# Patient Record
Sex: FEMALE | Race: WHITE | NOT HISPANIC OR LATINO | URBAN - METROPOLITAN AREA
[De-identification: names, ages, dates, MRNs, and addresses within clinical notes are randomized per-mention and may not be internally consistent; named-entity substitution may affect disease eponyms.]

---

## 2019-06-25 ENCOUNTER — OFFICE (OUTPATIENT)
Dept: URBAN - METROPOLITAN AREA CLINIC 75 | Facility: CLINIC | Age: 79
End: 2019-06-25

## 2019-06-25 VITALS
SYSTOLIC BLOOD PRESSURE: 160 MMHG | DIASTOLIC BLOOD PRESSURE: 104 MMHG | HEIGHT: 67 IN | WEIGHT: 225 LBS | HEART RATE: 75 BPM

## 2019-06-25 DIAGNOSIS — I48.91 UNSPECIFIED ATRIAL FIBRILLATION: ICD-10-CM

## 2019-06-25 DIAGNOSIS — D50.0 IRON DEFICIENCY ANEMIA SECONDARY TO BLOOD LOSS (CHRONIC): ICD-10-CM

## 2019-06-25 PROCEDURE — 99204 OFFICE O/P NEW MOD 45 MIN: CPT | Performed by: INTERNAL MEDICINE

## 2019-08-12 VITALS
TEMPERATURE: 96.4 F | HEART RATE: 61 BPM | DIASTOLIC BLOOD PRESSURE: 72 MMHG | RESPIRATION RATE: 21 BRPM | HEART RATE: 66 BPM | TEMPERATURE: 96.2 F | WEIGHT: 225 LBS | SYSTOLIC BLOOD PRESSURE: 191 MMHG | RESPIRATION RATE: 13 BRPM | SYSTOLIC BLOOD PRESSURE: 154 MMHG | SYSTOLIC BLOOD PRESSURE: 143 MMHG | RESPIRATION RATE: 22 BRPM | RESPIRATION RATE: 23 BRPM | DIASTOLIC BLOOD PRESSURE: 58 MMHG | SYSTOLIC BLOOD PRESSURE: 139 MMHG | RESPIRATION RATE: 17 BRPM | SYSTOLIC BLOOD PRESSURE: 158 MMHG | SYSTOLIC BLOOD PRESSURE: 208 MMHG | RESPIRATION RATE: 16 BRPM | HEART RATE: 70 BPM | OXYGEN SATURATION: 93 % | SYSTOLIC BLOOD PRESSURE: 175 MMHG | HEART RATE: 63 BPM | DIASTOLIC BLOOD PRESSURE: 61 MMHG | DIASTOLIC BLOOD PRESSURE: 74 MMHG | HEIGHT: 67 IN | RESPIRATION RATE: 19 BRPM | OXYGEN SATURATION: 96 % | HEART RATE: 60 BPM | SYSTOLIC BLOOD PRESSURE: 204 MMHG | RESPIRATION RATE: 20 BRPM | OXYGEN SATURATION: 92 % | HEART RATE: 67 BPM | HEART RATE: 57 BPM | SYSTOLIC BLOOD PRESSURE: 156 MMHG | HEART RATE: 64 BPM | HEART RATE: 62 BPM | DIASTOLIC BLOOD PRESSURE: 65 MMHG | OXYGEN SATURATION: 95 % | SYSTOLIC BLOOD PRESSURE: 159 MMHG | SYSTOLIC BLOOD PRESSURE: 161 MMHG | RESPIRATION RATE: 14 BRPM | HEART RATE: 69 BPM | OXYGEN SATURATION: 94 % | HEART RATE: 68 BPM | DIASTOLIC BLOOD PRESSURE: 67 MMHG | SYSTOLIC BLOOD PRESSURE: 133 MMHG | DIASTOLIC BLOOD PRESSURE: 82 MMHG | DIASTOLIC BLOOD PRESSURE: 81 MMHG | HEART RATE: 58 BPM | SYSTOLIC BLOOD PRESSURE: 157 MMHG | DIASTOLIC BLOOD PRESSURE: 69 MMHG | SYSTOLIC BLOOD PRESSURE: 145 MMHG | OXYGEN SATURATION: 100 % | SYSTOLIC BLOOD PRESSURE: 165 MMHG | HEART RATE: 65 BPM | DIASTOLIC BLOOD PRESSURE: 70 MMHG

## 2019-08-15 ENCOUNTER — OFFICE (OUTPATIENT)
Dept: URBAN - METROPOLITAN AREA PATHOLOGY 4 | Facility: PATHOLOGY | Age: 79
End: 2019-08-15

## 2019-08-15 ENCOUNTER — AMBULATORY SURGICAL CENTER (OUTPATIENT)
Dept: URBAN - METROPOLITAN AREA SURGERY 17 | Facility: SURGERY | Age: 79
End: 2019-08-15

## 2019-08-15 DIAGNOSIS — K29.80 DUODENITIS WITHOUT BLEEDING: ICD-10-CM

## 2019-08-15 DIAGNOSIS — D12.3 BENIGN NEOPLASM OF TRANSVERSE COLON: ICD-10-CM

## 2019-08-15 DIAGNOSIS — D12.5 BENIGN NEOPLASM OF SIGMOID COLON: ICD-10-CM

## 2019-08-15 DIAGNOSIS — D50.0 IRON DEFICIENCY ANEMIA SECONDARY TO BLOOD LOSS (CHRONIC): ICD-10-CM

## 2019-08-15 LAB
GI HISTOLOGY: A. UNSPECIFIED: (no result)
GI HISTOLOGY: B. UNSPECIFIED: (no result)
GI HISTOLOGY: C. UNSPECIFIED: (no result)
GI HISTOLOGY: PDF REPORT: (no result)

## 2019-08-15 PROCEDURE — 45385 COLONOSCOPY W/LESION REMOVAL: CPT | Performed by: INTERNAL MEDICINE

## 2019-08-15 PROCEDURE — 43239 EGD BIOPSY SINGLE/MULTIPLE: CPT | Performed by: INTERNAL MEDICINE

## 2019-08-15 PROCEDURE — 88305 TISSUE EXAM BY PATHOLOGIST: CPT | Performed by: INTERNAL MEDICINE

## 2022-03-31 ENCOUNTER — TRANSCRIBE ORDERS (OUTPATIENT)
Dept: ADMINISTRATIVE | Facility: HOSPITAL | Age: 82
End: 2022-03-31

## 2022-03-31 DIAGNOSIS — D63.1 ERYTHROPOIETIN DEFICIENCY ANEMIA: ICD-10-CM

## 2022-03-31 DIAGNOSIS — N18.4 CHRONIC RENAL DISEASE, STAGE IV: ICD-10-CM

## 2022-03-31 DIAGNOSIS — N18.4 CHRONIC KIDNEY DISEASE, STAGE IV (SEVERE): Primary | ICD-10-CM

## 2022-03-31 DIAGNOSIS — D63.1 ANEMIA IN CHRONIC KIDNEY DISEASE (CODE): Primary | ICD-10-CM

## 2022-04-01 ENCOUNTER — HOSPITAL ENCOUNTER (OUTPATIENT)
Dept: INFUSION THERAPY | Facility: HOSPITAL | Age: 82
Discharge: HOME | End: 2022-04-01
Admitting: INTERNAL MEDICINE

## 2022-04-01 VITALS
HEART RATE: 67 BPM | SYSTOLIC BLOOD PRESSURE: 159 MMHG | RESPIRATION RATE: 22 BRPM | OXYGEN SATURATION: 100 % | DIASTOLIC BLOOD PRESSURE: 75 MMHG | TEMPERATURE: 97.1 F

## 2022-04-01 DIAGNOSIS — D63.1 ERYTHROPOIETIN DEFICIENCY ANEMIA: ICD-10-CM

## 2022-04-01 DIAGNOSIS — N18.4 CHRONIC KIDNEY DISEASE, STAGE IV (SEVERE): Primary | ICD-10-CM

## 2022-04-01 LAB
BASOPHILS # BLD AUTO: 0.03 10*3/MM3 (ref 0–0.2)
BASOPHILS NFR BLD AUTO: 0.4 % (ref 0–1.5)
DEPRECATED RDW RBC AUTO: 45.2 FL (ref 37–54)
EOSINOPHIL # BLD AUTO: 0.12 10*3/MM3 (ref 0–0.4)
EOSINOPHIL NFR BLD AUTO: 1.4 % (ref 0.3–6.2)
ERYTHROCYTE [DISTWIDTH] IN BLOOD BY AUTOMATED COUNT: 14.7 % (ref 12.3–15.4)
FERRITIN SERPL-MCNC: 43.1 NG/ML (ref 13–150)
HCT VFR BLD AUTO: 23.9 % (ref 34–46.6)
HGB BLD-MCNC: 7.8 G/DL (ref 12–15.9)
IMM GRANULOCYTES # BLD AUTO: 0.04 10*3/MM3 (ref 0–0.05)
IMM GRANULOCYTES NFR BLD AUTO: 0.5 % (ref 0–0.5)
IRON 24H UR-MRATE: 42 MCG/DL (ref 37–145)
IRON SATN MFR SERPL: 9 % (ref 20–50)
LYMPHOCYTES # BLD AUTO: 1.56 10*3/MM3 (ref 0.7–3.1)
LYMPHOCYTES NFR BLD AUTO: 18.7 % (ref 19.6–45.3)
MCH RBC QN AUTO: 27.8 PG (ref 26.6–33)
MCHC RBC AUTO-ENTMCNC: 32.6 G/DL (ref 31.5–35.7)
MCV RBC AUTO: 85.1 FL (ref 79–97)
MONOCYTES # BLD AUTO: 0.87 10*3/MM3 (ref 0.1–0.9)
MONOCYTES NFR BLD AUTO: 10.4 % (ref 5–12)
NEUTROPHILS NFR BLD AUTO: 5.72 10*3/MM3 (ref 1.7–7)
NEUTROPHILS NFR BLD AUTO: 68.6 % (ref 42.7–76)
NRBC BLD AUTO-RTO: 0 /100 WBC (ref 0–0.2)
PLATELET # BLD AUTO: 226 10*3/MM3 (ref 140–450)
PMV BLD AUTO: 10.2 FL (ref 6–12)
RBC # BLD AUTO: 2.81 10*6/MM3 (ref 3.77–5.28)
TIBC SERPL-MCNC: 475 MCG/DL (ref 298–536)
TRANSFERRIN SERPL-MCNC: 319 MG/DL (ref 200–360)
WBC NRBC COR # BLD: 8.34 10*3/MM3 (ref 3.4–10.8)

## 2022-04-01 PROCEDURE — 86901 BLOOD TYPING SEROLOGIC RH(D): CPT

## 2022-04-01 PROCEDURE — 85025 COMPLETE CBC W/AUTO DIFF WBC: CPT | Performed by: INTERNAL MEDICINE

## 2022-04-01 PROCEDURE — 96365 THER/PROPH/DIAG IV INF INIT: CPT

## 2022-04-01 PROCEDURE — 96374 THER/PROPH/DIAG INJ IV PUSH: CPT

## 2022-04-01 PROCEDURE — 36415 COLL VENOUS BLD VENIPUNCTURE: CPT

## 2022-04-01 PROCEDURE — 25010000002 FERUMOXYTOL 510 MG/17ML SOLUTION 17 ML VIAL: Performed by: INTERNAL MEDICINE

## 2022-04-01 PROCEDURE — 84466 ASSAY OF TRANSFERRIN: CPT | Performed by: INTERNAL MEDICINE

## 2022-04-01 PROCEDURE — 25010000002 EPOETIN ALFA PER 1000 UNITS: Performed by: INTERNAL MEDICINE

## 2022-04-01 PROCEDURE — 82728 ASSAY OF FERRITIN: CPT | Performed by: INTERNAL MEDICINE

## 2022-04-01 PROCEDURE — 83540 ASSAY OF IRON: CPT | Performed by: INTERNAL MEDICINE

## 2022-04-01 PROCEDURE — 96372 THER/PROPH/DIAG INJ SC/IM: CPT

## 2022-04-01 PROCEDURE — 86900 BLOOD TYPING SEROLOGIC ABO: CPT

## 2022-04-01 RX ORDER — FUROSEMIDE 20 MG/1
20 TABLET ORAL DAILY
COMMUNITY
Start: 2022-01-27 | End: 2022-04-12 | Stop reason: HOSPADM

## 2022-04-01 RX ORDER — GABAPENTIN 300 MG/1
300 CAPSULE ORAL 3 TIMES DAILY
COMMUNITY
Start: 2021-10-25 | End: 2022-04-12 | Stop reason: HOSPADM

## 2022-04-01 RX ORDER — METOPROLOL TARTRATE 100 MG/1
100 TABLET ORAL DAILY
COMMUNITY
Start: 2022-02-07 | End: 2022-04-12 | Stop reason: HOSPADM

## 2022-04-01 RX ORDER — VENLAFAXINE HYDROCHLORIDE 75 MG/1
75 CAPSULE, EXTENDED RELEASE ORAL DAILY
COMMUNITY
Start: 2021-12-29

## 2022-04-01 RX ORDER — ATORVASTATIN CALCIUM 40 MG/1
1 TABLET, FILM COATED ORAL
COMMUNITY
Start: 2021-11-22

## 2022-04-01 RX ORDER — MULTIPLE VITAMINS W/ MINERALS TAB 9MG-400MCG
1 TAB ORAL DAILY
COMMUNITY

## 2022-04-01 RX ORDER — OMEPRAZOLE 40 MG/1
40 CAPSULE, DELAYED RELEASE ORAL DAILY
COMMUNITY
Start: 2021-11-23 | End: 2022-10-15 | Stop reason: HOSPADM

## 2022-04-01 RX ORDER — AMLODIPINE BESYLATE 5 MG/1
5 TABLET ORAL 2 TIMES DAILY
COMMUNITY
Start: 2021-12-29 | End: 2022-04-12 | Stop reason: HOSPADM

## 2022-04-01 RX ADMIN — FERUMOXYTOL 510 MG: 510 INJECTION INTRAVENOUS at 15:50

## 2022-04-01 RX ADMIN — ERYTHROPOIETIN 20000 UNITS: 20000 INJECTION, SOLUTION INTRAVENOUS; SUBCUTANEOUS at 14:45

## 2022-04-01 NOTE — PROGRESS NOTES
Pt given information verbally and written concerning procrit. Pt and daughter verbalized understanding.  Pt and daughter informed of orders and plan of care for the day.  Both verbalized understanding.      Pt given procrit injection per order due to order parameters met.  Pt and Daughter given information verbally and written concerning feraheme infusion due to order parameters met.  Both verbalized understanding.  Pt tolerated injection and infusion well.  Pt monitored for 30 minutes without complication. Pt given AVS and  dc'ed per wc with daughter.

## 2022-04-03 ENCOUNTER — HOSPITAL ENCOUNTER (INPATIENT)
Facility: HOSPITAL | Age: 82
LOS: 9 days | Discharge: NURSING, REHAB OR LONG-TERM CARE | End: 2022-04-12
Attending: EMERGENCY MEDICINE | Admitting: STUDENT IN AN ORGANIZED HEALTH CARE EDUCATION/TRAINING PROGRAM

## 2022-04-03 ENCOUNTER — APPOINTMENT (OUTPATIENT)
Dept: GENERAL RADIOLOGY | Facility: HOSPITAL | Age: 82
End: 2022-04-03

## 2022-04-03 DIAGNOSIS — D64.9 SYMPTOMATIC ANEMIA: ICD-10-CM

## 2022-04-03 DIAGNOSIS — E11.42 DIABETIC POLYNEUROPATHY ASSOCIATED WITH TYPE 2 DIABETES MELLITUS: ICD-10-CM

## 2022-04-03 DIAGNOSIS — I50.32 CHRONIC DIASTOLIC CONGESTIVE HEART FAILURE: ICD-10-CM

## 2022-04-03 DIAGNOSIS — N18.9 CHRONIC KIDNEY DISEASE, UNSPECIFIED CKD STAGE: ICD-10-CM

## 2022-04-03 DIAGNOSIS — K92.2 GASTROINTESTINAL HEMORRHAGE, UNSPECIFIED GASTROINTESTINAL HEMORRHAGE TYPE: ICD-10-CM

## 2022-04-03 DIAGNOSIS — F41.8 ANXIETY ASSOCIATED WITH DEPRESSION: ICD-10-CM

## 2022-04-03 DIAGNOSIS — I48.0 PAF (PAROXYSMAL ATRIAL FIBRILLATION): ICD-10-CM

## 2022-04-03 DIAGNOSIS — E66.9 OBESITY (BMI 30-39.9): ICD-10-CM

## 2022-04-03 DIAGNOSIS — G47.33 OSA (OBSTRUCTIVE SLEEP APNEA): Primary | ICD-10-CM

## 2022-04-03 PROBLEM — I50.9 CHF (CONGESTIVE HEART FAILURE): Status: ACTIVE | Noted: 2022-04-03

## 2022-04-03 PROBLEM — D50.9 IRON DEFICIENCY ANEMIA: Status: ACTIVE | Noted: 2022-04-03

## 2022-04-03 PROBLEM — R06.02 SHORTNESS OF BREATH: Status: ACTIVE | Noted: 2022-04-03

## 2022-04-03 LAB
ABO GROUP BLD: NORMAL
ALBUMIN SERPL-MCNC: 3.1 G/DL (ref 3.5–5.2)
ALBUMIN/GLOB SERPL: 1.1 G/DL
ALP SERPL-CCNC: 144 U/L (ref 39–117)
ALT SERPL W P-5'-P-CCNC: 6 U/L (ref 1–33)
ANION GAP SERPL CALCULATED.3IONS-SCNC: 10.8 MMOL/L (ref 5–15)
ANION GAP SERPL CALCULATED.3IONS-SCNC: 11.9 MMOL/L (ref 5–15)
APTT PPP: 33.2 SECONDS (ref 22.7–35.4)
AST SERPL-CCNC: 24 U/L (ref 1–32)
BASOPHILS # BLD AUTO: 0.03 10*3/MM3 (ref 0–0.2)
BASOPHILS NFR BLD AUTO: 0.6 % (ref 0–1.5)
BH BB BLOOD EXPIRATION DATE: NORMAL
BH BB BLOOD TYPE BARCODE: 6200
BH BB DISPENSE STATUS: NORMAL
BH BB PRODUCT CODE: NORMAL
BH BB UNIT NUMBER: NORMAL
BILIRUB SERPL-MCNC: 0.5 MG/DL (ref 0–1.2)
BLD GP AB SCN SERPL QL: NEGATIVE
BUN SERPL-MCNC: 27 MG/DL (ref 8–23)
BUN SERPL-MCNC: 27 MG/DL (ref 8–23)
BUN/CREAT SERPL: 12.2 (ref 7–25)
BUN/CREAT SERPL: 13.4 (ref 7–25)
CALCIUM SPEC-SCNC: 8.3 MG/DL (ref 8.6–10.5)
CALCIUM SPEC-SCNC: 9 MG/DL (ref 8.6–10.5)
CHLORIDE SERPL-SCNC: 108 MMOL/L (ref 98–107)
CHLORIDE SERPL-SCNC: 110 MMOL/L (ref 98–107)
CO2 SERPL-SCNC: 16.1 MMOL/L (ref 22–29)
CO2 SERPL-SCNC: 20.2 MMOL/L (ref 22–29)
CREAT SERPL-MCNC: 2.02 MG/DL (ref 0.57–1)
CREAT SERPL-MCNC: 2.21 MG/DL (ref 0.57–1)
CROSSMATCH INTERPRETATION: NORMAL
DEPRECATED RDW RBC AUTO: 48.3 FL (ref 37–54)
DEPRECATED RDW RBC AUTO: 48.4 FL (ref 37–54)
EGFRCR SERPLBLD CKD-EPI 2021: 21.8 ML/MIN/1.73
EGFRCR SERPLBLD CKD-EPI 2021: 24.2 ML/MIN/1.73
EOSINOPHIL # BLD AUTO: 0.13 10*3/MM3 (ref 0–0.4)
EOSINOPHIL NFR BLD AUTO: 2.6 % (ref 0.3–6.2)
ERYTHROCYTE [DISTWIDTH] IN BLOOD BY AUTOMATED COUNT: 15.2 % (ref 12.3–15.4)
ERYTHROCYTE [DISTWIDTH] IN BLOOD BY AUTOMATED COUNT: 15.3 % (ref 12.3–15.4)
GLOBULIN UR ELPH-MCNC: 2.9 GM/DL
GLUCOSE SERPL-MCNC: 121 MG/DL (ref 65–99)
GLUCOSE SERPL-MCNC: 169 MG/DL (ref 65–99)
HCT VFR BLD AUTO: 22.5 % (ref 34–46.6)
HCT VFR BLD AUTO: 24.2 % (ref 34–46.6)
HGB BLD-MCNC: 7.3 G/DL (ref 12–15.9)
HGB BLD-MCNC: 7.9 G/DL (ref 12–15.9)
IMM GRANULOCYTES # BLD AUTO: 0.01 10*3/MM3 (ref 0–0.05)
IMM GRANULOCYTES NFR BLD AUTO: 0.2 % (ref 0–0.5)
INR PPP: 2.14 (ref 0.9–1.1)
LYMPHOCYTES # BLD AUTO: 1.35 10*3/MM3 (ref 0.7–3.1)
LYMPHOCYTES NFR BLD AUTO: 27 % (ref 19.6–45.3)
MCH RBC QN AUTO: 28.3 PG (ref 26.6–33)
MCH RBC QN AUTO: 28.3 PG (ref 26.6–33)
MCHC RBC AUTO-ENTMCNC: 32.4 G/DL (ref 31.5–35.7)
MCHC RBC AUTO-ENTMCNC: 32.6 G/DL (ref 31.5–35.7)
MCV RBC AUTO: 86.7 FL (ref 79–97)
MCV RBC AUTO: 87.2 FL (ref 79–97)
MONOCYTES # BLD AUTO: 0.64 10*3/MM3 (ref 0.1–0.9)
MONOCYTES NFR BLD AUTO: 12.8 % (ref 5–12)
NEUTROPHILS NFR BLD AUTO: 2.84 10*3/MM3 (ref 1.7–7)
NEUTROPHILS NFR BLD AUTO: 56.8 % (ref 42.7–76)
NRBC BLD AUTO-RTO: 0 /100 WBC (ref 0–0.2)
NT-PROBNP SERPL-MCNC: 2649 PG/ML (ref 0–1800)
PLATELET # BLD AUTO: 178 10*3/MM3 (ref 140–450)
PLATELET # BLD AUTO: 181 10*3/MM3 (ref 140–450)
PMV BLD AUTO: 10.4 FL (ref 6–12)
PMV BLD AUTO: 9.7 FL (ref 6–12)
POTASSIUM SERPL-SCNC: 4.5 MMOL/L (ref 3.5–5.2)
POTASSIUM SERPL-SCNC: 4.7 MMOL/L (ref 3.5–5.2)
PROT SERPL-MCNC: 6 G/DL (ref 6–8.5)
PROTHROMBIN TIME: 24 SECONDS (ref 11.7–14.2)
QT INTERVAL: 445 MS
RBC # BLD AUTO: 2.58 10*6/MM3 (ref 3.77–5.28)
RBC # BLD AUTO: 2.79 10*6/MM3 (ref 3.77–5.28)
RH BLD: POSITIVE
SARS-COV-2 RNA PNL SPEC NAA+PROBE: NOT DETECTED
SODIUM SERPL-SCNC: 138 MMOL/L (ref 136–145)
SODIUM SERPL-SCNC: 139 MMOL/L (ref 136–145)
T&S EXPIRATION DATE: NORMAL
TROPONIN T SERPL-MCNC: <0.01 NG/ML (ref 0–0.03)
TROPONIN T SERPL-MCNC: <0.01 NG/ML (ref 0–0.03)
TSH SERPL DL<=0.05 MIU/L-ACNC: 3.94 UIU/ML (ref 0.27–4.2)
UNIT  ABO: NORMAL
UNIT  RH: NORMAL
WBC NRBC COR # BLD: 5 10*3/MM3 (ref 3.4–10.8)
WBC NRBC COR # BLD: 5.61 10*3/MM3 (ref 3.4–10.8)

## 2022-04-03 PROCEDURE — 86900 BLOOD TYPING SEROLOGIC ABO: CPT

## 2022-04-03 PROCEDURE — 85610 PROTHROMBIN TIME: CPT | Performed by: EMERGENCY MEDICINE

## 2022-04-03 PROCEDURE — 86901 BLOOD TYPING SEROLOGIC RH(D): CPT | Performed by: EMERGENCY MEDICINE

## 2022-04-03 PROCEDURE — 99221 1ST HOSP IP/OBS SF/LOW 40: CPT | Performed by: INTERNAL MEDICINE

## 2022-04-03 PROCEDURE — 87635 SARS-COV-2 COVID-19 AMP PRB: CPT | Performed by: EMERGENCY MEDICINE

## 2022-04-03 PROCEDURE — 86900 BLOOD TYPING SEROLOGIC ABO: CPT | Performed by: EMERGENCY MEDICINE

## 2022-04-03 PROCEDURE — 99284 EMERGENCY DEPT VISIT MOD MDM: CPT

## 2022-04-03 PROCEDURE — 83880 ASSAY OF NATRIURETIC PEPTIDE: CPT | Performed by: HOSPITALIST

## 2022-04-03 PROCEDURE — P9016 RBC LEUKOCYTES REDUCED: HCPCS

## 2022-04-03 PROCEDURE — 85025 COMPLETE CBC W/AUTO DIFF WBC: CPT | Performed by: EMERGENCY MEDICINE

## 2022-04-03 PROCEDURE — 36430 TRANSFUSION BLD/BLD COMPNT: CPT

## 2022-04-03 PROCEDURE — 86923 COMPATIBILITY TEST ELECTRIC: CPT

## 2022-04-03 PROCEDURE — 36415 COLL VENOUS BLD VENIPUNCTURE: CPT

## 2022-04-03 PROCEDURE — 85730 THROMBOPLASTIN TIME PARTIAL: CPT | Performed by: EMERGENCY MEDICINE

## 2022-04-03 PROCEDURE — 93005 ELECTROCARDIOGRAM TRACING: CPT

## 2022-04-03 PROCEDURE — 71045 X-RAY EXAM CHEST 1 VIEW: CPT

## 2022-04-03 PROCEDURE — 93010 ELECTROCARDIOGRAM REPORT: CPT | Performed by: INTERNAL MEDICINE

## 2022-04-03 PROCEDURE — 80053 COMPREHEN METABOLIC PANEL: CPT | Performed by: EMERGENCY MEDICINE

## 2022-04-03 PROCEDURE — 25010000002 FUROSEMIDE PER 20 MG: Performed by: INTERNAL MEDICINE

## 2022-04-03 PROCEDURE — 84484 ASSAY OF TROPONIN QUANT: CPT | Performed by: EMERGENCY MEDICINE

## 2022-04-03 PROCEDURE — 85027 COMPLETE CBC AUTOMATED: CPT

## 2022-04-03 PROCEDURE — 25010000002 FUROSEMIDE PER 20 MG: Performed by: HOSPITALIST

## 2022-04-03 PROCEDURE — 93005 ELECTROCARDIOGRAM TRACING: CPT | Performed by: EMERGENCY MEDICINE

## 2022-04-03 PROCEDURE — 84443 ASSAY THYROID STIM HORMONE: CPT | Performed by: HOSPITALIST

## 2022-04-03 PROCEDURE — 86850 RBC ANTIBODY SCREEN: CPT | Performed by: EMERGENCY MEDICINE

## 2022-04-03 RX ORDER — SODIUM CHLORIDE 0.9 % (FLUSH) 0.9 %
10 SYRINGE (ML) INJECTION AS NEEDED
Status: DISCONTINUED | OUTPATIENT
Start: 2022-04-03 | End: 2022-04-12 | Stop reason: HOSPADM

## 2022-04-03 RX ORDER — ONDANSETRON 4 MG/1
4 TABLET, FILM COATED ORAL EVERY 6 HOURS PRN
Status: DISCONTINUED | OUTPATIENT
Start: 2022-04-03 | End: 2022-04-12 | Stop reason: HOSPADM

## 2022-04-03 RX ORDER — ONDANSETRON 2 MG/ML
4 INJECTION INTRAMUSCULAR; INTRAVENOUS EVERY 6 HOURS PRN
Status: DISCONTINUED | OUTPATIENT
Start: 2022-04-03 | End: 2022-04-12 | Stop reason: HOSPADM

## 2022-04-03 RX ORDER — ATORVASTATIN CALCIUM 20 MG/1
40 TABLET, FILM COATED ORAL DAILY
Status: DISCONTINUED | OUTPATIENT
Start: 2022-04-03 | End: 2022-04-12 | Stop reason: HOSPADM

## 2022-04-03 RX ORDER — AMLODIPINE BESYLATE 5 MG/1
5 TABLET ORAL
Status: DISCONTINUED | OUTPATIENT
Start: 2022-04-03 | End: 2022-04-12 | Stop reason: HOSPADM

## 2022-04-03 RX ORDER — ACETAMINOPHEN 325 MG/1
650 TABLET ORAL EVERY 4 HOURS PRN
Status: DISCONTINUED | OUTPATIENT
Start: 2022-04-03 | End: 2022-04-12 | Stop reason: HOSPADM

## 2022-04-03 RX ORDER — FUROSEMIDE 10 MG/ML
40 INJECTION INTRAMUSCULAR; INTRAVENOUS ONCE
Status: DISCONTINUED | OUTPATIENT
Start: 2022-04-03 | End: 2022-04-03

## 2022-04-03 RX ORDER — NITROGLYCERIN 0.4 MG/1
0.4 TABLET SUBLINGUAL
Status: DISCONTINUED | OUTPATIENT
Start: 2022-04-03 | End: 2022-04-12 | Stop reason: HOSPADM

## 2022-04-03 RX ORDER — PANTOPRAZOLE SODIUM 40 MG/10ML
80 INJECTION, POWDER, LYOPHILIZED, FOR SOLUTION INTRAVENOUS ONCE
Status: COMPLETED | OUTPATIENT
Start: 2022-04-03 | End: 2022-04-03

## 2022-04-03 RX ORDER — FUROSEMIDE 10 MG/ML
40 INJECTION INTRAMUSCULAR; INTRAVENOUS EVERY 12 HOURS
Status: COMPLETED | OUTPATIENT
Start: 2022-04-03 | End: 2022-04-06

## 2022-04-03 RX ORDER — PANTOPRAZOLE SODIUM 40 MG/1
40 TABLET, DELAYED RELEASE ORAL EVERY MORNING
Status: DISCONTINUED | OUTPATIENT
Start: 2022-04-03 | End: 2022-04-12 | Stop reason: HOSPADM

## 2022-04-03 RX ORDER — GABAPENTIN 300 MG/1
300 CAPSULE ORAL 3 TIMES DAILY
Status: DISCONTINUED | OUTPATIENT
Start: 2022-04-03 | End: 2022-04-09

## 2022-04-03 RX ORDER — METOPROLOL TARTRATE 50 MG/1
100 TABLET, FILM COATED ORAL DAILY
Status: DISCONTINUED | OUTPATIENT
Start: 2022-04-03 | End: 2022-04-09

## 2022-04-03 RX ORDER — PANTOPRAZOLE SODIUM 40 MG/10ML
40 INJECTION, POWDER, LYOPHILIZED, FOR SOLUTION INTRAVENOUS EVERY 12 HOURS SCHEDULED
Status: DISCONTINUED | OUTPATIENT
Start: 2022-04-03 | End: 2022-04-03

## 2022-04-03 RX ORDER — UREA 10 %
3 LOTION (ML) TOPICAL NIGHTLY PRN
Status: DISCONTINUED | OUTPATIENT
Start: 2022-04-03 | End: 2022-04-12 | Stop reason: HOSPADM

## 2022-04-03 RX ORDER — VENLAFAXINE HYDROCHLORIDE 75 MG/1
75 CAPSULE, EXTENDED RELEASE ORAL DAILY
Status: DISCONTINUED | OUTPATIENT
Start: 2022-04-03 | End: 2022-04-12 | Stop reason: HOSPADM

## 2022-04-03 RX ORDER — SODIUM CHLORIDE, SODIUM LACTATE, POTASSIUM CHLORIDE, CALCIUM CHLORIDE 600; 310; 30; 20 MG/100ML; MG/100ML; MG/100ML; MG/100ML
75 INJECTION, SOLUTION INTRAVENOUS CONTINUOUS
Status: DISCONTINUED | OUTPATIENT
Start: 2022-04-03 | End: 2022-04-03

## 2022-04-03 RX ADMIN — METOPROLOL TARTRATE 100 MG: 50 TABLET, FILM COATED ORAL at 12:43

## 2022-04-03 RX ADMIN — GABAPENTIN 300 MG: 300 CAPSULE ORAL at 12:43

## 2022-04-03 RX ADMIN — FUROSEMIDE 40 MG: 10 INJECTION, SOLUTION INTRAMUSCULAR; INTRAVENOUS at 12:43

## 2022-04-03 RX ADMIN — PANTOPRAZOLE SODIUM 40 MG: 40 INJECTION, POWDER, FOR SOLUTION INTRAVENOUS at 08:06

## 2022-04-03 RX ADMIN — AMLODIPINE BESYLATE 5 MG: 5 TABLET ORAL at 12:43

## 2022-04-03 RX ADMIN — ATORVASTATIN CALCIUM 40 MG: 20 TABLET, FILM COATED ORAL at 12:43

## 2022-04-03 RX ADMIN — VENLAFAXINE HYDROCHLORIDE 75 MG: 75 CAPSULE, EXTENDED RELEASE ORAL at 18:02

## 2022-04-03 RX ADMIN — GABAPENTIN 300 MG: 300 CAPSULE ORAL at 21:42

## 2022-04-03 RX ADMIN — PANTOPRAZOLE SODIUM 80 MG: 40 INJECTION, POWDER, LYOPHILIZED, FOR SOLUTION INTRAVENOUS at 01:04

## 2022-04-03 RX ADMIN — FUROSEMIDE 40 MG: 10 INJECTION, SOLUTION INTRAMUSCULAR; INTRAVENOUS at 18:02

## 2022-04-03 RX ADMIN — GABAPENTIN 300 MG: 300 CAPSULE ORAL at 18:02

## 2022-04-03 RX ADMIN — PANTOPRAZOLE SODIUM 40 MG: 40 TABLET, DELAYED RELEASE ORAL at 12:43

## 2022-04-03 NOTE — PLAN OF CARE
Goal Outcome Evaluation:      Pt to room 659 via wheelchair daughter w her. No c/o voiced. 1 u PRBC's infusing. VSS. Md notified of admission new orders noted.  Cont to monitor.

## 2022-04-03 NOTE — CONSULTS
[facility]  Initial Inpatient Consult Note    Referring Provider: LUI Mathews    Reason for Consultation: Rectal bleeding, anemia    Subjective     History of present illness:    82 y.o. female recent episode of bright red blood on the tissue.  She denies any prior history of rectal bleeding.  No complaints of hemorrhoids or straining.  She has had previous colonoscopy by Dr. Jaleel Waterman several years past.  She is also been diagnosed with colon polyps in the past.  No upper GI complaints.  She does have some issues with edema and congestive heart failure as well as chronic kidney disease stage IV.  Some of her anemia has been attributed to chronic kidney disease.  Patient on Eliquis for atrial fibrillation.  Advised with no evidence of active GI bleeding at this time she can defer formal evaluation for now but would return to the A service for further evaluation once she has been discharged.  If she requires resumption of anticoagulant would monitor closely.    Past Medical History:  Past Medical History:   Diagnosis Date   • Anxiety    • Atrial fibrillation (HCC)    • Chronic kidney disease, stage IV (severe) (HCC)    • Depression    • Elevated cholesterol    • Hypertension    • Type 2 diabetes mellitus (HCC)      Past Surgical History:  Past Surgical History:   Procedure Laterality Date   • APPENDECTOMY     • CHOLECYSTECTOMY     • COLONOSCOPY     • HYSTERECTOMY     • TUMOR REMOVAL Left     benign tumor from left breast      Social History:   Social History     Tobacco Use   • Smoking status: Never Smoker   • Smokeless tobacco: Never Used   Substance Use Topics   • Alcohol use: Never      Family History:  No family history on file.    Home Meds:  Medications Prior to Admission   Medication Sig Dispense Refill Last Dose   • amLODIPine (NORVASC) 5 MG tablet Take 5 mg by mouth 2 (Two) Times a Day.   4/2/2022 at Unknown time   • apixaban (ELIQUIS) 5 MG tablet tablet Take 5 mg by  mouth 2 (Two) Times a Day.   4/2/2022 at Unknown time   • atorvastatin (LIPITOR) 40 MG tablet Take 1 tablet by mouth every night at bedtime.   4/2/2022 at Unknown time   • furosemide (LASIX) 20 MG tablet Take 20 mg by mouth Daily.   4/2/2022 at Unknown time   • gabapentin (NEURONTIN) 300 MG capsule Take 300 mg by mouth 3 (Three) Times a Day. 2 capsules 3 times per day.   4/2/2022 at Unknown time   • metoprolol tartrate (LOPRESSOR) 100 MG tablet Take 100 mg by mouth Daily. 2 tablets daily   4/2/2022 at Unknown time   • multivitamin with minerals tablet tablet Take 1 tablet by mouth Daily.   4/2/2022 at Unknown time   • omeprazole (priLOSEC) 40 MG capsule Take 40 mg by mouth Daily.   4/2/2022 at Unknown time   • venlafaxine XR (EFFEXOR-XR) 75 MG 24 hr capsule Take 75 mg by mouth Daily.   4/2/2022 at Unknown time     Current Meds:   amLODIPine, 5 mg, Oral, Q24H  atorvastatin, 40 mg, Oral, Daily  furosemide, 40 mg, Intravenous, Once  gabapentin, 300 mg, Oral, TID  metoprolol tartrate, 100 mg, Oral, Daily  pantoprazole, 40 mg, Oral, QAM  venlafaxine XR, 75 mg, Oral, Daily      Allergies:  Allergies   Allergen Reactions   • Ibuprofen Other (See Comments)     Decrease urine output       Review of Systems  Pertinent items are noted in HPI, all other systems reviewed and negative     Objective     Vital Signs  Temp:  [97.6 °F (36.4 °C)-98.5 °F (36.9 °C)] 98.2 °F (36.8 °C)  Heart Rate:  [65-78] 77  Resp:  [18] 18  BP: (141-171)/(70-92) 165/92  Physical Exam:  General Appearance:    Alert, cooperative, in no acute distress   Head:    Normocephalic, without obvious abnormality, atraumatic   Eyes:          conjunctivae and sclerae normal, no   icterus   Throat:   no thrush, oral mucosa moist   Neck:   Supple, no adenopathy   Lungs:     Clear to auscultation bilaterally    Heart:    Regular rhythm and normal rate    Chest Wall:    No abnormalities observed   Abdomen:     Soft, nondistended, nontender; normal bowel sounds    Extremities:   no edema, no redness   Skin:   No bruising or rash   Psychiatric:  normal mood and insight     Results Review:   I reviewed the patient's new clinical results.    Results from last 7 days   Lab Units 04/03/22  0939 04/03/22  0044 04/01/22  1404   WBC 10*3/mm3 5.61 5.00 8.34   HEMOGLOBIN g/dL 7.9* 7.3* 7.8*   HEMATOCRIT % 24.2* 22.5* 23.9*   PLATELETS 10*3/mm3 181 178 226     Results from last 7 days   Lab Units 04/03/22  0939 04/03/22  0044   SODIUM mmol/L 139 138   POTASSIUM mmol/L 4.5 4.7   CHLORIDE mmol/L 108* 110*   CO2 mmol/L 20.2* 16.1*   BUN mg/dL 27* 27*   CREATININE mg/dL 2.21* 2.02*   CALCIUM mg/dL 9.0 8.3*   BILIRUBIN mg/dL  --  0.5   ALK PHOS U/L  --  144*   ALT (SGPT) U/L  --  6   AST (SGOT) U/L  --  24   GLUCOSE mg/dL 169* 121*     Results from last 7 days   Lab Units 04/03/22  0044   INR  2.14*     No results found for: LIPASE    Radiology:  XR Chest 1 View   Final Result   Cardiomegaly with vascular congestion.       This report was finalized on 4/3/2022 1:21 AM by Dr. Em Brewster M.D.              Assessment/Plan   Patient Active Problem List   Diagnosis   • Chronic kidney disease, stage IV (severe) ([facility])   • Erythropoietin deficiency anemia   • Symptomatic anemia   • Anxiety associated with depression   • Iron deficiency anemia   • PAF (paroxysmal atrial fibrillation) ([facility])   • Shortness of breath   • CHF (congestive heart failure) ([facility])       Assessment:  1. Bright red blood on the tissue x1  2. 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 History of colon polyps  4. Congestive heart failure  5. Chronic kidney disease stage IV    Plan:  · Monitor H&H  · Defer endoscopic evaluation at this point unless she shows evidence of active GI blood loss  · Anticipate follow-up with [facility] service for further evaluation of her colon secondary to her history of polyps and recent episode of bleeding.      I discussed the patients findings and my recommendations with patient and  family.    Michael Diaz MD

## 2022-04-03 NOTE — H&P
Patient Name:  Lowell Min  YOB: 1940  MRN:  [id number]  Admit Date:  4/3/2022  Patient Care Team:  Dannie Mathews MD as PCP - General (Internal Medicine)      Subjective   History Present Illness     Chief Complaint   Patient presents with   • Shortness of Breath   • Black or Bloody Stool       Ms. Min is a 82 y.o. female with a history of RAHDA with iron infusions, PAF, CKD, HTN, HLD, anxiety, depression and DM type II that presents to [facility] complaining of shortness of air/breath.  Patient states that for approximately 3 weeks she has been feeling tired and rundown.  She has had no energy and was experiencing dyspnea on exertion.  However, now she is experiencing increased dyspnea all the time.  Stable going to the bathroom and cleaning herself after she noted some blood.  Due to patient being on Eliquis she came to [facility] emergency department for further evaluation.  She came via EMS and was on a nonrebreather upon arrival due to her dyspnea.  However, oxygen was able to be weaned down and she is currently utilizing 2 L via nasal cannula and has been on that since shortly after arrival to [facility].  Currently at rest she is experiencing no shortness of air.  Patient's most recent iron infusion was yesterday.  Her current hemoglobin is 7.3.  Patient is presenting symptomatic.  Patient did have positive Hemoccult on stool collected in the emergency department.  She has had low hemoglobins for quite some time now and due to the iron infusions she has had dark stools.  Yesterday was the first time that she had noticed any blood while going to the bathroom.  She does report swelling in the abdomen and bilateral lower extremities.  She has been followed by nephrology for this.  She has reported some slight activity change.  Patient alert and oriented and lives alone but is checked on by her daughter.  She lives very independently and does for self.   Patient is ill-appearing but in no acute distress during time of exam.  Daughter at bedside who helped assist with patient's history.  Plan of care reviewed with patient and daughter and they are agreeable.  Review of systems, physical exam, diagnostic data and plan are as outlined below.          Review of Systems   Constitutional: Positive for activity change, fatigue and unexpected weight change. Negative for appetite change and fever.   HENT: Negative for sore throat and trouble swallowing.    Respiratory: Positive for shortness of breath. Negative for cough.    Gastrointestinal: Negative for abdominal pain, constipation, diarrhea, nausea and vomiting.   Endocrine: Negative for cold intolerance and heat intolerance.   Genitourinary: Positive for frequency. Negative for difficulty urinating.   Musculoskeletal: Positive for arthralgias and myalgias. Negative for neck pain and neck stiffness.   Neurological: Positive for dizziness, weakness and light-headedness. Negative for tremors and headaches.   Psychiatric/Behavioral: Negative for sleep disturbance. The patient is not nervous/anxious.         Personal History     Past Medical History:   Diagnosis Date   • Anxiety    • Atrial fibrillation (HCC)    • Chronic kidney disease, stage IV (severe) (HCC)    • Depression    • Elevated cholesterol    • Hypertension    • Type 2 diabetes mellitus (HCC)      Past Surgical History:   Procedure Laterality Date   • APPENDECTOMY     • CHOLECYSTECTOMY     • COLONOSCOPY     • HYSTERECTOMY     • TUMOR REMOVAL Left     benign tumor from left breast     No family history on file.  Social History     Tobacco Use   • Smoking status: Never Smoker   • Smokeless tobacco: Never Used   Vaping Use   • Vaping Use: Never used   Substance Use Topics   • Alcohol use: Never   • Drug use: Never     No current facility-administered medications on file prior to encounter.     Current Outpatient Medications on File Prior to Encounter   Medication  Sig Dispense Refill   • amLODIPine (NORVASC) 5 MG tablet Take 5 mg by mouth 2 (Two) Times a Day.     • apixaban (ELIQUIS) 5 MG tablet tablet Take 5 mg by mouth 2 (Two) Times a Day.     • atorvastatin (LIPITOR) 40 MG tablet Take 1 tablet by mouth every night at bedtime.     • furosemide (LASIX) 20 MG tablet Take 20 mg by mouth Daily.     • gabapentin (NEURONTIN) 300 MG capsule Take 300 mg by mouth 3 (Three) Times a Day. 2 capsules 3 times per day.     • metoprolol tartrate (LOPRESSOR) 100 MG tablet Take 100 mg by mouth Daily. 2 tablets daily     • multivitamin with minerals tablet tablet Take 1 tablet by mouth Daily.     • omeprazole (priLOSEC) 40 MG capsule Take 40 mg by mouth Daily.     • venlafaxine XR (EFFEXOR-XR) 75 MG 24 hr capsule Take 75 mg by mouth Daily.       Allergies   Allergen Reactions   • Ibuprofen Other (See Comments)     Decrease urine output         Objective    Objective     Vital Signs  Temp:  [97.6 °F (36.4 °C)-98.5 °F (36.9 °C)] 98.5 °F (36.9 °C)  Heart Rate:  [65-78] 65  Resp:  [18] 18  BP: (141-171)/(70-88) 141/70  SpO2:  [93 %-100 %] 98 %  on  Flow (L/min):  [2-10] 2;   Device (Oxygen Therapy): nasal cannula  Body mass index is 39 kg/m².    Physical Exam  Constitutional:       General: She is not in acute distress.     Appearance: Normal appearance. She is obese. She is ill-appearing.   HENT:      Head: Normocephalic and atraumatic.      Mouth/Throat:      Pharynx: Oropharynx is clear.   Eyes:      Extraocular Movements: Extraocular movements intact.      Conjunctiva/sclera: Conjunctivae normal.   Cardiovascular:      Rate and Rhythm: Normal rate. Rhythm irregularly irregular.      Pulses: Normal pulses.      Heart sounds: Normal heart sounds. No murmur heard.  Pulmonary:      Effort: Pulmonary effort is normal. No respiratory distress.      Breath sounds: Examination of the right-middle field reveals rales. Examination of the right-lower field reveals decreased breath sounds. Examination of  the left-lower field reveals decreased breath sounds. Decreased breath sounds and rales present.   Abdominal:      Palpations: There is no mass.      Tenderness: There is no abdominal tenderness.      Hernia: No hernia is present.   Musculoskeletal:         General: Swelling present. No signs of injury. Normal range of motion.      Cervical back: Normal range of motion and neck supple.      Right lower leg: Edema present.      Left lower leg: Edema present.   Skin:     General: Skin is warm and dry.      Capillary Refill: Capillary refill takes less than 2 seconds.      Coloration: Skin is pale.   Neurological:      General: No focal deficit present.      Mental Status: She is alert and oriented to person, place, and time. Mental status is at baseline.      Motor: Weakness present.   Psychiatric:         Mood and Affect: Mood normal.         Behavior: Behavior normal.         Thought Content: Thought content normal.         Judgment: Judgment normal.         Results Review:  I reviewed the patient's new clinical results.  I reviewed the patient's new imaging results and agree with the interpretation.  I reviewed the patient's other test results and agree with the interpretation  I personally viewed and interpreted the patient's EKG/Telemetry data  Discussed with ED provider.    Lab Results (last 24 hours)     Procedure Component Value Units Date/Time    CBC & Differential [[id number]]  (Abnormal) Collected: 04/03/22 0044    Specimen: Blood Updated: 04/03/22 0117    Narrative:      The following orders were created for panel order CBC & Differential.  Procedure                               Abnormality         Status                     ---------                               -----------         ------                     CBC Auto Differential[[id number]]        Abnormal            Final result                 Please view results for these tests on the individual orders.    Comprehensive Metabolic Panel [[id number]]   (Abnormal) Collected: 04/03/22 0044    Specimen: Blood Updated: 04/03/22 0138     Glucose 121 mg/dL      BUN 27 mg/dL      Creatinine 2.02 mg/dL      Sodium 138 mmol/L      Potassium 4.7 mmol/L      Comment: Slight hemolysis detected by analyzer. Results may be affected.        Chloride 110 mmol/L      CO2 16.1 mmol/L      Calcium 8.3 mg/dL      Total Protein 6.0 g/dL      Albumin 3.10 g/dL      ALT (SGPT) 6 U/L      AST (SGOT) 24 U/L      Comment: Slight hemolysis detected by analyzer. Results may be affected.        Alkaline Phosphatase 144 U/L      Total Bilirubin 0.5 mg/dL      Globulin 2.9 gm/dL      A/G Ratio 1.1 g/dL      BUN/Creatinine Ratio 13.4     Anion Gap 11.9 mmol/L      eGFR 24.2 mL/min/1.73      Comment: National Kidney Foundation and American Society of Nephrology (ASN) Task Force recommended calculation based on the Chronic Kidney Disease Epidemiology Collaboration (CKD-EPI) equation refit without adjustment for race.       Narrative:      GFR Normal >60  Chronic Kidney Disease <60  Kidney Failure <15      Protime-INR [[id number]]  (Abnormal) Collected: 04/03/22 0044    Specimen: Blood Updated: 04/03/22 0214     Protime 24.0 Seconds      INR 2.14    aPTT [[id number]]  (Normal) Collected: 04/03/22 0044    Specimen: Blood Updated: 04/03/22 0142     PTT 33.2 seconds     Troponin [[id number]]  (Normal) Collected: 04/03/22 0044    Specimen: Blood Updated: 04/03/22 0135     Troponin T <0.010 ng/mL     Narrative:      Troponin T Reference Range:  <= 0.03 ng/mL-   Negative for AMI  >0.03 ng/mL-     Abnormal for myocardial necrosis.  Clinicians would have to utilize clinical acumen, EKG, Troponin and serial changes to determine if it is an Acute Myocardial Infarction or myocardial injury due to an underlying chronic condition.       Results may be falsely decreased if patient taking Biotin.      CBC Auto Differential [[id number]]  (Abnormal) Collected: 04/03/22 0044    Specimen: Blood Updated: 04/03/22 0117      WBC 5.00 10*3/mm3      RBC 2.58 10*6/mm3      Hemoglobin 7.3 g/dL      Hematocrit 22.5 %      MCV 87.2 fL      MCH 28.3 pg      MCHC 32.4 g/dL      RDW 15.2 %      RDW-SD 48.3 fl      MPV 10.4 fL      Platelets 178 10*3/mm3      Neutrophil % 56.8 %      Lymphocyte % 27.0 %      Monocyte % 12.8 %      Eosinophil % 2.6 %      Basophil % 0.6 %      Immature Grans % 0.2 %      Neutrophils, Absolute 2.84 10*3/mm3      Lymphocytes, Absolute 1.35 10*3/mm3      Monocytes, Absolute 0.64 10*3/mm3      Eosinophils, Absolute 0.13 10*3/mm3      Basophils, Absolute 0.03 10*3/mm3      Immature Grans, Absolute 0.01 10*3/mm3      nRBC 0.0 /100 WBC     COVID PRE-OP / PRE-PROCEDURE SCREENING ORDER (NO ISOLATION) - Swab, Nasopharynx [[id number]]  (Normal) Collected: 04/03/22 0223    Specimen: Swab from Nasopharynx Updated: 04/03/22 0253    Narrative:      The following orders were created for panel order COVID PRE-OP / PRE-PROCEDURE SCREENING ORDER (NO ISOLATION) - Swab, Nasopharynx.  Procedure                               Abnormality         Status                     ---------                               -----------         ------                     COVID-19,[facility] IN-HOUSE...[[id number]]  Normal              Final result                 Please view results for these tests on the individual orders.    COVID-19,[facility] IN-HOUSE CEPHEID/EM NP SWAB IN TRANSPORT MEDIA 8-12 HR TAT - Swab, Nasopharynx [[id number]]  (Normal) Collected: 04/03/22 0223    Specimen: Swab from Nasopharynx Updated: 04/03/22 0253     COVID19 Not Detected    Narrative:      Fact sheet for providers: https://www.fda.gov/media/527712/download    Fact sheet for patients: https://www.fda.gov/media/982911/download    Test performed by PCR.          Imaging Results (Last 24 Hours)     Procedure Component Value Units Date/Time    XR Chest 1 View [[id number]] Collected: 04/03/22 0121     Updated: 04/03/22 0125    Narrative:      SINGLE VIEW OF THE CHEST     HISTORY:  Shortness of air     COMPARISON: None available.     FINDINGS:  Cardiomegaly is present. There is vascular congestion. No pneumothorax  is seen. No definite pleural effusion is identified. Lung volumes are  diminished, with bibasilar atelectasis noted. There is calcification of  the aorta.       Impression:      Cardiomegaly with vascular congestion.     This report was finalized on 4/3/2022 1:21 AM by Dr. Em Brewster M.D.               ECG 12 Lead   Preliminary Result   HEART RATE= 60  bpm   RR Interval= 994  ms   GA Interval=   ms   P Horizontal Axis=   deg   P Front Axis=   deg   QRSD Interval= 106  ms   QT Interval= 445  ms   QRS Axis= 105  deg   T Wave Axis= 93  deg   - ABNORMAL ECG -   Atrial fibrillation   Right axis deviation   Low voltage, precordial leads   Probable anterolateral infarct, old   Electronically Signed By:    Date and Time of Study: 2022-04-03 00:51:28      ECG 12 Lead    (Results Pending)     Assessment/Plan   Assessment/Plan   Active Hospital Problems    Diagnosis  POA   • **Symptomatic anemia [D64.9]  Yes   • Anxiety associated with depression [F41.8]  Yes   • Iron deficiency anemia [D50.9]  Yes   • PAF (paroxysmal atrial fibrillation) ([facility]) [I48.0]  Yes   • Shortness of breath [R06.02]  Yes   • Chronic kidney disease, stage IV (severe) (HCC) [N18.4]  Yes      Resolved Hospital Problems   No resolved problems to display.       82 y.o. female admitted with Symptomatic anemia.    · Continuous cardiac monitoring  · Supplemental oxygen as needed.  Patient arrived via EMS on a nonrebreather at 10 L.  Currently she is utilizing 2 L supplemental oxygen via nasal cannula with oxygen saturations approximately 97%  · Continuous pulse oximetry monitoring  · Hemoglobin hematocrit every 8 hours in between morning CBCs  · CBC in a.m.  · Initial labs completed emergency department reviewed  · Trend labs during hospitalization  · Blood transfusions as needed based upon CBC and H&H results.  Patient  had 1 unit of packed red blood cells initiate emergency department  · Hold anticoagulants for now    CKD  · Creatinine mildly elevated.  This is likely related to some volume depletion.  Will initiate gentle hydration via IV fluids x1 L  · BMP in a.m.  · Trend chemistry results during hospitalization    · Echocardiogram in a.m.  · SCDs for DVT prophylaxis.  · Full code.  · Discussed with patient, family and ED provider.      RAMAKRISHNA Cavazos  [facility]  04/03/22  04:19 EDT

## 2022-04-03 NOTE — CONSULTS
[facility]                                                                                             Nephrology Initial Consult Note    Patient Identification:  Name: Lowell Min MRN: [id number]  Age: 82 y.o. : 1940  Sex: female  Date:4/3/2022    Requesting Physician: As per consult order.  Reason for Consultation: CKD  Information from:patient/ family/ chart      History of Present Illness: This is a 82 y.o. year old female with stage IV chronic kidney disease followed in our office by Dr. Ryann Foster.  She was last seen about 2 weeks ago for CKD follow-up and creatinine was fairly stable at that time, 1.8 with an estimated baseline creatinine around 2.0.  She presented to the hospital with bright red blood per rectum, no nausea vomiting or abdominal pain.  Also with some worsening shortness of breath requiring oxygen with vascular congestion on chest x-ray.  She also has anemia of CKD is on Eliquis for A. fib denies any active GI bleeding at this time.    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]        Past Medical History:  Past Medical History:   Diagnosis Date   • Anxiety    • Atrial fibrillation (HCC)    • Chronic kidney disease, stage IV (severe) (HCC)    • Depression    • Elevated cholesterol    • Hypertension    • Type 2 diabetes mellitus (HCC)        Past Surgical History:  Past Surgical History:   Procedure Laterality Date   •  APPENDECTOMY     • CHOLECYSTECTOMY     • COLONOSCOPY     • HYSTERECTOMY     • TUMOR REMOVAL Left     benign tumor from left breast        Home Meds:   Medications Prior to Admission   Medication Sig Dispense Refill Last Dose   • amLODIPine (NORVASC) 5 MG tablet Take 5 mg by mouth 2 (Two) Times a Day.   4/2/2022 at Unknown time   • apixaban (ELIQUIS) 5 MG tablet tablet Take 5 mg by mouth 2 (Two) Times a Day.   4/2/2022 at Unknown time   • atorvastatin (LIPITOR) 40 MG tablet Take 1 tablet by mouth every night at bedtime.   4/2/2022 at Unknown time   • furosemide (LASIX) 20 MG tablet Take 20 mg by mouth Daily.   4/2/2022 at Unknown time   • gabapentin (NEURONTIN) 300 MG capsule Take 300 mg by mouth 3 (Three) Times a Day. 2 capsules 3 times per day.   4/2/2022 at Unknown time   • metoprolol tartrate (LOPRESSOR) 100 MG tablet Take 100 mg by mouth Daily. 2 tablets daily   4/2/2022 at Unknown time   • multivitamin with minerals tablet tablet Take 1 tablet by mouth Daily.   4/2/2022 at Unknown time   • omeprazole (priLOSEC) 40 MG capsule Take 40 mg by mouth Daily.   4/2/2022 at Unknown time   • venlafaxine XR (EFFEXOR-XR) 75 MG 24 hr capsule Take 75 mg by mouth Daily.   4/2/2022 at Unknown time       Current Meds:   Current Facility-Administered Medications   Medication Dose Route Frequency Provider Last Rate Last Admin   • acetaminophen (TYLENOL) tablet 650 mg  650 mg Oral Q4H PRN Gary Moore, APRN       • amLODIPine (NORVASC) tablet 5 mg  5 mg Oral Q24H Vipin Tovar MD       • atorvastatin (LIPITOR) tablet 40 mg  40 mg Oral Daily Vipin Tovar MD       • furosemide (LASIX) injection 40 mg  40 mg Intravenous Once Vipin Tovar MD       • gabapentin (NEURONTIN) capsule 300 mg  300 mg Oral TID Vipin Tovar MD       • melatonin tablet 3 mg  3 mg Oral Nightly PRN Gary Moore, APRN       • metoprolol tartrate (LOPRESSOR) tablet 100 mg  100 mg Oral Daily Vipin Tovar MD       •  nitroglycerin (NITROSTAT) SL tablet 0.4 mg  0.4 mg Sublingual Q5 Min PRN Gary Moore APRN       • ondansetron (ZOFRAN) tablet 4 mg  4 mg Oral Q6H PRN Gary Moore APRN        Or   • ondansetron (ZOFRAN) injection 4 mg  4 mg Intravenous Q6H PRN Gary Moore APRN       • pantoprazole (PROTONIX) EC tablet 40 mg  40 mg Oral QAM Vipin Tovar MD       • sodium chloride 0.9 % flush 10 mL  10 mL Intravenous PRN Oscar Ellis MD       • venlafaxine XR (EFFEXOR-XR) 24 hr capsule 75 mg  75 mg Oral Daily Vipin Tovar MD           Allergies:  Allergies   Allergen Reactions   • Ibuprofen Other (See Comments)     Decrease urine output         Social History:   Social History     Socioeconomic History   • Marital status: Single   Tobacco Use   • Smoking status: Never Smoker   • Smokeless tobacco: Never Used   Vaping Use   • Vaping Use: Never used   Substance and Sexual Activity   • Alcohol use: Never   • Drug use: Never   • Sexual activity: Defer        Family History:  No family history on file.     Review of Systems: as per HPI, in addition:    General:      + Weakness / fatigue,                       No fevers / chills                       no weight loss  HEENT:       no dysphagia / odynophagia  Neck:           normal range of motion, no swelling  Respiratory: no cough / congestion                      + Shortness of air                       No wheezing  CV:              No chest pain                       No palpitations  Abdomen/GI: no nausea / vomiting                      No diarrhea / constipation                      No abdominal pain, + hematochezia  :             no dysuria / urinary frequency                       No urgency, normal output  Endocrine:   no polyuria / polydipsia,                      No heat or cold intolerance  Skin:           no rashes or skin breakdown   Vascular:   No edema                     No claudication  Psych:        no depression/  "anxiety  Neuro:        no focal weakness, no seizures  Musculoskeletal: no joint pain or deformities      Physical Exam:  Vitals:   Temp (24hrs), Av.1 °F (36.7 °C), Min:97.6 °F (36.4 °C), Max:98.5 °F (36.9 °C)    /92   Pulse 77   Temp 98.2 °F (36.8 °C) (Tympanic)   Resp 18   Ht 170.2 cm (67\")   Wt 113 kg (249 lb)   SpO2 97%   BMI 39.00 kg/m²   Intake/Output:     Intake/Output Summary (Last 24 hours) at 4/3/2022 1238  Last data filed at 4/3/2022 0806  Gross per 24 hour   Intake 331.25 ml   Output --   Net 331.25 ml        Wt Readings from Last 1 Encounters:   22 0320 113 kg (249 lb)   22 0209 95.3 kg (210 lb)       Exam:    General Appearance:  Awake, alert, oriented x3, no acute distress  Chronically ill-appearing   Head and Face:  Normocephalic, atraumatic, mucus membranes moist, oropharynx clear   Eyes:  No icterus, pupils equal round and reactive to light, extraocular movements intact    ENMT: Moist mucosa, tongue symmetric    Neck: Supple  no jugular venous distention  no thyromegaly   Pulmonary:  Respiratory effort: Normal  Auscultation of lungs: Clear bilaterally  No wheezes  No rhonchi  Good air movement, good expansion   Chest wall:  No tenderness or deformity   Cardiovascular:  Auscultation of the heart: Normal rhythm, no murmurs  + Edema of bilateral lower extremities   Abdomen:  Abdomen: soft, non-tender, normal bowel sounds all four quadrants, no masses   Liver and spleen: no hepatosplenomegaly   Musculoskeletal: Digits and nails: normal  Normal range of motion  No joint swelling or gross deformities    Skin: Skin inspection: color normal, no visible rashes or lesions  Skin palpation: texture, turgor normal, no palpable lesions   Lymphatic:  no cervical lymphadenopathy    Psychiatric: Judgement and insight: normal  Orientation to person place and time: normal  Mood and affect: normal       DATA:  Radiology and Labs:  The following labs independently reviewed by me, additional " AM labs ordered  Old records independently reviewed showing CKD 4, office notes reviewed  The following radiologic studies independently viewed by me, findings chest x-ray with cardiomegaly and vascular congestion  Interval notes, chart personally reviewed by me.  I have reviewed and summarized old records as detailed above  Plan of care discussed with patient herself at bedside  New problems include PERLA, GI bleed      Risk/ complexity of medical care/ medical decision making: Moderate risk, CKD with diuretic management  Chronic illness with severe exacerbation or progression: CKD and CHF      Labs:   Recent Results (from the past 24 hour(s))   Comprehensive Metabolic Panel    Collection Time: 04/03/22 12:44 AM    Specimen: Blood   Result Value Ref Range    Glucose 121 (H) 65 - 99 mg/dL    BUN 27 (H) 8 - 23 mg/dL    Creatinine 2.02 (H) 0.57 - 1.00 mg/dL    Sodium 138 136 - 145 mmol/L    Potassium 4.7 3.5 - 5.2 mmol/L    Chloride 110 (H) 98 - 107 mmol/L    CO2 16.1 (L) 22.0 - 29.0 mmol/L    Calcium 8.3 (L) 8.6 - 10.5 mg/dL    Total Protein 6.0 6.0 - 8.5 g/dL    Albumin 3.10 (L) 3.50 - 5.20 g/dL    ALT (SGPT) 6 1 - 33 U/L    AST (SGOT) 24 1 - 32 U/L    Alkaline Phosphatase 144 (H) 39 - 117 U/L    Total Bilirubin 0.5 0.0 - 1.2 mg/dL    Globulin 2.9 gm/dL    A/G Ratio 1.1 g/dL    BUN/Creatinine Ratio 13.4 7.0 - 25.0    Anion Gap 11.9 5.0 - 15.0 mmol/L    eGFR 24.2 (L) >60.0 mL/min/1.73   Protime-INR    Collection Time: 04/03/22 12:44 AM    Specimen: Blood   Result Value Ref Range    Protime 24.0 (H) 11.7 - 14.2 Seconds    INR 2.14 (H) 0.90 - 1.10   aPTT    Collection Time: 04/03/22 12:44 AM    Specimen: Blood   Result Value Ref Range    PTT 33.2 22.7 - 35.4 seconds   Troponin    Collection Time: 04/03/22 12:44 AM    Specimen: Blood   Result Value Ref Range    Troponin T <0.010 0.000 - 0.030 ng/mL   CBC Auto Differential    Collection Time: 04/03/22 12:44 AM    Specimen: Blood   Result Value Ref Range    WBC 5.00 3.40 -  10.80 10*3/mm3    RBC 2.58 (L) 3.77 - 5.28 10*6/mm3    Hemoglobin 7.3 (L) 12.0 - 15.9 g/dL    Hematocrit 22.5 (L) 34.0 - 46.6 %    MCV 87.2 79.0 - 97.0 fL    MCH 28.3 26.6 - 33.0 pg    MCHC 32.4 31.5 - 35.7 g/dL    RDW 15.2 12.3 - 15.4 %    RDW-SD 48.3 37.0 - 54.0 fl    MPV 10.4 6.0 - 12.0 fL    Platelets 178 140 - 450 10*3/mm3    Neutrophil % 56.8 42.7 - 76.0 %    Lymphocyte % 27.0 19.6 - 45.3 %    Monocyte % 12.8 (H) 5.0 - 12.0 %    Eosinophil % 2.6 0.3 - 6.2 %    Basophil % 0.6 0.0 - 1.5 %    Immature Grans % 0.2 0.0 - 0.5 %    Neutrophils, Absolute 2.84 1.70 - 7.00 10*3/mm3    Lymphocytes, Absolute 1.35 0.70 - 3.10 10*3/mm3    Monocytes, Absolute 0.64 0.10 - 0.90 10*3/mm3    Eosinophils, Absolute 0.13 0.00 - 0.40 10*3/mm3    Basophils, Absolute 0.03 0.00 - 0.20 10*3/mm3    Immature Grans, Absolute 0.01 0.00 - 0.05 10*3/mm3    nRBC 0.0 0.0 - 0.2 /100 WBC   BNP    Collection Time: 04/03/22 12:44 AM    Specimen: Blood   Result Value Ref Range    proBNP 2,649.0 (H) 0.0 - 1,800.0 pg/mL   ECG 12 Lead    Collection Time: 04/03/22 12:51 AM   Result Value Ref Range    QT Interval 445 ms   Type & Screen    Collection Time: 04/03/22  1:52 AM    Specimen: Blood   Result Value Ref Range    ABO Type A     RH type Positive     Antibody Screen Negative     T&S Expiration Date 4/6/2022 11:59:59 PM    COVID-19,[facility] IN-HOUSE CEPHEID/EM NP SWAB IN TRANSPORT MEDIA 8-12 HR TAT - Swab, Nasopharynx    Collection Time: 04/03/22  2:23 AM    Specimen: Nasopharynx; Swab   Result Value Ref Range    COVID19 Not Detected Not Detected - Ref. Range   Prepare RBC, 1 Units    Collection Time: 04/03/22  3:33 AM   Result Value Ref Range    Product Code B8416Q21     Unit Number [id number]     UNIT  ABO A     UNIT  RH POS     Crossmatch Interpretation Compatible     Dispense Status IS     Blood Expiration Date 202205042359     Blood Type Barcode 6200    Troponin    Collection Time: 04/03/22  9:39 AM    Specimen: Blood   Result Value Ref  Range    Troponin T <0.010 0.000 - 0.030 ng/mL   Basic Metabolic Panel    Collection Time: 04/03/22  9:39 AM    Specimen: Blood   Result Value Ref Range    Glucose 169 (H) 65 - 99 mg/dL    BUN 27 (H) 8 - 23 mg/dL    Creatinine 2.21 (H) 0.57 - 1.00 mg/dL    Sodium 139 136 - 145 mmol/L    Potassium 4.5 3.5 - 5.2 mmol/L    Chloride 108 (H) 98 - 107 mmol/L    CO2 20.2 (L) 22.0 - 29.0 mmol/L    Calcium 9.0 8.6 - 10.5 mg/dL    BUN/Creatinine Ratio 12.2 7.0 - 25.0    Anion Gap 10.8 5.0 - 15.0 mmol/L    eGFR 21.8 (L) >60.0 mL/min/1.73   CBC (No Diff)    Collection Time: 04/03/22  9:39 AM    Specimen: Blood   Result Value Ref Range    WBC 5.61 3.40 - 10.80 10*3/mm3    RBC 2.79 (L) 3.77 - 5.28 10*6/mm3    Hemoglobin 7.9 (L) 12.0 - 15.9 g/dL    Hematocrit 24.2 (L) 34.0 - 46.6 %    MCV 86.7 79.0 - 97.0 fL    MCH 28.3 26.6 - 33.0 pg    MCHC 32.6 31.5 - 35.7 g/dL    RDW 15.3 12.3 - 15.4 %    RDW-SD 48.4 37.0 - 54.0 fl    MPV 9.7 6.0 - 12.0 fL    Platelets 181 140 - 450 10*3/mm3   TSH    Collection Time: 04/03/22  9:39 AM    Specimen: Blood   Result Value Ref Range    TSH 3.940 0.270 - 4.200 uIU/mL       Radiology:  Imaging Results (Last 24 Hours)     Procedure Component Value Units Date/Time    XR Chest 1 View [[id number]] Collected: 04/03/22 0121     Updated: 04/03/22 0125    Narrative:      SINGLE VIEW OF THE CHEST     HISTORY: Shortness of air     COMPARISON: None available.     FINDINGS:  Cardiomegaly is present. There is vascular congestion. No pneumothorax  is seen. No definite pleural effusion is identified. Lung volumes are  diminished, with bibasilar atelectasis noted. There is calcification of  the aorta.       Impression:      Cardiomegaly with vascular congestion.     This report was finalized on 4/3/2022 1:21 AM by Dr. Em Brewster M.D.                  ASSESSMENT:   CKD stage IV with mild PERLA.  Baseline creatinine 2.0    CHF (congestive heart failure), echo pending    Chronic kidney disease, stage IV (severe)  (HCC)    Symptomatic anemia    Anxiety associated with depression    Iron deficiency anemia    PAF (paroxysmal atrial fibrillation) (HCC)    Shortness of breath      PLAN:   Renal function not far from baseline  Volume overloaded on exam/imaging  Agree with continued IV diuretics  IV fluids have been discontinued  Monitor daily weights, strict I's and O's  Dietary fluid and salt restriction while on diuretics    Continue to monitor electrolytes and volume closely, avoid IV contrast and nephrotoxic medications     I appreciate the consult request, please call if any questions      Isaiah Foster M.D  [facility]  Office phone number: [phone]  Answering service phone number: [phone]        4/3/2022        Dictation via Dragon dictation software

## 2022-04-03 NOTE — ED NOTES
To er via EMS from home, bright red blood in stool starting tonight, shortness of breath x 3 weeks. States had iron infusion yesterday.

## 2022-04-03 NOTE — ED PROVIDER NOTES
EMERGENCY DEPARTMENT ENCOUNTER    Room Number:  E659/1  Date of encounter:  4/3/2022  PCP: Dannie Mathews MD  Historian: Patient      HPI:  Chief Complaint: Dyspnea, tired, bloody stool  A complete HPI/ROS/PMH/PSH/SH/FH are unobtainable due to: None    Context: Lowell Min is a 82 y.o. female who presents to the ED c/o 3 weeks of feeling tired and rundown increased dyspnea with exertion.  Patient states that this evening she noted some bright red blood on the paper when she wiped after having a bowel movement.  Denies any blood in the toilet water.  Patient is on Eliquis for A. fib.  Denies any pain.  No shortness of breath without exertion.  Patient received iron infusion yesterday      PAST MEDICAL HISTORY  Active Ambulatory Problems     Diagnosis Date Noted   • Chronic kidney disease, stage IV (severe) (HCC) 03/31/2022   • Erythropoietin deficiency anemia 03/31/2022     Resolved Ambulatory Problems     Diagnosis Date Noted   • No Resolved Ambulatory Problems     Past Medical History:   Diagnosis Date   • Anxiety    • Atrial fibrillation (HCC)    • Depression    • Elevated cholesterol    • Hypertension    • Type 2 diabetes mellitus (HCC)          PAST SURGICAL HISTORY  Past Surgical History:   Procedure Laterality Date   • APPENDECTOMY     • CHOLECYSTECTOMY     • COLONOSCOPY     • HYSTERECTOMY     • TUMOR REMOVAL Left     benign tumor from left breast         FAMILY HISTORY  No family history on file.      SOCIAL HISTORY  Social History     Socioeconomic History   • Marital status: Single   Tobacco Use   • Smoking status: Never Smoker   • Smokeless tobacco: Never Used   Vaping Use   • Vaping Use: Never used   Substance and Sexual Activity   • Alcohol use: Never   • Drug use: Never   • Sexual activity: Defer         ALLERGIES  Ibuprofen        REVIEW OF SYSTEMS  Review of Systems     All systems reviewed and negative except for those discussed in HPI.       PHYSICAL EXAM    I have reviewed the triage vital  signs and nursing notes.    ED Triage Vitals [04/03/22 0030]   Temp Heart Rate Resp BP SpO2   98 °F (36.7 °C) 66 18 171/76 100 %      Temp src Heart Rate Source Patient Position BP Location FiO2 (%)   Tympanic Monitor -- -- --       Physical Exam  GENERAL: not distressed  HENT: nares patent  EYES: no scleral icterus  CV: regular rhythm, regular rate  RESPIRATORY: normal effort  ABDOMEN: soft, nontender nondistended, rectal exam within normal limits stool normal color Hemoccult positive QC okay  MUSCULOSKELETAL: no deformity  NEURO: alert, moves all extremities, follows commands  SKIN: warm, dry        LAB RESULTS  Recent Results (from the past 24 hour(s))   Comprehensive Metabolic Panel    Collection Time: 04/03/22 12:44 AM    Specimen: Blood   Result Value Ref Range    Glucose 121 (H) 65 - 99 mg/dL    BUN 27 (H) 8 - 23 mg/dL    Creatinine 2.02 (H) 0.57 - 1.00 mg/dL    Sodium 138 136 - 145 mmol/L    Potassium 4.7 3.5 - 5.2 mmol/L    Chloride 110 (H) 98 - 107 mmol/L    CO2 16.1 (L) 22.0 - 29.0 mmol/L    Calcium 8.3 (L) 8.6 - 10.5 mg/dL    Total Protein 6.0 6.0 - 8.5 g/dL    Albumin 3.10 (L) 3.50 - 5.20 g/dL    ALT (SGPT) 6 1 - 33 U/L    AST (SGOT) 24 1 - 32 U/L    Alkaline Phosphatase 144 (H) 39 - 117 U/L    Total Bilirubin 0.5 0.0 - 1.2 mg/dL    Globulin 2.9 gm/dL    A/G Ratio 1.1 g/dL    BUN/Creatinine Ratio 13.4 7.0 - 25.0    Anion Gap 11.9 5.0 - 15.0 mmol/L    eGFR 24.2 (L) >60.0 mL/min/1.73   Protime-INR    Collection Time: 04/03/22 12:44 AM    Specimen: Blood   Result Value Ref Range    Protime 24.0 (H) 11.7 - 14.2 Seconds    INR 2.14 (H) 0.90 - 1.10   aPTT    Collection Time: 04/03/22 12:44 AM    Specimen: Blood   Result Value Ref Range    PTT 33.2 22.7 - 35.4 seconds   Troponin    Collection Time: 04/03/22 12:44 AM    Specimen: Blood   Result Value Ref Range    Troponin T <0.010 0.000 - 0.030 ng/mL   CBC Auto Differential    Collection Time: 04/03/22 12:44 AM    Specimen: Blood   Result Value Ref Range    WBC  5.00 3.40 - 10.80 10*3/mm3    RBC 2.58 (L) 3.77 - 5.28 10*6/mm3    Hemoglobin 7.3 (L) 12.0 - 15.9 g/dL    Hematocrit 22.5 (L) 34.0 - 46.6 %    MCV 87.2 79.0 - 97.0 fL    MCH 28.3 26.6 - 33.0 pg    MCHC 32.4 31.5 - 35.7 g/dL    RDW 15.2 12.3 - 15.4 %    RDW-SD 48.3 37.0 - 54.0 fl    MPV 10.4 6.0 - 12.0 fL    Platelets 178 140 - 450 10*3/mm3    Neutrophil % 56.8 42.7 - 76.0 %    Lymphocyte % 27.0 19.6 - 45.3 %    Monocyte % 12.8 (H) 5.0 - 12.0 %    Eosinophil % 2.6 0.3 - 6.2 %    Basophil % 0.6 0.0 - 1.5 %    Immature Grans % 0.2 0.0 - 0.5 %    Neutrophils, Absolute 2.84 1.70 - 7.00 10*3/mm3    Lymphocytes, Absolute 1.35 0.70 - 3.10 10*3/mm3    Monocytes, Absolute 0.64 0.10 - 0.90 10*3/mm3    Eosinophils, Absolute 0.13 0.00 - 0.40 10*3/mm3    Basophils, Absolute 0.03 0.00 - 0.20 10*3/mm3    Immature Grans, Absolute 0.01 0.00 - 0.05 10*3/mm3    nRBC 0.0 0.0 - 0.2 /100 WBC   ECG 12 Lead    Collection Time: 04/03/22 12:51 AM   Result Value Ref Range    QT Interval 445 ms   Type & Screen    Collection Time: 04/03/22  1:52 AM    Specimen: Blood   Result Value Ref Range    ABO Type A     RH type Positive     Antibody Screen Negative     T&S Expiration Date 4/6/2022 11:59:59 PM    COVID-19,[facility] IN-HOUSE CEPHEID/EM NP SWAB IN TRANSPORT MEDIA 8-12 HR TAT - Swab, Nasopharynx    Collection Time: 04/03/22  2:23 AM    Specimen: Nasopharynx; Swab   Result Value Ref Range    COVID19 Not Detected Not Detected - Ref. Range   Prepare RBC, 1 Units    Collection Time: 04/03/22  3:33 AM   Result Value Ref Range    Product Code E6292Q78     Unit Number [id number]     UNIT  ABO A     UNIT  RH POS     Crossmatch Interpretation Compatible     Dispense Status IS     Blood Expiration Date 202205042359     Blood Type Barcode 6200        Ordered the above labs and independently reviewed the results.        RADIOLOGY  XR Chest 1 View    Result Date: 4/3/2022  SINGLE VIEW OF THE CHEST  HISTORY: Shortness of air  COMPARISON: None available.   FINDINGS: Cardiomegaly is present. There is vascular congestion. No pneumothorax is seen. No definite pleural effusion is identified. Lung volumes are diminished, with bibasilar atelectasis noted. There is calcification of the aorta.      Cardiomegaly with vascular congestion.  This report was finalized on 4/3/2022 1:21 AM by Dr. Em Brewster M.D.        I ordered the above noted radiological studies. Reviewed by me and discussed with radiologist.  See dictation for official radiology interpretation.      PROCEDURES    Procedures      MEDICATIONS GIVEN IN ER    Medications   sodium chloride 0.9 % flush 10 mL (has no administration in time range)   pantoprazole (PROTONIX) injection 40 mg (has no administration in time range)   nitroglycerin (NITROSTAT) SL tablet 0.4 mg (has no administration in time range)   acetaminophen (TYLENOL) tablet 650 mg (has no administration in time range)   ondansetron (ZOFRAN) tablet 4 mg (has no administration in time range)     Or   ondansetron (ZOFRAN) injection 4 mg (has no administration in time range)   melatonin tablet 3 mg (has no administration in time range)   lactated ringers infusion (has no administration in time range)   pantoprazole (PROTONIX) injection 80 mg (80 mg Intravenous Given 4/3/22 0104)         PROGRESS, DATA ANALYSIS, CONSULTS, AND MEDICAL DECISION MAKING    All labs have been independently reviewed by me.  All radiology studies have been reviewed by me and discussed with radiologist dictating the report.   EKG's independently viewed and interpreted by me.  Discussion below represents my analysis of pertinent findings related to patient's condition, differential diagnosis, treatment plan and final disposition.        ED Course as of 04/03/22 0606   Sun Apr 03, 2022   0058 EKG          EKG time: 0051  Rhythm/Rate: Atrial fibrillation rate 60  P waves and CA: A. fib  QRS, axis: Narrow irregular  ST and T waves:     Interpreted Contemporaneously by me,  independently viewed    No significant change compared to prior EKG from 9/8/2018. [TJ]      ED Course User Index  [TJ] Oscar Ellis MD           PPE: The patient wore a surgical mask throughout the entire patient encounter. I wore an N95.    AS OF 06:06 EDT VITALS:    BP - 141/70  HR - 65  TEMP - 98.5 °F (36.9 °C) (Oral)  O2 SATS - 98%        DIAGNOSIS  Final diagnoses:   Symptomatic anemia   Gastrointestinal hemorrhage, unspecified gastrointestinal hemorrhage type   Chronic kidney disease, unspecified CKD stage         DISPOSITION  Admitted           Oscar Ellis MD  04/03/22 0606       Oscar Ellis MD  04/03/22 0607

## 2022-04-04 ENCOUNTER — APPOINTMENT (OUTPATIENT)
Dept: GENERAL RADIOLOGY | Facility: HOSPITAL | Age: 82
End: 2022-04-04

## 2022-04-04 ENCOUNTER — APPOINTMENT (OUTPATIENT)
Dept: CARDIOLOGY | Facility: HOSPITAL | Age: 82
End: 2022-04-04

## 2022-04-04 LAB
ALBUMIN SERPL-MCNC: 3.3 G/DL (ref 3.5–5.2)
ANION GAP SERPL CALCULATED.3IONS-SCNC: 10 MMOL/L (ref 5–15)
AORTIC DIMENSIONLESS INDEX: 0.4 (DI)
BASOPHILS # BLD AUTO: 0.03 10*3/MM3 (ref 0–0.2)
BASOPHILS NFR BLD AUTO: 0.4 % (ref 0–1.5)
BH CV ECHO MEAS - AO MAX PG: 40.4 MMHG
BH CV ECHO MEAS - AO MEAN PG: 20.9 MMHG
BH CV ECHO MEAS - AO ROOT DIAM: 2.6 CM
BH CV ECHO MEAS - AO V2 MAX: 317.6 CM/SEC
BH CV ECHO MEAS - AO V2 VTI: 59.6 CM
BH CV ECHO MEAS - AVA(I,D): 1.31 CM2
BH CV ECHO MEAS - EDV(CUBED): 165 ML
BH CV ECHO MEAS - EDV(MOD-SP2): 109 ML
BH CV ECHO MEAS - EDV(MOD-SP4): 102 ML
BH CV ECHO MEAS - EF(MOD-BP): 59.2 %
BH CV ECHO MEAS - EF(MOD-SP2): 55 %
BH CV ECHO MEAS - EF(MOD-SP4): 65.7 %
BH CV ECHO MEAS - ESV(CUBED): 35.3 ML
BH CV ECHO MEAS - ESV(MOD-SP2): 49 ML
BH CV ECHO MEAS - ESV(MOD-SP4): 35 ML
BH CV ECHO MEAS - FS: 40.2 %
BH CV ECHO MEAS - IVS/LVPW: 1.02 CM
BH CV ECHO MEAS - IVSD: 0.92 CM
BH CV ECHO MEAS - LAT PEAK E' VEL: 12.2 CM/SEC
BH CV ECHO MEAS - LV DIASTOLIC VOL/BSA (35-75): 46.3 CM2
BH CV ECHO MEAS - LV MASS(C)D: 188.2 GRAMS
BH CV ECHO MEAS - LV MAX PG: 8.1 MMHG
BH CV ECHO MEAS - LV MEAN PG: 4.1 MMHG
BH CV ECHO MEAS - LV SYSTOLIC VOL/BSA (12-30): 15.9 CM2
BH CV ECHO MEAS - LV V1 MAX: 142.1 CM/SEC
BH CV ECHO MEAS - LV V1 VTI: 26.7 CM
BH CV ECHO MEAS - LVIDD: 5.5 CM
BH CV ECHO MEAS - LVIDS: 3.3 CM
BH CV ECHO MEAS - LVOT AREA: 2.9 CM2
BH CV ECHO MEAS - LVOT DIAM: 1.93 CM
BH CV ECHO MEAS - LVPWD: 0.9 CM
BH CV ECHO MEAS - MED PEAK E' VEL: 9.4 CM/SEC
BH CV ECHO MEAS - MR MAX PG: 171.7 MMHG
BH CV ECHO MEAS - MR MAX VEL: 655.2 CM/SEC
BH CV ECHO MEAS - MV A DUR: 0.11 SEC
BH CV ECHO MEAS - MV A MAX VEL: 68.3 CM/SEC
BH CV ECHO MEAS - MV DEC SLOPE: 1128 CM/SEC2
BH CV ECHO MEAS - MV DEC TIME: 0.14 MSEC
BH CV ECHO MEAS - MV E MAX VEL: 185 CM/SEC
BH CV ECHO MEAS - MV E/A: 2.7
BH CV ECHO MEAS - MV MAX PG: 11.7 MMHG
BH CV ECHO MEAS - MV MEAN PG: 3.2 MMHG
BH CV ECHO MEAS - MV V2 VTI: 43.5 CM
BH CV ECHO MEAS - MVA(VTI): 1.79 CM2
BH CV ECHO MEAS - PA ACC TIME: 0.08 SEC
BH CV ECHO MEAS - PA PR(ACCEL): 42.2 MMHG
BH CV ECHO MEAS - PA V2 MAX: 161.5 CM/SEC
BH CV ECHO MEAS - RAP SYSTOLE: 15 MMHG
BH CV ECHO MEAS - RV MAX PG: 2.38 MMHG
BH CV ECHO MEAS - RV V1 MAX: 77.1 CM/SEC
BH CV ECHO MEAS - RV V1 VTI: 13.3 CM
BH CV ECHO MEAS - RVSP: 66.2 MMHG
BH CV ECHO MEAS - SI(MOD-SP2): 27.2 ML/M2
BH CV ECHO MEAS - SI(MOD-SP4): 30.4 ML/M2
BH CV ECHO MEAS - SV(LVOT): 77.9 ML
BH CV ECHO MEAS - SV(MOD-SP2): 60 ML
BH CV ECHO MEAS - SV(MOD-SP4): 67 ML
BH CV ECHO MEAS - TAPSE (>1.6): 1.7 CM
BH CV ECHO MEAS - TR MAX PG: 51.2 MMHG
BH CV ECHO MEAS - TR MAX VEL: 357.8 CM/SEC
BH CV ECHO MEASUREMENTS AVERAGE E/E' RATIO: 17.13
BH CV XLRA - RV BASE: 3.8 CM
BH CV XLRA - RV LENGTH: 8.8 CM
BH CV XLRA - RV MID: 3.5 CM
BH CV XLRA - TDI S': 10.4 CM/SEC
BUN SERPL-MCNC: 24 MG/DL (ref 8–23)
BUN/CREAT SERPL: 11.5 (ref 7–25)
CALCIUM SPEC-SCNC: 8.8 MG/DL (ref 8.6–10.5)
CHLORIDE SERPL-SCNC: 105 MMOL/L (ref 98–107)
CO2 SERPL-SCNC: 22 MMOL/L (ref 22–29)
CREAT SERPL-MCNC: 2.08 MG/DL (ref 0.57–1)
DEPRECATED RDW RBC AUTO: 46.7 FL (ref 37–54)
EGFRCR SERPLBLD CKD-EPI 2021: 23.4 ML/MIN/1.73
EOSINOPHIL # BLD AUTO: 0.1 10*3/MM3 (ref 0–0.4)
EOSINOPHIL NFR BLD AUTO: 1.5 % (ref 0.3–6.2)
ERYTHROCYTE [DISTWIDTH] IN BLOOD BY AUTOMATED COUNT: 15.5 % (ref 12.3–15.4)
FOLATE SERPL-MCNC: >20 NG/ML (ref 4.78–24.2)
GLUCOSE BLDC GLUCOMTR-MCNC: 190 MG/DL (ref 70–130)
GLUCOSE SERPL-MCNC: 192 MG/DL (ref 65–99)
HCT VFR BLD AUTO: 25.1 % (ref 34–46.6)
HGB BLD-MCNC: 8 G/DL (ref 12–15.9)
IMM GRANULOCYTES # BLD AUTO: 0.04 10*3/MM3 (ref 0–0.05)
IMM GRANULOCYTES NFR BLD AUTO: 0.6 % (ref 0–0.5)
LYMPHOCYTES # BLD AUTO: 1 10*3/MM3 (ref 0.7–3.1)
LYMPHOCYTES NFR BLD AUTO: 14.6 % (ref 19.6–45.3)
MAXIMAL PREDICTED HEART RATE: 138 BPM
MCH RBC QN AUTO: 27.3 PG (ref 26.6–33)
MCHC RBC AUTO-ENTMCNC: 31.9 G/DL (ref 31.5–35.7)
MCV RBC AUTO: 85.7 FL (ref 79–97)
MONOCYTES # BLD AUTO: 0.69 10*3/MM3 (ref 0.1–0.9)
MONOCYTES NFR BLD AUTO: 10.1 % (ref 5–12)
NEUTROPHILS NFR BLD AUTO: 4.97 10*3/MM3 (ref 1.7–7)
NEUTROPHILS NFR BLD AUTO: 72.8 % (ref 42.7–76)
NRBC BLD AUTO-RTO: 0.1 /100 WBC (ref 0–0.2)
PHOSPHATE SERPL-MCNC: 3.8 MG/DL (ref 2.5–4.5)
PLATELET # BLD AUTO: 190 10*3/MM3 (ref 140–450)
PMV BLD AUTO: 9.6 FL (ref 6–12)
POTASSIUM SERPL-SCNC: 3.9 MMOL/L (ref 3.5–5.2)
RBC # BLD AUTO: 2.93 10*6/MM3 (ref 3.77–5.28)
SODIUM SERPL-SCNC: 137 MMOL/L (ref 136–145)
STRESS TARGET HR: 117 BPM
VIT B12 BLD-MCNC: 538 PG/ML (ref 211–946)
WBC NRBC COR # BLD: 6.83 10*3/MM3 (ref 3.4–10.8)

## 2022-04-04 PROCEDURE — 99232 SBSQ HOSP IP/OBS MODERATE 35: CPT | Performed by: INTERNAL MEDICINE

## 2022-04-04 PROCEDURE — 97530 THERAPEUTIC ACTIVITIES: CPT

## 2022-04-04 PROCEDURE — 85025 COMPLETE CBC W/AUTO DIFF WBC: CPT | Performed by: HOSPITALIST

## 2022-04-04 PROCEDURE — 73560 X-RAY EXAM OF KNEE 1 OR 2: CPT

## 2022-04-04 PROCEDURE — 97162 PT EVAL MOD COMPLEX 30 MIN: CPT

## 2022-04-04 PROCEDURE — 25010000002 PERFLUTREN (DEFINITY) 8.476 MG IN SODIUM CHLORIDE (PF) 0.9 % 10 ML INJECTION

## 2022-04-04 PROCEDURE — 25010000002 FUROSEMIDE PER 20 MG: Performed by: INTERNAL MEDICINE

## 2022-04-04 PROCEDURE — 82962 GLUCOSE BLOOD TEST: CPT

## 2022-04-04 PROCEDURE — 93306 TTE W/DOPPLER COMPLETE: CPT

## 2022-04-04 PROCEDURE — 82746 ASSAY OF FOLIC ACID SERUM: CPT | Performed by: HOSPITALIST

## 2022-04-04 PROCEDURE — 80069 RENAL FUNCTION PANEL: CPT | Performed by: INTERNAL MEDICINE

## 2022-04-04 PROCEDURE — 93306 TTE W/DOPPLER COMPLETE: CPT | Performed by: INTERNAL MEDICINE

## 2022-04-04 PROCEDURE — 82607 VITAMIN B-12: CPT | Performed by: HOSPITALIST

## 2022-04-04 RX ORDER — INSULIN LISPRO 100 [IU]/ML
0-7 INJECTION, SOLUTION INTRAVENOUS; SUBCUTANEOUS
Status: DISCONTINUED | OUTPATIENT
Start: 2022-04-05 | End: 2022-04-12 | Stop reason: HOSPADM

## 2022-04-04 RX ORDER — DEXTROSE MONOHYDRATE 25 G/50ML
25 INJECTION, SOLUTION INTRAVENOUS
Status: DISCONTINUED | OUTPATIENT
Start: 2022-04-04 | End: 2022-04-12 | Stop reason: HOSPADM

## 2022-04-04 RX ORDER — NICOTINE POLACRILEX 4 MG
15 LOZENGE BUCCAL
Status: DISCONTINUED | OUTPATIENT
Start: 2022-04-04 | End: 2022-04-12 | Stop reason: HOSPADM

## 2022-04-04 RX ORDER — METOLAZONE 5 MG/1
5 TABLET ORAL ONCE
Status: COMPLETED | OUTPATIENT
Start: 2022-04-04 | End: 2022-04-04

## 2022-04-04 RX ORDER — POLYETHYLENE GLYCOL 3350 17 G/17G
1 POWDER, FOR SOLUTION ORAL ONCE
Status: COMPLETED | OUTPATIENT
Start: 2022-04-04 | End: 2022-04-04

## 2022-04-04 RX ADMIN — PERFLUTREN 2 ML: 6.52 INJECTION, SUSPENSION INTRAVENOUS at 11:51

## 2022-04-04 RX ADMIN — METOLAZONE 5 MG: 5 TABLET ORAL at 14:19

## 2022-04-04 RX ADMIN — Medication 10 ML: at 10:05

## 2022-04-04 RX ADMIN — GABAPENTIN 300 MG: 300 CAPSULE ORAL at 18:00

## 2022-04-04 RX ADMIN — Medication 10 ML: at 10:04

## 2022-04-04 RX ADMIN — METOPROLOL TARTRATE 100 MG: 50 TABLET, FILM COATED ORAL at 10:03

## 2022-04-04 RX ADMIN — AMLODIPINE BESYLATE 5 MG: 5 TABLET ORAL at 10:03

## 2022-04-04 RX ADMIN — GABAPENTIN 300 MG: 300 CAPSULE ORAL at 12:00

## 2022-04-04 RX ADMIN — GABAPENTIN 300 MG: 300 CAPSULE ORAL at 22:25

## 2022-04-04 RX ADMIN — Medication 10 ML: at 22:25

## 2022-04-04 RX ADMIN — FUROSEMIDE 40 MG: 10 INJECTION, SOLUTION INTRAMUSCULAR; INTRAVENOUS at 18:00

## 2022-04-04 RX ADMIN — VENLAFAXINE HYDROCHLORIDE 75 MG: 75 CAPSULE, EXTENDED RELEASE ORAL at 10:03

## 2022-04-04 RX ADMIN — POLYETHYLENE GLYCOL 3350 1 BOTTLE: 17 POWDER, FOR SOLUTION ORAL at 22:25

## 2022-04-04 RX ADMIN — PANTOPRAZOLE SODIUM 40 MG: 40 TABLET, DELAYED RELEASE ORAL at 06:47

## 2022-04-04 RX ADMIN — FUROSEMIDE 40 MG: 10 INJECTION, SOLUTION INTRAMUSCULAR; INTRAVENOUS at 06:47

## 2022-04-04 RX ADMIN — ATORVASTATIN CALCIUM 40 MG: 20 TABLET, FILM COATED ORAL at 10:03

## 2022-04-04 RX ADMIN — ACETAMINOPHEN 650 MG: 325 TABLET ORAL at 16:41

## 2022-04-04 NOTE — PLAN OF CARE
Goal Outcome Evaluation:  Plan of Care Reviewed With: patient           Outcome Evaluation: SOA at rest. 02 2L NC. IV Lasix given. c/o LLE pain with activity. Eliquis on hold. Telemetry a-fib.

## 2022-04-04 NOTE — CONSULTS
Requested to see patient regarding an Advance Directive.  I provided patient information and AD form.  She stated that she will take home and speak to family before completing.

## 2022-04-04 NOTE — PLAN OF CARE
Goal Outcome Evaluation:  Plan of Care Reviewed With: patient        Progress: no change  Outcome Evaluation: Patient  resting at this time.  Denies  any  pain.  Alert  and oriented.  Continue  IV  Lasix.  SOA  with  exertion. Oxygen  at  2.5l/m.  Purewick  in  place.   Afib  on  the  monitor.  Nursing  will continue to monitor.

## 2022-04-04 NOTE — PROGRESS NOTES
"DAILY PROGRESS NOTE  [facility]    Patient Identification:  Name: Lowell Min  Age: 82 y.o.  Sex: female  :  1940  MRN: [id number]         Primary Care Physician: Dannie Mathews MD      Subjective  Patient complains of pain behind the left knee and into the upper part of the left calf.  Exacerbated by trying to raise the leg.  She has not tried weightbearing yet.    Discussed her echocardiogram results.  On questioning she admits that she has been told she has sleep apnea and has previously been prescribed CPAP but she is not using it.    Discussed her elevated blood sugars.  She states she takes \"pills\" for this.  These are not found on her med rec.    No BM since admission.    Objective:  General Appearance:  Comfortable, well-appearing, in no acute distress and not in pain (Morbidly obese.).    Vital signs: (most recent): Blood pressure 130/53, pulse 70, temperature 98.3 °F (36.8 °C), temperature source Oral, resp. rate 18, height 170.2 cm (67\"), weight 111 kg (245 lb), SpO2 96 %.    Lungs:  Normal effort and normal respiratory rate.  Breath sounds clear to auscultation.    Heart: Normal rate.  Regular rhythm.    Extremities: There is dependent edema.  (Still nearly 2+ pretibial pitting edema bilaterally.  Visual and palpable knee effusion on the left.  No erythema.  Slightly warm.  Mild to moderate tenderness posteriorly.)  Neurological: Patient is alert and oriented to person, place and time.    Skin:  Warm and dry.                Vital signs in last 24 hours:  Temp:  [97.6 °F (36.4 °C)-98.3 °F (36.8 °C)] 98.3 °F (36.8 °C)  Heart Rate:  [60-75] 70  Resp:  [18] 18  BP: (130-142)/(53-65) 130/53    Intake/Output:    Intake/Output Summary (Last 24 hours) at 2022 1832  Last data filed at 2022 1754  Gross per 24 hour   Intake 240 ml   Output 3700 ml   Net -3460 ml         Results from last 7 days   Lab Units 22  1103 22  0939 22  0044 22  1404   WBC 10*3/mm3 " "6.83 5.61 5.00 8.34   HEMOGLOBIN g/dL 8.0* 7.9* 7.3* 7.8*   PLATELETS 10*3/mm3 190 181 178 226     Results from last 7 days   Lab Units 04/04/22  1103 04/03/22  0939 04/03/22  0044   SODIUM mmol/L 137 139 138   POTASSIUM mmol/L 3.9 4.5 4.7   CHLORIDE mmol/L 105 108* 110*   CO2 mmol/L 22.0 20.2* 16.1*   BUN mg/dL 24* 27* 27*   CREATININE mg/dL 2.08* 2.21* 2.02*   GLUCOSE mg/dL 192* 169* 121*   Estimated Creatinine Clearance: 26.8 mL/min (A) (by C-G formula based on SCr of 2.08 mg/dL (H)).  Results from last 7 days   Lab Units 04/04/22  1103 04/03/22  0939 04/03/22  0044   CALCIUM mg/dL 8.8 9.0 8.3*   ALBUMIN g/dL 3.30*  --  3.10*   PHOSPHORUS mg/dL 3.8  --   --      Results from last 7 days   Lab Units 04/04/22  1103 04/03/22  0044   ALBUMIN g/dL 3.30* 3.10*   BILIRUBIN mg/dL  --  0.5   ALK PHOS U/L  --  144*   AST (SGOT) U/L  --  24   ALT (SGPT) U/L  --  6       Assessment:  Diastolic CHF-acute on chronic: Continue diuresis.  Severe pulmonary hypertension: Likely contributing to her edema.  Likely secondary to noncompliance with CPAP.  Chronic kidney disease stage IV: Nephrology input appreciated.  Paroxysmal atrial fibrillation: Continue rate control.  Anticoagulation on hold.  If it does not appear she is going to need an arthrocentesis then okay to resume anticoagulation from my point of view.-See below.  Blood on toilet paper: GI input appreciated.  No evidence of any further GI blood loss.  Anemia-severe iron deficiency, recurrent-anemia of chronic renal disease: Patient received an iron infusion prior to admission.  Scheduled for second infusion next week.  Diabetes type 2: Patient is not sure what \"pill\" she is taking for this.  Will initiate sliding scale insulin during hospitalization.  Morbid obesity:  Valvular heart disease: Moderate aortic valve stenosis, mild mitral valve regurgitation, mild mitral valve stenosis, tricuspid regurgitation.  Obstructive sleep apnea: Noncompliant with CPAP.  I will get her " reintroduced to pulmonology.    Left knee effusion: Possible Baker's cyst.  Will check plain films of the knee as well as ultrasound.        Plan:  Discussed with patient at length.  Discussed with RN.    Vipin Tovar MD  4/4/2022  18:32 EDT    Addendum:  Unable to place an order for a knee ultrasound via [facility].  Will request orthopedic opinion.  Electronically signed by Vipin Tovar MD, 04/04/22, 6:52 PM EDT.

## 2022-04-04 NOTE — THERAPY EVALUATION
Patient Name: Lowell Min  : 1940    MRN: [id number]                              Today's Date: 2022       Admit Date: 4/3/2022    Visit Dx:     ICD-10-CM ICD-9-CM   1. Symptomatic anemia  D64.9 285.9   2. Gastrointestinal hemorrhage, unspecified gastrointestinal hemorrhage type  K92.2 578.9   3. Chronic kidney disease, unspecified CKD stage  N18.9 585.9     Patient Active Problem List   Diagnosis   • Chronic kidney disease, stage IV (severe) (HCC)   • Erythropoietin deficiency anemia   • Symptomatic anemia   • Anxiety associated with depression   • Iron deficiency anemia   • PAF (paroxysmal atrial fibrillation) (HCC)   • Shortness of breath   • CHF (congestive heart failure) (HCC)     Past Medical History:   Diagnosis Date   • Anxiety    • Atrial fibrillation (HCC)    • Chronic kidney disease, stage IV (severe) (HCC)    • Depression    • Elevated cholesterol    • Hypertension    • Type 2 diabetes mellitus (HCC)      Past Surgical History:   Procedure Laterality Date   • APPENDECTOMY     • CHOLECYSTECTOMY     • COLONOSCOPY     • HYSTERECTOMY     • TUMOR REMOVAL Left     benign tumor from left breast      General Information     Row Name 22 1526          Physical Therapy Time and Intention    Document Type evaluation  -DB     Mode of Treatment individual therapy;physical therapy  -DB     Row Name 22 1526          General Information    Patient Profile Reviewed yes  -DB     Prior Level of Function independent:  -DB     Existing Precautions/Restrictions fall  -DB     Barriers to Rehab medically complex  -DB     Row Name 22 1526          Living Environment    People in Home alone  -DB     Row Name 22 1526          Home Main Entrance    Number of Stairs, Main Entrance twelve  -DB     Stair Railings, Main Entrance railings safe and in good condition  -DB     Row Name 22 1526          Stairs Within Home, Primary    Stairs, Within Home, Primary pt states she lives in a bilevel  home  -DB     Row Name 04/04/22 1526          Cognition    Orientation Status (Cognition) oriented x 4  -DB     Row Name 04/04/22 1526          Safety Issues, Functional Mobility    Impairments Affecting Function (Mobility) balance;strength;pain;endurance/activity tolerance  -DB           User Key  (r) = Recorded By, (t) = Taken By, (c) = Cosigned By    Initials Name Provider Type    DB Oanh Camp, MIRNA Physical Therapist               Mobility     Row Name 04/04/22 1527          Bed Mobility    Bed Mobility supine-sit;sit-supine;scooting/bridging  -DB     Scooting/Bridging [place] (Bed Mobility) dependent (less than 25% patient effort);2 person assist  -DB     Supine-Sit [place] (Bed Mobility) moderate assist (50% patient effort);verbal cues;nonverbal cues (demo/gesture)  -DB     Sit-Supine [place] (Bed Mobility) moderate assist (50% patient effort);verbal cues;nonverbal cues (demo/gesture)  -DB     Assistive Device (Bed Mobility) bed rails;head of bed elevated;draw sheet  -DB     [facility] Name 04/04/22 1527          Bed-Chair Transfer    Bed-Chair [place] (Transfers) not tested  -DB     [facility] Name 04/04/22 1527          Sit-Stand Transfer    Sit-Stand [place] (Transfers) not tested  -DB     [facility] Name 04/04/22 1527          Gait/Stairs (Locomotion)    [place] Level (Gait) not tested  -DB           User Key  (r) = Recorded By, (t) = Taken By, (c) = Cosigned By    Initials Name Provider Type    DB Oanh Camp, PT Physical Therapist               Obj/Interventions     Row Name 04/04/22 1528          Range of Motion Comprehensive    General Range of Motion no range of motion deficits identified  -DB     [facility] Name 04/04/22 1528          Strength Comprehensive (MMT)    Comment, General Manual Muscle Testing (MMT) Assessment generalized weakness  -DB     [facility] Name 04/04/22 1528          Balance    Balance Assessment sitting static balance;sitting dynamic balance  -DB     Static Sitting Balance  standby assist  -DB     Dynamic Sitting Balance standby assist  -DB     Balance Interventions sitting  -DB           User Key  (r) = Recorded By, (t) = Taken By, (c) = Cosigned By    Initials Name Provider Type    Oanh Navarrete PT Physical Therapist               Goals/Plan     Row Name 04/04/22 1537          Bed Mobility Goal 1 (PT)    Activity/Assistive Device (Bed Mobility Goal 1, PT) bed mobility activities, all  -DB     [place] Level/Cues Needed (Bed Mobility Goal 1, PT) modified independence  -DB     Time Frame (Bed Mobility Goal 1, PT) 1 week  -DB     Row Name 04/04/22 1537          Transfer Goal 1 (PT)    Activity/Assistive Device (Transfer Goal 1, PT) transfers, all  -DB     [place] Level/Cues Needed (Transfer Goal 1, PT) standby assist  -DB     Time Frame (Transfer Goal 1, PT) 1 week  -DB     Row Name 04/04/22 1537          Gait Training Goal 1 (PT)    Activity/Assistive Device (Gait Training Goal 1, PT) gait (walking locomotion)  -DB     [place] Level (Gait Training Goal 1, PT) standby assist  -DB     Time Frame (Gait Training Goal 1, PT) 1 week  -DB     Row Name 04/04/22 1537          Stairs Goal 1 (PT)    Activity/Assistive Device (Stairs Goal 1, PT) stairs, all skills  -DB     [place] Level/Cues Needed (Stairs Goal 1, PT) standby assist  -DB     Number of Stairs (Stairs Goal 1, PT) 13  -DB     Time Frame (Stairs Goal 1, PT) 1 week  -DB           User Key  (r) = Recorded By, (t) = Taken By, (c) = Cosigned By    Initials Name Provider Type    Oanh Navarrete PT Physical Therapist               Clinical Impression     Row Name 04/04/22 1529          Pain    Pain Intervention(s) Ambulation/increased activity;Repositioned  -DB     Row Name 04/04/22 1529          Plan of Care Review    Plan of Care Reviewed With patient  -DB     Outcome Evaluation Pt is an 83 y/o F admitted to [facility] with complaints of SOA. RN gave OK for PT to work with pt this date. Pt states she lives alone in  a [employer] house with 13 JD. Pt has been complaining of L leg pain since this morning and is have inc'd difficutly with mobility as a result. Pt req's modA for sup<>sit. Once sitting EOB, talked with RN and she states she does not want pt UIC today until determination is made about LLE. Returned pt to supine position with modA. Pt demo's dec'd strength, endurance, and balance and would benefit from skilled PT intervention. Rec D/C home with assist pending progress vs. SNF.  -DB     Row Name 04/04/22 1529          Therapy Assessment/Plan (PT)    Rehab Potential (PT) good, to achieve stated therapy goals  -DB     Criteria for Skilled Interventions Met (PT) yes  -DB     Row Name 04/04/22 1529          Vital Signs    O2 Delivery Pre Treatment supplemental O2  -DB     O2 Delivery Intra Treatment room air  -DB     O2 Delivery Post Treatment supplemental O2  -DB     Pre Patient Position Supine  -DB     Intra Patient Position Sitting  -DB     Post Patient Position Supine  -DB     Row Name 04/04/22 1529          Positioning and Restraints    Pre-Treatment Position in bed  -DB     Post Treatment Position bed  -DB     In Bed supine;call light within reach;encouraged to call for assist;exit alarm on  -DB           User Key  (r) = Recorded By, (t) = Taken By, (c) = Cosigned By    Initials Name Provider Type    Oanh Navarrete PT Physical Therapist               Outcome Measures     Row Name 04/04/22 1538 04/04/22 1005       How much help from another person do you currently need...    Turning from your back to your side while in flat bed without using bedrails? 2  -DB 3  -CH    Moving from lying on back to sitting on the side of a flat bed without bedrails? 2  -DB 3  -CH    Moving to and from a bed to a chair (including a wheelchair)? 2  -DB 2  -CH    Standing up from a chair using your arms (e.g., wheelchair, bedside chair)? 2  -DB 2  -CH    Climbing 3-5 steps with a railing? 1  -DB 2  -CH    To walk in hospital room? 2   -DB 2  -CH    AM-PAC 6 Clicks Score (PT) 11  -DB 14  -CH    Row Name 04/04/22 1538          Functional Assessment    Outcome Measure Options AM-PAC 6 Clicks Basic Mobility (PT)  -DB           User Key  (r) = Recorded By, (t) = Taken By, (c) = Cosigned By    Initials Name Provider Type     Lala Butt RN Registered Nurse    Oanh Navarrete, MIRNA Physical Therapist                             Physical Therapy Education                 Title: PT OT SLP Therapies (Done)     Topic: Physical Therapy (Done)     Point: Mobility training (Done)     Learning Progress Summary           Patient Acceptance, E, VU by DB at 4/4/2022 1541                   Point: Home exercise program (Done)     Learning Progress Summary           Patient Acceptance, E, VU by DB at 4/4/2022 1541                   Point: Body mechanics (Done)     Learning Progress Summary           Patient Acceptance, E, VU by DB at 4/4/2022 1541                   Point: Precautions (Done)     Learning Progress Summary           Patient Acceptance, E, VU by DB at 4/4/2022 1541                               User Key     Initials Effective Dates Name Provider Type Discipline     06/16/21 -  Oanh Camp, MIRNA Physical Therapist PT              PT Recommendation and Plan  Planned Therapy Interventions (PT): balance training, bed mobility training, gait training, home exercise program, neuromuscular re-education, stair training, strengthening, patient/family education, postural re-education, transfer training  Plan of Care Reviewed With: patient  Outcome Evaluation: Pt is an 81 y/o F admitted to [facility] with complaints of SOA. RN gave OK for PT to work with pt this date. Pt states she lives alone in a bilevel house with 13 JD. Pt has been complaining of L leg pain since this morning and is have inc'd difficutly with mobility as a result. Pt req's modA for sup<>sit. Once sitting EOB, talked with RN and she states she does not want pt UIC today until  determination is made about LLE. Returned pt to supine position with modA. Pt demo's dec'd strength, endurance, and balance and would benefit from skilled PT intervention. Rec D/C home with assist pending progress vs. SNF.     Time Calculation:    PT Charges     Row Name 04/04/22 1542             Time Calculation    Start Time 1340  -DB      Stop Time 1356  -DB      Time Calculation (min) 16 min  -DB      PT Received On 04/04/22  -DB      PT - Next Appointment 04/05/22  -DB      PT Goal Re-Cert Due Date 04/11/22  -DB              Time Calculation- PT    Total Timed Code Minutes- PT 8 minute(s)  -DB            User Key  (r) = Recorded By, (t) = Taken By, (c) = Cosigned By    Initials Name Provider Type    DB Oanh Camp, PT Physical Therapist              Therapy Charges for Today     Code Description Service Date Service Provider Modifiers Qty    [id number]  PT EVAL MOD COMPLEXITY 2 4/4/2022 Oanh Camp, PT GP 1    [id number]  PT THERAPEUTIC ACT EA 15 MIN 4/4/2022 Oanh Camp, PT GP 1          PT G-Codes  Outcome Measure Options: AM-PAC 6 Clicks Basic Mobility (PT)  AM-PAC 6 Clicks Score (PT): 11    Oanh Camp PT  4/4/2022

## 2022-04-04 NOTE — CASE MANAGEMENT/SOCIAL WORK
Continued Stay Note  [facility]     Patient Name: Lowell Min  MRN: [id number]  Today's Date: 4/4/2022    Admit Date: 4/3/2022     Discharge Plan     Row Name 04/04/22 1016       Plan    Plan Plan home with family support.  JONNIE Avery RN    Patient/Family in Agreement with Plan yes    Plan Comments FACE SHEET VERIFIED/ IM LETTER SIGNED.  Spoke with pt at bedside.  Pt's PCP is Dr. Dannie Mathews.  Pt's next of kin is her daughter( Karen Dalton  [phone]).  Pt lives alone in a bi level house.  Pt is independent with ADLs.  Pt has a straight cane, grab bar, shower chair and rolling walker for home use.  Pt gets her prescriptions at [facility] on [address].  Pt denies any issues affording medications.  Pt is not current with HH.  Pt has not been in SNF.  Pt states her daughter will assist her at home and transport her home.  Plan home with family support.   JONNIE Avery RN               Discharge Codes    No documentation.               Expected Discharge Date and Time     Expected Discharge Date Expected Discharge Time    Apr 8, 2022             Brandi Avery, RN

## 2022-04-04 NOTE — CASE MANAGEMENT/SOCIAL WORK
Discharge Planning Assessment  [facility]     Patient Name: Lowell Min  MRN: [id number]  Today's Date: 4/4/2022    Admit Date: 4/3/2022     Discharge Needs Assessment     Row Name 04/04/22 1015       Living Environment    People in Home alone    Current Living Arrangements home    Primary Care Provided by self    Provides Primary Care For no one    Family Caregiver if Needed child(yashira), adult    Family Caregiver Names Daughter  ( Karen Dalton  [phone])    Quality of Family Relationships helpful;involved;supportive    Able to Return to Prior Arrangements yes    Living Arrangement Comments Pt lives alone in a bi level house.       Resource/Environmental Concerns    Resource/Environmental Concerns none    Transportation Concerns no car       Transition Planning    Patient/Family Anticipates Transition to home    Patient/Family Anticipated Services at Transition none    Transportation Anticipated family or friend will provide       Discharge Needs Assessment    Readmission Within the Last 30 Days no previous admission in last 30 days    Equipment Currently Used at Home cane, straight;grab bar;shower chair;walker, rolling    Concerns to be Addressed no discharge needs identified;denies needs/concerns at this time    Anticipated Changes Related to Illness none    Equipment Needed After Discharge cane, straight;grab bar, tub/shower;shower chair;walker, rolling               Discharge Plan     Row Name 04/04/22 1016       Plan    Plan Plan home with family support.  JONNIE Avery RN    Patient/Family in Agreement with Plan yes    Plan Comments FACE SHEET VERIFIED/ IM LETTER SIGNED.  Spoke with pt at bedside.  Pt's PCP is Dr. Dannie Mathews.  Pt's next of kin is her daughter( Karen Dalton  [phone]).  Pt lives alone in a bi level house.  Pt is independent with ADLs.  Pt has a straight cane, grab bar, shower chair and rolling walker for home use.  Pt gets her prescriptions at [facility] on [address].  Pt denies any  issues affording medications.  Pt is not current with HH.  Pt has not been in SNF.  Pt states her daughter will assist her at home and transport her home.  Plan home with family support.   JONNIE Avery RN              Continued Care and Services - Admitted Since 4/3/2022    Coordination has not been started for this encounter.       Expected Discharge Date and Time     Expected Discharge Date Expected Discharge Time    Apr 8, 2022          Demographic Summary     Row Name 04/04/22 1014       General Information    Admission Type inpatient    Arrived From emergency department    Required Notices Provided Important Message from Medicare    Referral Source admission list    Reason for Consult discharge planning    Preferred Language English               Functional Status     Row Name 04/04/22 1014       Functional Status    Usual Activity Tolerance good    Current Activity Tolerance moderate       Functional Status, IADL    Medications independent    Meal Preparation independent    Housekeeping independent    Laundry independent    Shopping assistive person       Mental Status    General Appearance WDL WDL               Psychosocial    No documentation.                Abuse/Neglect    No documentation.                Legal    No documentation.                Substance Abuse    No documentation.                Patient Forms    No documentation.                   Brandi Avery, RN

## 2022-04-04 NOTE — PROGRESS NOTES
LOS: 1 day     Chief Complaint/ Reason for encounter: CKD, diuretic management  Chief Complaint   Patient presents with   • Shortness of Breath   • Black or Bloody Stool         Subjective   04/04/22 : No complaints, feels better  Weight unchanged but feels less swollen  No shortness of breath or chest pain  Good appetite no nausea or vomiting  No additional hematochezia, eventual colonoscopy planned      Medical history reviewed:  History of Present Illness    Subjective    History taken from: Patient and chart    Vital Signs  Temp:  [97.6 °F (36.4 °C)-97.9 °F (36.6 °C)] 97.6 °F (36.4 °C)  Heart Rate:  [51-75] 75  Resp:  [18] 18  BP: (133-142)/(65-84) 142/65       Wt Readings from Last 1 Encounters:   04/04/22 1150 111 kg (245 lb)   04/04/22 0526 112 kg (245 lb 14.4 oz)   04/03/22 0320 113 kg (249 lb)   04/03/22 0209 95.3 kg (210 lb)       Objective    Objective:  General Appearance:  Comfortable, well-appearing, in no acute distress and not in pain.  Awake, alert, oriented  HEENT: Mucous membranes moist, no injury, oropharynx clear  Lungs:  Normal effort and normal respiratory rate.  Breath sounds clear to auscultation.  No  respiratory distress.  No rales, decreased breath sounds or rhonchi.    Heart: Normal rate.  Regular rhythm.  S1 normal.  No murmur.   Abdomen: Abdomen is soft.  Bowel sounds are normal, no abdominal tenderness.  There is no rebound or guarding  Extremities: Normal range of motion.  Decreased, 2+ edema of bilateral lower extremities, distal pulses intact  Neurological: No focal motor or sensory deficits, pupils reactive  Skin:  Warm and dry.  No rash or cyanosis.       Results Review:    Intake/Output:     Intake/Output Summary (Last 24 hours) at 4/4/2022 1216  Last data filed at 4/4/2022 1128  Gross per 24 hour   Intake 240 ml   Output 3400 ml   Net -3160 ml         DATA:  Radiology and Labs:  The following labs independently reviewed by me. Additional labs ordered for tomorrow  a.m.  Interval notes, chart personally reviewed by me.   Old records independently reviewed showing CKD 4  The following radiologic studies independently viewed by me, findings echocardiogram showing EF 60 to 65% normal LV systolic function, grade 2 diastolic dysfunction  New problems include diastolic CHF  Discussed with patient herself at bedside    Risk/ complexity of medical care/ medical decision making moderate complexity, PERLA with diuretic management      Labs:   Recent Results (from the past 24 hour(s))   Prepare RBC, 1 Units    Collection Time: 04/03/22  6:00 PM   Result Value Ref Range    Product Code P1162X54     Unit Number [id number]     UNIT  ABO A     UNIT  RH POS     Crossmatch Interpretation Compatible     Dispense Status PT     Blood Expiration Date [id number]     Blood Type Barcode 6200    Vitamin B12    Collection Time: 04/04/22 11:03 AM    Specimen: Blood   Result Value Ref Range    Vitamin B-12 538 211 - 946 pg/mL   Folate    Collection Time: 04/04/22 11:03 AM    Specimen: Blood   Result Value Ref Range    Folate >20.00 4.78 - 24.20 ng/mL   Renal Function Panel    Collection Time: 04/04/22 11:03 AM    Specimen: Blood   Result Value Ref Range    Glucose 192 (H) 65 - 99 mg/dL    BUN 24 (H) 8 - 23 mg/dL    Creatinine 2.08 (H) 0.57 - 1.00 mg/dL    Sodium 137 136 - 145 mmol/L    Potassium 3.9 3.5 - 5.2 mmol/L    Chloride 105 98 - 107 mmol/L    CO2 22.0 22.0 - 29.0 mmol/L    Calcium 8.8 8.6 - 10.5 mg/dL    Albumin 3.30 (L) 3.50 - 5.20 g/dL    Phosphorus 3.8 2.5 - 4.5 mg/dL    Anion Gap 10.0 5.0 - 15.0 mmol/L    BUN/Creatinine Ratio 11.5 7.0 - 25.0    eGFR 23.4 (L) >60.0 mL/min/1.73   CBC Auto Differential    Collection Time: 04/04/22 11:03 AM    Specimen: Blood   Result Value Ref Range    WBC 6.83 3.40 - 10.80 10*3/mm3    RBC 2.93 (L) 3.77 - 5.28 10*6/mm3    Hemoglobin 8.0 (L) 12.0 - 15.9 g/dL    Hematocrit 25.1 (L) 34.0 - 46.6 %    MCV 85.7 79.0 - 97.0 fL    MCH 27.3 26.6 - 33.0 pg    MCHC  31.9 31.5 - 35.7 g/dL    RDW 15.5 (H) 12.3 - 15.4 %    RDW-SD 46.7 37.0 - 54.0 fl    MPV 9.6 6.0 - 12.0 fL    Platelets 190 140 - 450 10*3/mm3    Neutrophil % 72.8 42.7 - 76.0 %    Lymphocyte % 14.6 (L) 19.6 - 45.3 %    Monocyte % 10.1 5.0 - 12.0 %    Eosinophil % 1.5 0.3 - 6.2 %    Basophil % 0.4 0.0 - 1.5 %    Immature Grans % 0.6 (H) 0.0 - 0.5 %    Neutrophils, Absolute 4.97 1.70 - 7.00 10*3/mm3    Lymphocytes, Absolute 1.00 0.70 - 3.10 10*3/mm3    Monocytes, Absolute 0.69 0.10 - 0.90 10*3/mm3    Eosinophils, Absolute 0.10 0.00 - 0.40 10*3/mm3    Basophils, Absolute 0.03 0.00 - 0.20 10*3/mm3    Immature Grans, Absolute 0.04 0.00 - 0.05 10*3/mm3    nRBC 0.1 0.0 - 0.2 /100 WBC   Adult Transthoracic Echo Complete W/ Cont if Necessary Per Protocol    Collection Time: 04/04/22 11:50 AM   Result Value Ref Range    Target HR (85%) 117 bpm    Max. Pred. HR (100%) 138 bpm    RV S' 10.4 cm/sec    RV Base 3.8 cm    RV Length 8.8 cm    RV Mid 3.5 cm    Dimensionless Index 0.40 (DI)    Avg E/e' ratio 17.13     Ao root diam 2.6 cm    Ao pk erlin 317.6 cm/sec    Ao V2 VTI 59.6 cm    GOVIND(I,D) 1.31 cm2    EDV(cubed) 165.0 ml    EDV(MOD-sp2) 109.0 ml    EDV(MOD-sp4) 102.0 ml    EF(MOD-bp) 59.2 %    EF(MOD-sp2) 55.0 %    EF(MOD-sp4) 65.7 %    ESV(cubed) 35.3 ml    ESV(MOD-sp2) 49.0 ml    ESV(MOD-sp4) 35.0 ml    IVS/LVPW 1.02 cm    Lat Peak E' Erlin 12.2 cm/sec    LV mass(C)d 188.2 grams    LV V1 max PG 8.1 mmHg    LV V1 mean PG 4.1 mmHg    LV V1 max 142.1 cm/sec    LVPWd 0.90 cm    Med Peak E' Erlin 9.4 cm/sec    MR max .7 mmHg    MV dec slope 1,128 cm/sec2    MV dec time 0.14 msec    MV V2 VTI 43.5 cm    MVA(VTI) 1.79 cm2    PA acc time 0.08 sec    PA pr(Accel) 42.2 mmHg    PA V2 max 161.5 cm/sec    RAP systole 15.0 mmHg    RV V1 max PG 2.38 mmHg    RV V1 max 77.1 cm/sec    RV V1 VTI 13.3 cm    RVSP(TR) 66.2 mmHg    SI(MOD-sp2) 27.2 ml/m2    SI(MOD-sp4) 30.4 ml/m2    SV(LVOT) 77.9 ml    SV(MOD-sp2) 60.0 ml    SV(MOD-sp4) 67.0 ml     TR max PG 51.2 mmHg    Ao max PG 40.4 mmHg    Ao mean PG 20.9 mmHg    FS 40.2 %    IVSd 0.92 cm    LV V1 VTI 26.7 cm    LVIDd 5.5 cm    LVIDs 3.3 cm    LVOT area 2.9 cm2    LVOT diam 1.93 cm    MV E/A 2.7     MV max PG 11.7 mmHg    MV mean PG 3.2 mmHg    MR max leslie 655.2 cm/sec    MV A dur 0.11 sec    MV A max leslie 68.3 cm/sec    MV E max leslie 185.0 cm/sec    TR max leslie 357.8 cm/sec    LV [place] Vol (BSA corrected) 46.3 cm2    LV Sys Vol (BSA corrected) 15.9 cm2    TAPSE (>1.6) 1.70 cm       Radiology:  Imaging Results (Last 24 Hours)     ** No results found for the last 24 hours. **             Medications have been reviewed:  Current Facility-Administered Medications   Medication Dose Route Frequency Provider Last Rate Last Admin   • acetaminophen (TYLENOL) tablet 650 mg  650 mg Oral Q4H PRN Gary Moore APRN       • amLODIPine (NORVASC) tablet 5 mg  5 mg Oral Q24H Vipin Tovar MD   5 mg at 04/04/22 1003   • atorvastatin (LIPITOR) tablet 40 mg  40 mg Oral Daily Vipin Tovar MD   40 mg at 04/04/22 1003   • furosemide (LASIX) injection 40 mg  40 mg Intravenous Q12H Isaiah Foster MD   40 mg at 04/04/22 0647   • gabapentin (NEURONTIN) capsule 300 mg  300 mg Oral TID Vipin Tovar MD   300 mg at 04/04/22 1003   • melatonin tablet 3 mg  3 mg Oral Nightly PRN Gary Moore APRN       • metoprolol tartrate (LOPRESSOR) tablet 100 mg  100 mg Oral Daily Vipin Tovar MD   100 mg at 04/04/22 1003   • nitroglycerin (NITROSTAT) SL tablet 0.4 mg  0.4 mg Sublingual Q5 Min PRN Gary Moore APRYUKO       • ondansetron (ZOFRAN) tablet 4 mg  4 mg Oral Q6H PRN Gary Moore APRN        Or   • ondansetron (ZOFRAN) injection 4 mg  4 mg Intravenous Q6H PRN Gary Moore APRN       • pantoprazole (PROTONIX) EC tablet 40 mg  40 mg Oral Vipin Lou MD   40 mg at 04/04/22 0657   • sodium chloride 0.9 % flush 10 mL  10 mL Intravenous PRN Oscar Ellis MD    10 mL at 04/04/22 1005   • venlafaxine XR (EFFEXOR-XR) 24 hr capsule 75 mg  75 mg Oral Daily Vipin Tovar MD   75 mg at 04/04/22 1003       ASSESSMENT:  CKD stage IV with mild PERLA.  Baseline creatinine 2.0, improved    CHF (congestive heart failure), acute on chronic systolic    Chronic kidney disease, stage IV (severe) ([facility])    Symptomatic anemia    Anxiety associated with depression    Iron deficiency anemia    PAF (paroxysmal atrial fibrillation) ([facility])    Shortness of breath        PLAN:   Renal function not far from baseline, actually improved some with diuresis  Remains volume overloaded on exam/imaging  Echo findings noted  Agree with continued IV diuretics, add metolazone orally x1    Monitor daily weights, strict I's and O's  Dietary fluid and salt restriction while on diuretics     Continue to monitor electrolytes and volume closely, avoid IV contrast and nephrotoxic medications       Isaiah Foster MD   [facility]   Office phone number: [phone]  Answering service phone number: [phone]    04/04/22  12:16 EDT    Dictation performed using Dragon dictation software

## 2022-04-04 NOTE — PLAN OF CARE
Goal Outcome Evaluation:  Plan of Care Reviewed With: patient           Outcome Evaluation: Pt is an 81 y/o F admitted to [facility] with complaints of SOA. RN gave OK for PT to work with pt this date. Pt states she lives alone in a bilevel house with 13 JD. Pt has been complaining of L leg pain since this morning and is have inc'd difficutly with mobility as a result. Pt req's modA for sup<>sit. Once sitting EOB, talked with RN and she states she does not want pt UIC today until cause of LLE pain is determined. Returned pt to supine position with modA. Pt demo's dec'd strength, endurance, and balance and would benefit from skilled PT intervention. Rec D/C home with assist pending progress vs. SNF.

## 2022-04-04 NOTE — PROGRESS NOTES
[facility]  Inpatient Progress Note    Reason for Follow Up: Rectal bleeding, anemia    Subjective     Interval History:   H&H is stable.  Patient denies any bowel movement for the past 3 days.  She complains of left leg pain and I informed the nurse of this.  We talked about further evaluation but will defer endoscopy for the present as long as she is not showing evidence of active bleeding.  She can follow-up with LGA service in the outpatient setting if discharge is eminent.    Current Facility-Administered Medications:   •  acetaminophen (TYLENOL) tablet 650 mg, 650 mg, Oral, Q4H PRN, Gary Moore, APRN  •  amLODIPine (NORVASC) tablet 5 mg, 5 mg, Oral, Q24H, Vipin Tovar MD, 5 mg at 04/04/22 1003  •  atorvastatin (LIPITOR) tablet 40 mg, 40 mg, Oral, Daily, Vipin Tovar MD, 40 mg at 04/04/22 1003  •  furosemide (LASIX) injection 40 mg, 40 mg, Intravenous, Q12H, Isaiah Foster MD, 40 mg at 04/04/22 0647  •  gabapentin (NEURONTIN) capsule 300 mg, 300 mg, Oral, TID, Vipin Tovar MD, 300 mg at 04/04/22 1003  •  melatonin tablet 3 mg, 3 mg, Oral, Nightly PRN, Gary Moore, APRN  •  metoprolol tartrate (LOPRESSOR) tablet 100 mg, 100 mg, Oral, Daily, Vipin Tovar MD, 100 mg at 04/04/22 1003  •  nitroglycerin (NITROSTAT) SL tablet 0.4 mg, 0.4 mg, Sublingual, Q5 Min PRN, Gary Moore, APRN  •  ondansetron (ZOFRAN) tablet 4 mg, 4 mg, Oral, Q6H PRN **OR** ondansetron (ZOFRAN) injection 4 mg, 4 mg, Intravenous, Q6H PRN, Gary Moore, APRN  •  pantoprazole (PROTONIX) EC tablet 40 mg, 40 mg, Oral, QAM, Vipin Tovar MD, 40 mg at 04/04/22 0647  •  [COMPLETED] Insert peripheral IV, , , Once **AND** sodium chloride 0.9 % flush 10 mL, 10 mL, Intravenous, PRN, Oscar Ellis MD, 10 mL at 04/04/22 1005  •  venlafaxine XR (EFFEXOR-XR) 24 hr capsule 75 mg, 75 mg, Oral, Daily, Vipin Tovar MD, 75 mg at 04/04/22 1003  Review of  Systems:    All systems were reviewed and negative except for:  Gastrointestinal: positive for  See HPI    Objective     Vital Signs  Temp:  [97.6 °F (36.4 °C)-97.9 °F (36.6 °C)] 97.6 °F (36.4 °C)  Heart Rate:  [51-75] 75  Resp:  [18] 18  BP: (133-142)/(65-84) 142/65  Body mass index is 38.37 kg/m².    Intake/Output Summary (Last 24 hours) at 4/4/2022 1208  Last data filed at 4/4/2022 1128  Gross per 24 hour   Intake 240 ml   Output 3400 ml   Net -3160 ml     I/O this shift:  In: -   Out: 1800 [Urine:1800]     Physical Exam:   General: patient awake, alert and cooperative   Eyes: Normal lids and lashes, no scleral icterus   Neck: supple, normal ROM   Skin: warm and dry, not jaundiced   Cardiovascular: regular rhythm and rate, no murmurs auscultated   Pulm: clear to auscultation bilaterally, regular and unlabored   Abdomen: soft, nontender, nondistended; normal bowel sounds   Extremities: no rash or edema   Psychiatric: Normal mood and behavior; memory intact     Results Review:     I reviewed the patient's new clinical results.    Results from last 7 days   Lab Units 04/04/22  1103 04/03/22  0939 04/03/22  0044   WBC 10*3/mm3 6.83 5.61 5.00   HEMOGLOBIN g/dL 8.0* 7.9* 7.3*   HEMATOCRIT % 25.1* 24.2* 22.5*   PLATELETS 10*3/mm3 190 181 178     Results from last 7 days   Lab Units 04/04/22  1103 04/03/22  0939 04/03/22  0044   SODIUM mmol/L 137 139 138   POTASSIUM mmol/L 3.9 4.5 4.7   CHLORIDE mmol/L 105 108* 110*   CO2 mmol/L 22.0 20.2* 16.1*   BUN mg/dL 24* 27* 27*   CREATININE mg/dL 2.08* 2.21* 2.02*   CALCIUM mg/dL 8.8 9.0 8.3*   BILIRUBIN mg/dL  --   --  0.5   ALK PHOS U/L  --   --  144*   ALT (SGPT) U/L  --   --  6   AST (SGOT) U/L  --   --  24   GLUCOSE mg/dL 192* 169* 121*     Results from last 7 days   Lab Units 04/03/22  0044   INR  2.14*     No results found for: LIPASE    Radiology:  XR Chest 1 View   Final Result   Cardiomegaly with vascular congestion.       This report was finalized on 4/3/2022 1:21 AM  by Dr. Em Brewster M.D.              Assessment/Plan     Patient Active Problem List   Diagnosis   • Chronic kidney disease, stage IV (severe) ([facility])   • Erythropoietin deficiency anemia   • Symptomatic anemia   • Anxiety associated with depression   • Iron deficiency anemia   • PAF (paroxysmal atrial fibrillation) ([facility])   • Shortness of breath   • CHF (congestive heart failure) ([facility])       Assessment:  1. Bright red blood per rectum x1: No further bleeding reported  2. Chronic anemia with known kidney disease  3. Congestive heart failure  4. Chronic kidney disease stage IV  5. History of colon polyps      Plan:  · Continue to monitor H&H  · Eventual colonoscopic assessment either with the LGA service in the outpatient setting or sooner if needed.  I discussed the patients findings and my recommendations with patient and nursing staff.    Michael Diaz MD

## 2022-04-05 LAB
ALBUMIN SERPL-MCNC: 3.4 G/DL (ref 3.5–5.2)
ANION GAP SERPL CALCULATED.3IONS-SCNC: 11.9 MMOL/L (ref 5–15)
BUN SERPL-MCNC: 24 MG/DL (ref 8–23)
BUN/CREAT SERPL: 12 (ref 7–25)
CALCIUM SPEC-SCNC: 9.5 MG/DL (ref 8.6–10.5)
CHLORIDE SERPL-SCNC: 98 MMOL/L (ref 98–107)
CO2 SERPL-SCNC: 29.1 MMOL/L (ref 22–29)
CREAT SERPL-MCNC: 2 MG/DL (ref 0.57–1)
EGFRCR SERPLBLD CKD-EPI 2021: 24.5 ML/MIN/1.73
GLUCOSE BLDC GLUCOMTR-MCNC: 123 MG/DL (ref 70–130)
GLUCOSE BLDC GLUCOMTR-MCNC: 180 MG/DL (ref 70–130)
GLUCOSE BLDC GLUCOMTR-MCNC: 183 MG/DL (ref 70–130)
GLUCOSE BLDC GLUCOMTR-MCNC: 339 MG/DL (ref 70–130)
GLUCOSE SERPL-MCNC: 125 MG/DL (ref 65–99)
HBA1C MFR BLD: 6.3 % (ref 4.8–5.6)
PHOSPHATE SERPL-MCNC: 3.4 MG/DL (ref 2.5–4.5)
POTASSIUM SERPL-SCNC: 3.9 MMOL/L (ref 3.5–5.2)
SODIUM SERPL-SCNC: 139 MMOL/L (ref 136–145)
URATE SERPL-MCNC: 8 MG/DL (ref 2.4–5.7)

## 2022-04-05 PROCEDURE — 82962 GLUCOSE BLOOD TEST: CPT

## 2022-04-05 PROCEDURE — 25010000002 FUROSEMIDE PER 20 MG: Performed by: INTERNAL MEDICINE

## 2022-04-05 PROCEDURE — 83036 HEMOGLOBIN GLYCOSYLATED A1C: CPT | Performed by: HOSPITALIST

## 2022-04-05 PROCEDURE — 99232 SBSQ HOSP IP/OBS MODERATE 35: CPT | Performed by: INTERNAL MEDICINE

## 2022-04-05 PROCEDURE — 63710000001 INSULIN LISPRO (HUMAN) PER 5 UNITS: Performed by: HOSPITALIST

## 2022-04-05 PROCEDURE — 80069 RENAL FUNCTION PANEL: CPT | Performed by: INTERNAL MEDICINE

## 2022-04-05 PROCEDURE — 84550 ASSAY OF BLOOD/URIC ACID: CPT | Performed by: HOSPITALIST

## 2022-04-05 PROCEDURE — 97110 THERAPEUTIC EXERCISES: CPT

## 2022-04-05 RX ORDER — CALCIUM POLYCARBOPHIL 625 MG
1250 TABLET ORAL DAILY
Status: DISCONTINUED | OUTPATIENT
Start: 2022-04-05 | End: 2022-04-12 | Stop reason: HOSPADM

## 2022-04-05 RX ORDER — DICLOFENAC SODIUM 30 MG/G
2 GEL TOPICAL 4 TIMES DAILY
Status: DISCONTINUED | OUTPATIENT
Start: 2022-04-05 | End: 2022-04-05 | Stop reason: SDUPTHER

## 2022-04-05 RX ADMIN — ACETAMINOPHEN 650 MG: 325 TABLET ORAL at 08:42

## 2022-04-05 RX ADMIN — GABAPENTIN 300 MG: 300 CAPSULE ORAL at 17:19

## 2022-04-05 RX ADMIN — FUROSEMIDE 40 MG: 10 INJECTION, SOLUTION INTRAMUSCULAR; INTRAVENOUS at 06:26

## 2022-04-05 RX ADMIN — GABAPENTIN 300 MG: 300 CAPSULE ORAL at 21:12

## 2022-04-05 RX ADMIN — CALCIUM POLYCARBOPHIL 1250 MG: 625 TABLET, FILM COATED ORAL at 21:12

## 2022-04-05 RX ADMIN — PANTOPRAZOLE SODIUM 40 MG: 40 TABLET, DELAYED RELEASE ORAL at 06:26

## 2022-04-05 RX ADMIN — FUROSEMIDE 40 MG: 10 INJECTION, SOLUTION INTRAMUSCULAR; INTRAVENOUS at 17:31

## 2022-04-05 RX ADMIN — APIXABAN 2.5 MG: 2.5 TABLET, FILM COATED ORAL at 21:12

## 2022-04-05 RX ADMIN — AMLODIPINE BESYLATE 5 MG: 5 TABLET ORAL at 08:41

## 2022-04-05 RX ADMIN — GABAPENTIN 300 MG: 300 CAPSULE ORAL at 08:42

## 2022-04-05 RX ADMIN — Medication 10 ML: at 08:42

## 2022-04-05 RX ADMIN — INSULIN LISPRO 5 UNITS: 100 INJECTION, SOLUTION INTRAVENOUS; SUBCUTANEOUS at 12:20

## 2022-04-05 RX ADMIN — METOPROLOL TARTRATE 100 MG: 50 TABLET, FILM COATED ORAL at 08:42

## 2022-04-05 RX ADMIN — ATORVASTATIN CALCIUM 40 MG: 20 TABLET, FILM COATED ORAL at 08:42

## 2022-04-05 RX ADMIN — INSULIN LISPRO 2 UNITS: 100 INJECTION, SOLUTION INTRAVENOUS; SUBCUTANEOUS at 17:19

## 2022-04-05 RX ADMIN — VENLAFAXINE HYDROCHLORIDE 75 MG: 75 CAPSULE, EXTENDED RELEASE ORAL at 08:42

## 2022-04-05 RX ADMIN — ACETAMINOPHEN 650 MG: 325 TABLET ORAL at 21:16

## 2022-04-05 NOTE — PLAN OF CARE
Goal Outcome Evaluation:  Plan of Care Reviewed With: patient        Progress: no change  Outcome Evaluation: Patient  resting  in bed.  Alert  and  orientyed. SOA  with  exertion.  Left  edema noted.  Consult  to  pulmonary for  SOA  called.  Will  notify  ortho  of   consult  in  am.    Continue  IV  Lasix.  .  Miralex  started  for  Constipation.  Afib  on  monitor. Nursing  will  continue  to monitor.

## 2022-04-05 NOTE — CONSULTS
ORTHOPAEDIC/MUSCULOSKELETAL ONCOLOGIC CONSULTATION  [facility]  Navarro Patel M.D.    Patient Identification:  Name: Lowell Min  Age: 82 y.o.  Sex: female  :  1940  MRN: [id number]    Requesting Provider: Vipin Tovar MD                     Chief Complaint: Left knee pain    History of Present Illness:   Lowell Min is a 82 y.o. female who I am seeing in consultation at the request of Dr. Tovar regarding the above.  Patient was admitted to the hospital on April 3 secondary to bright red blood which she noticed while having a bowel movement.  She was transported via ambulance secondary to increasing dyspnea.  Patient states that on  she noticed swelling and pain in the left knee with weightbearing.  She denies any trauma.  She denies any history of left knee pain.  At home she is a community ambulator without use of ambulatory aid.  She states that her knee was significantly swollen that it has now gone down.  She states that she has no pain with active or passive range of motion the knee but that that her symptoms are diffuse and worse with weightbearing.    Past Medical History:  Past Medical History:   Diagnosis Date   • Anxiety    • Atrial fibrillation (HCC)    • Chronic kidney disease, stage IV (severe) (HCC)    • Depression    • Elevated cholesterol    • Hypertension    • Type 2 diabetes mellitus (HCC)        Past Surgical History:  Past Surgical History:   Procedure Laterality Date   • APPENDECTOMY     • CHOLECYSTECTOMY     • COLONOSCOPY     • HYSTERECTOMY     • TUMOR REMOVAL Left     benign tumor from left breast        Home Meds:  Medications Prior to Admission   Medication Sig Dispense Refill Last Dose   • amLODIPine (NORVASC) 5 MG tablet Take 5 mg by mouth 2 (Two) Times a Day.   2022 at Unknown time   • apixaban (ELIQUIS) 5 MG tablet tablet Take 5 mg by mouth 2 (Two) Times a Day.   2022 at Unknown time   • atorvastatin (LIPITOR) 40 MG tablet  Take 1 tablet by mouth every night at bedtime.   4/2/2022 at Unknown time   • furosemide (LASIX) 20 MG tablet Take 20 mg by mouth Daily.   4/2/2022 at Unknown time   • gabapentin (NEURONTIN) 300 MG capsule Take 300 mg by mouth 3 (Three) Times a Day. 2 capsules 3 times per day.   4/2/2022 at Unknown time   • metoprolol tartrate (LOPRESSOR) 100 MG tablet Take 100 mg by mouth Daily. 2 tablets daily   4/2/2022 at Unknown time   • multivitamin with minerals tablet tablet Take 1 tablet by mouth Daily.   4/2/2022 at Unknown time   • omeprazole (priLOSEC) 40 MG capsule Take 40 mg by mouth Daily.   4/2/2022 at Unknown time   • venlafaxine XR (EFFEXOR-XR) 75 MG 24 hr capsule Take 75 mg by mouth Daily.   4/2/2022 at Unknown time       Current Meds:   Current Facility-Administered Medications   Medication Dose Route Frequency Provider Last Rate Last Admin   • acetaminophen (TYLENOL) tablet 650 mg  650 mg Oral Q4H PRN Gary Moore APRN   650 mg at 04/05/22 0842   • amLODIPine (NORVASC) tablet 5 mg  5 mg Oral Q24H Vipin Tovar MD   5 mg at 04/05/22 0841   • atorvastatin (LIPITOR) tablet 40 mg  40 mg Oral Daily Vipin Tovar MD   40 mg at 04/05/22 0842   • dextrose (D50W) (25 g/50 mL) IV injection 25 g  25 g Intravenous Q15 Min PRN Vipin Tovar MD       • dextrose (GLUTOSE) oral gel 15 g  15 g Oral Q15 Min PRN Vipin Tovar MD       • furosemide (LASIX) injection 40 mg  40 mg Intravenous Q12H Isaiah Foster MD   40 mg at 04/05/22 0626   • gabapentin (NEURONTIN) capsule 300 mg  300 mg Oral TID Vipin Tovar MD   300 mg at 04/05/22 0842   • glucagon (human recombinant) (GLUCAGEN DIAGNOSTIC) injection 1 mg  1 mg Intramuscular Q15 Min PRN Vipin Tovar MD       • insulin lispro (ADMELOG) injection 0-7 Units  0-7 Units Subcutaneous TID AC Vipin Tovar MD   5 Units at 04/05/22 1220   • melatonin tablet 3 mg  3 mg Oral Nightly PRN Gary Moore, RAMAKRISHNA       •  metoprolol tartrate (LOPRESSOR) tablet 100 mg  100 mg Oral Daily Vipin Tovar MD   100 mg at 22 0842   • nitroglycerin (NITROSTAT) SL tablet 0.4 mg  0.4 mg Sublingual Q5 Min PRN Gary Moore APRN       • ondansetron (ZOFRAN) tablet 4 mg  4 mg Oral Q6H PRN Gary Moore APRN        Or   • ondansetron (ZOFRAN) injection 4 mg  4 mg Intravenous Q6H PRN Gary Moore APRN       • pantoprazole (PROTONIX) EC tablet 40 mg  40 mg Oral QAM Vipin Tovar MD   40 mg at 22 0626   • sodium chloride 0.9 % flush 10 mL  10 mL Intravenous PRN Oscar Ellis MD   10 mL at 22 0842   • venlafaxine XR (EFFEXOR-XR) 24 hr capsule 75 mg  75 mg Oral Daily Vipin Tovar MD   75 mg at 22 08       Allergies:  Allergies   Allergen Reactions   • Ibuprofen Other (See Comments)     Decrease urine output         Social History:   Social History     Tobacco Use   • Smoking status: Never Smoker   • Smokeless tobacco: Never Used   Substance Use Topics   • Alcohol use: Never        Family History:  History reviewed. No pertinent family history.     Review of Systems  Constitutional: negative for chills, fevers and night sweats  Eyes: negative  Ears, nose, mouth, throat, and face: negative  Respiratory: positive for Shortness of breath  Cardiovascular: negative for chest pain  Gastrointestinal: positive for Bright red blood per rectum  Genitourinary:negative for dysuria and hematuria  Integument/breast: positive for Sores from attempted blood draws  Musculoskeletal:positive for Left knee pain and effusion and back pain  Neurological: negative  Behavioral/Psych: positive for anxiety and depression  Gynn: Negative for vaginal discharge or bleeding    Objective:  tMax 24 hrs: Temp (24hrs), Av.3 °F (36.8 °C), Min:98.2 °F (36.8 °C), Max:98.4 °F (36.9 °C)    Vitals Ranges:   Temp:  [98.2 °F (36.8 °C)-98.4 °F (36.9 °C)] 98.3 °F (36.8 °C)  Heart Rate:  [61-91] 61  Resp:  [18] 18  BP:  "()/(60-68) 94/60  Intake and Output Last 3 Shifts:   I/O last 3 completed shifts:  In: 240 [P.O.:240]  Out: 4800 [Urine:4800]    Physical Exam:  BP 94/60 (BP Location: Right arm, Patient Position: Lying)   Pulse 61   Temp 98.3 °F (36.8 °C) (Oral)   Resp 18   Ht 170.2 cm (67\")   Wt 105 kg (232 lb 1.6 oz)   SpO2 95%   BMI 36.35 kg/m²     General Appearance:    Alert, cooperative, no distress, appears stated age   HEENT:    Normocephalic, atraumatic. Sclerae anicteric.  Mucous         membranes moist.   Cardio:   Regular rate and rhythm.   Lungs:     Breathing unlabored on room air   Focused MSK:  Left knee demonstrates knee effusion.  She has no pain with passive range of motion of the knee.  Knee is stable to varus and valgus 0 and 30 degrees.  She has a 2+ dorsalis pedis pulse.  5/5 dorsiflexion, plantarflexion, EHL.           Data Review:  Lab Results (last 24 hours)     Procedure Component Value Units Date/Time    POC Glucose Once [[id number]]  (Abnormal) Collected: 04/05/22 1539    Specimen: Blood Updated: 04/05/22 1545     Glucose 183 mg/dL      Comment: Meter: [id number] : [id number] Lang All My Data YASMEEN       POC Glucose Once [[id number]]  (Abnormal) Collected: 04/05/22 1039    Specimen: Blood Updated: 04/05/22 1044     Glucose 339 mg/dL      Comment: Meter: [id number] : [id number] Lang All My Data YASMEEN       Renal Function Panel [[id number]]  (Abnormal) Collected: 04/05/22 0717    Specimen: Blood Updated: 04/05/22 0919     Glucose 125 mg/dL      BUN 24 mg/dL      Creatinine 2.00 mg/dL      Sodium 139 mmol/L      Potassium 3.9 mmol/L      Chloride 98 mmol/L      CO2 29.1 mmol/L      Calcium 9.5 mg/dL      Albumin 3.40 g/dL      Phosphorus 3.4 mg/dL      Anion Gap 11.9 mmol/L      BUN/Creatinine Ratio 12.0     eGFR 24.5 mL/min/1.73      Comment: National Kidney Foundation and American Society of Nephrology (ASN) Task Force recommended calculation based on the Chronic Kidney Disease Epidemiology " Collaboration (CKD-EPI) equation refit without adjustment for race.       Narrative:      GFR Normal >60  Chronic Kidney Disease <60  Kidney Failure <15      Uric Acid [[id number]]  (Abnormal) Collected: 22    Specimen: Blood Updated: 22 0800     Uric Acid 8.0 mg/dL     Hemoglobin A1c [[id number]]  (Abnormal) Collected: 22    Specimen: Blood Updated: 22 0749     Hemoglobin A1C 6.30 %     Narrative:      Hemoglobin A1C Ranges:    Increased Risk for Diabetes  5.7% to 6.4%  Diabetes                     >= 6.5%  Diabetic Goal                < 7.0%    POC Glucose Once [[id number]]  (Normal) Collected: 22    Specimen: Blood Updated: 22     Glucose 123 mg/dL      Comment: Meter: [id number] : [id number] Michelle ARANA       POC Glucose Once [[id number]]  (Abnormal) Collected: 22    Specimen: Blood Updated: 22     Glucose 190 mg/dL      Comment: Meter: [id number] : [id number] Michelle ARANA           CBC from  was reviewed demonstrating a white count of 6.8, differential with a neutrophil count of 72.8%    Radiographs of the left knee dated 2022 were available for my review and demonstrates tricompartmental osteoarthritis of the left knee in addition to an enchondroma and the distal femoral diaphysis    Assessment:  Lowell Min is a 82 y.o. 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      Medical decision-makin.  I explained to the patient that I do not feel this is indicative of a a septic joint.  I did however present her an option of a therapeutic arthrocentesis to decrease to some of the fluid volume in her knee.  She deferred.  2.  Imaging was reviewed with the patient demonstrating the arthritic findings in addition to the distal femoral enchondroma.  It was conveyed to her that the  enchondroma of the left distal femur failed to demonstrate any radiographic findings to suggest malignant degeneration.  She has no pain in this area.  3.  Regarding her osteoarthritis the patient performs a pharmacologic approach.  Provided there is no contraindications I would recommend a high-dose course of anti-inflammatory to see if this helps calm her knee down.  If it does not options for an aspiration and injection are still possible.  I feel gout is not likely the culprit given the patient's lack of pain at rest.  4.  As a pertains to the enchondroma of the left distal femur I do recommend radiographic evaluation in approximately 1 year to determine stability.  I do not feel that it warrants further advanced imaging or biopsy.  5.  From my standpoint the patient may be weightbearing as tolerated, she will likely need an ambulatory aid and evaluation with physical therapy to ascertain safety given her current status  6.  The patient may follow-up with me as an outpatient in approximately 2 to 4 weeks.  I am happy to reevaluate the patient should her symptoms persist or worsen during this hospitalization.    Total time of visit was approximately 45 minutes greater than 50% of which was spent in counseling medical decision making.  Remainder was spent in review of the patient's medical records and review of imaging.    Navarro Patel MD  4/5/2022

## 2022-04-05 NOTE — THERAPY TREATMENT NOTE
Patient Name: Lowell Min  : 1940    MRN: [id number]                              Today's Date: 2022       Admit Date: 4/3/2022    Visit Dx:     ICD-10-CM ICD-9-CM   1. NELLY (obstructive sleep apnea)  G47.33 327.23   2. Symptomatic anemia  D64.9 285.9   3. Gastrointestinal hemorrhage, unspecified gastrointestinal hemorrhage type  K92.2 578.9   4. Chronic kidney disease, unspecified CKD stage  N18.9 585.9   5. Chronic diastolic congestive heart failure (HCC)  I50.32 428.32     428.0   6. PAF (paroxysmal atrial fibrillation) (HCC)  I48.0 427.31   7. Obesity (BMI 30-39.9)  E66.9 278.00     Patient Active Problem List   Diagnosis   • Chronic kidney disease, stage IV (severe) (HCC)   • Erythropoietin deficiency anemia   • Symptomatic anemia   • Anxiety associated with depression   • Iron deficiency anemia   • PAF (paroxysmal atrial fibrillation) (HCC)   • Shortness of breath   • CHF (congestive heart failure) (HCC)     Past Medical History:   Diagnosis Date   • Anxiety    • Atrial fibrillation (HCC)    • Chronic kidney disease, stage IV (severe) (HCC)    • Depression    • Elevated cholesterol    • Hypertension    • Type 2 diabetes mellitus (HCC)      Past Surgical History:   Procedure Laterality Date   • APPENDECTOMY     • CHOLECYSTECTOMY     • COLONOSCOPY     • HYSTERECTOMY     • TUMOR REMOVAL Left     benign tumor from left breast      General Information     Row Name 22 1116          Physical Therapy Time and Intention    Document Type therapy note (daily note)  -EE     Mode of Treatment physical therapy;individual therapy  -EE     Row Name 22 1116          General Information    Existing Precautions/Restrictions fall  -EE     Barriers to Rehab medically complex  -EE     Row Name 22 1116          Cognition    Orientation Status (Cognition) oriented x 4  -EE     Row Name 22 1116          Safety Issues, Functional Mobility    Impairments Affecting Function (Mobility) balance;range of  motion (ROM);strength;endurance/activity tolerance;pain  -EE           User Key  (r) = Recorded By, (t) = Taken By, (c) = Cosigned By    Initials Name Provider Type    Ni Hays PT Physical Therapist               Mobility     Row Name 04/05/22 1116          Bed Mobility    Comment, (Bed Mobility) bed mobility not tested today; awaiting results of L knee x-ray; therefore, bed exercises only until cleared for OOB activity & WBing  -EE           User Key  (r) = Recorded By, (t) = Taken By, (c) = Cosigned By    Initials Name Provider Type    Ni Hays PT Physical Therapist               Obj/Interventions     Row Name 04/05/22 1117          Motor Skills    Therapeutic Exercise --  Supine B ankle pumps, heel slides, SAQ, hip abd/add x 10 reps each. Increased time required for L LE; decreased L knee ROM with heel slide.  -EE           User Key  (r) = Recorded By, (t) = Taken By, (c) = Cosigned By    Initials Name Provider Type    Ni Hays, MIRNA Physical Therapist               Goals/Plan    No documentation.                Clinical Impression     Row Name 04/05/22 1118          Pain    Pretreatment Pain Rating 5/10  -EE     Posttreatment Pain Rating 5/10  -EE     Pain Location - Side/Orientation Left  -EE     Pain Location - knee  -EE     Pain Intervention(s) Repositioned;Rest;Elevated  -EE     Row Name 04/05/22 1118          Plan of Care Review    Outcome Evaluation Pt agreeable to PT; xray results pending; therefore OOB activity deferred and only bed exercises performed. Pt able to complete ther ex independently; however, requires increased time for L LE and demos decreased ROM and strength in L knee. Anticipate pt will have difficulty with weight bearing and ambulation secondary to L knee pain and weakness. Pt will continue to benefit from PT for ongoing strengthening and mobilization as able.  -EE     Row Name 04/05/22 1118          Positioning and Restraints    Pre-Treatment Position in bed  -EE      Post Treatment Position bed  -EE     In Bed fowlers;call light within reach;encouraged to call for assist;exit alarm on;heels elevated  -EE           User Key  (r) = Recorded By, (t) = Taken By, (c) = Cosigned By    Initials Name Provider Type    EE Ni Connor, PT Physical Therapist               Outcome Measures     Row Name 04/05/22 1120          How much help from another person do you currently need...    Turning from your back to your side while in flat bed without using bedrails? 2  -EE     Moving from lying on back to sitting on the side of a flat bed without bedrails? 2  -EE     Moving to and from a bed to a chair (including a wheelchair)? 2  -EE     Standing up from a chair using your arms (e.g., wheelchair, bedside chair)? 2  -EE     Climbing 3-5 steps with a railing? 1  -EE     To walk in hospital room? 1  -EE     AM-PAC 6 Clicks Score (PT) 10  -EE           User Key  (r) = Recorded By, (t) = Taken By, (c) = Cosigned By    Initials Name Provider Type    EE Ni Connor, PT Physical Therapist                             Physical Therapy Education                 Title: PT OT SLP Therapies (Done)     Topic: Physical Therapy (Done)     Point: Mobility training (Done)     Learning Progress Summary           Patient Acceptance, E, VU by DB at 4/4/2022 1541                   Point: Home exercise program (Done)     Learning Progress Summary           Patient Acceptance, E, VU,NR by EE at 4/5/2022 1120    Acceptance, E, VU by DB at 4/4/2022 1541                   Point: Body mechanics (Done)     Learning Progress Summary           Patient Acceptance, E, VU by DB at 4/4/2022 1541                   Point: Precautions (Done)     Learning Progress Summary           Patient Acceptance, E, VU by DB at 4/4/2022 1541                               User Key     Initials Effective Dates Name Provider Type Discipline    EE 06/16/21 -  Ni Connor, PT Physical Therapist PT    DB 06/16/21 -  Oanh Camp, MIRNA Physical  Therapist PT              PT Recommendation and Plan     Outcome Evaluation: Pt agreeable to PT; xray results pending; therefore OOB activity deferred and only bed exercises performed. Pt able to complete ther ex independently; however, requires increased time for L LE and demos decreased ROM and strength in L knee. Anticipate pt will have difficulty with weight bearing and ambulation secondary to L knee pain and weakness. Pt will continue to benefit from PT for ongoing strengthening and mobilization as able.     Time Calculation:    PT Charges     Row Name 04/05/22 1120             Time Calculation    Start Time 1031  -EE      Stop Time 1049  -EE      Time Calculation (min) 18 min  -EE      PT Received On 04/05/22  -EE      PT - Next Appointment 04/06/22  -EE              Time Calculation- PT    Total Timed Code Minutes- PT 18 minute(s)  -EE              Timed Charges    25033 - PT Therapeutic Exercise Minutes 18  -EE              Total Minutes    Timed Charges Total Minutes 18  -EE       Total Minutes 18  -EE            User Key  (r) = Recorded By, (t) = Taken By, (c) = Cosigned By    Initials Name Provider Type    EE Ni Connor, MIRNA Physical Therapist              Therapy Charges for Today     Code Description Service Date Service Provider Modifiers Qty    10924447323 HC PT THER PROC EA 15 MIN 4/5/2022 Ni Connor, PT GP 1          PT G-Codes  Outcome Measure Options: AM-PAC 6 Clicks Basic Mobility (PT)  AM-PAC 6 Clicks Score (PT): 10     Patient was intermittently wearing a face mask during this therapy encounter. Therapist used appropriate personal protective equipment including mask and gloves.  Mask used was standard procedure mask. Appropriate PPE was worn during the entire therapy session. Hand hygiene was completed before and after therapy session. Patient is not in enhanced droplet precautions.       Ni Connor PT  4/5/2022

## 2022-04-05 NOTE — CONSULTS
Pulmonary Consultation     Patient Name: Lowell Min  Age/Sex: 82 y.o. female  : 1940  MRN: [id number]    Date of Admission: 4/3/2022  Date of Encounter Visit: 22  Encounter Provider: Andrew Saini MD  Referring Provider: Alfonso Aviles MD  Place of Service: [facility]  Patient Care Team:  Dannie Mathews MD as PCP - General (Internal Medicine)      Subjective:     Consulted for: Sleep apnea with intolerance to the CPAP with severe underlying pulmonary hypertension admitted with decompensated heart failure    Chief Complaint: Patient is here with decompensated heart failure    History of Present Illness:  Lowell Min is a 82 y.o. female with multiple comorbidities who presented to the hospital on 4/3/2022 with some bright red blood per rectum associated with some worsening puffiness according to the family, with no chest pain however there was some decrease in exercise tolerance with more dyspnea on exertion but no orthopnea.  Initial work-up in the ER including chest x-ray showed no cardiomegaly or pulmonary edema was similar to her reported finding on the prior x-ray couple of weeks earlier.  Patient was on 100% nonrebreather by EMS when she presented however she was doing well on 2 L nasal cannula oxygen after it was weaned.  Echocardiogram was done and showed evidence of severe pulmonary hypertension with some valvular heart disease, patient  is known to have severe underlying sleep apnea however she is on no active treatment because  intolerance to CPAP and we were consulted for reassessment  The patient was evaluated for sleep apnea around 8 years ago at [facility] and tested positive, she does not believe it was severe.  CPAP was recommended, she could not tolerate it and has not been on any treatment since  She does not have any nocturnal oxygen.  At this point she is compensating very well and denies any distress except whenever she tries to ambulate  Patient does have daytime  fatigue and sleepiness with hypersomnia [place] sleepiness scale of 12 she does take naps during the daytime which are refreshing  She is not sure about her snoring she sleeps alone so there is no feedback about her sleeping history.  Patient reported that she may have gained 30 pounds since her last sleep study  Patient is not opposed to another sleep evaluation    Echocardiogram 4/3/2022:  · Left ventricular ejection fraction appears to be 61 - 65%. Left ventricular systolic function is normal.  · Left ventricular diastolic function is consistent with (grade II w/high LAP) pseudonormalization.  · Moderate aortic valve stenosis is present. Aortic valve area is 1.31 cm2.Peak velocity of the flow distal to the aortic valve is 317.6 cm/s. Aortic valve maximum pressure gradient is 40.4 mmHg. Aortic valve mean pressure gradient is 20.9 mmHg. Aortic valve dimensionless index is 0.4 .  · Mild mitral valve regurgitation is present. Mild mitral valve stenosis is present. The mitral valve mean gradient is 3.2 mmHg.  · The left atrial cavity is severely dilated.  · Mild tricuspid valve regurgitation is present. Calculated right ventricular systolic pressure from tricuspid regurgitation is 66.2 mmHg. Severe pulmonary hypertension is present      PUlmonary Functions Testing Results:    No results found for: FEV1, FVC, DOR7XWB, TLC, DLCO    Review of Systems:   Review of Systems   Constitutional: Positive for activity change, fatigue and unexpected weight change. Negative for appetite change and fever.   HENT: Negative for sore throat and trouble swallowing.    Respiratory: Positive for shortness of breath. Negative for cough.    Gastrointestinal: Negative for abdominal pain, constipation, diarrhea, nausea and vomiting.   Endocrine: Negative for cold intolerance and heat intolerance.   Genitourinary: Positive for frequency. Negative for difficulty urinating.   Musculoskeletal: Positive for arthralgias and myalgias. Negative for  neck pain and neck stiffness.   Neurological: Positive for dizziness, weakness and light-headedness. Negative for tremors and headaches.   Psychiatric/Behavioral: Negative for sleep disturbance. The patient is not nervous/anxious.    Past Medical History:  Past Medical History:   Diagnosis Date   • Anxiety    • Atrial fibrillation (HCC)    • Chronic kidney disease, stage IV (severe) (HCC)    • Depression    • Elevated cholesterol    • Hypertension    • Type 2 diabetes mellitus (HCC)        Past Surgical History:   Procedure Laterality Date   • APPENDECTOMY     • CHOLECYSTECTOMY     • COLONOSCOPY     • HYSTERECTOMY     • TUMOR REMOVAL Left     benign tumor from left breast       Home Medications:   Medications Prior to Admission   Medication Sig Dispense Refill Last Dose   • amLODIPine (NORVASC) 5 MG tablet Take 5 mg by mouth 2 (Two) Times a Day.   4/2/2022 at Unknown time   • apixaban (ELIQUIS) 5 MG tablet tablet Take 5 mg by mouth 2 (Two) Times a Day.   4/2/2022 at Unknown time   • atorvastatin (LIPITOR) 40 MG tablet Take 1 tablet by mouth every night at bedtime.   4/2/2022 at Unknown time   • furosemide (LASIX) 20 MG tablet Take 20 mg by mouth Daily.   4/2/2022 at Unknown time   • gabapentin (NEURONTIN) 300 MG capsule Take 300 mg by mouth 3 (Three) Times a Day. 2 capsules 3 times per day.   4/2/2022 at Unknown time   • metoprolol tartrate (LOPRESSOR) 100 MG tablet Take 100 mg by mouth Daily. 2 tablets daily   4/2/2022 at Unknown time   • multivitamin with minerals tablet tablet Take 1 tablet by mouth Daily.   4/2/2022 at Unknown time   • omeprazole (priLOSEC) 40 MG capsule Take 40 mg by mouth Daily.   4/2/2022 at Unknown time   • venlafaxine XR (EFFEXOR-XR) 75 MG 24 hr capsule Take 75 mg by mouth Daily.   4/2/2022 at Unknown time       Inpatient Medications:  Scheduled Meds:amLODIPine, 5 mg, Oral, Q24H  atorvastatin, 40 mg, Oral, Daily  furosemide, 40 mg, Intravenous, Q12H  gabapentin, 300 mg, Oral, TID  insulin  lispro, 0-7 Units, Subcutaneous, TID AC  metoprolol tartrate, 100 mg, Oral, Daily  pantoprazole, 40 mg, Oral, QAM  venlafaxine XR, 75 mg, Oral, Daily      Continuous Infusions:   PRN Meds:.•  acetaminophen  •  dextrose  •  dextrose  •  glucagon (human recombinant)  •  melatonin  •  nitroglycerin  •  ondansetron **OR** ondansetron  •  [COMPLETED] Insert peripheral IV **AND** sodium chloride    Allergies:  Allergies   Allergen Reactions   • Ibuprofen Other (See Comments)     Decrease urine output         Past Social History:  Social History     Socioeconomic History   • Marital status: Single   Tobacco Use   • Smoking status: Never Smoker   • Smokeless tobacco: Never Used   Vaping Use   • Vaping Use: Never used   Substance and Sexual Activity   • Alcohol use: Never   • Drug use: Never   • Sexual activity: Defer       Past Family History:  History reviewed. No pertinent family history.        Objective:   Temp:  [97.6 °F (36.4 °C)-98.4 °F (36.9 °C)] 98.2 °F (36.8 °C)  Heart Rate:  [70-75] 73  Resp:  [18] 18  BP: (130-142)/(53-68) 138/68  SpO2:  [95 %-98 %] 97 %  on  Flow (L/min):  [2] 2 Device (Oxygen Therapy): nasal cannula     Intake/Output Summary (Last 24 hours) at 4/5/2022 0405  Last data filed at 4/4/2022 2145  Gross per 24 hour   Intake --   Output 3300 ml   Net -3300 ml     Body mass index is 38.37 kg/m².      04/03/22  0320 04/04/22  0526 04/04/22  1150   Weight: 113 kg (249 lb) 112 kg (245 lb 14.4 oz) 111 kg (245 lb)     Weight change: -0.408 kg (-14.4 oz)    Physical Exam:   Physical Exam   General:    No acute distress, alert and oriented x4, pleasant, on nasal cannula oxygen                   Head:    Normocephalic, atraumatic. External ears and nose are normal   Eyes:          Conjunctivae and sclerae normal, no icterus, PERRLA, no  discharge   Throat:   No oral lesions, no thrush, oral mucosa moist.  Mallampati 4   Neck:   Supple, trachea midline. No JVD, no cervical or supraclavicular lymphadenopathy     Lungs:     Normal chest on inspection, CTAB, no wheezes. No rhonchi. No crackles. Respirations regular, even and unlabored.  Very faint crackles posteriorly    Heart:    Regular rhythm and normal rate.  No murmurs, gallops, or rubs noted.   Abdomen:     Soft, nontender, nondistended, positive bowel sounds. No hepatospleenomegaly    Extremities:   No clubbing, cyanosis, or edema.     Pulses:   Pulses palpable and equal bilaterally.    Skin:   No bleeding or rash. No bumps, good turgor pressure    Neuro:   Nonfocal.  Moves all extremities well. Strength 5/5 and symmetrical, no sensory deficit    Psychiatric:   Normal mood and affect.     Lab Review:   Results from last 7 days   Lab Units 04/04/22  1103 04/03/22  0939 04/03/22  0044   SODIUM mmol/L 137 139 138   POTASSIUM mmol/L 3.9 4.5 4.7   CHLORIDE mmol/L 105 108* 110*   CO2 mmol/L 22.0 20.2* 16.1*   BUN mg/dL 24* 27* 27*   CREATININE mg/dL 2.08* 2.21* 2.02*   GLUCOSE mg/dL 192* 169* 121*   CALCIUM mg/dL 8.8 9.0 8.3*   AST (SGOT) U/L  --   --  24   ALT (SGPT) U/L  --   --  6   ALBUMIN g/dL 3.30*  --  3.10*     Results from last 7 days   Lab Units 04/03/22  0939 04/03/22  0044   TROPONIN T ng/mL <0.010 <0.010     Results from last 7 days   Lab Units 04/04/22  1103 04/03/22  0939 04/03/22  0044 04/01/22  1404   WBC 10*3/mm3 6.83 5.61 5.00 8.34   HEMOGLOBIN g/dL 8.0* 7.9* 7.3* 7.8*   HEMATOCRIT % 25.1* 24.2* 22.5* 23.9*   PLATELETS 10*3/mm3 190 181 178 226   MCV fL 85.7 86.7 87.2 85.1   MCH pg 27.3 28.3 28.3 27.8   MCHC g/dL 31.9 32.6 32.4 32.6   RDW % 15.5* 15.3 15.2 14.7   RDW-SD fl 46.7 48.4 48.3 45.2   MPV fL 9.6 9.7 10.4 10.2   NEUTROPHIL % % 72.8  --  56.8 68.6   LYMPHOCYTE % % 14.6*  --  27.0 18.7*   MONOCYTES % % 10.1  --  12.8* 10.4   EOSINOPHIL % % 1.5  --  2.6 1.4   BASOPHIL % % 0.4  --  0.6 0.4   IMM GRAN % % 0.6*  --  0.2 0.5   NEUTROS ABS 10*3/mm3 4.97  --  2.84 5.72   LYMPHS ABS 10*3/mm3 1.00  --  1.35 1.56   MONOS ABS 10*3/mm3 0.69  --  0.64 0.87    EOS ABS 10*3/mm3 0.10  --  0.13 0.12   BASOS ABS 10*3/mm3 0.03  --  0.03 0.03   IMMATURE GRANS (ABS) 10*3/mm3 0.04  --  0.01 0.04   NRBC /100 WBC 0.1  --  0.0 0.0     Results from last 7 days   Lab Units 04/03/22  0044   INR  2.14*   APTT seconds 33.2               Invalid input(s): LDLCALC  Results from last 7 days   Lab Units 04/03/22  0044   PROBNP pg/mL 2,649.0*     Results from last 7 days   Lab Units 04/03/22  0939   TSH uIU/mL 3.940                             Results from last 7 days   Lab Units 04/03/22  0223   COVID19  Not Detected             Imaging:  Imaging Results (Most Recent)     Procedure Component Value Units Date/Time    XR Knee 1 or 2 View Left [[id number]] Collected: 04/04/22 1932     Updated: 04/04/22 1941    Narrative:      XR KNEE 1 OR 2 VW LEFT-     INDICATIONS: Knee effusion     TECHNIQUE: Frontal and lateral views of the left knee     COMPARISON: None available     FINDINGS:     Degenerative changes of the knee are present, with moderate to large  degenerative spurring, and prominent patellofemoral joint space  narrowing. Minimal to mild knee effusion is suggested. No acute  fracture, erosion, or dislocation is identified.     A mixed density, predominantly sclerotic structure in the distal femoral  shaft measures 2.2 x 1.8 x 5.9 cm, suggests enchondroma. No cortical  breakthrough or associated soft tissue mass is noted, but possibility of  chondrosarcoma cannot entirely be excluded, particularly if there is  pain at this location. If indicated, MRI could be considered for further  evaluation, and interval radiographic follow-up in 3-6 months is  recommended to characterize change.       Impression:         As described.     This report was finalized on 4/4/2022 7:38 PM by Dr. Pérez Swartz M.D.       XR Chest 1 View [[id number]] Collected: 04/03/22 0121     Updated: 04/03/22 0125    Narrative:      SINGLE VIEW OF THE CHEST     HISTORY: Shortness of air     COMPARISON: None  available.     FINDINGS:  Cardiomegaly is present. There is vascular congestion. No pneumothorax  is seen. No definite pleural effusion is identified. Lung volumes are  diminished, with bibasilar atelectasis noted. There is calcification of  the aorta.       Impression:      Cardiomegaly with vascular congestion.     This report was finalized on 4/3/2022 1:21 AM by Dr. Em Brewster M.D.             I personally viewed and interpreted the patient's imaging studies.    Assessment:   7. Obstructive sleep apnea, noncompliant/intolerant to CPAP  8. Severe pulmonary hypertension  9. Morbid obesity BMI of 38  10. Acute on chronic decompensated diastolic congestive heart failure  11. Chronic kidney disease stage IV  12. Anemia with iron deficiency  13. Diabetes mellitus type 2  14. Moderate aortic valve stenosis with mild mitral regurgitation  15. Left knee effusion      Plan:     Patient had intolerance to the CPAP before with discomfort from the mask and the pressure  I do not recommend the hospital BiPAP because of the lack of options when it comes to mask fitting besides patient does not have any decompensated hypercapnic respiratory failure to require immediate measures  If the patient is willing to pursue a possible reevaluation and treatment for sleep apnea, she would be best served with another in lab sleep study with split/CPAP titration and then follow-up afterwards with further adjustment if needed  Patient was educated about the impact of untreated sleep apnea on the heart failure, on the pulmonary hypertension and the importance to treat  With her underlying heart failure and with the sleep apnea pulmonary hypertension is secondary and we need to address those issues first before considering any pulmonary artery vasodilators, that will be followed as an outpatient as well for the time being the treatment is to diuresis tolerated  Monitor renal function closely while on diuretics    Other issues will be  deferred to the primary team    We will go ahead and order the sleep study and the follow-up, otherwise we will see as needed for the remainder of the hospital stay        Thank you for allowing me to participate in the care of Lowell Min. Feel free to contact me directly with any further questions or concerns.    Andrew Saini MD  [facility]   04/05/22  04:05 EDT    Dictated utilizing Dragon dictation

## 2022-04-05 NOTE — PLAN OF CARE
Goal Outcome Evaluation:  Plan of Care Reviewed With: patient           Outcome Evaluation: Pt pleasant and cooperative, no c/os this shift except for headache this am which was releived with tylenol, voiding well, xxlg amounts of very clear urine, fluid restriction followed, eating well, afib on monitor. Pt will restart eliquis this evening.

## 2022-04-05 NOTE — PLAN OF CARE
Goal Outcome Evaluation:              Outcome Evaluation: Pt agreeable to PT; xray results pending; therefore OOB activity deferred and only bed exercises performed. Pt able to complete ther ex independently; however, requires increased time for L LE and demos decreased ROM and strength in L knee. Anticipate pt will have difficulty with weight bearing and ambulation secondary to L knee pain and weakness. Pt will continue to benefit from PT for ongoing strengthening and mobilization as able.

## 2022-04-05 NOTE — PROGRESS NOTES
[facility]     Progress Note    Patient Name: Lowell Min  : 1940  MRN: [id number]  Primary Care Physician:  Dannie Mathews MD  Date of admission: 4/3/2022    Subjective   Subjective     Chief Complaint: ckd IV    History of Present Illness  Patient with ckd IV and volume overload    Review of Systems    Objective   Objective     Vitals:   Temp:  [98.2 °F (36.8 °C)-98.4 °F (36.9 °C)] 98.2 °F (36.8 °C)  Heart Rate:  [70-91] 91  Resp:  [18] 18  BP: (130-147)/(53-68) 147/67  Flow (L/min):  [2] 2    Physical Exam   General Appearance:  In no acute distress.    HEENT: Normal HEENT exam.     Extremities: .  There is no deformity, effusion or dependent edema.    Neurological: Patient is alert     Result Review    Result Review:  I have personally reviewed the results from the time of this admission to 2022 08:51 EDT and agree with these findings:  []  Laboratory  []  Microbiology  []  Radiology  []  EKG/Telemetry   []  Cardiology/Vascular   []  Pathology  []  Old records  []  Other:      Assessment/Plan   Assessment / Plan     Brief Patient Summary:  Lowell Min is a 82 y.o. female who has ckd IV with volume overload    Active Hospital Problems:  Active Hospital Problems    Diagnosis    • **CHF (congestive heart failure) (HCC)    • Symptomatic anemia    • Anxiety associated with depression    • Iron deficiency anemia    • PAF (paroxysmal atrial fibrillation) ([facility])    • Shortness of breath    • Chronic kidney disease, stage IV (severe) ([facility])      Plan:   CKD stage IV with mild PERLA.  Baseline creatinine 2.0, improved    CHF (congestive heart failure), acute on chronic systolic    Chronic kidney disease, stage IV (severe) ([facility])    Symptomatic anemia    Anxiety associated with depression    Iron deficiency anemia    PAF (paroxysmal atrial fibrillation) ([facility])    Shortness of breath    Patient seen and examined labs and chart reviewed. No complaints currently.  Cr stable at 2. Will continue  current    Makayla Dela Cruz MD

## 2022-04-05 NOTE — PROGRESS NOTES
"DAILY PROGRESS NOTE  [facility]    Patient Identification:  Name: Lowell Min  Age: 82 y.o.  Sex: female  :  1940  MRN: [id number]         Primary Care Physician: Dannie Mathews MD      Subjective  Patient with no new complaints.    Objective:  General Appearance:  Comfortable, well-appearing, in no acute distress and not in pain (Morbidly obese.).    Vital signs: (most recent): Blood pressure 94/60, pulse 61, temperature 98.3 °F (36.8 °C), temperature source Oral, resp. rate 18, height 170.2 cm (67\"), weight 105 kg (232 lb 1.6 oz), SpO2 95 %.    Lungs:  Normal effort and normal respiratory rate.    Heart: Normal rate.  Irregular rhythm.    Extremities: There is dependent edema.  (Left knee effusion a little improved.  Knee tenderness also improved.)  Neurological: Patient is alert and oriented to person, place and time.    Skin:  Warm and dry.                Vital signs in last 24 hours:  Temp:  [98.2 °F (36.8 °C)-98.4 °F (36.9 °C)] 98.3 °F (36.8 °C)  Heart Rate:  [61-91] 61  Resp:  [18] 18  BP: ()/(60-68) 94/60    Intake/Output:    Intake/Output Summary (Last 24 hours) at 2022 1654  Last data filed at 2022 1259  Gross per 24 hour   Intake --   Output 3500 ml   Net -3500 ml         Results from last 7 days   Lab Units 22  1103 22  0939 22  0044 22  1404   WBC 10*3/mm3 6.83 5.61 5.00 8.34   HEMOGLOBIN g/dL 8.0* 7.9* 7.3* 7.8*   PLATELETS 10*3/mm3 190 181 178 226     Results from last 7 days   Lab Units 22  0717 22  1103 22  0939 22  0044   SODIUM mmol/L 139 137 139 138   POTASSIUM mmol/L 3.9 3.9 4.5 4.7   CHLORIDE mmol/L 98 105 108* 110*   CO2 mmol/L 29.1* 22.0 20.2* 16.1*   BUN mg/dL 24* 24* 27* 27*   CREATININE mg/dL 2.00* 2.08* 2.21* 2.02*   GLUCOSE mg/dL 125* 192* 169* 121*   Estimated Creatinine Clearance: 27 mL/min (A) (by C-G formula based on SCr of 2 mg/dL (H)).  Results from last 7 days   Lab Units 22  0717 " "04/04/22  1103 04/03/22  0939 04/03/22  0044   CALCIUM mg/dL 9.5 8.8 9.0 8.3*   ALBUMIN g/dL 3.40* 3.30*  --  3.10*   PHOSPHORUS mg/dL 3.4 3.8  --   --      Results from last 7 days   Lab Units 04/05/22  0717 04/04/22  1103 04/03/22  0044   ALBUMIN g/dL 3.40* 3.30* 3.10*   BILIRUBIN mg/dL  --   --  0.5   ALK PHOS U/L  --   --  144*   AST (SGOT) U/L  --   --  24   ALT (SGPT) U/L  --   --  6       Assessment:  Diastolic CHF-acute on chronic: Continue diuresis.  If daily weights are accurate she is down 17 pounds.  Severe pulmonary hypertension: Likely contributing to her edema.  Likely secondary to noncompliance with CPAP.  Chronic kidney disease stage IV: Nephrology input appreciated.  Paroxysmal atrial fibrillation: Continue rate control.  Anticoagulation on hold.  If it does not appear she is going to need an arthrocentesis then okay to resume anticoagulation from my point of view.-See below.  Blood on toilet paper: GI input appreciated.  No evidence of any further GI blood loss.  Resume Eliquis.  Will resume at 2.5 mg noting her age and renal function.  Anemia-severe iron deficiency, recurrent-anemia of chronic renal disease: Patient received an iron infusion prior to admission.  Scheduled for second infusion next week.  Diabetes type 2: Patient is not sure what \"pill\" she is taking for this.  Will initiate sliding scale insulin during hospitalization.  Morbid obesity:  Valvular heart disease: Moderate aortic valve stenosis, mild mitral valve regurgitation, mild mitral valve stenosis, tricuspid regurgitation.  Obstructive sleep apnea: Noncompliant with CPAP.  Pulmonary evaluation appreciated.  Patient encouraged to follow-up as an outpatient.    Left knee effusion:  Orthopedic evaluation greatly appreciated.  With her renal status and on anticoagulants, unfortunately she is not a candidate for oral NSAIDs.  Trial of Voltaren gel.    Plan:  Please see above.  Discussed with orthopedic surgery.    Vipin SUTTON" MD Guy  4/5/2022  16:54 EDT

## 2022-04-05 NOTE — PROGRESS NOTES
[facility]  Inpatient Progress Note    Reason for Follow Up: Rectal bleeding, anemia    Subjective     Interval History:   She has not had any further bleeding.  She confirms that the blood she is always just a very small amount on the toilet tissue.  Plans for resuming Eliquis.      Current Facility-Administered Medications:   •  acetaminophen (TYLENOL) tablet 650 mg, 650 mg, Oral, Q4H PRN, Gary Moore, APRN, 650 mg at 04/05/22 0842  •  amLODIPine (NORVASC) tablet 5 mg, 5 mg, Oral, Q24H, Vipin Tovar MD, 5 mg at 04/05/22 0841  •  apixaban (ELIQUIS) tablet 2.5 mg, 2.5 mg, Oral, BID, Vipin Tovar MD  •  atorvastatin (LIPITOR) tablet 40 mg, 40 mg, Oral, Daily, Vipin Tovar MD, 40 mg at 04/05/22 0842  •  dextrose (D50W) (25 g/50 mL) IV injection 25 g, 25 g, Intravenous, Q15 Min PRN, Vipin Tovar MD  •  dextrose (GLUTOSE) oral gel 15 g, 15 g, Oral, Q15 Min PRN, Vipin Tovar MD  •  Diclofenac Sodium (VOLTAREN) 3 % gel 2 g, 2 g, Topical, 4x Daily, Vipin Tovar MD  •  furosemide (LASIX) injection 40 mg, 40 mg, Intravenous, Q12H, Isaiah Foster MD, 40 mg at 04/05/22 0626  •  gabapentin (NEURONTIN) capsule 300 mg, 300 mg, Oral, TID, Vipin Tovar MD, 300 mg at 04/05/22 0842  •  glucagon (human recombinant) (GLUCAGEN DIAGNOSTIC) injection 1 mg, 1 mg, Intramuscular, Q15 Min PRN, Vipin Tovar MD  •  insulin lispro (ADMELOG) injection 0-7 Units, 0-7 Units, Subcutaneous, TID AC, Vipin Tovar MD, 5 Units at 04/05/22 1220  •  melatonin tablet 3 mg, 3 mg, Oral, Nightly PRN, Gary Moore, APRN  •  metoprolol tartrate (LOPRESSOR) tablet 100 mg, 100 mg, Oral, Daily, Vipin Tovar MD, 100 mg at 04/05/22 0842  •  nitroglycerin (NITROSTAT) SL tablet 0.4 mg, 0.4 mg, Sublingual, Q5 Min PRN, Gary Moore, RAMAKRISHNA  •  ondansetron (ZOFRAN) tablet 4 mg, 4 mg, Oral, Q6H PRN **OR** ondansetron (ZOFRAN) injection 4 mg, 4  mg, Intravenous, Q6H PRN, Gary Moore APRN  •  pantoprazole (PROTONIX) EC tablet 40 mg, 40 mg, Oral, QAM, Vipin Tovar MD, 40 mg at 04/05/22 0626  •  [COMPLETED] Insert peripheral IV, , , Once **AND** sodium chloride 0.9 % flush 10 mL, 10 mL, Intravenous, PRN, Oscar Ellis MD, 10 mL at 04/05/22 0842  •  venlafaxine XR (EFFEXOR-XR) 24 hr capsule 75 mg, 75 mg, Oral, Daily, Vipin Tovar MD, 75 mg at 04/05/22 0842  Review of Systems:     The following systems were reviewed and negative: Constitution, pulmonary, cardiac, gastrointestinal, genitourinary      Objective     Vital Signs  Temp:  [98.2 °F (36.8 °C)-98.4 °F (36.9 °C)] 98.3 °F (36.8 °C)  Heart Rate:  [61-91] 61  Resp:  [18] 18  BP: ()/(60-68) 94/60  Body mass index is 36.35 kg/m².    Intake/Output Summary (Last 24 hours) at 4/5/2022 1703  Last data filed at 4/5/2022 1259  Gross per 24 hour   Intake 240 ml   Output 3500 ml   Net -3260 ml     I/O this shift:  In: 240 [P.O.:240]  Out: 1800 [Urine:1800]     Physical Exam:   General: patient awake, alert and cooperative   Eyes: Normal lids and lashes, no scleral icterus   Neck: supple, normal ROM   Skin: warm and dry, not jaundiced   Cardiovascular: regular rhythm and rate, no murmurs auscultated   Pulm: clear to auscultation bilaterally, regular and unlabored   Abdomen: soft, obese, nontender, nondistended; normal bowel sounds   Rectal: deferred   Extremities: no rash or edema   Psychiatric: Normal mood and behavior; memory intact     Results Review:     I reviewed the patient's new clinical results.    Results from last 7 days   Lab Units 04/04/22  1103 04/03/22  0939 04/03/22  0044   WBC 10*3/mm3 6.83 5.61 5.00   HEMOGLOBIN g/dL 8.0* 7.9* 7.3*   HEMATOCRIT % 25.1* 24.2* 22.5*   PLATELETS 10*3/mm3 190 181 178     Results from last 7 days   Lab Units 04/05/22  0717 04/04/22  1103 04/03/22  0939 04/03/22  0044   SODIUM mmol/L 139 137 139 138   POTASSIUM mmol/L 3.9 3.9 4.5 4.7    CHLORIDE mmol/L 98 105 108* 110*   CO2 mmol/L 29.1* 22.0 20.2* 16.1*   BUN mg/dL 24* 24* 27* 27*   CREATININE mg/dL 2.00* 2.08* 2.21* 2.02*   CALCIUM mg/dL 9.5 8.8 9.0 8.3*   BILIRUBIN mg/dL  --   --   --  0.5   ALK PHOS U/L  --   --   --  144*   ALT (SGPT) U/L  --   --   --  6   AST (SGOT) U/L  --   --   --  24   GLUCOSE mg/dL 125* 192* 169* 121*     Results from last 7 days   Lab Units 04/03/22  0044   INR  2.14*     No results found for: LIPASE    Radiology:  XR Knee 1 or 2 View Left   Final Result       As described.       This report was finalized on 4/4/2022 7:38 PM by Dr. Pérez Swartz M.D.          XR Chest 1 View   Final Result   Cardiomegaly with vascular congestion.       This report was finalized on 4/3/2022 1:21 AM by Dr. Em Brewster M.D.              Assessment/Plan     Patient Active Problem List   Diagnosis   • Chronic kidney disease, stage IV (severe) ([facility])   • Erythropoietin deficiency anemia   • Symptomatic anemia   • Anxiety associated with depression   • Iron deficiency anemia   • PAF (paroxysmal atrial fibrillation) ([facility])   • Shortness of breath   • CHF (congestive heart failure) ([facility])       Impression  1.  Bright red blood per rectum: Limited episode.  She describes a scant amount    2.  Anemia of chronic kidney disease    3.  Chronic kidney disease    4.  CHF, severe pulmonary hypertension    Plan  Continue to monitor for further bleeding with resumption of Eliquis  Follow hemoglobin level  I do not recommend pursuing endoscopy unless she is having evidence of a life-threatening GI bleed.  Otherwise, her significant cardiopulmonary comorbidities make endoscopy very risky.  Add in daily fiber supplementation for treatment of presumed hemorrhoids  Will arrange for outpatient follow-up--such as been sent to the office    GI will sign off but remain available as needed in this patient's care.  Thank you.        I discussed the patients findings and my recommendations with  patient.    All necessary PPE, including face mask and eye protection, were worn during this encounter.  Hand sanitization was performed both before and after the patient interaction.    Over 25 minutes was spent reviewing the patient's chart and records, in discussion with the patient, in examination of the patient, and in discussion with members of the patient's medical team.    Yady Leo MD

## 2022-04-06 LAB
ALBUMIN SERPL-MCNC: 3.4 G/DL (ref 3.5–5.2)
ANION GAP SERPL CALCULATED.3IONS-SCNC: 8.5 MMOL/L (ref 5–15)
BUN SERPL-MCNC: 26 MG/DL (ref 8–23)
BUN/CREAT SERPL: 12.7 (ref 7–25)
CALCIUM SPEC-SCNC: 9.4 MG/DL (ref 8.6–10.5)
CHLORIDE SERPL-SCNC: 94 MMOL/L (ref 98–107)
CO2 SERPL-SCNC: 35.5 MMOL/L (ref 22–29)
CREAT SERPL-MCNC: 2.05 MG/DL (ref 0.57–1)
EGFRCR SERPLBLD CKD-EPI 2021: 23.8 ML/MIN/1.73
GLUCOSE BLDC GLUCOMTR-MCNC: 111 MG/DL (ref 70–130)
GLUCOSE BLDC GLUCOMTR-MCNC: 166 MG/DL (ref 70–130)
GLUCOSE BLDC GLUCOMTR-MCNC: 178 MG/DL (ref 70–130)
GLUCOSE BLDC GLUCOMTR-MCNC: 215 MG/DL (ref 70–130)
GLUCOSE SERPL-MCNC: 111 MG/DL (ref 65–99)
PHOSPHATE SERPL-MCNC: 4.3 MG/DL (ref 2.5–4.5)
POTASSIUM SERPL-SCNC: 3.6 MMOL/L (ref 3.5–5.2)
SODIUM SERPL-SCNC: 138 MMOL/L (ref 136–145)

## 2022-04-06 PROCEDURE — 80069 RENAL FUNCTION PANEL: CPT | Performed by: INTERNAL MEDICINE

## 2022-04-06 PROCEDURE — 63710000001 INSULIN LISPRO (HUMAN) PER 5 UNITS: Performed by: HOSPITALIST

## 2022-04-06 PROCEDURE — 25010000002 FUROSEMIDE PER 20 MG: Performed by: INTERNAL MEDICINE

## 2022-04-06 PROCEDURE — 97110 THERAPEUTIC EXERCISES: CPT

## 2022-04-06 PROCEDURE — 82962 GLUCOSE BLOOD TEST: CPT

## 2022-04-06 RX ORDER — NYSTATIN 100000 [USP'U]/G
POWDER TOPICAL EVERY 12 HOURS SCHEDULED
Status: DISCONTINUED | OUTPATIENT
Start: 2022-04-06 | End: 2022-04-12 | Stop reason: HOSPADM

## 2022-04-06 RX ORDER — FUROSEMIDE 40 MG/1
40 TABLET ORAL
Status: DISCONTINUED | OUTPATIENT
Start: 2022-04-07 | End: 2022-04-10

## 2022-04-06 RX ADMIN — FUROSEMIDE 40 MG: 10 INJECTION, SOLUTION INTRAMUSCULAR; INTRAVENOUS at 06:34

## 2022-04-06 RX ADMIN — VENLAFAXINE HYDROCHLORIDE 75 MG: 75 CAPSULE, EXTENDED RELEASE ORAL at 08:20

## 2022-04-06 RX ADMIN — CALCIUM POLYCARBOPHIL 1250 MG: 625 TABLET, FILM COATED ORAL at 08:20

## 2022-04-06 RX ADMIN — DICLOFENAC SODIUM 1 G: 10 GEL TOPICAL at 12:32

## 2022-04-06 RX ADMIN — FUROSEMIDE 40 MG: 10 INJECTION, SOLUTION INTRAMUSCULAR; INTRAVENOUS at 16:50

## 2022-04-06 RX ADMIN — DICLOFENAC SODIUM 1 G: 10 GEL TOPICAL at 00:41

## 2022-04-06 RX ADMIN — GABAPENTIN 300 MG: 300 CAPSULE ORAL at 16:50

## 2022-04-06 RX ADMIN — DICLOFENAC SODIUM 1 G: 10 GEL TOPICAL at 20:31

## 2022-04-06 RX ADMIN — APIXABAN 2.5 MG: 2.5 TABLET, FILM COATED ORAL at 20:30

## 2022-04-06 RX ADMIN — GABAPENTIN 300 MG: 300 CAPSULE ORAL at 08:20

## 2022-04-06 RX ADMIN — DICLOFENAC SODIUM 1 G: 10 GEL TOPICAL at 16:50

## 2022-04-06 RX ADMIN — DICLOFENAC SODIUM 1 G: 10 GEL TOPICAL at 08:20

## 2022-04-06 RX ADMIN — INSULIN LISPRO 3 UNITS: 100 INJECTION, SOLUTION INTRAVENOUS; SUBCUTANEOUS at 16:50

## 2022-04-06 RX ADMIN — METOPROLOL TARTRATE 100 MG: 50 TABLET, FILM COATED ORAL at 08:20

## 2022-04-06 RX ADMIN — NYSTATIN: 100000 POWDER TOPICAL at 20:31

## 2022-04-06 RX ADMIN — APIXABAN 2.5 MG: 2.5 TABLET, FILM COATED ORAL at 08:20

## 2022-04-06 RX ADMIN — PANTOPRAZOLE SODIUM 40 MG: 40 TABLET, DELAYED RELEASE ORAL at 06:34

## 2022-04-06 RX ADMIN — ATORVASTATIN CALCIUM 40 MG: 20 TABLET, FILM COATED ORAL at 08:20

## 2022-04-06 RX ADMIN — GABAPENTIN 300 MG: 300 CAPSULE ORAL at 20:31

## 2022-04-06 RX ADMIN — AMLODIPINE BESYLATE 5 MG: 5 TABLET ORAL at 08:20

## 2022-04-06 RX ADMIN — INSULIN LISPRO 2 UNITS: 100 INJECTION, SOLUTION INTRAVENOUS; SUBCUTANEOUS at 12:32

## 2022-04-06 RX ADMIN — NYSTATIN: 100000 POWDER TOPICAL at 12:32

## 2022-04-06 RX ADMIN — ACETAMINOPHEN 650 MG: 325 TABLET ORAL at 16:54

## 2022-04-06 NOTE — PLAN OF CARE
Goal Outcome Evaluation:  Plan of Care Reviewed With: patient        Progress: improving  Outcome Evaluation: Pt able to advance activity in order to stand and ambulate short distance in room with assist x1 and walker. Pt's mobility continues to be limited by pain and weakness, and pt will continue to benefit from PT to address impairments and increase independence with mobility. Would recommend DC to SNF for subacute rehab at this time given pt only able to ambulate 10' today.

## 2022-04-06 NOTE — PLAN OF CARE
Goal Outcome Evaluation:  Plan of Care Reviewed With: patient        Progress: improving  Outcome Evaluation: Patient  resting  at this  time. Complained  of  right  upper  back  pain  and  had  Tylenol  with  some  relief.  Started  on  Voltaren gel  for  Knee  pain.   Remain  on  oxygen  at  2l/m.  PT  to  evaluate  and  treat. Afib  on  the  monitor.  Nursing  will  continue  to monitor.

## 2022-04-06 NOTE — PROGRESS NOTES
LOS: 3 days     Chief Complaint/ Reason for encounter: CKD, diuretic management  Chief Complaint   Patient presents with   • Shortness of Breath   • Black or Bloody Stool         Subjective   04/04/22 : No complaints, feels better  Weight unchanged but feels less swollen  No shortness of breath or chest pain  Good appetite no nausea or vomiting  No additional hematochezia, eventual colonoscopy planned    4/6.  Feels well, edema considerably better and her weight is down about 31 pounds since the third  Denies any fevers or chills, shortness of breath or chest pain  Good appetite with no nausea  Still very weak but was able to get up and sit in the chair today      Medical history reviewed:  History of Present Illness    Subjective    History taken from: Patient and chart    Vital Signs  Temp:  [98.1 °F (36.7 °C)-98.6 °F (37 °C)] 98.1 °F (36.7 °C)  Heart Rate:  [71-77] 77  Resp:  [18] 18  BP: (142-152)/(56-75) 146/69       Wt Readings from Last 1 Encounters:   04/06/22 0559 98.9 kg (218 lb)   04/05/22 0615 105 kg (232 lb 1.6 oz)   04/04/22 1150 111 kg (245 lb)   04/04/22 0526 112 kg (245 lb 14.4 oz)   04/03/22 0320 113 kg (249 lb)   04/03/22 0209 95.3 kg (210 lb)       Objective    Objective:  General Appearance:  Comfortable, well-appearing, in no acute distress and not in pain.  Awake, alert, oriented  HEENT: Mucous membranes moist, no injury, oropharynx clear  Lungs:  Normal effort and normal respiratory rate.  Breath sounds clear to auscultation.  No  respiratory distress.  No rales, decreased breath sounds or rhonchi.    Heart: Normal rate.  Regular rhythm.  S1 normal.  No murmur.   Abdomen: Abdomen is soft.  Bowel sounds are normal, no abdominal tenderness.  There is no rebound or guarding  Extremities: Normal range of motion.  Decreased, 1+ edema of bilateral lower extremities, distal pulses intact  Neurological: No focal motor or sensory deficits, pupils reactive  Skin:  Warm and dry.  No rash or cyanosis.        Results Review:    Intake/Output:     Intake/Output Summary (Last 24 hours) at 4/6/2022 1411  Last data filed at 4/6/2022 1232  Gross per 24 hour   Intake 720 ml   Output 4800 ml   Net -4080 ml         DATA:  Radiology and Labs:  The following labs independently reviewed by me. Additional labs ordered for tomorrow a.m.  Interval notes, chart personally reviewed by me.   Old records independently reviewed showing CKD 4  The following radiologic studies independently viewed by me, findings echocardiogram showing EF 60 to 65% normal LV systolic function, grade 2 diastolic dysfunction  New problems include diastolic CHF  Discussed with patient herself at bedside    Risk/ complexity of medical care/ medical decision making moderate complexity, PERLA with diuretic management      Labs:   Recent Results (from the past 24 hour(s))   POC Glucose Once    Collection Time: 04/05/22  3:39 PM    Specimen: Blood   Result Value Ref Range    Glucose 183 (H) 70 - 130 mg/dL   POC Glucose Once    Collection Time: 04/05/22  8:52 PM    Specimen: Blood   Result Value Ref Range    Glucose 180 (H) 70 - 130 mg/dL   POC Glucose Once    Collection Time: 04/06/22  5:51 AM    Specimen: Blood   Result Value Ref Range    Glucose 111 70 - 130 mg/dL   Renal Function Panel    Collection Time: 04/06/22  6:40 AM    Specimen: Blood   Result Value Ref Range    Glucose 111 (H) 65 - 99 mg/dL    BUN 26 (H) 8 - 23 mg/dL    Creatinine 2.05 (H) 0.57 - 1.00 mg/dL    Sodium 138 136 - 145 mmol/L    Potassium 3.6 3.5 - 5.2 mmol/L    Chloride 94 (L) 98 - 107 mmol/L    CO2 35.5 (H) 22.0 - 29.0 mmol/L    Calcium 9.4 8.6 - 10.5 mg/dL    Albumin 3.40 (L) 3.50 - 5.20 g/dL    Phosphorus 4.3 2.5 - 4.5 mg/dL    Anion Gap 8.5 5.0 - 15.0 mmol/L    BUN/Creatinine Ratio 12.7 7.0 - 25.0    eGFR 23.8 (L) >60.0 mL/min/1.73   POC Glucose Once    Collection Time: 04/06/22 11:06 AM    Specimen: Blood   Result Value Ref Range    Glucose 166 (H) 70 - 130 mg/dL        Radiology:  Imaging Results (Last 24 Hours)     ** No results found for the last 24 hours. **             Medications have been reviewed:  Current Facility-Administered Medications   Medication Dose Route Frequency Provider Last Rate Last Admin   • acetaminophen (TYLENOL) tablet 650 mg  650 mg Oral Q4H PRN Gary Moore APRN   650 mg at 04/05/22 2116   • amLODIPine (NORVASC) tablet 5 mg  5 mg Oral Q24H Vipin Tovar MD   5 mg at 04/06/22 0820   • apixaban (ELIQUIS) tablet 2.5 mg  2.5 mg Oral BID Vipin Tovar MD   2.5 mg at 04/06/22 0820   • atorvastatin (LIPITOR) tablet 40 mg  40 mg Oral Daily Vipin Tovar MD   40 mg at 04/06/22 0820   • dextrose (D50W) (25 g/50 mL) IV injection 25 g  25 g Intravenous Q15 Min PRN Vipin Tovar MD       • dextrose (GLUTOSE) oral gel 15 g  15 g Oral Q15 Min PRN Vipin Tovar MD       • Diclofenac Sodium (VOLTAREN) 1 % gel 1 g  1 g Topical 4x Daily Vipin Tovar MD   1 g at 04/06/22 1232   • furosemide (LASIX) injection 40 mg  40 mg Intravenous Q12H Isaiah Foster MD   40 mg at 04/06/22 0634   • [START ON 4/7/2022] furosemide (LASIX) tablet 40 mg  40 mg Oral BID Isaiah Foster MD       • gabapentin (NEURONTIN) capsule 300 mg  300 mg Oral TID Vipin Tovar MD   300 mg at 04/06/22 0820   • glucagon (human recombinant) (GLUCAGEN DIAGNOSTIC) injection 1 mg  1 mg Intramuscular Q15 Min PRN Vipin Tovar MD       • insulin lispro (ADMELOG) injection 0-7 Units  0-7 Units Subcutaneous TID AC Vipin Tovar MD   2 Units at 04/06/22 1232   • melatonin tablet 3 mg  3 mg Oral Nightly PRN Gary Moore APRN       • metoprolol tartrate (LOPRESSOR) tablet 100 mg  100 mg Oral Daily Vipin Tovar MD   100 mg at 04/06/22 0820   • nitroglycerin (NITROSTAT) SL tablet 0.4 mg  0.4 mg Sublingual Q5 Min PRN Gary Moore APRN       • nystatin (MYCOSTATIN) powder   Topical Q12H Vipin Tovar MD    Given at 04/06/22 1232   • ondansetron (ZOFRAN) tablet 4 mg  4 mg Oral Q6H PRN Gary Moore APRN        Or   • ondansetron (ZOFRAN) injection 4 mg  4 mg Intravenous Q6H PRN Gary Moore APRN       • pantoprazole (PROTONIX) EC tablet 40 mg  40 mg Oral QAM Vipin Tovar MD   40 mg at 04/06/22 0634   • polycarbophil tablet 1,250 mg  1,250 mg Oral Daily Yady Leo MD   1,250 mg at 04/06/22 0820   • sodium chloride 0.9 % flush 10 mL  10 mL Intravenous PRN Oscar Ellis MD   10 mL at 04/05/22 0842   • venlafaxine XR (EFFEXOR-XR) 24 hr capsule 75 mg  75 mg Oral Daily Vipin Tovar MD   75 mg at 04/06/22 0820       ASSESSMENT:  CKD stage IV with mild PERLA.  Baseline creatinine 2.0, improved    CHF (congestive heart failure), acute on chronic systolic    Chronic kidney disease, stage IV (severe) ([facility])    Symptomatic anemia    Anxiety associated with depression    Iron deficiency anemia    PAF (paroxysmal atrial fibrillation) ([facility])    Shortness of breath  Worsening metabolic alkalosis from diuretics      PLAN:   Renal function remains very stable with diuresis and near baseline  Volume status considerably improved  We will transition to oral Lasix tomorrow, weight down over 30 pounds so far since admission    Monitor daily weights, strict I's and O's  Maintain dietary fluid and salt restriction while on diuretics     Continue to monitor electrolytes and volume closely  Discharge planning      Isaiah Foster MD   [facility]   Office phone number: [phone]  Answering service phone number: [phone]    04/06/22  14:11 EDT    Dictation performed using Dragon dictation software     Nsaids Counseling: NSAID Counseling: I discussed with the patient that NSAIDs should be taken with food. Prolonged use of NSAIDs can result in the development of stomach ulcers.  Patient advised to stop taking NSAIDs if abdominal pain occurs.  The patient verbalized understanding of the proper use and possible adverse effects of NSAIDs.  All of the patient's questions and concerns were addressed.

## 2022-04-06 NOTE — THERAPY TREATMENT NOTE
Patient Name: Lowell Min  : 1940    MRN: [id number]                              Today's Date: 2022       Admit Date: 4/3/2022    Visit Dx:     ICD-10-CM ICD-9-CM   1. NELLY (obstructive sleep apnea)  G47.33 327.23   2. Symptomatic anemia  D64.9 285.9   3. Gastrointestinal hemorrhage, unspecified gastrointestinal hemorrhage type  K92.2 578.9   4. Chronic kidney disease, unspecified CKD stage  N18.9 585.9   5. Chronic diastolic congestive heart failure (HCC)  I50.32 428.32     428.0   6. PAF (paroxysmal atrial fibrillation) (HCC)  I48.0 427.31   7. Obesity (BMI 30-39.9)  E66.9 278.00     Patient Active Problem List   Diagnosis   • Chronic kidney disease, stage IV (severe) (HCC)   • Erythropoietin deficiency anemia   • Symptomatic anemia   • Anxiety associated with depression   • Iron deficiency anemia   • PAF (paroxysmal atrial fibrillation) (HCC)   • Shortness of breath   • CHF (congestive heart failure) (HCC)     Past Medical History:   Diagnosis Date   • Anxiety    • Atrial fibrillation (HCC)    • Chronic kidney disease, stage IV (severe) (HCC)    • Depression    • Elevated cholesterol    • Hypertension    • Type 2 diabetes mellitus (HCC)      Past Surgical History:   Procedure Laterality Date   • APPENDECTOMY     • CHOLECYSTECTOMY     • COLONOSCOPY     • HYSTERECTOMY     • TUMOR REMOVAL Left     benign tumor from left breast      General Information     Row Name 22 0955          Physical Therapy Time and Intention    Document Type therapy note (daily note)  -EE     Mode of Treatment physical therapy;individual therapy  -EE     Row Name 22 0955          General Information    Existing Precautions/Restrictions fall  -EE     Barriers to Rehab medically complex  -EE     Row Name 22 0955          Cognition    Orientation Status (Cognition) oriented x 3  -EE     Row Name 22 0955          Safety Issues, Functional Mobility    Impairments Affecting Function (Mobility)  balance;endurance/activity tolerance;range of motion (ROM);strength;pain  -EE           User Key  (r) = Recorded By, (t) = Taken By, (c) = Cosigned By    Initials Name Provider Type    EE Ni Connor PT Physical Therapist               Mobility     Row Name 04/06/22 0956          Bed Mobility    Comment, (Bed Mobility) not tested; pt up in chair  -EE     Row Name 04/06/22 0956          Sit-Stand Transfer    Sit-Stand Darke (Transfers) minimum assist (75% patient effort);verbal cues  -EE     Assistive Device (Sit-Stand Transfers) walker, front-wheeled  -EE     Row Name 04/06/22 0956          Gait/Stairs (Locomotion)    Darke Level (Gait) contact guard;verbal cues  -EE     Assistive Device (Gait) walker, front-wheeled  -EE     Distance in Feet (Gait) 10'  -EE     Deviations/Abnormal Patterns (Gait) guillermo decreased;left sided deviations;antalgic  -EE     Bilateral Gait Deviations forward flexed posture  -EE     Left Sided Gait Deviations heel strike decreased;weight shift ability decreased  -EE     Comment, (Gait/Stairs) vc's for gait sequencing with walker; distance limited by pain and fatigue  -EE           User Key  (r) = Recorded By, (t) = Taken By, (c) = Cosigned By    Initials Name Provider Type    EE Ni Connor PT Physical Therapist               Obj/Interventions     Row Name 04/06/22 0956          Motor Skills    Therapeutic Exercise --  seated B ankle pumps, LAQ x 10 reps each  -EE           User Key  (r) = Recorded By, (t) = Taken By, (c) = Cosigned By    Initials Name Provider Type    Ni Hays PT Physical Therapist               Goals/Plan    No documentation.                Clinical Impression     Row Name 04/06/22 0956          Pain    Pretreatment Pain Rating 5/10  -EE     Posttreatment Pain Rating 6/10  -EE     Pain Location - Side/Orientation Left  -EE     Pain Location - knee  -EE     Pain Intervention(s) Repositioned;Rest;Elevated  -EE     Row Name 04/06/22 0956           Plan of Care Review    Plan of Care Reviewed With patient  -EE     Progress improving  -EE     Outcome Evaluation Pt able to advance activity in order to stand and ambulate short distance in room with assist x1 and walker. Pt's mobility continues to be limited by pain and weakness, and pt will continue to benefit from PT to address impairments and increase independence with mobility. Would recommend DC to SNF for subacute rehab at this time given pt only able to ambulate 10' today.  -EE     Row Name 04/06/22 0956          Positioning and Restraints    Pre-Treatment Position sitting in chair/recliner  -EE     Post Treatment Position chair  -EE     In Chair reclined;call light within reach;encouraged to call for assist;notified nsg;legs elevated  alarm strip in chair; RN, Garrett, notified no alarm box present in room  -EE           User Key  (r) = Recorded By, (t) = Taken By, (c) = Cosigned By    Initials Name Provider Type    Ni Hays PT Physical Therapist               Outcome Measures     Row Name 04/06/22 0958 04/06/22 0821       How much help from another person do you currently need...    Turning from your back to your side while in flat bed without using bedrails? 2  -EE 2  -MS    Moving from lying on back to sitting on the side of a flat bed without bedrails? 2  -EE 2  -MS    Moving to and from a bed to a chair (including a wheelchair)? 3  -EE 2  -MS    Standing up from a chair using your arms (e.g., wheelchair, bedside chair)? 3  -EE 2  -MS    Climbing 3-5 steps with a railing? 1  -EE 1  -MS    To walk in hospital room? 3  -EE 1  -MS    AM-PAC 6 Clicks Score (PT) 14  -EE 10  -MS          User Key  (r) = Recorded By, (t) = Taken By, (c) = Cosigned By    Initials Name Provider Type    Ni Hays PT Physical Therapist    Michael Malloy, RN Registered Nurse                             Physical Therapy Education                 Title: PT OT SLP Therapies (Done)     Topic: Physical Therapy (Done)      Point: Mobility training (Done)     Learning Progress Summary           Patient Acceptance, E,TB, VU,NR by EE at 4/6/2022 0958    Acceptance, E, VU by DB at 4/4/2022 1541                   Point: Home exercise program (Done)     Learning Progress Summary           Patient Acceptance, E,TB, VU,NR by EE at 4/6/2022 0958    Acceptance, E, VU,NR by EE at 4/5/2022 1120    Acceptance, E, VU by DB at 4/4/2022 1541                   Point: Body mechanics (Done)     Learning Progress Summary           Patient Acceptance, E, VU by DB at 4/4/2022 1541                   Point: Precautions (Done)     Learning Progress Summary           Patient Acceptance, E, VU by DB at 4/4/2022 1541                               User Key     Initials Effective Dates Name Provider Type Discipline    EE 06/16/21 -  Ni Connor, PT Physical Therapist PT    DB 06/16/21 -  Oanh Camp PT Physical Therapist PT              PT Recommendation and Plan     Plan of Care Reviewed With: patient  Progress: improving  Outcome Evaluation: Pt able to advance activity in order to stand and ambulate short distance in room with assist x1 and walker. Pt's mobility continues to be limited by pain and weakness, and pt will continue to benefit from PT to address impairments and increase independence with mobility. Would recommend DC to SNF for subacute rehab at this time given pt only able to ambulate 10' today.     Time Calculation:    PT Charges     Row Name 04/06/22 0959             Time Calculation    Start Time 0931  -EE      Stop Time 0948  -EE      Time Calculation (min) 17 min  -EE      PT Received On 04/06/22  -EE      PT - Next Appointment 04/07/22  -EE              Time Calculation- PT    Total Timed Code Minutes- PT 17 minute(s)  -EE            User Key  (r) = Recorded By, (t) = Taken By, (c) = Cosigned By    Initials Name Provider Type    EE Ni Connor, PT Physical Therapist              Therapy Charges for Today     Code Description Service  Date Service Provider Modifiers Qty    [id number]  PT THER PROC EA 15 MIN 4/5/2022 Ni Connor, PT GP 1    [id number] HC PT THER PROC EA 15 MIN 4/6/2022 Ni Connor, PT GP 1          PT G-Codes  Outcome Measure Options: AM-PAC 6 Clicks Basic Mobility (PT)  AM-PAC 6 Clicks Score (PT): 14     Patient was intermittently wearing a face mask during this therapy encounter. Therapist used appropriate personal protective equipment including mask and gloves.  Mask used was standard procedure mask. Appropriate PPE was worn during the entire therapy session. Hand hygiene was completed before and after therapy session. Patient is not in enhanced droplet precautions.       Ni Connor, PT  4/6/2022

## 2022-04-06 NOTE — PLAN OF CARE
Problem: Fall Injury Risk  Goal: Absence of Fall and Fall-Related Injury  Outcome: Ongoing, Progressing  Intervention: Promote Injury-Free Environment  Recent Flowsheet Documentation  Taken 4/6/2022 1400 by Michael Ferrer RN  Safety Promotion/Fall Prevention:   assistive device/personal items within reach   fall prevention program maintained   nonskid shoes/slippers when out of bed   safety round/check completed  Taken 4/6/2022 1200 by Michael Ferrer RN  Safety Promotion/Fall Prevention:   assistive device/personal items within reach   fall prevention program maintained   nonskid shoes/slippers when out of bed   safety round/check completed  Taken 4/6/2022 1014 by Michael Ferrer RN  Safety Promotion/Fall Prevention:   assistive device/personal items within reach   fall prevention program maintained   nonskid shoes/slippers when out of bed   safety round/check completed  Taken 4/6/2022 0821 by Michael Ferrer RN  Safety Promotion/Fall Prevention:   assistive device/personal items within reach   fall prevention program maintained   nonskid shoes/slippers when out of bed   safety round/check completed     Problem: Adult Inpatient Plan of Care  Goal: Plan of Care Review  Outcome: Ongoing, Progressing  Flowsheets (Taken 4/6/2022 1548)  Progress: improving  Plan of Care Reviewed With: patient  Outcome Evaluation: Patient has been pleasant and cooperative during shift. No complaints of pain, nausea or SOA. PT ambulated patient 10 feet. PO lasix to be started tomorrow. AOx4, assist x1, room air. Patient sitting in chair for large portion of the day. Will continue to monitor and assist patient as needed.  Goal: Patient-Specific Goal (Individualized)  Outcome: Ongoing, Progressing  Goal: Absence of Hospital-Acquired Illness or Injury  Outcome: Ongoing, Progressing  Intervention: Identify and Manage Fall Risk  Recent Flowsheet Documentation  Taken 4/6/2022 1400 by Michael Ferrer RN  Safety Promotion/Fall  Prevention:   assistive device/personal items within [facility]   fall prevention program maintained   nonskid shoes/slippers when out of bed   safety round/check completed  Taken 4/6/2022 1200 by Michael Ferrer RN  Safety Promotion/Fall Prevention:   assistive device/personal items within reach   fall prevention program maintained   nonskid shoes/slippers when out of bed   safety round/check completed  Taken 4/6/2022 1014 by Michael Ferrer RN  Safety Promotion/Fall Prevention:   assistive device/personal items within reach   fall prevention program maintained   nonskid shoes/slippers when out of bed   safety round/check completed  Taken 4/6/2022 0821 by Michael Ferrer RN  Safety Promotion/Fall Prevention:   assistive device/personal items within reach   fall prevention program maintained   nonskid shoes/slippers when out of bed   safety round/check completed  Intervention: Prevent Skin Injury  Recent Flowsheet Documentation  Taken 4/6/2022 1400 by Michael Ferrer RN  Body Position: (patient in chair) --  Taken 4/6/2022 1200 by Michael Ferrer RN  Body Position: (patient in chair) --  Taken 4/6/2022 1014 by Michael Ferrer RN  Body Position: supine  Taken 4/6/2022 0821 by Michael Ferrer RN  Body Position: supine  Intervention: Prevent and Manage VTE (Venous Thromboembolism) Risk  Recent Flowsheet Documentation  Taken 4/6/2022 1400 by Michael Ferrer RN  Activity Management:   activity adjusted per tolerance   up in chair  Taken 4/6/2022 1200 by Michael Ferrer RN  Activity Management:   activity adjusted per tolerance   up in chair  Taken 4/6/2022 1014 by Michael Ferrer RN  Activity Management: activity adjusted per tolerance  Taken 4/6/2022 0821 by Michael Ferrer RN  Activity Management: activity adjusted per tolerance  Goal: Optimal Comfort and Wellbeing  Outcome: Ongoing, Progressing  Goal: Readiness for Transition of Care  Outcome: Ongoing, Progressing     Problem: Diabetes  Comorbidity  Goal: Blood Glucose Level Within Targeted Range  Outcome: Ongoing, Progressing     Problem: Heart Failure Comorbidity  Goal: Maintenance of Heart Failure Symptom Control  Outcome: Ongoing, Progressing     Problem: Hypertension Comorbidity  Goal: Blood Pressure in Desired Range  Outcome: Ongoing, Progressing   Goal Outcome Evaluation:  Plan of Care Reviewed With: patient        Progress: improving  Outcome Evaluation: Patient has been pleasant and cooperative during shift. No complaints of pain, nausea or SOA. PT ambulated patient 10 feet. PO lasix to be started tomorrow. AOx4, assist x1, room air. Patient sitting in chair for large portion of the day. Will continue to monitor and assist patient as needed.

## 2022-04-06 NOTE — CASE MANAGEMENT/SOCIAL WORK
Continued Stay Note  [facility]     Patient Name: Lowell Min  MRN: [id number]  Today's Date: 4/6/2022    Admit Date: 4/3/2022     Discharge Plan     Row Name 04/06/22 1400       Plan    Plan Plan home with family support.   JONNIE Avery RN    Patient/Family in Agreement with Plan yes    Plan Comments Spoke with pt at bedside.  Pt states she does not have scales available at home to weigh herself.  Pt provided scales from [facility].  Pt denies any other needs at this time.  Plan home with family.   JONNIE Avery RN               Discharge Codes    No documentation.               Expected Discharge Date and Time     Expected Discharge Date Expected Discharge Time    Apr 8, 2022             Brandi Avery, RN

## 2022-04-06 NOTE — PROGRESS NOTES
"DAILY PROGRESS NOTE  [facility]    Patient Identification:  Name: Lowell Min  Age: 82 y.o.  Sex: female  :  1940  MRN: [id number]         Primary Care Physician: Dannie Mathews MD      Subjective  No new complaints.    Objective:  General Appearance:  Comfortable, well-appearing, in no acute distress and not in pain (Obese).    Vital signs: (most recent): Blood pressure 146/69, pulse 77, temperature 98.1 °F (36.7 °C), temperature source Oral, resp. rate 18, height 170.2 cm (67\"), weight 98.9 kg (218 lb), SpO2 95 %.    Lungs:  Normal effort and normal respiratory rate.  Breath sounds clear to auscultation.    Heart: Normal rate.  Regular rhythm.    Extremities: There is dependent edema.  (Pretibial edema much improved.  Down about 1+ now.  Ankles almost visible)  Neurological: Patient is alert and oriented to person, place and time.    Skin:  Warm and dry.                Vital signs in last 24 hours:  Temp:  [98.1 °F (36.7 °C)-98.6 °F (37 °C)] 98.1 °F (36.7 °C)  Heart Rate:  [71-77] 77  Resp:  [18] 18  BP: (142-152)/(56-75) 146/69    Intake/Output:    Intake/Output Summary (Last 24 hours) at 2022 1805  Last data filed at 2022 1736  Gross per 24 hour   Intake 720 ml   Output 5500 ml   Net -4780 ml         Results from last 7 days   Lab Units 22  1103 22  0939 22  0044 22  1404   WBC 10*3/mm3 6.83 5.61 5.00 8.34   HEMOGLOBIN g/dL 8.0* 7.9* 7.3* 7.8*   PLATELETS 10*3/mm3 190 181 178 226     Results from last 7 days   Lab Units 22  0640 22  0717 22  1103 22  0939 22  0044   SODIUM mmol/L 138 139 137 139 138   POTASSIUM mmol/L 3.6 3.9 3.9 4.5 4.7   CHLORIDE mmol/L 94* 98 105 108* 110*   CO2 mmol/L 35.5* 29.1* 22.0 20.2* 16.1*   BUN mg/dL 26* 24* 24* 27* 27*   CREATININE mg/dL 2.05* 2.00* 2.08* 2.21* 2.02*   GLUCOSE mg/dL 111* 125* 192* 169* 121*   Estimated Creatinine Clearance: 25.6 mL/min (A) (by C-G formula based on SCr of 2.05 " "mg/dL (H)).  Results from last 7 days   Lab Units 04/06/22  0640 04/05/22  0717 04/04/22  1103 04/03/22  0939 04/03/22  0044   CALCIUM mg/dL 9.4 9.5 8.8 9.0 8.3*   ALBUMIN g/dL 3.40* 3.40* 3.30*  --  3.10*   PHOSPHORUS mg/dL 4.3 3.4 3.8  --   --      Results from last 7 days   Lab Units 04/06/22  0640 04/05/22  0717 04/04/22  1103 04/03/22  0044   ALBUMIN g/dL 3.40* 3.40* 3.30* 3.10*   BILIRUBIN mg/dL  --   --   --  0.5   ALK PHOS U/L  --   --   --  144*   AST (SGOT) U/L  --   --   --  24   ALT (SGPT) U/L  --   --   --  6       Assessment:  Diastolic CHF-acute on chronic: Continue diuresis.  If daily weights are accurate she is down 31 pounds.  Severe pulmonary hypertension: Likely contributing to her edema.  Likely secondary to noncompliance with CPAP.  Pulmonary evaluation appreciated.  Patient follow-up as an outpatient.  Chronic kidney disease stage IV: Nephrology input appreciated.  Tolerating diuresis very nicely.  Paroxysmal atrial fibrillation: Continue rate control.  Eliquis resumed.    Blood on toilet paper: GI input appreciated.  No evidence of any further GI blood loss.    Eliquis resumed at 2.5 mg twice daily noting her age and renal function.  Anemia-severe iron deficiency, recurrent-anemia of chronic renal disease: Patient received an iron infusion prior to admission.  Scheduled for second infusion next week.  Diabetes type 2: Patient is not sure what \"pill\" she is taking for this.  Will initiate sliding scale insulin during hospitalization.  Morbid obesity:  Valvular heart disease: Moderate aortic valve stenosis, mild mitral valve regurgitation, mild mitral valve stenosis, tricuspid regurgitation.  Obstructive sleep apnea: Noncompliant with CPAP.  Pulmonary evaluation appreciated.  Patient encouraged to follow-up as an outpatient.    Left knee effusion:  Orthopedic evaluation greatly appreciated.  With her renal status and on anticoagulants, unfortunately she is not a candidate for oral NSAIDs.  " Trial of Voltaren gel.  Improved and ambulation improving.      Plan:  Please see above.  Discharge when okay with nephrology.    Vipin Tovar MD  4/6/2022  18:05 EDT

## 2022-04-07 LAB
ALBUMIN SERPL-MCNC: 3.4 G/DL (ref 3.5–5.2)
ANION GAP SERPL CALCULATED.3IONS-SCNC: 10.9 MMOL/L (ref 5–15)
BASOPHILS # BLD AUTO: 0.03 10*3/MM3 (ref 0–0.2)
BASOPHILS NFR BLD AUTO: 0.5 % (ref 0–1.5)
BUN SERPL-MCNC: 30 MG/DL (ref 8–23)
BUN/CREAT SERPL: 14.3 (ref 7–25)
CALCIUM SPEC-SCNC: 9.3 MG/DL (ref 8.6–10.5)
CHLORIDE SERPL-SCNC: 92 MMOL/L (ref 98–107)
CO2 SERPL-SCNC: 36.1 MMOL/L (ref 22–29)
CREAT SERPL-MCNC: 2.1 MG/DL (ref 0.57–1)
DEPRECATED RDW RBC AUTO: 48.6 FL (ref 37–54)
EGFRCR SERPLBLD CKD-EPI 2021: 23.1 ML/MIN/1.73
EOSINOPHIL # BLD AUTO: 0.22 10*3/MM3 (ref 0–0.4)
EOSINOPHIL NFR BLD AUTO: 3.6 % (ref 0.3–6.2)
ERYTHROCYTE [DISTWIDTH] IN BLOOD BY AUTOMATED COUNT: 15.9 % (ref 12.3–15.4)
GLUCOSE BLDC GLUCOMTR-MCNC: 106 MG/DL (ref 70–130)
GLUCOSE BLDC GLUCOMTR-MCNC: 163 MG/DL (ref 70–130)
GLUCOSE BLDC GLUCOMTR-MCNC: 174 MG/DL (ref 70–130)
GLUCOSE BLDC GLUCOMTR-MCNC: 310 MG/DL (ref 70–130)
GLUCOSE SERPL-MCNC: 109 MG/DL (ref 65–99)
HCT VFR BLD AUTO: 29.1 % (ref 34–46.6)
HGB BLD-MCNC: 9.1 G/DL (ref 12–15.9)
IMM GRANULOCYTES # BLD AUTO: 0.02 10*3/MM3 (ref 0–0.05)
IMM GRANULOCYTES NFR BLD AUTO: 0.3 % (ref 0–0.5)
LYMPHOCYTES # BLD AUTO: 1.62 10*3/MM3 (ref 0.7–3.1)
LYMPHOCYTES NFR BLD AUTO: 26.4 % (ref 19.6–45.3)
MCH RBC QN AUTO: 27.2 PG (ref 26.6–33)
MCHC RBC AUTO-ENTMCNC: 31.3 G/DL (ref 31.5–35.7)
MCV RBC AUTO: 86.9 FL (ref 79–97)
MONOCYTES # BLD AUTO: 0.88 10*3/MM3 (ref 0.1–0.9)
MONOCYTES NFR BLD AUTO: 14.3 % (ref 5–12)
NEUTROPHILS NFR BLD AUTO: 3.37 10*3/MM3 (ref 1.7–7)
NEUTROPHILS NFR BLD AUTO: 54.9 % (ref 42.7–76)
NRBC BLD AUTO-RTO: 0 /100 WBC (ref 0–0.2)
PHOSPHATE SERPL-MCNC: 5.1 MG/DL (ref 2.5–4.5)
PLATELET # BLD AUTO: 262 10*3/MM3 (ref 140–450)
PMV BLD AUTO: 9.4 FL (ref 6–12)
POTASSIUM SERPL-SCNC: 3.6 MMOL/L (ref 3.5–5.2)
RBC # BLD AUTO: 3.35 10*6/MM3 (ref 3.77–5.28)
SODIUM SERPL-SCNC: 139 MMOL/L (ref 136–145)
WBC NRBC COR # BLD: 6.14 10*3/MM3 (ref 3.4–10.8)

## 2022-04-07 PROCEDURE — 85025 COMPLETE CBC W/AUTO DIFF WBC: CPT | Performed by: HOSPITALIST

## 2022-04-07 PROCEDURE — 63710000001 INSULIN LISPRO (HUMAN) PER 5 UNITS: Performed by: HOSPITALIST

## 2022-04-07 PROCEDURE — 82962 GLUCOSE BLOOD TEST: CPT

## 2022-04-07 PROCEDURE — 97110 THERAPEUTIC EXERCISES: CPT

## 2022-04-07 PROCEDURE — 25010000002 NA FERRIC GLUC CPLX PER 12.5 MG: Performed by: INTERNAL MEDICINE

## 2022-04-07 PROCEDURE — 80069 RENAL FUNCTION PANEL: CPT | Performed by: INTERNAL MEDICINE

## 2022-04-07 RX ORDER — ACETAMINOPHEN 325 MG/1
650 TABLET ORAL ONCE
Status: COMPLETED | OUTPATIENT
Start: 2022-04-07 | End: 2022-04-07

## 2022-04-07 RX ORDER — ACETAMINOPHEN 325 MG/1
650 TABLET ORAL ONCE
Status: DISCONTINUED | OUTPATIENT
Start: 2022-04-07 | End: 2022-04-07 | Stop reason: CLARIF

## 2022-04-07 RX ADMIN — GABAPENTIN 300 MG: 300 CAPSULE ORAL at 08:10

## 2022-04-07 RX ADMIN — ATORVASTATIN CALCIUM 40 MG: 20 TABLET, FILM COATED ORAL at 08:10

## 2022-04-07 RX ADMIN — INSULIN LISPRO 2 UNITS: 100 INJECTION, SOLUTION INTRAVENOUS; SUBCUTANEOUS at 11:33

## 2022-04-07 RX ADMIN — ACETAMINOPHEN 650 MG: 325 TABLET ORAL at 16:52

## 2022-04-07 RX ADMIN — Medication 10 ML: at 21:15

## 2022-04-07 RX ADMIN — GABAPENTIN 300 MG: 300 CAPSULE ORAL at 16:52

## 2022-04-07 RX ADMIN — INSULIN LISPRO 2 UNITS: 100 INJECTION, SOLUTION INTRAVENOUS; SUBCUTANEOUS at 16:52

## 2022-04-07 RX ADMIN — METOPROLOL TARTRATE 100 MG: 50 TABLET, FILM COATED ORAL at 08:10

## 2022-04-07 RX ADMIN — AMLODIPINE BESYLATE 5 MG: 5 TABLET ORAL at 08:10

## 2022-04-07 RX ADMIN — SODIUM CHLORIDE 250 MG: 9 INJECTION, SOLUTION INTRAVENOUS at 16:52

## 2022-04-07 RX ADMIN — APIXABAN 2.5 MG: 2.5 TABLET, FILM COATED ORAL at 21:15

## 2022-04-07 RX ADMIN — GABAPENTIN 300 MG: 300 CAPSULE ORAL at 21:15

## 2022-04-07 RX ADMIN — FUROSEMIDE 40 MG: 40 TABLET ORAL at 16:52

## 2022-04-07 RX ADMIN — APIXABAN 2.5 MG: 2.5 TABLET, FILM COATED ORAL at 08:09

## 2022-04-07 RX ADMIN — DICLOFENAC SODIUM 1 G: 10 GEL TOPICAL at 21:15

## 2022-04-07 RX ADMIN — DICLOFENAC SODIUM 1 G: 10 GEL TOPICAL at 16:52

## 2022-04-07 RX ADMIN — VENLAFAXINE HYDROCHLORIDE 75 MG: 75 CAPSULE, EXTENDED RELEASE ORAL at 08:10

## 2022-04-07 RX ADMIN — NYSTATIN: 100000 POWDER TOPICAL at 08:10

## 2022-04-07 RX ADMIN — NYSTATIN 1 APPLICATION: 100000 POWDER TOPICAL at 21:15

## 2022-04-07 RX ADMIN — FUROSEMIDE 40 MG: 40 TABLET ORAL at 08:09

## 2022-04-07 RX ADMIN — PANTOPRAZOLE SODIUM 40 MG: 40 TABLET, DELAYED RELEASE ORAL at 06:59

## 2022-04-07 RX ADMIN — DICLOFENAC SODIUM 1 G: 10 GEL TOPICAL at 08:10

## 2022-04-07 RX ADMIN — DICLOFENAC SODIUM 1 G: 10 GEL TOPICAL at 11:34

## 2022-04-07 RX ADMIN — CALCIUM POLYCARBOPHIL 1250 MG: 625 TABLET, FILM COATED ORAL at 08:09

## 2022-04-07 NOTE — PLAN OF CARE
Goal Outcome Evaluation:  Plan of Care Reviewed With: patient        Progress: no change  Outcome Evaluation: Pt able to maintain activity level with PT. Continues to require assist x1 and use of rolling walker for mobility due to weakness and ongoing L knee pain. Pt still only able to tolerate short distance ambulation in room and will continue to benefit from PT for further strengthening and mobilization. Continue to recommend DC to SNF as pt lives alone and has 13 stairs to enter home.

## 2022-04-07 NOTE — THERAPY TREATMENT NOTE
Patient Name: Lowell Min  : 1940    MRN: [id number]                              Today's Date: 2022       Admit Date: 4/3/2022    Visit Dx:     ICD-10-CM ICD-9-CM   1. NELLY (obstructive sleep apnea)  G47.33 327.23   2. Symptomatic anemia  D64.9 285.9   3. Gastrointestinal hemorrhage, unspecified gastrointestinal hemorrhage type  K92.2 578.9   4. Chronic kidney disease, unspecified CKD stage  N18.9 585.9   5. Chronic diastolic congestive heart failure (HCC)  I50.32 428.32     428.0   6. PAF (paroxysmal atrial fibrillation) (HCC)  I48.0 427.31   7. Obesity (BMI 30-39.9)  E66.9 278.00     Patient Active Problem List   Diagnosis   • Chronic kidney disease, stage IV (severe) (HCC)   • Erythropoietin deficiency anemia   • Symptomatic anemia   • Anxiety associated with depression   • Iron deficiency anemia   • PAF (paroxysmal atrial fibrillation) (HCC)   • Shortness of breath   • CHF (congestive heart failure) (HCC)     Past Medical History:   Diagnosis Date   • Anxiety    • Atrial fibrillation (HCC)    • Chronic kidney disease, stage IV (severe) (HCC)    • Depression    • Elevated cholesterol    • Hypertension    • Type 2 diabetes mellitus (HCC)      Past Surgical History:   Procedure Laterality Date   • APPENDECTOMY     • CHOLECYSTECTOMY     • COLONOSCOPY     • HYSTERECTOMY     • TUMOR REMOVAL Left     benign tumor from left breast      General Information     Row Name 22 1522          Physical Therapy Time and Intention    Document Type therapy note (daily note)  -EE     Mode of Treatment physical therapy;individual therapy  -EE     Row Name 22 1522          General Information    Existing Precautions/Restrictions fall  -EE     Row Name 22 1522          Cognition    Orientation Status (Cognition) oriented x 3  -EE     Row Name 22 1522          Safety Issues, Functional Mobility    Impairments Affecting Function (Mobility) balance;endurance/activity tolerance;strength;pain;range of  motion (ROM)  -EE           User Key  (r) = Recorded By, (t) = Taken By, (c) = Cosigned By    Initials Name Provider Type    EE Ni Connor PT Physical Therapist               Mobility     Row Name 04/07/22 1522          Bed Mobility    Sit-Supine [place] (Bed Mobility) standby assist  -EE     Assistive Device (Bed Mobility) head of bed elevated  -EE     Row Name 04/07/22 1522          Sit-Stand Transfer    Sit-Stand [place] (Transfers) contact guard;verbal cues  -EE     Assistive Device (Sit-Stand Transfers) walker, front-wheeled  -EE     Row Name 04/07/22 1522          Gait/Stairs (Locomotion)    [place] Level (Gait) contact guard;verbal cues  -EE     Assistive Device (Gait) walker, front-wheeled  -EE     Distance in Feet (Gait) 15'  -EE     Deviations/Abnormal Patterns (Gait) guillermo decreased;left sided deviations;antalgic  -EE     Bilateral Gait Deviations forward flexed posture  -EE     Left Sided Gait Deviations heel strike decreased;weight shift ability decreased  -EE     Comment, (Gait/Stairs) Unable to ambulate farther due to L knee pain and fatigue. Vc's for upright posture.  -EE           User Key  (r) = Recorded By, (t) = Taken By, (c) = Cosigned By    Initials Name Provider Type    Ni Hays PT Physical Therapist               Obj/Interventions     Row Name 04/07/22 1524          Motor Skills    Therapeutic Exercise --  seated B ankle pumps, LAQ, marching x 10 reps each  -EE           User Key  (r) = Recorded By, (t) = Taken By, (c) = Cosigned By    Initials Name Provider Type    Ni Hays PT Physical Therapist               Goals/Plan    No documentation.                Clinical Impression     Row Name 04/07/22 1525          Pain    Pretreatment Pain Rating 5/10  -EE     Posttreatment Pain Rating 5/10  -EE     Pain Location - Side/Orientation Left  -EE     Pain Location - knee  -EE     Pain Intervention(s) Repositioned;Rest;Elevated  -EE     Row Name 04/07/22 1525           Plan of Care Review    Plan of Care Reviewed With patient  -EE     Progress no change  -EE     Outcome Evaluation Pt able to maintain activity level with PT. Continues to require assist x1 and use of rolling walker for mobility due to weakness and ongoing L knee pain. Pt still only able to tolerate short distance ambulation in room and will continue to benefit from PT for further strengthening and mobilization. Continue to recommend DC to SNF as pt lives alone and has 13 stairs to enter home.  -EE     Row Name 04/07/22 1525          Therapy Assessment/Plan (PT)    Rehab Potential (PT) good, to achieve stated therapy goals  -EE     Row Name 04/07/22 1525          Positioning and Restraints    Pre-Treatment Position in bed  -EE     Post Treatment Position chair  -EE     In Chair reclined;call light within reach;encouraged to call for assist;legs elevated;notified nsg  alarm strip in chair; no alarm box present in room  -EE           User Key  (r) = Recorded By, (t) = Taken By, (c) = Cosigned By    Initials Name Provider Type    Ni Hays PT Physical Therapist               Outcome Measures     Row Name 04/07/22 1526 04/07/22 0811       How much help from another person do you currently need...    Turning from your back to your side while in flat bed without using bedrails? 3  -EE 2  -MS    Moving from lying on back to sitting on the side of a flat bed without bedrails? 3  -EE 2  -MS    Moving to and from a bed to a chair (including a wheelchair)? 3  -EE 3  -MS    Standing up from a chair using your arms (e.g., wheelchair, bedside chair)? 3  -EE 3  -MS    Climbing 3-5 steps with a railing? 2  -EE 1  -MS    To walk in hospital room? 3  -EE 3  -MS    AM-PAC 6 Clicks Score (PT) 17  -EE 14  -MS          User Key  (r) = Recorded By, (t) = Taken By, (c) = Cosigned By    Initials Name Provider Type    Ni Hays PT Physical Therapist    Michael Malloy, RN Registered Nurse                              Physical Therapy Education                 Title: PT OT SLP Therapies (Done)     Topic: Physical Therapy (Done)     Point: Mobility training (Done)     Learning Progress Summary           Patient Acceptance, E,TB, VU,NR by EE at 4/7/2022 1527    Acceptance, E,TB, VU,NR by EE at 4/6/2022 0958    Acceptance, E, VU by DB at 4/4/2022 1541                   Point: Home exercise program (Done)     Learning Progress Summary           Patient Acceptance, E,TB, VU,NR by EE at 4/7/2022 1527    Acceptance, E,TB, VU,NR by EE at 4/6/2022 0958    Acceptance, E, VU,NR by EE at 4/5/2022 1120    Acceptance, E, VU by DB at 4/4/2022 1541                   Point: Body mechanics (Done)     Learning Progress Summary           Patient Acceptance, E, VU by DB at 4/4/2022 1541                   Point: Precautions (Done)     Learning Progress Summary           Patient Acceptance, E, VU by DB at 4/4/2022 1541                               User Key     Initials Effective Dates Name Provider Type Discipline    EE 06/16/21 -  Ni Connor, PT Physical Therapist PT    DB 06/16/21 -  Oanh Camp, PT Physical Therapist PT              PT Recommendation and Plan     Plan of Care Reviewed With: patient  Progress: no change  Outcome Evaluation: Pt able to maintain activity level with PT. Continues to require assist x1 and use of rolling walker for mobility due to weakness and ongoing L knee pain. Pt still only able to tolerate short distance ambulation in room and will continue to benefit from PT for further strengthening and mobilization. Continue to recommend DC to SNF as pt lives alone and has 13 stairs to enter home.     Time Calculation:    PT Charges     Row Name 04/07/22 1527             Time Calculation    Start Time 1353  -EE      Stop Time 1407  -EE      Time Calculation (min) 14 min  -EE      PT Received On 04/07/22  -EE      PT - Next Appointment 04/08/22  -EE              Time Calculation- PT    Total Timed Code Minutes- PT 14  minute(s)  -EE            User Key  (r) = Recorded By, (t) = Taken By, (c) = Cosigned By    Initials Name Provider Type    EE Ni Connor, PT Physical Therapist              Therapy Charges for Today     Code Description Service Date Service Provider Modifiers Qty    [id number]  PT THER PROC EA 15 MIN 4/6/2022 Ni Connor, PT GP 1    [id number] HC PT THER PROC EA 15 MIN 4/7/2022 Ni Connor, PT GP 1          PT G-Codes  Outcome Measure Options: AM-PAC 6 Clicks Basic Mobility (PT)  AM-PAC 6 Clicks Score (PT): 17     Patient was not wearing a face mask during this therapy encounter. Therapist used appropriate personal protective equipment including mask and gloves.  Mask used was standard procedure mask. Appropriate PPE was worn during the entire therapy session. Hand hygiene was completed before and after therapy session. Patient is not in enhanced droplet precautions.       Ni Connor PT  4/7/2022

## 2022-04-07 NOTE — PROGRESS NOTES
Name: Lowell Min ADMIT: 4/3/2022   : 1940  PCP: Dannie Mathews MD    MRN: [id number] LOS: 4 days   AGE/SEX: 82 y.o. female  ROOM: [facility]   Subjective   Chief Complaint   Patient presents with   • Shortness of Breath   • Black or Bloody Stool     Dyspnea fair  On 1L oxygen  On lasix  +weakness- working with PT    ROS  No f/c  No n/v  No cp/palp  +soa/cough    Objective   Vital Signs  Temp:  [98.1 °F (36.7 °C)-98.5 °F (36.9 °C)] 98.1 °F (36.7 °C)  Heart Rate:  [70-76] 70  Resp:  [18] 18  BP: (116-128)/(56-66) 128/62  SpO2:  [91 %-93 %] 91 %  on  Flow (L/min):  [1] 1;   Device (Oxygen Therapy): room air  Body mass index is 33.99 kg/m².    Physical Exam  Constitutional:       General: She is not in acute distress.  HENT:      Head: Normocephalic and atraumatic.   Eyes:      General: No scleral icterus.  Cardiovascular:      Rate and Rhythm: Regular rhythm.      Heart sounds: Normal heart sounds.   Pulmonary:      Effort: Respiratory distress present.      Breath sounds: Rales (slight at base) present.   Abdominal:      General: There is no distension.      Palpations: Abdomen is soft.   Musculoskeletal:      Cervical back: Neck supple.   Skin:     Coloration: Skin is pale.   Neurological:      Mental Status: She is alert.   Psychiatric:         Behavior: Behavior normal.     elderly, chronically ill    Results Review:       I reviewed the patient's new clinical results.  Results from last 7 days   Lab Units 22  0602 22  1103 22  0939 22  0044   WBC 10*3/mm3 6.14 6.83 5.61 5.00   HEMOGLOBIN g/dL 9.1* 8.0* 7.9* 7.3*   PLATELETS 10*3/mm3 262 190 181 178     Results from last 7 days   Lab Units 22  0602 22  0640 22  0717 22  1103   SODIUM mmol/L 139 138 139 137   POTASSIUM mmol/L 3.6 3.6 3.9 3.9   CHLORIDE mmol/L 92* 94* 98 105   CO2 mmol/L 36.1* 35.5* 29.1* 22.0   BUN mg/dL 30* 26* 24* 24*   CREATININE mg/dL 2.10* 2.05* 2.00* 2.08*   GLUCOSE mg/dL 109* 111*  125* 192*   Estimated Creatinine Clearance: 24.9 mL/min (A) (by C-G formula based on SCr of 2.1 mg/dL (H)).  Results from last 7 days   Lab Units 04/07/22  0602 04/06/22  0640 04/05/22  0717 04/04/22  1103 04/03/22  0044   ALBUMIN g/dL 3.40* 3.40* 3.40* 3.30* 3.10*   BILIRUBIN mg/dL  --   --   --   --  0.5   ALK PHOS U/L  --   --   --   --  144*   AST (SGOT) U/L  --   --   --   --  24   ALT (SGPT) U/L  --   --   --   --  6     Results from last 7 days   Lab Units 04/07/22  0602 04/06/22  0640 04/05/22 0717 04/04/22  1103   CALCIUM mg/dL 9.3 9.4 9.5 8.8   ALBUMIN g/dL 3.40* 3.40* 3.40* 3.30*   PHOSPHORUS mg/dL 5.1* 4.3 3.4 3.8         Coag   Results from last 7 days   Lab Units 04/03/22  0044   INR  2.14*   APTT seconds 33.2     HbA1C   Lab Results   Component Value Date    HGBA1C 6.30 (H) 04/05/2022    HGBA1C 6.8 (H) 12/29/2021     Infection     Radiology(recent) No radiology results for the last day  No results found for: TROPONINT, TROPONINI, BNP  No components found for: TSH;2    acetaminophen, 650 mg, Oral, Once   And  iron sucrose, 200 mg, Intravenous, Once  amLODIPine, 5 mg, Oral, Q24H  apixaban, 2.5 mg, Oral, BID  atorvastatin, 40 mg, Oral, Daily  Diclofenac Sodium, 1 g, Topical, 4x Daily  furosemide, 40 mg, Oral, BID  gabapentin, 300 mg, Oral, TID  insulin lispro, 0-7 Units, Subcutaneous, TID AC  metoprolol tartrate, 100 mg, Oral, Daily  nystatin, , Topical, Q12H  pantoprazole, 40 mg, Oral, QAM  calcium polycarbophil, 1,250 mg, Oral, Daily  venlafaxine XR, 75 mg, Oral, Daily       Diet Regular; Daily Fluid Restriction, Low Sodium; Other; 1,200      Assessment/Plan      Active Hospital Problems    Diagnosis  POA   • **CHF (congestive heart failure) ([facility]) [I50.9]  Yes   • Symptomatic anemia [D64.9]  Yes   • Anxiety associated with depression [F41.8]  Yes   • Iron deficiency anemia [D50.9]  Yes   • PAF (paroxysmal atrial fibrillation) ([facility]) [I48.0]  Yes   • Shortness of breath [R06.02]  Yes   • Chronic kidney  "disease, stage IV (severe) ([facility]) [N18.4]  Yes      Resolved Hospital Problems   No resolved problems to display.     Diastolic CHF-acute on chronic: Continue diuresis.    Severe pulmonary hypertension: Likely contributing to her edema.  Likely secondary to noncompliance with CPAP.  Pulmonary evaluation appreciated.  Patient follow-up as an outpatient.  Chronic kidney disease stage IV: Nephrology input appreciated.  Tolerating diuresis very nicely.  Paroxysmal atrial fibrillation: Continue rate control.  Eliquis resumed.    Blood on toilet paper: GI input appreciated.  No evidence of any further GI blood loss.    Eliquis resumed at 2.5 mg twice daily noting her age and renal function.  Anemia-severe iron deficiency, recurrent-anemia of chronic renal disease: Patient received an iron infusion prior to admission.  Give her IV iron today (scheduled for tomorrow as outpatient)  Diabetes type 2: Patient is not sure what \"pill\" she is taking for this. sliding scale insulin during hospitalization.  Morbid obesity:  Valvular heart disease: Moderate aortic valve stenosis, mild mitral valve regurgitation, mild mitral valve stenosis, tricuspid regurgitation.  Obstructive sleep apnea: Noncompliant with CPAP.  Pulmonary evaluation appreciated.  Patient encouraged to follow-up as an outpatient.    Left knee effusion:  Orthopedic evaluation greatly appreciated.  With her renal status and on anticoagulants, unfortunately she is not a candidate for oral NSAIDs.  Trial of Voltaren gel.  Improved and ambulation improving.    All problems new to me    Dispo- likely subacute rehab when bed available      DW MULU Grant MD  [facility]  04/07/22  14:17 EDT  "

## 2022-04-07 NOTE — PROGRESS NOTES
[facility]     Progress Note    Patient Name: Lowell Min  : 1940  MRN: [id number]  Primary Care Physician:  Dannie Mathews MD  Date of admission: 4/3/2022    Subjective   Subjective     Chief Complaint: ckd IV    History of Present Illness    Patient with ckd IV and volume overload    Review of Systems      Objective   Objective     Vitals:   Temp:  [98.1 °F (36.7 °C)-98.5 °F (36.9 °C)] 98.1 °F (36.7 °C)  Heart Rate:  [70-77] 70  Resp:  [18] 18  BP: (116-146)/(56-69) 128/62  Flow (L/min):  [1] 1    Physical Exam     General Appearance:  In no acute distress.    HEENT: Normal HEENT exam.     Extremities: .  There is no deformity, effusion or dependent edema.    Neurological: Patient is asleep in no distress    Result Review    Result Review:  I have personally reviewed the results from the time of this admission to 2022 09:28 EDT and agree with these findings:  []  Laboratory  []  Microbiology  []  Radiology  []  EKG/Telemetry   []  Cardiology/Vascular   []  Pathology  []  Old records  []  Other:      Assessment/Plan   Assessment / Plan     Brief Patient Summary:  Lowell Min is a 82 y.o. female who has ckd IV with volume overload    Active Hospital Problems:  Active Hospital Problems    Diagnosis    • **CHF (congestive heart failure) ([facility])    • Symptomatic anemia    • Anxiety associated with depression    • Iron deficiency anemia    • PAF (paroxysmal atrial fibrillation) ([facility])    • Shortness of breath    • Chronic kidney disease, stage IV (severe) ([facility])      Plan:   CKD stage IV with mild PERLA.  Baseline creatinine 2.0, improved    CHF (congestive heart failure), acute on chronic systolic    Chronic kidney disease, stage IV (severe) ([facility])    Symptomatic anemia    Anxiety associated with depression    Iron deficiency anemia    PAF (paroxysmal atrial fibrillation) ([facility])    Shortness of breath    Patient asleep, in no distress. Labs and chart reviewed. Cr stable at 2.1. will change to po  diuretics.  F/u labs in am    Makayla Dela Cruz MD

## 2022-04-07 NOTE — PLAN OF CARE
Goal Outcome Evaluation:  Plan of Care Reviewed With: patient        Progress: improving  Outcome Evaluation: Patient  resting  at this  time.  Remain  on  oxygen  at  1l/m.  No  complaints  of  pain  voiced.  Cream  applied  to  Bilateral  knee  as  ordered.    Starting  oral  lasix  this  am. Afib  on the  monitor.   Nursing  will    continue  to monitor.

## 2022-04-07 NOTE — DISCHARGE PLACEMENT REQUEST
"Lowell Min (82 y.o. Female)             Date of Birth   1940    Social Security Number       Address   [address] [facility] [address]    Home Phone   [phone]    MRN   [id number]       [facility]    Marital Status   Single                            Admission Date   4/3/22    Admission Type   Emergency    Admitting Provider   Alfonso Aviles MD    Attending Provider   Truman Grant MD    Department, Room/Bed   [address], [address]       Discharge Date       Discharge Disposition       Discharge Destination                               Attending Provider: Truman Grant MD    Allergies: Ibuprofen    Isolation: None   Infection: MRSA/History Only (04/03/22)   Code Status: CPR   Advance Care Planning Activity    Ht: 170.2 cm (67\")   Wt: 98.4 kg (217 lb)    Admission Cmt: None   Principal Problem: CHF (congestive heart failure) ([facility]) [I50.9]                 Active Insurance as of 4/3/2022     Primary Coverage     Payor Plan Insurance Group Employer/Plan Group    MEDICARE MEDICARE A & B      Payor Plan Address Payor Plan Phone Number Payor Plan Fax Number Effective Dates    [address] [phone]  1/1/2005 - None Entered    [facility] [id number]       Subscriber Name Subscriber Birth Date Member ID       LOWELL MIN 1940 [id number]           Secondary Coverage     Payor Plan Insurance Group Employer/Plan Group    [facility] SUPP [id number]     Payor Plan Address Payor Plan Phone Number Payor Plan Fax Number Effective Dates    [address]   12/1/2016 - None Entered    [facility] [id number]       Subscriber Name Subscriber Birth Date Member ID       LOWELL MIN 1940 [id number]                 Emergency Contacts      (Rel.) Home Phone Work Phone Mobile Phone    ANABELA COPPOLA (Daughter) -- -- [phone]              "

## 2022-04-07 NOTE — PLAN OF CARE
Problem: Fall Injury Risk  Goal: Absence of Fall and Fall-Related Injury  Outcome: Ongoing, Progressing  Intervention: Promote Injury-Free Environment  Recent Flowsheet Documentation  Taken 4/7/2022 1400 by Michael Ferrer RN  Safety Promotion/Fall Prevention:   assistive device/personal items within reach   fall prevention program maintained   nonskid shoes/slippers when out of bed   safety round/check completed  Taken 4/7/2022 1200 by Michael Ferrer RN  Safety Promotion/Fall Prevention:   assistive device/personal items within reach   fall prevention program maintained   nonskid shoes/slippers when out of bed   safety round/check completed  Taken 4/7/2022 1010 by Michael Ferrer RN  Safety Promotion/Fall Prevention:   assistive device/personal items within reach   fall prevention program maintained   nonskid shoes/slippers when out of bed   safety round/check completed  Taken 4/7/2022 0811 by Michael Ferrer RN  Safety Promotion/Fall Prevention:   assistive device/personal items within reach   fall prevention program maintained   nonskid shoes/slippers when out of bed   safety round/check completed     Problem: Adult Inpatient Plan of Care  Goal: Plan of Care Review  Outcome: Ongoing, Progressing  Flowsheets (Taken 4/7/2022 1433)  Progress: improving  Plan of Care Reviewed With: patient  Outcome Evaluation: Patient has been pleasant and cooperative during shift. Patient treated for knee pain. No nausea or SOA. AOx4, assist x1, room air. Patient recieving IV iron transfusion today that was shceduled for tomorrow. Diuretics changed to PO. PT worked with patient today. Will continue to monitor and assist patient as needed.  Goal: Patient-Specific Goal (Individualized)  Outcome: Ongoing, Progressing  Goal: Absence of Hospital-Acquired Illness or Injury  Outcome: Ongoing, Progressing  Intervention: Identify and Manage Fall Risk  Recent Flowsheet Documentation  Taken 4/7/2022 1400 by Michael Ferrer  RN  Safety Promotion/Fall Prevention:   assistive device/personal items within [facility]   fall prevention program maintained   nonskid shoes/slippers when out of bed   safety round/check completed  Taken 4/7/2022 1200 by Michael Ferrer RN  Safety Promotion/Fall Prevention:   assistive device/personal items within [facility]   fall prevention program maintained   nonskid shoes/slippers when out of bed   safety round/check completed  Taken 4/7/2022 1010 by Michael Ferrer RN  Safety Promotion/Fall Prevention:   assistive device/personal items within [facility]   fall prevention program maintained   nonskid shoes/slippers when out of bed   safety round/check completed  Taken 4/7/2022 0811 by Michael Ferrer RN  Safety Promotion/Fall Prevention:   assistive device/personal items within reach   fall prevention program maintained   nonskid shoes/slippers when out of bed   safety round/check completed  Intervention: Prevent Skin Injury  Recent Flowsheet Documentation  Taken 4/7/2022 1400 by Michael Ferrer RN  Body Position: supine  Taken 4/7/2022 1200 by Michael Ferrer RN  Body Position: supine  Taken 4/7/2022 1010 by Michael Ferrer RN  Body Position: supine  Taken 4/7/2022 0811 by Michael Ferrer RN  Body Position: supine  Intervention: Prevent and Manage VTE (Venous Thromboembolism) Risk  Recent Flowsheet Documentation  Taken 4/7/2022 1400 by Michael Ferrer RN  Activity Management: activity adjusted per tolerance  Taken 4/7/2022 1200 by Michael Ferrer RN  Activity Management: activity adjusted per tolerance  Taken 4/7/2022 1010 by Michael Ferrer RN  Activity Management: activity adjusted per tolerance  Taken 4/7/2022 0811 by Michael Ferrer RN  Activity Management: activity adjusted per tolerance  Goal: Optimal Comfort and Wellbeing  Outcome: Ongoing, Progressing  Goal: Readiness for Transition of Care  Outcome: Ongoing, Progressing     Problem: Diabetes Comorbidity  Goal: Blood Glucose Level Within  Targeted Range  Outcome: Ongoing, Progressing     Problem: Heart Failure Comorbidity  Goal: Maintenance of Heart Failure Symptom Control  Outcome: Ongoing, Progressing     Problem: Hypertension Comorbidity  Goal: Blood Pressure in Desired Range  Outcome: Ongoing, Progressing   Goal Outcome Evaluation:  Plan of Care Reviewed With: patient        Progress: improving  Outcome Evaluation: Patient has been pleasant and cooperative during shift. Patient treated for knee pain. No nausea or SOA. AOx4, assist x1, room air. Patient recieving IV iron transfusion today that was shceduled for tomorrow. Diuretics changed to PO. PT worked with patient today. Will continue to monitor and assist patient as needed.

## 2022-04-08 ENCOUNTER — HOSPITAL ENCOUNTER (OUTPATIENT)
Dept: INFUSION THERAPY | Facility: HOSPITAL | Age: 82
Discharge: HOME | End: 2022-04-08

## 2022-04-08 LAB
ALBUMIN SERPL-MCNC: 3.6 G/DL (ref 3.5–5.2)
ANION GAP SERPL CALCULATED.3IONS-SCNC: 12 MMOL/L (ref 5–15)
BUN SERPL-MCNC: 38 MG/DL (ref 8–23)
BUN/CREAT SERPL: 17.2 (ref 7–25)
CALCIUM SPEC-SCNC: 9.1 MG/DL (ref 8.6–10.5)
CHLORIDE SERPL-SCNC: 93 MMOL/L (ref 98–107)
CO2 SERPL-SCNC: 35 MMOL/L (ref 22–29)
CREAT SERPL-MCNC: 2.21 MG/DL (ref 0.57–1)
EGFRCR SERPLBLD CKD-EPI 2021: 21.8 ML/MIN/1.73
GLUCOSE BLDC GLUCOMTR-MCNC: 126 MG/DL (ref 70–130)
GLUCOSE BLDC GLUCOMTR-MCNC: 193 MG/DL (ref 70–130)
GLUCOSE BLDC GLUCOMTR-MCNC: 218 MG/DL (ref 70–130)
GLUCOSE BLDC GLUCOMTR-MCNC: 229 MG/DL (ref 70–130)
GLUCOSE SERPL-MCNC: 91 MG/DL (ref 65–99)
PHOSPHATE SERPL-MCNC: 4.8 MG/DL (ref 2.5–4.5)
POTASSIUM SERPL-SCNC: 3.5 MMOL/L (ref 3.5–5.2)
SODIUM SERPL-SCNC: 140 MMOL/L (ref 136–145)

## 2022-04-08 PROCEDURE — 63710000001 INSULIN LISPRO (HUMAN) PER 5 UNITS: Performed by: HOSPITALIST

## 2022-04-08 PROCEDURE — 97110 THERAPEUTIC EXERCISES: CPT

## 2022-04-08 PROCEDURE — 80069 RENAL FUNCTION PANEL: CPT | Performed by: INTERNAL MEDICINE

## 2022-04-08 PROCEDURE — 82962 GLUCOSE BLOOD TEST: CPT

## 2022-04-08 RX ORDER — GABAPENTIN 300 MG/1
300 CAPSULE ORAL 3 TIMES DAILY
Qty: 15 CAPSULE | Refills: 0 | Status: SHIPPED | OUTPATIENT
Start: 2022-04-08 | End: 2022-04-12 | Stop reason: HOSPADM

## 2022-04-08 RX ORDER — AMOXICILLIN 250 MG
2 CAPSULE ORAL 2 TIMES DAILY
Status: DISCONTINUED | OUTPATIENT
Start: 2022-04-08 | End: 2022-04-12 | Stop reason: HOSPADM

## 2022-04-08 RX ADMIN — GABAPENTIN 300 MG: 300 CAPSULE ORAL at 09:47

## 2022-04-08 RX ADMIN — VENLAFAXINE HYDROCHLORIDE 75 MG: 75 CAPSULE, EXTENDED RELEASE ORAL at 09:59

## 2022-04-08 RX ADMIN — INSULIN LISPRO 3 UNITS: 100 INJECTION, SOLUTION INTRAVENOUS; SUBCUTANEOUS at 14:17

## 2022-04-08 RX ADMIN — FUROSEMIDE 40 MG: 40 TABLET ORAL at 09:46

## 2022-04-08 RX ADMIN — GABAPENTIN 300 MG: 300 CAPSULE ORAL at 21:09

## 2022-04-08 RX ADMIN — ACETAMINOPHEN 650 MG: 325 TABLET ORAL at 21:09

## 2022-04-08 RX ADMIN — NYSTATIN: 100000 POWDER TOPICAL at 09:49

## 2022-04-08 RX ADMIN — FUROSEMIDE 40 MG: 40 TABLET ORAL at 17:26

## 2022-04-08 RX ADMIN — DOCUSATE SODIUM 50MG AND SENNOSIDES 8.6MG 2 TABLET: 8.6; 5 TABLET, FILM COATED ORAL at 14:18

## 2022-04-08 RX ADMIN — PANTOPRAZOLE SODIUM 40 MG: 40 TABLET, DELAYED RELEASE ORAL at 06:43

## 2022-04-08 RX ADMIN — GABAPENTIN 300 MG: 300 CAPSULE ORAL at 17:26

## 2022-04-08 RX ADMIN — NYSTATIN 1 APPLICATION: 100000 POWDER TOPICAL at 21:12

## 2022-04-08 RX ADMIN — APIXABAN 2.5 MG: 2.5 TABLET, FILM COATED ORAL at 21:09

## 2022-04-08 RX ADMIN — DICLOFENAC SODIUM 1 G: 10 GEL TOPICAL at 17:26

## 2022-04-08 RX ADMIN — METOPROLOL TARTRATE 100 MG: 50 TABLET, FILM COATED ORAL at 09:48

## 2022-04-08 RX ADMIN — INSULIN LISPRO 3 UNITS: 100 INJECTION, SOLUTION INTRAVENOUS; SUBCUTANEOUS at 17:26

## 2022-04-08 RX ADMIN — AMLODIPINE BESYLATE 5 MG: 5 TABLET ORAL at 09:46

## 2022-04-08 RX ADMIN — CALCIUM POLYCARBOPHIL 1250 MG: 625 TABLET, FILM COATED ORAL at 09:48

## 2022-04-08 RX ADMIN — DICLOFENAC SODIUM 1 G: 10 GEL TOPICAL at 21:12

## 2022-04-08 RX ADMIN — DICLOFENAC SODIUM 1 G: 10 GEL TOPICAL at 09:48

## 2022-04-08 RX ADMIN — APIXABAN 2.5 MG: 2.5 TABLET, FILM COATED ORAL at 09:47

## 2022-04-08 RX ADMIN — DICLOFENAC SODIUM 1 G: 10 GEL TOPICAL at 14:18

## 2022-04-08 RX ADMIN — DOCUSATE SODIUM 50MG AND SENNOSIDES 8.6MG 2 TABLET: 8.6; 5 TABLET, FILM COATED ORAL at 21:09

## 2022-04-08 RX ADMIN — ATORVASTATIN CALCIUM 40 MG: 20 TABLET, FILM COATED ORAL at 09:47

## 2022-04-08 RX ADMIN — ACETAMINOPHEN 650 MG: 325 TABLET ORAL at 06:51

## 2022-04-08 RX ADMIN — Medication 10 ML: at 21:09

## 2022-04-08 NOTE — CASE MANAGEMENT/SOCIAL WORK
Continued Stay Note  [facility]     Patient Name: Lowell Min  MRN: [id number]  Today's Date: 4/8/2022    Admit Date: 4/3/2022     Discharge Plan     Row Name 04/08/22 1602       Plan    Plan Plan home with family support or skilled care at accepting facility.  JONNIE Avery RN    Plan Comments Spoke with pt at bedside regarding need for skilled care.  Pt requested CCP call her daughter.  Called and spoke with pt's daughter ( Karen Dalton [phone]) who is interested in SNF close to pt's home.  Pt's first choice is 1) [facility]) [facility] and 3) [facility].  Alina ( [phone]) following for [facility].  Alysa  ( [phone]) called with referral to [facility].  Plan home with family support or skilled care at accepting facility.  JONNIE Avery RN    Row Name 04/08/22 5877       Plan    Plan Plan home with family support or skilled care at accepting facility.  JONNIE Avery RN    Patient/Family in Agreement with Plan yes    Plan Comments Spoke with pt at bedside.  Informed pt per Alina  ( [phone]) [facility] does not have a bed at present.  Bed availability is very tight and there may be something available the first of next week.  Plan home with family support or skilled care at accepting facility.  JONNIE Avery RN               Discharge Codes    No documentation.               Expected Discharge Date and Time     Expected Discharge Date Expected Discharge Time    Apr 8, 2022             Brandi Avery RN

## 2022-04-08 NOTE — CASE MANAGEMENT/SOCIAL WORK
Continued Stay Note  [facility]     Patient Name: Lowell Min  MRN: [id number]  Today's Date: 4/8/2022    Admit Date: 4/3/2022     Discharge Plan     Row Name 04/08/22 1618       Plan    Plan Plan home with family support or skilled care at accepting facility.  JONNIE Avery RN    Patient/Family in Agreement with Plan yes    Plan Comments Pt's daughter called and requested referral to [facility].  Called Mac ( [phone]) with referral. Plan home with family support or skilled care at accepting facility.  JONNIE Avery RN    Row Name 04/08/22 1606       Plan    Plan Plan home with family support or skilled care at accepting facility.  JONNIE Avery RN    Plan Comments Spoke with pt at bedside regarding need for skilled care.  Pt requested CCP call her daughter.  Called and spoke with pt's daughter ( Karen Dalton [phone]) who is interested in SNF close to pt's home.  Pt's first choice is 1) [facility]) [facility] and 3) [facility].  Alina ( [phone]) following for [facility].  Alysa  ( [phone]) called with referral to [facility].  Plan home with family support or skilled care at accepting facility.  JONNIE Avery RN    Row Name 04/08/22 1430       Plan    Plan Plan home with family support or skilled care at accepting facility.  JONNIE Avery RN    Patient/Family in Agreement with Plan yes    Plan Comments Spoke with pt at bedside.  Informed pt per Alina  ( [phone]) [facility] does not have a bed at present.  Bed availability is very tight and there may be something available the first of next week.  Plan home with family support or skilled care at accepting facility.  JONNIE Avery RN               Discharge Codes    No documentation.               Expected Discharge Date and Time     Expected Discharge Date Expected Discharge Time    Apr 8, 2022             Brandi Avery RN

## 2022-04-08 NOTE — THERAPY TREATMENT NOTE
Patient Name: Lowell Min  : 1940    MRN: [id number]                              Today's Date: 2022       Admit Date: 4/3/2022    Visit Dx:     ICD-10-CM ICD-9-CM   1. NELLY (obstructive sleep apnea)  G47.33 327.23   2. Symptomatic anemia  D64.9 285.9   3. Gastrointestinal hemorrhage, unspecified gastrointestinal hemorrhage type  K92.2 578.9   4. Chronic kidney disease, unspecified CKD stage  N18.9 585.9   5. Chronic diastolic congestive heart failure (HCC)  I50.32 428.32     428.0   6. PAF (paroxysmal atrial fibrillation) (HCC)  I48.0 427.31   7. Obesity (BMI 30-39.9)  E66.9 278.00   8. Anxiety associated with depression  F41.8 300.4     Patient Active Problem List   Diagnosis   • Chronic kidney disease, stage IV (severe) (HCC)   • Erythropoietin deficiency anemia   • Symptomatic anemia   • Anxiety associated with depression   • Iron deficiency anemia   • PAF (paroxysmal atrial fibrillation) (HCC)   • Shortness of breath   • CHF (congestive heart failure) (HCC)     Past Medical History:   Diagnosis Date   • Anxiety    • Atrial fibrillation (HCC)    • Chronic kidney disease, stage IV (severe) (HCC)    • Depression    • Elevated cholesterol    • Hypertension    • Type 2 diabetes mellitus (HCC)      Past Surgical History:   Procedure Laterality Date   • APPENDECTOMY     • CHOLECYSTECTOMY     • COLONOSCOPY     • HYSTERECTOMY     • TUMOR REMOVAL Left     benign tumor from left breast      General Information     Row Name 22 1539          Physical Therapy Time and Intention    Document Type therapy note (daily note)  -EE     Mode of Treatment individual therapy;physical therapy  -EE     Row Name 22 1539          General Information    Existing Precautions/Restrictions fall  -EE     Row Name 22 1539          Cognition    Orientation Status (Cognition) oriented x 3  -EE     Row Name 22 1539          Safety Issues, Functional Mobility    Impairments Affecting Function (Mobility)  balance;endurance/activity tolerance;strength;pain;range of motion (ROM)  -EE           User Key  (r) = Recorded By, (t) = Taken By, (c) = Cosigned By    Initials Name Provider Type    EE Ni Connor PT Physical Therapist               Mobility     Row Name 04/08/22 1539          Bed Mobility    Bed Mobility supine-sit  -EE     Supine-Sit [place] (Bed Mobility) standby assist  -EE     Assistive Device (Bed Mobility) bed rails;head of bed elevated  -EE     Row Name 04/08/22 1539          Sit-Stand Transfer    Sit-Stand [place] (Transfers) contact guard;verbal cues  -EE     Assistive Device (Sit-Stand Transfers) walker, front-wheeled  -EE     Row Name 04/08/22 1539          Gait/Stairs (Locomotion)    [place] Level (Gait) contact guard;verbal cues  -EE     Assistive Device (Gait) walker, front-wheeled  -EE     Distance in Feet (Gait) 5'  -EE     Deviations/Abnormal Patterns (Gait) guillermo decreased;festinating/shuffling;stride length decreased  -EE     Bilateral Gait Deviations forward flexed posture;heel strike decreased  -EE     Comment, (Gait/Stairs) fatigue limiting ambulation distance  -EE           User Key  (r) = Recorded By, (t) = Taken By, (c) = Cosigned By    Initials Name Provider Type    Ni Hays PT Physical Therapist               Obj/Interventions     Row Name 04/08/22 1540          Motor Skills    Therapeutic Exercise --  seated B LAQ, marching x 10 reps each  -EE           User Key  (r) = Recorded By, (t) = Taken By, (c) = Cosigned By    Initials Name Provider Type    Ni Hays PT Physical Therapist               Goals/Plan    No documentation.                Clinical Impression     Row Name 04/08/22 1540          Pain    Pretreatment Pain Rating 5/10  -EE     Posttreatment Pain Rating 5/10  -EE     Pain Location - Side/Orientation Left  -EE     Pain Location - knee  -EE     Pain Intervention(s) Repositioned;Rest;Elevated  -EE     Row Name 04/08/22 1540          Plan of  Care Review    Progress improving  -EE     Outcome Evaluation Pt continues to ambulate with assist x1 and walker; however, overall activity tolerance remains limited by pain and fatigue. Pt only able to ambulate short distance in room, would recommend DC to SNF as pt lives alone.  -EE     Row Name 04/08/22 1540          Positioning and Restraints    Pre-Treatment Position in bed  -EE     Post Treatment Position chair  -EE     In Chair reclined;call light within reach;encouraged to call for assist;exit alarm on;legs elevated;notified nsg  -EE           User Key  (r) = Recorded By, (t) = Taken By, (c) = Cosigned By    Initials Name Provider Type    EE Ni Connor PT Physical Therapist               Outcome Measures     Row Name 04/08/22 1543          How much help from another person do you currently need...    Turning from your back to your side while in flat bed without using bedrails? 3  -EE     Moving from lying on back to sitting on the side of a flat bed without bedrails? 3  -EE     Moving to and from a bed to a chair (including a wheelchair)? 3  -EE     Standing up from a chair using your arms (e.g., wheelchair, bedside chair)? 3  -EE     Climbing 3-5 steps with a railing? 2  -EE     To walk in hospital room? 3  -EE     AM-PAC 6 Clicks Score (PT) 17  -EE           User Key  (r) = Recorded By, (t) = Taken By, (c) = Cosigned By    Initials Name Provider Type    EE Ni Connor PT Physical Therapist                             Physical Therapy Education                 Title: PT OT SLP Therapies (Done)     Topic: Physical Therapy (Done)     Point: Mobility training (Done)     Learning Progress Summary           Patient Acceptance, E,TB, VU,NR by EE at 4/8/2022 1543    Acceptance, E,TB, VU,NR by EE at 4/7/2022 1527    Acceptance, E,TB, VU,NR by EE at 4/6/2022 0958    Acceptance, E, VU by DB at 4/4/2022 1541                   Point: Home exercise program (Done)     Learning Progress Summary           Patient  Acceptance, E,TB, VU,NR by EE at 4/8/2022 1543    Acceptance, E,TB, VU,NR by EE at 4/7/2022 1527    Acceptance, E,TB, VU,NR by EE at 4/6/2022 0958    Acceptance, E, VU,NR by EE at 4/5/2022 1120    Acceptance, E, VU by DB at 4/4/2022 1541                   Point: Body mechanics (Done)     Learning Progress Summary           Patient Acceptance, E, VU by DB at 4/4/2022 1541                   Point: Precautions (Done)     Learning Progress Summary           Patient Acceptance, E, VU by DB at 4/4/2022 1541                               User Key     Initials Effective Dates Name Provider Type Discipline    EE 06/16/21 -  Ni Connor, PT Physical Therapist PT    DB 06/16/21 -  Oanh Camp PT Physical Therapist PT              PT Recommendation and Plan     Plan of Care Reviewed With: patient  Progress: improving  Outcome Evaluation: Pt continues to ambulate with assist x1 and walker; however, overall activity tolerance remains limited by pain and fatigue. Pt only able to ambulate short distance in room, would recommend DC to SNF as pt lives alone.     Time Calculation:    PT Charges     Row Name 04/08/22 1545             Time Calculation    Start Time 1402  -EE      Stop Time 1415  -EE      Time Calculation (min) 13 min  -EE      PT Received On 04/08/22  -EE      PT - Next Appointment 04/09/22  -EE              Time Calculation- PT    Total Timed Code Minutes- PT 13 minute(s)  -EE            User Key  (r) = Recorded By, (t) = Taken By, (c) = Cosigned By    Initials Name Provider Type    EE Ni Connor, PT Physical Therapist              Therapy Charges for Today     Code Description Service Date Service Provider Modifiers Qty    97132603260 HC PT THER PROC EA 15 MIN 4/7/2022 Ni Connor, PT GP 1    08459884430 HC PT THER PROC EA 15 MIN 4/8/2022 Ni Connor, PT GP 1          PT G-Codes  Outcome Measure Options: AM-PAC 6 Clicks Basic Mobility (PT)  AM-PAC 6 Clicks Score (PT): 17     Patient was intermittently  wearing a face mask during this therapy encounter. Therapist used appropriate personal protective equipment including mask and gloves.  Mask used was standard procedure mask. Appropriate PPE was worn during the entire therapy session. Hand hygiene was completed before and after therapy session. Patient is not in enhanced droplet precautions.       Ni Connor, PT  4/8/2022

## 2022-04-08 NOTE — PROGRESS NOTES
"   LOS: 5 days     Chief Complaint/ Reason for encounter: CKD, diuretic management  Chief Complaint   Patient presents with   • Shortness of Breath   • Black or Bloody Stool         Subjective   04/04/22 : No complaints, feels better  Weight unchanged but feels less swollen  No shortness of breath or chest pain  Good appetite no nausea or vomiting  No additional hematochezia, eventual colonoscopy planned    4/6.  Feels well, edema considerably better and her weight is down about 31 pounds since the third  Denies any fevers or chills, shortness of breath or chest pain  Good appetite with no nausea  Still very weak but was able to get up and sit in the chair today    4/8 resting, seems to be doing well with no new complaints or events noted.  Weight now down 34 pounds since the third      Medical history reviewed:  History of Present Illness    Subjective:  Symptoms:  She reports shortness of breath.        History taken from: Patient and chart    Vital Signs  Temp:  [97.3 °F (36.3 °C)-97.9 °F (36.6 °C)] 97.4 °F (36.3 °C)  Heart Rate:  [55-84] 55  Resp:  [18] 18  BP: (113-136)/(67-75) 113/74       Wt Readings from Last 1 Encounters:   04/08/22 0410 97.6 kg (215 lb 1.6 oz)   04/07/22 0503 98.4 kg (217 lb)   04/06/22 0559 98.9 kg (218 lb)   04/05/22 0615 105 kg (232 lb 1.6 oz)   04/04/22 1150 111 kg (245 lb)   04/04/22 0526 112 kg (245 lb 14.4 oz)   04/03/22 0320 113 kg (249 lb)   04/03/22 0209 95.3 kg (210 lb)       Objective:  Vital signs: (most recent): Blood pressure 113/74, pulse 55, temperature 97.4 °F (36.3 °C), temperature source Oral, resp. rate 18, height 170.2 cm (67\"), weight 97.6 kg (215 lb 1.6 oz), SpO2 98 %.              Objective:  General Appearance:  Comfortable, well-appearing, in no acute distress and not in pain.  Awake, alert, oriented  HEENT: Mucous membranes moist, no injury, oropharynx clear  Lungs:  Normal effort and normal respiratory rate.  Breath sounds clear to auscultation.  No  " respiratory distress.  No rales, decreased breath sounds or rhonchi.    Heart: Normal rate.  Regular rhythm.  S1 normal.  No murmur.   Abdomen: Abdomen is soft.  Bowel sounds are normal, no abdominal tenderness.  There is no rebound or guarding  Extremities: Normal range of motion.  Decreased, 1+ edema of bilateral lower extremities, distal pulses intact  Neurological: No focal motor or sensory deficits, pupils reactive  Skin:  Warm and dry.  No rash or cyanosis.       Results Review:    Intake/Output:     Intake/Output Summary (Last 24 hours) at 4/8/2022 1544  Last data filed at 4/8/2022 1002  Gross per 24 hour   Intake 300 ml   Output 1600 ml   Net -1300 ml         DATA:  Radiology and Labs:  The following labs independently reviewed by me. Additional labs ordered for tomorrow a.m.  Interval notes, chart personally reviewed by me.   Old records independently reviewed showing CKD 4  Discussed with patient herself at bedside    Risk/ complexity of medical care/ medical decision making moderate complexity, PERLA with diuretic management      Labs:   Recent Results (from the past 24 hour(s))   POC Glucose Once    Collection Time: 04/07/22  4:19 PM    Specimen: Blood   Result Value Ref Range    Glucose 174 (H) 70 - 130 mg/dL   POC Glucose Once    Collection Time: 04/07/22  8:13 PM    Specimen: Blood   Result Value Ref Range    Glucose 310 (H) 70 - 130 mg/dL   Renal Function Panel    Collection Time: 04/08/22  4:55 AM    Specimen: Blood   Result Value Ref Range    Glucose 91 65 - 99 mg/dL    BUN 38 (H) 8 - 23 mg/dL    Creatinine 2.21 (H) 0.57 - 1.00 mg/dL    Sodium 140 136 - 145 mmol/L    Potassium 3.5 3.5 - 5.2 mmol/L    Chloride 93 (L) 98 - 107 mmol/L    CO2 35.0 (H) 22.0 - 29.0 mmol/L    Calcium 9.1 8.6 - 10.5 mg/dL    Albumin 3.60 3.50 - 5.20 g/dL    Phosphorus 4.8 (H) 2.5 - 4.5 mg/dL    Anion Gap 12.0 5.0 - 15.0 mmol/L    BUN/Creatinine Ratio 17.2 7.0 - 25.0    eGFR 21.8 (L) >60.0 mL/min/1.73   POC Glucose Once     Collection Time: 04/08/22  6:05 AM    Specimen: Blood   Result Value Ref Range    Glucose 126 70 - 130 mg/dL   POC Glucose Once    Collection Time: 04/08/22 11:46 AM    Specimen: Blood   Result Value Ref Range    Glucose 218 (H) 70 - 130 mg/dL       Radiology:  Imaging Results (Last 24 Hours)     ** No results found for the last 24 hours. **             Medications have been reviewed:  Current Facility-Administered Medications   Medication Dose Route Frequency Provider Last Rate Last Admin   • acetaminophen (TYLENOL) tablet 650 mg  650 mg Oral Q4H PRN Gary Moore APRN   650 mg at 04/08/22 0651   • amLODIPine (NORVASC) tablet 5 mg  5 mg Oral Q24H Vipin Tovar MD   5 mg at 04/08/22 0946   • apixaban (ELIQUIS) tablet 2.5 mg  2.5 mg Oral BID Vipin Tovar MD   2.5 mg at 04/08/22 0947   • atorvastatin (LIPITOR) tablet 40 mg  40 mg Oral Daily Vipin Tovar MD   40 mg at 04/08/22 0947   • dextrose (D50W) (25 g/50 mL) IV injection 25 g  25 g Intravenous Q15 Min PRN Vipin Tovar MD       • dextrose (GLUTOSE) oral gel 15 g  15 g Oral Q15 Min PRN Vipin Tovar MD       • Diclofenac Sodium (VOLTAREN) 1 % gel 1 g  1 g Topical 4x Daily Vipin Tovar MD   1 g at 04/08/22 1418   • furosemide (LASIX) tablet 40 mg  40 mg Oral BID Isaiah Foster MD   40 mg at 04/08/22 0946   • gabapentin (NEURONTIN) capsule 300 mg  300 mg Oral TID Vipin Tovar MD   300 mg at 04/08/22 0947   • glucagon (human recombinant) (GLUCAGEN DIAGNOSTIC) injection 1 mg  1 mg Intramuscular Q15 Min PRN Vipin Tovar MD       • insulin lispro (ADMELOG) injection 0-7 Units  0-7 Units Subcutaneous TID AC Vipin Tovar MD   3 Units at 04/08/22 1417   • melatonin tablet 3 mg  3 mg Oral Nightly PRN Gary Moore APRN       • metoprolol tartrate (LOPRESSOR) tablet 100 mg  100 mg Oral Daily Vipin Tovar MD   100 mg at 04/08/22 0948   • nitroglycerin (NITROSTAT) SL tablet 0.4 mg   0.4 mg Sublingual Q5 Min PRN Gary Moore APRN       • nystatin (MYCOSTATIN) powder   Topical Q12H Vipin Tovar MD   Given at 04/08/22 0949   • ondansetron (ZOFRAN) tablet 4 mg  4 mg Oral Q6H PRN Gary Moore APRN        Or   • ondansetron (ZOFRAN) injection 4 mg  4 mg Intravenous Q6H PRN Gary Moore APRN       • pantoprazole (PROTONIX) EC tablet 40 mg  40 mg Oral QAM Vipin Tovar MD   40 mg at 04/08/22 0643   • polycarbophil tablet 1,250 mg  1,250 mg Oral Daily Yady Leo MD   1,250 mg at 04/08/22 0948   • sennosides-docusate (PERICOLACE) 8.6-50 MG per tablet 2 tablet  2 tablet Oral BID Truman Grant MD   2 tablet at 04/08/22 1418   • sodium chloride 0.9 % flush 10 mL  10 mL Intravenous PRN Oscar Ellis MD   10 mL at 04/07/22 2115   • venlafaxine XR (EFFEXOR-XR) 24 hr capsule 75 mg  75 mg Oral Daily Vipin Tovar MD   75 mg at 04/08/22 0959       ASSESSMENT:  CKD stage IV with mild PERLA.  Baseline creatinine 2.0, improved    CHF (congestive heart failure), acute on chronic systolic    Chronic kidney disease, stage IV (severe) ([facility])    Symptomatic anemia    Anxiety associated with depression    Iron deficiency anemia    PAF (paroxysmal atrial fibrillation) ([facility])    Shortness of breath  Worsening metabolic alkalosis from diuretics      PLAN:   Renal function remains very stable with diuresis and near baseline  Volume status considerably improved  We will transition to oral Lasix tomorrow, weight down almost 35 pounds since admission    Monitor daily weights, strict I's and O's  Maintain dietary fluid and salt restriction while on diuretics     Continue to monitor electrolytes and volume closely  Discharge planning, await rehab bed      Isaiah Foster MD   [facility]   Office phone number: [phone]  Answering service phone number: [phone]    04/08/22  15:44 EDT    Dictation performed using Dragon dictation software

## 2022-04-08 NOTE — PROGRESS NOTES
Name: Lowell Min ADMIT: 4/3/2022   : 1940  PCP: Dannie Mathews MD    MRN: [id number] LOS: 5 days   AGE/SEX: 82 y.o. female  ROOM: [facility]   Subjective   Chief Complaint   Patient presents with   • Shortness of Breath   • Black or Bloody Stool     Dyspnea fair  On 1L oxygen  On lasix  +weakness- working with PT  Still only getting a few feet    ROS  No f/c  No n/v  No cp/palp  +soa/cough    Objective   Vital Signs  Temp:  [97.3 °F (36.3 °C)-97.7 °F (36.5 °C)] 97.4 °F (36.3 °C)  Heart Rate:  [55-84] 55  Resp:  [18] 18  BP: (113-136)/(67-75) 113/74  SpO2:  [95 %-98 %] 98 %  on  Flow (L/min):  [1] 1;   Device (Oxygen Therapy): nasal cannula  Body mass index is 33.69 kg/m².    Physical Exam  Constitutional:       General: She is not in acute distress.  HENT:      Head: Normocephalic and atraumatic.   Eyes:      General: No scleral icterus.  Cardiovascular:      Rate and Rhythm: Regular rhythm.      Heart sounds: Normal heart sounds.   Pulmonary:      Effort: No respiratory distress (decreased bs at bases).      Breath sounds: No rales.   Abdominal:      General: There is no distension.      Palpations: Abdomen is soft.   Musculoskeletal:      Cervical back: Neck supple.   Skin:     Coloration: Skin is pale.   Neurological:      Mental Status: She is alert.   Psychiatric:         Behavior: Behavior normal.     elderly, chronically ill    Results Review:       I reviewed the patient's new clinical results.  Results from last 7 days   Lab Units 22  0602 22  1103 22  0939 22  0044   WBC 10*3/mm3 6.14 6.83 5.61 5.00   HEMOGLOBIN g/dL 9.1* 8.0* 7.9* 7.3*   PLATELETS 10*3/mm3 262 190 181 178     Results from last 7 days   Lab Units 22  0455 22  0602 22  0640 22  0717   SODIUM mmol/L 140 139 138 139   POTASSIUM mmol/L 3.5 3.6 3.6 3.9   CHLORIDE mmol/L 93* 92* 94* 98   CO2 mmol/L 35.0* 36.1* 35.5* 29.1*   BUN mg/dL 38* 30* 26* 24*   CREATININE mg/dL 2.21* 2.10* 2.05*  2.00*   GLUCOSE mg/dL 91 109* 111* 125*   Estimated Creatinine Clearance: 23.5 mL/min (A) (by C-G formula based on SCr of 2.21 mg/dL (H)).  Results from last 7 days   Lab Units 04/08/22 0455 04/07/22  0602 04/06/22  0640 04/05/22  0717 04/04/22  1103 04/03/22  0044   ALBUMIN g/dL 3.60 3.40* 3.40* 3.40*   < > 3.10*   BILIRUBIN mg/dL  --   --   --   --   --  0.5   ALK PHOS U/L  --   --   --   --   --  144*   AST (SGOT) U/L  --   --   --   --   --  24   ALT (SGPT) U/L  --   --   --   --   --  6    < > = values in this interval not displayed.     Results from last 7 days   Lab Units 04/08/22 0455 04/07/22 0602 04/06/22  0640 04/05/22  0717   CALCIUM mg/dL 9.1 9.3 9.4 9.5   ALBUMIN g/dL 3.60 3.40* 3.40* 3.40*   PHOSPHORUS mg/dL 4.8* 5.1* 4.3 3.4         Coag   Results from last 7 days   Lab Units 04/03/22  0044   INR  2.14*   APTT seconds 33.2     HbA1C   Lab Results   Component Value Date    HGBA1C 6.30 (H) 04/05/2022    HGBA1C 6.8 (H) 12/29/2021     Infection     Radiology(recent) No radiology results for the last day  No results found for: TROPONINT, TROPONINI, BNP  No components found for: TSH;2    amLODIPine, 5 mg, Oral, Q24H  apixaban, 2.5 mg, Oral, BID  atorvastatin, 40 mg, Oral, Daily  Diclofenac Sodium, 1 g, Topical, 4x Daily  furosemide, 40 mg, Oral, BID  gabapentin, 300 mg, Oral, TID  insulin lispro, 0-7 Units, Subcutaneous, TID AC  metoprolol tartrate, 100 mg, Oral, Daily  nystatin, , Topical, Q12H  pantoprazole, 40 mg, Oral, QAM  calcium polycarbophil, 1,250 mg, Oral, Daily  senna-docusate sodium, 2 tablet, Oral, BID  venlafaxine XR, 75 mg, Oral, Daily       Diet Regular; Daily Fluid Restriction, Low Sodium, Consistent Carbohydrate; Other; 1,200      Assessment/Plan      Active Hospital Problems    Diagnosis  POA   • **CHF (congestive heart failure) (HCC) [I50.9]  Yes   • Symptomatic anemia [D64.9]  Yes   • Anxiety associated with depression [F41.8]  Yes   • Iron deficiency anemia [D50.9]  Yes   • PAF  "(paroxysmal atrial fibrillation) ([facility]) [I48.0]  Yes   • Shortness of breath [R06.02]  Yes   • Chronic kidney disease, stage IV (severe) ([facility]) [N18.4]  Yes      Resolved Hospital Problems   No resolved problems to display.     Diastolic CHF-acute on chronic: Continue diuresis as directed by renal  Severe pulmonary hypertension: Likely contributing to her edema.  Likely secondary to noncompliance with CPAP.  Pulmonary evaluation appreciated.  Patient follow-up as an outpatient.  Chronic kidney disease stage IV: Nephrology input appreciated.  Tolerating diuresis very nicely.  Paroxysmal atrial fibrillation: Continue rate control.  Eliquis resumed.    Blood on toilet paper: GI input appreciated.  No evidence of any further GI blood loss.    Eliquis resumed at 2.5 mg twice daily noting her age and renal function.  Anemia-severe iron deficiency, recurrent-anemia of chronic renal disease: Patient received an iron infusion prior to admission.  Give her IV iron 4/7 (scheduled as outpatient)  Diabetes type 2: Patient is not sure what \"pill\" she is taking for this. sliding scale insulin during hospitalization.  Morbid obesity:  Valvular heart disease: Moderate aortic valve stenosis, mild mitral valve regurgitation, mild mitral valve stenosis, tricuspid regurgitation.  Obstructive sleep apnea: Noncompliant with CPAP.  Pulmonary evaluation appreciated.  Patient encouraged to follow-up as an outpatient.    Left knee effusion:  Orthopedic evaluation greatly appreciated.  With her renal status and on anticoagulants, unfortunately she is not a candidate for oral NSAIDs.  Trial of Voltaren gel.  Improved and ambulation improving.        Dispo- likely subacute rehab when bed available      DW RN and other staff on multidisciplinary rounds      Truman Grant MD  [facility]  04/08/22  14:17 EDT  "

## 2022-04-08 NOTE — PLAN OF CARE
Goal Outcome Evaluation:         Pt A&O, 1L n/c, Afib controlled, Voltaren to bilat knees for pain control, symptoms improved.  Nystatin for bilat breast excoriation/redness.  Purewick for UOP, pt weight shifts, turns well but needs reminding to turn Q2 hrs.  VSS, will CTM.        Problem: Fall Injury Risk  Goal: Absence of Fall and Fall-Related Injury  Outcome: Ongoing, Progressing  Intervention: Identify and Manage Contributors  Description: Develop a fall prevention plan with the patient and caregiver/family.  Provide reorientation, appropriate sensory stimulation and routines with changes in mental status to decrease risk of fall.  Promote use of personal vision and auditory aids.  Assess assistance level required for safe and effective self-care; provide support as needed, such as toileting, mobilization. For age 65 and older, implement timed toileting with assistance.  Encourage physical activity, such as performance of mobility and self-care at highest level of patient ability, multicomponent exercise program and provision of appropriate assistive devices.  If fall occurs, assess the severity of injury; implement fall injury protocol. Determine the cause and revise fall injury prevention plan.  Regularly review medication contribution to fall risk; adjust medication administration times to minimize risk of falling.  Consider risk related to polypharmacy and age.  Balance adequate pain management with potential for oversedation.  Recent Flowsheet Documentation  Taken 4/8/2022 0400 by Genie Medellin, RN  Medication Review/Management:   medications reviewed   high-risk medications identified  Taken 4/8/2022 0200 by Genie Medellin, RN  Medication Review/Management:   medications reviewed   high-risk medications identified  Taken 4/8/2022 0000 by Genie Medellin, RN  Medication Review/Management:   medications reviewed   high-risk medications identified  Self-Care Promotion:   BADL personal objects within  reach   independence encouraged   safe use of adaptive equipment encouraged  Taken 4/7/2022 2200 by Genie Medellin RN  Medication Review/Management:   medications reviewed   high-risk medications identified  Taken 4/7/2022 2000 by Genie Medellin RN  Medication Review/Management: medications reviewed  Self-Care Promotion:   BADL personal objects within reach   independence encouraged   safe use of adaptive equipment encouraged  Intervention: Promote Injury-Free Environment  Description: Provide a safe, barrier-free environment that encourages independent activity.  Keep care area uncluttered and well-lighted.  Determine need for increased observation or monitoring.  Avoid use of devices that minimize mobility, such as restraints or indwelling urinary catheter.  Recent Flowsheet Documentation  Taken 4/8/2022 0400 by Genie Medellin RN  Safety Promotion/Fall Prevention: safety round/check completed  Taken 4/8/2022 0200 by Genie Medellin RN  Safety Promotion/Fall Prevention: safety round/check completed  Taken 4/8/2022 0000 by Genie Medellin RN  Safety Promotion/Fall Prevention: safety round/check completed  Taken 4/7/2022 2200 by Genie Medellin RN  Safety Promotion/Fall Prevention: safety round/check completed  Taken 4/7/2022 2000 by Genie Medellin RN  Safety Promotion/Fall Prevention: safety round/check completed     Problem: Adult Inpatient Plan of Care  Goal: Plan of Care Review  Outcome: Ongoing, Progressing  Goal: Optimal Comfort and Wellbeing  Outcome: Ongoing, Progressing  Intervention: Monitor Pain and Promote Comfort  Description: Assess pain level, treatment efficacy and patient response at regular intervals using a consistent pain scale.  Consider the presence and impact of preexisting chronic pain.  Encourage patient and caregiver involvement in pain assessment, interventions and safety measures.  Recent Flowsheet Documentation  Taken 4/7/2022 2000 by Genie Medellin, RN  Pain  Management Interventions:   care clustered   diversional activity provided   pillow support provided   position adjusted   quiet environment facilitated   see MAR  Intervention: Provide Person-Centered Care  Description: Use a family-focused approach to care.  Develop trust and rapport by proactively providing information, encouraging questions, addressing concerns and offering reassurance.  Acknowledge emotional response to hospitalization.  Recognize and utilize personal coping strategies.  Honor spiritual and cultural preferences.  Recent Flowsheet Documentation  Taken 4/8/2022 0000 by Genie Medellin RN  Trust Relationship/Rapport:   choices provided   emotional support provided   care explained   questions answered   thoughts/feelings acknowledged   reassurance provided   questions encouraged  Taken 4/7/2022 2000 by Genie Medellin, RN  Trust Relationship/Rapport:   care explained   choices provided   empathic listening provided   questions encouraged   questions answered   reassurance provided   thoughts/feelings acknowledged     Problem: Heart Failure Comorbidity  Goal: Maintenance of Heart Failure Symptom Control  Outcome: Ongoing, Progressing  Intervention: Maintain Heart Failure-Management  Description: Evaluate adherence to home heart failure self-care regimen (e.g., medication, fluid balance, sodium intake, daily weight, physical activity, telemonitoring, support).  Advocate continuation of home medication and schedule.  Consider pharmacologic therapy administration time and effects (e.g., avoid giving diuretic prior to bedtime or nitrates on empty stomach).  Monitor response to pharmacologic therapy, including weight fluctuations, blood pressure and electrolyte levels.  Monitor for signs and symptoms of anxiety and depression, including severity and duration; if present, provide psychosocial support.  Consider need for heart failure clinic or palliative care consult.  Recent Flowsheet  Documentation  Taken 4/8/2022 0400 by Genie Medellin RN  Medication Review/Management:   medications reviewed   high-risk medications identified  Taken 4/8/2022 0200 by Genie Medellin RN  Medication Review/Management:   medications reviewed   high-risk medications identified  Taken 4/8/2022 0000 by Genie Medellin RN  Medication Review/Management:   medications reviewed   high-risk medications identified  Taken 4/7/2022 2200 by Genie Medellin RN  Medication Review/Management:   medications reviewed   high-risk medications identified  Taken 4/7/2022 2000 by Genie Medellin RN  Medication Review/Management: medications reviewed     Problem: Hypertension Comorbidity  Goal: Blood Pressure in Desired Range  Outcome: Ongoing, Progressing  Intervention: Maintain Blood Pressure Management  Description: Evaluate adherence to home antihypertensive regimen (e.g., exercise and activity, diet modification, medication).  Provide scheduled antihypertensive medication; consider administration time and effects (e.g., avoid giving diuretic prior to bedtime).  Monitor response to antihypertensive medication therapy (e.g., blood pressure, electrolyte levels, medication effects).  Minimize risk of orthostatic hypotension; encourage caution with position changes, particularly if elderly.  Recent Flowsheet Documentation  Taken 4/8/2022 0400 by Genie Medellin RN  Medication Review/Management:   medications reviewed   high-risk medications identified  Taken 4/8/2022 0200 by Genie Medellin RN  Medication Review/Management:   medications reviewed   high-risk medications identified  Taken 4/8/2022 0000 by Genie Medellin RN  Syncope Management:   legs elevated   position changed slowly  Medication Review/Management:   medications reviewed   high-risk medications identified  Taken 4/7/2022 2200 by Genie Medellin RN  Medication Review/Management:   medications reviewed   high-risk medications identified  Taken 4/7/2022  2000 by Genie Medellin, RN  Syncope Management:   legs elevated   position changed slowly  Medication Review/Management: medications reviewed

## 2022-04-08 NOTE — PLAN OF CARE
Goal Outcome Evaluation:  Plan of Care Reviewed With: patient        Progress: improving  Outcome Evaluation: Pt continues to ambulate with assist x1 and walker; however, overall activity tolerance remains limited by pain and fatigue. Pt only able to ambulate short distance in room, would recommend DC to SNF as pt lives alone.

## 2022-04-08 NOTE — PLAN OF CARE
Problem: Fall Injury Risk  Goal: Absence of Fall and Fall-Related Injury  Outcome: Ongoing, Progressing  Intervention: Identify and Manage Contributors  Recent Flowsheet Documentation  Taken 4/8/2022 1600 by Mary Toledo RN  Medication Review/Management: medications reviewed  Taken 4/8/2022 1400 by Mary Toledo RN  Medication Review/Management:   medications reviewed   high-risk medications identified  Taken 4/8/2022 1200 by Mary Toledo RN  Medication Review/Management: medications reviewed  Taken 4/8/2022 1000 by Mary Toledo RN  Medication Review/Management: medications reviewed  Taken 4/8/2022 0800 by Mary Toledo RN  Medication Review/Management: medications reviewed  Intervention: Promote Injury-Free Environment  Recent Flowsheet Documentation  Taken 4/8/2022 1600 by Mary Toledo RN  Safety Promotion/Fall Prevention:   safety round/check completed   activity supervised   clutter free environment maintained   elopement precautions   fall prevention program maintained   lighting adjusted   assistive device/personal items within reach  Taken 4/8/2022 1400 by Mary Toledo RN  Safety Promotion/Fall Prevention:   safety round/check completed   activity supervised   assistive device/personal items within reach  Taken 4/8/2022 1200 by Mary Toledo RN  Safety Promotion/Fall Prevention: assistive device/personal items within reach  Taken 4/8/2022 1000 by Mary Toledo RN  Safety Promotion/Fall Prevention:   activity supervised   assistive device/personal items within reach   clutter free environment maintained   elopement precautions   fall prevention program maintained   gait belt   mobility aid in reach   lighting adjusted  Taken 4/8/2022 0800 by Mary Toledo RN  Safety Promotion/Fall Prevention: activity supervised     Problem: Adult Inpatient Plan of Care  Goal: Plan of Care Review  Outcome: Ongoing, Progressing  Goal: Patient-Specific Goal  (Individualized)  Outcome: Ongoing, Progressing  Goal: Absence of Hospital-Acquired Illness or Injury  Outcome: Ongoing, Progressing  Intervention: Identify and Manage Fall Risk  Recent Flowsheet Documentation  Taken 4/8/2022 1600 by Mary Toledo RN  Safety Promotion/Fall Prevention:   safety round/check completed   activity supervised   clutter free environment maintained   elopement precautions   fall prevention program maintained   lighting adjusted   assistive device/personal items within reach  Taken 4/8/2022 1400 by Mary Toledo RN  Safety Promotion/Fall Prevention:   safety round/check completed   activity supervised   assistive device/personal items within reach  Taken 4/8/2022 1200 by Mary Toledo RN  Safety Promotion/Fall Prevention: assistive device/personal items within reach  Taken 4/8/2022 1000 by Mary Toledo RN  Safety Promotion/Fall Prevention:   activity supervised   assistive device/personal items within reach   clutter free environment maintained   elopement precautions   fall prevention program maintained   gait belt   mobility aid in reach   lighting adjusted  Taken 4/8/2022 0800 by Mary Toledo RN  Safety Promotion/Fall Prevention: activity supervised  Intervention: Prevent Skin Injury  Recent Flowsheet Documentation  Taken 4/8/2022 1600 by Mary Toledo RN  Body Position: position changed independently  Taken 4/8/2022 1400 by Mary Toledo RN  Body Position: position changed independently  Taken 4/8/2022 1200 by Mary Toledo RN  Body Position: position changed independently  Taken 4/8/2022 1000 by aMry Toledo RN  Body Position: position changed independently  Taken 4/8/2022 0800 by Mary Toledo RN  Body Position: position changed independently  Skin Protection:   adhesive use limited   drying agents applied   incontinence pads utilized  Intervention: Prevent and Manage VTE (Venous Thromboembolism) Risk  Recent Flowsheet  Documentation  Taken 4/8/2022 1600 by Mary Toledo RN  Activity Management: activity adjusted per tolerance  Taken 4/8/2022 1400 by Mary Toledo RN  Activity Management: activity adjusted per tolerance  VTE Prevention/Management: (eliquis) other (see comments)  Taken 4/8/2022 1200 by Mary Toledo RN  Activity Management:   activity adjusted per tolerance   activity encouraged  Taken 4/8/2022 1000 by Mary Toledo RN  Activity Management: activity adjusted per tolerance  Taken 4/8/2022 0800 by Mary Toledo RN  Activity Management:   activity adjusted per tolerance   activity encouraged  VTE Prevention/Management: (eliquis) other (see comments)  Range of Motion: active ROM (range of motion) encouraged  Intervention: Prevent Infection  Recent Flowsheet Documentation  Taken 4/8/2022 1600 by Mary Toledo RN  Infection Prevention:   hand hygiene promoted   personal protective equipment utilized   rest/sleep promoted  Taken 4/8/2022 1400 by Mary Toledo RN  Infection Prevention:   environmental surveillance performed   hand hygiene promoted   equipment surfaces disinfected   personal protective equipment utilized  Taken 4/8/2022 1200 by Mary Toledo RN  Infection Prevention:   environmental surveillance performed   equipment surfaces disinfected   hand hygiene promoted   personal protective equipment utilized   single patient room provided   rest/sleep promoted  Taken 4/8/2022 1000 by Mary Toledo RN  Infection Prevention:   equipment surfaces disinfected   hand hygiene promoted   personal protective equipment utilized   cohorting utilized   environmental surveillance performed  Taken 4/8/2022 0800 by Mary Toledo RN  Infection Prevention: environmental surveillance performed  Goal: Optimal Comfort and Wellbeing  Outcome: Ongoing, Progressing  Intervention: Provide Person-Centered Care  Recent Flowsheet Documentation  Taken 4/8/2022 0800 by Mary Toledo  RN  Trust Relationship/Rapport:   care explained   choices provided   empathic listening provided  Goal: Readiness for Transition of Care  Outcome: Ongoing, Progressing     Problem: Diabetes Comorbidity  Goal: Blood Glucose Level Within Targeted Range  Outcome: Ongoing, Progressing  Intervention: Monitor and Manage Glycemia  Recent Flowsheet Documentation  Taken 4/8/2022 0800 by Mary Toledo RN  Glycemic Management: blood glucose monitored     Problem: Heart Failure Comorbidity  Goal: Maintenance of Heart Failure Symptom Control  Outcome: Ongoing, Progressing  Intervention: Maintain Heart Failure-Management  Recent Flowsheet Documentation  Taken 4/8/2022 1600 by Mary Toledo RN  Medication Review/Management: medications reviewed  Taken 4/8/2022 1400 by Mary Toledo RN  Medication Review/Management:   medications reviewed   high-risk medications identified  Taken 4/8/2022 1200 by Mary Toledo RN  Medication Review/Management: medications reviewed  Taken 4/8/2022 1000 by Mary Toledo RN  Medication Review/Management: medications reviewed  Taken 4/8/2022 0800 by Mary Toledo RN  Medication Review/Management: medications reviewed     Problem: Hypertension Comorbidity  Goal: Blood Pressure in Desired Range  Outcome: Ongoing, Progressing  Intervention: Maintain Blood Pressure Management  Recent Flowsheet Documentation  Taken 4/8/2022 1600 by Mary Toledo RN  Medication Review/Management: medications reviewed  Taken 4/8/2022 1400 by Mary Toledo RN  Medication Review/Management:   medications reviewed   high-risk medications identified  Taken 4/8/2022 1200 by Mary Toledo RN  Medication Review/Management: medications reviewed  Taken 4/8/2022 1000 by Mary Toledo RN  Medication Review/Management: medications reviewed  Taken 4/8/2022 0800 by Mary Toledo RN  Medication Review/Management: medications reviewed   Goal Outcome Evaluation:    Pt improving today.  However, she is still very weak. Rehab would benefit patient at her current state. CCP and MD aware of this. VSS, pt ambulated with PT today and tolerated well.

## 2022-04-08 NOTE — CASE MANAGEMENT/SOCIAL WORK
Continued Stay Note  [facility]     Patient Name: Lowell Min  MRN: [id number]  Today's Date: 4/8/2022    Admit Date: 4/3/2022     Discharge Plan     Row Name 04/08/22 1436       Plan    Plan Plan home with family support or skilled care at accepting facility.  JONNIE Avery RN    Patient/Family in Agreement with Plan yes    Plan Comments Spoke with pt at bedside.  Informed pt per Alina  ( [phone]) [facility] does not have a bed at present.  Bed availability is very tight and there may be something available the first of next week.  Plan home with family support or skilled care at accepting facility.  JONNIE Avery RN               Discharge Codes    No documentation.               Expected Discharge Date and Time     Expected Discharge Date Expected Discharge Time    Apr 8, 2022             Brandi Avery, RN

## 2022-04-09 LAB
ALBUMIN SERPL-MCNC: 3.6 G/DL (ref 3.5–5.2)
ANION GAP SERPL CALCULATED.3IONS-SCNC: 11 MMOL/L (ref 5–15)
BUN SERPL-MCNC: 43 MG/DL (ref 8–23)
BUN/CREAT SERPL: 18.9 (ref 7–25)
CALCIUM SPEC-SCNC: 9.4 MG/DL (ref 8.6–10.5)
CHLORIDE SERPL-SCNC: 93 MMOL/L (ref 98–107)
CO2 SERPL-SCNC: 35 MMOL/L (ref 22–29)
CREAT SERPL-MCNC: 2.28 MG/DL (ref 0.57–1)
EGFRCR SERPLBLD CKD-EPI 2021: 21 ML/MIN/1.73
GLUCOSE BLDC GLUCOMTR-MCNC: 118 MG/DL (ref 70–130)
GLUCOSE BLDC GLUCOMTR-MCNC: 150 MG/DL (ref 70–130)
GLUCOSE BLDC GLUCOMTR-MCNC: 156 MG/DL (ref 70–130)
GLUCOSE BLDC GLUCOMTR-MCNC: 192 MG/DL (ref 70–130)
GLUCOSE SERPL-MCNC: 113 MG/DL (ref 65–99)
PHOSPHATE SERPL-MCNC: 4.5 MG/DL (ref 2.5–4.5)
POTASSIUM SERPL-SCNC: 3.8 MMOL/L (ref 3.5–5.2)
SODIUM SERPL-SCNC: 139 MMOL/L (ref 136–145)

## 2022-04-09 PROCEDURE — 82962 GLUCOSE BLOOD TEST: CPT

## 2022-04-09 PROCEDURE — 63710000001 INSULIN LISPRO (HUMAN) PER 5 UNITS: Performed by: HOSPITALIST

## 2022-04-09 PROCEDURE — 80069 RENAL FUNCTION PANEL: CPT | Performed by: INTERNAL MEDICINE

## 2022-04-09 RX ORDER — GABAPENTIN 100 MG/1
100 CAPSULE ORAL 3 TIMES DAILY
Status: DISCONTINUED | OUTPATIENT
Start: 2022-04-09 | End: 2022-04-12 | Stop reason: HOSPADM

## 2022-04-09 RX ORDER — METOPROLOL TARTRATE 50 MG/1
50 TABLET, FILM COATED ORAL DAILY
Status: DISCONTINUED | OUTPATIENT
Start: 2022-04-10 | End: 2022-04-12 | Stop reason: HOSPADM

## 2022-04-09 RX ADMIN — ACETAMINOPHEN 650 MG: 325 TABLET ORAL at 06:12

## 2022-04-09 RX ADMIN — VENLAFAXINE HYDROCHLORIDE 75 MG: 75 CAPSULE, EXTENDED RELEASE ORAL at 08:46

## 2022-04-09 RX ADMIN — NYSTATIN 1 APPLICATION: 100000 POWDER TOPICAL at 21:06

## 2022-04-09 RX ADMIN — DICLOFENAC SODIUM 1 G: 10 GEL TOPICAL at 08:48

## 2022-04-09 RX ADMIN — GABAPENTIN 100 MG: 100 CAPSULE ORAL at 17:14

## 2022-04-09 RX ADMIN — FUROSEMIDE 40 MG: 40 TABLET ORAL at 17:14

## 2022-04-09 RX ADMIN — DICLOFENAC SODIUM 1 G: 10 GEL TOPICAL at 12:41

## 2022-04-09 RX ADMIN — ACETAMINOPHEN 650 MG: 325 TABLET ORAL at 21:03

## 2022-04-09 RX ADMIN — INSULIN LISPRO 2 UNITS: 100 INJECTION, SOLUTION INTRAVENOUS; SUBCUTANEOUS at 17:14

## 2022-04-09 RX ADMIN — DOCUSATE SODIUM 50MG AND SENNOSIDES 8.6MG 2 TABLET: 8.6; 5 TABLET, FILM COATED ORAL at 08:46

## 2022-04-09 RX ADMIN — GABAPENTIN 100 MG: 100 CAPSULE ORAL at 21:04

## 2022-04-09 RX ADMIN — NYSTATIN: 100000 POWDER TOPICAL at 08:48

## 2022-04-09 RX ADMIN — DOCUSATE SODIUM 50MG AND SENNOSIDES 8.6MG 2 TABLET: 8.6; 5 TABLET, FILM COATED ORAL at 21:04

## 2022-04-09 RX ADMIN — FUROSEMIDE 40 MG: 40 TABLET ORAL at 08:46

## 2022-04-09 RX ADMIN — DICLOFENAC SODIUM 1 G: 10 GEL TOPICAL at 17:15

## 2022-04-09 RX ADMIN — APIXABAN 2.5 MG: 2.5 TABLET, FILM COATED ORAL at 21:04

## 2022-04-09 RX ADMIN — Medication 10 ML: at 08:47

## 2022-04-09 RX ADMIN — ATORVASTATIN CALCIUM 40 MG: 20 TABLET, FILM COATED ORAL at 08:46

## 2022-04-09 RX ADMIN — GABAPENTIN 300 MG: 300 CAPSULE ORAL at 08:46

## 2022-04-09 RX ADMIN — CALCIUM POLYCARBOPHIL 1250 MG: 625 TABLET, FILM COATED ORAL at 08:46

## 2022-04-09 RX ADMIN — PANTOPRAZOLE SODIUM 40 MG: 40 TABLET, DELAYED RELEASE ORAL at 06:12

## 2022-04-09 RX ADMIN — APIXABAN 2.5 MG: 2.5 TABLET, FILM COATED ORAL at 08:46

## 2022-04-09 RX ADMIN — AMLODIPINE BESYLATE 5 MG: 5 TABLET ORAL at 08:46

## 2022-04-09 RX ADMIN — INSULIN LISPRO 2 UNITS: 100 INJECTION, SOLUTION INTRAVENOUS; SUBCUTANEOUS at 12:41

## 2022-04-09 RX ADMIN — METOPROLOL TARTRATE 100 MG: 50 TABLET, FILM COATED ORAL at 08:47

## 2022-04-09 RX ADMIN — Medication 10 ML: at 21:04

## 2022-04-09 RX ADMIN — DICLOFENAC SODIUM 1 G: 10 GEL TOPICAL at 21:06

## 2022-04-09 NOTE — PROGRESS NOTES
Name: Lowell Min ADMIT: 4/3/2022   : 1940  PCP: Dannie Mathews MD    MRN: [id number] LOS: 6 days   AGE/SEX: 82 y.o. female  ROOM: [facility]   Subjective   Chief Complaint   Patient presents with   • Shortness of Breath   • Black or Bloody Stool     Dyspnea fair  On 1L oxygen  On lasix- oral now  +weakness- working with PT  Not ambulating very far    ROS  No f/c  No n/v  No cp/palp  +soa/cough    Objective   Vital Signs  Temp:  [97.3 °F (36.3 °C)-97.9 °F (36.6 °C)] 97.9 °F (36.6 °C)  Heart Rate:  [55-68] 68  Resp:  [16-18] 16  BP: (113-134)/(74-79) 134/79  SpO2:  [94 %-98 %] 94 %  on  Flow (L/min):  [1] 1;   Device (Oxygen Therapy): room air  Body mass index is 33.64 kg/m².    Physical Exam  Constitutional:       General: She is not in acute distress.  HENT:      Head: Normocephalic and atraumatic.   Eyes:      General: No scleral icterus.  Cardiovascular:      Rate and Rhythm: Regular rhythm.      Heart sounds: Normal heart sounds.   Pulmonary:      Effort: No respiratory distress (decreased bs at bases).      Breath sounds: No rales.   Abdominal:      General: There is no distension.      Palpations: Abdomen is soft.   Musculoskeletal:      Cervical back: Neck supple.   Skin:     Coloration: Skin is pale.   Neurological:      Mental Status: She is alert.   Psychiatric:         Behavior: Behavior normal.     elderly, chronically ill  Trace LE edema    Results Review:       I reviewed the patient's new clinical results.  Results from last 7 days   Lab Units 22  0602 22  1103 22  0939 22  0044   WBC 10*3/mm3 6.14 6.83 5.61 5.00   HEMOGLOBIN g/dL 9.1* 8.0* 7.9* 7.3*   PLATELETS 10*3/mm3 262 190 181 178     Results from last 7 days   Lab Units 22  0749 22  0455 22  0602 22  0640   SODIUM mmol/L 139 140 139 138   POTASSIUM mmol/L 3.8 3.5 3.6 3.6   CHLORIDE mmol/L 93* 93* 92* 94*   CO2 mmol/L 35.0* 35.0* 36.1* 35.5*   BUN mg/dL 43* 38* 30* 26*   CREATININE mg/dL  2.28* 2.21* 2.10* 2.05*   GLUCOSE mg/dL 113* 91 109* 111*   Estimated Creatinine Clearance: 22.8 mL/min (A) (by C-G formula based on SCr of 2.28 mg/dL (H)).  Results from last 7 days   Lab Units 04/09/22  0749 04/08/22  0455 04/07/22  0602 04/06/22  0640 04/04/22  1103 04/03/22  0044   ALBUMIN g/dL 3.60 3.60 3.40* 3.40*   < > 3.10*   BILIRUBIN mg/dL  --   --   --   --   --  0.5   ALK PHOS U/L  --   --   --   --   --  144*   AST (SGOT) U/L  --   --   --   --   --  24   ALT (SGPT) U/L  --   --   --   --   --  6    < > = values in this interval not displayed.     Results from last 7 days   Lab Units 04/09/22  0749 04/08/22  0455 04/07/22  0602 04/06/22  0640   CALCIUM mg/dL 9.4 9.1 9.3 9.4   ALBUMIN g/dL 3.60 3.60 3.40* 3.40*   PHOSPHORUS mg/dL 4.5 4.8* 5.1* 4.3         Coag   Results from last 7 days   Lab Units 04/03/22  0044   INR  2.14*   APTT seconds 33.2     HbA1C   Lab Results   Component Value Date    HGBA1C 6.30 (H) 04/05/2022    HGBA1C 6.8 (H) 12/29/2021     Infection     Radiology(recent) No radiology results for the last day  No results found for: TROPONINT, TROPONINI, BNP  No components found for: TSH;2    amLODIPine, 5 mg, Oral, Q24H  apixaban, 2.5 mg, Oral, BID  atorvastatin, 40 mg, Oral, Daily  Diclofenac Sodium, 1 g, Topical, 4x Daily  furosemide, 40 mg, Oral, BID  gabapentin, 300 mg, Oral, TID  insulin lispro, 0-7 Units, Subcutaneous, TID AC  [START ON 4/10/2022] metoprolol tartrate, 50 mg, Oral, Daily  nystatin, , Topical, Q12H  pantoprazole, 40 mg, Oral, QAM  calcium polycarbophil, 1,250 mg, Oral, Daily  senna-docusate sodium, 2 tablet, Oral, BID  venlafaxine XR, 75 mg, Oral, Daily       Diet Regular; Daily Fluid Restriction, Low Sodium, Consistent Carbohydrate; Other; 1,200      Assessment/Plan      Active Hospital Problems    Diagnosis  POA   • **CHF (congestive heart failure) (HCC) [I50.9]  Yes   • Symptomatic anemia [D64.9]  Yes   • Anxiety associated with depression [F41.8]  Yes   • Iron  "deficiency anemia [D50.9]  Yes   • PAF (paroxysmal atrial fibrillation) ([facility]) [I48.0]  Yes   • Shortness of breath [R06.02]  Yes   • Chronic kidney disease, stage IV (severe) ([facility]) [N18.4]  Yes      Resolved Hospital Problems   No resolved problems to display.     Diastolic CHF-acute on chronic: Continue diuresis as directed by renal  Severe pulmonary hypertension: Likely contributing to her edema.  Likely secondary to noncompliance with CPAP.  Pulmonary evaluation appreciated.  Patient follow-up as an outpatient.  Chronic kidney disease stage IV: Nephrology input appreciated.  Tolerating diuresis very nicely.  Paroxysmal atrial fibrillation:rate control.  Eliquis resumed.  metoprolol adjusted  Blood on toilet paper: GI input appreciated.  No evidence of any further GI blood loss.    Eliquis resumed at 2.5 mg twice daily noting her age and renal function.  Anemia-severe iron deficiency, recurrent-anemia of chronic renal disease: Patient received an iron infusion prior to admission.  Give her IV iron 4/7 (scheduled as outpatient)  Diabetes type 2: Patient is not sure what \"pill\" she is taking for this. sliding scale insulin during hospitalization.  Morbid obesity:  Valvular heart disease: Moderate aortic valve stenosis, mild mitral valve regurgitation, mild mitral valve stenosis, tricuspid regurgitation.  Obstructive sleep apnea: Noncompliant with CPAP.  Pulmonary evaluation appreciated.  Patient encouraged to follow-up as an outpatient.    Left knee effusion:  Orthopedic evaluation greatly appreciated.  With her renal status and on anticoagulants, unfortunately she is not a candidate for oral NSAIDs.  Trial of Voltaren gel.  Improved and ambulation improving.      Dispo- likely subacute rehab when bed available  Doing well, thanks to Nephrology and other staff. Likely not strong enough for home and needs rehab.       Truman Grant MD  [facility]  04/09/22  14:17 EDT  "

## 2022-04-09 NOTE — PLAN OF CARE
Problem: Fall Injury Risk  Goal: Absence of Fall and Fall-Related Injury  Outcome: Ongoing, Progressing  Intervention: Identify and Manage Contributors  Recent Flowsheet Documentation  Taken 4/9/2022 1415 by Mindi Mayorga RN  Medication Review/Management:   medications reviewed   high-risk medications identified  Taken 4/9/2022 0830 by Mindi Mayorga RN  Medication Review/Management:   medications reviewed   high-risk medications identified  Intervention: Promote Injury-Free Environment  Recent Flowsheet Documentation  Taken 4/9/2022 1800 by Mindi Mayorga RN  Safety Promotion/Fall Prevention:   assistive device/personal items within reach   clutter free environment maintained   fall prevention program maintained   nonskid shoes/slippers when out of bed   safety round/check completed   room organization consistent  Taken 4/9/2022 1600 by Mindi Mayorga RN  Safety Promotion/Fall Prevention:   assistive device/personal items within reach   clutter free environment maintained   fall prevention program maintained   safety round/check completed   room organization consistent   nonskid shoes/slippers when out of bed  Taken 4/9/2022 1415 by Mindi Mayorga RN  Safety Promotion/Fall Prevention:   assistive device/personal items within reach   clutter free environment maintained   fall prevention program maintained   nonskid shoes/slippers when out of bed   room organization consistent   safety round/check completed  Taken 4/9/2022 1200 by Mindi Mayorga RN  Safety Promotion/Fall Prevention:   assistive device/personal items within reach   clutter free environment maintained   fall prevention program maintained   nonskid shoes/slippers when out of bed   room organization consistent   safety round/check completed  Taken 4/9/2022 1000 by Mindi Mayorga RN  Safety Promotion/Fall Prevention:   assistive device/personal items within reach   clutter free environment maintained   fall prevention program  maintained   nonskid shoes/slippers when out of bed   room organization consistent   safety round/check completed  Taken 4/9/2022 0830 by Mindi Mayorga, RN  Safety Promotion/Fall Prevention:   activity supervised   assistive device/personal items within reach   clutter free environment maintained   fall prevention program maintained   nonskid shoes/slippers when out of bed   room organization consistent   safety round/check completed     Problem: Adult Inpatient Plan of Care  Goal: Plan of Care Review  Outcome: Ongoing, Progressing  Flowsheets (Taken 4/9/2022 1821)  Progress: no change  Plan of Care Reviewed With: patient  Outcome Evaluation: Pt very drowsy today, neurontin decreased.  Pt up to chair this afternoon.  Plan for rehab.  Off O2 when awake.  Will CTM  Goal: Patient-Specific Goal (Individualized)  Outcome: Ongoing, Progressing  Goal: Absence of Hospital-Acquired Illness or Injury  Outcome: Ongoing, Progressing  Intervention: Identify and Manage Fall Risk  Recent Flowsheet Documentation  Taken 4/9/2022 1800 by Minid Mayorga, RN  Safety Promotion/Fall Prevention:   assistive device/personal items within reach   clutter free environment maintained   fall prevention program maintained   nonskid shoes/slippers when out of bed   safety round/check completed   room organization consistent  Taken 4/9/2022 1600 by Mindi Mayorga, RN  Safety Promotion/Fall Prevention:   assistive device/personal items within reach   clutter free environment maintained   fall prevention program maintained   safety round/check completed   room organization consistent   nonskid shoes/slippers when out of bed  Taken 4/9/2022 1415 by Mindi Mayorga, RN  Safety Promotion/Fall Prevention:   assistive device/personal items within reach   clutter free environment maintained   fall prevention program maintained   nonskid shoes/slippers when out of bed   room organization consistent   safety round/check completed  Taken 4/9/2022  1200 by Mukesh, Mindi, RN  Safety Promotion/Fall Prevention:   assistive device/personal items within reach   clutter free environment maintained   fall prevention program maintained   nonskid shoes/slippers when out of bed   room organization consistent   safety round/check completed  Taken 4/9/2022 1000 by Mindi Mayorga RN  Safety Promotion/Fall Prevention:   assistive device/personal items within reach   clutter free environment maintained   fall prevention program maintained   nonskid shoes/slippers when out of bed   room organization consistent   safety round/check completed  Taken 4/9/2022 0830 by Mindi Mayorga RN  Safety Promotion/Fall Prevention:   activity supervised   assistive device/personal items within reach   clutter free environment maintained   fall prevention program maintained   nonskid shoes/slippers when out of bed   room organization consistent   safety round/check completed  Intervention: Prevent Skin Injury  Recent Flowsheet Documentation  Taken 4/9/2022 1800 by Mindi Mayorga RN  Body Position: (up in chair) --  Taken 4/9/2022 1415 by Mindi Mayorga RN  Body Position:   weight shifting   sitting up in bed  Taken 4/9/2022 1200 by Mindi Mayorga RN  Body Position: sitting up in bed  Taken 4/9/2022 1000 by Mindi Mayorga RN  Body Position: sitting up in bed  Taken 4/9/2022 0830 by Mindi Mayorga RN  Body Position: weight shifting  Intervention: Prevent and Manage VTE (Venous Thromboembolism) Risk  Recent Flowsheet Documentation  Taken 4/9/2022 1800 by Mindi Mayorga RN  Activity Management: activity encouraged  Taken 4/9/2022 1600 by Mindi Mayorga RN  Activity Management:   activity encouraged   up in chair  Taken 4/9/2022 1415 by Mindi Mayorga RN  Activity Management: activity adjusted per tolerance  VTE Prevention/Management:   bilateral   dorsiflexion/plantar flexion performed  Taken 4/9/2022 1200 by Mindi Mayorga RN  Activity Management:   activity  adjusted per tolerance   activity encouraged  Taken 4/9/2022 1000 by Mindi Mayorga RN  Activity Management: activity adjusted per tolerance  Taken 4/9/2022 0830 by Mindi Mayorga RN  Activity Management: activity encouraged  VTE Prevention/Management: (eliquis and ambulation)   bilateral   dorsiflexion/plantar flexion performed  Range of Motion: active ROM (range of motion) encouraged  Intervention: Prevent Infection  Recent Flowsheet Documentation  Taken 4/9/2022 1415 by Mindi Mayorga RN  Infection Prevention:   personal protective equipment utilized   hand hygiene promoted  Taken 4/9/2022 0830 by Mindi Mayorga RN  Infection Prevention:   personal protective equipment utilized   hand hygiene promoted  Goal: Optimal Comfort and Wellbeing  Outcome: Ongoing, Progressing  Intervention: Provide Person-Centered Care  Recent Flowsheet Documentation  Taken 4/9/2022 1415 by Mindi Mayorga RN  Trust Relationship/Rapport:   care explained   choices provided   questions answered   questions encouraged   reassurance provided   thoughts/feelings acknowledged  Taken 4/9/2022 0830 by Mindi Mayorga RN  Trust Relationship/Rapport:   care explained   choices provided   questions encouraged   questions answered   reassurance provided   thoughts/feelings acknowledged  Goal: Readiness for Transition of Care  Outcome: Ongoing, Progressing     Problem: Diabetes Comorbidity  Goal: Blood Glucose Level Within Targeted Range  Outcome: Ongoing, Progressing     Problem: Heart Failure Comorbidity  Goal: Maintenance of Heart Failure Symptom Control  Outcome: Ongoing, Progressing  Intervention: Maintain Heart Failure-Management  Recent Flowsheet Documentation  Taken 4/9/2022 1415 by Mindi Mayorga RN  Medication Review/Management:   medications reviewed   high-risk medications identified  Taken 4/9/2022 0830 by Mindi Mayorga RN  Medication Review/Management:   medications reviewed   high-risk medications identified      Problem: Hypertension Comorbidity  Goal: Blood Pressure in Desired Range  Outcome: Ongoing, Progressing  Intervention: Maintain Blood Pressure Management  Recent Flowsheet Documentation  Taken 4/9/2022 1415 by Mindi Mayorga, RN  Medication Review/Management:   medications reviewed   high-risk medications identified  Taken 4/9/2022 0830 by Mindi Mayorga, RN  Medication Review/Management:   medications reviewed   high-risk medications identified   Goal Outcome Evaluation:  Plan of Care Reviewed With: patient        Progress: no change  Outcome Evaluation: Pt very drowsy today, neurontin decreased.  Pt up to chair this afternoon.  Plan for rehab.  Off O2 when awake.  Will CTM

## 2022-04-09 NOTE — PLAN OF CARE
Goal Outcome Evaluation:         Pt is A&O, Afib and can become bradycardic at rest, can be R/A until HS then has apnea requiring 1L n/c to maintain O2 sats in 90's%.  Q2hr turn.  Pt sat up in chair on dayshift for a little while, no BM yet since 04/04, prune juice given to aid in bowel motility, dayshift RN gave stool softener per order.  No BM yet.  Will CTM.        Problem: Fall Injury Risk  Goal: Absence of Fall and Fall-Related Injury  Outcome: Ongoing, Progressing  Intervention: Identify and Manage Contributors  Description: Develop a fall prevention plan with the patient and caregiver/family.  Provide reorientation, appropriate sensory stimulation and routines with changes in mental status to decrease risk of fall.  Promote use of personal vision and auditory aids.  Assess assistance level required for safe and effective self-care; provide support as needed, such as toileting, mobilization. For age 65 and older, implement timed toileting with assistance.  Encourage physical activity, such as performance of mobility and self-care at highest level of patient ability, multicomponent exercise program and provision of appropriate assistive devices.  If fall occurs, assess the severity of injury; implement fall injury protocol. Determine the cause and revise fall injury prevention plan.  Regularly review medication contribution to fall risk; adjust medication administration times to minimize risk of falling.  Consider risk related to polypharmacy and age.  Balance adequate pain management with potential for oversedation.  Recent Flowsheet Documentation  Taken 4/9/2022 0400 by Genie Medellin, RN  Medication Review/Management:  • medications reviewed  • high-risk medications identified  Taken 4/9/2022 0200 by Genie Medellin, RN  Medication Review/Management:  • medications reviewed  • high-risk medications identified  Taken 4/9/2022 0000 by Genie Medellin, RN  Medication Review/Management:  • medications  reviewed  • high-risk medications identified  Self-Care Promotion:  • independence encouraged  • BADL personal objects within reach  • safe use of adaptive equipment encouraged  Taken 4/8/2022 2200 by Genie Medellin RN  Medication Review/Management:  • medications reviewed  • high-risk medications identified  Taken 4/8/2022 2000 by Genie Medellin RN  Medication Review/Management:  • medications reviewed  • high-risk medications identified  Self-Care Promotion:  • BADL personal objects within reach  • independence encouraged  • safe use of adaptive equipment encouraged  Intervention: Promote Injury-Free Environment  Description: Provide a safe, barrier-free environment that encourages independent activity.  Keep care area uncluttered and well-lighted.  Determine need for increased observation or monitoring.  Avoid use of devices that minimize mobility, such as restraints or indwelling urinary catheter.  Recent Flowsheet Documentation  Taken 4/9/2022 0400 by Genie Medellin RN  Safety Promotion/Fall Prevention: safety round/check completed  Taken 4/9/2022 0200 by Genie Medellin RN  Safety Promotion/Fall Prevention: safety round/check completed  Taken 4/9/2022 0000 by Genie Medellin RN  Safety Promotion/Fall Prevention: safety round/check completed  Taken 4/8/2022 2200 by Genie Medellin RN  Safety Promotion/Fall Prevention: safety round/check completed  Taken 4/8/2022 2000 by Genie Medellin RN  Safety Promotion/Fall Prevention: safety round/check completed     Problem: Adult Inpatient Plan of Care  Goal: Optimal Comfort and Wellbeing  Outcome: Ongoing, Progressing  Intervention: Monitor Pain and Promote Comfort  Description: Assess pain level, treatment efficacy and patient response at regular intervals using a consistent pain scale.  Consider the presence and impact of preexisting chronic pain.  Encourage patient and caregiver involvement in pain assessment, interventions and safety  measures.  Recent Flowsheet Documentation  Taken 4/8/2022 2000 by Genie Medellin, RN  Pain Management Interventions:  • care clustered  • diversional activity provided  • pillow support provided  • position adjusted  • quiet environment facilitated  • see MAR  Intervention: Provide Person-Centered Care  Description: Use a family-focused approach to care.  Develop trust and rapport by proactively providing information, encouraging questions, addressing concerns and offering reassurance.  Acknowledge emotional response to hospitalization.  Recognize and utilize personal coping strategies.  Honor spiritual and cultural preferences.  Recent Flowsheet Documentation  Taken 4/9/2022 0000 by Genie Medellin, RN  Trust Relationship/Rapport:  • care explained  • choices provided  • empathic listening provided  • questions answered  • thoughts/feelings acknowledged  • reassurance provided  • questions encouraged  Taken 4/8/2022 2000 by Genie Medellin RN  Trust Relationship/Rapport:  • care explained  • choices provided  • questions answered  • empathic listening provided  • thoughts/feelings acknowledged  • reassurance provided  • questions encouraged  Goal: Readiness for Transition of Care  Outcome: Ongoing, Progressing     Problem: Hypertension Comorbidity  Goal: Blood Pressure in Desired Range  Outcome: Ongoing, Progressing  Intervention: Maintain Blood Pressure Management  Description: Evaluate adherence to home antihypertensive regimen (e.g., exercise and activity, diet modification, medication).  Provide scheduled antihypertensive medication; consider administration time and effects (e.g., avoid giving diuretic prior to bedtime).  Monitor response to antihypertensive medication therapy (e.g., blood pressure, electrolyte levels, medication effects).  Minimize risk of orthostatic hypotension; encourage caution with position changes, particularly if elderly.  Recent Flowsheet Documentation  Taken 4/9/2022 0400 by  Genie Medellin, RN  Medication Review/Management:  • medications reviewed  • high-risk medications identified  Taken 4/9/2022 0200 by Genie Medellin, RN  Medication Review/Management:  • medications reviewed  • high-risk medications identified  Taken 4/9/2022 0000 by Genie Medellin, RN  Syncope Management:  • legs elevated  • position changed slowly  Medication Review/Management:  • medications reviewed  • high-risk medications identified  Taken 4/8/2022 2200 by Genie Medellin, RN  Medication Review/Management:  • medications reviewed  • high-risk medications identified  Taken 4/8/2022 2000 by Genie Medellin, RN  Syncope Management:  • legs elevated  • position changed slowly  Medication Review/Management:  • medications reviewed  • high-risk medications identified

## 2022-04-09 NOTE — NURSING NOTE
Pt's daughter is taking the scales back with her for pt per pt's request since she is going to rehab soon.

## 2022-04-09 NOTE — PROGRESS NOTES
"   LOS: 6 days     Chief Complaint/ Reason for encounter: CKD, diuretic management  Chief Complaint   Patient presents with   • Shortness of Breath   • Black or Bloody Stool         Subjective   04/04/22 : No complaints, feels better  Weight unchanged but feels less swollen  No shortness of breath or chest pain  Good appetite no nausea or vomiting  No additional hematochezia, eventual colonoscopy planned    4/6.  Feels well, edema considerably better and her weight is down about 31 pounds since the third  Denies any fevers or chills, shortness of breath or chest pain  Good appetite with no nausea  Still very weak but was able to get up and sit in the chair today    4/8 resting, seems to be doing well with no new complaints or events noted.  Weight now down 34 pounds since the third    4/9 no new complaints, just waiting on a nursing home bed at this time    Medical history reviewed:  Shortness of Breath        Subjective:  Symptoms:  She reports shortness of breath.        History taken from: Patient and chart    Vital Signs  Temp:  [97.3 °F (36.3 °C)-97.9 °F (36.6 °C)] 97.9 °F (36.6 °C)  Heart Rate:  [55-68] 68  Resp:  [16-18] 16  BP: (113-134)/(74-79) 134/79       Wt Readings from Last 1 Encounters:   04/09/22 0610 97.4 kg (214 lb 12.8 oz)   04/08/22 0410 97.6 kg (215 lb 1.6 oz)   04/07/22 0503 98.4 kg (217 lb)   04/06/22 0559 98.9 kg (218 lb)   04/05/22 0615 105 kg (232 lb 1.6 oz)   04/04/22 1150 111 kg (245 lb)   04/04/22 0526 112 kg (245 lb 14.4 oz)   04/03/22 0320 113 kg (249 lb)   04/03/22 0209 95.3 kg (210 lb)       Objective:  Vital signs: (most recent): Blood pressure 134/79, pulse 68, temperature 97.9 °F (36.6 °C), temperature source Oral, resp. rate 16, height 170.2 cm (67\"), weight 97.4 kg (214 lb 12.8 oz), SpO2 94 %.              Objective:  General Appearance:  Comfortable, well-appearing, in no acute distress and not in pain.  Awake, alert, oriented  HEENT: Mucous membranes moist, no injury, " oropharynx clear  Lungs:  Normal effort and normal respiratory rate.  Breath sounds clear to auscultation.  No  respiratory distress.  No rales, decreased breath sounds or rhonchi.    Heart: Normal rate.  Regular rhythm.  S1 normal.  No murmur.   Abdomen: Abdomen is soft.  Bowel sounds are normal, no abdominal tenderness.  There is no rebound or guarding  Extremities: Normal range of motion.  Decreased, 1+ edema of bilateral lower extremities, distal pulses intact  Neurological: No focal motor or sensory deficits, pupils reactive  Skin:  Warm and dry.  No rash or cyanosis.       Results Review:    Intake/Output:     Intake/Output Summary (Last 24 hours) at 4/9/2022 1008  Last data filed at 4/9/2022 0611  Gross per 24 hour   Intake 850 ml   Output 1400 ml   Net -550 ml         DATA:  Radiology and Labs:  The following labs independently reviewed by me. Additional labs ordered for tomorrow a.m.  Interval notes, chart personally reviewed by me.   Old records independently reviewed showing CKD 4  Discussed with patient herself at bedside    Risk/ complexity of medical care/ medical decision making moderate complexity, CKD, CHF, diuretic management      Labs:   Recent Results (from the past 24 hour(s))   POC Glucose Once    Collection Time: 04/08/22 11:46 AM    Specimen: Blood   Result Value Ref Range    Glucose 218 (H) 70 - 130 mg/dL   POC Glucose Once    Collection Time: 04/08/22  4:49 PM    Specimen: Blood   Result Value Ref Range    Glucose 229 (H) 70 - 130 mg/dL   POC Glucose Once    Collection Time: 04/08/22  9:06 PM    Specimen: Blood   Result Value Ref Range    Glucose 193 (H) 70 - 130 mg/dL   POC Glucose Once    Collection Time: 04/09/22  6:06 AM    Specimen: Blood   Result Value Ref Range    Glucose 118 70 - 130 mg/dL   Renal Function Panel    Collection Time: 04/09/22  7:49 AM    Specimen: Blood   Result Value Ref Range    Glucose 113 (H) 65 - 99 mg/dL    BUN 43 (H) 8 - 23 mg/dL    Creatinine 2.28 (H) 0.57 -  1.00 mg/dL    Sodium 139 136 - 145 mmol/L    Potassium 3.8 3.5 - 5.2 mmol/L    Chloride 93 (L) 98 - 107 mmol/L    CO2 35.0 (H) 22.0 - 29.0 mmol/L    Calcium 9.4 8.6 - 10.5 mg/dL    Albumin 3.60 3.50 - 5.20 g/dL    Phosphorus 4.5 2.5 - 4.5 mg/dL    Anion Gap 11.0 5.0 - 15.0 mmol/L    BUN/Creatinine Ratio 18.9 7.0 - 25.0    eGFR 21.0 (L) >60.0 mL/min/1.73       Radiology:  Imaging Results (Last 24 Hours)     ** No results found for the last 24 hours. **             Medications have been reviewed:  Current Facility-Administered Medications   Medication Dose Route Frequency Provider Last Rate Last Admin   • acetaminophen (TYLENOL) tablet 650 mg  650 mg Oral Q4H PRN Gary Moore APRN   650 mg at 04/09/22 0612   • amLODIPine (NORVASC) tablet 5 mg  5 mg Oral Q24H Vipin Tovar MD   5 mg at 04/09/22 0846   • apixaban (ELIQUIS) tablet 2.5 mg  2.5 mg Oral BID Vipin Tovar MD   2.5 mg at 04/09/22 0846   • atorvastatin (LIPITOR) tablet 40 mg  40 mg Oral Daily Vipin Tovar MD   40 mg at 04/09/22 0846   • dextrose (D50W) (25 g/50 mL) IV injection 25 g  25 g Intravenous Q15 Min PRN Vipin Tovar MD       • dextrose (GLUTOSE) oral gel 15 g  15 g Oral Q15 Min PRN Vipin Tovar MD       • Diclofenac Sodium (VOLTAREN) 1 % gel 1 g  1 g Topical 4x Daily Vipin Tovar MD   1 g at 04/09/22 0848   • furosemide (LASIX) tablet 40 mg  40 mg Oral BID Isaiah Foster MD   40 mg at 04/09/22 0846   • gabapentin (NEURONTIN) capsule 300 mg  300 mg Oral TID Vipin Tovar MD   300 mg at 04/09/22 0846   • glucagon (human recombinant) (GLUCAGEN DIAGNOSTIC) injection 1 mg  1 mg Intramuscular Q15 Min PRN Vipin Tovar MD       • insulin lispro (ADMELOG) injection 0-7 Units  0-7 Units Subcutaneous TID AC Vipin Tovar MD   3 Units at 04/08/22 1726   • melatonin tablet 3 mg  3 mg Oral Nightly PRN Gary Moore APRN       • [START ON 4/10/2022] metoprolol tartrate  (LOPRESSOR) tablet 50 mg  50 mg Oral Daily Isaiah Foster MD       • nitroglycerin (NITROSTAT) SL tablet 0.4 mg  0.4 mg Sublingual Q5 Min PRN Gary Moore APRN       • nystatin (MYCOSTATIN) powder   Topical Q12H Vipin Tovar MD   Given at 04/09/22 0848   • ondansetron (ZOFRAN) tablet 4 mg  4 mg Oral Q6H PRN Gary Moore APRN        Or   • ondansetron (ZOFRAN) injection 4 mg  4 mg Intravenous Q6H PRN Gary Moore APRN       • pantoprazole (PROTONIX) EC tablet 40 mg  40 mg Oral QAM Vipin Tovar MD   40 mg at 04/09/22 0612   • polycarbophil tablet 1,250 mg  1,250 mg Oral Daily Yady Leo MD   1,250 mg at 04/09/22 0846   • sennosides-docusate (PERICOLACE) 8.6-50 MG per tablet 2 tablet  2 tablet Oral BID Truman Grant MD   2 tablet at 04/09/22 0846   • sodium chloride 0.9 % flush 10 mL  10 mL Intravenous PRN Oscar Ellis MD   10 mL at 04/09/22 0847   • venlafaxine XR (EFFEXOR-XR) 24 hr capsule 75 mg  75 mg Oral Daily Vipin Tovar MD   75 mg at 04/09/22 0846       ASSESSMENT:  CKD stage IV with mild PERLA.  Baseline creatinine 2.0, improved    CHF (congestive heart failure), acute on chronic systolic    Chronic kidney disease, stage IV (severe) (Formerly [facility])    Symptomatic anemia    Anxiety associated with depression    Iron deficiency anemia    PAF (paroxysmal atrial fibrillation) (Former[facility])    Shortness of breath  Worsening metabolic alkalosis from diuretics      PLAN:   Renal function remains stable and near baseline  Volume status dramatically improved since admission with weight down 35 pounds since 4/3  Continue current oral Lasix dosing  Maintain dietary fluid and salt restriction while on diuretics     Continue to monitor electrolytes and volume closely  Discharge planning, await rehab bed  Stable for discharge anytime from a renal standpoint      Isaiah Foster MD   [facility]   Office phone number: [phone]  Answering service phone  number: [phone]    04/09/22  10:08 EDT    Dictation performed using Dragon dictation [employer]

## 2022-04-10 LAB
ALBUMIN SERPL-MCNC: 3.5 G/DL (ref 3.5–5.2)
ANION GAP SERPL CALCULATED.3IONS-SCNC: 9 MMOL/L (ref 5–15)
BUN SERPL-MCNC: 48 MG/DL (ref 8–23)
BUN/CREAT SERPL: 21.5 (ref 7–25)
CALCIUM SPEC-SCNC: 9.3 MG/DL (ref 8.6–10.5)
CHLORIDE SERPL-SCNC: 95 MMOL/L (ref 98–107)
CO2 SERPL-SCNC: 34 MMOL/L (ref 22–29)
CREAT SERPL-MCNC: 2.23 MG/DL (ref 0.57–1)
EGFRCR SERPLBLD CKD-EPI 2021: 21.5 ML/MIN/1.73
GLUCOSE BLDC GLUCOMTR-MCNC: 121 MG/DL (ref 70–130)
GLUCOSE BLDC GLUCOMTR-MCNC: 168 MG/DL (ref 70–130)
GLUCOSE BLDC GLUCOMTR-MCNC: 182 MG/DL (ref 70–130)
GLUCOSE SERPL-MCNC: 112 MG/DL (ref 65–99)
PHOSPHATE SERPL-MCNC: 4.7 MG/DL (ref 2.5–4.5)
POTASSIUM SERPL-SCNC: 3.3 MMOL/L (ref 3.5–5.2)
SODIUM SERPL-SCNC: 138 MMOL/L (ref 136–145)

## 2022-04-10 PROCEDURE — 82962 GLUCOSE BLOOD TEST: CPT

## 2022-04-10 PROCEDURE — 63710000001 INSULIN LISPRO (HUMAN) PER 5 UNITS: Performed by: HOSPITALIST

## 2022-04-10 PROCEDURE — 97110 THERAPEUTIC EXERCISES: CPT

## 2022-04-10 PROCEDURE — 80069 RENAL FUNCTION PANEL: CPT | Performed by: INTERNAL MEDICINE

## 2022-04-10 RX ORDER — POTASSIUM CHLORIDE 750 MG/1
40 TABLET, FILM COATED, EXTENDED RELEASE ORAL ONCE
Status: COMPLETED | OUTPATIENT
Start: 2022-04-10 | End: 2022-04-10

## 2022-04-10 RX ORDER — FUROSEMIDE 40 MG/1
40 TABLET ORAL DAILY
Status: DISCONTINUED | OUTPATIENT
Start: 2022-04-10 | End: 2022-04-12 | Stop reason: HOSPADM

## 2022-04-10 RX ADMIN — APIXABAN 2.5 MG: 2.5 TABLET, FILM COATED ORAL at 21:40

## 2022-04-10 RX ADMIN — DOCUSATE SODIUM 50MG AND SENNOSIDES 8.6MG 2 TABLET: 8.6; 5 TABLET, FILM COATED ORAL at 21:40

## 2022-04-10 RX ADMIN — ATORVASTATIN CALCIUM 40 MG: 20 TABLET, FILM COATED ORAL at 09:07

## 2022-04-10 RX ADMIN — GABAPENTIN 100 MG: 100 CAPSULE ORAL at 21:40

## 2022-04-10 RX ADMIN — VENLAFAXINE HYDROCHLORIDE 75 MG: 75 CAPSULE, EXTENDED RELEASE ORAL at 09:07

## 2022-04-10 RX ADMIN — DICLOFENAC SODIUM 1 G: 10 GEL TOPICAL at 18:14

## 2022-04-10 RX ADMIN — NYSTATIN: 100000 POWDER TOPICAL at 21:40

## 2022-04-10 RX ADMIN — FUROSEMIDE 40 MG: 40 TABLET ORAL at 09:07

## 2022-04-10 RX ADMIN — DICLOFENAC SODIUM 1 G: 10 GEL TOPICAL at 09:08

## 2022-04-10 RX ADMIN — DICLOFENAC SODIUM 1 G: 10 GEL TOPICAL at 21:40

## 2022-04-10 RX ADMIN — ACETAMINOPHEN 650 MG: 325 TABLET ORAL at 21:40

## 2022-04-10 RX ADMIN — PANTOPRAZOLE SODIUM 40 MG: 40 TABLET, DELAYED RELEASE ORAL at 06:12

## 2022-04-10 RX ADMIN — DICLOFENAC SODIUM 1 G: 10 GEL TOPICAL at 11:59

## 2022-04-10 RX ADMIN — GABAPENTIN 100 MG: 100 CAPSULE ORAL at 09:08

## 2022-04-10 RX ADMIN — METOPROLOL TARTRATE 50 MG: 50 TABLET, FILM COATED ORAL at 09:07

## 2022-04-10 RX ADMIN — INSULIN LISPRO 2 UNITS: 100 INJECTION, SOLUTION INTRAVENOUS; SUBCUTANEOUS at 12:05

## 2022-04-10 RX ADMIN — CALCIUM POLYCARBOPHIL 1250 MG: 625 TABLET, FILM COATED ORAL at 09:08

## 2022-04-10 RX ADMIN — NYSTATIN: 100000 POWDER TOPICAL at 09:08

## 2022-04-10 RX ADMIN — APIXABAN 2.5 MG: 2.5 TABLET, FILM COATED ORAL at 09:08

## 2022-04-10 RX ADMIN — DOCUSATE SODIUM 50MG AND SENNOSIDES 8.6MG 2 TABLET: 8.6; 5 TABLET, FILM COATED ORAL at 09:07

## 2022-04-10 RX ADMIN — GABAPENTIN 100 MG: 100 CAPSULE ORAL at 16:19

## 2022-04-10 RX ADMIN — AMLODIPINE BESYLATE 5 MG: 5 TABLET ORAL at 09:08

## 2022-04-10 RX ADMIN — POTASSIUM CHLORIDE 40 MEQ: 10 TABLET, EXTENDED RELEASE ORAL at 16:19

## 2022-04-10 NOTE — PLAN OF CARE
Goal Outcome Evaluation:  Plan of Care Reviewed With: patient        Progress: improving  Outcome Evaluation: Pt tolerated treatment well this date. Increased gait distance to 80ft w/ Rw and CGA. Slow pace throughout, and slightly unsteady at times. Encouraged pt to ambulate w/ nsg.

## 2022-04-10 NOTE — NURSING NOTE
Paged and received a call back from RAMAKRISHNA Giron and reported pt is more sleepy today and this evening than the last two days in a row, is ambulating more regularly than she had been and has not experienced an increased level of weakness but c/o mod-severe bilat shin pain with or without touch.  Pulses equal, no redness or new edema seen (if anything, edema is getting better, not worse).  No new weakness but pt has been much more tired today over the course of dayshift and into the evening, though she has been awake so far the whole shift for me.    Asked about any new orders if indicated.  No new orders received at this time, just CTM and advise dayshift MD also to determine if any new orders are indicated at that time.  Repeated and verified, will CTM.

## 2022-04-10 NOTE — PROGRESS NOTES
Name: Lowell Min ADMIT: 4/3/2022   : 1940  PCP: Dannie Mathews MD    MRN: [id number] LOS: 7 days   AGE/SEX: 82 y.o. female  ROOM: [facility]   Subjective   Chief Complaint   Patient presents with   • Shortness of Breath   • Black or Bloody Stool     Dyspnea fair  Onoxygen  On lasix- oral   +weakness- working with PT  More alert today    ROS  No f/c  No n/v  No cp/palp  +soa/cough    Objective   Vital Signs  Temp:  [97.8 °F (36.6 °C)-98.3 °F (36.8 °C)] 97.8 °F (36.6 °C)  Heart Rate:  [65-74] 72  Resp:  [16] 16  BP: (135-155)/(65-92) 140/72  SpO2:  [88 %-98 %] 98 %  on  Flow (L/min):  [2] 2;   Device (Oxygen Therapy): room air  Body mass index is 38.57 kg/m².    Physical Exam  Constitutional:       General: She is not in acute distress.  HENT:      Head: Normocephalic and atraumatic.   Eyes:      General: No scleral icterus.  Cardiovascular:      Rate and Rhythm: Regular rhythm.      Heart sounds: Normal heart sounds.   Pulmonary:      Effort: No respiratory distress (decreased bs at bases).      Breath sounds: No rales.   Abdominal:      General: There is no distension.      Palpations: Abdomen is soft.   Musculoskeletal:      Cervical back: Neck supple.   Skin:     Coloration: Skin is pale.   Neurological:      Mental Status: She is alert.   Psychiatric:         Behavior: Behavior normal.     elderly, chronically ill  Trace LE edema    Results Review:       I reviewed the patient's new clinical results.  Results from last 7 days   Lab Units 22  0602 22  1103   WBC 10*3/mm3 6.14 6.83   HEMOGLOBIN g/dL 9.1* 8.0*   PLATELETS 10*3/mm3 262 190     Results from last 7 days   Lab Units 04/10/22  0704 22  0749 22  0455 22  0602   SODIUM mmol/L 138 139 140 139   POTASSIUM mmol/L 3.3* 3.8 3.5 3.6   CHLORIDE mmol/L 95* 93* 93* 92*   CO2 mmol/L 34.0* 35.0* 35.0* 36.1*   BUN mg/dL 48* 43* 38* 30*   CREATININE mg/dL 2.23* 2.28* 2.21* 2.10*   GLUCOSE mg/dL 112* 113* 91 109*   Estimated  Creatinine Clearance: 25.1 mL/min (A) (by C-G formula based on SCr of 2.23 mg/dL (H)).  Results from last 7 days   Lab Units 04/10/22  0704 04/09/22  0749 04/08/22  0455 04/07/22  0602   ALBUMIN g/dL 3.50 3.60 3.60 3.40*     Results from last 7 days   Lab Units 04/10/22  0704 04/09/22  0749 04/08/22  0455 04/07/22  0602   CALCIUM mg/dL 9.3 9.4 9.1 9.3   ALBUMIN g/dL 3.50 3.60 3.60 3.40*   PHOSPHORUS mg/dL 4.7* 4.5 4.8* 5.1*         Coag       HbA1C   Lab Results   Component Value Date    HGBA1C 6.30 (H) 04/05/2022    HGBA1C 6.8 (H) 12/29/2021     Infection     Radiology(recent) No radiology results for the last day  No results found for: TROPONINT, TROPONINI, BNP  No components found for: TSH;2    amLODIPine, 5 mg, Oral, Q24H  apixaban, 2.5 mg, Oral, BID  atorvastatin, 40 mg, Oral, Daily  Diclofenac Sodium, 1 g, Topical, 4x Daily  furosemide, 40 mg, Oral, Daily  gabapentin, 100 mg, Oral, TID  insulin lispro, 0-7 Units, Subcutaneous, TID AC  metoprolol tartrate, 50 mg, Oral, Daily  nystatin, , Topical, Q12H  pantoprazole, 40 mg, Oral, QAM  calcium polycarbophil, 1,250 mg, Oral, Daily  senna-docusate sodium, 2 tablet, Oral, BID  venlafaxine XR, 75 mg, Oral, Daily       Diet Regular; Daily Fluid Restriction, Low Sodium, Consistent Carbohydrate; 1500 mL Fluid Per Day      Assessment/Plan      Active Hospital Problems    Diagnosis  POA   • **CHF (congestive heart failure) ([facility]) [I50.9]  Yes   • Symptomatic anemia [D64.9]  Yes   • Anxiety associated with depression [F41.8]  Yes   • Iron deficiency anemia [D50.9]  Yes   • PAF (paroxysmal atrial fibrillation) ([facility]) [I48.0]  Yes   • Shortness of breath [R06.02]  Yes   • Chronic kidney disease, stage IV (severe) (HCC) [N18.4]  Yes      Resolved Hospital Problems   No resolved problems to display.     Diastolic CHF-acute on chronic: Continue diuresis as directed by renal  Severe pulmonary hypertension: Likely contributing to her edema.  Likely secondary to noncompliance with  "CPAP.  Pulmonary evaluation appreciated.  Patient follow-up as an outpatient.  Chronic kidney disease stage IV: Nephrology input appreciated.    Paroxysmal atrial fibrillation:rate control.  Eliquis resumed.  metoprolol adjusted  Blood on toilet paper: GI input appreciated.  No evidence of any further GI blood loss.    Eliquis resumed at 2.5 mg twice daily noting her age and renal function.  Anemia-severe iron deficiency, recurrent-anemia of chronic renal disease: Patient received an iron infusion prior to admission.  Give her IV iron 4/7 (scheduled as outpatient)  Diabetes type 2: Patient is not sure what \"pill\" she is taking for this. sliding scale insulin during hospitalization.  Morbid obesity:  Valvular heart disease: Moderate aortic valve stenosis, mild mitral valve regurgitation, mild mitral valve stenosis, tricuspid regurgitation.  Obstructive sleep apnea: Noncompliant with CPAP.  Pulmonary evaluation appreciated.  Patient encouraged to follow-up as an outpatient.    Left knee effusion:  Orthopedic evaluation greatly appreciated.  With her renal status and on anticoagulants, unfortunately she is not a candidate for oral NSAIDs.  Trial of Voltaren gel.  Improved and ambulation improving.      Dispo- likely subacute rehab when bed available  Doing well, thanks to Nephrology and other staff. Likely not strong enough for home and needs rehab. Lasix decreased by Dr. Foster today. Hopefully [facility] soon      Truman Grant MD  [facility]  04/10/22  14:17 EDT  "

## 2022-04-10 NOTE — PLAN OF CARE
Problem: Fall Injury Risk  Goal: Absence of Fall and Fall-Related Injury  Outcome: Ongoing, Progressing  Intervention: Identify and Manage Contributors  Recent Flowsheet Documentation  Taken 4/10/2022 0830 by Mindi Mayorga, RN  Medication Review/Management:   medications reviewed   high-risk medications identified  Intervention: Promote Injury-Free Environment  Recent Flowsheet Documentation  Taken 4/10/2022 1800 by Mindi Mayorga, RN  Safety Promotion/Fall Prevention:   assistive device/personal items within reach   clutter free environment maintained   fall prevention program maintained   nonskid shoes/slippers when out of bed   room organization consistent   safety round/check completed  Taken 4/10/2022 1600 by Mindi Mayorga, RN  Safety Promotion/Fall Prevention:   assistive device/personal items within reach   clutter free environment maintained   fall prevention program maintained   nonskid shoes/slippers when out of bed   room organization consistent   safety round/check completed  Taken 4/10/2022 1350 by Mindi Mayorga, RN  Safety Promotion/Fall Prevention:   assistive device/personal items within reach   clutter free environment maintained   fall prevention program maintained   nonskid shoes/slippers when out of bed   room organization consistent   safety round/check completed  Taken 4/10/2022 1200 by Mindi Mayorga, RN  Safety Promotion/Fall Prevention:   assistive device/personal items within reach   clutter free environment maintained   fall prevention program maintained   nonskid shoes/slippers when out of bed   room organization consistent   safety round/check completed  Taken 4/10/2022 1000 by Mindi Mayorga, RN  Safety Promotion/Fall Prevention:   assistive device/personal items within reach   clutter free environment maintained   fall prevention program maintained   nonskid shoes/slippers when out of bed   room organization consistent   safety round/check completed  Taken 4/10/2022  0830 by Mindi Mayorga, RN  Safety Promotion/Fall Prevention:   assistive device/personal items within reach   clutter free environment maintained   fall prevention program maintained   nonskid shoes/slippers when out of bed   room organization consistent   safety round/check completed     Problem: Adult Inpatient Plan of Care  Goal: Plan of Care Review  Outcome: Ongoing, Progressing  Flowsheets (Taken 4/10/2022 1815)  Progress: improving  Plan of Care Reviewed With: patient  Outcome Evaluation: Pt more alert this shift.  Up in chair most of shift and ambulating to and from bathroom with walker.  On room air during the day.  BM x2.  Plan to rehab tomorrow if bed available.  Goal: Patient-Specific Goal (Individualized)  Outcome: Ongoing, Progressing  Goal: Absence of Hospital-Acquired Illness or Injury  Outcome: Ongoing, Progressing  Intervention: Identify and Manage Fall Risk  Recent Flowsheet Documentation  Taken 4/10/2022 1800 by Mindi Mayorga, RN  Safety Promotion/Fall Prevention:   assistive device/personal items within reach   clutter free environment maintained   fall prevention program maintained   nonskid shoes/slippers when out of bed   room organization consistent   safety round/check completed  Taken 4/10/2022 1600 by Mindi Mayorga, RN  Safety Promotion/Fall Prevention:   assistive device/personal items within reach   clutter free environment maintained   fall prevention program maintained   nonskid shoes/slippers when out of bed   room organization consistent   safety round/check completed  Taken 4/10/2022 1350 by Mindi Mayorga, RN  Safety Promotion/Fall Prevention:   assistive device/personal items within reach   clutter free environment maintained   fall prevention program maintained   nonskid shoes/slippers when out of bed   room organization consistent   safety round/check completed  Taken 4/10/2022 1200 by Mindi Mayorga, RN  Safety Promotion/Fall Prevention:   assistive device/personal  items within reach   clutter free environment maintained   fall prevention program maintained   nonskid shoes/slippers when out of bed   room organization consistent   safety round/check completed  Taken 4/10/2022 1000 by Mindi Mayroga RN  Safety Promotion/Fall Prevention:   assistive device/personal items within reach   clutter free environment maintained   fall prevention program maintained   nonskid shoes/slippers when out of bed   room organization consistent   safety round/check completed  Taken 4/10/2022 0830 by Mindi Mayorga RN  Safety Promotion/Fall Prevention:   assistive device/personal items within reach   clutter free environment maintained   fall prevention program maintained   nonskid shoes/slippers when out of bed   room organization consistent   safety round/check completed  Intervention: Prevent Skin Injury  Recent Flowsheet Documentation  Taken 4/10/2022 1350 by Mindi Mayorga RN  Body Position: legs elevated  Taken 4/10/2022 0830 by Mindi Mayorga RN  Body Position: position changed independently  Intervention: Prevent and Manage VTE (Venous Thromboembolism) Risk  Recent Flowsheet Documentation  Taken 4/10/2022 1800 by Mindi Mayorga RN  Activity Management: up in chair  Taken 4/10/2022 1600 by Mindi Mayorga RN  Activity Management: up in chair  Taken 4/10/2022 1350 by Mindi Mayorga RN  Activity Management: up in chair  Taken 4/10/2022 1200 by Mindi Mayorga RN  Activity Management: up in chair  Taken 4/10/2022 1000 by Mindi Mayorga RN  Activity Management: activity encouraged  Taken 4/10/2022 0830 by Mindi Mayorga RN  Activity Management: activity encouraged  VTE Prevention/Management:   bilateral   dorsiflexion/plantar flexion performed  Range of Motion: active ROM (range of motion) encouraged  Intervention: Prevent Infection  Recent Flowsheet Documentation  Taken 4/10/2022 1350 by Mindi Mayorga RN  Infection Prevention:   hand hygiene promoted   personal  protective equipment utilized  Taken 4/10/2022 0830 by Mindi Mayorga RN  Infection Prevention:   personal protective equipment utilized   hand hygiene promoted  Goal: Optimal Comfort and Wellbeing  Outcome: Ongoing, Progressing  Intervention: Monitor Pain and Promote Comfort  Recent Flowsheet Documentation  Taken 4/10/2022 0830 by Mindi Mayorga RN  Pain Management Interventions:   see MAR   pain management plan reviewed with patient/caregiver   position adjusted  Intervention: Provide Person-Centered Care  Recent Flowsheet Documentation  Taken 4/10/2022 1350 by Mindi Mayorga RN  Trust Relationship/Rapport:   care explained   choices provided   questions encouraged   questions answered   reassurance provided   thoughts/feelings acknowledged  Taken 4/10/2022 0830 by Mindi Mayorga RN  Trust Relationship/Rapport:   care explained   choices provided   questions answered   questions encouraged   reassurance provided   thoughts/feelings acknowledged  Goal: Readiness for Transition of Care  Outcome: Ongoing, Progressing     Problem: Diabetes Comorbidity  Goal: Blood Glucose Level Within Targeted Range  Outcome: Ongoing, Progressing     Problem: Heart Failure Comorbidity  Goal: Maintenance of Heart Failure Symptom Control  Outcome: Ongoing, Progressing  Intervention: Maintain Heart Failure-Management  Recent Flowsheet Documentation  Taken 4/10/2022 0830 by Mindi Mayorga RN  Medication Review/Management:   medications reviewed   high-risk medications identified     Problem: Hypertension Comorbidity  Goal: Blood Pressure in Desired Range  Outcome: Ongoing, Progressing  Intervention: Maintain Blood Pressure Management  Recent Flowsheet Documentation  Taken 4/10/2022 0830 by Mindi Mayorga RN  Medication Review/Management:   medications reviewed   high-risk medications identified   Goal Outcome Evaluation:  Plan of Care Reviewed With: patient        Progress: improving  Outcome Evaluation: Pt more alert  this shift.  Up in chair most of shift and ambulating to and from bathroom with walker.  On room air during the day.  BM x2.  Plan to rehab tomorrow if bed available.

## 2022-04-10 NOTE — THERAPY TREATMENT NOTE
Patient Name: Lowell Min  : 1940    MRN: [id number]                              Today's Date: 4/10/2022       Admit Date: 4/3/2022    Visit Dx:     ICD-10-CM ICD-9-CM   1. NELLY (obstructive sleep apnea)  G47.33 327.23   2. Symptomatic anemia  D64.9 285.9   3. Gastrointestinal hemorrhage, unspecified gastrointestinal hemorrhage type  K92.2 578.9   4. Chronic kidney disease, unspecified CKD stage  N18.9 585.9   5. Chronic diastolic congestive heart failure (HCC)  I50.32 428.32     428.0   6. PAF (paroxysmal atrial fibrillation) (HCC)  I48.0 427.31   7. Obesity (BMI 30-39.9)  E66.9 278.00   8. Anxiety associated with depression  F41.8 300.4     Patient Active Problem List   Diagnosis   • Chronic kidney disease, stage IV (severe) (HCC)   • Erythropoietin deficiency anemia   • Symptomatic anemia   • Anxiety associated with depression   • Iron deficiency anemia   • PAF (paroxysmal atrial fibrillation) (HCC)   • Shortness of breath   • CHF (congestive heart failure) (HCC)     Past Medical History:   Diagnosis Date   • Anxiety    • Atrial fibrillation (HCC)    • Chronic kidney disease, stage IV (severe) (HCC)    • Depression    • Elevated cholesterol    • Hypertension    • Type 2 diabetes mellitus (HCC)      Past Surgical History:   Procedure Laterality Date   • APPENDECTOMY     • CHOLECYSTECTOMY     • COLONOSCOPY     • HYSTERECTOMY     • TUMOR REMOVAL Left     benign tumor from left breast      General Information     Row Name 04/10/22 1620          Physical Therapy Time and Intention    Document Type therapy note (daily note)  -     Mode of Treatment physical therapy  -     Row Name 04/10/22 1620          General Information    Existing Precautions/Restrictions fall  -     Row Name 04/10/22 1620          Cognition    Orientation Status (Cognition) oriented x 4  -           User Key  (r) = Recorded By, (t) = Taken By, (c) = Cosigned By    Initials Name Provider Type     Yady Tristan PTA  Physical Therapist Assistant               Mobility     Row Name 04/10/22 1621          Bed Mobility    Comment, (Bed Mobility) up in chair  -     Row Name 04/10/22 1621          Sit-Stand Transfer    Sit-Stand [place] (Transfers) standby assist  -     Assistive Device (Sit-Stand Transfers) walker, front-wheeled  -SM     Row Name 04/10/22 1621          Gait/Stairs (Locomotion)    [place] Level (Gait) contact guard  -     Assistive Device (Gait) walker, front-wheeled  -SM     Distance in Feet (Gait) 80  -SM     Deviations/Abnormal Patterns (Gait) guillermo decreased;stride length decreased  -SM     Bilateral Gait Deviations forward flexed posture  -     Comment, (Gait/Stairs) slow pace throughout and slightly unsteady  -SM           User Key  (r) = Recorded By, (t) = Taken By, (c) = Cosigned By    Initials Name Provider Type    Yady Shaikh PTA Physical Therapist Assistant               Obj/Interventions    No documentation.                Goals/Plan    No documentation.                Clinical Impression     Row Name 04/10/22 1621          Pain    Pretreatment Pain Rating 0/10 - no pain  -SM     Posttreatment Pain Rating 0/10 - no pain  -SM     [facility] Name 04/10/22 1621          Positioning and Restraints    Pre-Treatment Position sitting in chair/recliner  -SM     Post Treatment Position chair  -SM     In Chair reclined;call light within reach;encouraged to call for assist  -SM           User Key  (r) = Recorded By, (t) = Taken By, (c) = Cosigned By    Initials Name Provider Type    Yady Shaikh PTA Physical Therapist Assistant               Outcome Measures     Row Name 04/10/22 1622          How much help from another person do you currently need...    Turning from your back to your side while in flat bed without using bedrails? 3  -SM     Moving from lying on back to sitting on the side of a flat bed without bedrails? 3  -SM     Moving to and from a bed to a chair (including a  wheelchair)? 3  -SM     Standing up from a chair using your arms (e.g., wheelchair, bedside chair)? 3  -SM     Climbing 3-5 steps with a railing? 2  -SM     To walk in hospital room? 3  -SM     AM-PAC 6 Clicks Score (PT) 17  -SM     Row Name 04/10/22 1622          Functional Assessment    Outcome Measure Options AM-PAC 6 Clicks Basic Mobility (PT)  -           User Key  (r) = Recorded By, (t) = Taken By, (c) = Cosigned By    Initials Name Provider Type    Yady Shaikh, PTA Physical Therapist Assistant                             Physical Therapy Education                 Title: PT OT SLP Therapies (Done)     Topic: Physical Therapy (Done)     Point: Mobility training (Done)     Learning Progress Summary           Patient Acceptance, E,TB,D, VU,NR by  at 4/10/2022 1622    Acceptance, E, VU by HF at 4/8/2022 1639    Acceptance, E,TB, VU,NR by EE at 4/8/2022 1543    Acceptance, E,TB, VU,NR by EE at 4/7/2022 1527    Acceptance, E,TB, VU,NR by EE at 4/6/2022 0958    Acceptance, E, VU by DB at 4/4/2022 1541                   Point: Home exercise program (Done)     Learning Progress Summary           Patient Acceptance, E,TB,D, VU,NR by  at 4/10/2022 1622    Acceptance, E, VU by HF at 4/8/2022 1639    Acceptance, E,TB, VU,NR by EE at 4/8/2022 1543    Acceptance, E,TB, VU,NR by EE at 4/7/2022 1527    Acceptance, E,TB, VU,NR by EE at 4/6/2022 0958    Acceptance, E, VU,NR by EE at 4/5/2022 1120    Acceptance, E, VU by DB at 4/4/2022 1541                   Point: Body mechanics (Done)     Learning Progress Summary           Patient Acceptance, E,TB,D, VU,NR by  at 4/10/2022 1622    Acceptance, E, VU by HF at 4/8/2022 1639    Acceptance, E, VU by DB at 4/4/2022 1541                   Point: Precautions (Done)     Learning Progress Summary           Patient Acceptance, E,TB,D, VU,NR by  at 4/10/2022 1622    Acceptance, E, VU by HF at 4/8/2022 1639    Acceptance, E, VU by DB at 4/4/2022 1541                                User Key     Initials Effective Dates Name Provider Type Discipline    EE 06/16/21 -  Ni Connor, PT Physical Therapist PT     03/07/18 -  Yady Tristan PTA Physical Therapist Assistant PT    DB 06/16/21 -  Oanh Camp, PT Physical Therapist PT    HF 01/18/22 -  Mary Toledo RN Registered Nurse Nurse              PT Recommendation and Plan     Plan of Care Reviewed With: patient  Progress: improving  Outcome Evaluation: Pt tolerated treatment well this date. Increased gait distance to 80ft w/ Rw and CGA. Slow pace throughout, and slightly unsteady at times. Encouraged pt to ambulate w/ nsg.     Time Calculation:    PT Charges     Row Name 04/10/22 1623             Time Calculation    Start Time 1538  -      Stop Time 1552  -      Time Calculation (min) 14 min  -      PT Received On 04/10/22  -      PT - Next Appointment 04/11/22  -            User Key  (r) = Recorded By, (t) = Taken By, (c) = Cosigned By    Initials Name Provider Type     Yady Tristan PTA Physical Therapist Assistant              Therapy Charges for Today     Code Description Service Date Service Provider Modifiers Qty    [id number] HC PT THER PROC EA 15 MIN 4/10/2022 Yady Tristan PTA GP 1          PT G-Codes  Outcome Measure Options: AM-PAC 6 Clicks Basic Mobility (PT)  AM-PAC 6 Clicks Score (PT): 17    Yady Tristan PTA  4/10/2022

## 2022-04-10 NOTE — PROGRESS NOTES
"   LOS: 7 days     Chief Complaint/ Reason for encounter: CKD, diuretic management  Chief Complaint   Patient presents with   • Shortness of Breath   • Black or Bloody Stool         Subjective   04/04/22 : No complaints, feels better  Weight unchanged but feels less swollen  No shortness of breath or chest pain  Good appetite no nausea or vomiting  No additional hematochezia, eventual colonoscopy planned    4/6.  Feels well, edema considerably better and her weight is down about 31 pounds since the third  Denies any fevers or chills, shortness of breath or chest pain  Good appetite with no nausea  Still very weak but was able to get up and sit in the chair today    4/8 resting, seems to be doing well with no new complaints or events noted.  Weight now down 34 pounds since the third    4/9 no new complaints, just waiting on a nursing home bed at this time    4/10 looks and feels well denies any shortness of breath or chest pain  No edema    Medical history reviewed:  Shortness of Breath        Subjective:  Symptoms:  She reports shortness of breath.        History taken from: Patient and chart    Vital Signs  Temp:  [97.8 °F (36.6 °C)-98.3 °F (36.8 °C)] 97.8 °F (36.6 °C)  Heart Rate:  [65-74] 72  Resp:  [16] 16  BP: (116-155)/(65-92) 140/72       Wt Readings from Last 1 Encounters:   04/10/22 0300 112 kg (246 lb 4.1 oz)   04/09/22 0610 97.4 kg (214 lb 12.8 oz)   04/08/22 0410 97.6 kg (215 lb 1.6 oz)   04/07/22 0503 98.4 kg (217 lb)   04/06/22 0559 98.9 kg (218 lb)   04/05/22 0615 105 kg (232 lb 1.6 oz)   04/04/22 1150 111 kg (245 lb)   04/04/22 0526 112 kg (245 lb 14.4 oz)   04/03/22 0320 113 kg (249 lb)   04/03/22 0209 95.3 kg (210 lb)       Objective:  Vital signs: (most recent): Blood pressure 140/72, pulse 72, temperature 97.8 °F (36.6 °C), temperature source Oral, resp. rate 16, height 170.2 cm (67\"), weight 112 kg (246 lb 4.1 oz), SpO2 98 %.              Objective:  General Appearance:  Comfortable, " well-appearing, in no acute distress and not in pain.  Awake, alert, oriented  HEENT: Mucous membranes moist, no injury, oropharynx clear  Lungs:  Normal effort and normal respiratory rate.  Breath sounds clear to auscultation.  No  respiratory distress.  No rales, decreased breath sounds or rhonchi.    Heart: Normal rate.  Regular rhythm.  S1 normal.  No murmur.   Abdomen: Abdomen is soft.  Bowel sounds are normal, no abdominal tenderness.  There is no rebound or guarding  Extremities: Normal range of motion.  Decreased, trace edema of bilateral lower extremities, distal pulses intact  Neurological: No focal motor or sensory deficits, pupils reactive  Skin:  Warm and dry.  No rash or cyanosis.       Results Review:    Intake/Output:     Intake/Output Summary (Last 24 hours) at 4/10/2022 1141  Last data filed at 4/10/2022 0900  Gross per 24 hour   Intake 1140 ml   Output 1300 ml   Net -160 ml         DATA:  Radiology and Labs:  The following labs independently reviewed by me. Additional labs ordered for tomorrow a.m.  Interval notes, chart personally reviewed by me.   Old records independently reviewed showing CKD 4  Discussed with patient herself at bedside    Risk/ complexity of medical care/ medical decision making moderate complexity, CKD, CHF, diuretic management      Labs:   Recent Results (from the past 24 hour(s))   POC Glucose Once    Collection Time: 04/09/22  5:08 PM    Specimen: Blood   Result Value Ref Range    Glucose 150 (H) 70 - 130 mg/dL   POC Glucose Once    Collection Time: 04/09/22  9:08 PM    Specimen: Blood   Result Value Ref Range    Glucose 192 (H) 70 - 130 mg/dL   POC Glucose Once    Collection Time: 04/10/22  6:19 AM    Specimen: Blood   Result Value Ref Range    Glucose 121 70 - 130 mg/dL   Renal Function Panel    Collection Time: 04/10/22  7:04 AM    Specimen: Blood   Result Value Ref Range    Glucose 112 (H) 65 - 99 mg/dL    BUN 48 (H) 8 - 23 mg/dL    Creatinine 2.23 (H) 0.57 - 1.00  mg/dL    Sodium 138 136 - 145 mmol/L    Potassium 3.3 (L) 3.5 - 5.2 mmol/L    Chloride 95 (L) 98 - 107 mmol/L    CO2 34.0 (H) 22.0 - 29.0 mmol/L    Calcium 9.3 8.6 - 10.5 mg/dL    Albumin 3.50 3.50 - 5.20 g/dL    Phosphorus 4.7 (H) 2.5 - 4.5 mg/dL    Anion Gap 9.0 5.0 - 15.0 mmol/L    BUN/Creatinine Ratio 21.5 7.0 - 25.0    eGFR 21.5 (L) >60.0 mL/min/1.73       Radiology:  Imaging Results (Last 24 Hours)     ** No results found for the last 24 hours. **             Medications have been reviewed:  Current Facility-Administered Medications   Medication Dose Route Frequency Provider Last Rate Last Admin   • acetaminophen (TYLENOL) tablet 650 mg  650 mg Oral Q4H PRN Gary Moore APRN   650 mg at 04/09/22 2103   • amLODIPine (NORVASC) tablet 5 mg  5 mg Oral Q24H Vipin Tovar MD   5 mg at 04/10/22 0908   • apixaban (ELIQUIS) tablet 2.5 mg  2.5 mg Oral BID Vipin Tovar MD   2.5 mg at 04/10/22 0908   • atorvastatin (LIPITOR) tablet 40 mg  40 mg Oral Daily Vipin Tovar MD   40 mg at 04/10/22 0907   • dextrose (D50W) (25 g/50 mL) IV injection 25 g  25 g Intravenous Q15 Min PRN Vipin Tovar MD       • dextrose (GLUTOSE) oral gel 15 g  15 g Oral Q15 Min PRN Vipin Tovar MD       • Diclofenac Sodium (VOLTAREN) 1 % gel 1 g  1 g Topical 4x Daily Vipin Tovar MD   1 g at 04/10/22 0908   • furosemide (LASIX) tablet 40 mg  40 mg Oral Daily Isaiah Foster MD   40 mg at 04/10/22 0907   • gabapentin (NEURONTIN) capsule 100 mg  100 mg Oral TID Truman Grant MD   100 mg at 04/10/22 0908   • glucagon (human recombinant) (GLUCAGEN DIAGNOSTIC) injection 1 mg  1 mg Intramuscular Q15 Min PRN Vipin Tovar MD       • insulin lispro (ADMELOG) injection 0-7 Units  0-7 Units Subcutaneous TID AC Vipin Tovar MD   2 Units at 04/09/22 1714   • melatonin tablet 3 mg  3 mg Oral Nightly PRN Gary Moore APRN       • metoprolol tartrate (LOPRESSOR) tablet 50 mg   50 mg Oral Daily Isaiah Foster MD   50 mg at 04/10/22 0907   • nitroglycerin (NITROSTAT) SL tablet 0.4 mg  0.4 mg Sublingual Q5 Min PRN Gary Moore APRN       • nystatin (MYCOSTATIN) powder   Topical Q12H Vipin Tovar MD   Given at 04/10/22 0908   • ondansetron (ZOFRAN) tablet 4 mg  4 mg Oral Q6H PRN Gary Moore APRN        Or   • ondansetron (ZOFRAN) injection 4 mg  4 mg Intravenous Q6H PRN Gary Moore APRN       • pantoprazole (PROTONIX) EC tablet 40 mg  40 mg Oral QAM Vipin Tovar MD   40 mg at 04/10/22 0612   • polycarbophil tablet 1,250 mg  1,250 mg Oral Daily Yady Leo MD   1,250 mg at 04/10/22 0908   • sennosides-docusate (PERICOLACE) 8.6-50 MG per tablet 2 tablet  2 tablet Oral BID Truman Grant MD   2 tablet at 04/10/22 0907   • sodium chloride 0.9 % flush 10 mL  10 mL Intravenous PRN Oscar Ellis MD   10 mL at 04/09/22 2104   • venlafaxine XR (EFFEXOR-XR) 24 hr capsule 75 mg  75 mg Oral Daily Vipin Tovar MD   75 mg at 04/10/22 0907       ASSESSMENT:  CKD stage IV with mild PERLA.  Baseline creatinine 2.0, improved    CHF (congestive heart failure), acute on chronic systolic    Chronic kidney disease, stage IV (severe) ([facility])    Symptomatic anemia    Anxiety associated with depression    Iron deficiency anemia    PAF (paroxysmal atrial fibrillation) ([facility])    Shortness of breath  Worsening metabolic alkalosis from diuretics      PLAN:   Waste products, in particular her BUN trending up, may be getting a bit intravascularly dry  Will reduce Lasix dose to 40 mg once daily  Today's weight seems to be an error    Maintain dietary fluid and salt restriction while on diuretics but will ease up a bit on her fluid restriction     Continue to monitor electrolytes and volume closely  Discharge planning, await rehab bed  Stable for discharge anytime from a renal standpoint      Isaiah Foster MD   [facility]   Office phone  number: [phone]  Answering service phone number: [phone]    04/10/22  11:41 EDT    Dictation performed using Dragon dictation [facility]

## 2022-04-11 PROBLEM — I50.33 ACUTE ON CHRONIC DIASTOLIC CONGESTIVE HEART FAILURE: Status: ACTIVE | Noted: 2022-04-03

## 2022-04-11 LAB
ALBUMIN SERPL-MCNC: 3.6 G/DL (ref 3.5–5.2)
ANION GAP SERPL CALCULATED.3IONS-SCNC: 10 MMOL/L (ref 5–15)
BUN SERPL-MCNC: 42 MG/DL (ref 8–23)
BUN/CREAT SERPL: 20.7 (ref 7–25)
CALCIUM SPEC-SCNC: 9.4 MG/DL (ref 8.6–10.5)
CHLORIDE SERPL-SCNC: 99 MMOL/L (ref 98–107)
CO2 SERPL-SCNC: 30 MMOL/L (ref 22–29)
CREAT SERPL-MCNC: 2.03 MG/DL (ref 0.57–1)
EGFRCR SERPLBLD CKD-EPI 2021: 24.1 ML/MIN/1.73
GLUCOSE BLDC GLUCOMTR-MCNC: 124 MG/DL (ref 70–130)
GLUCOSE BLDC GLUCOMTR-MCNC: 158 MG/DL (ref 70–130)
GLUCOSE BLDC GLUCOMTR-MCNC: 177 MG/DL (ref 70–130)
GLUCOSE BLDC GLUCOMTR-MCNC: 224 MG/DL (ref 70–130)
GLUCOSE SERPL-MCNC: 206 MG/DL (ref 65–99)
PHOSPHATE SERPL-MCNC: 4.3 MG/DL (ref 2.5–4.5)
POTASSIUM SERPL-SCNC: 3.7 MMOL/L (ref 3.5–5.2)
SODIUM SERPL-SCNC: 139 MMOL/L (ref 136–145)

## 2022-04-11 PROCEDURE — 82962 GLUCOSE BLOOD TEST: CPT

## 2022-04-11 PROCEDURE — 80069 RENAL FUNCTION PANEL: CPT | Performed by: INTERNAL MEDICINE

## 2022-04-11 PROCEDURE — 63710000001 INSULIN LISPRO (HUMAN) PER 5 UNITS: Performed by: HOSPITALIST

## 2022-04-11 RX ADMIN — PANTOPRAZOLE SODIUM 40 MG: 40 TABLET, DELAYED RELEASE ORAL at 06:03

## 2022-04-11 RX ADMIN — APIXABAN 2.5 MG: 2.5 TABLET, FILM COATED ORAL at 21:15

## 2022-04-11 RX ADMIN — AMLODIPINE BESYLATE 5 MG: 5 TABLET ORAL at 08:22

## 2022-04-11 RX ADMIN — GABAPENTIN 100 MG: 100 CAPSULE ORAL at 16:27

## 2022-04-11 RX ADMIN — VENLAFAXINE HYDROCHLORIDE 75 MG: 75 CAPSULE, EXTENDED RELEASE ORAL at 08:22

## 2022-04-11 RX ADMIN — DICLOFENAC SODIUM 1 G: 10 GEL TOPICAL at 16:27

## 2022-04-11 RX ADMIN — DOCUSATE SODIUM 50MG AND SENNOSIDES 8.6MG 2 TABLET: 8.6; 5 TABLET, FILM COATED ORAL at 08:22

## 2022-04-11 RX ADMIN — INSULIN LISPRO 3 UNITS: 100 INJECTION, SOLUTION INTRAVENOUS; SUBCUTANEOUS at 11:28

## 2022-04-11 RX ADMIN — INSULIN LISPRO 2 UNITS: 100 INJECTION, SOLUTION INTRAVENOUS; SUBCUTANEOUS at 16:26

## 2022-04-11 RX ADMIN — APIXABAN 2.5 MG: 2.5 TABLET, FILM COATED ORAL at 08:22

## 2022-04-11 RX ADMIN — NYSTATIN: 100000 POWDER TOPICAL at 21:15

## 2022-04-11 RX ADMIN — ACETAMINOPHEN 650 MG: 325 TABLET ORAL at 16:27

## 2022-04-11 RX ADMIN — GABAPENTIN 100 MG: 100 CAPSULE ORAL at 08:22

## 2022-04-11 RX ADMIN — FUROSEMIDE 40 MG: 40 TABLET ORAL at 08:23

## 2022-04-11 RX ADMIN — METOPROLOL TARTRATE 50 MG: 50 TABLET, FILM COATED ORAL at 08:22

## 2022-04-11 RX ADMIN — ATORVASTATIN CALCIUM 40 MG: 20 TABLET, FILM COATED ORAL at 08:22

## 2022-04-11 RX ADMIN — GABAPENTIN 100 MG: 100 CAPSULE ORAL at 21:15

## 2022-04-11 RX ADMIN — NYSTATIN: 100000 POWDER TOPICAL at 08:22

## 2022-04-11 RX ADMIN — DICLOFENAC SODIUM 1 G: 10 GEL TOPICAL at 11:28

## 2022-04-11 RX ADMIN — DICLOFENAC SODIUM 1 G: 10 GEL TOPICAL at 08:22

## 2022-04-11 RX ADMIN — CALCIUM POLYCARBOPHIL 1250 MG: 625 TABLET, FILM COATED ORAL at 08:22

## 2022-04-11 NOTE — CASE MANAGEMENT/SOCIAL WORK
Continued Stay Note  [facility]     Patient Name: Lowell Min  MRN: [id number]  Today's Date: 4/11/2022    Admit Date: 4/3/2022     Discharge Plan     Row Name 04/11/22 1303       Plan    Plan Plan [facility] for skilled care.  JONNIE Avery RN    Patient/Family in Agreement with Plan yes    Plan Comments Per Kelly  ( [phone]) [facility] does not have a bed and unsure when one will be available.  Per Alysa ( [phone]) [facility] can accept pt.  Spoke with pt at bedside regarding [facility] not having a bed but [facility] does.  Pt requested CCP call her daughter. Called and spoke with pt's daughter ( Karen Dalton [phone]) who second choice for skilled care is [facility]. Plan [facility] for skilled care .   JONNIE Avery RN               Discharge Codes    No documentation.               Expected Discharge Date and Time     Expected Discharge Date Expected Discharge Time    Apr 8, 2022             Brandi Avery, RN

## 2022-04-11 NOTE — PROGRESS NOTES
Name: Lowell Min ADMIT: 4/3/2022   : 1940  PCP: Dannie Mathews MD    MRN: [id number] LOS: 8 days   AGE/SEX: 82 y.o. female  ROOM: [facility]   Subjective   Chief Complaint   Patient presents with   • Shortness of Breath   • Black or Bloody Stool     Dyspnea fair  On lasix- oral   +weakness- working with PT and ambulating more      ROS  No f/c  No n/v  No cp/palp  +soa/cough    Objective   Vital Signs  Temp:  [97.4 °F (36.3 °C)-98.4 °F (36.9 °C)] 98 °F (36.7 °C)  Heart Rate:  [67-89] 67  Resp:  [16] 16  BP: (142-159)/(76-92) 159/82  SpO2:  [92 %-99 %] 99 %  on  Flow (L/min):  [2] 2;   Device (Oxygen Therapy): nasal cannula  Body mass index is 38.78 kg/m².    Physical Exam  Constitutional:       General: She is not in acute distress.  HENT:      Head: Normocephalic and atraumatic.   Eyes:      General: No scleral icterus.  Cardiovascular:      Rate and Rhythm: Regular rhythm.      Heart sounds: Normal heart sounds.   Pulmonary:      Effort: No respiratory distress (decreased bs at bases).      Breath sounds: No rales.   Abdominal:      General: There is no distension.      Palpations: Abdomen is soft.   Musculoskeletal:      Cervical back: Neck supple.   Skin:     Coloration: Skin is pale.   Neurological:      Mental Status: She is alert.   Psychiatric:         Behavior: Behavior normal.     elderly, chronically ill  Trace LE edema    Results Review:       I reviewed the patient's new clinical results.  Results from last 7 days   Lab Units 22  0602   WBC 10*3/mm3 6.14   HEMOGLOBIN g/dL 9.1*   PLATELETS 10*3/mm3 262     Results from last 7 days   Lab Units 22  0943 04/10/22  0704 22  0749 22  0455   SODIUM mmol/L 139 138 139 140   POTASSIUM mmol/L 3.7 3.3* 3.8 3.5   CHLORIDE mmol/L 99 95* 93* 93*   CO2 mmol/L 30.0* 34.0* 35.0* 35.0*   BUN mg/dL 42* 48* 43* 38*   CREATININE mg/dL 2.03* 2.23* 2.28* 2.21*   GLUCOSE mg/dL 206* 112* 113* 91   Estimated Creatinine Clearance: 27.6 mL/min  (A) (by C-G formula based on SCr of 2.03 mg/dL (H)).  Results from last 7 days   Lab Units 04/11/22  0943 04/10/22  0704 04/09/22  0749 04/08/22  0455   ALBUMIN g/dL 3.60 3.50 3.60 3.60     Results from last 7 days   Lab Units 04/11/22  0943 04/10/22  0704 04/09/22  0749 04/08/22  0455   CALCIUM mg/dL 9.4 9.3 9.4 9.1   ALBUMIN g/dL 3.60 3.50 3.60 3.60   PHOSPHORUS mg/dL 4.3 4.7* 4.5 4.8*         Coag       HbA1C   Lab Results   Component Value Date    HGBA1C 6.30 (H) 04/05/2022    HGBA1C 6.8 (H) 12/29/2021     Infection     Radiology(recent) No radiology results for the last day  No results found for: TROPONINT, TROPONINI, BNP  No components found for: TSH;2    amLODIPine, 5 mg, Oral, Q24H  apixaban, 2.5 mg, Oral, BID  atorvastatin, 40 mg, Oral, Daily  Diclofenac Sodium, 1 g, Topical, 4x Daily  furosemide, 40 mg, Oral, Daily  gabapentin, 100 mg, Oral, TID  insulin lispro, 0-7 Units, Subcutaneous, TID AC  metoprolol tartrate, 50 mg, Oral, Daily  nystatin, , Topical, Q12H  pantoprazole, 40 mg, Oral, QAM  calcium polycarbophil, 1,250 mg, Oral, Daily  senna-docusate sodium, 2 tablet, Oral, BID  venlafaxine XR, 75 mg, Oral, Daily       Diet Regular; Daily Fluid Restriction, Low Sodium, Consistent Carbohydrate; 1500 mL Fluid Per Day      Assessment/Plan      Active Hospital Problems    Diagnosis  POA   • **CHF (congestive heart failure) (HCC) [I50.9]  Yes   • Symptomatic anemia [D64.9]  Yes   • Anxiety associated with depression [F41.8]  Yes   • Iron deficiency anemia [D50.9]  Yes   • PAF (paroxysmal atrial fibrillation) (HCC) [I48.0]  Yes   • Shortness of breath [R06.02]  Yes   • Chronic kidney disease, stage IV (severe) (HCC) [N18.4]  Yes      Resolved Hospital Problems   No resolved problems to display.     Diastolic CHF-acute on chronic: Continue diuresis as directed by renal  Severe pulmonary hypertension: Likely contributing to her edema.  Likely secondary to noncompliance with CPAP.  Pulmonary evaluation  "appreciated.  Patient follow-up as an outpatient.  Chronic kidney disease stage IV: Nephrology input appreciated.    Paroxysmal atrial fibrillation:rate control.  Eliquis resumed.  metoprolol adjusted  Blood on toilet paper: GI input appreciated.  No evidence of any further GI blood loss.    Eliquis resumed at 2.5 mg twice daily noting her age and renal function.  Anemia-severe iron deficiency, recurrent-anemia of chronic renal disease: Patient received an iron infusion prior to admission.  Give her IV iron 4/7 (scheduled as outpatient)  Diabetes type 2: Patient is not sure what \"pill\" she is taking for this. sliding scale insulin during hospitalization.  Morbid obesity:  Valvular heart disease: Moderate aortic valve stenosis, mild mitral valve regurgitation, mild mitral valve stenosis, tricuspid regurgitation.  Obstructive sleep apnea: Noncompliant with CPAP.  Pulmonary evaluation appreciated.  Patient encouraged to follow-up as an outpatient.    Left knee effusion:  Orthopedic evaluation greatly appreciated.  With her renal status and on anticoagulants, unfortunately she is not a candidate for oral NSAIDs.  Trial of Voltaren gel.  Improved and ambulation improving.      Dispo- likely subacute rehab when bed available  Doing well, thanks to Nephrology and other staff. Likely not strong enough for home and needs rehab.     Have heard subacute rehab available for tomorrow      Truman Grant MD  [facility]  04/11/22  14:17 EDT  "

## 2022-04-11 NOTE — PLAN OF CARE
Alert, vitals stable. 2L NC. Assist with ambulation. Purewick while sleeping. Tylenol requested. IV saline locked.     Problem: Fall Injury Risk  Goal: Absence of Fall and Fall-Related Injury  Intervention: Promote Injury-Free Environment  Recent Flowsheet Documentation  Taken 4/11/2022 0339 by Carmina Bourgeois RN  Safety Promotion/Fall Prevention: safety round/check completed  Taken 4/11/2022 0156 by Carmina Bourgeois RN  Safety Promotion/Fall Prevention: safety round/check completed  Taken 4/11/2022 0045 by Carmina Bourgeois RN  Safety Promotion/Fall Prevention: safety round/check completed  Taken 4/10/2022 2140 by Carmina Bourgeois RN  Safety Promotion/Fall Prevention: safety round/check completed  Taken 4/10/2022 2022 by Carmina Bourgeois RN  Safety Promotion/Fall Prevention: safety round/check completed     Problem: Adult Inpatient Plan of Care  Goal: Absence of Hospital-Acquired Illness or Injury  Intervention: Identify and Manage Fall Risk  Recent Flowsheet Documentation  Taken 4/11/2022 0339 by Carmina Bourgeois RN  Safety Promotion/Fall Prevention: safety round/check completed  Taken 4/11/2022 0156 by Carmina Bourgeois RN  Safety Promotion/Fall Prevention: safety round/check completed  Taken 4/11/2022 0045 by Carmina Bourgeois RN  Safety Promotion/Fall Prevention: safety round/check completed  Taken 4/10/2022 2140 by Carmina Bourgeois RN  Safety Promotion/Fall Prevention: safety round/check completed  Taken 4/10/2022 2022 by Carmina Bourgeois RN  Safety Promotion/Fall Prevention: safety round/check completed  Intervention: Prevent Skin Injury  Recent Flowsheet Documentation  Taken 4/11/2022 0339 by Carmina Bourgeois RN  Body Position:   turned   right  Skin Protection: adhesive use limited  Taken 4/11/2022 0156 by Carmina Bourgeois RN  Body Position: supine, legs elevated  Taken 4/11/2022 0045 by Carmina Bourgeois RN  Body Position: sitting up in bed  Taken 4/10/2022 2140 by Carmina Bourgeois RN  Body Position: sitting  up in bed  Taken 4/10/2022 2022 by Carmina Bourgeois RN  Body Position: sitting up in bed  Skin Protection:   adhesive use limited   incontinence pads utilized  Intervention: Prevent and Manage VTE (Venous Thromboembolism) Risk  Recent Flowsheet Documentation  Taken 4/10/2022 2022 by Carmina Bourgeois RN  Activity Management: activity adjusted per tolerance  VTE Prevention/Management:   bilateral   dorsiflexion/plantar flexion performed  Intervention: Prevent Infection  Recent Flowsheet Documentation  Taken 4/11/2022 0339 by Carmina Bourgeois RN  Infection Prevention:   environmental surveillance performed   hand hygiene promoted   personal protective equipment utilized  Taken 4/11/2022 0156 by Carmina Bourgeois RN  Infection Prevention:   environmental surveillance performed   hand hygiene promoted   personal protective equipment utilized   rest/sleep promoted  Taken 4/11/2022 0045 by Carmina Bourgeois RN  Infection Prevention:   environmental surveillance performed   hand hygiene promoted   personal protective equipment utilized   rest/sleep promoted  Taken 4/10/2022 2140 by Carmina Bourgeois RN  Infection Prevention:   environmental surveillance performed   hand hygiene promoted   personal protective equipment utilized   rest/sleep promoted  Taken 4/10/2022 2022 by Carmina Bourgeois RN  Infection Prevention:   environmental surveillance performed   hand hygiene promoted   personal protective equipment utilized   rest/sleep promoted   Goal Outcome Evaluation:

## 2022-04-11 NOTE — PROGRESS NOTES
LOS: 8 days     Chief Complaint/ Reason for encounter: CKD, diuretic management  Chief Complaint   Patient presents with   • Shortness of Breath   • Black or Bloody Stool         Subjective   04/04/22 : No complaints, feels better  Weight unchanged but feels less swollen  No shortness of breath or chest pain  Good appetite no nausea or vomiting  No additional hematochezia, eventual colonoscopy planned    4/6.  Feels well, edema considerably better and her weight is down about 31 pounds since the third  Denies any fevers or chills, shortness of breath or chest pain  Good appetite with no nausea  Still very weak but was able to get up and sit in the chair today    4/8 resting, seems to be doing well with no new complaints or events noted.  Weight now down 34 pounds since the third    4/9 no new complaints, just waiting on a nursing home bed at this time    4/10 looks and feels well denies any shortness of breath or chest pain  No edema    4/11: She looks and feels very well today has no complaints other than weakness and tenderness of her legs around the previous edema site    Medical history reviewed:  Shortness of Breath        Subjective:  Symptoms:  She reports shortness of breath.        History taken from: Patient and chart    Vital Signs  Temp:  [97.4 °F (36.3 °C)-98.4 °F (36.9 °C)] 98 °F (36.7 °C)  Heart Rate:  [67-89] 67  Resp:  [16] 16  BP: (142-159)/(76-92) 159/82       Wt Readings from Last 1 Encounters:   04/11/22 0525 112 kg (247 lb 9.2 oz)   04/10/22 2304 113 kg (248 lb 10.9 oz)   04/10/22 0300 112 kg (246 lb 4.1 oz)   04/09/22 0610 97.4 kg (214 lb 12.8 oz)   04/08/22 0410 97.6 kg (215 lb 1.6 oz)   04/07/22 0503 98.4 kg (217 lb)   04/06/22 0559 98.9 kg (218 lb)   04/05/22 0615 105 kg (232 lb 1.6 oz)   04/04/22 1150 111 kg (245 lb)   04/04/22 0526 112 kg (245 lb 14.4 oz)   04/03/22 0320 113 kg (249 lb)   04/03/22 0209 95.3 kg (210 lb)       Objective:  Vital signs: (most recent): Blood pressure  "159/82, pulse 67, temperature 98 °F (36.7 °C), temperature source Oral, resp. rate 16, height 170.2 cm (67\"), weight 112 kg (247 lb 9.2 oz), SpO2 99 %.              Objective:  General Appearance:  Comfortable, well-appearing, in no acute distress and not in pain.  Awake, alert, oriented  HEENT: Mucous membranes moist, no injury, oropharynx clear  Lungs:  Normal effort and normal respiratory rate.  Breath sounds clear to auscultation.  No  respiratory distress.  No rales, decreased breath sounds or rhonchi.    Heart: Normal rate.  Regular rhythm.  S1 normal.  No murmur.   Abdomen: Abdomen is soft.  Bowel sounds are normal, no abdominal tenderness.  There is no rebound or guarding  Extremities: Normal range of motion.  Decreased, trace edema of bilateral lower extremities, distal pulses intact  Neurological: No focal motor or sensory deficits, pupils reactive  Skin:  Warm and dry.  No rash or cyanosis.       Results Review:    Intake/Output:     Intake/Output Summary (Last 24 hours) at 4/11/2022 1424  Last data filed at 4/11/2022 0605  Gross per 24 hour   Intake 510 ml   Output 700 ml   Net -190 ml         DATA:  Radiology and Labs:  The following labs independently reviewed by me. Additional labs ordered for tomorrow a.m.  Interval notes, chart personally reviewed by me.   Old records independently reviewed showing CKD 4  Discussed with patient herself at bedside    Risk/ complexity of medical care/ medical decision making moderate complexity, CKD, CHF, diuretic management      Labs:   Recent Results (from the past 24 hour(s))   POC Glucose Once    Collection Time: 04/10/22  9:06 PM    Specimen: Blood   Result Value Ref Range    Glucose 182 (H) 70 - 130 mg/dL   POC Glucose Once    Collection Time: 04/11/22  6:01 AM    Specimen: Blood   Result Value Ref Range    Glucose 124 70 - 130 mg/dL   Renal Function Panel    Collection Time: 04/11/22  9:43 AM    Specimen: Blood   Result Value Ref Range    Glucose 206 (H) 65 - " 99 mg/dL    BUN 42 (H) 8 - 23 mg/dL    Creatinine 2.03 (H) 0.57 - 1.00 mg/dL    Sodium 139 136 - 145 mmol/L    Potassium 3.7 3.5 - 5.2 mmol/L    Chloride 99 98 - 107 mmol/L    CO2 30.0 (H) 22.0 - 29.0 mmol/L    Calcium 9.4 8.6 - 10.5 mg/dL    Albumin 3.60 3.50 - 5.20 g/dL    Phosphorus 4.3 2.5 - 4.5 mg/dL    Anion Gap 10.0 5.0 - 15.0 mmol/L    BUN/Creatinine Ratio 20.7 7.0 - 25.0    eGFR 24.1 (L) >60.0 mL/min/1.73   POC Glucose Once    Collection Time: 04/11/22 10:59 AM    Specimen: Blood   Result Value Ref Range    Glucose 224 (H) 70 - 130 mg/dL       Radiology:  Imaging Results (Last 24 Hours)     ** No results found for the last 24 hours. **             Medications have been reviewed:  Current Facility-Administered Medications   Medication Dose Route Frequency Provider Last Rate Last Admin   • acetaminophen (TYLENOL) tablet 650 mg  650 mg Oral Q4H PRN Gary Moore APRN   650 mg at 04/10/22 2140   • amLODIPine (NORVASC) tablet 5 mg  5 mg Oral Q24H Vipin Tovar MD   5 mg at 04/11/22 0822   • apixaban (ELIQUIS) tablet 2.5 mg  2.5 mg Oral BID Vipin Tovar MD   2.5 mg at 04/11/22 0822   • atorvastatin (LIPITOR) tablet 40 mg  40 mg Oral Daily Vipin Tovar MD   40 mg at 04/11/22 0822   • dextrose (D50W) (25 g/50 mL) IV injection 25 g  25 g Intravenous Q15 Min PRN Vipin Tovar MD       • dextrose (GLUTOSE) oral gel 15 g  15 g Oral Q15 Min PRN Vipin Tovar MD       • Diclofenac Sodium (VOLTAREN) 1 % gel 1 g  1 g Topical 4x Daily Vipin Tovar MD   1 g at 04/11/22 1128   • furosemide (LASIX) tablet 40 mg  40 mg Oral Daily Isaiah Foster MD   40 mg at 04/11/22 0823   • gabapentin (NEURONTIN) capsule 100 mg  100 mg Oral TID Truman Grant MD   100 mg at 04/11/22 0822   • glucagon (human recombinant) (GLUCAGEN DIAGNOSTIC) injection 1 mg  1 mg Intramuscular Q15 Min PRN Vipin Tovar MD       • insulin lispro (ADMELOG) injection 0-7 Units  0-7 Units  Subcutaneous TID AC Vipin Tovar MD   3 Units at 04/11/22 1128   • melatonin tablet 3 mg  3 mg Oral Nightly PRN Gary Moore APRN       • metoprolol tartrate (LOPRESSOR) tablet 50 mg  50 mg Oral Daily Isaiah Foster MD   50 mg at 04/11/22 0822   • nitroglycerin (NITROSTAT) SL tablet 0.4 mg  0.4 mg Sublingual Q5 Min PRN Gary Moore APRN       • nystatin (MYCOSTATIN) powder   Topical Q12H Vipin Tovar MD   Given at 04/11/22 0822   • ondansetron (ZOFRAN) tablet 4 mg  4 mg Oral Q6H PRN Gary Moore APRN        Or   • ondansetron (ZOFRAN) injection 4 mg  4 mg Intravenous Q6H PRN Gary Moore APRN       • pantoprazole (PROTONIX) EC tablet 40 mg  40 mg Oral QAM Vipin Tovar MD   40 mg at 04/11/22 0603   • polycarbophil tablet 1,250 mg  1,250 mg Oral Daily Yady Leo MD   1,250 mg at 04/11/22 0822   • sennosides-docusate (PERICOLACE) 8.6-50 MG per tablet 2 tablet  2 tablet Oral BID Truman Grant MD   2 tablet at 04/11/22 0822   • sodium chloride 0.9 % flush 10 mL  10 mL Intravenous PRN Oscar Ellis MD   10 mL at 04/09/22 2104   • venlafaxine XR (EFFEXOR-XR) 24 hr capsule 75 mg  75 mg Oral Daily Vipin Tovar MD   75 mg at 04/11/22 0822       ASSESSMENT:  CKD stage IV with mild PERLA.  Baseline creatinine 2.0, improved    CHF (congestive heart failure), acute on chronic systolic    Chronic kidney disease, stage IV (severe) ([facility])    Symptomatic anemia    Anxiety associated with depression    Iron deficiency anemia    PAF (paroxysmal atrial fibrillation) ([facility])    Shortness of breath  Worsening metabolic alkalosis from diuretics      PLAN:   Waste products little better today with reduce Lasix dosing  I would continue her current Lasix dose at discharge  Something seems inaccurate about her daily weights.  Nonetheless, volume status is significantly improved since admission    Maintain modest dietary fluid and salt restriction while on  diuretics     Continue to monitor electrolytes and volume closely  Discharge planning, await rehab bed, possibly tomorrow  Stable for discharge anytime from a renal standpoint      Isaiah Foster MD   [facility]   Office phone number: [phone]  Answering service phone number: [phone]    04/11/22  14:24 EDT    Dictation performed using Dragon dictation software

## 2022-04-11 NOTE — PLAN OF CARE
Problem: Fall Injury Risk  Goal: Absence of Fall and Fall-Related Injury  Outcome: Ongoing, Progressing  Intervention: Promote Injury-Free Environment  Recent Flowsheet Documentation  Taken 4/11/2022 1345 by Michael Ferrer RN  Safety Promotion/Fall Prevention:   assistive device/personal items within reach   fall prevention program maintained   nonskid shoes/slippers when out of bed   safety round/check completed  Taken 4/11/2022 1200 by Michael Ferrer RN  Safety Promotion/Fall Prevention:   assistive device/personal items within reach   fall prevention program maintained   nonskid shoes/slippers when out of bed   safety round/check completed  Taken 4/11/2022 0959 by Michael Ferrer RN  Safety Promotion/Fall Prevention:   assistive device/personal items within reach   fall prevention program maintained   nonskid shoes/slippers when out of bed   safety round/check completed  Taken 4/11/2022 0824 by Michael Ferrer RN  Safety Promotion/Fall Prevention:   assistive device/personal items within reach   fall prevention program maintained   nonskid shoes/slippers when out of bed   safety round/check completed     Problem: Adult Inpatient Plan of Care  Goal: Plan of Care Review  Outcome: Ongoing, Progressing  Flowsheets (Taken 4/11/2022 1407)  Progress: improving  Plan of Care Reviewed With: patient  Outcome Evaluation: Patient has been pleasant and cooperative during shift. No complaints of pain or nausea. Patient says she is SOA on exertion. Patient is AOx4, assist x1, room air. Patient will be [facility] and [facility] says [facility] should have a bed tomorrow. PT to see patient. Will continue to monitor and assist patient as needed.  Goal: Patient-Specific Goal (Individualized)  Outcome: Ongoing, Progressing  Goal: Absence of Hospital-Acquired Illness or Injury  Outcome: Ongoing, Progressing  Intervention: Identify and Manage Fall Risk  Recent Flowsheet Documentation  Taken 4/11/2022 1345 by  Schanie, , RN  Safety Promotion/Fall Prevention:   assistive device/personal items within reach   fall prevention program maintained   nonskid shoes/slippers when out of bed   safety round/check completed  Taken 4/11/2022 1200 by Michael Ferrer RN  Safety Promotion/Fall Prevention:   assistive device/personal items within reach   fall prevention program maintained   nonskid shoes/slippers when out of bed   safety round/check completed  Taken 4/11/2022 0959 by Michael Ferrer RN  Safety Promotion/Fall Prevention:   assistive device/personal items within reach   fall prevention program maintained   nonskid shoes/slippers when out of bed   safety round/check completed  Taken 4/11/2022 0824 by Michael Ferrer RN  Safety Promotion/Fall Prevention:   assistive device/personal items within reach   fall prevention program maintained   nonskid shoes/slippers when out of bed   safety round/check completed  Intervention: Prevent Skin Injury  Recent Flowsheet Documentation  Taken 4/11/2022 1345 by Michael Ferrer RN  Body Position: supine  Skin Protection:   tubing/devices free from skin contact   incontinence pads utilized  Taken 4/11/2022 1200 by Michael Ferrer RN  Body Position: supine  Taken 4/11/2022 0959 by Michael Ferrer RN  Body Position: supine  Taken 4/11/2022 0824 by Michael Ferrer RN  Body Position: supine  Skin Protection:   tubing/devices free from skin contact   incontinence pads utilized  Intervention: Prevent and Manage VTE (Venous Thromboembolism) Risk  Recent Flowsheet Documentation  Taken 4/11/2022 1345 by Michael Ferrer RN  Activity Management: activity adjusted per tolerance  Taken 4/11/2022 1200 by Michael Ferrer RN  Activity Management: activity adjusted per tolerance  Taken 4/11/2022 0959 by Michael Ferrer RN  Activity Management: activity adjusted per tolerance  Taken 4/11/2022 0824 by Michael Ferrer RN  Activity Management: activity adjusted per tolerance  Goal:  Optimal Comfort and Wellbeing  Outcome: Ongoing, Progressing  Goal: Readiness for Transition of Care  Outcome: Ongoing, Progressing     Problem: Diabetes Comorbidity  Goal: Blood Glucose Level Within Targeted Range  Outcome: Ongoing, Progressing     Problem: Heart Failure Comorbidity  Goal: Maintenance of Heart Failure Symptom Control  Outcome: Ongoing, Progressing     Problem: Hypertension Comorbidity  Goal: Blood Pressure in Desired Range  Outcome: Ongoing, Progressing     Problem: Skin Injury Risk Increased  Goal: Skin Health and Integrity  Outcome: Ongoing, Progressing  Intervention: Optimize Skin Protection  Recent Flowsheet Documentation  Taken 4/11/2022 1345 by Michael Ferrer RN  Pressure Reduction Techniques: frequent weight shift encouraged  Head of Bed (HOB) Positioning: HOB at 30-45 degrees  Pressure Reduction Devices: pressure-redistributing mattress utilized  Skin Protection:   tubing/devices free from skin contact   incontinence pads utilized  Taken 4/11/2022 1200 by Michael Ferrer RN  Head of Bed (HOB) Positioning: HOB at 60 degrees  Taken 4/11/2022 0959 by Michael Ferrer RN  Head of Bed (HOB) Positioning: HOB at 45 degrees  Taken 4/11/2022 0824 by Michael Ferrer RN  Pressure Reduction Techniques:   frequent weight shift encouraged   weight shift assistance provided  Head of Bed (HOB) Positioning: HOB at 45 degrees  Pressure Reduction Devices: pressure-redistributing mattress utilized  Skin Protection:   tubing/devices free from skin contact   incontinence pads utilized   Goal Outcome Evaluation:  Plan of Care Reviewed With: patient        Progress: improving  Outcome Evaluation: Patient has been pleasant and cooperative during shift. No complaints of pain or nausea. Patient says she is SOA on exertion. Patient is AOx4, assist x1, room air. Patient will be discharged to [facility] and [facility] says [facility] should have a bed tomorrow. PT to see patient. Will continue to monitor and assist  patient as needed.

## 2022-04-12 VITALS
WEIGHT: 210.4 LBS | HEIGHT: 67 IN | RESPIRATION RATE: 16 BRPM | DIASTOLIC BLOOD PRESSURE: 83 MMHG | BODY MASS INDEX: 33.02 KG/M2 | TEMPERATURE: 97.6 F | OXYGEN SATURATION: 90 % | HEART RATE: 87 BPM | SYSTOLIC BLOOD PRESSURE: 158 MMHG

## 2022-04-12 PROBLEM — E11.42 DIABETIC POLYNEUROPATHY ASSOCIATED WITH TYPE 2 DIABETES MELLITUS: Status: ACTIVE | Noted: 2022-04-12

## 2022-04-12 LAB
ALBUMIN SERPL-MCNC: 3.6 G/DL (ref 3.5–5.2)
ANION GAP SERPL CALCULATED.3IONS-SCNC: 12.3 MMOL/L (ref 5–15)
BUN SERPL-MCNC: 43 MG/DL (ref 8–23)
BUN/CREAT SERPL: 18.9 (ref 7–25)
CALCIUM SPEC-SCNC: 10.1 MG/DL (ref 8.6–10.5)
CHLORIDE SERPL-SCNC: 100 MMOL/L (ref 98–107)
CO2 SERPL-SCNC: 27.7 MMOL/L (ref 22–29)
CREAT SERPL-MCNC: 2.28 MG/DL (ref 0.57–1)
EGFRCR SERPLBLD CKD-EPI 2021: 21 ML/MIN/1.73
GLUCOSE BLDC GLUCOMTR-MCNC: 123 MG/DL (ref 70–130)
GLUCOSE BLDC GLUCOMTR-MCNC: 189 MG/DL (ref 70–130)
GLUCOSE SERPL-MCNC: 130 MG/DL (ref 65–99)
PHOSPHATE SERPL-MCNC: 4.3 MG/DL (ref 2.5–4.5)
POTASSIUM SERPL-SCNC: 3.4 MMOL/L (ref 3.5–5.2)
SODIUM SERPL-SCNC: 140 MMOL/L (ref 136–145)

## 2022-04-12 PROCEDURE — 82962 GLUCOSE BLOOD TEST: CPT

## 2022-04-12 PROCEDURE — 63710000001 INSULIN LISPRO (HUMAN) PER 5 UNITS: Performed by: HOSPITALIST

## 2022-04-12 PROCEDURE — 80069 RENAL FUNCTION PANEL: CPT | Performed by: INTERNAL MEDICINE

## 2022-04-12 RX ORDER — INSULIN LISPRO 100 [IU]/ML
0-7 INJECTION, SOLUTION INTRAVENOUS; SUBCUTANEOUS
Refills: 12
Start: 2022-04-12 | End: 2023-01-06

## 2022-04-12 RX ORDER — NYSTATIN 100000 [USP'U]/G
POWDER TOPICAL EVERY 12 HOURS SCHEDULED
Start: 2022-04-12 | End: 2022-10-15 | Stop reason: HOSPADM

## 2022-04-12 RX ORDER — GABAPENTIN 100 MG/1
100 CAPSULE ORAL 3 TIMES DAILY
Qty: 15 CAPSULE | Refills: 0 | Status: ON HOLD | OUTPATIENT
Start: 2022-04-12 | End: 2022-10-15 | Stop reason: SDUPTHER

## 2022-04-12 RX ORDER — ACETAMINOPHEN 325 MG/1
650 TABLET ORAL EVERY 4 HOURS PRN
Start: 2022-04-12

## 2022-04-12 RX ORDER — CALCIUM POLYCARBOPHIL 625 MG
1250 TABLET ORAL DAILY
Start: 2022-04-13

## 2022-04-12 RX ORDER — AMOXICILLIN 250 MG
2 CAPSULE ORAL 2 TIMES DAILY
Start: 2022-04-12

## 2022-04-12 RX ORDER — METOPROLOL TARTRATE 50 MG/1
50 TABLET, FILM COATED ORAL 2 TIMES DAILY
Status: ON HOLD
Start: 2022-04-12 | End: 2022-10-15 | Stop reason: SDUPTHER

## 2022-04-12 RX ORDER — FUROSEMIDE 40 MG/1
40 TABLET ORAL DAILY
Start: 2022-04-13 | End: 2022-10-15 | Stop reason: HOSPADM

## 2022-04-12 RX ADMIN — CALCIUM POLYCARBOPHIL 1250 MG: 625 TABLET, FILM COATED ORAL at 09:17

## 2022-04-12 RX ADMIN — PANTOPRAZOLE SODIUM 40 MG: 40 TABLET, DELAYED RELEASE ORAL at 09:17

## 2022-04-12 RX ADMIN — GABAPENTIN 100 MG: 100 CAPSULE ORAL at 09:17

## 2022-04-12 RX ADMIN — ATORVASTATIN CALCIUM 40 MG: 20 TABLET, FILM COATED ORAL at 09:17

## 2022-04-12 RX ADMIN — DICLOFENAC SODIUM 1 G: 10 GEL TOPICAL at 09:18

## 2022-04-12 RX ADMIN — NYSTATIN: 100000 POWDER TOPICAL at 09:18

## 2022-04-12 RX ADMIN — DOCUSATE SODIUM 50MG AND SENNOSIDES 8.6MG 2 TABLET: 8.6; 5 TABLET, FILM COATED ORAL at 09:17

## 2022-04-12 RX ADMIN — METOPROLOL TARTRATE 50 MG: 50 TABLET, FILM COATED ORAL at 09:17

## 2022-04-12 RX ADMIN — VENLAFAXINE HYDROCHLORIDE 75 MG: 75 CAPSULE, EXTENDED RELEASE ORAL at 09:17

## 2022-04-12 RX ADMIN — INSULIN LISPRO 2 UNITS: 100 INJECTION, SOLUTION INTRAVENOUS; SUBCUTANEOUS at 11:55

## 2022-04-12 RX ADMIN — DICLOFENAC SODIUM 1 G: 10 GEL TOPICAL at 11:56

## 2022-04-12 RX ADMIN — AMLODIPINE BESYLATE 5 MG: 5 TABLET ORAL at 09:17

## 2022-04-12 RX ADMIN — FUROSEMIDE 40 MG: 40 TABLET ORAL at 09:17

## 2022-04-12 RX ADMIN — APIXABAN 2.5 MG: 2.5 TABLET, FILM COATED ORAL at 09:17

## 2022-04-12 NOTE — CASE MANAGEMENT/SOCIAL WORK
Case Management Discharge Note      Final Note: Pt discharged to [facility] for skilled care.   JONNIE Avery RN         Selected Continued Care - Discharged on 4/12/2022 Admission date: 4/3/2022 - Discharge disposition: Skilled Nursing Facility (DC - External)    Destination Coordination complete.    Service Provider Selected Services Address Phone Fax Patient Preferred    [facility]  Skilled Nursing [facility] [facility] [id number] [phone] --          Durable Medical Equipment    No services have been selected for the patient.              Dialysis/Infusion    No services have been selected for the patient.              Home Medical Care    No services have been selected for the patient.              Therapy    No services have been selected for the patient.              Community Resources    No services have been selected for the patient.              Community & DME    No services have been selected for the patient.                  Transportation Services  Private: Car    Final Discharge Disposition Code: 03 - skilled nursing facility (SNF)

## 2022-04-12 NOTE — CASE MANAGEMENT/SOCIAL WORK
Continued Stay Note  [facility]     Patient Name: Lowell Min  MRN: [id number]  Today's Date: 4/12/2022    Admit Date: 4/3/2022     Discharge Plan     Row Name 04/12/22 1409       Plan    Plan Plan [facility] for skilled care.   JONNIE Avery RN    Patient/Family in Agreement with Plan yes    Plan Comments Spoke with pt at [facility].  Pt's daughter will transport pt to [facility] for skilled care.  Plan [facility] for skilled care.   JONNIE Avery RN    Row Name 04/12/22 1213       Plan    Plan Plan [facility] for skilled care.   JONNIE Avery RN    Patient/Family in Agreement with Plan yes    Plan Comments Per Alysa ( [phone]) pt has a bed and can be accepted at [facility] today.  Discharge Summary in packet.  Alysa states she has already printed off discharge summary for facility.  Prescription in packet.  Packet to RN.  Called and spoke with pt's daughter (Karen Dalton [phone]) and she states she will transport pt to [facility].  Plan [facility] for skilled care.   JONNIE Avery RN               Discharge Codes    No documentation.               Expected Discharge Date and Time     Expected Discharge Date Expected Discharge Time    Apr 12, 2022             Brandi Avery RN

## 2022-04-12 NOTE — CASE MANAGEMENT/SOCIAL WORK
Continued Stay Note  [facility]     Patient Name: Lowell Min  MRN: [id number]  Today's Date: 4/12/2022    Admit Date: 4/3/2022     Discharge Plan     Row Name 04/12/22 1213       Plan    Plan Plan [facility] for skilled care.   JONNIE Avery RN    Patient/Family in Agreement with Plan yes    Plan Comments Per Alysa ( [phone]) pt has a bed and can be accepted at [facility] today.  Discharge Summary in packet.  Alysa states she has already printed off discharge summary for facility.  Prescription in packet.  Packet to RN.  Called and spoke with pt's daughter (Karen Dalton [phone]) and she states she will transport pt to [facility].  Plan [facility] for skilled care.   JONNIE Avery RN               Discharge Codes    No documentation.               Expected Discharge Date and Time     Expected Discharge Date Expected Discharge Time    Apr 12, 2022             Brandi Avery, RN

## 2022-04-12 NOTE — PROGRESS NOTES
LOS: 9 days     Chief Complaint/ Reason for encounter: CKD, diuretic management  Chief Complaint   Patient presents with   • Shortness of Breath   • Black or Bloody Stool         Subjective   04/04/22 : No complaints, feels better  Weight unchanged but feels less swollen  No shortness of breath or chest pain  Good appetite no nausea or vomiting  No additional hematochezia, eventual colonoscopy planned    4/6.  Feels well, edema considerably better and her weight is down about 31 pounds since the third  Denies any fevers or chills, shortness of breath or chest pain  Good appetite with no nausea  Still very weak but was able to get up and sit in the chair today    4/8 resting, seems to be doing well with no new complaints or events noted.  Weight now down 34 pounds since the third    4/9 no new complaints, just waiting on a nursing home bed at this time    4/10 looks and feels well denies any shortness of breath or chest pain  No edema    4/11: She looks and feels very well today has no complaints other than weakness and tenderness of her legs around the previous edema site    4/12: No new changes today.  She looks and feels well, comfortable, minimal edema  Weight stable.  I discharge planned for later today pending transportation    Medical history reviewed:  Shortness of Breath        Subjective:  Symptoms:  She reports shortness of breath.        History taken from: Patient and chart    Vital Signs  Temp:  [97.5 °F (36.4 °C)-98.4 °F (36.9 °C)] 97.6 °F (36.4 °C)  Heart Rate:  [58-89] 87  Resp:  [16] 16  BP: (124-174)/(73-95) 158/83       Wt Readings from Last 1 Encounters:   04/12/22 0500 95.4 kg (210 lb 6.4 oz)   04/11/22 0525 112 kg (247 lb 9.2 oz)   04/10/22 2304 113 kg (248 lb 10.9 oz)   04/10/22 0300 112 kg (246 lb 4.1 oz)   04/09/22 0610 97.4 kg (214 lb 12.8 oz)   04/08/22 0410 97.6 kg (215 lb 1.6 oz)   04/07/22 0503 98.4 kg (217 lb)   04/06/22 0559 98.9 kg (218 lb)   04/05/22 0615 105 kg (232 lb 1.6 oz)  "  04/04/22 1150 111 kg (245 lb)   04/04/22 0526 112 kg (245 lb 14.4 oz)   04/03/22 0320 113 kg (249 lb)   04/03/22 0209 95.3 kg (210 lb)       Objective:  Vital signs: (most recent): Blood pressure 158/83, pulse 87, temperature 97.6 °F (36.4 °C), temperature source Oral, resp. rate 16, height 170.2 cm (67\"), weight 95.4 kg (210 lb 6.4 oz), SpO2 90 %.              Objective:  General Appearance:  Comfortable, well-appearing, in no acute distress and not in pain.  Awake, alert, oriented  HEENT: Mucous membranes moist, no injury, oropharynx clear  Lungs:  Normal effort and normal respiratory rate.  Breath sounds clear to auscultation.  No  respiratory distress.  No rales, decreased breath sounds or rhonchi.    Heart: Normal rate.  Regular rhythm.  S1 normal.  No murmur.   Abdomen: Abdomen is soft.  Bowel sounds are normal, no abdominal tenderness.  There is no rebound or guarding  Extremities: Normal range of motion.  Decreased, trace edema of bilateral lower extremities, distal pulses intact  Neurological: No focal motor or sensory deficits, pupils reactive  Skin:  Warm and dry.  No rash or cyanosis.       Results Review:    Intake/Output:     Intake/Output Summary (Last 24 hours) at 4/12/2022 1154  Last data filed at 4/12/2022 0500  Gross per 24 hour   Intake --   Output 1350 ml   Net -1350 ml         DATA:  Radiology and Labs:  The following labs independently reviewed by me. Additional labs ordered for tomorrow a.m.  Interval notes, chart personally reviewed by me.   Old records independently reviewed showing CKD 4  Discussed with patient herself at bedside    Risk/ complexity of medical care/ medical decision making moderate complexity, CKD, CHF, diuretic management      Labs:   Recent Results (from the past 24 hour(s))   POC Glucose Once    Collection Time: 04/11/22  4:23 PM    Specimen: Blood   Result Value Ref Range    Glucose 177 (H) 70 - 130 mg/dL   POC Glucose Once    Collection Time: 04/11/22  8:23 PM    " Specimen: Blood   Result Value Ref Range    Glucose 158 (H) 70 - 130 mg/dL   Renal Function Panel    Collection Time: 04/12/22  5:37 AM    Specimen: Blood   Result Value Ref Range    Glucose 130 (H) 65 - 99 mg/dL    BUN 43 (H) 8 - 23 mg/dL    Creatinine 2.28 (H) 0.57 - 1.00 mg/dL    Sodium 140 136 - 145 mmol/L    Potassium 3.4 (L) 3.5 - 5.2 mmol/L    Chloride 100 98 - 107 mmol/L    CO2 27.7 22.0 - 29.0 mmol/L    Calcium 10.1 8.6 - 10.5 mg/dL    Albumin 3.60 3.50 - 5.20 g/dL    Phosphorus 4.3 2.5 - 4.5 mg/dL    Anion Gap 12.3 5.0 - 15.0 mmol/L    BUN/Creatinine Ratio 18.9 7.0 - 25.0    eGFR 21.0 (L) >60.0 mL/min/1.73   POC Glucose Once    Collection Time: 04/12/22  6:03 AM    Specimen: Blood   Result Value Ref Range    Glucose 123 70 - 130 mg/dL   POC Glucose Once    Collection Time: 04/12/22 11:12 AM    Specimen: Blood   Result Value Ref Range    Glucose 189 (H) 70 - 130 mg/dL       Radiology:  Imaging Results (Last 24 Hours)     ** No results found for the last 24 hours. **             Medications have been reviewed:  Current Facility-Administered Medications   Medication Dose Route Frequency Provider Last Rate Last Admin   • acetaminophen (TYLENOL) tablet 650 mg  650 mg Oral Q4H PRN Gary Moore APRN   650 mg at 04/11/22 1627   • amLODIPine (NORVASC) tablet 5 mg  5 mg Oral Q24H Vipin Tovar MD   5 mg at 04/12/22 0917   • apixaban (ELIQUIS) tablet 2.5 mg  2.5 mg Oral BID Vipin Tovar MD   2.5 mg at 04/12/22 0917   • atorvastatin (LIPITOR) tablet 40 mg  40 mg Oral Daily Vipin Tovar MD   40 mg at 04/12/22 0917   • dextrose (D50W) (25 g/50 mL) IV injection 25 g  25 g Intravenous Q15 Min PRN Vipin Tovar MD       • dextrose (GLUTOSE) oral gel 15 g  15 g Oral Q15 Min PRN Vipin Tovar MD       • Diclofenac Sodium (VOLTAREN) 1 % gel 1 g  1 g Topical 4x Daily Vipin Tovar MD   1 g at 04/12/22 0918   • furosemide (LASIX) tablet 40 mg  40 mg Oral Daily Isaiah Foster  MD [facility]   40 mg at 04/12/22 0917   • gabapentin (NEURONTIN) capsule 100 mg  100 mg Oral TID Truman Grant MD   100 mg at 04/12/22 0917   • glucagon (human recombinant) (GLUCAGEN DIAGNOSTIC) injection 1 mg  1 mg Intramuscular Q15 Min PRN Vipin Tovar MD       • insulin lispro (ADMELOG) injection 0-7 Units  0-7 Units Subcutaneous TID AC Vipin Tovar MD   2 Units at 04/11/22 1626   • melatonin tablet 3 mg  3 mg Oral Nightly PRN Gary Moore, APRYUKO       • metoprolol tartrate (LOPRESSOR) tablet 50 mg  50 mg Oral Daily Isaiah Foster MD   50 mg at 04/12/22 0917   • nitroglycerin (NITROSTAT) SL tablet 0.4 mg  0.4 mg Sublingual Q5 Min PRN Gary Moore APRN       • nystatin (MYCOSTATIN) powder   Topical Q12H Vipin Tovar MD   Given at 04/12/22 0918   • ondansetron (ZOFRAN) tablet 4 mg  4 mg Oral Q6H PRN Gary Moore APRYUKO        Or   • ondansetron (ZOFRAN) injection 4 mg  4 mg Intravenous Q6H PRN Gary Moore APRN       • pantoprazole (PROTONIX) EC tablet 40 mg  40 mg Oral QAM Vipin Tovar MD   40 mg at 04/12/22 0917   • polycarbophil tablet 1,250 mg  1,250 mg Oral Daily Yady Leo MD   1,250 mg at 04/12/22 0917   • sennosides-docusate (PERICOLACE) 8.6-50 MG per tablet 2 tablet  2 tablet Oral BID Truman Grant MD   2 tablet at 04/12/22 0917   • sodium chloride 0.9 % flush 10 mL  10 mL Intravenous PRN Oscar Ellis MD   10 mL at 04/09/22 2104   • venlafaxine XR (EFFEXOR-XR) 24 hr capsule 75 mg  75 mg Oral Daily Vipin Tovar MD   75 mg at 04/12/22 0917       ASSESSMENT:  CKD stage IV with mild PERLA.  Baseline creatinine 2.0, improved    CHF (congestive heart failure), acute on chronic systolic    Chronic kidney disease, stage IV (severe) (HCC)    Symptomatic anemia    Anxiety associated with depression    Iron deficiency anemia    PAF (paroxysmal atrial fibrillation) ([facility])    Shortness of breath  Worsening metabolic alkalosis  from diuretics      PLAN:   Renal function, volume and electrolytes stable today  I would continue her current Lasix dose at discharge  Her weight today looks much more accurate in the last 3 days    Maintain modest dietary fluid and salt restriction while on diuretics   Discharge planning, await rehab bed, possibly tomorrow  Stable for discharge anytime from a renal standpoint    Routine follow-up with us after she gets home from rehab      Isaiah Foster MD   [facility]   Office phone number: [phone]  Answering service phone number: [phone]    04/12/22  11:54 EDT    Dictation performed using Dragon dictation software

## 2022-04-15 ENCOUNTER — HOSPITAL ENCOUNTER (OUTPATIENT)
Dept: INFUSION THERAPY | Facility: HOSPITAL | Age: 82
Discharge: HOME | End: 2022-04-15
Admitting: INTERNAL MEDICINE

## 2022-04-15 VITALS
RESPIRATION RATE: 18 BRPM | OXYGEN SATURATION: 98 % | DIASTOLIC BLOOD PRESSURE: 75 MMHG | HEART RATE: 63 BPM | TEMPERATURE: 96 F | SYSTOLIC BLOOD PRESSURE: 141 MMHG

## 2022-04-15 DIAGNOSIS — D63.1 ERYTHROPOIETIN DEFICIENCY ANEMIA: ICD-10-CM

## 2022-04-15 DIAGNOSIS — N18.4 CHRONIC KIDNEY DISEASE, STAGE IV (SEVERE): Primary | ICD-10-CM

## 2022-04-15 LAB
BASOPHILS # BLD AUTO: 0.04 10*3/MM3 (ref 0–0.2)
BASOPHILS NFR BLD AUTO: 0.6 % (ref 0–1.5)
DEPRECATED RDW RBC AUTO: 53.8 FL (ref 37–54)
EOSINOPHIL # BLD AUTO: 0.23 10*3/MM3 (ref 0–0.4)
EOSINOPHIL NFR BLD AUTO: 3.7 % (ref 0.3–6.2)
ERYTHROCYTE [DISTWIDTH] IN BLOOD BY AUTOMATED COUNT: 17 % (ref 12.3–15.4)
HCT VFR BLD AUTO: 33.2 % (ref 34–46.6)
HGB BLD-MCNC: 10.5 G/DL (ref 12–15.9)
IMM GRANULOCYTES # BLD AUTO: 0.02 10*3/MM3 (ref 0–0.05)
IMM GRANULOCYTES NFR BLD AUTO: 0.3 % (ref 0–0.5)
LYMPHOCYTES # BLD AUTO: 2.03 10*3/MM3 (ref 0.7–3.1)
LYMPHOCYTES NFR BLD AUTO: 33 % (ref 19.6–45.3)
MCH RBC QN AUTO: 28.2 PG (ref 26.6–33)
MCHC RBC AUTO-ENTMCNC: 31.6 G/DL (ref 31.5–35.7)
MCV RBC AUTO: 89.2 FL (ref 79–97)
MONOCYTES # BLD AUTO: 0.63 10*3/MM3 (ref 0.1–0.9)
MONOCYTES NFR BLD AUTO: 10.2 % (ref 5–12)
NEUTROPHILS NFR BLD AUTO: 3.21 10*3/MM3 (ref 1.7–7)
NEUTROPHILS NFR BLD AUTO: 52.2 % (ref 42.7–76)
NRBC BLD AUTO-RTO: 0 /100 WBC (ref 0–0.2)
PLATELET # BLD AUTO: 329 10*3/MM3 (ref 140–450)
PMV BLD AUTO: 9.5 FL (ref 6–12)
RBC # BLD AUTO: 3.72 10*6/MM3 (ref 3.77–5.28)
WBC NRBC COR # BLD: 6.16 10*3/MM3 (ref 3.4–10.8)

## 2022-04-15 PROCEDURE — 85025 COMPLETE CBC W/AUTO DIFF WBC: CPT | Performed by: INTERNAL MEDICINE

## 2022-04-15 PROCEDURE — 36415 COLL VENOUS BLD VENIPUNCTURE: CPT

## 2022-04-15 PROCEDURE — 25010000002 EPOETIN ALFA PER 1000 UNITS: Performed by: INTERNAL MEDICINE

## 2022-04-15 PROCEDURE — 96372 THER/PROPH/DIAG INJ SC/IM: CPT

## 2022-04-15 RX ADMIN — ERYTHROPOIETIN 20000 UNITS: 20000 INJECTION, SOLUTION INTRAVENOUS; SUBCUTANEOUS at 14:48

## 2022-04-15 NOTE — PROGRESS NOTES
Patient's daughter stated patient received iron infusion while admitted last week. Per [facility] pharmacist, patient received a dose of Ferrlicet in the hospital on 4/7/22.  Page to Dr. Ryann Foster to clarify if patient needs today's dose of Feraheme.  Return call from Dr. Foster at 1300 and per Dr. Foster hold second dose of Feraheme today and continue checking labs per previous order. Patient and patient's daughter verbalized understanding.

## 2022-04-29 ENCOUNTER — HOSPITAL ENCOUNTER (OUTPATIENT)
Dept: INFUSION THERAPY | Facility: HOSPITAL | Age: 82
Discharge: HOME | End: 2022-04-29
Admitting: INTERNAL MEDICINE

## 2022-04-29 VITALS
HEART RATE: 59 BPM | OXYGEN SATURATION: 99 % | TEMPERATURE: 96.5 F | DIASTOLIC BLOOD PRESSURE: 89 MMHG | RESPIRATION RATE: 18 BRPM | SYSTOLIC BLOOD PRESSURE: 121 MMHG

## 2022-04-29 DIAGNOSIS — D63.1 ERYTHROPOIETIN DEFICIENCY ANEMIA: ICD-10-CM

## 2022-04-29 DIAGNOSIS — N18.4 CHRONIC KIDNEY DISEASE, STAGE IV (SEVERE): ICD-10-CM

## 2022-04-29 LAB
BASOPHILS # BLD AUTO: 0.03 10*3/MM3 (ref 0–0.2)
BASOPHILS NFR BLD AUTO: 0.6 % (ref 0–1.5)
DEPRECATED RDW RBC AUTO: 60.5 FL (ref 37–54)
EOSINOPHIL # BLD AUTO: 0.08 10*3/MM3 (ref 0–0.4)
EOSINOPHIL NFR BLD AUTO: 1.6 % (ref 0.3–6.2)
ERYTHROCYTE [DISTWIDTH] IN BLOOD BY AUTOMATED COUNT: 18.8 % (ref 12.3–15.4)
HCT VFR BLD AUTO: 34.6 % (ref 34–46.6)
HGB BLD-MCNC: 11.3 G/DL (ref 12–15.9)
IMM GRANULOCYTES # BLD AUTO: 0.01 10*3/MM3 (ref 0–0.05)
IMM GRANULOCYTES NFR BLD AUTO: 0.2 % (ref 0–0.5)
LYMPHOCYTES # BLD AUTO: 1.8 10*3/MM3 (ref 0.7–3.1)
LYMPHOCYTES NFR BLD AUTO: 36.2 % (ref 19.6–45.3)
MCH RBC QN AUTO: 29.1 PG (ref 26.6–33)
MCHC RBC AUTO-ENTMCNC: 32.7 G/DL (ref 31.5–35.7)
MCV RBC AUTO: 89.2 FL (ref 79–97)
MONOCYTES # BLD AUTO: 0.36 10*3/MM3 (ref 0.1–0.9)
MONOCYTES NFR BLD AUTO: 7.2 % (ref 5–12)
NEUTROPHILS NFR BLD AUTO: 2.69 10*3/MM3 (ref 1.7–7)
NEUTROPHILS NFR BLD AUTO: 54.2 % (ref 42.7–76)
NRBC BLD AUTO-RTO: 0 /100 WBC (ref 0–0.2)
PLATELET # BLD AUTO: 213 10*3/MM3 (ref 140–450)
PMV BLD AUTO: 9.5 FL (ref 6–12)
RBC # BLD AUTO: 3.88 10*6/MM3 (ref 3.77–5.28)
WBC NRBC COR # BLD: 4.97 10*3/MM3 (ref 3.4–10.8)

## 2022-04-29 PROCEDURE — G0463 HOSPITAL OUTPT CLINIC VISIT: HCPCS

## 2022-04-29 PROCEDURE — 36415 COLL VENOUS BLD VENIPUNCTURE: CPT

## 2022-04-29 PROCEDURE — 85025 COMPLETE CBC W/AUTO DIFF WBC: CPT | Performed by: INTERNAL MEDICINE

## 2022-05-13 ENCOUNTER — HOSPITAL ENCOUNTER (OUTPATIENT)
Dept: INFUSION THERAPY | Facility: HOSPITAL | Age: 82
Discharge: HOME | End: 2022-05-13
Admitting: INTERNAL MEDICINE

## 2022-05-13 VITALS
HEART RATE: 56 BPM | OXYGEN SATURATION: 99 % | DIASTOLIC BLOOD PRESSURE: 82 MMHG | TEMPERATURE: 96.9 F | SYSTOLIC BLOOD PRESSURE: 178 MMHG | RESPIRATION RATE: 20 BRPM

## 2022-05-13 DIAGNOSIS — N18.4 CHRONIC KIDNEY DISEASE, STAGE IV (SEVERE): Primary | ICD-10-CM

## 2022-05-13 DIAGNOSIS — D63.1 ERYTHROPOIETIN DEFICIENCY ANEMIA: ICD-10-CM

## 2022-05-13 LAB
BASOPHILS # BLD AUTO: 0.02 10*3/MM3 (ref 0–0.2)
BASOPHILS NFR BLD AUTO: 0.4 % (ref 0–1.5)
DEPRECATED RDW RBC AUTO: 62.8 FL (ref 37–54)
EOSINOPHIL # BLD AUTO: 0.13 10*3/MM3 (ref 0–0.4)
EOSINOPHIL NFR BLD AUTO: 2.7 % (ref 0.3–6.2)
ERYTHROCYTE [DISTWIDTH] IN BLOOD BY AUTOMATED COUNT: 19.6 % (ref 12.3–15.4)
FERRITIN SERPL-MCNC: 115 NG/ML (ref 13–150)
HCT VFR BLD AUTO: 35.4 % (ref 34–46.6)
HGB BLD-MCNC: 11.9 G/DL (ref 12–15.9)
IMM GRANULOCYTES # BLD AUTO: 0.01 10*3/MM3 (ref 0–0.05)
IMM GRANULOCYTES NFR BLD AUTO: 0.2 % (ref 0–0.5)
IRON 24H UR-MRATE: 55 MCG/DL (ref 37–145)
IRON SATN MFR SERPL: 17 % (ref 20–50)
LYMPHOCYTES # BLD AUTO: 1.24 10*3/MM3 (ref 0.7–3.1)
LYMPHOCYTES NFR BLD AUTO: 26.2 % (ref 19.6–45.3)
MCH RBC QN AUTO: 29.8 PG (ref 26.6–33)
MCHC RBC AUTO-ENTMCNC: 33.6 G/DL (ref 31.5–35.7)
MCV RBC AUTO: 88.7 FL (ref 79–97)
MONOCYTES # BLD AUTO: 0.33 10*3/MM3 (ref 0.1–0.9)
MONOCYTES NFR BLD AUTO: 7 % (ref 5–12)
NEUTROPHILS NFR BLD AUTO: 3 10*3/MM3 (ref 1.7–7)
NEUTROPHILS NFR BLD AUTO: 63.5 % (ref 42.7–76)
NRBC BLD AUTO-RTO: 0 /100 WBC (ref 0–0.2)
PLATELET # BLD AUTO: 150 10*3/MM3 (ref 140–450)
PMV BLD AUTO: 9.3 FL (ref 6–12)
RBC # BLD AUTO: 3.99 10*6/MM3 (ref 3.77–5.28)
TIBC SERPL-MCNC: 319 MCG/DL (ref 298–536)
TRANSFERRIN SERPL-MCNC: 214 MG/DL (ref 200–360)
WBC NRBC COR # BLD: 4.73 10*3/MM3 (ref 3.4–10.8)

## 2022-05-13 PROCEDURE — 84466 ASSAY OF TRANSFERRIN: CPT | Performed by: INTERNAL MEDICINE

## 2022-05-13 PROCEDURE — 25010000002 FERUMOXYTOL 510 MG/17ML SOLUTION 17 ML VIAL: Performed by: INTERNAL MEDICINE

## 2022-05-13 PROCEDURE — 85025 COMPLETE CBC W/AUTO DIFF WBC: CPT | Performed by: INTERNAL MEDICINE

## 2022-05-13 PROCEDURE — 36415 COLL VENOUS BLD VENIPUNCTURE: CPT

## 2022-05-13 PROCEDURE — 83540 ASSAY OF IRON: CPT | Performed by: INTERNAL MEDICINE

## 2022-05-13 PROCEDURE — 96374 THER/PROPH/DIAG INJ IV PUSH: CPT

## 2022-05-13 PROCEDURE — 96365 THER/PROPH/DIAG IV INF INIT: CPT

## 2022-05-13 PROCEDURE — 82728 ASSAY OF FERRITIN: CPT | Performed by: INTERNAL MEDICINE

## 2022-05-13 RX ADMIN — FERUMOXYTOL 510 MG: 510 INJECTION INTRAVENOUS at 15:39

## 2022-05-13 NOTE — PROGRESS NOTES
Patient's daughter states she saw PCP this week with her BP being 168 SBP. She states her PCP is having [facility] report daily BPs to him. Patient is due a Metoprolol this evening. This nurse instructed patient and daughter to take Metoprolol as soon as she arrives home.

## 2022-05-26 ENCOUNTER — HOSPITAL ENCOUNTER (OUTPATIENT)
Dept: INFUSION THERAPY | Facility: HOSPITAL | Age: 82
Discharge: HOME | End: 2022-05-26
Admitting: INTERNAL MEDICINE

## 2022-05-26 VITALS
HEART RATE: 59 BPM | DIASTOLIC BLOOD PRESSURE: 76 MMHG | SYSTOLIC BLOOD PRESSURE: 140 MMHG | OXYGEN SATURATION: 100 % | TEMPERATURE: 96.9 F | RESPIRATION RATE: 20 BRPM

## 2022-05-26 DIAGNOSIS — N18.4 CHRONIC KIDNEY DISEASE, STAGE IV (SEVERE): Primary | ICD-10-CM

## 2022-05-26 DIAGNOSIS — D63.1 ERYTHROPOIETIN DEFICIENCY ANEMIA: ICD-10-CM

## 2022-05-26 LAB
BASOPHILS # BLD AUTO: 0.03 10*3/MM3 (ref 0–0.2)
BASOPHILS NFR BLD AUTO: 0.4 % (ref 0–1.5)
DEPRECATED RDW RBC AUTO: 66.1 FL (ref 37–54)
EOSINOPHIL # BLD AUTO: 0.11 10*3/MM3 (ref 0–0.4)
EOSINOPHIL NFR BLD AUTO: 1.6 % (ref 0.3–6.2)
ERYTHROCYTE [DISTWIDTH] IN BLOOD BY AUTOMATED COUNT: 19.9 % (ref 12.3–15.4)
HCT VFR BLD AUTO: 39.6 % (ref 34–46.6)
HGB BLD-MCNC: 12.9 G/DL (ref 12–15.9)
IMM GRANULOCYTES # BLD AUTO: 0.02 10*3/MM3 (ref 0–0.05)
IMM GRANULOCYTES NFR BLD AUTO: 0.3 % (ref 0–0.5)
LYMPHOCYTES # BLD AUTO: 2.22 10*3/MM3 (ref 0.7–3.1)
LYMPHOCYTES NFR BLD AUTO: 32.7 % (ref 19.6–45.3)
MCH RBC QN AUTO: 29.6 PG (ref 26.6–33)
MCHC RBC AUTO-ENTMCNC: 32.6 G/DL (ref 31.5–35.7)
MCV RBC AUTO: 90.8 FL (ref 79–97)
MONOCYTES # BLD AUTO: 0.43 10*3/MM3 (ref 0.1–0.9)
MONOCYTES NFR BLD AUTO: 6.3 % (ref 5–12)
NEUTROPHILS NFR BLD AUTO: 3.97 10*3/MM3 (ref 1.7–7)
NEUTROPHILS NFR BLD AUTO: 58.7 % (ref 42.7–76)
NRBC BLD AUTO-RTO: 0 /100 WBC (ref 0–0.2)
PLATELET # BLD AUTO: 217 10*3/MM3 (ref 140–450)
PMV BLD AUTO: 9.6 FL (ref 6–12)
RBC # BLD AUTO: 4.36 10*6/MM3 (ref 3.77–5.28)
WBC NRBC COR # BLD: 6.78 10*3/MM3 (ref 3.4–10.8)

## 2022-05-26 PROCEDURE — 85025 COMPLETE CBC W/AUTO DIFF WBC: CPT | Performed by: INTERNAL MEDICINE

## 2022-05-26 PROCEDURE — 36415 COLL VENOUS BLD VENIPUNCTURE: CPT

## 2022-05-26 PROCEDURE — G0463 HOSPITAL OUTPT CLINIC VISIT: HCPCS

## 2022-06-01 ENCOUNTER — TRANSCRIBE ORDERS (OUTPATIENT)
Dept: ADMINISTRATIVE | Facility: HOSPITAL | Age: 82
End: 2022-06-01

## 2022-06-01 DIAGNOSIS — Z01.818 OTHER SPECIFIED PRE-OPERATIVE EXAMINATION: Primary | ICD-10-CM

## 2022-06-08 ENCOUNTER — HOSPITAL ENCOUNTER (OUTPATIENT)
Dept: SLEEP MEDICINE | Facility: HOSPITAL | Age: 82
End: 2022-06-08

## 2022-06-10 ENCOUNTER — APPOINTMENT (OUTPATIENT)
Dept: INFUSION THERAPY | Facility: HOSPITAL | Age: 82
End: 2022-06-10

## 2022-06-24 ENCOUNTER — HOSPITAL ENCOUNTER (OUTPATIENT)
Dept: INFUSION THERAPY | Facility: HOSPITAL | Age: 82
Discharge: HOME | End: 2022-06-24
Admitting: INTERNAL MEDICINE

## 2022-06-24 VITALS
DIASTOLIC BLOOD PRESSURE: 82 MMHG | HEART RATE: 70 BPM | RESPIRATION RATE: 20 BRPM | TEMPERATURE: 96.4 F | SYSTOLIC BLOOD PRESSURE: 174 MMHG | OXYGEN SATURATION: 98 %

## 2022-06-24 DIAGNOSIS — N18.4 CHRONIC KIDNEY DISEASE, STAGE IV (SEVERE): Primary | ICD-10-CM

## 2022-06-24 DIAGNOSIS — D63.1 ERYTHROPOIETIN DEFICIENCY ANEMIA: ICD-10-CM

## 2022-06-24 LAB
BASOPHILS # BLD AUTO: 0.02 10*3/MM3 (ref 0–0.2)
BASOPHILS NFR BLD AUTO: 0.3 % (ref 0–1.5)
DEPRECATED RDW RBC AUTO: 63.7 FL (ref 37–54)
EOSINOPHIL # BLD AUTO: 0.18 10*3/MM3 (ref 0–0.4)
EOSINOPHIL NFR BLD AUTO: 3.1 % (ref 0.3–6.2)
ERYTHROCYTE [DISTWIDTH] IN BLOOD BY AUTOMATED COUNT: 19.2 % (ref 12.3–15.4)
FERRITIN SERPL-MCNC: 265 NG/ML (ref 13–150)
HCT VFR BLD AUTO: 38 % (ref 34–46.6)
HGB BLD-MCNC: 12.4 G/DL (ref 12–15.9)
IMM GRANULOCYTES # BLD AUTO: 0.01 10*3/MM3 (ref 0–0.05)
IMM GRANULOCYTES NFR BLD AUTO: 0.2 % (ref 0–0.5)
IRON 24H UR-MRATE: 67 MCG/DL (ref 37–145)
IRON SATN MFR SERPL: 25 % (ref 20–50)
LYMPHOCYTES # BLD AUTO: 1.73 10*3/MM3 (ref 0.7–3.1)
LYMPHOCYTES NFR BLD AUTO: 29.7 % (ref 19.6–45.3)
MCH RBC QN AUTO: 30.1 PG (ref 26.6–33)
MCHC RBC AUTO-ENTMCNC: 32.6 G/DL (ref 31.5–35.7)
MCV RBC AUTO: 92.2 FL (ref 79–97)
MONOCYTES # BLD AUTO: 0.45 10*3/MM3 (ref 0.1–0.9)
MONOCYTES NFR BLD AUTO: 7.7 % (ref 5–12)
NEUTROPHILS NFR BLD AUTO: 3.43 10*3/MM3 (ref 1.7–7)
NEUTROPHILS NFR BLD AUTO: 59 % (ref 42.7–76)
NRBC BLD AUTO-RTO: 0 /100 WBC (ref 0–0.2)
PLATELET # BLD AUTO: 194 10*3/MM3 (ref 140–450)
PMV BLD AUTO: 9 FL (ref 6–12)
RBC # BLD AUTO: 4.12 10*6/MM3 (ref 3.77–5.28)
TIBC SERPL-MCNC: 273 MCG/DL (ref 298–536)
TRANSFERRIN SERPL-MCNC: 183 MG/DL (ref 200–360)
WBC NRBC COR # BLD: 5.82 10*3/MM3 (ref 3.4–10.8)

## 2022-06-24 PROCEDURE — 84466 ASSAY OF TRANSFERRIN: CPT | Performed by: INTERNAL MEDICINE

## 2022-06-24 PROCEDURE — 85025 COMPLETE CBC W/AUTO DIFF WBC: CPT | Performed by: INTERNAL MEDICINE

## 2022-06-24 PROCEDURE — G0463 HOSPITAL OUTPT CLINIC VISIT: HCPCS

## 2022-06-24 PROCEDURE — 82728 ASSAY OF FERRITIN: CPT | Performed by: INTERNAL MEDICINE

## 2022-06-24 PROCEDURE — 36415 COLL VENOUS BLD VENIPUNCTURE: CPT

## 2022-06-24 PROCEDURE — 83540 ASSAY OF IRON: CPT | Performed by: INTERNAL MEDICINE

## 2022-06-24 NOTE — PROGRESS NOTES
Pt labs drawn and Procrit nor Feraheme is required per lab results. Pt tolerated lab draw well. D/C ambulatory after lab results reviewed with pt and her daughter.

## 2022-07-08 ENCOUNTER — HOSPITAL ENCOUNTER (OUTPATIENT)
Dept: INFUSION THERAPY | Facility: HOSPITAL | Age: 82
Discharge: HOME | End: 2022-07-08
Admitting: INTERNAL MEDICINE

## 2022-07-08 VITALS
RESPIRATION RATE: 20 BRPM | OXYGEN SATURATION: 98 % | DIASTOLIC BLOOD PRESSURE: 91 MMHG | TEMPERATURE: 96.4 F | SYSTOLIC BLOOD PRESSURE: 163 MMHG | HEART RATE: 79 BPM

## 2022-07-08 DIAGNOSIS — D63.1 ERYTHROPOIETIN DEFICIENCY ANEMIA: ICD-10-CM

## 2022-07-08 DIAGNOSIS — N18.4 CHRONIC KIDNEY DISEASE, STAGE IV (SEVERE): Primary | ICD-10-CM

## 2022-07-08 LAB
DEPRECATED RDW RBC AUTO: 59.1 FL (ref 37–54)
ERYTHROCYTE [DISTWIDTH] IN BLOOD BY AUTOMATED COUNT: 17.8 % (ref 12.3–15.4)
HCT VFR BLD AUTO: 40.2 % (ref 34–46.6)
HGB BLD-MCNC: 13.1 G/DL (ref 12–15.9)
MCH RBC QN AUTO: 30.3 PG (ref 26.6–33)
MCHC RBC AUTO-ENTMCNC: 32.6 G/DL (ref 31.5–35.7)
MCV RBC AUTO: 93.1 FL (ref 79–97)
PLATELET # BLD AUTO: 230 10*3/MM3 (ref 140–450)
PMV BLD AUTO: 9 FL (ref 6–12)
RBC # BLD AUTO: 4.32 10*6/MM3 (ref 3.77–5.28)
WBC NRBC COR # BLD: 7.76 10*3/MM3 (ref 3.4–10.8)

## 2022-07-08 PROCEDURE — 36415 COLL VENOUS BLD VENIPUNCTURE: CPT

## 2022-07-08 PROCEDURE — 85027 COMPLETE CBC AUTOMATED: CPT | Performed by: INTERNAL MEDICINE

## 2022-07-08 PROCEDURE — G0463 HOSPITAL OUTPT CLINIC VISIT: HCPCS

## 2022-07-22 ENCOUNTER — HOSPITAL ENCOUNTER (OUTPATIENT)
Dept: INFUSION THERAPY | Facility: HOSPITAL | Age: 82
Discharge: HOME | End: 2022-07-22
Admitting: INTERNAL MEDICINE

## 2022-07-22 VITALS
DIASTOLIC BLOOD PRESSURE: 78 MMHG | HEART RATE: 69 BPM | RESPIRATION RATE: 20 BRPM | TEMPERATURE: 96.6 F | OXYGEN SATURATION: 98 % | SYSTOLIC BLOOD PRESSURE: 150 MMHG

## 2022-07-22 DIAGNOSIS — D63.1 ERYTHROPOIETIN DEFICIENCY ANEMIA: ICD-10-CM

## 2022-07-22 DIAGNOSIS — N18.4 CHRONIC KIDNEY DISEASE, STAGE IV (SEVERE): Primary | ICD-10-CM

## 2022-07-22 LAB
BASOPHILS # BLD AUTO: 0.03 10*3/MM3 (ref 0–0.2)
BASOPHILS NFR BLD AUTO: 0.5 % (ref 0–1.5)
DEPRECATED RDW RBC AUTO: 55.9 FL (ref 37–54)
EOSINOPHIL # BLD AUTO: 0.2 10*3/MM3 (ref 0–0.4)
EOSINOPHIL NFR BLD AUTO: 3.4 % (ref 0.3–6.2)
ERYTHROCYTE [DISTWIDTH] IN BLOOD BY AUTOMATED COUNT: 16.6 % (ref 12.3–15.4)
HCT VFR BLD AUTO: 36.1 % (ref 34–46.6)
HGB BLD-MCNC: 12.1 G/DL (ref 12–15.9)
IMM GRANULOCYTES # BLD AUTO: 0.01 10*3/MM3 (ref 0–0.05)
IMM GRANULOCYTES NFR BLD AUTO: 0.2 % (ref 0–0.5)
LYMPHOCYTES # BLD AUTO: 1.89 10*3/MM3 (ref 0.7–3.1)
LYMPHOCYTES NFR BLD AUTO: 31.9 % (ref 19.6–45.3)
MCH RBC QN AUTO: 31.2 PG (ref 26.6–33)
MCHC RBC AUTO-ENTMCNC: 33.5 G/DL (ref 31.5–35.7)
MCV RBC AUTO: 93 FL (ref 79–97)
MONOCYTES # BLD AUTO: 0.41 10*3/MM3 (ref 0.1–0.9)
MONOCYTES NFR BLD AUTO: 6.9 % (ref 5–12)
NEUTROPHILS NFR BLD AUTO: 3.39 10*3/MM3 (ref 1.7–7)
NEUTROPHILS NFR BLD AUTO: 57.1 % (ref 42.7–76)
NRBC BLD AUTO-RTO: 0 /100 WBC (ref 0–0.2)
PLATELET # BLD AUTO: 178 10*3/MM3 (ref 140–450)
PMV BLD AUTO: 9.1 FL (ref 6–12)
RBC # BLD AUTO: 3.88 10*6/MM3 (ref 3.77–5.28)
WBC NRBC COR # BLD: 5.93 10*3/MM3 (ref 3.4–10.8)

## 2022-07-22 PROCEDURE — 36415 COLL VENOUS BLD VENIPUNCTURE: CPT

## 2022-07-22 PROCEDURE — 85025 COMPLETE CBC W/AUTO DIFF WBC: CPT | Performed by: INTERNAL MEDICINE

## 2022-07-22 PROCEDURE — G0463 HOSPITAL OUTPT CLINIC VISIT: HCPCS

## 2022-08-16 DIAGNOSIS — N18.4 CHRONIC KIDNEY DISEASE, STAGE IV (SEVERE): Primary | ICD-10-CM

## 2022-08-16 DIAGNOSIS — D63.1 ERYTHROPOIETIN DEFICIENCY ANEMIA: ICD-10-CM

## 2022-08-19 ENCOUNTER — HOSPITAL ENCOUNTER (OUTPATIENT)
Dept: INFUSION THERAPY | Facility: HOSPITAL | Age: 82
Discharge: HOME | End: 2022-08-19
Admitting: INTERNAL MEDICINE

## 2022-08-19 VITALS
RESPIRATION RATE: 18 BRPM | SYSTOLIC BLOOD PRESSURE: 160 MMHG | DIASTOLIC BLOOD PRESSURE: 86 MMHG | OXYGEN SATURATION: 96 % | HEART RATE: 60 BPM | TEMPERATURE: 96.4 F

## 2022-08-19 DIAGNOSIS — D63.1 ERYTHROPOIETIN DEFICIENCY ANEMIA: ICD-10-CM

## 2022-08-19 DIAGNOSIS — N18.4 CHRONIC KIDNEY DISEASE, STAGE IV (SEVERE): Primary | ICD-10-CM

## 2022-08-19 LAB
DEPRECATED RDW RBC AUTO: 48.2 FL (ref 37–54)
ERYTHROCYTE [DISTWIDTH] IN BLOOD BY AUTOMATED COUNT: 13.9 % (ref 12.3–15.4)
FERRITIN SERPL-MCNC: 296 NG/ML (ref 13–150)
HCT VFR BLD AUTO: 34.7 % (ref 34–46.6)
HGB BLD-MCNC: 11.9 G/DL (ref 12–15.9)
IRON 24H UR-MRATE: 66 MCG/DL (ref 37–145)
IRON SATN MFR SERPL: 23 % (ref 20–50)
MCH RBC QN AUTO: 32.7 PG (ref 26.6–33)
MCHC RBC AUTO-ENTMCNC: 34.3 G/DL (ref 31.5–35.7)
MCV RBC AUTO: 95.3 FL (ref 79–97)
PLATELET # BLD AUTO: 180 10*3/MM3 (ref 140–450)
PMV BLD AUTO: 9.4 FL (ref 6–12)
RBC # BLD AUTO: 3.64 10*6/MM3 (ref 3.77–5.28)
TIBC SERPL-MCNC: 285 MCG/DL (ref 298–536)
TRANSFERRIN SERPL-MCNC: 191 MG/DL (ref 200–360)
WBC NRBC COR # BLD: 11.42 10*3/MM3 (ref 3.4–10.8)

## 2022-08-19 PROCEDURE — 85027 COMPLETE CBC AUTOMATED: CPT | Performed by: INTERNAL MEDICINE

## 2022-08-19 PROCEDURE — 36415 COLL VENOUS BLD VENIPUNCTURE: CPT

## 2022-08-19 PROCEDURE — 83540 ASSAY OF IRON: CPT | Performed by: INTERNAL MEDICINE

## 2022-08-19 PROCEDURE — 82728 ASSAY OF FERRITIN: CPT | Performed by: INTERNAL MEDICINE

## 2022-08-19 PROCEDURE — 84466 ASSAY OF TRANSFERRIN: CPT | Performed by: INTERNAL MEDICINE

## 2022-09-16 ENCOUNTER — HOSPITAL ENCOUNTER (OUTPATIENT)
Dept: INFUSION THERAPY | Facility: HOSPITAL | Age: 82
Discharge: HOME | End: 2022-09-16
Admitting: INTERNAL MEDICINE

## 2022-09-16 VITALS
SYSTOLIC BLOOD PRESSURE: 138 MMHG | TEMPERATURE: 96.4 F | RESPIRATION RATE: 20 BRPM | HEART RATE: 58 BPM | DIASTOLIC BLOOD PRESSURE: 79 MMHG | OXYGEN SATURATION: 100 %

## 2022-09-16 DIAGNOSIS — N18.4 CHRONIC KIDNEY DISEASE, STAGE IV (SEVERE): Primary | ICD-10-CM

## 2022-09-16 DIAGNOSIS — D63.1 ERYTHROPOIETIN DEFICIENCY ANEMIA: ICD-10-CM

## 2022-09-16 LAB
BASOPHILS # BLD AUTO: 0.02 10*3/MM3 (ref 0–0.2)
BASOPHILS NFR BLD AUTO: 0.2 % (ref 0–1.5)
DEPRECATED RDW RBC AUTO: 48.2 FL (ref 37–54)
EOSINOPHIL # BLD AUTO: 0.24 10*3/MM3 (ref 0–0.4)
EOSINOPHIL NFR BLD AUTO: 2.7 % (ref 0.3–6.2)
ERYTHROCYTE [DISTWIDTH] IN BLOOD BY AUTOMATED COUNT: 13.2 % (ref 12.3–15.4)
FERRITIN SERPL-MCNC: 542 NG/ML (ref 13–150)
HCT VFR BLD AUTO: 30.4 % (ref 34–46.6)
HGB BLD-MCNC: 9.8 G/DL (ref 12–15.9)
IMM GRANULOCYTES # BLD AUTO: 0.05 10*3/MM3 (ref 0–0.05)
IMM GRANULOCYTES NFR BLD AUTO: 0.6 % (ref 0–0.5)
IRON 24H UR-MRATE: 173 MCG/DL (ref 37–145)
IRON SATN MFR SERPL: 75 % (ref 20–50)
LYMPHOCYTES # BLD AUTO: 1.69 10*3/MM3 (ref 0.7–3.1)
LYMPHOCYTES NFR BLD AUTO: 19.1 % (ref 19.6–45.3)
MCH RBC QN AUTO: 32 PG (ref 26.6–33)
MCHC RBC AUTO-ENTMCNC: 32.2 G/DL (ref 31.5–35.7)
MCV RBC AUTO: 99.3 FL (ref 79–97)
MONOCYTES # BLD AUTO: 0.74 10*3/MM3 (ref 0.1–0.9)
MONOCYTES NFR BLD AUTO: 8.4 % (ref 5–12)
NEUTROPHILS NFR BLD AUTO: 6.11 10*3/MM3 (ref 1.7–7)
NEUTROPHILS NFR BLD AUTO: 69 % (ref 42.7–76)
NRBC BLD AUTO-RTO: 0 /100 WBC (ref 0–0.2)
PLATELET # BLD AUTO: 248 10*3/MM3 (ref 140–450)
PMV BLD AUTO: 8.8 FL (ref 6–12)
RBC # BLD AUTO: 3.06 10*6/MM3 (ref 3.77–5.28)
TIBC SERPL-MCNC: 231 MCG/DL (ref 298–536)
TRANSFERRIN SERPL-MCNC: 155 MG/DL (ref 200–360)
WBC NRBC COR # BLD: 8.85 10*3/MM3 (ref 3.4–10.8)

## 2022-09-16 PROCEDURE — 85025 COMPLETE CBC W/AUTO DIFF WBC: CPT | Performed by: INTERNAL MEDICINE

## 2022-09-16 PROCEDURE — 25010000002 EPOETIN ALFA PER 1000 UNITS: Performed by: INTERNAL MEDICINE

## 2022-09-16 PROCEDURE — 84466 ASSAY OF TRANSFERRIN: CPT | Performed by: INTERNAL MEDICINE

## 2022-09-16 PROCEDURE — 96372 THER/PROPH/DIAG INJ SC/IM: CPT

## 2022-09-16 PROCEDURE — 82728 ASSAY OF FERRITIN: CPT | Performed by: INTERNAL MEDICINE

## 2022-09-16 PROCEDURE — 36415 COLL VENOUS BLD VENIPUNCTURE: CPT

## 2022-09-16 PROCEDURE — 83540 ASSAY OF IRON: CPT | Performed by: INTERNAL MEDICINE

## 2022-09-16 RX ORDER — VIBEGRON 75 MG/1
1 TABLET, FILM COATED ORAL DAILY
COMMUNITY
End: 2022-10-15 | Stop reason: HOSPADM

## 2022-09-16 RX ADMIN — ERYTHROPOIETIN 20000 UNITS: 20000 INJECTION, SOLUTION INTRAVENOUS; SUBCUTANEOUS at 15:18

## 2022-10-07 ENCOUNTER — APPOINTMENT (OUTPATIENT)
Dept: ULTRASOUND IMAGING | Facility: HOSPITAL | Age: 82
End: 2022-10-07

## 2022-10-07 ENCOUNTER — HOSPITAL ENCOUNTER (INPATIENT)
Facility: HOSPITAL | Age: 82
LOS: 8 days | Discharge: NURSING, REHAB OR LONG-TERM CARE | End: 2022-10-15
Attending: EMERGENCY MEDICINE | Admitting: INTERNAL MEDICINE

## 2022-10-07 DIAGNOSIS — N17.9 AKI (ACUTE KIDNEY INJURY): Primary | ICD-10-CM

## 2022-10-07 DIAGNOSIS — E83.42 HYPOMAGNESEMIA: ICD-10-CM

## 2022-10-07 DIAGNOSIS — E11.42 DIABETIC POLYNEUROPATHY ASSOCIATED WITH TYPE 2 DIABETES MELLITUS: ICD-10-CM

## 2022-10-07 LAB
ALBUMIN SERPL-MCNC: 4.3 G/DL (ref 3.5–5.2)
ALBUMIN/GLOB SERPL: 1.4 G/DL
ALP SERPL-CCNC: 148 U/L (ref 39–117)
ALT SERPL W P-5'-P-CCNC: 18 U/L (ref 1–33)
ANION GAP SERPL CALCULATED.3IONS-SCNC: 15.1 MMOL/L (ref 5–15)
AST SERPL-CCNC: 19 U/L (ref 1–32)
BACTERIA UR QL AUTO: ABNORMAL /HPF
BASOPHILS # BLD AUTO: 0.03 10*3/MM3 (ref 0–0.2)
BASOPHILS NFR BLD AUTO: 0.4 % (ref 0–1.5)
BILIRUB SERPL-MCNC: 0.3 MG/DL (ref 0–1.2)
BILIRUB UR QL STRIP: NEGATIVE
BUN SERPL-MCNC: 75 MG/DL (ref 8–23)
BUN/CREAT SERPL: 12.6 (ref 7–25)
CALCIUM SPEC-SCNC: 8.8 MG/DL (ref 8.6–10.5)
CHLORIDE SERPL-SCNC: 109 MMOL/L (ref 98–107)
CLARITY UR: ABNORMAL
CO2 SERPL-SCNC: 14.9 MMOL/L (ref 22–29)
COLOR UR: YELLOW
CREAT SERPL-MCNC: 5.94 MG/DL (ref 0.57–1)
DEPRECATED RDW RBC AUTO: 44.5 FL (ref 37–54)
EGFRCR SERPLBLD CKD-EPI 2021: 6.6 ML/MIN/1.73
EOSINOPHIL # BLD AUTO: 0.15 10*3/MM3 (ref 0–0.4)
EOSINOPHIL NFR BLD AUTO: 2.2 % (ref 0.3–6.2)
ERYTHROCYTE [DISTWIDTH] IN BLOOD BY AUTOMATED COUNT: 12.8 % (ref 12.3–15.4)
GLOBULIN UR ELPH-MCNC: 3 GM/DL
GLUCOSE SERPL-MCNC: 96 MG/DL (ref 65–99)
GLUCOSE UR STRIP-MCNC: NEGATIVE MG/DL
HCT VFR BLD AUTO: 28.7 % (ref 34–46.6)
HGB BLD-MCNC: 9.7 G/DL (ref 12–15.9)
HGB UR QL STRIP.AUTO: ABNORMAL
HYALINE CASTS UR QL AUTO: ABNORMAL /LPF
IMM GRANULOCYTES # BLD AUTO: 0.02 10*3/MM3 (ref 0–0.05)
IMM GRANULOCYTES NFR BLD AUTO: 0.3 % (ref 0–0.5)
KETONES UR QL STRIP: NEGATIVE
LEUKOCYTE ESTERASE UR QL STRIP.AUTO: ABNORMAL
LYMPHOCYTES # BLD AUTO: 1.94 10*3/MM3 (ref 0.7–3.1)
LYMPHOCYTES NFR BLD AUTO: 28 % (ref 19.6–45.3)
MAGNESIUM SERPL-MCNC: 1.5 MG/DL (ref 1.6–2.4)
MCH RBC QN AUTO: 32.2 PG (ref 26.6–33)
MCHC RBC AUTO-ENTMCNC: 33.8 G/DL (ref 31.5–35.7)
MCV RBC AUTO: 95.3 FL (ref 79–97)
MONOCYTES # BLD AUTO: 0.55 10*3/MM3 (ref 0.1–0.9)
MONOCYTES NFR BLD AUTO: 7.9 % (ref 5–12)
NEUTROPHILS NFR BLD AUTO: 4.24 10*3/MM3 (ref 1.7–7)
NEUTROPHILS NFR BLD AUTO: 61.2 % (ref 42.7–76)
NITRITE UR QL STRIP: NEGATIVE
NRBC BLD AUTO-RTO: 0 /100 WBC (ref 0–0.2)
PH UR STRIP.AUTO: <=5 [PH] (ref 5–8)
PLATELET # BLD AUTO: 197 10*3/MM3 (ref 140–450)
PMV BLD AUTO: 9.6 FL (ref 6–12)
POTASSIUM SERPL-SCNC: 4.3 MMOL/L (ref 3.5–5.2)
PROT SERPL-MCNC: 7.3 G/DL (ref 6–8.5)
PROT UR QL STRIP: ABNORMAL
QT INTERVAL: 461 MS
RBC # BLD AUTO: 3.01 10*6/MM3 (ref 3.77–5.28)
RBC # UR STRIP: ABNORMAL /HPF
REF LAB TEST METHOD: ABNORMAL
SODIUM SERPL-SCNC: 139 MMOL/L (ref 136–145)
SP GR UR STRIP: 1.01 (ref 1–1.03)
SQUAMOUS #/AREA URNS HPF: ABNORMAL /HPF
TROPONIN T SERPL-MCNC: 0.03 NG/ML (ref 0–0.03)
UROBILINOGEN UR QL STRIP: ABNORMAL
WBC # UR STRIP: ABNORMAL /HPF
WBC NRBC COR # BLD: 6.93 10*3/MM3 (ref 3.4–10.8)

## 2022-10-07 PROCEDURE — 93010 ELECTROCARDIOGRAM REPORT: CPT | Performed by: INTERNAL MEDICINE

## 2022-10-07 PROCEDURE — 99284 EMERGENCY DEPT VISIT MOD MDM: CPT

## 2022-10-07 PROCEDURE — 80053 COMPREHEN METABOLIC PANEL: CPT | Performed by: EMERGENCY MEDICINE

## 2022-10-07 PROCEDURE — 85025 COMPLETE CBC W/AUTO DIFF WBC: CPT | Performed by: EMERGENCY MEDICINE

## 2022-10-07 PROCEDURE — 93005 ELECTROCARDIOGRAM TRACING: CPT | Performed by: EMERGENCY MEDICINE

## 2022-10-07 PROCEDURE — 81001 URINALYSIS AUTO W/SCOPE: CPT | Performed by: EMERGENCY MEDICINE

## 2022-10-07 PROCEDURE — 76775 US EXAM ABDO BACK WALL LIM: CPT

## 2022-10-07 PROCEDURE — 83735 ASSAY OF MAGNESIUM: CPT | Performed by: EMERGENCY MEDICINE

## 2022-10-07 PROCEDURE — 25010000002 MAGNESIUM SULFATE IN D5W 1G/100ML (PREMIX) 1-5 GM/100ML-% SOLUTION: Performed by: EMERGENCY MEDICINE

## 2022-10-07 PROCEDURE — 84484 ASSAY OF TROPONIN QUANT: CPT | Performed by: EMERGENCY MEDICINE

## 2022-10-07 RX ORDER — MAGNESIUM SULFATE 1 G/100ML
1 INJECTION INTRAVENOUS AS NEEDED
Status: DISCONTINUED | OUTPATIENT
Start: 2022-10-07 | End: 2022-10-07

## 2022-10-07 RX ORDER — NITROGLYCERIN 0.4 MG/1
0.4 TABLET SUBLINGUAL
Status: DISCONTINUED | OUTPATIENT
Start: 2022-10-07 | End: 2022-10-15 | Stop reason: HOSPADM

## 2022-10-07 RX ORDER — NICOTINE POLACRILEX 4 MG
15 LOZENGE BUCCAL
Status: DISCONTINUED | OUTPATIENT
Start: 2022-10-07 | End: 2022-10-08

## 2022-10-07 RX ORDER — SODIUM CHLORIDE 0.9 % (FLUSH) 0.9 %
10 SYRINGE (ML) INJECTION AS NEEDED
Status: DISCONTINUED | OUTPATIENT
Start: 2022-10-07 | End: 2022-10-12

## 2022-10-07 RX ORDER — MAGNESIUM SULFATE HEPTAHYDRATE 40 MG/ML
4 INJECTION, SOLUTION INTRAVENOUS AS NEEDED
Status: DISCONTINUED | OUTPATIENT
Start: 2022-10-07 | End: 2022-10-07

## 2022-10-07 RX ORDER — ONDANSETRON 2 MG/ML
4 INJECTION INTRAMUSCULAR; INTRAVENOUS EVERY 6 HOURS PRN
Status: DISCONTINUED | OUTPATIENT
Start: 2022-10-07 | End: 2022-10-15 | Stop reason: HOSPADM

## 2022-10-07 RX ORDER — SODIUM CHLORIDE 0.9 % (FLUSH) 0.9 %
10 SYRINGE (ML) INJECTION EVERY 12 HOURS SCHEDULED
Status: DISCONTINUED | OUTPATIENT
Start: 2022-10-07 | End: 2022-10-12

## 2022-10-07 RX ORDER — MAGNESIUM SULFATE 1 G/100ML
1 INJECTION INTRAVENOUS ONCE
Status: COMPLETED | OUTPATIENT
Start: 2022-10-07 | End: 2022-10-08

## 2022-10-07 RX ORDER — MAGNESIUM SULFATE HEPTAHYDRATE 40 MG/ML
2 INJECTION, SOLUTION INTRAVENOUS AS NEEDED
Status: DISCONTINUED | OUTPATIENT
Start: 2022-10-07 | End: 2022-10-07

## 2022-10-07 RX ORDER — SODIUM CHLORIDE 9 MG/ML
100 INJECTION, SOLUTION INTRAVENOUS CONTINUOUS
Status: DISCONTINUED | OUTPATIENT
Start: 2022-10-07 | End: 2022-10-08

## 2022-10-07 RX ORDER — DEXTROSE MONOHYDRATE 25 G/50ML
25 INJECTION, SOLUTION INTRAVENOUS
Status: DISCONTINUED | OUTPATIENT
Start: 2022-10-07 | End: 2022-10-08

## 2022-10-07 RX ORDER — SODIUM CHLORIDE 9 MG/ML
125 INJECTION, SOLUTION INTRAVENOUS CONTINUOUS
Status: DISCONTINUED | OUTPATIENT
Start: 2022-10-07 | End: 2022-10-09

## 2022-10-07 RX ADMIN — SODIUM CHLORIDE 125 ML/HR: 9 INJECTION, SOLUTION INTRAVENOUS at 23:30

## 2022-10-07 RX ADMIN — Medication 10 ML: at 23:44

## 2022-10-07 RX ADMIN — MAGNESIUM SULFATE HEPTAHYDRATE 1 G: 1 INJECTION, SOLUTION INTRAVENOUS at 23:30

## 2022-10-07 NOTE — ED NOTES
Pt ambulatory to triage from home with c/o abnormal labs.  Went to pmd today, had labs drawn, her creatinine was 5.6, and pt has been lightheaded, so pmd sent her to ER for iv fluids.  Per doctor note from today, symptoms of getting lightheaded on and off x 2 weeks, with no syncope.  Pt wearing mask in triage. Triage personnel wore appropriate PPE

## 2022-10-08 PROBLEM — I50.32 CHRONIC DIASTOLIC CONGESTIVE HEART FAILURE: Status: ACTIVE | Noted: 2022-04-03

## 2022-10-08 LAB
BASOPHILS # BLD AUTO: 0.02 10*3/MM3 (ref 0–0.2)
BASOPHILS NFR BLD AUTO: 0.3 % (ref 0–1.5)
DEPRECATED RDW RBC AUTO: 47.2 FL (ref 37–54)
EOSINOPHIL # BLD AUTO: 0.16 10*3/MM3 (ref 0–0.4)
EOSINOPHIL NFR BLD AUTO: 2.8 % (ref 0.3–6.2)
ERYTHROCYTE [DISTWIDTH] IN BLOOD BY AUTOMATED COUNT: 13.1 % (ref 12.3–15.4)
GLUCOSE BLDC GLUCOMTR-MCNC: 104 MG/DL (ref 70–130)
GLUCOSE BLDC GLUCOMTR-MCNC: 155 MG/DL (ref 70–130)
GLUCOSE BLDC GLUCOMTR-MCNC: 81 MG/DL (ref 70–130)
GLUCOSE BLDC GLUCOMTR-MCNC: 96 MG/DL (ref 70–130)
GLUCOSE BLDC GLUCOMTR-MCNC: 97 MG/DL (ref 70–130)
HCT VFR BLD AUTO: 26.5 % (ref 34–46.6)
HGB BLD-MCNC: 8.6 G/DL (ref 12–15.9)
IMM GRANULOCYTES # BLD AUTO: 0.01 10*3/MM3 (ref 0–0.05)
IMM GRANULOCYTES NFR BLD AUTO: 0.2 % (ref 0–0.5)
LYMPHOCYTES # BLD AUTO: 1.52 10*3/MM3 (ref 0.7–3.1)
LYMPHOCYTES NFR BLD AUTO: 26.5 % (ref 19.6–45.3)
MCH RBC QN AUTO: 31.9 PG (ref 26.6–33)
MCHC RBC AUTO-ENTMCNC: 32.5 G/DL (ref 31.5–35.7)
MCV RBC AUTO: 98.1 FL (ref 79–97)
MONOCYTES # BLD AUTO: 0.5 10*3/MM3 (ref 0.1–0.9)
MONOCYTES NFR BLD AUTO: 8.7 % (ref 5–12)
NEUTROPHILS NFR BLD AUTO: 3.52 10*3/MM3 (ref 1.7–7)
NEUTROPHILS NFR BLD AUTO: 61.5 % (ref 42.7–76)
NRBC BLD AUTO-RTO: 0 /100 WBC (ref 0–0.2)
PLATELET # BLD AUTO: 186 10*3/MM3 (ref 140–450)
PMV BLD AUTO: 9.8 FL (ref 6–12)
RBC # BLD AUTO: 2.7 10*6/MM3 (ref 3.77–5.28)
WBC NRBC COR # BLD: 5.73 10*3/MM3 (ref 3.4–10.8)

## 2022-10-08 PROCEDURE — 82962 GLUCOSE BLOOD TEST: CPT

## 2022-10-08 PROCEDURE — 85025 COMPLETE CBC W/AUTO DIFF WBC: CPT | Performed by: INTERNAL MEDICINE

## 2022-10-08 PROCEDURE — 63710000001 INSULIN LISPRO (HUMAN) PER 5 UNITS: Performed by: INTERNAL MEDICINE

## 2022-10-08 RX ORDER — VENLAFAXINE HYDROCHLORIDE 37.5 MG/1
37.5 CAPSULE, EXTENDED RELEASE ORAL DAILY
Status: DISCONTINUED | OUTPATIENT
Start: 2022-10-08 | End: 2022-10-15 | Stop reason: HOSPADM

## 2022-10-08 RX ORDER — ACETAMINOPHEN 325 MG/1
650 TABLET ORAL EVERY 6 HOURS PRN
Status: DISCONTINUED | OUTPATIENT
Start: 2022-10-08 | End: 2022-10-15 | Stop reason: HOSPADM

## 2022-10-08 RX ORDER — PANTOPRAZOLE SODIUM 40 MG/1
40 TABLET, DELAYED RELEASE ORAL EVERY MORNING
Status: DISCONTINUED | OUTPATIENT
Start: 2022-10-08 | End: 2022-10-12

## 2022-10-08 RX ORDER — GABAPENTIN 100 MG/1
100 CAPSULE ORAL NIGHTLY
Status: DISCONTINUED | OUTPATIENT
Start: 2022-10-08 | End: 2022-10-15 | Stop reason: HOSPADM

## 2022-10-08 RX ORDER — AMOXICILLIN 250 MG
2 CAPSULE ORAL 2 TIMES DAILY
Status: DISCONTINUED | OUTPATIENT
Start: 2022-10-08 | End: 2022-10-15 | Stop reason: HOSPADM

## 2022-10-08 RX ORDER — INSULIN LISPRO 100 [IU]/ML
0-7 INJECTION, SOLUTION INTRAVENOUS; SUBCUTANEOUS
Status: DISCONTINUED | OUTPATIENT
Start: 2022-10-08 | End: 2022-10-15 | Stop reason: HOSPADM

## 2022-10-08 RX ORDER — ATORVASTATIN CALCIUM 20 MG/1
40 TABLET, FILM COATED ORAL DAILY
Status: DISCONTINUED | OUTPATIENT
Start: 2022-10-08 | End: 2022-10-15 | Stop reason: HOSPADM

## 2022-10-08 RX ORDER — NICOTINE POLACRILEX 4 MG
15 LOZENGE BUCCAL
Status: DISCONTINUED | OUTPATIENT
Start: 2022-10-08 | End: 2022-10-15 | Stop reason: HOSPADM

## 2022-10-08 RX ORDER — DEXTROSE MONOHYDRATE 25 G/50ML
25 INJECTION, SOLUTION INTRAVENOUS
Status: DISCONTINUED | OUTPATIENT
Start: 2022-10-08 | End: 2022-10-15 | Stop reason: HOSPADM

## 2022-10-08 RX ORDER — SODIUM BICARBONATE 650 MG/1
650 TABLET ORAL 3 TIMES DAILY
Status: DISCONTINUED | OUTPATIENT
Start: 2022-10-08 | End: 2022-10-15 | Stop reason: HOSPADM

## 2022-10-08 RX ADMIN — ACETAMINOPHEN 650 MG: 325 TABLET, FILM COATED ORAL at 20:19

## 2022-10-08 RX ADMIN — SODIUM BICARBONATE 650 MG: 650 TABLET ORAL at 08:40

## 2022-10-08 RX ADMIN — INSULIN LISPRO 2 UNITS: 100 INJECTION, SOLUTION INTRAVENOUS; SUBCUTANEOUS at 13:00

## 2022-10-08 RX ADMIN — GABAPENTIN 100 MG: 100 CAPSULE ORAL at 20:14

## 2022-10-08 RX ADMIN — SODIUM CHLORIDE 125 ML/HR: 9 INJECTION, SOLUTION INTRAVENOUS at 17:48

## 2022-10-08 RX ADMIN — SODIUM BICARBONATE 650 MG: 650 TABLET ORAL at 17:35

## 2022-10-08 RX ADMIN — SODIUM BICARBONATE 650 MG: 650 TABLET ORAL at 20:14

## 2022-10-08 RX ADMIN — DOCUSATE SODIUM 50MG AND SENNOSIDES 8.6MG 2 TABLET: 8.6; 5 TABLET, FILM COATED ORAL at 08:40

## 2022-10-08 RX ADMIN — ACETAMINOPHEN 650 MG: 325 TABLET, FILM COATED ORAL at 13:00

## 2022-10-08 RX ADMIN — PANTOPRAZOLE SODIUM 40 MG: 40 TABLET, DELAYED RELEASE ORAL at 08:40

## 2022-10-08 RX ADMIN — Medication 10 ML: at 20:14

## 2022-10-08 RX ADMIN — DOCUSATE SODIUM 50MG AND SENNOSIDES 8.6MG 2 TABLET: 8.6; 5 TABLET, FILM COATED ORAL at 20:14

## 2022-10-08 RX ADMIN — Medication 10 ML: at 08:40

## 2022-10-08 RX ADMIN — METOPROLOL TARTRATE 25 MG: 25 TABLET, FILM COATED ORAL at 23:53

## 2022-10-08 RX ADMIN — VENLAFAXINE HYDROCHLORIDE 37.5 MG: 37.5 CAPSULE, EXTENDED RELEASE ORAL at 08:40

## 2022-10-08 RX ADMIN — APIXABAN 2.5 MG: 2.5 TABLET, FILM COATED ORAL at 20:14

## 2022-10-08 RX ADMIN — APIXABAN 2.5 MG: 2.5 TABLET, FILM COATED ORAL at 08:40

## 2022-10-08 RX ADMIN — METOPROLOL TARTRATE 25 MG: 25 TABLET, FILM COATED ORAL at 08:40

## 2022-10-08 RX ADMIN — SODIUM CHLORIDE 125 ML/HR: 9 INJECTION, SOLUTION INTRAVENOUS at 10:12

## 2022-10-08 RX ADMIN — ATORVASTATIN CALCIUM 40 MG: 20 TABLET, FILM COATED ORAL at 08:40

## 2022-10-08 NOTE — ED PROVIDER NOTES
EMERGENCY DEPARTMENT ENCOUNTER    Room Number:  20/20  Date of encounter:  10/7/2022  PCP: Dannie Mathews MD  Historian: Patient      HPI:  Chief Complaint: Lightheadedness  A complete HPI/ROS/PMH/PSH/SH/FH are unobtainable due to: None    Context: Lowell Min is a 82 y.o. female who presents to the ED c/o lightheadedness with intermittent for the past week or so.  Worse when she rises to standing does not experience symptoms at rest.  No rotational component no ataxia gait instability.  No nausea vomiting.  Patient was seen by primary care doctor today and found to have PERLA.  Patient's baseline creatinine is around 2 creatinine today per PMD was about 5.  Patient denies any change in appetite or fluid intake.      PAST MEDICAL HISTORY  Active Ambulatory Problems     Diagnosis Date Noted   • Chronic kidney disease, stage IV (severe) (HCC) 03/31/2022   • Erythropoietin deficiency anemia 03/31/2022   • Symptomatic anemia 04/03/2022   • Anxiety associated with depression 04/03/2022   • Iron deficiency anemia 04/03/2022   • PAF (paroxysmal atrial fibrillation) (HCC) 04/03/2022   • Shortness of breath 04/03/2022   • Acute on chronic diastolic congestive heart failure (HCC) 04/03/2022   • Diabetic polyneuropathy associated with type 2 diabetes mellitus (HCC) 04/12/2022     Resolved Ambulatory Problems     Diagnosis Date Noted   • No Resolved Ambulatory Problems     Past Medical History:   Diagnosis Date   • Anxiety    • Atrial fibrillation (HCC)    • Depression    • Elevated cholesterol    • Hypertension    • Type 2 diabetes mellitus (HCC)          PAST SURGICAL HISTORY  Past Surgical History:   Procedure Laterality Date   • APPENDECTOMY     • CHOLECYSTECTOMY     • COLONOSCOPY     • HYSTERECTOMY     • TUMOR REMOVAL Left     benign tumor from left breast         FAMILY HISTORY  No family history on file.      SOCIAL HISTORY  Social History     Socioeconomic History   • Marital status: Single   Tobacco Use   • Smoking  status: Never Smoker   • Smokeless tobacco: Never Used   Vaping Use   • Vaping Use: Never used   Substance and Sexual Activity   • Alcohol use: Never   • Drug use: Never   • Sexual activity: Defer         ALLERGIES  Ibuprofen        REVIEW OF SYSTEMS  Review of Systems     All systems reviewed and negative except for those discussed in HPI.       PHYSICAL EXAM    I have reviewed the triage vital signs and nursing notes.    ED Triage Vitals [10/07/22 1939]   Temp Heart Rate Resp BP SpO2   99 °F (37.2 °C) 67 16 167/83 99 %      Temp src Heart Rate Source Patient Position BP Location FiO2 (%)   Tympanic Monitor Sitting Left arm --       Physical Exam  GENERAL: not distressed  HENT: nares patent  EYES: no scleral icterus  CV: regular rhythm, regular rate  RESPIRATORY: normal effort clear to auscultation laterally  ABDOMEN: soft nontender nondistended  MUSCULOSKELETAL: no deformity  NEURO: alert, moves all extremities, follows commands  SKIN: warm, dry        LAB RESULTS  Recent Results (from the past 24 hour(s))   HEMOGLOBIN A1C    Collection Time: 10/07/22 12:21 PM    Specimen type and source: Whole Blood, Blood   Result Value Ref Range    Hemoglobin A1C 6.5 (H) 4.3 - 5.6 %    Mean Bld Glu Estim. 140 mg/dL mg/dL   ECG 12 Lead    Collection Time: 10/07/22  9:17 PM   Result Value Ref Range    QT Interval 461 ms   Comprehensive Metabolic Panel    Collection Time: 10/07/22  9:29 PM    Specimen: Blood   Result Value Ref Range    Glucose 96 65 - 99 mg/dL    BUN 75 (H) 8 - 23 mg/dL    Creatinine 5.94 (H) 0.57 - 1.00 mg/dL    Sodium 139 136 - 145 mmol/L    Potassium 4.3 3.5 - 5.2 mmol/L    Chloride 109 (H) 98 - 107 mmol/L    CO2 14.9 (L) 22.0 - 29.0 mmol/L    Calcium 8.8 8.6 - 10.5 mg/dL    Total Protein 7.3 6.0 - 8.5 g/dL    Albumin 4.30 3.50 - 5.20 g/dL    ALT (SGPT) 18 1 - 33 U/L    AST (SGOT) 19 1 - 32 U/L    Alkaline Phosphatase 148 (H) 39 - 117 U/L    Total Bilirubin 0.3 0.0 - 1.2 mg/dL    Globulin 3.0 gm/dL    A/G Ratio  1.4 g/dL    BUN/Creatinine Ratio 12.6 7.0 - 25.0    Anion Gap 15.1 (H) 5.0 - 15.0 mmol/L    eGFR 6.6 (L) >60.0 mL/min/1.73   Magnesium    Collection Time: 10/07/22  9:29 PM    Specimen: Blood   Result Value Ref Range    Magnesium 1.5 (L) 1.6 - 2.4 mg/dL   CBC Auto Differential    Collection Time: 10/07/22  9:29 PM    Specimen: Blood   Result Value Ref Range    WBC 6.93 3.40 - 10.80 10*3/mm3    RBC 3.01 (L) 3.77 - 5.28 10*6/mm3    Hemoglobin 9.7 (L) 12.0 - 15.9 g/dL    Hematocrit 28.7 (L) 34.0 - 46.6 %    MCV 95.3 79.0 - 97.0 fL    MCH 32.2 26.6 - 33.0 pg    MCHC 33.8 31.5 - 35.7 g/dL    RDW 12.8 12.3 - 15.4 %    RDW-SD 44.5 37.0 - 54.0 fl    MPV 9.6 6.0 - 12.0 fL    Platelets 197 140 - 450 10*3/mm3    Neutrophil % 61.2 42.7 - 76.0 %    Lymphocyte % 28.0 19.6 - 45.3 %    Monocyte % 7.9 5.0 - 12.0 %    Eosinophil % 2.2 0.3 - 6.2 %    Basophil % 0.4 0.0 - 1.5 %    Immature Grans % 0.3 0.0 - 0.5 %    Neutrophils, Absolute 4.24 1.70 - 7.00 10*3/mm3    Lymphocytes, Absolute 1.94 0.70 - 3.10 10*3/mm3    Monocytes, Absolute 0.55 0.10 - 0.90 10*3/mm3    Eosinophils, Absolute 0.15 0.00 - 0.40 10*3/mm3    Basophils, Absolute 0.03 0.00 - 0.20 10*3/mm3    Immature Grans, Absolute 0.02 0.00 - 0.05 10*3/mm3    nRBC 0.0 0.0 - 0.2 /100 WBC   Troponin    Collection Time: 10/07/22  9:29 PM    Specimen: Blood   Result Value Ref Range    Troponin T 0.026 0.000 - 0.030 ng/mL       Ordered the above labs and independently reviewed the results.        RADIOLOGY  No Radiology Exams Resulted Within Past 24 Hours    I ordered the above noted radiological studies. Reviewed by me and discussed with radiologist.  See dictation for official radiology interpretation.      PROCEDURES    Procedures      MEDICATIONS GIVEN IN ER    Medications   sodium chloride 0.9 % flush 10 mL (has no administration in time range)   magnesium sulfate in D5W 1g/100mL (PREMIX) (has no administration in time range)   sodium chloride 0.9 % infusion (has no  administration in time range)         PROGRESS, DATA ANALYSIS, CONSULTS, AND MEDICAL DECISION MAKING    All labs have been independently reviewed by me.  All radiology studies have been reviewed by me and discussed with radiologist dictating the report.   EKG's independently viewed and interpreted by me.  Discussion below represents my analysis of pertinent findings related to patient's condition, differential diagnosis, treatment plan and final disposition.        ED Course as of 10/07/22 2309   Fri Oct 07, 2022   2234 EKG          EKG time: 2117  Rhythm/Rate: Atrial fibrillation rate 69  P waves and AR: A. fib  QRS, axis: Narrow irregular  ST and T waves: Normal    Interpreted Contemporaneously by me, independently viewed  No significant changed compared to prior ED   [TJ]   2236 Patient's baseline creatinine is 2.0.  Patient is followed by Dr. Foster with nephrology. [TJ]   2247 Discussed with neurologist.  Recommends 1 g magnesium sulfate.  We will start fluids.  Obtain renal ultrasound and bladder scan. [TJ]   2308 Magnesium(!): 1.5 [TJ]   2309 Creatinine(!): 5.94 [TJ]   2309 Potassium: 4.3 [TJ]   2309 Troponin T: 0.026 [TJ]   2309 Hemoglobin(!): 9.7 [TJ]      ED Course User Index  [TJ] Oscar Ellis MD           PPE: The patient wore a surgical mask throughout the entire patient encounter. I wore an N95.    AS OF 23:09 EDT VITALS:    BP - 180/77  HR - 67  TEMP - 99 °F (37.2 °C) (Tympanic)  O2 SATS - 98%        DIAGNOSIS  Final diagnoses:   PERLA (acute kidney injury) (HCC)   Hypomagnesemia         DISPOSITION  discharge           Oscar Ellis MD  10/07/22 2309

## 2022-10-08 NOTE — CONSULTS
[facility]   Consult Note    Patient Name: Lowell Min  : 1940  MRN: [id number]  Primary Care Physician:  Dannie Mathews MD  Referring Physician: Rodo Leal MD  Date of admission: 10/7/2022    Consults  Subjective   Subjective     Reason for Consult/ Chief Complaint: jeanmarie on ckd IV baseline cr of 2    History of Present Illness  Lowell Min is a 82 y.o. female with history of ckd IV baseline cr of 2. Followed by Dr. Foster. Admitted with jeanmarie. Cr up to 5.9 today    Review of Systems     Personal History     Past Medical History:   Diagnosis Date   • Anxiety    • Atrial fibrillation (HCC)    • Chronic kidney disease, stage IV (severe) (HCC)    • Depression    • Elevated cholesterol    • Hypertension    • Type 2 diabetes mellitus (HCC)        Past Surgical History:   Procedure Laterality Date   • APPENDECTOMY     • CHOLECYSTECTOMY     • COLONOSCOPY     • HYSTERECTOMY     • TUMOR REMOVAL Left     benign tumor from left breast       Family History: family history is not on file. Otherwise pertinent FHx was reviewed and not pertinent to current issue.    Social History:  reports that she has never smoked. She has never used smokeless tobacco. She reports that she does not drink alcohol and does not use drugs.    Home Medications:   Vibegron, acetaminophen, apixaban, atorvastatin, furosemide, gabapentin, insulin lispro, metoprolol tartrate, multivitamin with minerals, nystatin, omeprazole, polycarbophil, sennosides-docusate, and venlafaxine XR    Allergies:  Allergies   Allergen Reactions   • Ibuprofen Other (See Comments)     Decrease urine output         Objective    Objective     Vitals:  Temp:  [97 °F (36.1 °C)-99 °F (37.2 °C)] 97 °F (36.1 °C)  Heart Rate:  [63-67] 63  Resp:  [16] 16  BP: (161-181)/() 166/78    Physical Exam  General Appearance:  In no acute distress.    HEENT: Normal HEENT exam.     Extremities: .  There is no deformity, effusion or dependent edema.    Neurological:  Patient is alert and oriented to person, place and time.    Skin:  Warm and dry.  No rash.   Abdomen: Abdomen is soft and non-distended.    Result Review    Result Review:  I have personally reviewed the results from the time of this admission to 10/8/2022 11:52 EDT and agree with these findings:  []  Laboratory list / accordion  []  Microbiology  []  Radiology  []  EKG/Telemetry   []  Cardiology/Vascular   []  Pathology  []  Old records  []  Other:        Assessment & Plan   Assessment / Plan     Brief Patient Summary:  Lowell Min is a 82 y.o. female who has perla on ckd IV    Active Hospital Problems:  Active Hospital Problems    Diagnosis    • **PERLA (acute kidney injury) (HCC)    • Diabetic polyneuropathy associated with type 2 diabetes mellitus (HCC)    • PAF (paroxysmal atrial fibrillation) (HCC)    • Anxiety associated with depression    • Chronic diastolic congestive heart failure (HCC)    • Chronic kidney disease, stage IV (severe) (HCC)      Plan:   1. perla  2. ckd IV  3. Dehydration  4. T2DM    Patient seen and examined. Labs and chart reviewed. With h/o ckd IV baseline cr near 2.  Admitted with perla. Etiology unclear. With poor po intake recently and on lasix at home.  Appears volume deplete on exam. Possible uti. U/s negative. Agree with current ivf.  Will f/u with labs in am      Makayla Dela Cruz MD

## 2022-10-08 NOTE — H&P
Patient Name:  Lowell Min  YOB: 1940  MRN:  [id number]  Admit Date:  10/7/2022  Patient Care Team:  Dannie Mathews MD as PCP - General (Internal Medicine)      Subjective   History Present Illness     Chief Complaint   Patient presents with   • Abnormal Lab       Ms. Min is a 82 y.o. non-smoker with a history of HTN, HLD, Type 2 DM, chronic diastolic CHF, CKD Stage 4 (baseline creatinine around 2.0), and paroxysmal atrial fibrillation on eliquis that presents to [facility] complaining of generalized weakness and light-headedness on standing for the past week or so. She denies otherwise being acutely ill and reports no changes in appetite/oral intake or medications including over the counter-specifically no NASIDs. She states that she feels like she has been urinating more than usual but she has had no hematuria or dysuria. She visited her PCP earlier today and had some blood work done which ended up showing a marked increase in her creatinine level and she was thus sent to the ER for further evaluation and treatment.    History of Present Illness  Review of Systems   Constitutional: Positive for fatigue. Negative for chills and fever.   HENT: Negative for congestion, sore throat and trouble swallowing.    Eyes: Negative for redness and visual disturbance.   Respiratory: Negative for cough, choking and shortness of breath.    Cardiovascular: Negative for chest pain, palpitations and leg swelling.   Gastrointestinal: Negative for abdominal pain, constipation, diarrhea, nausea and vomiting.   Endocrine: Negative for cold intolerance and heat intolerance.   Genitourinary: Negative for decreased urine volume, difficulty urinating, dysuria and hematuria.   Musculoskeletal: Negative for arthralgias and myalgias.   Skin: Negative for pallor and rash.   Neurological: Positive for weakness (generalized) and light-headedness. Negative for dizziness and headaches.   Hematological:  Negative for adenopathy. Does not bruise/bleed easily.   Psychiatric/Behavioral: Negative for confusion and decreased concentration.        Personal History     Past Medical History:   Diagnosis Date   • Anxiety    • Atrial fibrillation (HCC)    • Chronic kidney disease, stage IV (severe) (HCC)    • Depression    • Elevated cholesterol    • Hypertension    • Type 2 diabetes mellitus (HCC)      Past Surgical History:   Procedure Laterality Date   • APPENDECTOMY     • CHOLECYSTECTOMY     • COLONOSCOPY     • HYSTERECTOMY     • TUMOR REMOVAL Left     benign tumor from left breast     History reviewed. No pertinent family history.  Social History     Tobacco Use   • Smoking status: Never   • Smokeless tobacco: Never   Vaping Use   • Vaping Use: Never used   Substance Use Topics   • Alcohol use: Never   • Drug use: Never     No current facility-administered medications on file prior to encounter.     Current Outpatient Medications on File Prior to Encounter   Medication Sig Dispense Refill   • acetaminophen (TYLENOL) 325 MG tablet Take 2 tablets by mouth Every 4 (Four) Hours As Needed for Mild Pain .     • apixaban (ELIQUIS) 2.5 MG tablet tablet Take 1 tablet by mouth 2 (Two) Times a Day. Indications: Atrial Fibrillation 60 tablet    • atorvastatin (LIPITOR) 40 MG tablet Take 1 tablet by mouth every night at bedtime.     • furosemide (LASIX) 40 MG tablet Take 1 tablet by mouth Daily.     • gabapentin (NEURONTIN) 100 MG capsule Take 1 capsule by mouth 3 (Three) Times a Day. 15 capsule 0   • insulin lispro (ADMELOG) 100 UNIT/ML injection Inject 0-7 Units under the skin into the appropriate area as directed 3 (Three) Times a Day Before Meals.  12   • metoprolol tartrate (LOPRESSOR) 50 MG tablet Take 1 tablet by mouth 2 (Two) Times a Day. (Patient taking differently: Take 25 mg by mouth 2 (Two) Times a Day.)     • multivitamin with minerals tablet tablet Take 1 tablet by mouth Daily.     • nystatin (MYCOSTATIN) 633150 UNIT/GM  powder Apply  topically to the appropriate area as directed Every 12 (Twelve) Hours.     • omeprazole (priLOSEC) 40 MG capsule Take 40 mg by mouth Daily.     • polycarbophil 625 MG tablet tablet Take 2 tablets by mouth Daily.     • sennosides-docusate (PERICOLACE) 8.6-50 MG per tablet Take 2 tablets by mouth 2 (Two) Times a Day.     • venlafaxine XR (EFFEXOR-XR) 75 MG 24 hr capsule Take 75 mg by mouth Daily.     • Vibegron (Gemtesa) 75 MG tablet Take 1 tablet by mouth Daily.       Allergies   Allergen Reactions   • Ibuprofen Other (See Comments)     Decrease urine output         Objective    Objective     Vital Signs  Temp:  [97.5 °F (36.4 °C)-99 °F (37.2 °C)] 97.5 °F (36.4 °C)  Heart Rate:  [67] 67  Resp:  [16] 16  BP: (161-181)/() 171/79  SpO2:  [98 %-99 %] 98 %  on   ;   Device (Oxygen Therapy): room air  Body mass index is 32.89 kg/m².    Physical Exam  Vitals and nursing note reviewed.   Constitutional:       General: She is not in acute distress.     Appearance: She is not toxic-appearing or diaphoretic.   HENT:      Head: Normocephalic and atraumatic.      Nose: Nose normal.      Mouth/Throat:      Mouth: Mucous membranes are moist.      Pharynx: Oropharynx is clear.   Eyes:      Conjunctiva/sclera: Conjunctivae normal.      Pupils: Pupils are equal, round, and reactive to light.   Cardiovascular:      Rate and Rhythm: Normal rate and regular rhythm.      Pulses: Normal pulses.   Pulmonary:      Effort: Pulmonary effort is normal.      Breath sounds: Normal breath sounds. No rales.   Abdominal:      General: Bowel sounds are normal.      Palpations: Abdomen is soft.      Tenderness: There is no abdominal tenderness.   Musculoskeletal:         General: No swelling or tenderness.      Cervical back: Normal range of motion and neck supple.   Skin:     General: Skin is warm and dry.      Capillary Refill: Capillary refill takes less than 2 seconds.   Neurological:      General: No focal deficit present.       Mental Status: She is alert and oriented to person, place, and time.   Psychiatric:         Mood and Affect: Mood normal.         Behavior: Behavior normal.         Results Review:  I reviewed the patient's new clinical results.  I reviewed the patient's new imaging results and agree with the interpretation.  I reviewed the patient's other test results and agree with the interpretation  I personally viewed and interpreted the patient's EKG/Telemetry data  Discussed with ED provider.    Lab Results (last 24 hours)     Procedure Component Value Units Date/Time    COMPREHENSIVE METABOLIC PANEL [[id number]]  (Abnormal) Collected: 10/07/22 1008     Updated: 10/07/22 1936    CBC AND DIFFERENTIAL [[id number]]  (Abnormal) Collected: 10/07/22 1008     Updated: 10/07/22 1936    TSH [[id number]] Collected: 10/07/22 1008     Updated: 10/07/22 1936    HEMOGLOBIN A1C [[id number]]  (Abnormal) Collected: 10/07/22 1221     Updated: 10/07/22 1936     Hemoglobin A1C 6.5 %      Mean Bld Glu Estim. 140 mg/dL     CBC & Differential [[id number]]  (Abnormal) Collected: 10/07/22 2129    Specimen: Blood Updated: 10/07/22 2156    Narrative:      The following orders were created for panel order CBC & Differential.  Procedure                               Abnormality         Status                     ---------                               -----------         ------                     CBC Auto Differential[[id number]]        Abnormal            Final result                 Please view results for these tests on the individual orders.    Comprehensive Metabolic Panel [[id number]]  (Abnormal) Collected: 10/07/22 2129    Specimen: Blood Updated: 10/07/22 2223     Glucose 96 mg/dL      BUN 75 mg/dL      Creatinine 5.94 mg/dL      Sodium 139 mmol/L      Potassium 4.3 mmol/L      Chloride 109 mmol/L      CO2 14.9 mmol/L      Calcium 8.8 mg/dL      Total Protein 7.3 g/dL      Albumin 4.30 g/dL      ALT (SGPT) 18 U/L      AST (SGOT) 19 U/L      Alkaline  Phosphatase 148 U/L      Total Bilirubin 0.3 mg/dL      Globulin 3.0 gm/dL      A/G Ratio 1.4 g/dL      BUN/Creatinine Ratio 12.6     Anion Gap 15.1 mmol/L      eGFR 6.6 mL/min/1.73      Comment: <15 Indicative of kidney failure       Narrative:      GFR Normal >60  Chronic Kidney Disease <60  Kidney Failure <15      Magnesium [[id number]]  (Abnormal) Collected: 10/07/22 2129    Specimen: Blood Updated: 10/07/22 2223     Magnesium 1.5 mg/dL     CBC Auto Differential [[id number]]  (Abnormal) Collected: 10/07/22 2129    Specimen: Blood Updated: 10/07/22 2156     WBC 6.93 10*3/mm3      RBC 3.01 10*6/mm3      Hemoglobin 9.7 g/dL      Hematocrit 28.7 %      MCV 95.3 fL      MCH 32.2 pg      MCHC 33.8 g/dL      RDW 12.8 %      RDW-SD 44.5 fl      MPV 9.6 fL      Platelets 197 10*3/mm3      Neutrophil % 61.2 %      Lymphocyte % 28.0 %      Monocyte % 7.9 %      Eosinophil % 2.2 %      Basophil % 0.4 %      Immature Grans % 0.3 %      Neutrophils, Absolute 4.24 10*3/mm3      Lymphocytes, Absolute 1.94 10*3/mm3      Monocytes, Absolute 0.55 10*3/mm3      Eosinophils, Absolute 0.15 10*3/mm3      Basophils, Absolute 0.03 10*3/mm3      Immature Grans, Absolute 0.02 10*3/mm3      nRBC 0.0 /100 WBC     Troponin [[id number]]  (Normal) Collected: 10/07/22 2129    Specimen: Blood Updated: 10/07/22 2223     Troponin T 0.026 ng/mL     Narrative:      Troponin T Reference Range:  <= 0.03 ng/mL-   Negative for AMI  >0.03 ng/mL-     Abnormal for myocardial necrosis.  Clinicians would have to utilize clinical acumen, EKG, Troponin and serial changes to determine if it is an Acute Myocardial Infarction or myocardial injury due to an underlying chronic condition.       Results may be falsely decreased if patient taking Biotin.      Urinalysis With Microscopic If Indicated (No Culture) - Urine, Clean Catch [[id number]]  (Abnormal) Collected: 10/07/22 2304    Specimen: Urine, Clean Catch Updated: 10/07/22 2330     Color, UA Yellow     Appearance,  UA Cloudy     pH, UA <=5.0     Specific Gravity, UA 1.009     Glucose, UA Negative     Ketones, UA Negative     Bilirubin, UA Negative     Blood, UA Trace     Protein, UA 30 mg/dL (1+)     Leuk Esterase, UA Trace     Nitrite, UA Negative     Urobilinogen, UA 0.2 E.U./dL    Urinalysis, Microscopic Only - Urine, Clean Catch [[id number]]  (Abnormal) Collected: 10/07/22 2304    Specimen: Urine, Clean Catch Updated: 10/07/22 2341     RBC, UA None Seen /HPF      WBC, UA 6-12 /HPF      Bacteria, UA None Seen /HPF      Squamous Epithelial Cells, UA 0-2 /HPF      Hyaline Casts, UA None Seen /LPF      Methodology Manual Light Microscopy    POC Glucose Once [[id number]]  (Normal) Collected: 10/08/22 0252    Specimen: Blood Updated: 10/08/22 0355     Glucose 97 mg/dL      Comment: Meter: [id number] : [id number] Irene Jensen RN       POC Glucose Once [[id number]]  (Normal) Collected: 10/08/22 0556    Specimen: Blood Updated: 10/08/22 0558     Glucose 96 mg/dL      Comment: Meter: [id number] : [id number] Paul ARANA             Imaging Results (Last 24 Hours)     Procedure Component Value Units Date/Time    US Renal Bilateral [[id number]] Collected: 10/08/22 0013     Updated: 10/08/22 0014    Narrative:      Examination: Renal sonography     HISTORY: 82-year-old female with history of acute renal sufficiency     FINDINGS: Sonographic evaluation of the kidneys was performed. No prior  examination is available for comparison. The right kidney measures 10.4  x 4.7 x 4.5 cm. The left kidney measures 9.9 x 7.5 x 5.2 cm. There is  increased echogenicity of the bilateral renal cortices. There is no  evidence for hydronephrosis or proximal hydroureter. The urinary bladder  has a normal appearance. Neither ureteral jet is visualized.        Impression:      1.There is evidence of bilateral medical renal disease. No evidence for  renal obstruction is appreciated.             Results for orders placed during the hospital  encounter of 04/03/22    Adult Transthoracic Echo Complete W/ Cont if Necessary Per Protocol    Interpretation Summary  · Left ventricular ejection fraction appears to be 61 - 65%. Left ventricular systolic function is normal.  · Left ventricular diastolic function is consistent with (grade II w/high LAP) pseudonormalization.  · Moderate aortic valve stenosis is present. Aortic valve area is 1.31 cm2.Peak velocity of the flow distal to the aortic valve is 317.6 cm/s. Aortic valve maximum pressure gradient is 40.4 mmHg. Aortic valve mean pressure gradient is 20.9 mmHg. Aortic valve dimensionless index is 0.4 .  · Mild mitral valve regurgitation is present. Mild mitral valve stenosis is present. The mitral valve mean gradient is 3.2 mmHg.  · The left atrial cavity is severely dilated.  · Mild tricuspid valve regurgitation is present. Calculated right ventricular systolic pressure from tricuspid regurgitation is 66.2 mmHg. Severe pulmonary hypertension is present      ECG 12 Lead   Final Result   HEART RATE= 69  bpm   RR Interval= 870  ms   LA Interval=   ms   P Horizontal Axis=   deg   P Front Axis=   deg   QRSD Interval= 112  ms   QT Interval= 461  ms   QRS Axis= -20  deg   T Wave Axis= 104  deg   - ABNORMAL ECG -   Atrial fibrillation   Abnormal R-wave progression, late transition   LVH with secondary repolarization abnormality   Borderline prolonged QT interval   No change from previous tracing   Electronically Signed By: Enzo WattsKAILA) ([facility]) 07-Oct-2022 21:45:33   Date and Time of Study: 2022-10-07 21:17:16           Assessment/Plan     Active Hospital Problems    Diagnosis  POA   • **PERLA (acute kidney injury) ([facility]) [N17.9]  Yes   • Diabetic polyneuropathy associated with type 2 diabetes mellitus ([facility]) [E11.42]  Yes   • PAF (paroxysmal atrial fibrillation) ([facility]) [I48.0]  Yes   • Anxiety associated with depression [F41.8]  Yes   • Chronic diastolic congestive heart failure ([facility]) [I50.32]  Yes   • Chronic  kidney disease, stage IV (severe) ([facility]) [N18.4]  Yes      Resolved Hospital Problems   No resolved problems to display.   PERLA on CKD Stage 4  - baseline serum creatinine is around 2.0, was 5.62 today  - unclear cause at this point-she does not appear volume overloaded and her electrolytes are okay  - PVR is 50cc, check renal ultrasound  - will give gentle IVF overnight and check BMP in AM  - she has an associated acidosis and will start oral sodium bicarbonate  - consult nephrology    HTN/Chronic Diastolic CHF  - she does not appear volume overloaded but will monitor closely with IVF  - hold lasix given PERLA     Type 2 DM  - complications include polyneuropathy and nephropathy  - she is on low dose ssi at home, will continue  - she is on gabapentin, will attenuate her dose given her change in renal function    PAF  - rate is controlled, continue metoprolol  - on AC with eliquis, continue    Hypomagnesemia  - replace    I discussed the patient's findings and my recommendations with patient, family, nursing staff and ED provider.    VTE Prophylaxis - Eliquis (home med).  Code Status - Full code.       Rodo Leal MD  [facility]  10/07/22  23:19 EDT

## 2022-10-08 NOTE — ED NOTES
"Nursing report ED to floor  Lowell Min  82 y.o.  female    HPI :   Chief Complaint   Patient presents with   • Abnormal Lab       Admitting doctor:   Rodo Leal MD    Admitting diagnosis:   The primary encounter diagnosis was PERLA (acute kidney injury) (HCC). A diagnosis of Hypomagnesemia was also pertinent to this visit.    Code status:   Current Code Status     Date Active Code Status Order ID Comments User Context       10/7/2022 2341 CPR (Attempt to Resuscitate) [id number]  Ryann Bonilla, APRN ED     Advance Care Planning Activity      Questions for Current Code Status     Question Answer    Code Status (Patient has no pulse and is not breathing) CPR (Attempt to Resuscitate)    Medical Interventions (Patient has pulse or is breathing) Full Support          Allergies:   Ibuprofen    Isolation:   No active isolations    Intake and Output  No intake or output data in the 24 hours ending 10/08/22 0120    Weight:       10/07/22  2249   Weight: 95.3 kg (210 lb)       Most recent vitals:   Vitals:    10/07/22 1939 10/07/22 2231 10/07/22 2249   BP: 167/83 180/77    BP Location: Left arm     Patient Position: Sitting     Pulse: 67 67    Resp: 16 16    Temp: 99 °F (37.2 °C)     TempSrc: Tympanic     SpO2: 99% 98%    Weight:   95.3 kg (210 lb)   Height: 170.2 cm (67\")         Active LDAs/IV Access:   Lines, Drains & Airways     Active LDAs     Name Placement date Placement time Site Days    Peripheral IV 10/07/22 2131 Left Antecubital 10/07/22  2131  Antecubital  less than 1                Labs (abnormal labs have a star):   Labs Reviewed   COMPREHENSIVE METABOLIC PANEL - Abnormal; Notable for the following components:       Result Value    BUN 75 (*)     Creatinine 5.94 (*)     Chloride 109 (*)     CO2 14.9 (*)     Alkaline Phosphatase 148 (*)     Anion Gap 15.1 (*)     eGFR 6.6 (*)     All other components within normal limits    Narrative:     GFR Normal >60  Chronic Kidney Disease <60  Kidney Failure " <15     MAGNESIUM - Abnormal; Notable for the following components:    Magnesium 1.5 (*)     All other components within normal limits   CBC WITH AUTO DIFFERENTIAL - Abnormal; Notable for the following components:    RBC 3.01 (*)     Hemoglobin 9.7 (*)     Hematocrit 28.7 (*)     All other components within normal limits   URINALYSIS W/ MICROSCOPIC IF INDICATED (NO CULTURE) - Abnormal; Notable for the following components:    Appearance, UA Cloudy (*)     Blood, UA Trace (*)     Protein, UA 30 mg/dL (1+) (*)     Leuk Esterase, UA Trace (*)     All other components within normal limits   URINALYSIS, MICROSCOPIC ONLY - Abnormal; Notable for the following components:    WBC, UA 6-12 (*)     All other components within normal limits   TROPONIN (IN-HOUSE) - Normal    Narrative:     Troponin T Reference Range:  <= 0.03 ng/mL-   Negative for AMI  >0.03 ng/mL-     Abnormal for myocardial necrosis.  Clinicians would have to utilize clinical acumen, EKG, Troponin and serial changes to determine if it is an Acute Myocardial Infarction or myocardial injury due to an underlying chronic condition.       Results may be falsely decreased if patient taking Biotin.     POCT GLUCOSE FINGERSTICK   POCT GLUCOSE FINGERSTICK   POCT GLUCOSE FINGERSTICK   CBC AND DIFFERENTIAL    Narrative:     The following orders were created for panel order CBC & Differential.  Procedure                               Abnormality         Status                     ---------                               -----------         ------                     CBC Auto Differential[[id number]]        Abnormal            Final result                 Please view results for these tests on the individual orders.       EKG:   ECG 12 Lead   Final Result   HEART RATE= 69  bpm   RR Interval= 870  ms   IA Interval=   ms   P Horizontal Axis=   deg   P Front Axis=   deg   QRSD Interval= 112  ms   QT Interval= 461  ms   QRS Axis= -20  deg   T Wave Axis= 104  deg   - ABNORMAL ECG  -   Atrial fibrillation   Abnormal R-wave progression, late transition   LVH with secondary repolarization abnormality   Borderline prolonged QT interval   No change from previous tracing   Electronically Signed By: Enzo WattsKAILA) ([facility]) 07-Oct-2022 21:45:33   Date and Time of Study: 2022-10-07 21:17:16          Meds given in ED:   Medications   sodium chloride 0.9 % flush 10 mL (has no administration in time range)   sodium chloride 0.9 % infusion (125 mL/hr Intravenous New Bag 10/7/22 2330)   sodium chloride 0.9 % flush 10 mL (10 mL Intravenous Given 10/7/22 2344)   sodium chloride 0.9 % flush 10 mL (has no administration in time range)   dextrose (GLUTOSE) oral gel 15 g (has no administration in time range)   dextrose (D50W) (25 g/50 mL) IV injection 25 g (has no administration in time range)   glucagon (human recombinant) (GLUCAGEN DIAGNOSTIC) injection 1 mg (has no administration in time range)   nitroglycerin (NITROSTAT) SL tablet 0.4 mg (has no administration in time range)   sodium chloride 0.9 % infusion (0 mL/hr Intravenous Hold 10/7/22 2344)   ondansetron (ZOFRAN) injection 4 mg (has no administration in time range)   magnesium sulfate in D5W 1g/100mL (PREMIX) (1 g Intravenous New Bag 10/7/22 2330)       Imaging results:  US Renal Bilateral    Result Date: 10/8/2022  1.There is evidence of bilateral medical renal disease. No evidence for renal obstruction is appreciated.        Ambulatory status:   - up ad gigi    Social issues:   Social History     Socioeconomic History   • Marital status: Single   Tobacco Use   • Smoking status: Never Smoker   • Smokeless tobacco: Never Used   Vaping Use   • Vaping Use: Never used   Substance and Sexual Activity   • Alcohol use: Never   • Drug use: Never   • Sexual activity: Defer       NIH Stroke Scale:         Laura Craven RN  10/08/22 01:20 EDT

## 2022-10-08 NOTE — PLAN OF CARE
Goal Outcome Evaluation:               Patient alert and oriented with c/o head and back pain this shift- patient medicated per MAR. Patient on room air and A Fib on monitor. Daughter at bedside. Patient with purewick - good output noted. IV fluids infusing. No acute distress noted, will continue to monitor.

## 2022-10-08 NOTE — PLAN OF CARE
Goal Outcome Evaluation:  Plan of Care Reviewed With: patient      Patient admitted this morning with PERLA. Alert and oriented. Denies pain. IVF infusing @100ml/hr.RA.Nephro consulted. Afib on tele. No acute distress noted.

## 2022-10-08 NOTE — PROGRESS NOTES
Name: Lowell Min ADMIT: 10/7/2022   : 1940  PCP: Dannie Mathews MD    MRN: [id number] LOS: 1 days   AGE/SEX: 82 y.o. female  ROOM: [facility]     Subjective   Subjective   Patient seen at bedside.       Objective   Objective   Vital Signs  Temp:  [97 °F (36.1 °C)-99 °F (37.2 °C)] 97.8 °F (36.6 °C)  Heart Rate:  [50-67] 50  Resp:  [16] 16  BP: (156-181)/() 156/78  SpO2:  [96 %-100 %] 100 %  on   ;   Device (Oxygen Therapy): room air  Body mass index is 32.89 kg/m².  Physical Exam     General, awake and alert.  Head and ENT, normocephalic and atraumatic.  Lungs, symmetric expansion, equal air entry bilaterally.  Heart, regular rate and rhythm.  Abdomen, soft and nontender.  Extremities, no clubbing or cyanosis.  Neuro, no focal deficits.  Skin: Warm and no rash.  Psych, normal mood and affect.  Musculoskeletal, joint examination is grossly normal.    Results Review     I reviewed the patient's new clinical results.  Results from last 7 days   Lab Units 10/07/22  2129   WBC 10*3/mm3 6.93   HEMOGLOBIN g/dL 9.7*   PLATELETS 10*3/mm3 197     Results from last 7 days   Lab Units 10/07/22  2129   SODIUM mmol/L 139   POTASSIUM mmol/L 4.3   CHLORIDE mmol/L 109*   CO2 mmol/L 14.9*   BUN mg/dL 75*   CREATININE mg/dL 5.94*   GLUCOSE mg/dL 96   EGFR mL/min/1.73 6.6*     Results from last 7 days   Lab Units 10/07/22  2129   ALBUMIN g/dL 4.30   BILIRUBIN mg/dL 0.3   ALK PHOS U/L 148*   AST (SGOT) U/L 19   ALT (SGPT) U/L 18     Results from last 7 days   Lab Units 10/07/22  212   CALCIUM mg/dL 8.8   ALBUMIN g/dL 4.30   MAGNESIUM mg/dL 1.5*       Hemoglobin A1C   Date/Time Value Ref Range Status   10/07/2022 1221 6.5 (H) 4.3 - 5.6 % Final     Glucose   Date/Time Value Ref Range Status   10/08/2022 1138 155 (H) 70 - 130 mg/dL Final     Comment:     Meter: [id number] : [id number] Barrera ARANA   10/08/2022 0556 96 70 - 130 mg/dL Final     Comment:     Meter: [id number] : [id number] Paul ARANA    10/08/2022 0252 97 70 - 130 mg/dL Final     Comment:     Meter: [id number] : [id number] Irene Jensen RN        Renal Bilateral    Result Date: 10/8/2022  1.There is evidence of bilateral medical renal disease. No evidence for renal obstruction is appreciated.  This report was finalized on 10/8/2022 11:06 AM by Dr. Ellis Hernandez M.D.      Scheduled Medications  apixaban, 2.5 mg, Oral, BID  atorvastatin, 40 mg, Oral, Daily  gabapentin, 100 mg, Oral, Nightly  insulin lispro, 0-7 Units, Subcutaneous, TID AC  metoprolol tartrate, 25 mg, Oral, BID  pantoprazole, 40 mg, Oral, QAM  sennosides-docusate, 2 tablet, Oral, BID  sodium bicarbonate, 650 mg, Oral, TID  sodium chloride, 10 mL, Intravenous, Q12H  venlafaxine XR, 37.5 mg, Oral, Daily    Infusions  sodium chloride, 125 mL/hr, Last Rate: 125 mL/hr (10/08/22 1012)  sodium chloride, 100 mL/hr, Last Rate: Stopped (10/07/22 2344)    Diet  Diet Regular; Cardiac       Assessment/Plan     Active Hospital Problems    Diagnosis  POA   • **PERLA (acute kidney injury) ([facility]) [N17.9]  Yes   • Diabetic polyneuropathy associated with type 2 diabetes mellitus ([facility]) [E11.42]  Yes   • PAF (paroxysmal atrial fibrillation) ([facility]) [I48.0]  Yes   • Anxiety associated with depression [F41.8]  Yes   • Chronic diastolic congestive heart failure ([facility]) [I50.32]  Yes   • Chronic kidney disease, stage IV (severe) ([facility]) [N18.4]  Yes      Resolved Hospital Problems   No resolved problems to display.       82 y.o. female admitted with PERLA (acute kidney injury) ([facility]).    Assessment and plan:  1.  Acute on chronic kidney injury.  Continue IV fluids, nephrology consult has been requested.  Avoid nephrotoxic medications.    2.  Chronic diastolic CHF, monitor for volume status changes while on IV fluids.    3.  Type 2 diabetes mellitus, complicated with nephropathy and polyneuropathy.  Continue Accu-Cheks and sliding scale coverage.  Continue Neurontin.    4.  Persistent atrial fibrillation,  patient is currently rate controlled, she is on anticoagulant Eliquis.    5.  Hypomagnesemia, replace electrolytes per protocol.    6.  CODE STATUS is full code.  Further plans based on hospital course.    Leodan Saucedo MD  [facility]  10/08/22  16:01 EDT

## 2022-10-09 LAB
ALBUMIN SERPL-MCNC: 3 G/DL (ref 3.5–5.2)
ALBUMIN/GLOB SERPL: 1 G/DL
ALP SERPL-CCNC: 122 U/L (ref 39–117)
ALT SERPL W P-5'-P-CCNC: 18 U/L (ref 1–33)
ANION GAP SERPL CALCULATED.3IONS-SCNC: 12.6 MMOL/L (ref 5–15)
AST SERPL-CCNC: 16 U/L (ref 1–32)
BASOPHILS # BLD AUTO: 0.02 10*3/MM3 (ref 0–0.2)
BASOPHILS NFR BLD AUTO: 0.3 % (ref 0–1.5)
BILIRUB SERPL-MCNC: 0.3 MG/DL (ref 0–1.2)
BUN SERPL-MCNC: 68 MG/DL (ref 8–23)
BUN/CREAT SERPL: 12.7 (ref 7–25)
CALCIUM SPEC-SCNC: 8.8 MG/DL (ref 8.6–10.5)
CHLORIDE SERPL-SCNC: 115 MMOL/L (ref 98–107)
CO2 SERPL-SCNC: 15.4 MMOL/L (ref 22–29)
CREAT SERPL-MCNC: 5.34 MG/DL (ref 0.57–1)
DEPRECATED RDW RBC AUTO: 45.4 FL (ref 37–54)
EGFRCR SERPLBLD CKD-EPI 2021: 7.6 ML/MIN/1.73
EOSINOPHIL # BLD AUTO: 0.15 10*3/MM3 (ref 0–0.4)
EOSINOPHIL NFR BLD AUTO: 2.6 % (ref 0.3–6.2)
ERYTHROCYTE [DISTWIDTH] IN BLOOD BY AUTOMATED COUNT: 13 % (ref 12.3–15.4)
GLOBULIN UR ELPH-MCNC: 2.9 GM/DL
GLUCOSE BLDC GLUCOMTR-MCNC: 118 MG/DL (ref 70–130)
GLUCOSE BLDC GLUCOMTR-MCNC: 171 MG/DL (ref 70–130)
GLUCOSE BLDC GLUCOMTR-MCNC: 175 MG/DL (ref 70–130)
GLUCOSE BLDC GLUCOMTR-MCNC: 81 MG/DL (ref 70–130)
GLUCOSE SERPL-MCNC: 106 MG/DL (ref 65–99)
HCT VFR BLD AUTO: 26.9 % (ref 34–46.6)
HGB BLD-MCNC: 9 G/DL (ref 12–15.9)
IMM GRANULOCYTES # BLD AUTO: 0.01 10*3/MM3 (ref 0–0.05)
IMM GRANULOCYTES NFR BLD AUTO: 0.2 % (ref 0–0.5)
LYMPHOCYTES # BLD AUTO: 1.51 10*3/MM3 (ref 0.7–3.1)
LYMPHOCYTES NFR BLD AUTO: 26.3 % (ref 19.6–45.3)
MCH RBC QN AUTO: 31.9 PG (ref 26.6–33)
MCHC RBC AUTO-ENTMCNC: 33.5 G/DL (ref 31.5–35.7)
MCV RBC AUTO: 95.4 FL (ref 79–97)
MONOCYTES # BLD AUTO: 0.49 10*3/MM3 (ref 0.1–0.9)
MONOCYTES NFR BLD AUTO: 8.5 % (ref 5–12)
NEUTROPHILS NFR BLD AUTO: 3.56 10*3/MM3 (ref 1.7–7)
NEUTROPHILS NFR BLD AUTO: 62.1 % (ref 42.7–76)
NRBC BLD AUTO-RTO: 0 /100 WBC (ref 0–0.2)
PLATELET # BLD AUTO: 187 10*3/MM3 (ref 140–450)
PMV BLD AUTO: 9.4 FL (ref 6–12)
POTASSIUM SERPL-SCNC: 4.2 MMOL/L (ref 3.5–5.2)
PROT SERPL-MCNC: 5.9 G/DL (ref 6–8.5)
RBC # BLD AUTO: 2.82 10*6/MM3 (ref 3.77–5.28)
SODIUM SERPL-SCNC: 143 MMOL/L (ref 136–145)
WBC NRBC COR # BLD: 5.74 10*3/MM3 (ref 3.4–10.8)

## 2022-10-09 PROCEDURE — 80053 COMPREHEN METABOLIC PANEL: CPT | Performed by: INTERNAL MEDICINE

## 2022-10-09 PROCEDURE — 97161 PT EVAL LOW COMPLEX 20 MIN: CPT

## 2022-10-09 PROCEDURE — 63710000001 INSULIN LISPRO (HUMAN) PER 5 UNITS: Performed by: INTERNAL MEDICINE

## 2022-10-09 PROCEDURE — 85025 COMPLETE CBC W/AUTO DIFF WBC: CPT | Performed by: INTERNAL MEDICINE

## 2022-10-09 PROCEDURE — 97116 GAIT TRAINING THERAPY: CPT

## 2022-10-09 PROCEDURE — 82962 GLUCOSE BLOOD TEST: CPT

## 2022-10-09 RX ORDER — HYDRALAZINE HYDROCHLORIDE 50 MG/1
50 TABLET, FILM COATED ORAL EVERY 12 HOURS SCHEDULED
Status: DISCONTINUED | OUTPATIENT
Start: 2022-10-09 | End: 2022-10-09

## 2022-10-09 RX ORDER — HYDRALAZINE HYDROCHLORIDE 50 MG/1
50 TABLET, FILM COATED ORAL EVERY 8 HOURS SCHEDULED
Status: DISCONTINUED | OUTPATIENT
Start: 2022-10-09 | End: 2022-10-12

## 2022-10-09 RX ORDER — AMLODIPINE BESYLATE 5 MG/1
5 TABLET ORAL
Status: DISCONTINUED | OUTPATIENT
Start: 2022-10-09 | End: 2022-10-15 | Stop reason: HOSPADM

## 2022-10-09 RX ADMIN — APIXABAN 2.5 MG: 2.5 TABLET, FILM COATED ORAL at 21:22

## 2022-10-09 RX ADMIN — APIXABAN 2.5 MG: 2.5 TABLET, FILM COATED ORAL at 08:34

## 2022-10-09 RX ADMIN — HYDRALAZINE HYDROCHLORIDE 50 MG: 50 TABLET, FILM COATED ORAL at 12:14

## 2022-10-09 RX ADMIN — INSULIN LISPRO 2 UNITS: 100 INJECTION, SOLUTION INTRAVENOUS; SUBCUTANEOUS at 12:14

## 2022-10-09 RX ADMIN — DOCUSATE SODIUM 50MG AND SENNOSIDES 8.6MG 2 TABLET: 8.6; 5 TABLET, FILM COATED ORAL at 21:22

## 2022-10-09 RX ADMIN — SODIUM CHLORIDE 125 ML/HR: 9 INJECTION, SOLUTION INTRAVENOUS at 03:00

## 2022-10-09 RX ADMIN — Medication 10 ML: at 08:34

## 2022-10-09 RX ADMIN — DOCUSATE SODIUM 50MG AND SENNOSIDES 8.6MG 2 TABLET: 8.6; 5 TABLET, FILM COATED ORAL at 08:34

## 2022-10-09 RX ADMIN — GABAPENTIN 100 MG: 100 CAPSULE ORAL at 21:22

## 2022-10-09 RX ADMIN — AMLODIPINE BESYLATE 5 MG: 5 TABLET ORAL at 18:21

## 2022-10-09 RX ADMIN — SODIUM BICARBONATE: 84 INJECTION, SOLUTION INTRAVENOUS at 14:50

## 2022-10-09 RX ADMIN — HYDRALAZINE HYDROCHLORIDE 50 MG: 50 TABLET, FILM COATED ORAL at 21:22

## 2022-10-09 RX ADMIN — PANTOPRAZOLE SODIUM 40 MG: 40 TABLET, DELAYED RELEASE ORAL at 06:06

## 2022-10-09 RX ADMIN — VENLAFAXINE HYDROCHLORIDE 37.5 MG: 37.5 CAPSULE, EXTENDED RELEASE ORAL at 08:34

## 2022-10-09 RX ADMIN — ATORVASTATIN CALCIUM 40 MG: 20 TABLET, FILM COATED ORAL at 08:34

## 2022-10-09 RX ADMIN — SODIUM BICARBONATE 650 MG: 650 TABLET ORAL at 16:08

## 2022-10-09 RX ADMIN — SODIUM BICARBONATE 650 MG: 650 TABLET ORAL at 08:34

## 2022-10-09 RX ADMIN — Medication 10 ML: at 21:22

## 2022-10-09 RX ADMIN — SODIUM CHLORIDE 125 ML/HR: 9 INJECTION, SOLUTION INTRAVENOUS at 12:14

## 2022-10-09 RX ADMIN — ACETAMINOPHEN 650 MG: 325 TABLET, FILM COATED ORAL at 08:34

## 2022-10-09 RX ADMIN — SODIUM BICARBONATE 650 MG: 650 TABLET ORAL at 21:22

## 2022-10-09 NOTE — PLAN OF CARE
Goal Outcome Evaluation:  Plan of Care Reviewed With: patient           Outcome Evaluation: Pt is an 81 y/o female admitted to hospital with PERLA. Pt reports she lives in a bi-level home with approximately 12-13 steps total. she states she is independent with mobility and does not use any assistive device. Pt presents with decreased strength, decreased endurance/activity tolerance, SOA, and impaired balance impacting her mobility. she performed bed mobility with CGA, transferred sit to stand with CGA-min A, and ambulated 80 ft. with RWX and CGA. Pt required increased time for mobility. Pt may benefit from acute skilled PT to address noted deficits and facilitate safe return home at [facility].

## 2022-10-09 NOTE — CASE MANAGEMENT/SOCIAL WORK
Discharge Planning Assessment  [facility]     Patient Name: Lowell Min  MRN: [id number]  Today's Date: 10/9/2022    Admit Date: 10/7/2022    Plan: [facility] (referral pending) vs. home with [facility] following (referral pending)   Discharge Needs Assessment     Row Name 10/09/22 1443       Living Environment    People in Home alone    Current Living Arrangements home    Primary Care Provided by self    Provides Primary Care For no one    Family Caregiver if Needed child(yashira), adult    Family Caregiver Names Daughter Karen Dalton [phone]    Quality of Family Relationships helpful    Able to Return to Prior Arrangements other (see comments)  Unsure - may need SNF, no PT Eval yet       Resource/Environmental Concerns    Resource/Environmental Concerns none    Transportation Concerns rides, unreliable from others       Transition Planning    Patient/Family Anticipates Transition to inpatient rehabilitation facility    Patient/Family Anticipated Services at Transition skilled nursing    Transportation Anticipated family or friend will provide       Discharge Needs Assessment    Readmission Within the Last 30 Days no previous admission in last 30 days    Equipment Currently Used at Home walker, standard    Concerns to be Addressed basic needs;discharge planning    Anticipated Changes Related to Illness inability to care for self    Equipment Needed After Discharge none    Provided Post Acute Provider List? N/A    N/A Provider List Comment Has been to [facility] in the past and has used [facility]  in the past               Discharge Plan     Row Name 10/09/22 1444       Plan    Plan [facility] (referral pending) vs. home with [facility] following (referral pending)    Patient/Family in Agreement with Plan yes    Plan Comments Spoke with patient at bedside.  Patient lives alone, is IADL, has a walker if needed.  She has used [facility]  in the past and has been to [facility] for  rehab.  PCP is Dr. Dannie Mathews and pharmacy is [facility] on [address].  Emergency contact is her daughter Karen Dalton [phone].  Patient realizes she is very weak and may need SNF (no PT eval yet) - she would like referral to [facility] - left message for Alysa.  And if she is able to return home, she is agreeable to using [facility]  - spoke with Julio.  CCP will follow.  Preston HARDWICK              Continued Care and Services - Admitted Since 10/7/2022     Destination     Service Provider Request Status Selected Services Address Phone Fax Patient Preferred    [facility] Pending - Request Sent N/A [address] [place] [address] [phone] --          Home Medical Care     Service Provider Request Status Selected Services Address Phone Fax Patient Preferred    [facility] Pending - Request Sent N/A [address], [address], [place] [address] [phone] --                 Demographic Summary     Row Name 10/09/22 1442       General Information    Admission Type inpatient    Arrived From home    Referral Source admission list    Reason for Consult discharge planning    Preferred Language English               Functional Status     Row Name 10/09/22 1442       Functional Status    Usual Activity Tolerance moderate    Current Activity Tolerance fair       Functional Status, IADL    Medications independent    Meal Preparation independent    Housekeeping independent    Laundry independent    Shopping assistive person  Daughter       Mental Status    General Appearance WDL WDL       Mental Status Summary    Recent Changes in Mental Status/Cognitive Functioning no changes                         Becky S. [place], RN

## 2022-10-09 NOTE — PLAN OF CARE
Goal Outcome Evaluation:  Plan of Care Reviewed With: patient        Progress: no change  Outcome Evaluation: Pt slept thru the night, no s/sx of discomfort, b/p and hr monitored, nad, safety precautions in place.

## 2022-10-09 NOTE — PLAN OF CARE
Goal Outcome Evaluation:               Patient alert and oriented with c/o pain to her shoulders- medicated with Tylenol and seems to improve while up in the chair. Patient up walking in hallway with PT. IV fluids infusing. Kidney function continues to be elevated. Daughter at bedside. No acute distress noted, will continue to monitor.

## 2022-10-09 NOTE — DISCHARGE PLACEMENT REQUEST
"Becca Min (82 y.o. Female)     Date of Birth   1940    Social Security Number       Address   [address] [facility] [address]    Home Phone   [phone]    MRN   [id number]       [facility]    Marital Status   Single                            Admission Date   10/7/22    Admission Type   Emergency    Admitting Provider   Rodo Leal MD    Attending Provider   Leodan Saucedo MD    Department, Room/Bed   [address], [address]       Discharge Date       Discharge Disposition       Discharge Destination                               Attending Provider: Leodan Saucedo MD    Allergies: Ibuprofen    Isolation: None   Infection: MRSA/History Only (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), COVID Screen (preop/placement) (06/01/22)   Code Status: CPR    Ht: 170.2 cm (67\")   Wt: 95.3 kg (210 lb)    Admission Cmt: None   Principal Problem: PERLA (acute kidney injury) (HCC) [N17.9]                 Active Insurance as of 10/7/2022     Primary Coverage     Payor Plan Insurance Group Employer/Plan Group    MEDICARE MEDICARE A & B      Payor Plan Address Payor Plan Phone Number Payor Plan Fax Number Effective Dates    [address] [phone]  1/1/2005 - None Entered    [facility] [id number]       Subscriber Name Subscriber Birth Date Member ID       BECCA MIN 1940 [id number]           Secondary Coverage     Payor Plan Insurance Group Employer/Plan Group    ANTHEM BLUE CROSS [facility] SUPP [id number]     Payor Plan Address Payor Plan Phone Number Payor Plan Fax Number Effective Dates    [address]   12/1/2016 - None Entered    [facility] [id number]       Subscriber Name Subscriber Birth Date Member ID       BECCA MIN 1940 [id number]                 Emergency Contacts      (Rel.) Home Phone Work Phone Mobile Phone    ANABELA COPPOLA (Daughter) -- -- [phone]              "

## 2022-10-09 NOTE — PROGRESS NOTES
[facility]     Progress Note    Patient Name: Lowell Min  : 1940  MRN: [id number]  Primary Care Physician:  Dannie Mathews MD  Date of admission: 10/7/2022    Subjective   Subjective     Chief Complaint: perla on ckd IV    History of Present Illness  Patient with perla on ckd IV likely secondary to dehydration    Review of Systems    Objective   Objective     Vitals:   Temp:  [97.8 °F (36.6 °C)-98.3 °F (36.8 °C)] 98.3 °F (36.8 °C)  Heart Rate:  [50-73] 62  Resp:  [16-18] 18  BP: (149-197)/(70-99) 191/94    Physical Exam   General Appearance:  In no acute distress.    HEENT: Normal HEENT exam.     Extremities: .  There is no deformity, effusion or dependent edema.    Neurological: Patient is alert    Result Review    Result Review:  I have personally reviewed the results from the time of this admission to 10/9/2022 13:10 EDT and agree with these findings:  []  Laboratory list / accordion  []  Microbiology  []  Radiology  []  EKG/Telemetry   []  Cardiology/Vascular   []  Pathology  []  Old records  []  Other:        Assessment & Plan   Assessment / Plan     Brief Patient Summary:  Lowell Min is a 82 y.o. female who has peral on ckd Iv    Active Hospital Problems:  Active Hospital Problems    Diagnosis    • **PERLA (acute kidney injury) (HCC)    • Diabetic polyneuropathy associated with type 2 diabetes mellitus ([facility])    • PAF (paroxysmal atrial fibrillation) ([facility])    • Anxiety associated with depression    • Chronic diastolic congestive heart failure (HCC)    • Chronic kidney disease, stage IV (severe) ([facility])      Plan:   1. perla  2. ckd IV  3. Dehydration  4. T2DM  5. Metabolic acidosis    Patient seen and examined. No complaints. Tolerating po.  perla slowly improving. Dehydration vs atn.  With continued metabolic acidosis. Will add bicarb to fluids and monitor    Makayla Dela Cruz MD

## 2022-10-09 NOTE — PROGRESS NOTES
Name: Lowell Min ADMIT: 10/7/2022   : 1940  PCP: Dannie Mathews MD    MRN: [id number] LOS: 2 days   AGE/SEX: 82 y.o. female  ROOM: [facility]     Subjective   Subjective    Patient is seen at bedside.       Objective   Objective   Vital Signs  Temp:  [97.8 °F (36.6 °C)-98.3 °F (36.8 °C)] 97.8 °F (36.6 °C)  Heart Rate:  [52-73] 64  Resp:  [16-18] 18  BP: (149-197)/(70-99) 180/72  SpO2:  [91 %-100 %] 100 %  on   ;   Device (Oxygen Therapy): room air  Body mass index is 32.89 kg/m².  Physical Exam    General, awake and alert.  Head and ENT, normocephalic and atraumatic.  Lungs, symmetric expansion, equal air entry bilaterally.  Heart, regular rate and rhythm.  Abdomen, soft and nontender.  Extremities, no clubbing or cyanosis.  Neuro, no focal deficits.  Skin: Warm and no rash.  Psych, normal mood and affect.  Musculoskeletal, joint examination is grossly normal.         Results Review     I reviewed the patient's new clinical results.  Results from last 7 days   Lab Units 10/08/22  1926 10/07/22  2129   WBC 10*3/mm3 5.73 6.93   HEMOGLOBIN g/dL 8.6* 9.7*   PLATELETS 10*3/mm3 186 197     Results from last 7 days   Lab Units 10/09/22  0655 10/07/22  2129   SODIUM mmol/L 143 139   POTASSIUM mmol/L 4.2 4.3   CHLORIDE mmol/L 115* 109*   CO2 mmol/L 15.4* 14.9*   BUN mg/dL 68* 75*   CREATININE mg/dL 5.34* 5.94*   GLUCOSE mg/dL 106* 96   EGFR mL/min/1.73 7.6* 6.6*     Results from last 7 days   Lab Units 10/09/22  0655 10/07/22  2129   ALBUMIN g/dL 3.00* 4.30   BILIRUBIN mg/dL 0.3 0.3   ALK PHOS U/L 122* 148*   AST (SGOT) U/L 16 19   ALT (SGPT) U/L 18 18     Results from last 7 days   Lab Units 10/09/22  0655 10/07/22  2129   CALCIUM mg/dL 8.8 8.8   ALBUMIN g/dL 3.00* 4.30   MAGNESIUM mg/dL  --  1.5*       Hemoglobin A1C   Date/Time Value Ref Range Status   10/07/2022 1221 6.5 (H) 4.3 - 5.6 % Final     Glucose   Date/Time Value Ref Range Status   10/09/2022 1050 175 (H) 70 - 130 mg/dL Final     Comment:     Meter:  [id number] : [id number] Yudelka Conteh NA   10/09/2022 0609 81 70 - 130 mg/dL Final     Comment:     Meter: [id number] : [id number] Gerard Shukla NA   10/08/2022 2011 104 70 - 130 mg/dL Final     Comment:     Meter: [id number] : [id number] Gerard Shukla NA   10/08/2022 1720 81 70 - 130 mg/dL Final     Comment:     Meter: [id number] : [id number] Barrera Gonzales NA   10/08/2022 1138 155 (H) 70 - 130 mg/dL Final     Comment:     Meter: [id number] : [id number] Barrera Gonzales NA   10/08/2022 0556 96 70 - 130 mg/dL Final     Comment:     Meter: [id number] : [id number] Paul Briseno NA   10/08/2022 0252 97 70 - 130 mg/dL Final     Comment:     Meter: [id number] : [id number] Irene Jensen RN        Renal Bilateral    Result Date: 10/8/2022  1.There is evidence of bilateral medical renal disease. No evidence for renal obstruction is appreciated.  This report was finalized on 10/8/2022 11:06 AM by Dr. Ellis Hernandez M.D.      Scheduled Medications  amLODIPine, 5 mg, Oral, Q24H  apixaban, 2.5 mg, Oral, BID  atorvastatin, 40 mg, Oral, Daily  gabapentin, 100 mg, Oral, Nightly  hydrALAZINE, 50 mg, Oral, Q8H  insulin lispro, 0-7 Units, Subcutaneous, TID AC  pantoprazole, 40 mg, Oral, QAM  sennosides-docusate, 2 tablet, Oral, BID  sodium bicarbonate, 650 mg, Oral, TID  sodium chloride, 10 mL, Intravenous, Q12H  venlafaxine XR, 37.5 mg, Oral, Daily    Infusions  custom IV infusion builder, , Last Rate: 100 mL/hr at 10/09/22 1450    Diet  Diet Regular; Cardiac       Assessment/Plan     Active Hospital Problems    Diagnosis  POA   • **PERLA (acute kidney injury) ([facility]) [N17.9]  Yes   • Diabetic polyneuropathy associated with type 2 diabetes mellitus (HCC) [E11.42]  Yes   • PAF (paroxysmal atrial fibrillation) (HCC) [I48.0]  Yes   • Anxiety associated with depression [F41.8]  Yes   • Chronic diastolic congestive heart failure (HCC) [I50.32]  Yes   • Chronic kidney disease, stage IV (severe)  ([facility]) [N18.4]  Yes      Resolved Hospital Problems   No resolved problems to display.       82 y.o. female admitted with PERLA (acute kidney injury) ([facility]).    Assessment and plan:  1.  Acute on chronic kidney injury.  Continue IV fluids, nephrology on board.  Avoid nephrotoxic medications.     2.  Chronic diastolic CHF, monitor for volume status changes while on IV fluids.      3.  Type 2 diabetes mellitus, complicated with nephropathy and polyneuropathy.  Continue Accu-Cheks and sliding scale coverage.  Continue Neurontin.     4.  Persistent atrial fibrillation, patient is currently rate controlled, she is on anticoagulant Eliquis.     5.  Hypomagnesemia, replace electrolytes per protocol.    6.  Hypertension, patient did exhibit bradycardia, I stopped beta-blocker therapy, switched patient to hydralazine and Norvasc regimen.     7.  CODE STATUS is full code.  Further plans based on hospital course.    Leodan Saucedo MD  [facility]  10/09/22  16:22 EDT

## 2022-10-09 NOTE — THERAPY EVALUATION
Patient Name: Lowell Min  : 1940    MRN: [id number]                              Today's Date: 10/9/2022       Admit Date: 10/7/2022    Visit Dx:     ICD-10-CM ICD-9-CM   1. PERLA (acute kidney injury) (HCC)  N17.9 584.9   2. Hypomagnesemia  E83.42 275.2     Patient Active Problem List   Diagnosis   • Chronic kidney disease, stage IV (severe) (HCC)   • Erythropoietin deficiency anemia   • Symptomatic anemia   • Anxiety associated with depression   • Iron deficiency anemia   • PAF (paroxysmal atrial fibrillation) (HCC)   • Shortness of breath   • Chronic diastolic congestive heart failure (HCC)   • Diabetic polyneuropathy associated with type 2 diabetes mellitus (HCC)   • PERLA (acute kidney injury) (HCC)     Past Medical History:   Diagnosis Date   • Anxiety    • Atrial fibrillation (HCC)    • Chronic kidney disease, stage IV (severe) (HCC)    • Depression    • Elevated cholesterol    • Hypertension    • Type 2 diabetes mellitus (HCC)      Past Surgical History:   Procedure Laterality Date   • APPENDECTOMY     • CHOLECYSTECTOMY     • COLONOSCOPY     • HYSTERECTOMY     • TUMOR REMOVAL Left     benign tumor from left breast      General Information     Row Name 10/09/22 [facility]          Physical Therapy Time and Intention    Document Type evaluation;therapy note (daily note)  -     Mode of Treatment physical therapy;individual therapy  -     Row Name 10/09/22 [facility]          General Information    Patient Profile Reviewed yes  -     Prior Level of Function independent:;all household mobility;community mobility;gait;transfer;bed mobility  -     Existing Precautions/Restrictions fall  -     Barriers to Rehab none identified  -     Row Name 10/09/22 155          Stairs Within Home, Primary    Stairs, Within Home, Primary 12 steps   pt lives in bilevel  -     Row Name 10/09/22 1556          Cognition    Orientation Status (Cognition) oriented x 4  -     Row Name 10/09/22 1556          Safety Issues,  Functional Mobility    Impairments Affecting Function (Mobility) balance;endurance/activity tolerance;shortness of breath;strength  -     Comment, Safety Issues/Impairments (Mobility) fall prevention program maintained  -           User Key  (r) = Recorded By, (t) = Taken By, (c) = Cosigned By    Initials Name Provider Type    Donna Lugo, PT Physical Therapist               Mobility     Row Name 10/09/22 1557          Bed Mobility    Bed Mobility supine-sit;sit-supine  -     Supine-Sit [place] (Bed Mobility) contact guard;verbal cues;nonverbal cues (demo/gesture)  -     Sit-Supine [place] (Bed Mobility) contact guard;verbal cues;nonverbal cues (demo/gesture)  -     Assistive Device (Bed Mobility) bed rails;head of bed elevated  -     Row Name 10/09/22 1601 10/09/22 1557       Sit-Stand Transfer    Sit-Stand [place] (Transfers) contact guard;minimum assist (75% patient effort);verbal cues;nonverbal cues (demo/gesture)  - contact guard;minimum assist (75% patient effort);verbal cues;nonverbal cues (demo/gesture)  -    Assistive Device (Sit-Stand Transfers) other (see comments)  none  - walker, front-wheeled  -    Row Name 10/09/22 1557          Gait/Stairs (Locomotion)    [place] Level (Gait) contact guard;verbal cues;nonverbal cues (demo/gesture)  -     Assistive Device (Gait) walker, front-wheeled  -     Distance in Feet (Gait) 80  -     Deviations/Abnormal Patterns (Gait) base of support, narrow;guillermo decreased;gait speed decreased;stride length decreased  -     Bilateral Gait Deviations forward flexed posture;heel strike decreased  -           User Key  (r) = Recorded By, (t) = Taken By, (c) = Cosigned By    Initials Name Provider Type    Donna Lugo, MIRNA Physical Therapist               Obj/Interventions     Row Name 10/09/22 1558          Range of Motion Comprehensive    Comment, General Range of Motion BUE and BLE gross ROM appear WFLs  -      Row Name 10/09/22 1558          Strength Comprehensive (MMT)    Comment, General Manual Muscle Testing (MMT) Assessment BLE gross strength >/= 3+/5. Observed generalized weakness during functional mobility  -     Row Name 10/09/22 1558          Balance    Balance Assessment sitting static balance;standing static balance;standing dynamic balance  -     Static Sitting Balance independent  -KH     Position, Sitting Balance unsupported;sitting edge of bed  -     Static Standing Balance contact guard  -     Dynamic Standing Balance contact guard  -KH     Position/Device Used, Standing Balance supported;walker, front-wheeled  -           User Key  (r) = Recorded By, (t) = Taken By, (c) = Cosigned By    Initials Name Provider Type     Donna South, PT Physical Therapist               Goals/Plan     Row Name 10/09/22 1602          Bed Mobility Goal 1 (PT)    Activity/Assistive Device (Bed Mobility Goal 1, PT) bed mobility activities, all  -KH     [place] Level/Cues Needed (Bed Mobility Goal 1, PT) independent  -KH     Time Frame (Bed Mobility Goal 1, PT) 10 days  -     Row Name 10/09/22 1602          Transfer Goal 1 (PT)    Activity/Assistive Device (Transfer Goal 1, PT) transfers, all  most appropriate AD  -KH     [place] Level/Cues Needed (Transfer Goal 1, PT) modified independence  -KH     Time Frame (Transfer Goal 1, PT) 10 days  -     Row Name 10/09/22 1602          Gait Training Goal 1 (PT)    Activity/Assistive Device (Gait Training Goal 1, PT) gait (walking locomotion)  most appropriate AD  -KH     Distance (Gait Training Goal 1, PT) 300 ft.  -     Time Frame (Gait Training Goal 1, PT) 10 days  -     Row Name 10/09/22 1602          Therapy Assessment/Plan (PT)    Planned Therapy Interventions (PT) balance training;bed mobility training;gait training;home exercise program;patient/family education;strengthening;transfer training  -           User Key  (r) = Recorded By, (t) = Taken  By, (c) = Cosigned By    Initials Name Provider Type    Donna Lugo, PT Physical Therapist               Clinical Impression     Row Name 10/09/22 1558          Pain    Pretreatment Pain Rating 0/10 - no pain  -     Posttreatment Pain Rating 0/10 - no pain  -     Row Name 10/09/22 1558          Plan of Care Review    Plan of Care Reviewed With patient  -     Outcome Evaluation Pt is an 81 y/o female admitted to hospital with PERLA. Pt reports she lives in a bi-level home with approximately 12-13 steps total. she states she is independent with mobility and does not use any assistive device. Pt presents with decreased strength, decreased endurance/activity tolerance, SOA, and impaired balance impacting her mobility. she performed bed mobility with CGA, transferred sit to stand with CGA-min A, and ambulated 80 ft. with RWX and CGA. Pt required increased time for mobility. Pt may benefit from acute skilled PT to address noted deficits and facilitate safe return home at [facility].  -     Row Name 10/09/22 1558          Therapy Assessment/Plan (PT)    Patient/Family Therapy Goals Statement (PT) get better and go home  -     Rehab Potential (PT) good, to achieve stated therapy goals  -     Criteria for Skilled Interventions Met (PT) yes;skilled treatment is necessary  -     Therapy Frequency (PT) 5 times/wk  -     Row Name 10/09/22 1558          Vital Signs    O2 Delivery Pre Treatment room air  -     O2 Delivery Intra Treatment room air  -     O2 Delivery Post Treatment room air  -     Row Name 10/09/22 1558          Positioning and Restraints    Pre-Treatment Position in bed  -     Post Treatment Position bed  -     In Bed supine;call light within reach;encouraged to call for assist;exit alarm on;with family/caregiver  -           User Key  (r) = Recorded By, (t) = Taken By, (c) = Cosigned By    Initials Name Provider Type    Donna Lugo, PT Physical Therapist               Outcome  Measures     Row Name 10/09/22 1603          How much help from another person do you currently need...    Turning from your back to your side while in flat bed without using bedrails? 4  -KH     Moving from lying on back to sitting on the side of a flat bed without bedrails? 4  -KH     Moving to and from a bed to a chair (including a wheelchair)? 3  -KH     Standing up from a chair using your arms (e.g., wheelchair, bedside chair)? 3  -KH     Climbing 3-5 steps with a railing? 2  -KH     To walk in hospital room? 3  -KH     AM-PAC 6 Clicks Score (PT) 19  -     Highest level of mobility 6 --> Walked 10 steps or more  -     Row Name 10/09/22 1603          Functional Assessment    Outcome Measure Options AM-PAC 6 Clicks Basic Mobility (PT)  -           User Key  (r) = Recorded By, (t) = Taken By, (c) = Cosigned By    Initials Name Provider Type    Donna Lugo, PT Physical Therapist                             Physical Therapy Education     Title: PT OT SLP Therapies (In Progress)     Topic: Physical Therapy (In Progress)     Point: Mobility training (Done)     Learning Progress Summary           Patient Acceptance, E, VU by  at 10/9/2022 1603                   Point: Home exercise program (Not Started)     Learner Progress:  Not documented in this visit.          Point: Body mechanics (Done)     Learning Progress Summary           Patient Acceptance, E, VU by  at 10/9/2022 1603                   Point: Precautions (Done)     Learning Progress Summary           Patient Acceptance, E, VU by  at 10/9/2022 1603                               User Key     Initials Effective Dates Name Provider Type Discipline     06/16/21 -  Donna South, MIRNA Physical Therapist PT              PT Recommendation and Plan  Planned Therapy Interventions (PT): balance training, bed mobility training, gait training, home exercise program, patient/family education, strengthening, transfer training  Plan of Care Reviewed  With: patient  Outcome Evaluation: Pt is an 83 y/o female admitted to hospital with PERLA. Pt reports she lives in a bi-level home with approximately 12-13 steps total. she states she is independent with mobility and does not use any assistive device. Pt presents with decreased strength, decreased endurance/activity tolerance, SOA, and impaired balance impacting her mobility. she performed bed mobility with CGA, transferred sit to stand with CGA-min A, and ambulated 80 ft. with RWX and CGA. Pt required increased time for mobility. Pt may benefit from acute skilled PT to address noted deficits and facilitate safe return home at [facility].     Time Calculation:    PT Charges     Row Name 10/09/22 1604             Time Calculation    Start Time 1534  -KH      Stop Time 1555  -KH      Time Calculation (min) 21 min  -KH      PT Non-Billable Time (min) 10 min  -KH      PT Received On 10/09/22  -KH      PT - Next Appointment 10/10/22  -      PT Goal Re-Cert Due Date 10/19/22  -         Timed Charges    22837 - Gait Training Minutes  11  -KH         Untimed Charges    PT Eval/Re-eval Minutes 10  -KH         Total Minutes    Timed Charges Total Minutes 11  -KH      Untimed Charges Total Minutes 10  -KH       Total Minutes 21  -KH            User Key  (r) = Recorded By, (t) = Taken By, (c) = Cosigned By    Initials Name Provider Type    Donna Lugo, PT Physical Therapist              Therapy Charges for Today     Code Description Service Date Service Provider Modifiers Qty    52999665221 HC GAIT TRAINING EA 15 MIN 10/9/2022 Donna South, PT GP 1    13005666277 HC PT EVAL LOW COMPLEXITY 2 10/9/2022 Donna South, PT GP 1          PT G-Codes  Outcome Measure Options: AM-PAC 6 Clicks Basic Mobility (PT)  AM-PAC 6 Clicks Score (PT): 19    Donna South PT  10/9/2022

## 2022-10-10 LAB
ANION GAP SERPL CALCULATED.3IONS-SCNC: 10.9 MMOL/L (ref 5–15)
BUN SERPL-MCNC: 60 MG/DL (ref 8–23)
BUN/CREAT SERPL: 12.6 (ref 7–25)
CALCIUM SPEC-SCNC: 8.5 MG/DL (ref 8.6–10.5)
CHLORIDE SERPL-SCNC: 109 MMOL/L (ref 98–107)
CO2 SERPL-SCNC: 21.1 MMOL/L (ref 22–29)
CREAT SERPL-MCNC: 4.78 MG/DL (ref 0.57–1)
EGFRCR SERPLBLD CKD-EPI 2021: 8.6 ML/MIN/1.73
GLUCOSE BLDC GLUCOMTR-MCNC: 130 MG/DL (ref 70–130)
GLUCOSE BLDC GLUCOMTR-MCNC: 153 MG/DL (ref 70–130)
GLUCOSE BLDC GLUCOMTR-MCNC: 154 MG/DL (ref 70–130)
GLUCOSE BLDC GLUCOMTR-MCNC: 225 MG/DL (ref 70–130)
GLUCOSE SERPL-MCNC: 132 MG/DL (ref 65–99)
POTASSIUM SERPL-SCNC: 3.7 MMOL/L (ref 3.5–5.2)
SODIUM SERPL-SCNC: 141 MMOL/L (ref 136–145)

## 2022-10-10 PROCEDURE — 80048 BASIC METABOLIC PNL TOTAL CA: CPT | Performed by: INTERNAL MEDICINE

## 2022-10-10 PROCEDURE — 82962 GLUCOSE BLOOD TEST: CPT

## 2022-10-10 PROCEDURE — 63710000001 INSULIN LISPRO (HUMAN) PER 5 UNITS: Performed by: INTERNAL MEDICINE

## 2022-10-10 RX ADMIN — PANTOPRAZOLE SODIUM 40 MG: 40 TABLET, DELAYED RELEASE ORAL at 06:32

## 2022-10-10 RX ADMIN — SODIUM BICARBONATE: 84 INJECTION, SOLUTION INTRAVENOUS at 01:40

## 2022-10-10 RX ADMIN — INSULIN LISPRO 2 UNITS: 100 INJECTION, SOLUTION INTRAVENOUS; SUBCUTANEOUS at 18:31

## 2022-10-10 RX ADMIN — ACETAMINOPHEN 650 MG: 325 TABLET, FILM COATED ORAL at 08:53

## 2022-10-10 RX ADMIN — APIXABAN 2.5 MG: 2.5 TABLET, FILM COATED ORAL at 21:55

## 2022-10-10 RX ADMIN — Medication 10 ML: at 08:41

## 2022-10-10 RX ADMIN — VENLAFAXINE HYDROCHLORIDE 37.5 MG: 37.5 CAPSULE, EXTENDED RELEASE ORAL at 08:40

## 2022-10-10 RX ADMIN — AMLODIPINE BESYLATE 5 MG: 5 TABLET ORAL at 08:40

## 2022-10-10 RX ADMIN — ATORVASTATIN CALCIUM 40 MG: 20 TABLET, FILM COATED ORAL at 08:41

## 2022-10-10 RX ADMIN — APIXABAN 2.5 MG: 2.5 TABLET, FILM COATED ORAL at 08:41

## 2022-10-10 RX ADMIN — DOCUSATE SODIUM 50MG AND SENNOSIDES 8.6MG 2 TABLET: 8.6; 5 TABLET, FILM COATED ORAL at 21:55

## 2022-10-10 RX ADMIN — HYDRALAZINE HYDROCHLORIDE 50 MG: 50 TABLET, FILM COATED ORAL at 16:51

## 2022-10-10 RX ADMIN — SODIUM BICARBONATE 650 MG: 650 TABLET ORAL at 21:55

## 2022-10-10 RX ADMIN — DOCUSATE SODIUM 50MG AND SENNOSIDES 8.6MG 2 TABLET: 8.6; 5 TABLET, FILM COATED ORAL at 08:41

## 2022-10-10 RX ADMIN — ACETAMINOPHEN 650 MG: 325 TABLET, FILM COATED ORAL at 18:34

## 2022-10-10 RX ADMIN — SODIUM BICARBONATE 650 MG: 650 TABLET ORAL at 08:41

## 2022-10-10 RX ADMIN — Medication 10 ML: at 22:00

## 2022-10-10 RX ADMIN — INSULIN LISPRO 3 UNITS: 100 INJECTION, SOLUTION INTRAVENOUS; SUBCUTANEOUS at 12:51

## 2022-10-10 RX ADMIN — SODIUM BICARBONATE 650 MG: 650 TABLET ORAL at 16:51

## 2022-10-10 RX ADMIN — HYDRALAZINE HYDROCHLORIDE 50 MG: 50 TABLET, FILM COATED ORAL at 06:32

## 2022-10-10 RX ADMIN — HYDRALAZINE HYDROCHLORIDE 50 MG: 50 TABLET, FILM COATED ORAL at 21:55

## 2022-10-10 RX ADMIN — GABAPENTIN 100 MG: 100 CAPSULE ORAL at 21:55

## 2022-10-10 NOTE — PROGRESS NOTES
Name: Lowell Min ADMIT: 10/7/2022   : 1940  PCP: Dannie Mathews MD    MRN: [id number] LOS: 3 days   AGE/SEX: 82 y.o. female  ROOM: [facility]     Subjective   Subjective   Complaining of a headache and some low back pain this morning.    Review of Systems    Neg for CP, SOA, N/V/D    Objective   Objective   Vital Signs  Temp:  [97.6 °F (36.4 °C)-98.2 °F (36.8 °C)] 98.2 °F (36.8 °C)  Heart Rate:  [52-86] 86  Resp:  [18] 18  BP: (158-199)/(70-94) 159/78  SpO2:  [98 %-100 %] 98 %  on   ;   Device (Oxygen Therapy): room air  Body mass index is 30.76 kg/m².  Physical Exam  Constitutional:       Appearance: Normal appearance.   Cardiovascular:      Rate and Rhythm: Normal rate and regular rhythm.      Heart sounds: No murmur heard.    No gallop.   Pulmonary:      Effort: Pulmonary effort is normal. No respiratory distress.      Breath sounds: Normal breath sounds. No wheezing.   Abdominal:      General: Abdomen is flat. There is no distension.      Palpations: Abdomen is soft.      Tenderness: There is no guarding.   Neurological:      General: No focal deficit present.      Mental Status: She is alert and oriented to person, place, and time.   Psychiatric:         Mood and Affect: Mood normal.         Behavior: Behavior normal.         Results Review     I reviewed the patient's new clinical results.  Results from last 7 days   Lab Units 10/09/22  1727 10/08/22  1926 10/07/22  2129   WBC 10*3/mm3 5.74 5.73 6.93   HEMOGLOBIN g/dL 9.0* 8.6* 9.7*   PLATELETS 10*3/mm3 187 186 197     Results from last 7 days   Lab Units 10/09/22  0655 10/07/22  2129   SODIUM mmol/L 143 139   POTASSIUM mmol/L 4.2 4.3   CHLORIDE mmol/L 115* 109*   CO2 mmol/L 15.4* 14.9*   BUN mg/dL 68* 75*   CREATININE mg/dL 5.34* 5.94*   GLUCOSE mg/dL 106* 96   EGFR mL/min/1.73 7.6* 6.6*     Results from last 7 days   Lab Units 10/09/22  0655 10/07/22  2129   ALBUMIN g/dL 3.00* 4.30   BILIRUBIN mg/dL 0.3 0.3   ALK PHOS U/L 122* 148*   AST (SGOT)  U/L 16 19   ALT (SGPT) U/L 18 18     Results from last 7 days   Lab Units 10/09/22  0655 10/07/22  2129   CALCIUM mg/dL 8.8 8.8   ALBUMIN g/dL 3.00* 4.30   MAGNESIUM mg/dL  --  1.5*       Hemoglobin A1C   Date/Time Value Ref Range Status   10/07/2022 1221 6.5 (H) 4.3 - 5.6 % Final     Glucose   Date/Time Value Ref Range Status   10/10/2022 0625 130 70 - 130 mg/dL Final     Comment:     Meter: [id number] : [id number] Blas MCDONALD NA   10/09/2022 2038 171 (H) 70 - 130 mg/dL Final     Comment:     Meter: [id number] : [id number] Blas MCDONALD NA   10/09/2022 1623 118 70 - 130 mg/dL Final     Comment:     Meter: [id number] : [id number] Yudelka Conteh YASMEEN   10/09/2022 1050 175 (H) 70 - 130 mg/dL Final     Comment:     Meter: [id number] : [id number] Yudelka Conteh NA   10/09/2022 0609 81 70 - 130 mg/dL Final     Comment:     Meter: [id number] : [id number] Luisdimitris Rodriguezpatrick ARANA   10/08/2022 2011 104 70 - 130 mg/dL Final     Comment:     Meter: [id number] : [id number] Gerard Shukla YASMEEN   10/08/2022 1720 81 70 - 130 mg/dL Final     Comment:     Meter: [id number] : [id number] Barrera ARANA       No radiology results for the last day  Scheduled Medications  amLODIPine, 5 mg, Oral, Q24H  apixaban, 2.5 mg, Oral, BID  atorvastatin, 40 mg, Oral, Daily  gabapentin, 100 mg, Oral, Nightly  hydrALAZINE, 50 mg, Oral, Q8H  insulin lispro, 0-7 Units, Subcutaneous, TID AC  pantoprazole, 40 mg, Oral, QAM  sennosides-docusate, 2 tablet, Oral, BID  sodium bicarbonate, 650 mg, Oral, TID  sodium chloride, 10 mL, Intravenous, Q12H  venlafaxine XR, 37.5 mg, Oral, Daily    Infusions  custom IV infusion builder, , Last Rate: 100 mL/hr at 10/10/22 0140    Diet  Diet Regular; Cardiac       Assessment/Plan     Active Hospital Problems    Diagnosis  POA   • **PERLA (acute kidney injury) (HCC) [N17.9]  Yes   • Diabetic polyneuropathy associated with type 2 diabetes mellitus (HCC) [E11.42]  Yes   • PAF  (paroxysmal atrial fibrillation) ([facility]) [I48.0]  Yes   • Anxiety associated with depression [F41.8]  Yes   • Chronic diastolic congestive heart failure ([facility]) [I50.32]  Yes   • Chronic kidney disease, stage IV (severe) ([facility]) [N18.4]  Yes      Resolved Hospital Problems   No resolved problems to display.       82 y.o. female admitted with PERLA (acute kidney injury) ([facility]).    PERLA on CKD4  -Crt 5.94 OA (b/l ~2)  -Renal following   -Continue sodium bicarb    Chronic diastolic CHF  -home Lasix on hold  -appears euvolemic    HTN  -Metoprolol held secondary to bradycardia  -Amlodipine 5 mg, hydralazine 50 mg 3 times daily    DM2 (c/b neuropathy, nephropathy)  -SSI; monitor BG closely  -Gabapentin 100 mg nightly    pAF  -RC: metoprolol on hold  -AC: apixaban    Anemia of chronic disease      · Eliquis (home med) for DVT prophylaxis.  · Full code.  · Discussed with patient and care team on multidisciplinary rounds.  · Anticipate discharge home when cleared by consultants.      Pillo Yu MD  [facility]  10/10/22  07:02 EDT

## 2022-10-10 NOTE — PLAN OF CARE
Goal Outcome Evaluation:  Plan of Care Reviewed With: patient        Progress: no change  Outcome Evaluation: Patient  resting  at this  time. No  complaint of  pain  voiced.  Up  to  bathroom  x  1 assist  this  shift.  Complains  of  weakness    but no   dizziness.   IV  Sodium  Bicarbonate  infusing  as ordered.   Blood  pressure  slightly  elevated  but  improving.    Afib  on  the  monitor.  Nursing  will  continue to monitor.

## 2022-10-10 NOTE — PLAN OF CARE
Goal Outcome Evaluation:               Patient alert and oriented with c/o headache- medicated with tylenol and resolved. Patient up in chair. Iv fluids dc'd. BM today. Heart rate has improvement since changing medications this weekend. No acute distress noted, will continue to monitor.

## 2022-10-10 NOTE — PROGRESS NOTES
LOS: 3 days     Chief Complaint/ Reason for encounter: CKD management with new, severe PERLA    Subjective   10/10/22 : No new complaints, feeling better overall  States good appetite with no nausea or vomiting  No fevers or chills  No chest pain or dyspnea, minimal edema  Excellent urine output      Medical history reviewed:  History of Present Illness    Subjective    History taken from: Patient and chart    Vital Signs  Temp:  [97.4 °F (36.3 °C)-98.2 °F (36.8 °C)] 97.4 °F (36.3 °C)  Heart Rate:  [62-86] 85  Resp:  [18] 18  BP: (149-199)/(67-94) 149/67       Wt Readings from Last 1 Encounters:   10/10/22 0554 89.1 kg (196 lb 6.4 oz)   10/07/22 2249 95.3 kg (210 lb)       Objective    Objective:  General Appearance:  Comfortable, well no-appearing, in no acute distress and not in pain.  Awake, alert, oriented  HEENT: Mucous membranes moist, no injury, oropharynx clear  Lungs:  Normal effort and normal respiratory rate.  Breath sounds clear to auscultation.  No  respiratory distress.  No rales, decreased breath sounds or rhonchi.    Heart: Normal rate.  Regular rhythm.  S1, S2 normal.  No murmur.   Abdomen: Abdomen is soft.  Bowel sounds are normal, no abdominal tenderness.  There is no rebound or guarding  Extremities: No significant edema of bilateral lower extremities  Neurological: No focal motor or sensory deficits, pupils reactive  Skin:  Warm and dry.  No rash or cyanosis.       Results Review:    Intake/Output:     Intake/Output Summary (Last 24 hours) at 10/10/2022 1140  Last data filed at 10/10/2022 0839  Gross per 24 hour   Intake 2060 ml   Output 4350 ml   Net -2290 ml         DATA:  Radiology and Labs:  The following labs independently reviewed by me. Additional labs ordered for tomorrow a.m.  Interval notes, chart personally reviewed by me.   Old records independently reviewed showing CKD stage IV, baseline creatinine around 2.0-2.2  The following radiologic studies independently viewed by me,  findings renal ultrasound showed bilateral medical renal disease with no obstruction, small kidneys  New problems include metabolic acidosis, anemia of CKD, hypomagnesemia  Discussed with patient herself at bedside    Risk/ complexity of medical care/ medical decision making high risk, severe renal failure with underlying CKD stage IV      Labs:   Recent Results (from the past 24 hour(s))   POC Glucose Once    Collection Time: 10/09/22  4:23 PM    Specimen: Blood   Result Value Ref Range    Glucose 118 70 - 130 mg/dL   CBC Auto Differential    Collection Time: 10/09/22  5:27 PM    Specimen: Blood   Result Value Ref Range    WBC 5.74 3.40 - 10.80 10*3/mm3    RBC 2.82 (L) 3.77 - 5.28 10*6/mm3    Hemoglobin 9.0 (L) 12.0 - 15.9 g/dL    Hematocrit 26.9 (L) 34.0 - 46.6 %    MCV 95.4 79.0 - 97.0 fL    MCH 31.9 26.6 - 33.0 pg    MCHC 33.5 31.5 - 35.7 g/dL    RDW 13.0 12.3 - 15.4 %    RDW-SD 45.4 37.0 - 54.0 fl    MPV 9.4 6.0 - 12.0 fL    Platelets 187 140 - 450 10*3/mm3    Neutrophil % 62.1 42.7 - 76.0 %    Lymphocyte % 26.3 19.6 - 45.3 %    Monocyte % 8.5 5.0 - 12.0 %    Eosinophil % 2.6 0.3 - 6.2 %    Basophil % 0.3 0.0 - 1.5 %    Immature Grans % 0.2 0.0 - 0.5 %    Neutrophils, Absolute 3.56 1.70 - 7.00 10*3/mm3    Lymphocytes, Absolute 1.51 0.70 - 3.10 10*3/mm3    Monocytes, Absolute 0.49 0.10 - 0.90 10*3/mm3    Eosinophils, Absolute 0.15 0.00 - 0.40 10*3/mm3    Basophils, Absolute 0.02 0.00 - 0.20 10*3/mm3    Immature Grans, Absolute 0.01 0.00 - 0.05 10*3/mm3    nRBC 0.0 0.0 - 0.2 /100 WBC   POC Glucose Once    Collection Time: 10/09/22  8:38 PM    Specimen: Blood   Result Value Ref Range    Glucose 171 (H) 70 - 130 mg/dL   POC Glucose Once    Collection Time: 10/10/22  6:25 AM    Specimen: Blood   Result Value Ref Range    Glucose 130 70 - 130 mg/dL   Basic Metabolic Panel    Collection Time: 10/10/22  6:42 AM    Specimen: Blood   Result Value Ref Range    Glucose 132 (H) 65 - 99 mg/dL    BUN 60 (H) 8 - 23 mg/dL     Creatinine 4.78 (H) 0.57 - 1.00 mg/dL    Sodium 141 136 - 145 mmol/L    Potassium 3.7 3.5 - 5.2 mmol/L    Chloride 109 (H) 98 - 107 mmol/L    CO2 21.1 (L) 22.0 - 29.0 mmol/L    Calcium 8.5 (L) 8.6 - 10.5 mg/dL    BUN/Creatinine Ratio 12.6 7.0 - 25.0    Anion Gap 10.9 5.0 - 15.0 mmol/L    eGFR 8.6 (L) >60.0 mL/min/1.73   POC Glucose Once    Collection Time: 10/10/22 10:42 AM    Specimen: Blood   Result Value Ref Range    Glucose 225 (H) 70 - 130 mg/dL       Radiology:  Pertinent radiology studies were reviewed as described above      Medications have been reviewed separately in chart overview      ASSESSMENT:  1.  Acute renal failure, prerenal and improved.  Renal ultrasound unremarkable, urine studies show no secondary pathology  2. ckd IV, baseline creatinine around 2.0  3. Dehydration  4. T2DM  5. Metabolic acidosis, improved   6.  Hypomagnesemia  7.Anemia of CKD      Plan:  Renal function continues to improve but waste product still significantly above her usual baseline  Will discontinue IV fluids as oral intake is adequate  Continue BP regimen and sodium bicarb  Continue to encourage oral fluid intake  Recheck magnesium level and phosphorus in a.m.  Follow-up a.m. labs, slow progress      Isaiah Foster MD   [facility]   Office phone number: [phone]  Answering service phone number: [phone]    10/10/22  11:40 EDT    Dictation performed using Dragon dictation software

## 2022-10-11 LAB
ALBUMIN SERPL-MCNC: 3.2 G/DL (ref 3.5–5.2)
ANION GAP SERPL CALCULATED.3IONS-SCNC: 12 MMOL/L (ref 5–15)
BUN SERPL-MCNC: 57 MG/DL (ref 8–23)
BUN/CREAT SERPL: 12.4 (ref 7–25)
CALCIUM SPEC-SCNC: 8.9 MG/DL (ref 8.6–10.5)
CHLORIDE SERPL-SCNC: 105 MMOL/L (ref 98–107)
CO2 SERPL-SCNC: 22 MMOL/L (ref 22–29)
CREAT SERPL-MCNC: 4.59 MG/DL (ref 0.57–1)
EGFRCR SERPLBLD CKD-EPI 2021: 9.1 ML/MIN/1.73
GLUCOSE BLDC GLUCOMTR-MCNC: 107 MG/DL (ref 70–130)
GLUCOSE BLDC GLUCOMTR-MCNC: 134 MG/DL (ref 70–130)
GLUCOSE BLDC GLUCOMTR-MCNC: 158 MG/DL (ref 70–130)
GLUCOSE BLDC GLUCOMTR-MCNC: 178 MG/DL (ref 70–130)
GLUCOSE SERPL-MCNC: 106 MG/DL (ref 65–99)
MAGNESIUM SERPL-MCNC: 1.1 MG/DL (ref 1.6–2.4)
PHOSPHATE SERPL-MCNC: 4 MG/DL (ref 2.5–4.5)
POTASSIUM SERPL-SCNC: 3.5 MMOL/L (ref 3.5–5.2)
SODIUM SERPL-SCNC: 139 MMOL/L (ref 136–145)

## 2022-10-11 PROCEDURE — 83735 ASSAY OF MAGNESIUM: CPT | Performed by: INTERNAL MEDICINE

## 2022-10-11 PROCEDURE — 63710000001 INSULIN LISPRO (HUMAN) PER 5 UNITS: Performed by: INTERNAL MEDICINE

## 2022-10-11 PROCEDURE — 25010000002 MAGNESIUM SULFATE 2 GM/50ML SOLUTION: Performed by: INTERNAL MEDICINE

## 2022-10-11 PROCEDURE — 97110 THERAPEUTIC EXERCISES: CPT

## 2022-10-11 PROCEDURE — 82962 GLUCOSE BLOOD TEST: CPT

## 2022-10-11 PROCEDURE — 97530 THERAPEUTIC ACTIVITIES: CPT

## 2022-10-11 PROCEDURE — 80069 RENAL FUNCTION PANEL: CPT | Performed by: INTERNAL MEDICINE

## 2022-10-11 RX ORDER — POTASSIUM CHLORIDE 750 MG/1
40 TABLET, FILM COATED, EXTENDED RELEASE ORAL ONCE
Status: COMPLETED | OUTPATIENT
Start: 2022-10-11 | End: 2022-10-11

## 2022-10-11 RX ORDER — MAGNESIUM SULFATE HEPTAHYDRATE 40 MG/ML
2 INJECTION, SOLUTION INTRAVENOUS ONCE
Status: COMPLETED | OUTPATIENT
Start: 2022-10-11 | End: 2022-10-11

## 2022-10-11 RX ORDER — LIDOCAINE 50 MG/G
1 PATCH TOPICAL
Status: DISCONTINUED | OUTPATIENT
Start: 2022-10-11 | End: 2022-10-15 | Stop reason: HOSPADM

## 2022-10-11 RX ADMIN — Medication 10 ML: at 21:56

## 2022-10-11 RX ADMIN — ATORVASTATIN CALCIUM 40 MG: 20 TABLET, FILM COATED ORAL at 08:27

## 2022-10-11 RX ADMIN — HYDRALAZINE HYDROCHLORIDE 50 MG: 50 TABLET, FILM COATED ORAL at 21:29

## 2022-10-11 RX ADMIN — APIXABAN 2.5 MG: 2.5 TABLET, FILM COATED ORAL at 08:27

## 2022-10-11 RX ADMIN — DOCUSATE SODIUM 50MG AND SENNOSIDES 8.6MG 2 TABLET: 8.6; 5 TABLET, FILM COATED ORAL at 21:30

## 2022-10-11 RX ADMIN — METOPROLOL TARTRATE 25 MG: 25 TABLET, FILM COATED ORAL at 21:29

## 2022-10-11 RX ADMIN — ACETAMINOPHEN 650 MG: 325 TABLET, FILM COATED ORAL at 08:27

## 2022-10-11 RX ADMIN — Medication 10 ML: at 08:27

## 2022-10-11 RX ADMIN — INSULIN LISPRO 2 UNITS: 100 INJECTION, SOLUTION INTRAVENOUS; SUBCUTANEOUS at 11:47

## 2022-10-11 RX ADMIN — SODIUM BICARBONATE 650 MG: 650 TABLET ORAL at 21:29

## 2022-10-11 RX ADMIN — AMLODIPINE BESYLATE 5 MG: 5 TABLET ORAL at 08:27

## 2022-10-11 RX ADMIN — DOCUSATE SODIUM 50MG AND SENNOSIDES 8.6MG 2 TABLET: 8.6; 5 TABLET, FILM COATED ORAL at 08:27

## 2022-10-11 RX ADMIN — Medication 10 ML: at 17:58

## 2022-10-11 RX ADMIN — POTASSIUM CHLORIDE 40 MEQ: 750 TABLET, EXTENDED RELEASE ORAL at 11:47

## 2022-10-11 RX ADMIN — APIXABAN 2.5 MG: 2.5 TABLET, FILM COATED ORAL at 21:29

## 2022-10-11 RX ADMIN — Medication 10 ML: at 11:46

## 2022-10-11 RX ADMIN — HYDRALAZINE HYDROCHLORIDE 50 MG: 50 TABLET, FILM COATED ORAL at 14:07

## 2022-10-11 RX ADMIN — MAGNESIUM SULFATE HEPTAHYDRATE 2 G: 2 INJECTION, SOLUTION INTRAVENOUS at 11:47

## 2022-10-11 RX ADMIN — PANTOPRAZOLE SODIUM 40 MG: 40 TABLET, DELAYED RELEASE ORAL at 06:44

## 2022-10-11 RX ADMIN — GABAPENTIN 100 MG: 100 CAPSULE ORAL at 21:29

## 2022-10-11 RX ADMIN — METOPROLOL TARTRATE 25 MG: 25 TABLET, FILM COATED ORAL at 08:27

## 2022-10-11 RX ADMIN — LIDOCAINE 1 PATCH: 50 PATCH TOPICAL at 11:46

## 2022-10-11 RX ADMIN — INSULIN LISPRO 2 UNITS: 100 INJECTION, SOLUTION INTRAVENOUS; SUBCUTANEOUS at 17:58

## 2022-10-11 RX ADMIN — SODIUM BICARBONATE 650 MG: 650 TABLET ORAL at 17:58

## 2022-10-11 RX ADMIN — HYDRALAZINE HYDROCHLORIDE 50 MG: 50 TABLET, FILM COATED ORAL at 06:44

## 2022-10-11 RX ADMIN — SODIUM BICARBONATE 650 MG: 650 TABLET ORAL at 08:27

## 2022-10-11 RX ADMIN — VENLAFAXINE HYDROCHLORIDE 37.5 MG: 37.5 CAPSULE, EXTENDED RELEASE ORAL at 08:27

## 2022-10-11 NOTE — PLAN OF CARE
Goal Outcome Evaluation:               Patient alert and oriented with c/o pain with headache this am- medicated with tylenol. Patient up to the bathroom with several BM's today. Patient  up in chair. Patient weak with unsteady gait reaching for furniture, walls, tables, and doors while ambulating. Nurse placed walker in room for patient to use with staff. No acute distress noted, will continue to monitor.

## 2022-10-11 NOTE — PROGRESS NOTES
Name: Lowell Min ADMIT: 10/7/2022   : 1940  PCP: Dannie Mathews MD    MRN: [id number] LOS: 4 days   AGE/SEX: 82 y.o. female  ROOM: [facility]     Subjective   Subjective   Feels good this morning.  Back pain is improved.    Review of Systems    Neg for CP, SOA, N/V/D    Objective   Objective   Vital Signs  Temp:  [97.7 °F (36.5 °C)-98.1 °F (36.7 °C)] 98 °F (36.7 °C)  Heart Rate:  [65-87] 76  Resp:  [18] 18  BP: (148-162)/(61-91) 152/88  SpO2:  [95 %-98 %] 95 %  on   ;   Device (Oxygen Therapy): room air  Body mass index is 30.31 kg/m².  Physical Exam  Constitutional:       Appearance: Normal appearance.   Cardiovascular:      Rate and Rhythm: Normal rate and regular rhythm.      Heart sounds: No murmur heard.    No gallop.   Pulmonary:      Effort: Pulmonary effort is normal. No respiratory distress.      Breath sounds: Normal breath sounds. No wheezing.   Abdominal:      General: Abdomen is flat. There is no distension.      Palpations: Abdomen is soft.      Tenderness: There is no guarding.   Neurological:      General: No focal deficit present.      Mental Status: She is alert and oriented to person, place, and time.   Psychiatric:         Mood and Affect: Mood normal.         Behavior: Behavior normal.         Results Review     I reviewed the patient's new clinical results.  Results from last 7 days   Lab Units 10/09/22  1727 10/08/22  1926 10/07/22  2129   WBC 10*3/mm3 5.74 5.73 6.93   HEMOGLOBIN g/dL 9.0* 8.6* 9.7*   PLATELETS 10*3/mm3 187 186 197     Results from last 7 days   Lab Units 10/11/22  0607 10/10/22  0642 10/09/22  0655 10/07/22  2129   SODIUM mmol/L 139 141 143 139   POTASSIUM mmol/L 3.5 3.7 4.2 4.3   CHLORIDE mmol/L 105 109* 115* 109*   CO2 mmol/L 22.0 21.1* 15.4* 14.9*   BUN mg/dL 57* 60* 68* 75*   CREATININE mg/dL 4.59* 4.78* 5.34* 5.94*   GLUCOSE mg/dL 106* 132* 106* 96   EGFR mL/min/1.73 9.1* 8.6* 7.6* 6.6*     Results from last 7 days   Lab Units 10/11/22  0607 10/09/22  0655  10/07/22  2129   ALBUMIN g/dL 3.20* 3.00* 4.30   BILIRUBIN mg/dL  --  0.3 0.3   ALK PHOS U/L  --  122* 148*   AST (SGOT) U/L  --  16 19   ALT (SGPT) U/L  --  18 18     Results from last 7 days   Lab Units 10/11/22  0607 10/10/22  0642 10/09/22  0655 10/07/22  2129   CALCIUM mg/dL 8.9 8.5* 8.8 8.8   ALBUMIN g/dL 3.20*  --  3.00* 4.30   MAGNESIUM mg/dL 1.1*  --   --  1.5*   PHOSPHORUS mg/dL 4.0  --   --   --        Glucose   Date/Time Value Ref Range Status   10/11/2022 0559 107 70 - 130 mg/dL Final     Comment:     Meter: [id number] : [id number] Delores Jessie NA   10/10/2022 1958 154 (H) 70 - 130 mg/dL Final     Comment:     Meter: [id number] : [id number] Delores Reinosoana NA   10/10/2022 1533 153 (H) 70 - 130 mg/dL Final     Comment:     Meter: [id number] : [id number] Rehan STEPHENSA   10/10/2022 1042 225 (H) 70 - 130 mg/dL Final     Comment:     Meter: [id number] : [id number] Rehan Morris CNA   10/10/2022 0625 130 70 - 130 mg/dL Final     Comment:     Meter: [id number] : [id number] Blas Starkeya S NA   10/09/2022 2038 171 (H) 70 - 130 mg/dL Final     Comment:     Meter: [id number] : [id number] Blas Singhmela S NA   10/09/2022 1623 118 70 - 130 mg/dL Final     Comment:     Meter: [id number] : [id number] Yudelka ARANA       No radiology results for the last day  Scheduled Medications  amLODIPine, 5 mg, Oral, Q24H  apixaban, 2.5 mg, Oral, BID  atorvastatin, 40 mg, Oral, Daily  gabapentin, 100 mg, Oral, Nightly  hydrALAZINE, 50 mg, Oral, Q8H  insulin lispro, 0-7 Units, Subcutaneous, TID AC  lidocaine, 1 patch, Transdermal, Q24H  magnesium sulfate, 2 g, Intravenous, Once  metoprolol tartrate, 25 mg, Oral, Q12H  pantoprazole, 40 mg, Oral, QAM  potassium chloride, 40 mEq, Oral, Once  sennosides-docusate, 2 tablet, Oral, BID  sodium bicarbonate, 650 mg, Oral, TID  sodium chloride, 10 mL, Intravenous, Q12H  venlafaxine XR, 37.5 mg, Oral, Daily    Infusions    Diet  Diet Regular; Cardiac       Assessment/Plan     Active Hospital Problems    Diagnosis  POA   • **PERLA (acute kidney injury) ([facility]) [N17.9]  Yes   • Diabetic polyneuropathy associated with type 2 diabetes mellitus ([facility]) [E11.42]  Yes   • PAF (paroxysmal atrial fibrillation) ([facility]) [I48.0]  Yes   • Anxiety associated with depression [F41.8]  Yes   • Chronic diastolic congestive heart failure ([facility]) [I50.32]  Yes   • Chronic kidney disease, stage IV (severe) ([facility]) [N18.4]  Yes      Resolved Hospital Problems   No resolved problems to display.       82 y.o. female admitted with PERLA (acute kidney injury) ([facility]).    PERLA on CKD4  -Crt 5.94 OA (b/l ~2) > 4.59  -Renal following   -Waste products remain elevated; bicarb DC'd    Chronic diastolic CHF  -home Lasix on hold  -appears euvolemic    HTN  -Metoprolol held secondary to bradycardia; resume at 25mg BID  -Amlodipine 5 mg, hydralazine 50 mg 3 times daily    DM2 (c/b neuropathy, nephropathy)  -SSI; monitor BG closely  -Gabapentin 100 mg nightly    pAF  -RC: metoprolol 50 -> 25mg BID  -AC: apixaban    Anemia of chronic disease      · Eliquis (home med) for DVT prophylaxis.  · Full code.  · Discussed with patient and care team on multidisciplinary rounds.  · Anticipate discharge home when cleared by consultants.      Pillo Yu MD  [facility]  10/11/22  10:27 EDT

## 2022-10-11 NOTE — THERAPY TREATMENT NOTE
Patient Name: Lowell Min  : 1940    MRN: [id number]                              Today's Date: 10/11/2022       Admit Date: 10/7/2022    Visit Dx:     ICD-10-CM ICD-9-CM   1. PERLA (acute kidney injury) (HCC)  N17.9 584.9   2. Hypomagnesemia  E83.42 275.2     Patient Active Problem List   Diagnosis   • Chronic kidney disease, stage IV (severe) (HCC)   • Erythropoietin deficiency anemia   • Symptomatic anemia   • Anxiety associated with depression   • Iron deficiency anemia   • PAF (paroxysmal atrial fibrillation) (HCC)   • Shortness of breath   • Chronic diastolic congestive heart failure (HCC)   • Diabetic polyneuropathy associated with type 2 diabetes mellitus (HCC)   • PERLA (acute kidney injury) (HCC)     Past Medical History:   Diagnosis Date   • Anxiety    • Atrial fibrillation (HCC)    • Chronic kidney disease, stage IV (severe) (HCC)    • Depression    • Elevated cholesterol    • Hypertension    • Type 2 diabetes mellitus (HCC)      Past Surgical History:   Procedure Laterality Date   • APPENDECTOMY     • CHOLECYSTECTOMY     • COLONOSCOPY     • HYSTERECTOMY     • TUMOR REMOVAL Left     benign tumor from left breast      General Information     Row Name 10/11/22 1618          Physical Therapy Time and Intention    Document Type therapy note (daily note)  -DB     Mode of Treatment physical therapy;individual therapy  -DB     Row Name 10/11/22 1618          General Information    Patient Profile Reviewed yes  -DB           User Key  (r) = Recorded By, (t) = Taken By, (c) = Cosigned By    Initials Name Provider Type    DB Oanh Camp PT Physical Therapist               Mobility     Row Name 10/11/22 1618          Sit-Stand Transfer    Sit-Stand [place] (Transfers) contact guard  -DB     Assistive Device (Sit-Stand Transfers) walker, front-wheeled  -DB     Row Name 10/11/22 1618          Gait/Stairs (Locomotion)    [place] Level (Gait) contact guard;verbal cues;nonverbal cues  (demo/gesture);standby assist  -DB     Assistive Device (Gait) walker, front-wheeled  -DB     Distance in Feet (Gait) 130'  -DB     Deviations/Abnormal Patterns (Gait) base of support, narrow;guillermo decreased;gait speed decreased;stride length decreased  -DB     Bilateral Gait Deviations forward flexed posture;heel strike decreased  -DB           User Key  (r) = Recorded By, (t) = Taken By, (c) = Cosigned By    Initials Name Provider Type    Oanh Navarrete PT Physical Therapist               Obj/Interventions     Row Name 10/11/22 1619          Range of Motion Comprehensive    General Range of Motion no range of motion deficits identified  -DB     Row Name 10/11/22 1619          Motor Skills    Therapeutic Exercise other (see comments)  MIP, LAQ, AP, GS 2 x 10 BLE  -DB     Row Name 10/11/22 1619          Balance    Balance Assessment sitting static balance;sitting dynamic balance;standing static balance;standing dynamic balance  -DB     Static Sitting Balance independent  -DB     Dynamic Sitting Balance independent  -DB     Position, Sitting Balance sitting in chair  -DB     Static Standing Balance standby assist  -DB     Dynamic Standing Balance standby assist  -DB     Position/Device Used, Standing Balance supported;walker, front-wheeled  -DB     Balance Interventions standing;sitting;sit to stand  -DB           User Key  (r) = Recorded By, (t) = Taken By, (c) = Cosigned By    Initials Name Provider Type    Oanh Navarrete PT Physical Therapist               Goals/Plan    No documentation.                Clinical Impression     Row Name 10/11/22 1620          Pain    Pain Intervention(s) Repositioned  -DB     Row Name 10/11/22 1620          Plan of Care Review    Plan of Care Reviewed With patient  -DB     Progress improving  -DB     Outcome Evaluation Pt seated UIC at start of session and agreeble to PT. Pt was able to ambulate 130' with RW and CGA/SBA. Pt feeling SOA at end of walk, SpO2 measured at  99%. Pt returns to chair to perform seated LE ther ex. Continus to benefit from skilled PT.  -DB     Row Name 10/11/22 1620          Vital Signs    O2 Delivery Pre Treatment room air  -DB     Intra SpO2 (%) 99  -DB     O2 Delivery Intra Treatment room air  -DB     O2 Delivery Post Treatment room air  -DB     Pre Patient Position Sitting  -DB     Intra Patient Position Standing  -DB     Post Patient Position Sitting  -DB     Row Name 10/11/22 1620          Positioning and Restraints    Pre-Treatment Position sitting in chair/recliner  -DB     Post Treatment Position chair  -DB     In Chair reclined;sitting;call light within reach;encouraged to call for assist;exit alarm on  -DB           User Key  (r) = Recorded By, (t) = Taken By, (c) = Cosigned By    Initials Name Provider Type    Oanh Navarrete PT Physical Therapist               Outcome Measures     Row Name 10/11/22 1621          How much help from another person do you currently need...    Turning from your back to your side while in flat bed without using bedrails? 4  -DB     Moving from lying on back to sitting on the side of a flat bed without bedrails? 4  -DB     Moving to and from a bed to a chair (including a wheelchair)? 3  -DB     Standing up from a chair using your arms (e.g., wheelchair, bedside chair)? 3  -DB     Climbing 3-5 steps with a railing? 2  -DB     To walk in hospital room? 3  -DB     AM-PAC 6 Clicks Score (PT) 19  -DB     Highest level of mobility 6 --> Walked 10 steps or more  -DB     Row Name 10/11/22 1621          Functional Assessment    Outcome Measure Options AM-PAC 6 Clicks Basic Mobility (PT)  -DB           User Key  (r) = Recorded By, (t) = Taken By, (c) = Cosigned By    Initials Name Provider Type    Oanh Navarrete PT Physical Therapist                             Physical Therapy Education     Title: PT OT SLP Therapies (Done)     Topic: Physical Therapy (Done)     Point: Mobility training (Done)     Learning  Progress Summary           Patient Acceptance, E, VU by DB at 10/11/2022 1621    Acceptance, E, VU by  at 10/9/2022 1603                   Point: Home exercise program (Done)     Learning Progress Summary           Patient Acceptance, E, VU by DB at 10/11/2022 1621                   Point: Body mechanics (Done)     Learning Progress Summary           Patient Acceptance, E, VU by DB at 10/11/2022 1621    Acceptance, E, VU by  at 10/9/2022 1603                   Point: Precautions (Done)     Learning Progress Summary           Patient Acceptance, E, VU by DB at 10/11/2022 1621    Acceptance, E, VU by  at 10/9/2022 1603                               User Key     Initials Effective Dates Name Provider Type Discipline     06/16/21 -  Donna South, MIRNA Physical Therapist PT    RAVI 06/16/21 -  Oanh Camp PT Physical Therapist PT              PT Recommendation and Plan     Plan of Care Reviewed With: patient  Progress: improving  Outcome Evaluation: Pt seated UIC at start of session and agreeble to PT. Pt was able to ambulate 130' with RW and CGA/SBA. Pt feeling SOA at end of walk, SpO2 measured at 99%. Pt returns to chair to perform seated LE ther ex. Continus to benefit from skilled PT.     Time Calculation:    PT Charges     Row Name 10/11/22 1626             Time Calculation    Start Time 1606  -DB      Stop Time 1629  -DB      Time Calculation (min) 23 min  -DB      PT Received On 10/11/22  -DB      PT - Next Appointment 10/12/22  -DB         Time Calculation- PT    Total Timed Code Minutes- PT 23 minute(s)  -DB            User Key  (r) = Recorded By, (t) = Taken By, (c) = Cosigned By    Initials Name Provider Type    DB Oanh Camp, MIRNA Physical Therapist              Therapy Charges for Today     Code Description Service Date Service Provider Modifiers Qty    52324436937 HC PT THERAPEUTIC ACT EA 15 MIN 10/11/2022 Oanh Camp PT GP 1    64627727065 HC PT THER PROC EA 15 MIN 10/11/2022  Oanh Camp, PT GP 1          PT G-Codes  Outcome Measure Options: AM-PAC 6 Clicks Basic Mobility (PT)  AM-PAC 6 Clicks Score (PT): 19    Oanh Camp, PT  10/11/2022

## 2022-10-11 NOTE — PROGRESS NOTES
LOS: 4 days     Chief Complaint/ Reason for encounter: CKD management with new, severe PERLA    Subjective   10/10/22 : No new complaints, feeling better overall  States good appetite with no nausea or vomiting  No fevers or chills  No chest pain or dyspnea, minimal edema  Excellent urine output    10/11: She is feeling better, did not sleep well but has no new complaints  Specifically no nausea vomiting chest pain fevers or chills      Medical history reviewed:  History of Present Illness    Subjective    History taken from: Patient and chart    Vital Signs  Temp:  [97.7 °F (36.5 °C)-98.1 °F (36.7 °C)] 98 °F (36.7 °C)  Heart Rate:  [65-87] 76  Resp:  [18] 18  BP: (148-162)/(61-91) 152/88       Wt Readings from Last 1 Encounters:   10/11/22 0606 87.8 kg (193 lb 8 oz)   10/10/22 0554 89.1 kg (196 lb 6.4 oz)   10/07/22 2249 95.3 kg (210 lb)       Objective    Objective:  General Appearance:  Comfortable, well no-appearing, in no acute distress and not in pain.  Awake, alert, oriented  HEENT: Mucous membranes moist, no injury, oropharynx clear  Lungs:  Normal effort and normal respiratory rate.  Breath sounds clear to auscultation.  No  respiratory distress.  No rales, decreased breath sounds or rhonchi.    Heart: Normal rate.  Regular rhythm.  S1, S2 normal.  No murmur.   Abdomen: Abdomen is soft.  Bowel sounds are normal, no abdominal tenderness.  There is no rebound or guarding  Extremities: No significant edema of bilateral lower extremities  Neurological: No focal motor or sensory deficits, pupils reactive  Skin:  Warm and dry.  No rash or cyanosis.       Results Review:    Intake/Output:     Intake/Output Summary (Last 24 hours) at 10/11/2022 0939  Last data filed at 10/11/2022 0826  Gross per 24 hour   Intake 720 ml   Output 3325 ml   Net -2605 ml         DATA:  Radiology and Labs:  The following labs independently reviewed by me. Additional labs ordered for tomorrow a.m.  Interval notes, chart personally  reviewed by me.   Old records independently reviewed showing CKD stage IV, baseline creatinine around 2.0-2.2  New problems include worsening hypokalemia and hypomagnesemia  Discussed with patient herself at bedside    Risk/ complexity of medical care/ medical decision making high risk, severe renal failure with underlying CKD stage IV    Labs:   Recent Results (from the past 24 hour(s))   POC Glucose Once    Collection Time: 10/10/22 10:42 AM    Specimen: Blood   Result Value Ref Range    Glucose 225 (H) 70 - 130 mg/dL   POC Glucose Once    Collection Time: 10/10/22  3:33 PM    Specimen: Blood   Result Value Ref Range    Glucose 153 (H) 70 - 130 mg/dL   POC Glucose Once    Collection Time: 10/10/22  7:58 PM    Specimen: Blood   Result Value Ref Range    Glucose 154 (H) 70 - 130 mg/dL   POC Glucose Once    Collection Time: 10/11/22  5:59 AM    Specimen: Blood   Result Value Ref Range    Glucose 107 70 - 130 mg/dL   Renal Function Panel    Collection Time: 10/11/22  6:07 AM    Specimen: Blood   Result Value Ref Range    Glucose 106 (H) 65 - 99 mg/dL    BUN 57 (H) 8 - 23 mg/dL    Creatinine 4.59 (H) 0.57 - 1.00 mg/dL    Sodium 139 136 - 145 mmol/L    Potassium 3.5 3.5 - 5.2 mmol/L    Chloride 105 98 - 107 mmol/L    CO2 22.0 22.0 - 29.0 mmol/L    Calcium 8.9 8.6 - 10.5 mg/dL    Albumin 3.20 (L) 3.50 - 5.20 g/dL    Phosphorus 4.0 2.5 - 4.5 mg/dL    Anion Gap 12.0 5.0 - 15.0 mmol/L    BUN/Creatinine Ratio 12.4 7.0 - 25.0    eGFR 9.1 (L) >60.0 mL/min/1.73   Magnesium    Collection Time: 10/11/22  6:07 AM    Specimen: Blood   Result Value Ref Range    Magnesium 1.1 (L) 1.6 - 2.4 mg/dL       Radiology:  Pertinent radiology studies were reviewed as described above  Medications have been reviewed separately in chart overview      ASSESSMENT:  1.  Acute renal failure, prerenal and continuing to improve at a steady rate, excellent urine output  2. ckd IV, baseline creatinine around 2.0  3. Dehydration, improved, adequate oral  fluid intake  4. T2DM  5. Metabolic acidosis, improved   6.  Hypomagnesemia, worse  7.Anemia of CKD  8.  New hypokalemia      Plan:  Renal function continues to improve but waste  products remain significantly above her usual baseline  Continue to monitor off IV fluids and encourage p.o. fluid intake  Replace magnesium IV and potassium orally  Continue BP regimen and discontinue sodium bicarb  Continue to closely monitor labs and electrolytes, slow progress      Isaiah Foster MD   [facility]   Office phone number: [phone]  Answering service phone number: [phone]    10/11/22  09:39 EDT    Dictation performed using Dragon dictation software

## 2022-10-11 NOTE — PLAN OF CARE
Problem: Adult Inpatient Plan of Care  Goal: Plan of Care Review  Recent Flowsheet Documentation  Taken 10/11/2022 1620 by Oanh Camp, PT  Progress: improving  Plan of Care Reviewed With: patient  Outcome Evaluation: Pt seated UIC at start of session and agreeble to PT. Pt was able to ambulate 130' with RW and CGA/SBA. Pt feeling SOA at end of walk, SpO2 measured at 99%. Pt returns to chair to perform seated LE ther ex. Continus to benefit from skilled PT.   Goal Outcome Evaluation:  Plan of Care Reviewed With: patient        Progress: improving  Outcome Evaluation: Pt seated UIC at start of session and agreeble to PT. Pt was able to ambulate 130' with RW and CGA/SBA. Pt feeling SOA at end of walk, SpO2 measured at 99%. Pt returns to chair to perform seated LE ther ex. Continus to benefit from skilled PT.

## 2022-10-11 NOTE — PLAN OF CARE
Goal Outcome Evaluation:  Plan of Care Reviewed With: patient        Progress: no change  Outcome Evaluation: Patient  resting  at this  time.  No  complaints  of  pain voiced.  Denies  any  dizziness.  Up  to  bathroom    once  this  shift.  Purewick  in  place.  Fluids  encouraged  and  offered. Afib on the  monitor. Nursing  will  continmue  to monitor.

## 2022-10-12 PROBLEM — E83.42 HYPOMAGNESEMIA: Status: ACTIVE | Noted: 2022-10-12

## 2022-10-12 LAB
ALBUMIN SERPL-MCNC: 3.2 G/DL (ref 3.5–5.2)
ANION GAP SERPL CALCULATED.3IONS-SCNC: 12.1 MMOL/L (ref 5–15)
BACTERIA UR QL AUTO: ABNORMAL /HPF
BILIRUB UR QL STRIP: NEGATIVE
BUN SERPL-MCNC: 55 MG/DL (ref 8–23)
BUN/CREAT SERPL: 11.4 (ref 7–25)
CALCIUM SPEC-SCNC: 9.1 MG/DL (ref 8.6–10.5)
CHLORIDE SERPL-SCNC: 104 MMOL/L (ref 98–107)
CHLORIDE UR-SCNC: 30 MMOL/L
CLARITY UR: CLEAR
CO2 SERPL-SCNC: 22.9 MMOL/L (ref 22–29)
COLOR UR: YELLOW
CREAT SERPL-MCNC: 4.81 MG/DL (ref 0.57–1)
EGFRCR SERPLBLD CKD-EPI 2021: 8.6 ML/MIN/1.73
GLUCOSE BLDC GLUCOMTR-MCNC: 161 MG/DL (ref 70–130)
GLUCOSE BLDC GLUCOMTR-MCNC: 177 MG/DL (ref 70–130)
GLUCOSE BLDC GLUCOMTR-MCNC: 189 MG/DL (ref 70–130)
GLUCOSE BLDC GLUCOMTR-MCNC: 96 MG/DL (ref 70–130)
GLUCOSE SERPL-MCNC: 95 MG/DL (ref 65–99)
GLUCOSE UR STRIP-MCNC: NEGATIVE MG/DL
HGB UR QL STRIP.AUTO: NEGATIVE
HYALINE CASTS UR QL AUTO: ABNORMAL /LPF
KETONES UR QL STRIP: NEGATIVE
LEUKOCYTE ESTERASE UR QL STRIP.AUTO: NEGATIVE
NITRITE UR QL STRIP: NEGATIVE
PH UR STRIP.AUTO: 5.5 [PH] (ref 5–8)
PHOSPHATE SERPL-MCNC: 4.1 MG/DL (ref 2.5–4.5)
POTASSIUM SERPL-SCNC: 3.7 MMOL/L (ref 3.5–5.2)
PROT UR QL STRIP: ABNORMAL
RBC # UR STRIP: ABNORMAL /HPF
REF LAB TEST METHOD: ABNORMAL
SODIUM SERPL-SCNC: 139 MMOL/L (ref 136–145)
SODIUM UR-SCNC: 45 MMOL/L
SP GR UR STRIP: 1.01 (ref 1–1.03)
SQUAMOUS #/AREA URNS HPF: ABNORMAL /HPF
UROBILINOGEN UR QL STRIP: ABNORMAL
WBC # UR STRIP: ABNORMAL /HPF

## 2022-10-12 PROCEDURE — 80069 RENAL FUNCTION PANEL: CPT | Performed by: INTERNAL MEDICINE

## 2022-10-12 PROCEDURE — 81001 URINALYSIS AUTO W/SCOPE: CPT | Performed by: INTERNAL MEDICINE

## 2022-10-12 PROCEDURE — 84300 ASSAY OF URINE SODIUM: CPT | Performed by: INTERNAL MEDICINE

## 2022-10-12 PROCEDURE — 82436 ASSAY OF URINE CHLORIDE: CPT | Performed by: INTERNAL MEDICINE

## 2022-10-12 PROCEDURE — 82962 GLUCOSE BLOOD TEST: CPT

## 2022-10-12 PROCEDURE — 63710000001 INSULIN LISPRO (HUMAN) PER 5 UNITS: Performed by: INTERNAL MEDICINE

## 2022-10-12 RX ORDER — SODIUM CHLORIDE 9 MG/ML
75 INJECTION, SOLUTION INTRAVENOUS CONTINUOUS
Status: ACTIVE | OUTPATIENT
Start: 2022-10-12 | End: 2022-10-13

## 2022-10-12 RX ORDER — HYDRALAZINE HYDROCHLORIDE 25 MG/1
25 TABLET, FILM COATED ORAL EVERY 8 HOURS SCHEDULED
Status: DISCONTINUED | OUTPATIENT
Start: 2022-10-12 | End: 2022-10-15 | Stop reason: HOSPADM

## 2022-10-12 RX ADMIN — Medication 10 ML: at 08:21

## 2022-10-12 RX ADMIN — AMLODIPINE BESYLATE 5 MG: 5 TABLET ORAL at 08:21

## 2022-10-12 RX ADMIN — HYDRALAZINE HYDROCHLORIDE 25 MG: 50 TABLET, FILM COATED ORAL at 21:19

## 2022-10-12 RX ADMIN — SODIUM BICARBONATE 650 MG: 650 TABLET ORAL at 21:17

## 2022-10-12 RX ADMIN — METOPROLOL TARTRATE 25 MG: 25 TABLET, FILM COATED ORAL at 08:21

## 2022-10-12 RX ADMIN — GABAPENTIN 100 MG: 100 CAPSULE ORAL at 21:19

## 2022-10-12 RX ADMIN — VENLAFAXINE HYDROCHLORIDE 37.5 MG: 37.5 CAPSULE, EXTENDED RELEASE ORAL at 08:21

## 2022-10-12 RX ADMIN — APIXABAN 2.5 MG: 2.5 TABLET, FILM COATED ORAL at 21:18

## 2022-10-12 RX ADMIN — HYDRALAZINE HYDROCHLORIDE 25 MG: 50 TABLET, FILM COATED ORAL at 14:21

## 2022-10-12 RX ADMIN — DOCUSATE SODIUM 50MG AND SENNOSIDES 8.6MG 2 TABLET: 8.6; 5 TABLET, FILM COATED ORAL at 08:21

## 2022-10-12 RX ADMIN — SODIUM BICARBONATE 650 MG: 650 TABLET ORAL at 08:21

## 2022-10-12 RX ADMIN — SODIUM CHLORIDE 75 ML/HR: 9 INJECTION, SOLUTION INTRAVENOUS at 11:26

## 2022-10-12 RX ADMIN — INSULIN LISPRO 2 UNITS: 100 INJECTION, SOLUTION INTRAVENOUS; SUBCUTANEOUS at 12:33

## 2022-10-12 RX ADMIN — LIDOCAINE 1 PATCH: 50 PATCH TOPICAL at 08:20

## 2022-10-12 RX ADMIN — INSULIN LISPRO 2 UNITS: 100 INJECTION, SOLUTION INTRAVENOUS; SUBCUTANEOUS at 18:36

## 2022-10-12 RX ADMIN — METOPROLOL TARTRATE 25 MG: 25 TABLET, FILM COATED ORAL at 21:19

## 2022-10-12 RX ADMIN — ATORVASTATIN CALCIUM 40 MG: 20 TABLET, FILM COATED ORAL at 08:21

## 2022-10-12 RX ADMIN — SODIUM BICARBONATE 650 MG: 650 TABLET ORAL at 18:35

## 2022-10-12 RX ADMIN — APIXABAN 2.5 MG: 2.5 TABLET, FILM COATED ORAL at 08:21

## 2022-10-12 RX ADMIN — PANTOPRAZOLE SODIUM 40 MG: 40 TABLET, DELAYED RELEASE ORAL at 07:02

## 2022-10-12 NOTE — PLAN OF CARE
Goal Outcome Evaluation:               Patient alert and oriented, denies pain today. Up in chair. Plan is to discharge patient to [facility] for rehab and strengthening on Friday if kidney function improves. IV fluids infusing. No acute distress noted at this time, will continue to monitor.

## 2022-10-12 NOTE — PROGRESS NOTES
"RENAL/KCC:     LOS: 5 days     Chief Complaint/ Reason for encounter: CKD management with new, severe PERLA    Subjective   10/12/22: Feeling slightly better after getting some sleep last night.  Non-oliguric.  Denies any SOA.  No N/V/D.  No other complaints.      Vital Signs  Temp:  [97.6 °F (36.4 °C)-98.5 °F (36.9 °C)] 98.5 °F (36.9 °C)  Heart Rate:  [63-78] 71  Resp:  [18] 18  BP: (120-142)/(57-69) 134/57       Wt Readings from Last 1 Encounters:   10/12/22 0526 89 kg (196 lb 3.4 oz)   10/11/22 0606 87.8 kg (193 lb 8 oz)   10/10/22 0554 89.1 kg (196 lb 6.4 oz)   10/07/22 2249 95.3 kg (210 lb)       Objective:  Vital signs: (most recent): Blood pressure 134/57, pulse 71, temperature 98.5 °F (36.9 °C), temperature source Oral, resp. rate 18, height 170.2 cm (67\"), weight 89 kg (196 lb 3.4 oz), SpO2 95 %.          Objective:  General Appearance:  Comfortable, well no-appearing, in no acute distress and not in pain.  Awake, alert, oriented  HEENT: Mucous membranes moist, no injury, oropharynx clear  Lungs:  Normal effort and normal respiratory rate.  Breath sounds clear to auscultation.  No  respiratory distress.  No rales, decreased breath sounds or rhonchi.    Heart: Normal rate.  Regular rhythm.  S1, S2 normal.  No murmur.   Abdomen: Abdomen is soft.  Bowel sounds are normal, no abdominal tenderness.  There is no rebound or guarding  Extremities: No significant edema of bilateral lower extremities  Neurological: No focal motor or sensory deficits, pupils reactive  Skin:  Warm and dry.  No rash or cyanosis.       Results Review:    Intake/Output:     Intake/Output Summary (Last 24 hours) at 10/12/2022 0950  Last data filed at 10/12/2022 0820  Gross per 24 hour   Intake 960 ml   Output 600 ml   Net 360 ml         DATA:  Radiology and Labs:  The following labs independently reviewed by me. Additional labs ordered for tomorrow a.m.  Interval notes, chart personally reviewed by me.   Old records independently reviewed " showing CKD stage IV, baseline creatinine around 2.0-2.2  New problems include worsening hypokalemia and hypomagnesemia  Discussed with patient herself at bedside    Risk/ complexity of medical care/ medical decision making high risk, severe renal failure with underlying CKD stage IV    Labs:   Recent Results (from the past 24 hour(s))   POC Glucose Once    Collection Time: 10/11/22 11:13 AM    Specimen: Blood   Result Value Ref Range    Glucose 158 (H) 70 - 130 mg/dL   POC Glucose Once    Collection Time: 10/11/22  3:31 PM    Specimen: Blood   Result Value Ref Range    Glucose 178 (H) 70 - 130 mg/dL   POC Glucose Once    Collection Time: 10/11/22  8:04 PM    Specimen: Blood   Result Value Ref Range    Glucose 134 (H) 70 - 130 mg/dL   POC Glucose Once    Collection Time: 10/12/22  5:28 AM    Specimen: Blood   Result Value Ref Range    Glucose 96 70 - 130 mg/dL   Renal Function Panel    Collection Time: 10/12/22  6:55 AM    Specimen: Blood   Result Value Ref Range    Glucose 95 65 - 99 mg/dL    BUN 55 (H) 8 - 23 mg/dL    Creatinine 4.81 (H) 0.57 - 1.00 mg/dL    Sodium 139 136 - 145 mmol/L    Potassium 3.7 3.5 - 5.2 mmol/L    Chloride 104 98 - 107 mmol/L    CO2 22.9 22.0 - 29.0 mmol/L    Calcium 9.1 8.6 - 10.5 mg/dL    Albumin 3.20 (L) 3.50 - 5.20 g/dL    Phosphorus 4.1 2.5 - 4.5 mg/dL    Anion Gap 12.1 5.0 - 15.0 mmol/L    BUN/Creatinine Ratio 11.4 7.0 - 25.0    eGFR 8.6 (L) >60.0 mL/min/1.73       Radiology:  Pertinent radiology studies were reviewed as described above  Medications have been reviewed separately in chart overview      ASSESSMENT:  1.  Acute renal failure, thought to be prerenal with initial improvement in Cr with IVF but Cr bumped up today at 4.8.  2. CKD IV, baseline creatinine around 2.0  3. Dehydration  4. T2DM  5.  Metabolic acidosis, improved   6.  Hypomagnesemia, worse  7.  Anemia of CKD  8.  Hypokalemia      Plan:  Cr bumped slightly today to 4.8.  Unresolving pre-renal azotemia vs likely ATN  at this point.  Repeat UA and urine lytes.  There was no e/o hydro on U/S.  Careful resumption of IVF today.  If Cr continues to worsen then would recommend biopsy for definitive diagnosis.  No indication for acute HD as of now.  Avoid nephrotoxins.        Paras Payne MD   [facility]   Office phone number: [phone]  Answering service phone number: [phone]  10/12/22  09:52 EDT

## 2022-10-12 NOTE — PROGRESS NOTES
Name: Lowell Min ADMIT: 10/7/2022   : 1940  PCP: Dannie Mathews MD    MRN: [id number] LOS: 5 days   AGE/SEX: 82 y.o. female  ROOM: [facility]     Subjective   Subjective   No new issues overnight.  Tolerating diet.  No vomiting or diarrhea.    Review of Systems        Objective   Objective   Vital Signs  Temp:  [97.6 °F (36.4 °C)-98.5 °F (36.9 °C)] 98.5 °F (36.9 °C)  Heart Rate:  [63-78] 71  Resp:  [18] 18  BP: (120-142)/(57-69) 134/57  SpO2:  [95 %-98 %] 95 %  on   ;   Device (Oxygen Therapy): room air  Body mass index is 30.73 kg/m².  Physical Exam  Constitutional:       Appearance: Normal appearance.   Cardiovascular:      Rate and Rhythm: Normal rate and regular rhythm.      Heart sounds: No murmur heard.    No gallop.   Pulmonary:      Effort: Pulmonary effort is normal. No respiratory distress.      Breath sounds: Normal breath sounds. No wheezing.   Abdominal:      General: Abdomen is flat. There is no distension.      Palpations: Abdomen is soft.      Tenderness: There is no guarding.   Neurological:      General: No focal deficit present.      Mental Status: She is alert and oriented to person, place, and time.   Psychiatric:         Mood and Affect: Mood normal.         Behavior: Behavior normal.         Results Review     I reviewed the patient's new clinical results.  Results from last 7 days   Lab Units 10/09/22  1727 10/08/22  1926 10/07/22  2129   WBC 10*3/mm3 5.74 5.73 6.93   HEMOGLOBIN g/dL 9.0* 8.6* 9.7*   PLATELETS 10*3/mm3 187 186 197     Results from last 7 days   Lab Units 10/12/22  0655 10/11/22  0607 10/10/22  0642 10/09/22  0655   SODIUM mmol/L 139 139 141 143   POTASSIUM mmol/L 3.7 3.5 3.7 4.2   CHLORIDE mmol/L 104 105 109* 115*   CO2 mmol/L 22.9 22.0 21.1* 15.4*   BUN mg/dL 55* 57* 60* 68*   CREATININE mg/dL 4.81* 4.59* 4.78* 5.34*   GLUCOSE mg/dL 95 106* 132* 106*   EGFR mL/min/1.73 8.6* 9.1* 8.6* 7.6*     Results from last 7 days   Lab Units 10/12/22  0655 10/11/22  0607  10/09/22  0655 10/07/22  2129   ALBUMIN g/dL 3.20* 3.20* 3.00* 4.30   BILIRUBIN mg/dL  --   --  0.3 0.3   ALK PHOS U/L  --   --  122* 148*   AST (SGOT) U/L  --   --  16 19   ALT (SGPT) U/L  --   --  18 18     Results from last 7 days   Lab Units 10/12/22  0655 10/11/22  0607 10/10/22  0642 10/09/22  0655 10/07/22  2129   CALCIUM mg/dL 9.1 8.9 8.5* 8.8 8.8   ALBUMIN g/dL 3.20* 3.20*  --  3.00* 4.30   MAGNESIUM mg/dL  --  1.1*  --   --  1.5*   PHOSPHORUS mg/dL 4.1 4.0  --   --   --        Glucose   Date/Time Value Ref Range Status   10/12/2022 0528 96 70 - 130 mg/dL Final     Comment:     Meter: [id number] : [id number] Paul Briseno NA   10/11/2022 2004 134 (H) 70 - 130 mg/dL Final     Comment:     Meter: [id number] : [id number] Paul Briseno NA   10/11/2022 1531 178 (H) 70 - 130 mg/dL Final     Comment:     Meter: [id number] : [id number] Rehan Morris CNA   10/11/2022 1113 158 (H) 70 - 130 mg/dL Final     Comment:     Meter: [id number] : [id number] Brad Farris NA   10/11/2022 0559 107 70 - 130 mg/dL Final     Comment:     Meter: [id number] : [id number] Delores Jessie NA   10/10/2022 1958 154 (H) 70 - 130 mg/dL Final     Comment:     Meter: [id number] : [id number] Delores Jessie NA   10/10/2022 1533 153 (H) 70 - 130 mg/dL Final     Comment:     Meter: [id number] : [id number] Rehan Morris CNA       No radiology results for the last day  Scheduled Medications  amLODIPine, 5 mg, Oral, Q24H  apixaban, 2.5 mg, Oral, BID  atorvastatin, 40 mg, Oral, Daily  gabapentin, 100 mg, Oral, Nightly  hydrALAZINE, 25 mg, Oral, Q8H  insulin lispro, 0-7 Units, Subcutaneous, TID AC  lidocaine, 1 patch, Transdermal, Q24H  metoprolol tartrate, 25 mg, Oral, Q12H  pantoprazole, 40 mg, Oral, QAM  sennosides-docusate, 2 tablet, Oral, BID  sodium bicarbonate, 650 mg, Oral, TID  sodium chloride, 10 mL, Intravenous, Q12H  venlafaxine XR, 37.5 mg, Oral, Daily    Infusions   Diet  Diet Regular; Cardiac        Assessment/Plan     Active Hospital Problems    Diagnosis  POA   • **PERLA (acute kidney injury) ([facility]) [N17.9]  Yes   • Hypomagnesemia [E83.42]  Yes   • Diabetic polyneuropathy associated with type 2 diabetes mellitus ([facility]) [E11.42]  Yes   • PAF (paroxysmal atrial fibrillation) ([facility]) [I48.0]  Yes   • Anxiety associated with depression [F41.8]  Yes   • Chronic diastolic congestive heart failure ([facility]) [I50.32]  Yes   • Chronic kidney disease, stage IV (severe) ([facility]) [N18.4]  Yes      Resolved Hospital Problems   No resolved problems to display.       82 y.o. female admitted with PERLA (acute kidney injury) ([facility]).    Defer plans to nephrology.  Note rising creatinine this morning after had been steadily downtrending.  Still not very close to her baseline.  Await nephrology follow-up recommendations.    Other medical issues seem stable at present.        PERLA on CKD4  -Crt 5.94 OA (b/l ~2) > 4.59 up to 4.81 today.  -Renal following     Chronic diastolic CHF  -home Lasix on hold  -appears euvolemic    HTN  -Metoprolol held secondary to bradycardia; restarted at 25mg BID  -Amlodipine 5 mg, hydralazine 50 mg 3 times daily    DM2 (c/b neuropathy, nephropathy)  -SSI; monitor BG closely  -Gabapentin 100 mg nightly    pAF  -RC: metoprolol 50 -> 25mg BID  -AC: apixaban    Anemia of chronic disease      · Eliquis (home med) for DVT prophylaxis.  Renally dosed.  · Full code.  · Discussed with patient and care team on multidisciplinary rounds.  · Anticipate discharge home when cleared by consultants.      Gary Weinstein MD  [facility]  10/12/22  08:39 EDT

## 2022-10-12 NOTE — PLAN OF CARE
Goal Outcome Evaluation:  Plan of Care Reviewed With: patient        Progress: improving  Outcome Evaluation: Patient    resting  at  this  time.  No  complaints of  pain  voiced at this  time.  Fluids  encouraged as tolerated. Patient  reat  rate  decreased  to   42 once  this  shift  but not  sustained.  Has  been Afib  on the  monitor.  Continues to  have  good  urine  output.  Nursing  will  continue to monitor.

## 2022-10-12 NOTE — CASE MANAGEMENT/SOCIAL WORK
Continued Stay Note  [facility]     Patient Name: Lowell Min  MRN: [id number]  Today's Date: 10/12/2022    Admit Date: 10/7/2022    Plan: Plan home with [facility] or [facility] for skilled care based on bed availability.   JONNIE Avery RN   Discharge Plan     Row Name 10/12/22 0823       Plan    Plan Plan home with [facility] or [facility] for skilled care based on bed availability.   JONNIE Avery RN    Plan Comments Spoke with pt at bedside. Donna ( [phone]) is following for [facility].   Pt states she continues to feel weak and may need skilled care.  Alysa ( [phone]) called for update on bed availability at [facility].  Plan home with [facility] or [facility] for skilled care based on bed availability.   JONNIE Avery RN               Discharge Codes    No documentation.               Expected Discharge Date and Time     Expected Discharge Date Expected Discharge Time    Oct 12, 2022             Brandi Avery RN

## 2022-10-13 PROBLEM — M79.672 FOOT PAIN, BILATERAL: Status: ACTIVE | Noted: 2022-10-13

## 2022-10-13 PROBLEM — M79.671 FOOT PAIN, BILATERAL: Status: ACTIVE | Noted: 2022-10-13

## 2022-10-13 LAB
ALBUMIN SERPL-MCNC: 3.3 G/DL (ref 3.5–5.2)
ANION GAP SERPL CALCULATED.3IONS-SCNC: 12 MMOL/L (ref 5–15)
BUN SERPL-MCNC: 54 MG/DL (ref 8–23)
BUN/CREAT SERPL: 12.4 (ref 7–25)
CALCIUM SPEC-SCNC: 8.8 MG/DL (ref 8.6–10.5)
CHLORIDE SERPL-SCNC: 104 MMOL/L (ref 98–107)
CO2 SERPL-SCNC: 22 MMOL/L (ref 22–29)
CREAT SERPL-MCNC: 4.35 MG/DL (ref 0.57–1)
CRP SERPL-MCNC: 5.55 MG/DL (ref 0–0.5)
EGFRCR SERPLBLD CKD-EPI 2021: 9.7 ML/MIN/1.73
GLUCOSE BLDC GLUCOMTR-MCNC: 102 MG/DL (ref 70–130)
GLUCOSE BLDC GLUCOMTR-MCNC: 169 MG/DL (ref 70–130)
GLUCOSE BLDC GLUCOMTR-MCNC: 175 MG/DL (ref 70–130)
GLUCOSE BLDC GLUCOMTR-MCNC: 231 MG/DL (ref 70–130)
GLUCOSE SERPL-MCNC: 105 MG/DL (ref 65–99)
MAGNESIUM SERPL-MCNC: 1.5 MG/DL (ref 1.6–2.4)
PHOSPHATE SERPL-MCNC: 3.6 MG/DL (ref 2.5–4.5)
POTASSIUM SERPL-SCNC: 4 MMOL/L (ref 3.5–5.2)
SODIUM SERPL-SCNC: 138 MMOL/L (ref 136–145)
URATE SERPL-MCNC: 4 MG/DL (ref 2.4–5.7)

## 2022-10-13 PROCEDURE — 80069 RENAL FUNCTION PANEL: CPT | Performed by: INTERNAL MEDICINE

## 2022-10-13 PROCEDURE — 82962 GLUCOSE BLOOD TEST: CPT

## 2022-10-13 PROCEDURE — 83735 ASSAY OF MAGNESIUM: CPT | Performed by: INTERNAL MEDICINE

## 2022-10-13 PROCEDURE — 63710000001 INSULIN LISPRO (HUMAN) PER 5 UNITS: Performed by: INTERNAL MEDICINE

## 2022-10-13 PROCEDURE — 84550 ASSAY OF BLOOD/URIC ACID: CPT | Performed by: HOSPITALIST

## 2022-10-13 PROCEDURE — 63710000001 PREDNISONE PER 1 MG: Performed by: HOSPITALIST

## 2022-10-13 PROCEDURE — 97530 THERAPEUTIC ACTIVITIES: CPT

## 2022-10-13 PROCEDURE — 86140 C-REACTIVE PROTEIN: CPT | Performed by: HOSPITALIST

## 2022-10-13 RX ORDER — PREDNISONE 20 MG/1
20 TABLET ORAL
Status: COMPLETED | OUTPATIENT
Start: 2022-10-13 | End: 2022-10-15

## 2022-10-13 RX ADMIN — LIDOCAINE 1 PATCH: 50 PATCH TOPICAL at 09:45

## 2022-10-13 RX ADMIN — VENLAFAXINE HYDROCHLORIDE 37.5 MG: 37.5 CAPSULE, EXTENDED RELEASE ORAL at 09:45

## 2022-10-13 RX ADMIN — ACETAMINOPHEN 650 MG: 325 TABLET, FILM COATED ORAL at 12:44

## 2022-10-13 RX ADMIN — HYDRALAZINE HYDROCHLORIDE 25 MG: 50 TABLET, FILM COATED ORAL at 18:02

## 2022-10-13 RX ADMIN — SODIUM BICARBONATE 650 MG: 650 TABLET ORAL at 09:45

## 2022-10-13 RX ADMIN — INSULIN LISPRO 2 UNITS: 100 INJECTION, SOLUTION INTRAVENOUS; SUBCUTANEOUS at 18:02

## 2022-10-13 RX ADMIN — ACETAMINOPHEN 650 MG: 325 TABLET, FILM COATED ORAL at 21:54

## 2022-10-13 RX ADMIN — DOCUSATE SODIUM 50MG AND SENNOSIDES 8.6MG 2 TABLET: 8.6; 5 TABLET, FILM COATED ORAL at 21:54

## 2022-10-13 RX ADMIN — APIXABAN 2.5 MG: 2.5 TABLET, FILM COATED ORAL at 21:54

## 2022-10-13 RX ADMIN — SODIUM BICARBONATE 650 MG: 650 TABLET ORAL at 21:54

## 2022-10-13 RX ADMIN — DOCUSATE SODIUM 50MG AND SENNOSIDES 8.6MG 1 TABLET: 8.6; 5 TABLET, FILM COATED ORAL at 10:00

## 2022-10-13 RX ADMIN — ATORVASTATIN CALCIUM 40 MG: 20 TABLET, FILM COATED ORAL at 09:59

## 2022-10-13 RX ADMIN — AMLODIPINE BESYLATE 5 MG: 5 TABLET ORAL at 09:45

## 2022-10-13 RX ADMIN — GABAPENTIN 100 MG: 100 CAPSULE ORAL at 21:54

## 2022-10-13 RX ADMIN — PREDNISONE 20 MG: 20 TABLET ORAL at 12:44

## 2022-10-13 RX ADMIN — METOPROLOL TARTRATE 25 MG: 25 TABLET, FILM COATED ORAL at 21:54

## 2022-10-13 RX ADMIN — HYDRALAZINE HYDROCHLORIDE 25 MG: 50 TABLET, FILM COATED ORAL at 06:56

## 2022-10-13 RX ADMIN — INSULIN LISPRO 2 UNITS: 100 INJECTION, SOLUTION INTRAVENOUS; SUBCUTANEOUS at 12:44

## 2022-10-13 RX ADMIN — METOPROLOL TARTRATE 25 MG: 25 TABLET, FILM COATED ORAL at 10:00

## 2022-10-13 RX ADMIN — SODIUM BICARBONATE 650 MG: 650 TABLET ORAL at 18:02

## 2022-10-13 RX ADMIN — APIXABAN 2.5 MG: 2.5 TABLET, FILM COATED ORAL at 09:45

## 2022-10-13 RX ADMIN — SODIUM CHLORIDE 75 ML/HR: 9 INJECTION, SOLUTION INTRAVENOUS at 00:45

## 2022-10-13 RX ADMIN — MAGNESIUM OXIDE 400 MG (241.3 MG MAGNESIUM) TABLET 400 MG: TABLET at 21:54

## 2022-10-13 RX ADMIN — MAGNESIUM OXIDE 400 MG (241.3 MG MAGNESIUM) TABLET 400 MG: TABLET at 18:02

## 2022-10-13 NOTE — PROGRESS NOTES
Name: Lowell Min ADMIT: 10/7/2022   : 1940  PCP: Dannie Mathews MD    MRN: [id number] LOS: 6 days   AGE/SEX: 82 y.o. female  ROOM: [facility]     Subjective   Subjective   Complains of pain in both of her feet today.  Pain was present yesterday and is worse when she bears weight.  Affects her ability to walk.  Denies prior history of gout or pseudogout.  No injury reported.  Otherwise feels okay.    Review of Systems        Objective   Objective   Vital Signs  Temp:  [97.5 °F (36.4 °C)-98.7 °F (37.1 °C)] 98.7 °F (37.1 °C)  Heart Rate:  [54-78] 70  Resp:  [18] 18  BP: (106-163)/(61-78) 139/61  SpO2:  [92 %-95 %] 95 %  on   ;   Device (Oxygen Therapy): room air  Body mass index is 31.11 kg/m².  Physical Exam  Constitutional:       Appearance: Normal appearance.   Cardiovascular:      Rate and Rhythm: Normal rate and regular rhythm.      Heart sounds: No murmur heard.    No gallop.   Pulmonary:      Effort: Pulmonary effort is normal. No respiratory distress.      Breath sounds: Normal breath sounds. No wheezing.   Abdominal:      General: Abdomen is flat. There is no distension.      Palpations: Abdomen is soft.      Tenderness: There is no guarding.   Neurological:      General: No focal deficit present.      Mental Status: She is alert and oriented to person, place, and time.   Psychiatric:         Mood and Affect: Mood normal.         Behavior: Behavior normal.         Results Review     I reviewed the patient's new clinical results.  Results from last 7 days   Lab Units 10/09/22  1727 10/08/22  1926 10/07/22  2129   WBC 10*3/mm3 5.74 5.73 6.93   HEMOGLOBIN g/dL 9.0* 8.6* 9.7*   PLATELETS 10*3/mm3 187 186 197     Results from last 7 days   Lab Units 10/13/22  0557 10/12/22  0655 10/11/22  0607 10/10/22  0642   SODIUM mmol/L 138 139 139 141   POTASSIUM mmol/L 4.0 3.7 3.5 3.7   CHLORIDE mmol/L 104 104 105 109*   CO2 mmol/L 22.0 22.9 22.0 21.1*   BUN mg/dL 54* 55* 57* 60*   CREATININE mg/dL 4.35* 4.81*  4.59* 4.78*   GLUCOSE mg/dL 105* 95 106* 132*   EGFR mL/min/1.73 9.7* 8.6* 9.1* 8.6*     Results from last 7 days   Lab Units 10/13/22  0557 10/12/22  0655 10/11/22  0607 10/09/22  0655 10/07/22  2129   ALBUMIN g/dL 3.30* 3.20* 3.20* 3.00* 4.30   BILIRUBIN mg/dL  --   --   --  0.3 0.3   ALK PHOS U/L  --   --   --  122* 148*   AST (SGOT) U/L  --   --   --  16 19   ALT (SGPT) U/L  --   --   --  18 18     Results from last 7 days   Lab Units 10/13/22  0557 10/12/22  0655 10/11/22  0607 10/10/22  0642 10/09/22  0655 10/07/22  2129   CALCIUM mg/dL 8.8 9.1 8.9 8.5* 8.8 8.8   ALBUMIN g/dL 3.30* 3.20* 3.20*  --  3.00* 4.30   MAGNESIUM mg/dL  --   --  1.1*  --   --  1.5*   PHOSPHORUS mg/dL 3.6 4.1 4.0  --   --   --        Glucose   Date/Time Value Ref Range Status   10/13/2022 1036 175 (H) 70 - 130 mg/dL Final     Comment:     Meter: [id number] : [id number] [facility]   10/13/2022 0622 102 70 - 130 mg/dL Final     Comment:     Meter: [id number] : [id number] Delores Roman NA   10/12/2022 2028 161 (H) 70 - 130 mg/dL Final     Comment:     Meter: [id number] : [id number] Delores Reinosoana NA   10/12/2022 1539 177 (H) 70 - 130 mg/dL Final     Comment:     Meter: [id number] : [id number] Rehan Morris CNA   10/12/2022 1028 189 (H) 70 - 130 mg/dL Final     Comment:     Meter: [id number] : [id number] Rehan STEPHENSA   10/12/2022 0528 96 70 - 130 mg/dL Final     Comment:     Meter: [id number] : [id number] Paul ARANA   10/11/2022 2004 134 (H) 70 - 130 mg/dL Final     Comment:     Meter: [id number] : [id number] Paul ARANA       No radiology results for the last day  Scheduled Medications  amLODIPine, 5 mg, Oral, Q24H  apixaban, 2.5 mg, Oral, BID  atorvastatin, 40 mg, Oral, Daily  gabapentin, 100 mg, Oral, Nightly  hydrALAZINE, 25 mg, Oral, Q8H  insulin lispro, 0-7 Units, Subcutaneous, TID AC  lidocaine, 1 patch, Transdermal, Q24H  metoprolol tartrate, 25 mg, Oral, Q12H  predniSONE, 20  mg, Oral, Daily With Breakfast  sennosides-docusate, 2 tablet, Oral, BID  sodium bicarbonate, 650 mg, Oral, TID  venlafaxine XR, 37.5 mg, Oral, Daily    Infusions  sodium chloride, 75 mL/hr, Last Rate: 75 mL/hr (10/13/22 0045)    Diet  Diet Regular; Cardiac       Assessment/Plan     Active Hospital Problems    Diagnosis  POA   • **PERLA (acute kidney injury) ([facility]) [N17.9]  Yes   • Foot pain, bilateral [M79.671, M79.672]  No   • Hypomagnesemia [E83.42]  Yes   • Diabetic polyneuropathy associated with type 2 diabetes mellitus ([facility]) [E11.42]  Yes   • PAF (paroxysmal atrial fibrillation) ([facility]) [I48.0]  Yes   • Anxiety associated with depression [F41.8]  Yes   • Chronic diastolic congestive heart failure ([facility]) [I50.32]  Yes   • Chronic kidney disease, stage IV (severe) ([facility]) [N18.4]  Yes      Resolved Hospital Problems   No resolved problems to display.       82 y.o. female admitted with PERLA (acute kidney injury) ([facility]).    Renal function slightly improved today after receiving IV fluids yesterday.  Nephrology managing this issue.    Her feet are relatively normal to inspection.  No obvious edema.  She has full range of motion in her ankles but does report mild tenderness.  Right forefoot more tender to palpation than left.  No pain in her toes.  Suspect inflammatory arthropathy such as gout or pseudogout.  Will check uric acid and inflammatory markers and give trial of prednisone.        PERLA on CKD4  -Crt 5.94 OA (b/l ~2)   -Renal following, will reach out to them regarding hypomagnesemia tx    Chronic diastolic CHF  -home Lasix on hold  -appears euvolemic    HTN  -Metoprolol 25mg BID, amlodipine 5 mg, hydralazine 50 mg 3 times daily    DM2 (c/b neuropathy, nephropathy)  -SSI; monitor BG 3 times daily  -Gabapentin 100 mg nightly  -Consider basal insulin if blood sugar increases with use of prednisone    pAF  -RC: metoprolol 50 -> 25mg BID  -AC: apixaban, renally dosed    Anemia of chronic disease      · Eliquis (home med)  for DVT prophylaxis.  Renally dosed.  · Full code.  · Discussed with patient and care team on multidisciplinary rounds.  · Anticipate discharge home with  vs SNU facility when cleared by consultants.      Gary Weinstein MD  [facility]  10/13/22  10:58 EDT

## 2022-10-13 NOTE — PLAN OF CARE
Goal Outcome Evaluation:  Plan of Care Reviewed With: patient        Progress: declining  Outcome Evaluation: Pt guy's dec in endurance and balance today 2/2 B foot pain. Pt guy's LOB upon standing that req's Magnus to correct. Pt able to ambulate in room over to chair and perform seated LE ther ex. Continunes to benefit rom skilled PT.

## 2022-10-13 NOTE — PLAN OF CARE
Goal Outcome Evaluation:  Plan of Care Reviewed With: patient        Progress: improving   Pt A&Ox4. Pt c/o foot pain. MD is aware, orders received noted. Prn pain meds given. VSS. Creatnine is a few points lower than yesterday. Pt up in chair. NAD noted. Will continue to monitor.

## 2022-10-13 NOTE — PLAN OF CARE
Goal Outcome Evaluation:              Outcome Evaluation: Patient a/o x4, calm and coopertive, glucose taken ACHS, RA, up with assist x1. pt cont-incont with purewick in place, regular diet, and NS at 75 cc/hr. Meds given as ordered. No new issues noted. See v/s and labs.

## 2022-10-13 NOTE — PROGRESS NOTES
"RENAL/KCC:     LOS: 6 days     Chief Complaint/ Reason for encounter: CKD management with new, severe PERLA    Subjective   10/13/22: Feeling weak this AM.  Non-oliguric.  Denies any SOA.  No N/V/D.  No other complaints.      Vital Signs  Temp:  [97.5 °F (36.4 °C)-98.7 °F (37.1 °C)] 98.7 °F (37.1 °C)  Heart Rate:  [54-78] 77  Resp:  [18] 18  BP: (106-163)/(61-78) 139/61       Wt Readings from Last 1 Encounters:   10/13/22 0628 90.1 kg (198 lb 9.6 oz)   10/12/22 0526 89 kg (196 lb 3.4 oz)   10/11/22 0606 87.8 kg (193 lb 8 oz)   10/10/22 0554 89.1 kg (196 lb 6.4 oz)   10/07/22 2249 95.3 kg (210 lb)       Objective:  Vital signs: (most recent): Blood pressure 139/61, pulse 77, temperature 98.7 °F (37.1 °C), temperature source Oral, resp. rate 18, height 170.2 cm (67\"), weight 90.1 kg (198 lb 9.6 oz), SpO2 93 %.          Objective:  General Appearance:  Comfortable, well no-appearing, in no acute distress and not in pain.  Awake, alert, oriented  HEENT: Mucous membranes moist, no injury, oropharynx clear  Lungs:  Normal effort and normal respiratory rate.  Breath sounds clear to auscultation.  No  respiratory distress.  No rales, decreased breath sounds or rhonchi.    Heart: Normal rate.  Regular rhythm.  S1, S2 normal.  No murmur.   Abdomen: Abdomen is soft.  Bowel sounds are normal, no abdominal tenderness.  There is no rebound or guarding  Extremities: No significant edema of bilateral lower extremities  Neurological: No focal motor or sensory deficits, pupils reactive  Skin:  Warm and dry.  No rash or cyanosis.       Results Review:    Intake/Output:     Intake/Output Summary (Last 24 hours) at 10/13/2022 0854  Last data filed at 10/13/2022 0628  Gross per 24 hour   Intake 920 ml   Output 3250 ml   Net -2330 ml         DATA:  Radiology and Labs:  The following labs independently reviewed by me. Additional labs ordered for tomorrow a.m.  Interval notes, chart personally reviewed by me.   Old records independently " reviewed showing CKD stage IV, baseline creatinine around 2.0-2.2  New problems include worsening hypokalemia and hypomagnesemia  Discussed with patient herself at bedside    Risk/ complexity of medical care/ medical decision making high risk, severe renal failure with underlying CKD stage IV    Labs:   Recent Results (from the past 24 hour(s))   POC Glucose Once    Collection Time: 10/12/22 10:28 AM    Specimen: Blood   Result Value Ref Range    Glucose 189 (H) 70 - 130 mg/dL   Sodium, Urine, Random - Urine, Clean Catch    Collection Time: 10/12/22 12:27 PM    Specimen: Urine, Clean Catch   Result Value Ref Range    Sodium, Urine 45 mmol/L   Chloride, Urine, Random - Urine, Clean Catch    Collection Time: 10/12/22 12:27 PM    Specimen: Urine, Clean Catch   Result Value Ref Range    Chloride, Urine 30 mmol/L   Urinalysis With Microscopic If Indicated (No Culture) - Urine, Clean Catch    Collection Time: 10/12/22 12:27 PM    Specimen: Urine, Clean Catch   Result Value Ref Range    Color, UA Yellow Yellow, Straw    Appearance, UA Clear Clear    pH, UA 5.5 5.0 - 8.0    Specific Gravity, UA 1.011 1.005 - 1.030    Glucose, UA Negative Negative    Ketones, UA Negative Negative    Bilirubin, UA Negative Negative    Blood, UA Negative Negative    Protein, UA 30 mg/dL (1+) (A) Negative    Leuk Esterase, UA Negative Negative    Nitrite, UA Negative Negative    Urobilinogen, UA 0.2 E.U./dL 0.2 - 1.0 E.U./dL   Urinalysis, Microscopic Only - Urine, Clean Catch    Collection Time: 10/12/22 12:27 PM    Specimen: Urine, Clean Catch   Result Value Ref Range    RBC, UA None Seen None Seen, 0-2 /HPF    WBC, UA None Seen None Seen, 0-2 /HPF    Bacteria, UA None Seen None Seen /HPF    Squamous Epithelial Cells, UA 3-6 (A) None Seen, 0-2 /HPF    Hyaline Casts, UA None Seen None Seen /LPF    Methodology Manual Light Microscopy    POC Glucose Once    Collection Time: 10/12/22  3:39 PM    Specimen: Blood   Result Value Ref Range    Glucose  177 (H) 70 - 130 mg/dL   POC Glucose Once    Collection Time: 10/12/22  8:28 PM    Specimen: Blood   Result Value Ref Range    Glucose 161 (H) 70 - 130 mg/dL   Renal Function Panel    Collection Time: 10/13/22  5:57 AM    Specimen: Blood   Result Value Ref Range    Glucose 105 (H) 65 - 99 mg/dL    BUN 54 (H) 8 - 23 mg/dL    Creatinine 4.35 (H) 0.57 - 1.00 mg/dL    Sodium 138 136 - 145 mmol/L    Potassium 4.0 3.5 - 5.2 mmol/L    Chloride 104 98 - 107 mmol/L    CO2 22.0 22.0 - 29.0 mmol/L    Calcium 8.8 8.6 - 10.5 mg/dL    Albumin 3.30 (L) 3.50 - 5.20 g/dL    Phosphorus 3.6 2.5 - 4.5 mg/dL    Anion Gap 12.0 5.0 - 15.0 mmol/L    BUN/Creatinine Ratio 12.4 7.0 - 25.0    eGFR 9.7 (L) >60.0 mL/min/1.73   POC Glucose Once    Collection Time: 10/13/22  6:22 AM    Specimen: Blood   Result Value Ref Range    Glucose 102 70 - 130 mg/dL       Radiology:  Pertinent radiology studies were reviewed as described above  Medications have been reviewed separately in chart overview      ASSESSMENT:  1.  Acute renal failure, pre-renal and improving after IVF resumed  2. CKD IV, baseline creatinine around 2.0-2.2  3. Dehydration  4. T2DM  5.  Metabolic acidosis, improved   6.  Hypomagnesemia, worse  7.  Anemia of CKD  8.  Hypokalemia  9. H/O CHF      Plan:  Cr trending back down - at 4.3 today.  Likely some element of mild ATN.  Repeat UA and urine lytes reviewed.  There was no e/o hydro on U/S.  D/C IVF after current L.  No indication for acute HD.  Avoid nephrotoxins.  Will follow closely.      Paras Payne MD   [facility]   Office phone number: [phone]  Answering service phone number: [phone]  10/13/22  08:54 EDT     DISCHARGE

## 2022-10-13 NOTE — THERAPY TREATMENT NOTE
Patient Name: Lowell Min  : 1940    MRN: [id number]                              Today's Date: 10/13/2022       Admit Date: 10/7/2022    Visit Dx:     ICD-10-CM ICD-9-CM   1. PERLA (acute kidney injury) (HCC)  N17.9 584.9   2. Hypomagnesemia  E83.42 275.2     Patient Active Problem List   Diagnosis   • Chronic kidney disease, stage IV (severe) (HCC)   • Erythropoietin deficiency anemia   • Symptomatic anemia   • Anxiety associated with depression   • Iron deficiency anemia   • PAF (paroxysmal atrial fibrillation) (HCC)   • Shortness of breath   • Chronic diastolic congestive heart failure (HCC)   • Diabetic polyneuropathy associated with type 2 diabetes mellitus (HCC)   • PERLA (acute kidney injury) (HCC)   • Hypomagnesemia   • Foot pain, bilateral     Past Medical History:   Diagnosis Date   • Anxiety    • Atrial fibrillation (HCC)    • Chronic kidney disease, stage IV (severe) (HCC)    • Depression    • Elevated cholesterol    • Hypertension    • Type 2 diabetes mellitus (HCC)      Past Surgical History:   Procedure Laterality Date   • APPENDECTOMY     • CHOLECYSTECTOMY     • COLONOSCOPY     • HYSTERECTOMY     • TUMOR REMOVAL Left     benign tumor from left breast      General Information     Row Name 10/13/22 1333          Physical Therapy Time and Intention    Document Type therapy note (daily note)  -DB     Mode of Treatment physical therapy;individual therapy  -DB     Row Name 10/13/22 1333          General Information    Patient Profile Reviewed yes  -DB           User Key  (r) = Recorded By, (t) = Taken By, (c) = Cosigned By    Initials Name Provider Type    DB Oanh Camp PT Physical Therapist               Mobility     Row Name 10/13/22 1333          Bed Mobility    Bed Mobility supine-sit  -DB     Supine-Sit [place] (Bed Mobility) supervision;verbal cues  -DB     Assistive Device (Bed Mobility) bed rails;head of bed elevated  -DB     Row Name 10/13/22 1333          Sit-Stand Transfer     Sit-Stand [place] (Transfers) contact guard;verbal cues  -DB     Assistive Device (Sit-Stand Transfers) walker, front-wheeled  -DB     Row Name 10/13/22 1333          Gait/Stairs (Locomotion)    [place] Level (Gait) contact guard;verbal cues;nonverbal cues (demo/gesture);standby assist;minimum assist (75% patient effort)  -DB     Assistive Device (Gait) walker, front-wheeled  -DB     Distance in Feet (Gait) 20'  -DB     Deviations/Abnormal Patterns (Gait) base of support, narrow;guillermo decreased;gait speed decreased;stride length decreased  -DB     Bilateral Gait Deviations forward flexed posture;heel strike decreased  -DB     Comment, (Gait/Stairs) LOB backward upon standing, Magnus to correct  -DB           User Key  (r) = Recorded By, (t) = Taken By, (c) = Cosigned By    Initials Name Provider Type    Oanh Navarrete, MIRNA Physical Therapist               Obj/Interventions     Row Name 10/13/22 1335          Motor Skills    Therapeutic Exercise other (see comments)  seated MIP, LAQ, AP x15  -DB     Row Name 10/13/22 1335          Balance    Balance Assessment sitting static balance;sitting dynamic balance;standing static balance;standing dynamic balance  -DB     Dynamic Sitting Balance supervision  -DB     Position, Sitting Balance unsupported;sitting edge of bed  -DB     Static Standing Balance standby assist  -DB     Dynamic Standing Balance contact guard  -DB     Position/Device Used, Standing Balance supported  -DB     Balance Interventions standing;sit to stand;sitting  -DB           User Key  (r) = Recorded By, (t) = Taken By, (c) = Cosigned By    Initials Name Provider Type    Oanh Navarrete, PT Physical Therapist               Goals/Plan    No documentation.                Clinical Impression     Row Name 10/13/22 1336          Pain    Pain Intervention(s) Ambulation/increased activity;Repositioned  -DB     Row Name 10/13/22 1336          Plan of Care Review    Progress declining  -DB      Outcome Evaluation Pt guy's dec in endurance and balance today 2/2 B foot pain. Pt demo's LOB upon standing that req's Magnus to correct. Pt able to ambulate in room over to chair and perform seated LE ther ex. Continunes to benefit rom skilled PT.  -DB     Row Name 10/13/22 1336          Vital Signs    O2 Delivery Pre Treatment room air  -DB     O2 Delivery Intra Treatment room air  -DB     O2 Delivery Post Treatment room air  -DB     Pre Patient Position Supine  -DB     Intra Patient Position Standing  -DB     Post Patient Position Sitting  -DB     Row Name 10/13/22 1336          Positioning and Restraints    Pre-Treatment Position in bed  -DB     Post Treatment Position chair  -DB     In Chair reclined;sitting;call light within reach;encouraged to call for assist;exit alarm on  -DB           User Key  (r) = Recorded By, (t) = Taken By, (c) = Cosigned By    Initials Name Provider Type    DB Oanh Camp, PT Physical Therapist               Outcome Measures     Row Name 10/13/22 3471          How much help from another person do you currently need...    Turning from your back to your side while in flat bed without using bedrails? 4  -DB     Moving from lying on back to sitting on the side of a flat bed without bedrails? 3  -DB     Moving to and from a bed to a chair (including a wheelchair)? 3  -DB     Standing up from a chair using your arms (e.g., wheelchair, bedside chair)? 3  -DB     Climbing 3-5 steps with a railing? 2  -DB     To walk in hospital room? 3  -DB     AM-PAC 6 Clicks Score (PT) 18  -DB     Highest level of mobility 6 --> Walked 10 steps or more  -DB           User Key  (r) = Recorded By, (t) = Taken By, (c) = Cosigned By    Initials Name Provider Type    DB Oanh Camp, PT Physical Therapist                             Physical Therapy Education     Title: PT OT SLP Therapies (Done)     Topic: Physical Therapy (Done)     Point: Mobility training (Done)     Learning Progress Summary            Patient Acceptance, E, VU by DB at 10/13/2022 1341    Acceptance, E, VU by DB at 10/11/2022 1621    Acceptance, E, VU by KH at 10/9/2022 1603                   Point: Home exercise program (Done)     Learning Progress Summary           Patient Acceptance, E, VU by DB at 10/13/2022 1341    Acceptance, E, VU by DB at 10/11/2022 1621                   Point: Body mechanics (Done)     Learning Progress Summary           Patient Acceptance, E, VU by DB at 10/13/2022 1341    Acceptance, E, VU by DB at 10/11/2022 1621    Acceptance, E, VU by KH at 10/9/2022 1603                   Point: Precautions (Done)     Learning Progress Summary           Patient Acceptance, E, VU by DB at 10/13/2022 1341    Acceptance, E, VU by DB at 10/11/2022 1621    Acceptance, E, VU by KH at 10/9/2022 1603                               User Key     Initials Effective Dates Name Provider Type Discipline     06/16/21 -  Donna South, PT Physical Therapist PT    RAVI 06/16/21 -  Oanh Camp PT Physical Therapist PT              PT Recommendation and Plan     Plan of Care Reviewed With: patient  Progress: declining  Outcome Evaluation: Pt demo's dec in endurance and balance today 2/2 B foot pain. Pt demo's LOB upon standing that req's Magnus to correct. Pt able to ambulate in room over to chair and perform seated LE ther ex. Continunes to benefit rom skilled PT.     Time Calculation:    PT Charges     Row Name 10/13/22 1341             Time Calculation    Start Time 1325  -DB      Stop Time 1340  -DB      Time Calculation (min) 15 min  -DB      PT Received On 10/13/22  -DB      PT - Next Appointment 10/14/22  -DB         Time Calculation- PT    Total Timed Code Minutes- PT 15 minute(s)  -DB            User Key  (r) = Recorded By, (t) = Taken By, (c) = Cosigned By    Initials Name Provider Type    Oanh Navarrete, PT Physical Therapist              Therapy Charges for Today     Code Description Service Date Service Provider Modifiers  Qty    61957715279  PT THERAPEUTIC ACT EA 15 MIN 10/13/2022 Oanh Camp, PT GP 1          PT G-Codes  Outcome Measure Options: AM-PAC 6 Clicks Basic Mobility (PT)  AM-PAC 6 Clicks Score (PT): 18    Oanh Camp, PT  10/13/2022

## 2022-10-14 ENCOUNTER — HOSPITAL ENCOUNTER (OUTPATIENT)
Dept: INFUSION THERAPY | Facility: HOSPITAL | Age: 82
Discharge: HOME | End: 2022-10-14

## 2022-10-14 LAB
ALBUMIN SERPL-MCNC: 3.5 G/DL (ref 3.5–5.2)
ANION GAP SERPL CALCULATED.3IONS-SCNC: 11.6 MMOL/L (ref 5–15)
BUN SERPL-MCNC: 54 MG/DL (ref 8–23)
BUN/CREAT SERPL: 12.3 (ref 7–25)
CALCIUM SPEC-SCNC: 9.3 MG/DL (ref 8.6–10.5)
CHLORIDE SERPL-SCNC: 102 MMOL/L (ref 98–107)
CO2 SERPL-SCNC: 23.4 MMOL/L (ref 22–29)
CREAT SERPL-MCNC: 4.39 MG/DL (ref 0.57–1)
EGFRCR SERPLBLD CKD-EPI 2021: 9.6 ML/MIN/1.73
ERYTHROCYTE [SEDIMENTATION RATE] IN BLOOD: 62 MM/HR (ref 0–30)
FERRITIN SERPL-MCNC: 484 NG/ML (ref 13–150)
GLUCOSE BLDC GLUCOMTR-MCNC: 120 MG/DL (ref 70–130)
GLUCOSE BLDC GLUCOMTR-MCNC: 152 MG/DL (ref 70–130)
GLUCOSE BLDC GLUCOMTR-MCNC: 183 MG/DL (ref 70–130)
GLUCOSE BLDC GLUCOMTR-MCNC: 209 MG/DL (ref 70–130)
GLUCOSE SERPL-MCNC: 123 MG/DL (ref 65–99)
IRON 24H UR-MRATE: 38 MCG/DL (ref 37–145)
IRON SATN MFR SERPL: 18 % (ref 20–50)
PHOSPHATE SERPL-MCNC: 4.2 MG/DL (ref 2.5–4.5)
POTASSIUM SERPL-SCNC: 4.3 MMOL/L (ref 3.5–5.2)
SODIUM SERPL-SCNC: 137 MMOL/L (ref 136–145)
TIBC SERPL-MCNC: 206 MCG/DL (ref 298–536)
TRANSFERRIN SERPL-MCNC: 138 MG/DL (ref 200–360)

## 2022-10-14 PROCEDURE — 97116 GAIT TRAINING THERAPY: CPT

## 2022-10-14 PROCEDURE — 82728 ASSAY OF FERRITIN: CPT | Performed by: INTERNAL MEDICINE

## 2022-10-14 PROCEDURE — 83540 ASSAY OF IRON: CPT | Performed by: INTERNAL MEDICINE

## 2022-10-14 PROCEDURE — 63710000001 PREDNISONE PER 1 MG: Performed by: HOSPITALIST

## 2022-10-14 PROCEDURE — 80069 RENAL FUNCTION PANEL: CPT | Performed by: INTERNAL MEDICINE

## 2022-10-14 PROCEDURE — 63710000001 INSULIN LISPRO (HUMAN) PER 5 UNITS: Performed by: INTERNAL MEDICINE

## 2022-10-14 PROCEDURE — 85652 RBC SED RATE AUTOMATED: CPT | Performed by: HOSPITALIST

## 2022-10-14 PROCEDURE — 84466 ASSAY OF TRANSFERRIN: CPT | Performed by: INTERNAL MEDICINE

## 2022-10-14 PROCEDURE — 82962 GLUCOSE BLOOD TEST: CPT

## 2022-10-14 PROCEDURE — 97110 THERAPEUTIC EXERCISES: CPT

## 2022-10-14 RX ADMIN — INSULIN LISPRO 2 UNITS: 100 INJECTION, SOLUTION INTRAVENOUS; SUBCUTANEOUS at 12:35

## 2022-10-14 RX ADMIN — APIXABAN 2.5 MG: 2.5 TABLET, FILM COATED ORAL at 21:04

## 2022-10-14 RX ADMIN — METOPROLOL TARTRATE 25 MG: 25 TABLET, FILM COATED ORAL at 08:52

## 2022-10-14 RX ADMIN — SODIUM BICARBONATE 650 MG: 650 TABLET ORAL at 18:35

## 2022-10-14 RX ADMIN — DOCUSATE SODIUM 50MG AND SENNOSIDES 8.6MG 2 TABLET: 8.6; 5 TABLET, FILM COATED ORAL at 21:04

## 2022-10-14 RX ADMIN — MAGNESIUM OXIDE 400 MG (241.3 MG MAGNESIUM) TABLET 400 MG: TABLET at 08:51

## 2022-10-14 RX ADMIN — DOCUSATE SODIUM 50MG AND SENNOSIDES 8.6MG 1 TABLET: 8.6; 5 TABLET, FILM COATED ORAL at 08:52

## 2022-10-14 RX ADMIN — AMLODIPINE BESYLATE 5 MG: 5 TABLET ORAL at 08:51

## 2022-10-14 RX ADMIN — HYDRALAZINE HYDROCHLORIDE 25 MG: 50 TABLET, FILM COATED ORAL at 22:01

## 2022-10-14 RX ADMIN — SODIUM BICARBONATE 650 MG: 650 TABLET ORAL at 21:04

## 2022-10-14 RX ADMIN — GABAPENTIN 100 MG: 100 CAPSULE ORAL at 21:04

## 2022-10-14 RX ADMIN — HYDRALAZINE HYDROCHLORIDE 25 MG: 50 TABLET, FILM COATED ORAL at 06:43

## 2022-10-14 RX ADMIN — HYDRALAZINE HYDROCHLORIDE 25 MG: 50 TABLET, FILM COATED ORAL at 14:54

## 2022-10-14 RX ADMIN — MAGNESIUM OXIDE 400 MG (241.3 MG MAGNESIUM) TABLET 400 MG: TABLET at 21:04

## 2022-10-14 RX ADMIN — SODIUM BICARBONATE 650 MG: 650 TABLET ORAL at 08:50

## 2022-10-14 RX ADMIN — PREDNISONE 20 MG: 20 TABLET ORAL at 08:50

## 2022-10-14 RX ADMIN — APIXABAN 2.5 MG: 2.5 TABLET, FILM COATED ORAL at 08:50

## 2022-10-14 RX ADMIN — INSULIN LISPRO 3 UNITS: 100 INJECTION, SOLUTION INTRAVENOUS; SUBCUTANEOUS at 18:35

## 2022-10-14 RX ADMIN — VENLAFAXINE HYDROCHLORIDE 37.5 MG: 37.5 CAPSULE, EXTENDED RELEASE ORAL at 08:51

## 2022-10-14 RX ADMIN — METOPROLOL TARTRATE 25 MG: 25 TABLET, FILM COATED ORAL at 21:04

## 2022-10-14 RX ADMIN — ATORVASTATIN CALCIUM 40 MG: 20 TABLET, FILM COATED ORAL at 08:51

## 2022-10-14 RX ADMIN — LIDOCAINE 1 PATCH: 50 PATCH TOPICAL at 08:51

## 2022-10-14 RX ADMIN — HYDRALAZINE HYDROCHLORIDE 25 MG: 50 TABLET, FILM COATED ORAL at 00:34

## 2022-10-14 RX ADMIN — ACETAMINOPHEN 650 MG: 325 TABLET, FILM COATED ORAL at 08:50

## 2022-10-14 NOTE — PLAN OF CARE
Goal Outcome Evaluation:  Plan of Care Reviewed With: patient   Pt A&Ox4. Creatnine slightly trending down. Spoke with [facility] regarding outpatient orders that were due to be done today. Dr. Payne notified, orders entered in the chart. Pt walked with PT in the bragg, tolerated well. Pt up in chair most of the day. Purewick in place. BS, mg+ and renal function monitoring continued. VSS. NAD noted. Will continue to monitor.      Progress: improving

## 2022-10-14 NOTE — CASE MANAGEMENT/SOCIAL WORK
Continued Stay Note  [facility]     Patient Name: Lowell Min  MRN: [id number]  Today's Date: 10/14/2022    Admit Date: 10/7/2022    Plan: Plan [facility] for skilled care.   JONNIE Avery RN   Discharge Plan     Row Name 10/14/22 1336       Plan    Plan Plan [facility] for skilled care.   JONNIE Avery RN    Patient/Family in Agreement with Plan yes    Plan Comments Called and spoke with Alysa ( [phone]) and pt will have a bed and can be accepted at [facility] this weekend.  Packet in CCP office.  Discharge Summary will have to be faxed to [phone] with cover sheet on packet.  Pt states her daughter can transport her.  Plan [facility] for skilled care.   JONNIE Avery RN               Discharge Codes    No documentation.               Expected Discharge Date and Time     Expected Discharge Date Expected Discharge Time    Oct 18, 2022             Brandi Avery RN

## 2022-10-14 NOTE — THERAPY TREATMENT NOTE
Patient Name: Lowell Min  : 1940    MRN: [id number]                              Today's Date: 10/14/2022       Admit Date: 10/7/2022    Visit Dx:     ICD-10-CM ICD-9-CM   1. PERLA (acute kidney injury) (HCC)  N17.9 584.9   2. Hypomagnesemia  E83.42 275.2     Patient Active Problem List   Diagnosis   • Chronic kidney disease, stage IV (severe) (HCC)   • Erythropoietin deficiency anemia   • Symptomatic anemia   • Anxiety associated with depression   • Iron deficiency anemia   • PAF (paroxysmal atrial fibrillation) (HCC)   • Shortness of breath   • Chronic diastolic congestive heart failure (HCC)   • Diabetic polyneuropathy associated with type 2 diabetes mellitus (HCC)   • PERLA (acute kidney injury) (HCC)   • Hypomagnesemia   • Foot pain, bilateral     Past Medical History:   Diagnosis Date   • Anxiety    • Atrial fibrillation (HCC)    • Chronic kidney disease, stage IV (severe) (HCC)    • Depression    • Elevated cholesterol    • Hypertension    • Type 2 diabetes mellitus (HCC)      Past Surgical History:   Procedure Laterality Date   • APPENDECTOMY     • CHOLECYSTECTOMY     • COLONOSCOPY     • HYSTERECTOMY     • TUMOR REMOVAL Left     benign tumor from left breast      General Information     Row Name 10/14/22 151          Physical Therapy Time and Intention    Document Type therapy note (daily note)  -     Mode of Treatment physical therapy  -     Row Name 10/14/22 151          General Information    Existing Precautions/Restrictions fall  -     Row Name 10/14/22 151          Cognition    Orientation Status (Cognition) oriented x 4  -           User Key  (r) = Recorded By, (t) = Taken By, (c) = Cosigned By    Initials Name Provider Type     Yady Tristan PTA Physical Therapist Assistant               Mobility     Row Name 10/14/22 1513          Bed Mobility    Comment, (Bed Mobility) up in chair  -     Row Name 10/14/22 1513          Sit-Stand Transfer    Sit-Stand [place]  (Transfers) standby assist  -     Assistive Device (Sit-Stand Transfers) walker, front-wheeled  -SM     Row Name 10/14/22 1513          Gait/Stairs (Locomotion)    [place] Level (Gait) contact guard  -     Assistive Device (Gait) walker, front-wheeled  -SM     Distance in Feet (Gait) 90  -SM     Deviations/Abnormal Patterns (Gait) guillermo decreased;stride length decreased  -SM     Bilateral Gait Deviations forward flexed posture  -SM     Comment, (Gait/Stairs) slightly shaky and unsteady w/out AD  -SM           User Key  (r) = Recorded By, (t) = Taken By, (c) = Cosigned By    Initials Name Provider Type    Yady Shaikh PTA Physical Therapist Assistant               Obj/Interventions     Row Name 10/14/22 1514          Motor Skills    Therapeutic Exercise --  seated AP, LAQ, marches, hip abd/add x10 reps  -SM           User Key  (r) = Recorded By, (t) = Taken By, (c) = Cosigned By    Initials Name Provider Type    Yady Shaikh PTA Physical Therapist Assistant               Goals/Plan    No documentation.                Clinical Impression     Row Name 10/14/22 1515          Pain    Pretreatment Pain Rating 0/10 - no pain  -SM     Posttreatment Pain Rating 0/10 - no pain  -SM     Row Name 10/14/22 1515          Positioning and Restraints    Pre-Treatment Position sitting in chair/recliner  -SM     Post Treatment Position chair  -SM     In Chair reclined;call light within reach;encouraged to call for assist;exit alarm on  -SM           User Key  (r) = Recorded By, (t) = Taken By, (c) = Cosigned By    Initials Name Provider Type    Yady Shaikh PTA Physical Therapist Assistant               Outcome Measures     Row Name 10/14/22 1517          How much help from another person do you currently need...    Turning from your back to your side while in flat bed without using bedrails? 3  -SM     Moving from lying on back to sitting on the side of a flat bed without bedrails? 3  -SM      Moving to and from a bed to a chair (including a wheelchair)? 3  -SM     Standing up from a chair using your arms (e.g., wheelchair, bedside chair)? 3  -SM     Climbing 3-5 steps with a railing? 2  -SM     To walk in hospital room? 3  -SM     AM-PAC 6 Clicks Score (PT) 17  -     Highest level of mobility 5 --> Static standing  -     Row Name 10/14/22 1517          Functional Assessment    Outcome Measure Options AM-PAC 6 Clicks Basic Mobility (PT)  -           User Key  (r) = Recorded By, (t) = Taken By, (c) = Cosigned By    Initials Name Provider Type    Yady Shaikh, PTA Physical Therapist Assistant                             Physical Therapy Education     Title: PT OT SLP Therapies (Done)     Topic: Physical Therapy (Done)     Point: Mobility training (Done)     Learning Progress Summary           Patient Acceptance, E,TB,D, VU,NR by  at 10/14/2022 1518    Acceptance, E, VU by DB at 10/13/2022 1341    Acceptance, E, VU by DB at 10/11/2022 1621    Acceptance, E, VU by  at 10/9/2022 1603                   Point: Home exercise program (Done)     Learning Progress Summary           Patient Acceptance, E,TB,D, VU,NR by  at 10/14/2022 1518    Acceptance, E, VU by DB at 10/13/2022 1341    Acceptance, E, VU by DB at 10/11/2022 1621                   Point: Body mechanics (Done)     Learning Progress Summary           Patient Acceptance, E,TB,D, VU,NR by  at 10/14/2022 1518    Acceptance, E, VU by DB at 10/13/2022 1341    Acceptance, E, VU by DB at 10/11/2022 1621    Acceptance, E, VU by  at 10/9/2022 1603                   Point: Precautions (Done)     Learning Progress Summary           Patient Acceptance, E,TB,D, VU,NR by  at 10/14/2022 1518    Acceptance, E, VU by DB at 10/13/2022 1341    Acceptance, E, VU by DB at 10/11/2022 1621    Acceptance, E, VU by  at 10/9/2022 1603                               User Key     Initials Effective Dates Name Provider Type [facility] 06/16/21  -  Donna South, PT Physical Therapist PT     03/07/18 -  Yady Tristan PTA Physical Therapist Assistant PT     06/16/21 -  Oanh Camp, PT Physical Therapist PT              PT Recommendation and Plan     Plan of Care Reviewed With: patient  Progress: improving  Outcome Evaluation: Pt tolerated treatment well this date. Increased gait distance to 90ft w/ Rw and CGA. Pt let go of walker while donning mask, and was slightly shaky and unsteady w/out support. Very pleasant and was instructed on a few seated LE exercises. Encouraged pt to ambulate w/ nsg later.     Time Calculation:    PT Charges     Row Name 10/14/22 1522             Time Calculation    Start Time 1343  -      Stop Time 1407  -      Time Calculation (min) 24 min  -      PT Received On 10/14/22  -      PT - Next Appointment 10/17/22  -            User Key  (r) = Recorded By, (t) = Taken By, (c) = Cosigned By    Initials Name Provider Type     Yady Tristan PTA Physical Therapist Assistant              Therapy Charges for Today     Code Description Service Date Service Provider Modifiers Qty    68124155870 HC PT THER PROC EA 15 MIN 10/14/2022 Yady Tristan PTA GP 1    52913856813 HC GAIT TRAINING EA 15 MIN 10/14/2022 Yady Tristan PTA GP 1          PT G-Codes  Outcome Measure Options: AM-PAC 6 Clicks Basic Mobility (PT)  AM-PAC 6 Clicks Score (PT): 17    Yady Tristan PTA  10/14/2022

## 2022-10-14 NOTE — PLAN OF CARE
Goal Outcome Evaluation:  Plan of Care Reviewed With: patient        Progress: improving  Outcome Evaluation: Pt tolerated treatment well this date. Increased gait distance to 90ft w/ Rw and CGA. Pt let go of walker while donning mask, and was slightly shaky and unsteady w/out support. Very pleasant and was instructed on a few seated LE exercises. Encouraged pt to ambulate w/ nsg later.

## 2022-10-14 NOTE — PROGRESS NOTES
"RENAL/KCC:     LOS: 7 days     Chief Complaint/ Reason for encounter: CKD management with new, severe PERLA    Subjective   10/14/22: Feeling OK this AM.  Non-oliguric.  Denies any SOA.  No N/V/D.  No other complaints.      Vital Signs  Temp:  [97.5 °F (36.4 °C)-98.4 °F (36.9 °C)] 97.5 °F (36.4 °C)  Heart Rate:  [59-70] 59  Resp:  [18] 18  BP: (133-159)/(59-92) 157/76       Wt Readings from Last 1 Encounters:   10/14/22 0611 90.2 kg (198 lb 14.4 oz)   10/13/22 0628 90.1 kg (198 lb 9.6 oz)   10/12/22 0526 89 kg (196 lb 3.4 oz)   10/11/22 0606 87.8 kg (193 lb 8 oz)   10/10/22 0554 89.1 kg (196 lb 6.4 oz)   10/07/22 2249 95.3 kg (210 lb)       Objective:  Vital signs: (most recent): Blood pressure 157/76, pulse 59, temperature 97.5 °F (36.4 °C), temperature source Oral, resp. rate 18, height 170.2 cm (67\"), weight 90.2 kg (198 lb 14.4 oz), SpO2 97 %.          Objective:  General Appearance:  Comfortable, well no-appearing, in no acute distress and not in pain.  Awake, alert, oriented  HEENT: Mucous membranes moist, no injury, oropharynx clear  Lungs:  Normal effort and normal respiratory rate.  Breath sounds clear to auscultation.  No  respiratory distress.  No rales, decreased breath sounds or rhonchi.    Heart: Normal rate.  Regular rhythm.  S1, S2 normal.  No murmur.   Abdomen: Abdomen is soft.  Bowel sounds are normal, no abdominal tenderness.  There is no rebound or guarding  Extremities: No significant edema of bilateral lower extremities  Neurological: No focal motor or sensory deficits, pupils reactive  Skin:  Warm and dry.  No rash or cyanosis.       Results Review:    Intake/Output:     Intake/Output Summary (Last 24 hours) at 10/14/2022 0885  Last data filed at 10/14/2022 0452  Gross per 24 hour   Intake 120 ml   Output 2000 ml   Net -1880 ml         DATA:  Radiology and Labs:  The following labs independently reviewed by me. Additional labs ordered for tomorrow a.m.  Interval notes, chart personally reviewed " by me.   Old records independently reviewed showing CKD stage IV, baseline creatinine around 2.0-2.2  New problems include worsening hypokalemia and hypomagnesemia  Discussed with patient herself at bedside    Risk/ complexity of medical care/ medical decision making high risk, severe renal failure with underlying CKD stage IV    Labs:   Recent Results (from the past 24 hour(s))   POC Glucose Once    Collection Time: 10/13/22 10:36 AM    Specimen: Blood   Result Value Ref Range    Glucose 175 (H) 70 - 130 mg/dL   Magnesium    Collection Time: 10/13/22 11:13 AM    Specimen: Blood   Result Value Ref Range    Magnesium 1.5 (L) 1.6 - 2.4 mg/dL   POC Glucose Once    Collection Time: 10/13/22  3:31 PM    Specimen: Blood   Result Value Ref Range    Glucose 169 (H) 70 - 130 mg/dL   POC Glucose Once    Collection Time: 10/13/22  8:29 PM    Specimen: Blood   Result Value Ref Range    Glucose 231 (H) 70 - 130 mg/dL   POC Glucose Once    Collection Time: 10/14/22  6:22 AM    Specimen: Blood   Result Value Ref Range    Glucose 120 70 - 130 mg/dL   Renal Function Panel    Collection Time: 10/14/22  6:43 AM    Specimen: Blood   Result Value Ref Range    Glucose 123 (H) 65 - 99 mg/dL    BUN 54 (H) 8 - 23 mg/dL    Creatinine 4.39 (H) 0.57 - 1.00 mg/dL    Sodium 137 136 - 145 mmol/L    Potassium 4.3 3.5 - 5.2 mmol/L    Chloride 102 98 - 107 mmol/L    CO2 23.4 22.0 - 29.0 mmol/L    Calcium 9.3 8.6 - 10.5 mg/dL    Albumin 3.50 3.50 - 5.20 g/dL    Phosphorus 4.2 2.5 - 4.5 mg/dL    Anion Gap 11.6 5.0 - 15.0 mmol/L    BUN/Creatinine Ratio 12.3 7.0 - 25.0    eGFR 9.6 (L) >60.0 mL/min/1.73   Sedimentation Rate    Collection Time: 10/14/22  6:44 AM    Specimen: Blood   Result Value Ref Range    Sed Rate 62 (H) 0 - 30 mm/hr       Radiology:  Pertinent radiology studies were reviewed as described above  Medications have been reviewed separately in chart overview      ASSESSMENT:  1.  PERLA - ATN  2. CKD IV, baseline creatinine around 2.0-2.2  3.  Dehydration  4. T2DM  5.  Metabolic acidosis, improved   6.  Hypomagnesemia, on PO replacement  7.  Anemia of CKD  8.  Hypokalemia  9.  H/O CHF      Plan:  Cr stable at 4.3 today = unresolved ATN.  Volume OK at present time.  Repeat UA and urine lytes reviewed.  There was no e/o hydro on U/S.  No indication for acute HD.  Avoid nephrotoxins.  Continue electrolyte resplacement as needed.  Will need close outpatient follow-up to monitor for resolution of ATN.  Will follow closely.      Paras Payne MD   [facility]   Office phone number: [phone]  Answering service phone number: [phone]  10/14/22  08:37 EDT

## 2022-10-14 NOTE — PROGRESS NOTES
"    DAILY PROGRESS NOTE  [facility]    Patient Identification:  Name: Lowell Min  Age: 82 y.o.  Sex: female  :  1940  MRN: [id number]         Primary Care Physician: Dannie Mathews MD    Subjective:  Interval History: Good urine output.  No abdominal pain no nausea no vomiting no confusion.  Denies any chest pain or troubles breathing or volume overload    Objective: Sitting up in bed conversational and very pleasant.  Nontoxic in appearance.  No family at bedside and she has not asked me to discuss case with anyone additionally    Scheduled Meds:amLODIPine, 5 mg, Oral, Q24H  apixaban, 2.5 mg, Oral, BID  atorvastatin, 40 mg, Oral, Daily  gabapentin, 100 mg, Oral, Nightly  hydrALAZINE, 25 mg, Oral, Q8H  insulin lispro, 0-7 Units, Subcutaneous, TID AC  lidocaine, 1 patch, Transdermal, Q24H  magnesium oxide, 400 mg, Oral, BID  metoprolol tartrate, 25 mg, Oral, Q12H  predniSONE, 20 mg, Oral, Daily With Breakfast  sennosides-docusate, 2 tablet, Oral, BID  sodium bicarbonate, 650 mg, Oral, TID  venlafaxine XR, 37.5 mg, Oral, Daily      Continuous Infusions:     Vital signs in last 24 hours:  Temp:  [97.5 °F (36.4 °C)-98.4 °F (36.9 °C)] 97.5 °F (36.4 °C)  Heart Rate:  [59-70] 59  Resp:  [18] 18  BP: (133-159)/(59-92) 157/76    Intake/Output:    Intake/Output Summary (Last 24 hours) at 10/14/2022 0855  Last data filed at 10/14/2022 0452  Gross per 24 hour   Intake 120 ml   Output 2000 ml   Net -1880 ml       Exam:  /76 (BP Location: Right arm, Patient Position: Lying)   Pulse 59   Temp 97.5 °F (36.4 °C) (Oral)   Resp 18   Ht 170.2 cm (67\")   Wt 90.2 kg (198 lb 14.4 oz)   SpO2 97%   BMI 31.15 kg/m²     General Appearance:    Alert, cooperative, nontoxic, AAOx3                          Head:    Normocephalic, without obvious abnormality, atraumatic                           Eyes:    Conjunctivae/corneas clear, EOM's intact, both eyes                         Throat:   Oral mucosa pink " and moist                           Neck:   Supple, symmetrical, trachea midline, no JVD                         Lungs:    Clear to auscultation bilaterally, respirations unlabored                          Heart:    Regular rate and rhythm, S1 and S2 normal                  Abdomen:     Soft, nontender, bowel sounds active                 Extremities: Moving all with generalized weakness though no signs of volume overload/edema                        Pulses:   Pulses palpable in all extremities                  Neurologic:   CNII-XII intact     Data Review:  Labs in chart were reviewed.    Assessment:  Active Hospital Problems    Diagnosis  POA   • **PERLA (acute kidney injury) ([facility]) [N17.9]  Yes   • Foot pain, bilateral [M79.671, M79.672]  No   • Hypomagnesemia [E83.42]  Yes   • Diabetic polyneuropathy associated with type 2 diabetes mellitus ([facility]) [E11.42]  Yes   • PAF (paroxysmal atrial fibrillation) ([facility]) [I48.0]  Yes   • Anxiety associated with depression [F41.8]  Yes   • Chronic diastolic congestive heart failure ([facility]) [I50.32]  Yes   • Chronic kidney disease, stage IV (severe) ([facility]) [N18.4]  Yes      Resolved Hospital Problems   No resolved problems to display.       Plan:    ARF/CKD 4/ACD   -Renal consulted -unresolved ATN - no HD at this time    -crt 4.35-4.39 with levels plateauing   -Chronic diastolic CHF/euvolemic with Lasix on hold   -HTN stable/controlled on metoprolol/amlodipine/hydralazine   -Defer any lyte replacement to renal    DM2 with neuropathy/nephropathy -BS not bad despite steroids    PAF -metoprolol now twice daily -RC/AC with Eliquis      Disposition -PT suggest rehab.  Hopefully CCP can get insurance approval today.  Patient's choice of rehab is [facility] and I think she could be medically stable to transfer over the weekend       Artie Light MD  10/14/2022  08:55 EDT

## 2022-10-14 NOTE — PLAN OF CARE
Goal Outcome Evaluation:  Plan of Care Reviewed With: patient        Progress: improving  Outcome Evaluation: Patient  up in the  chair  for  a while,  resting at this  time. alert and oriented.  Had Tylenol  for  pain. Remain  on  room  air  this  shift.  Oxygen  level  in the  mid-high  80's, increases to  90.  Afib  on  the  monitor.   Nursing  will  continue  to monitor

## 2022-10-15 VITALS
TEMPERATURE: 97.6 F | SYSTOLIC BLOOD PRESSURE: 178 MMHG | DIASTOLIC BLOOD PRESSURE: 92 MMHG | OXYGEN SATURATION: 95 % | HEART RATE: 60 BPM | RESPIRATION RATE: 18 BRPM | WEIGHT: 198.9 LBS | BODY MASS INDEX: 31.22 KG/M2 | HEIGHT: 67 IN

## 2022-10-15 PROBLEM — E83.42 HYPOMAGNESEMIA: Status: RESOLVED | Noted: 2022-10-12 | Resolved: 2022-10-15

## 2022-10-15 LAB
ALBUMIN SERPL-MCNC: 3.2 G/DL (ref 3.5–5.2)
ANION GAP SERPL CALCULATED.3IONS-SCNC: 11.9 MMOL/L (ref 5–15)
BUN SERPL-MCNC: 58 MG/DL (ref 8–23)
BUN/CREAT SERPL: 13.3 (ref 7–25)
CALCIUM SPEC-SCNC: 9.3 MG/DL (ref 8.6–10.5)
CHLORIDE SERPL-SCNC: 102 MMOL/L (ref 98–107)
CO2 SERPL-SCNC: 22.1 MMOL/L (ref 22–29)
CREAT SERPL-MCNC: 4.35 MG/DL (ref 0.57–1)
EGFRCR SERPLBLD CKD-EPI 2021: 9.7 ML/MIN/1.73
GLUCOSE BLDC GLUCOMTR-MCNC: 130 MG/DL (ref 70–130)
GLUCOSE BLDC GLUCOMTR-MCNC: 94 MG/DL (ref 70–130)
GLUCOSE SERPL-MCNC: 85 MG/DL (ref 65–99)
MAGNESIUM SERPL-MCNC: 1.8 MG/DL (ref 1.6–2.4)
PHOSPHATE SERPL-MCNC: 4.3 MG/DL (ref 2.5–4.5)
POTASSIUM SERPL-SCNC: 4 MMOL/L (ref 3.5–5.2)
SODIUM SERPL-SCNC: 136 MMOL/L (ref 136–145)

## 2022-10-15 PROCEDURE — 63710000001 PREDNISONE PER 1 MG: Performed by: HOSPITALIST

## 2022-10-15 PROCEDURE — 83735 ASSAY OF MAGNESIUM: CPT | Performed by: INTERNAL MEDICINE

## 2022-10-15 PROCEDURE — 82962 GLUCOSE BLOOD TEST: CPT

## 2022-10-15 PROCEDURE — 80069 RENAL FUNCTION PANEL: CPT | Performed by: INTERNAL MEDICINE

## 2022-10-15 RX ORDER — HYDRALAZINE HYDROCHLORIDE 25 MG/1
25 TABLET, FILM COATED ORAL EVERY 8 HOURS SCHEDULED
Qty: 90 TABLET | Refills: 0
Start: 2022-10-15

## 2022-10-15 RX ORDER — GABAPENTIN 100 MG/1
100 CAPSULE ORAL 3 TIMES DAILY
Qty: 15 CAPSULE | Refills: 0 | Status: SHIPPED | OUTPATIENT
Start: 2022-10-15

## 2022-10-15 RX ORDER — AMLODIPINE BESYLATE 5 MG/1
5 TABLET ORAL
Qty: 30 TABLET | Refills: 0
Start: 2022-10-16

## 2022-10-15 RX ORDER — METOPROLOL TARTRATE 50 MG/1
25 TABLET, FILM COATED ORAL 2 TIMES DAILY
Start: 2022-10-15

## 2022-10-15 RX ORDER — SODIUM BICARBONATE 650 MG/1
650 TABLET ORAL 3 TIMES DAILY
Start: 2022-10-15

## 2022-10-15 RX ADMIN — ATORVASTATIN CALCIUM 40 MG: 20 TABLET, FILM COATED ORAL at 08:03

## 2022-10-15 RX ADMIN — VENLAFAXINE HYDROCHLORIDE 37.5 MG: 37.5 CAPSULE, EXTENDED RELEASE ORAL at 08:03

## 2022-10-15 RX ADMIN — DOCUSATE SODIUM 50MG AND SENNOSIDES 8.6MG 2 TABLET: 8.6; 5 TABLET, FILM COATED ORAL at 08:03

## 2022-10-15 RX ADMIN — METOPROLOL TARTRATE 25 MG: 25 TABLET, FILM COATED ORAL at 08:03

## 2022-10-15 RX ADMIN — MAGNESIUM OXIDE 400 MG (241.3 MG MAGNESIUM) TABLET 400 MG: TABLET at 08:03

## 2022-10-15 RX ADMIN — HYDRALAZINE HYDROCHLORIDE 25 MG: 50 TABLET, FILM COATED ORAL at 06:24

## 2022-10-15 RX ADMIN — PREDNISONE 20 MG: 20 TABLET ORAL at 08:03

## 2022-10-15 RX ADMIN — AMLODIPINE BESYLATE 5 MG: 5 TABLET ORAL at 08:03

## 2022-10-15 RX ADMIN — APIXABAN 2.5 MG: 2.5 TABLET, FILM COATED ORAL at 08:03

## 2022-10-15 RX ADMIN — SODIUM BICARBONATE 650 MG: 650 TABLET ORAL at 08:03

## 2022-10-15 NOTE — DISCHARGE SUMMARY
[facility]    Date of Discharge:  10/15/2022    PCP: Dannie Mathews MD    Discharge Diagnosis:   Active Hospital Problems    Diagnosis  POA   • **PERLA (acute kidney injury) ([facility]) [N17.9]  Yes   • Foot pain, bilateral [M79.671, M79.672]  No   • Diabetic polyneuropathy associated with type 2 diabetes mellitus ([facility]) [E11.42]  Yes   • PAF (paroxysmal atrial fibrillation) ([facility]) [I48.0]  Yes   • Anxiety associated with depression [F41.8]  Yes   • Chronic diastolic congestive heart failure ([facility]) [I50.32]  Yes   • Chronic kidney disease, stage IV (severe) ([facility]) [N18.4]  Yes      Resolved Hospital Problems    Diagnosis Date Resolved POA   • Hypomagnesemia [E83.42] 10/15/2022 Yes          Consults     Date and Time Order Name Status Description    10/7/2022 11:41 PM Inpatient Nephrology Consult      10/7/2022 10:36 PM Nephrology (on -call MD unless specified)          Hospital Course  82 y.o. female with past medical history of CKD 4 came in due to severe prerenal acute renal failure.  Her past medical history is compounded by chronic diastolic CHF hypertension diabetes and paroxysmal atrial fibrillation of which she is anticoagulated on Eliquis.  Regardless she was mainly managed by nephrology on this admission.  By discharge her creatinine seems to have stabilized in the 4.3 range.  Nephrology feels there is unresolved ATN and thinks it may take some time for her to return to previous baselines but no plans for hemodialysis have been implemented at this point.  On the day of discharge her creatinine is 4.35 with a potassium of 4.0 sodium 136 and a bicarb of 22.1 and the patient has been initiated on 3 times daily sodium bicarbonate.  Nephrology plans on close follow-up and monitoring of her lab values and appreciate the assistance.  Avoiding nephrotoxic medications is paramount.  Multiple home medications including Lasix Gemtesa and omeprazole would not be continued  postdischarge.  Her hypertensive regimen has been substituted with amlodipine and hydralazine.  Watch for any signs of volume overload given the fact that she is now on dual vasodilators, but she is completely euvolemic by discharge with stable vital signs and a blood pressure of 140/81.  Other comorbidities seem relatively stable.  At this point time she has generalized weakness and [facility] is coordinating subacute rehab.  Patient is very proactive with the idea of discharging today to rehab and we already have insurance precertification obtained prior to the weekend.  I think the patient is medically stable to transfer as long as there is no objection per nephrology.        Condition on Discharge: Improved.     Temp:  [97.4 °F (36.3 °C)-97.9 °F (36.6 °C)] 97.6 °F (36.4 °C)  Heart Rate:  [60-77] 60  Resp:  [16-18] 18  BP: (140-178)/(56-92) 178/92  Body mass index is 31.15 kg/m².    Physical Exam  HENT:      Head: Normocephalic.      Nose: Nose normal.      Mouth/Throat:      Mouth: Mucous membranes are moist.      Pharynx: Oropharynx is clear.   Eyes:      General: No scleral icterus.     Conjunctiva/sclera: Conjunctivae normal.   Cardiovascular:      Rate and Rhythm: Normal rate and regular rhythm.   Pulmonary:      Effort: Pulmonary effort is normal. No respiratory distress.      Breath sounds: Normal breath sounds.   Abdominal:      General: Bowel sounds are normal. There is no distension.      Tenderness: There is no abdominal tenderness. There is no right CVA tenderness or left CVA tenderness.   Musculoskeletal:         General: No swelling.   Skin:     General: Skin is warm and dry.      Coloration: Skin is not jaundiced.   Neurological:      Mental Status: She is alert and oriented to person, place, and time. Mental status is at baseline.      Cranial Nerves: No cranial nerve deficit.      Motor: Weakness present.      Gait: Gait abnormal.   Psychiatric:         Mood and Affect: Mood normal.         Behavior:  Behavior normal.         Thought Content: Thought content normal.         Judgment: Judgment normal.       Disposition: Skilled Nursing Facility (DC - External)       Discharge Medications      New Medications      Instructions Start Date   amLODIPine 5 MG tablet  Commonly known as: NORVASC   5 mg, Oral, Every 24 Hours Scheduled   Start Date: October 16, 2022     hydrALAZINE 25 MG tablet  Commonly known as: APRESOLINE   25 mg, Oral, Every 8 Hours Scheduled      sodium bicarbonate 650 MG tablet   650 mg, Oral, 3 Times Daily         Continue These Medications      Instructions Start Date   acetaminophen 325 MG tablet  Commonly known as: TYLENOL   650 mg, Oral, Every 4 Hours PRN      apixaban 2.5 MG tablet tablet  Commonly known as: ELIQUIS   2.5 mg, Oral, 2 Times Daily      atorvastatin 40 MG tablet  Commonly known as: LIPITOR   1 tablet, Oral, Every Night at Bedtime      gabapentin 100 MG capsule  Commonly known as: NEURONTIN   100 mg, Oral, 3 Times Daily      insulin lispro 100 UNIT/ML injection  Commonly known as: ADMELOG   0-7 Units, Subcutaneous, 3 Times Daily Before Meals      metoprolol tartrate 50 MG tablet  Commonly known as: LOPRESSOR   25 mg, Oral, 2 Times Daily      multivitamin with minerals tablet tablet   1 tablet, Oral, Daily      polycarbophil 625 MG tablet tablet   1,250 mg, Oral, Daily      sennosides-docusate 8.6-50 MG per tablet  Commonly known as: PERICOLACE   2 tablets, Oral, 2 Times Daily      venlafaxine XR 75 MG 24 hr capsule  Commonly known as: EFFEXOR-XR   75 mg, Oral, Daily         Stop These Medications    furosemide 40 MG tablet  Commonly known as: LASIX     Gemtesa 75 MG tablet  Generic drug: Vibegron     nystatin 366313 UNIT/GM powder  Commonly known as: MYCOSTATIN     omeprazole 40 MG capsule  Commonly known as: priLOSEC             Additional Instructions for the Follow-ups that You Need to Schedule     Discharge Follow-up with PCP   As directed       Currently Documented PCP:     Dannie Mathews MD    PCP Phone Number:    [phone]     Follow Up Details: pcp post rehab - Renal per their recs            Follow-up Information     Dannie Mathews MD .    Specialty: Internal Medicine  Why: pcp post rehab - Renal per their recs  Contact information:  [address] [address]  [phone]                           Artie Light MD  [facility]  10/15/22    Discharge time spent greater than 30 minutes.

## 2022-10-15 NOTE — PROGRESS NOTES
"RENAL/KCC:     LOS: 8 days     Chief Complaint/ Reason for encounter: CKD management with new, severe PERLA    Subjective   10/15/22: Feeling well this AM.  Non-oliguric.  Denies any SOA.  No N/V/D.  No other complaints.      Vital Signs  Temp:  [97.4 °F (36.3 °C)-97.9 °F (36.6 °C)] 97.6 °F (36.4 °C)  Heart Rate:  [60-77] 60  Resp:  [16-18] 18  BP: (140-178)/(56-92) 178/92       Wt Readings from Last 1 Encounters:   10/14/22 0611 90.2 kg (198 lb 14.4 oz)   10/13/22 0628 90.1 kg (198 lb 9.6 oz)   10/12/22 0526 89 kg (196 lb 3.4 oz)   10/11/22 0606 87.8 kg (193 lb 8 oz)   10/10/22 0554 89.1 kg (196 lb 6.4 oz)   10/07/22 2249 95.3 kg (210 lb)       Objective:  Vital signs: (most recent): Blood pressure 178/92, pulse 60, temperature 97.6 °F (36.4 °C), temperature source Oral, resp. rate 18, height 170.2 cm (67\"), weight 90.2 kg (198 lb 14.4 oz), SpO2 95 %.          Objective:  General Appearance:  Comfortable, well no-appearing, in no acute distress and not in pain.  Awake, alert, oriented  HEENT: Mucous membranes moist, no injury, oropharynx clear  Lungs:  Normal effort and normal respiratory rate.  Breath sounds clear to auscultation.  No  respiratory distress.  No rales, decreased breath sounds or rhonchi.    Heart: Normal rate.  Regular rhythm.  S1, S2 normal.  No murmur.   Abdomen: Abdomen is soft.  Bowel sounds are normal, no abdominal tenderness.  There is no rebound or guarding  Extremities: No significant edema of bilateral lower extremities  Neurological: No focal motor or sensory deficits, pupils reactive  Skin:  Warm and dry.  No rash or cyanosis.       Results Review:    Intake/Output:     Intake/Output Summary (Last 24 hours) at 10/15/2022 1212  Last data filed at 10/15/2022 1039  Gross per 24 hour   Intake 120 ml   Output 2450 ml   Net -2330 ml         DATA:  Radiology and Labs:  The following labs independently reviewed by me. Additional labs ordered for tomorrow a.m.  Interval notes, chart personally " reviewed by me.   Old records independently reviewed showing CKD stage IV, baseline creatinine around 2.0-2.2  New problems include worsening hypokalemia and hypomagnesemia  Discussed with patient herself at bedside    Risk/ complexity of medical care/ medical decision making high risk, severe renal failure with underlying CKD stage IV    Labs:   Recent Results (from the past 24 hour(s))   POC Glucose Once    Collection Time: 10/14/22  3:36 PM    Specimen: Blood   Result Value Ref Range    Glucose 209 (H) 70 - 130 mg/dL   POC Glucose Once    Collection Time: 10/14/22  8:19 PM    Specimen: Blood   Result Value Ref Range    Glucose 152 (H) 70 - 130 mg/dL   POC Glucose Once    Collection Time: 10/15/22  6:35 AM    Specimen: Blood   Result Value Ref Range    Glucose 94 70 - 130 mg/dL   Renal Function Panel    Collection Time: 10/15/22  6:38 AM    Specimen: Blood   Result Value Ref Range    Glucose 85 65 - 99 mg/dL    BUN 58 (H) 8 - 23 mg/dL    Creatinine 4.35 (H) 0.57 - 1.00 mg/dL    Sodium 136 136 - 145 mmol/L    Potassium 4.0 3.5 - 5.2 mmol/L    Chloride 102 98 - 107 mmol/L    CO2 22.1 22.0 - 29.0 mmol/L    Calcium 9.3 8.6 - 10.5 mg/dL    Albumin 3.20 (L) 3.50 - 5.20 g/dL    Phosphorus 4.3 2.5 - 4.5 mg/dL    Anion Gap 11.9 5.0 - 15.0 mmol/L    BUN/Creatinine Ratio 13.3 7.0 - 25.0    eGFR 9.7 (L) >60.0 mL/min/1.73   Magnesium    Collection Time: 10/15/22  6:38 AM    Specimen: Blood   Result Value Ref Range    Magnesium 1.8 1.6 - 2.4 mg/dL   POC Glucose Once    Collection Time: 10/15/22 10:30 AM    Specimen: Blood   Result Value Ref Range    Glucose 130 70 - 130 mg/dL       Radiology:  Pertinent radiology studies were reviewed as described above  Medications have been reviewed separately in chart overview      ASSESSMENT:  1.  PERLA - ATN  2. CKD IV, baseline creatinine around 2.0-2.2  3. Dehydration  4. T2DM  5.  Metabolic acidosis, improved   6.  Hypomagnesemia, on PO replacement  7.  Anemia of CKD  8.  Hypokalemia  9.   H/O CHF      Plan:  Cr stable at 4.3 today = ustable ATN.  Volume OK at present time.  Repeat UA and urine lytes reviewed.  There was no e/o hydro on U/S.  No indication for acute HD.  Avoid nephrotoxins.  Continue electrolyte resplacement as needed.  OK for D/C.  Will need close outpatient follow-up to monitor for resolution of ATN.  Have room to titrate Norvasc or Hydralazine if needed.  Will follow closely.      Paras Payne MD   [facility]   Office phone number: [phone]  Answering service phone number: [phone]  10/15/22  12:12 EDT

## 2022-10-15 NOTE — PLAN OF CARE
Goal Outcome Evaluation:              Outcome Evaluation: VSS. Patient remains A&Ox4. Up with assist and walker. No c/o pain/sob. Remains Afib on the monitor with rates controlled. No falls. All questions answered with verbalized understanding. Will continue to monitor patient's progress and adjust treatment plan as needed.

## 2022-10-16 NOTE — CASE MANAGEMENT/SOCIAL WORK
Case Management Discharge Note      Final Note: Pt discharged to [facility] for skilled care on 10/15.   JONNIE Avery RN    Provided Post Acute Provider List?: N/A  N/A Provider List Comment: Has been to [facility] in the past and has used [facility] in the past    Selected Continued Care - Discharged on 10/15/2022 Admission date: 10/7/2022 - Discharge disposition: Skilled Nursing Facility (DC - External)    Destination Coordination complete.    Service Provider Selected Services Address Phone Fax Patient Preferred    [facility] [address] [place] [id number] [phone] --          Durable Medical Equipment    No services have been selected for the patient.              Dialysis/Infusion    No services have been selected for the patient.              Home Medical Care    No services have been selected for the patient.              Therapy    No services have been selected for the patient.              Community Resources    No services have been selected for the patient.              Community & DME    No services have been selected for the patient.                  Transportation Services  Private: Car    Final Discharge Disposition Code: 03 - skilled nursing facility (SNF)

## 2022-11-11 ENCOUNTER — HOSPITAL ENCOUNTER (OUTPATIENT)
Dept: INFUSION THERAPY | Facility: HOSPITAL | Age: 82
Discharge: HOME | End: 2022-11-11
Admitting: INTERNAL MEDICINE

## 2022-11-11 VITALS
TEMPERATURE: 96.8 F | OXYGEN SATURATION: 100 % | RESPIRATION RATE: 20 BRPM | SYSTOLIC BLOOD PRESSURE: 146 MMHG | DIASTOLIC BLOOD PRESSURE: 71 MMHG | HEART RATE: 56 BPM

## 2022-11-11 DIAGNOSIS — N18.4 CHRONIC KIDNEY DISEASE, STAGE IV (SEVERE): Primary | ICD-10-CM

## 2022-11-11 DIAGNOSIS — D63.1 ERYTHROPOIETIN DEFICIENCY ANEMIA: ICD-10-CM

## 2022-11-11 LAB
BASOPHILS # BLD AUTO: 0.03 10*3/MM3 (ref 0–0.2)
BASOPHILS NFR BLD AUTO: 0.5 % (ref 0–1.5)
DEPRECATED RDW RBC AUTO: 49.7 FL (ref 37–54)
EOSINOPHIL # BLD AUTO: 0.15 10*3/MM3 (ref 0–0.4)
EOSINOPHIL NFR BLD AUTO: 2.6 % (ref 0.3–6.2)
ERYTHROCYTE [DISTWIDTH] IN BLOOD BY AUTOMATED COUNT: 13.7 % (ref 12.3–15.4)
FERRITIN SERPL-MCNC: 300 NG/ML (ref 13–150)
HCT VFR BLD AUTO: 25.7 % (ref 34–46.6)
HGB BLD-MCNC: 8.3 G/DL (ref 12–15.9)
IMM GRANULOCYTES # BLD AUTO: 0.02 10*3/MM3 (ref 0–0.05)
IMM GRANULOCYTES NFR BLD AUTO: 0.3 % (ref 0–0.5)
IRON 24H UR-MRATE: 63 MCG/DL (ref 37–145)
IRON SATN MFR SERPL: 23 % (ref 20–50)
LYMPHOCYTES # BLD AUTO: 1.27 10*3/MM3 (ref 0.7–3.1)
LYMPHOCYTES NFR BLD AUTO: 22 % (ref 19.6–45.3)
MCH RBC QN AUTO: 32.3 PG (ref 26.6–33)
MCHC RBC AUTO-ENTMCNC: 32.3 G/DL (ref 31.5–35.7)
MCV RBC AUTO: 100 FL (ref 79–97)
MONOCYTES # BLD AUTO: 0.44 10*3/MM3 (ref 0.1–0.9)
MONOCYTES NFR BLD AUTO: 7.6 % (ref 5–12)
NEUTROPHILS NFR BLD AUTO: 3.85 10*3/MM3 (ref 1.7–7)
NEUTROPHILS NFR BLD AUTO: 67 % (ref 42.7–76)
NRBC BLD AUTO-RTO: 0 /100 WBC (ref 0–0.2)
PLATELET # BLD AUTO: 188 10*3/MM3 (ref 140–450)
PMV BLD AUTO: 9.3 FL (ref 6–12)
RBC # BLD AUTO: 2.57 10*6/MM3 (ref 3.77–5.28)
TIBC SERPL-MCNC: 268 MCG/DL (ref 298–536)
TRANSFERRIN SERPL-MCNC: 180 MG/DL (ref 200–360)
WBC NRBC COR # BLD: 5.76 10*3/MM3 (ref 3.4–10.8)

## 2022-11-11 PROCEDURE — 25010000002 EPOETIN ALFA PER 1000 UNITS: Performed by: INTERNAL MEDICINE

## 2022-11-11 PROCEDURE — 83540 ASSAY OF IRON: CPT | Performed by: INTERNAL MEDICINE

## 2022-11-11 PROCEDURE — 96372 THER/PROPH/DIAG INJ SC/IM: CPT

## 2022-11-11 PROCEDURE — 85025 COMPLETE CBC W/AUTO DIFF WBC: CPT | Performed by: INTERNAL MEDICINE

## 2022-11-11 PROCEDURE — 36415 COLL VENOUS BLD VENIPUNCTURE: CPT

## 2022-11-11 PROCEDURE — 82728 ASSAY OF FERRITIN: CPT | Performed by: INTERNAL MEDICINE

## 2022-11-11 PROCEDURE — 84466 ASSAY OF TRANSFERRIN: CPT | Performed by: INTERNAL MEDICINE

## 2022-11-11 RX ADMIN — ERYTHROPOIETIN 20000 UNITS: 20000 INJECTION, SOLUTION INTRAVENOUS; SUBCUTANEOUS at 14:38

## 2022-12-09 ENCOUNTER — HOSPITAL ENCOUNTER (OUTPATIENT)
Dept: INFUSION THERAPY | Facility: HOSPITAL | Age: 82
Discharge: HOME | End: 2022-12-09

## 2023-01-06 ENCOUNTER — HOSPITAL ENCOUNTER (OUTPATIENT)
Dept: INFUSION THERAPY | Facility: HOSPITAL | Age: 83
Discharge: HOME | End: 2023-01-06
Admitting: INTERNAL MEDICINE
Payer: MEDICARE

## 2023-01-06 VITALS
SYSTOLIC BLOOD PRESSURE: 158 MMHG | HEART RATE: 70 BPM | RESPIRATION RATE: 20 BRPM | TEMPERATURE: 96.9 F | OXYGEN SATURATION: 100 % | DIASTOLIC BLOOD PRESSURE: 81 MMHG

## 2023-01-06 DIAGNOSIS — N18.4 CHRONIC KIDNEY DISEASE, STAGE IV (SEVERE): ICD-10-CM

## 2023-01-06 DIAGNOSIS — D63.1 ERYTHROPOIETIN DEFICIENCY ANEMIA: Primary | ICD-10-CM

## 2023-01-06 LAB
DEPRECATED RDW RBC AUTO: 44.3 FL (ref 37–54)
ERYTHROCYTE [DISTWIDTH] IN BLOOD BY AUTOMATED COUNT: 13 % (ref 12.3–15.4)
FERRITIN SERPL-MCNC: 176 NG/ML (ref 13–150)
HCT VFR BLD AUTO: 23.4 % (ref 34–46.6)
HGB BLD-MCNC: 8 G/DL (ref 12–15.9)
IRON 24H UR-MRATE: 45 MCG/DL (ref 37–145)
IRON SATN MFR SERPL: 16 % (ref 20–50)
MCH RBC QN AUTO: 32.1 PG (ref 26.6–33)
MCHC RBC AUTO-ENTMCNC: 34.2 G/DL (ref 31.5–35.7)
MCV RBC AUTO: 94 FL (ref 79–97)
PLATELET # BLD AUTO: 172 10*3/MM3 (ref 140–450)
PMV BLD AUTO: 9.1 FL (ref 6–12)
RBC # BLD AUTO: 2.49 10*6/MM3 (ref 3.77–5.28)
TIBC SERPL-MCNC: 279 MCG/DL (ref 298–536)
TRANSFERRIN SERPL-MCNC: 187 MG/DL (ref 200–360)
WBC NRBC COR # BLD: 5.77 10*3/MM3 (ref 3.4–10.8)

## 2023-01-06 PROCEDURE — 25010000002 FERUMOXYTOL 510 MG/17ML SOLUTION 17 ML VIAL: Performed by: INTERNAL MEDICINE

## 2023-01-06 PROCEDURE — 36415 COLL VENOUS BLD VENIPUNCTURE: CPT

## 2023-01-06 PROCEDURE — 84466 ASSAY OF TRANSFERRIN: CPT | Performed by: INTERNAL MEDICINE

## 2023-01-06 PROCEDURE — 25010000002 EPOETIN ALFA PER 1000 UNITS: Performed by: INTERNAL MEDICINE

## 2023-01-06 PROCEDURE — 82728 ASSAY OF FERRITIN: CPT | Performed by: INTERNAL MEDICINE

## 2023-01-06 PROCEDURE — 96372 THER/PROPH/DIAG INJ SC/IM: CPT

## 2023-01-06 PROCEDURE — 83540 ASSAY OF IRON: CPT | Performed by: INTERNAL MEDICINE

## 2023-01-06 PROCEDURE — 85027 COMPLETE CBC AUTOMATED: CPT | Performed by: INTERNAL MEDICINE

## 2023-01-06 PROCEDURE — 96374 THER/PROPH/DIAG INJ IV PUSH: CPT

## 2023-01-06 RX ADMIN — FERUMOXYTOL 510 MG: 510 INJECTION INTRAVENOUS at 15:55

## 2023-01-06 RX ADMIN — ERYTHROPOIETIN 20000 UNITS: 20000 INJECTION, SOLUTION INTRAVENOUS; SUBCUTANEOUS at 15:03

## 2023-01-13 ENCOUNTER — HOSPITAL ENCOUNTER (OUTPATIENT)
Dept: INFUSION THERAPY | Facility: HOSPITAL | Age: 83
Discharge: HOME | End: 2023-01-13
Admitting: INTERNAL MEDICINE
Payer: MEDICARE

## 2023-01-13 VITALS
HEART RATE: 60 BPM | SYSTOLIC BLOOD PRESSURE: 156 MMHG | DIASTOLIC BLOOD PRESSURE: 88 MMHG | TEMPERATURE: 95.9 F | RESPIRATION RATE: 20 BRPM | OXYGEN SATURATION: 100 %

## 2023-01-13 DIAGNOSIS — D63.1 ERYTHROPOIETIN DEFICIENCY ANEMIA: ICD-10-CM

## 2023-01-13 DIAGNOSIS — N18.4 CHRONIC KIDNEY DISEASE, STAGE IV (SEVERE): Primary | ICD-10-CM

## 2023-01-13 PROCEDURE — 25010000002 FERUMOXYTOL 510 MG/17ML SOLUTION 17 ML VIAL: Performed by: INTERNAL MEDICINE

## 2023-01-13 PROCEDURE — 96374 THER/PROPH/DIAG INJ IV PUSH: CPT

## 2023-01-13 PROCEDURE — 96365 THER/PROPH/DIAG IV INF INIT: CPT

## 2023-01-13 RX ADMIN — FERUMOXYTOL 510 MG: 510 INJECTION INTRAVENOUS at 14:30

## 2023-02-03 ENCOUNTER — HOSPITAL ENCOUNTER (OUTPATIENT)
Dept: INFUSION THERAPY | Facility: HOSPITAL | Age: 83
Discharge: HOME | End: 2023-02-03
Admitting: INTERNAL MEDICINE
Payer: MEDICARE

## 2023-02-03 VITALS
TEMPERATURE: 96.4 F | HEART RATE: 58 BPM | OXYGEN SATURATION: 100 % | DIASTOLIC BLOOD PRESSURE: 72 MMHG | SYSTOLIC BLOOD PRESSURE: 165 MMHG | RESPIRATION RATE: 20 BRPM

## 2023-02-03 DIAGNOSIS — N18.4 CHRONIC KIDNEY DISEASE, STAGE IV (SEVERE): Primary | ICD-10-CM

## 2023-02-03 DIAGNOSIS — D63.1 ERYTHROPOIETIN DEFICIENCY ANEMIA: ICD-10-CM

## 2023-02-03 LAB
BASOPHILS # BLD AUTO: 0.03 10*3/MM3 (ref 0–0.2)
BASOPHILS NFR BLD AUTO: 0.6 % (ref 0–1.5)
DEPRECATED RDW RBC AUTO: 48.5 FL (ref 37–54)
EOSINOPHIL # BLD AUTO: 0.16 10*3/MM3 (ref 0–0.4)
EOSINOPHIL NFR BLD AUTO: 3 % (ref 0.3–6.2)
ERYTHROCYTE [DISTWIDTH] IN BLOOD BY AUTOMATED COUNT: 13.6 % (ref 12.3–15.4)
FERRITIN SERPL-MCNC: 585 NG/ML (ref 13–150)
HCT VFR BLD AUTO: 29.5 % (ref 34–46.6)
HGB BLD-MCNC: 9.4 G/DL (ref 12–15.9)
IMM GRANULOCYTES # BLD AUTO: 0.01 10*3/MM3 (ref 0–0.05)
IMM GRANULOCYTES NFR BLD AUTO: 0.2 % (ref 0–0.5)
IRON 24H UR-MRATE: 70 MCG/DL (ref 37–145)
IRON SATN MFR SERPL: 27 % (ref 20–50)
LYMPHOCYTES # BLD AUTO: 1.4 10*3/MM3 (ref 0.7–3.1)
LYMPHOCYTES NFR BLD AUTO: 26.2 % (ref 19.6–45.3)
MCH RBC QN AUTO: 31.1 PG (ref 26.6–33)
MCHC RBC AUTO-ENTMCNC: 31.9 G/DL (ref 31.5–35.7)
MCV RBC AUTO: 97.7 FL (ref 79–97)
MONOCYTES # BLD AUTO: 0.46 10*3/MM3 (ref 0.1–0.9)
MONOCYTES NFR BLD AUTO: 8.6 % (ref 5–12)
NEUTROPHILS NFR BLD AUTO: 3.29 10*3/MM3 (ref 1.7–7)
NEUTROPHILS NFR BLD AUTO: 61.4 % (ref 42.7–76)
NRBC BLD AUTO-RTO: 0 /100 WBC (ref 0–0.2)
PLATELET # BLD AUTO: 181 10*3/MM3 (ref 140–450)
PMV BLD AUTO: 9.6 FL (ref 6–12)
RBC # BLD AUTO: 3.02 10*6/MM3 (ref 3.77–5.28)
TIBC SERPL-MCNC: 258 MCG/DL (ref 298–536)
TRANSFERRIN SERPL-MCNC: 173 MG/DL (ref 200–360)
WBC NRBC COR # BLD: 5.35 10*3/MM3 (ref 3.4–10.8)

## 2023-02-03 PROCEDURE — 85025 COMPLETE CBC W/AUTO DIFF WBC: CPT | Performed by: INTERNAL MEDICINE

## 2023-02-03 PROCEDURE — 82728 ASSAY OF FERRITIN: CPT | Performed by: INTERNAL MEDICINE

## 2023-02-03 PROCEDURE — 84466 ASSAY OF TRANSFERRIN: CPT | Performed by: INTERNAL MEDICINE

## 2023-02-03 PROCEDURE — 25010000002 EPOETIN ALFA PER 1000 UNITS: Performed by: INTERNAL MEDICINE

## 2023-02-03 PROCEDURE — 83540 ASSAY OF IRON: CPT | Performed by: INTERNAL MEDICINE

## 2023-02-03 PROCEDURE — 36415 COLL VENOUS BLD VENIPUNCTURE: CPT

## 2023-02-03 PROCEDURE — 96372 THER/PROPH/DIAG INJ SC/IM: CPT

## 2023-02-03 RX ADMIN — ERYTHROPOIETIN 20000 UNITS: 20000 INJECTION, SOLUTION INTRAVENOUS; SUBCUTANEOUS at 14:21

## 2023-02-03 NOTE — PROGRESS NOTES
Procrit indicated per lab results & MD order.  Injection site x 1 with band aide applied.  AVS given & pt DC per personal wheelchair with family after completion of visit.  Advised patient we would call with iron levels if Feraheme was indicated.  Pt verbalizes understanding.

## 2023-03-10 ENCOUNTER — HOSPITAL ENCOUNTER (OUTPATIENT)
Dept: INFUSION THERAPY | Facility: HOSPITAL | Age: 83
Discharge: HOME | End: 2023-03-10
Admitting: INTERNAL MEDICINE
Payer: MEDICARE

## 2023-03-10 VITALS
RESPIRATION RATE: 18 BRPM | HEART RATE: 66 BPM | SYSTOLIC BLOOD PRESSURE: 130 MMHG | DIASTOLIC BLOOD PRESSURE: 65 MMHG | OXYGEN SATURATION: 100 % | TEMPERATURE: 96.5 F

## 2023-03-10 DIAGNOSIS — N18.4 CHRONIC KIDNEY DISEASE, STAGE IV (SEVERE): Primary | ICD-10-CM

## 2023-03-10 DIAGNOSIS — D63.1 ERYTHROPOIETIN DEFICIENCY ANEMIA: ICD-10-CM

## 2023-03-10 LAB
BASOPHILS # BLD AUTO: 0.03 10*3/MM3 (ref 0–0.2)
BASOPHILS NFR BLD AUTO: 0.4 % (ref 0–1.5)
DEPRECATED RDW RBC AUTO: 47.8 FL (ref 37–54)
EOSINOPHIL # BLD AUTO: 0.21 10*3/MM3 (ref 0–0.4)
EOSINOPHIL NFR BLD AUTO: 2.6 % (ref 0.3–6.2)
ERYTHROCYTE [DISTWIDTH] IN BLOOD BY AUTOMATED COUNT: 13.2 % (ref 12.3–15.4)
FERRITIN SERPL-MCNC: 422 NG/ML (ref 13–150)
HCT VFR BLD AUTO: 30.7 % (ref 34–46.6)
HGB BLD-MCNC: 9.8 G/DL (ref 12–15.9)
IMM GRANULOCYTES # BLD AUTO: 0.02 10*3/MM3 (ref 0–0.05)
IMM GRANULOCYTES NFR BLD AUTO: 0.2 % (ref 0–0.5)
IRON 24H UR-MRATE: 89 MCG/DL (ref 37–145)
IRON SATN MFR SERPL: 36 % (ref 20–50)
LYMPHOCYTES # BLD AUTO: 1.51 10*3/MM3 (ref 0.7–3.1)
LYMPHOCYTES NFR BLD AUTO: 18.6 % (ref 19.6–45.3)
MCH RBC QN AUTO: 31.7 PG (ref 26.6–33)
MCHC RBC AUTO-ENTMCNC: 31.9 G/DL (ref 31.5–35.7)
MCV RBC AUTO: 99.4 FL (ref 79–97)
MONOCYTES # BLD AUTO: 0.65 10*3/MM3 (ref 0.1–0.9)
MONOCYTES NFR BLD AUTO: 8 % (ref 5–12)
NEUTROPHILS NFR BLD AUTO: 5.72 10*3/MM3 (ref 1.7–7)
NEUTROPHILS NFR BLD AUTO: 70.2 % (ref 42.7–76)
NRBC BLD AUTO-RTO: 0 /100 WBC (ref 0–0.2)
PLATELET # BLD AUTO: 219 10*3/MM3 (ref 140–450)
PMV BLD AUTO: 8.9 FL (ref 6–12)
RBC # BLD AUTO: 3.09 10*6/MM3 (ref 3.77–5.28)
TIBC SERPL-MCNC: 244 MCG/DL (ref 298–536)
TRANSFERRIN SERPL-MCNC: 164 MG/DL (ref 200–360)
WBC NRBC COR # BLD: 8.14 10*3/MM3 (ref 3.4–10.8)

## 2023-03-10 PROCEDURE — 96372 THER/PROPH/DIAG INJ SC/IM: CPT

## 2023-03-10 PROCEDURE — 85025 COMPLETE CBC W/AUTO DIFF WBC: CPT | Performed by: INTERNAL MEDICINE

## 2023-03-10 PROCEDURE — 83540 ASSAY OF IRON: CPT | Performed by: INTERNAL MEDICINE

## 2023-03-10 PROCEDURE — 25010000002 EPOETIN ALFA PER 1000 UNITS: Performed by: INTERNAL MEDICINE

## 2023-03-10 PROCEDURE — 36415 COLL VENOUS BLD VENIPUNCTURE: CPT

## 2023-03-10 PROCEDURE — 84466 ASSAY OF TRANSFERRIN: CPT | Performed by: INTERNAL MEDICINE

## 2023-03-10 PROCEDURE — 82728 ASSAY OF FERRITIN: CPT | Performed by: INTERNAL MEDICINE

## 2023-03-10 RX ADMIN — ERYTHROPOIETIN 20000 UNITS: 20000 INJECTION, SOLUTION INTRAVENOUS; SUBCUTANEOUS at 14:10

## 2023-04-07 ENCOUNTER — HOSPITAL ENCOUNTER (OUTPATIENT)
Dept: INFUSION THERAPY | Facility: HOSPITAL | Age: 83
Discharge: HOME | End: 2023-04-07
Admitting: INTERNAL MEDICINE
Payer: MEDICARE

## 2023-04-07 VITALS
OXYGEN SATURATION: 97 % | TEMPERATURE: 96.7 F | RESPIRATION RATE: 18 BRPM | HEART RATE: 56 BPM | DIASTOLIC BLOOD PRESSURE: 65 MMHG | SYSTOLIC BLOOD PRESSURE: 132 MMHG

## 2023-04-07 DIAGNOSIS — N18.4 CHRONIC KIDNEY DISEASE, STAGE IV (SEVERE): Primary | ICD-10-CM

## 2023-04-07 DIAGNOSIS — D63.1 ERYTHROPOIETIN DEFICIENCY ANEMIA: ICD-10-CM

## 2023-04-07 LAB
BASOPHILS # BLD AUTO: 0.02 10*3/MM3 (ref 0–0.2)
BASOPHILS NFR BLD AUTO: 0.4 % (ref 0–1.5)
DEPRECATED RDW RBC AUTO: 48.1 FL (ref 37–54)
EOSINOPHIL # BLD AUTO: 0.15 10*3/MM3 (ref 0–0.4)
EOSINOPHIL NFR BLD AUTO: 2.8 % (ref 0.3–6.2)
ERYTHROCYTE [DISTWIDTH] IN BLOOD BY AUTOMATED COUNT: 13.4 % (ref 12.3–15.4)
FERRITIN SERPL-MCNC: 374 NG/ML (ref 13–150)
HCT VFR BLD AUTO: 29.9 % (ref 34–46.6)
HGB BLD-MCNC: 9.6 G/DL (ref 12–15.9)
IMM GRANULOCYTES # BLD AUTO: 0.01 10*3/MM3 (ref 0–0.05)
IMM GRANULOCYTES NFR BLD AUTO: 0.2 % (ref 0–0.5)
IRON 24H UR-MRATE: 68 MCG/DL (ref 37–145)
IRON SATN MFR SERPL: 26 % (ref 20–50)
LYMPHOCYTES # BLD AUTO: 1.4 10*3/MM3 (ref 0.7–3.1)
LYMPHOCYTES NFR BLD AUTO: 25.8 % (ref 19.6–45.3)
MCH RBC QN AUTO: 31.7 PG (ref 26.6–33)
MCHC RBC AUTO-ENTMCNC: 32.1 G/DL (ref 31.5–35.7)
MCV RBC AUTO: 98.7 FL (ref 79–97)
MONOCYTES # BLD AUTO: 0.42 10*3/MM3 (ref 0.1–0.9)
MONOCYTES NFR BLD AUTO: 7.7 % (ref 5–12)
NEUTROPHILS NFR BLD AUTO: 3.43 10*3/MM3 (ref 1.7–7)
NEUTROPHILS NFR BLD AUTO: 63.1 % (ref 42.7–76)
NRBC BLD AUTO-RTO: 0 /100 WBC (ref 0–0.2)
PLATELET # BLD AUTO: 196 10*3/MM3 (ref 140–450)
PMV BLD AUTO: 9.1 FL (ref 6–12)
RBC # BLD AUTO: 3.03 10*6/MM3 (ref 3.77–5.28)
TIBC SERPL-MCNC: 259 MCG/DL (ref 298–536)
TRANSFERRIN SERPL-MCNC: 174 MG/DL (ref 200–360)
WBC NRBC COR # BLD: 5.43 10*3/MM3 (ref 3.4–10.8)

## 2023-04-07 PROCEDURE — 85025 COMPLETE CBC W/AUTO DIFF WBC: CPT | Performed by: INTERNAL MEDICINE

## 2023-04-07 PROCEDURE — 96372 THER/PROPH/DIAG INJ SC/IM: CPT

## 2023-04-07 PROCEDURE — 84466 ASSAY OF TRANSFERRIN: CPT | Performed by: INTERNAL MEDICINE

## 2023-04-07 PROCEDURE — 36415 COLL VENOUS BLD VENIPUNCTURE: CPT

## 2023-04-07 PROCEDURE — 83540 ASSAY OF IRON: CPT | Performed by: INTERNAL MEDICINE

## 2023-04-07 PROCEDURE — 82728 ASSAY OF FERRITIN: CPT | Performed by: INTERNAL MEDICINE

## 2023-04-07 PROCEDURE — 25010000002 EPOETIN ALFA PER 1000 UNITS: Performed by: INTERNAL MEDICINE

## 2023-04-07 RX ADMIN — ERYTHROPOIETIN 20000 UNITS: 20000 INJECTION, SOLUTION INTRAVENOUS; SUBCUTANEOUS at 14:52

## 2023-05-05 ENCOUNTER — HOSPITAL ENCOUNTER (OUTPATIENT)
Dept: INFUSION THERAPY | Facility: HOSPITAL | Age: 83
Discharge: HOME | End: 2023-05-05
Payer: MEDICARE

## 2023-05-05 VITALS
HEART RATE: 53 BPM | SYSTOLIC BLOOD PRESSURE: 133 MMHG | DIASTOLIC BLOOD PRESSURE: 62 MMHG | OXYGEN SATURATION: 99 % | RESPIRATION RATE: 16 BRPM | TEMPERATURE: 96.2 F

## 2023-05-05 DIAGNOSIS — N18.4 CHRONIC KIDNEY DISEASE, STAGE IV (SEVERE): Primary | ICD-10-CM

## 2023-05-05 DIAGNOSIS — D63.1 ERYTHROPOIETIN DEFICIENCY ANEMIA: ICD-10-CM

## 2023-05-05 LAB
BASOPHILS # BLD AUTO: 0.04 10*3/MM3 (ref 0–0.2)
BASOPHILS NFR BLD AUTO: 0.7 % (ref 0–1.5)
DEPRECATED RDW RBC AUTO: 47.3 FL (ref 37–54)
EOSINOPHIL # BLD AUTO: 0.2 10*3/MM3 (ref 0–0.4)
EOSINOPHIL NFR BLD AUTO: 3.4 % (ref 0.3–6.2)
ERYTHROCYTE [DISTWIDTH] IN BLOOD BY AUTOMATED COUNT: 13.4 % (ref 12.3–15.4)
FERRITIN SERPL-MCNC: 305 NG/ML (ref 13–150)
HCT VFR BLD AUTO: 28.9 % (ref 34–46.6)
HGB BLD-MCNC: 9.5 G/DL (ref 12–15.9)
IMM GRANULOCYTES # BLD AUTO: 0.02 10*3/MM3 (ref 0–0.05)
IMM GRANULOCYTES NFR BLD AUTO: 0.3 % (ref 0–0.5)
IRON 24H UR-MRATE: 82 MCG/DL (ref 37–145)
IRON SATN MFR SERPL: 30 % (ref 20–50)
LYMPHOCYTES # BLD AUTO: 1.58 10*3/MM3 (ref 0.7–3.1)
LYMPHOCYTES NFR BLD AUTO: 27.2 % (ref 19.6–45.3)
MCH RBC QN AUTO: 31.4 PG (ref 26.6–33)
MCHC RBC AUTO-ENTMCNC: 32.9 G/DL (ref 31.5–35.7)
MCV RBC AUTO: 95.4 FL (ref 79–97)
MONOCYTES # BLD AUTO: 0.48 10*3/MM3 (ref 0.1–0.9)
MONOCYTES NFR BLD AUTO: 8.3 % (ref 5–12)
NEUTROPHILS NFR BLD AUTO: 3.48 10*3/MM3 (ref 1.7–7)
NEUTROPHILS NFR BLD AUTO: 60.1 % (ref 42.7–76)
NRBC BLD AUTO-RTO: 0 /100 WBC (ref 0–0.2)
PLATELET # BLD AUTO: 182 10*3/MM3 (ref 140–450)
PMV BLD AUTO: 8.9 FL (ref 6–12)
RBC # BLD AUTO: 3.03 10*6/MM3 (ref 3.77–5.28)
TIBC SERPL-MCNC: 276 MCG/DL (ref 298–536)
TRANSFERRIN SERPL-MCNC: 185 MG/DL (ref 200–360)
WBC NRBC COR # BLD: 5.8 10*3/MM3 (ref 3.4–10.8)

## 2023-05-05 PROCEDURE — 84466 ASSAY OF TRANSFERRIN: CPT | Performed by: INTERNAL MEDICINE

## 2023-05-05 PROCEDURE — 25010000002 EPOETIN ALFA PER 1000 UNITS: Performed by: INTERNAL MEDICINE

## 2023-05-05 PROCEDURE — 83540 ASSAY OF IRON: CPT | Performed by: INTERNAL MEDICINE

## 2023-05-05 PROCEDURE — 36415 COLL VENOUS BLD VENIPUNCTURE: CPT

## 2023-05-05 PROCEDURE — 82728 ASSAY OF FERRITIN: CPT | Performed by: INTERNAL MEDICINE

## 2023-05-05 PROCEDURE — 96372 THER/PROPH/DIAG INJ SC/IM: CPT

## 2023-05-05 PROCEDURE — 85025 COMPLETE CBC W/AUTO DIFF WBC: CPT | Performed by: INTERNAL MEDICINE

## 2023-05-05 RX ORDER — CARVEDILOL 12.5 MG/1
TABLET ORAL
COMMUNITY
Start: 2023-03-28

## 2023-05-05 RX ORDER — VENLAFAXINE HYDROCHLORIDE 75 MG/1
1 CAPSULE, EXTENDED RELEASE ORAL DAILY
COMMUNITY
Start: 2023-02-27

## 2023-05-05 RX ADMIN — ERYTHROPOIETIN 20000 UNITS: 20000 INJECTION, SOLUTION INTRAVENOUS; SUBCUTANEOUS at 14:15

## 2023-06-02 ENCOUNTER — HOSPITAL ENCOUNTER (OUTPATIENT)
Dept: INFUSION THERAPY | Facility: HOSPITAL | Age: 83
Discharge: HOME | End: 2023-06-02
Admitting: INTERNAL MEDICINE
Payer: MEDICARE

## 2023-06-02 VITALS
HEART RATE: 60 BPM | RESPIRATION RATE: 18 BRPM | DIASTOLIC BLOOD PRESSURE: 69 MMHG | OXYGEN SATURATION: 99 % | TEMPERATURE: 97.9 F | SYSTOLIC BLOOD PRESSURE: 145 MMHG

## 2023-06-02 DIAGNOSIS — N18.4 CHRONIC KIDNEY DISEASE, STAGE IV (SEVERE): Primary | ICD-10-CM

## 2023-06-02 DIAGNOSIS — D63.1 ERYTHROPOIETIN DEFICIENCY ANEMIA: ICD-10-CM

## 2023-06-02 LAB
DEPRECATED RDW RBC AUTO: 48.3 FL (ref 37–54)
ERYTHROCYTE [DISTWIDTH] IN BLOOD BY AUTOMATED COUNT: 13.7 % (ref 12.3–15.4)
FERRITIN SERPL-MCNC: 331 NG/ML (ref 13–150)
HCT VFR BLD AUTO: 32 % (ref 34–46.6)
HGB BLD-MCNC: 10.7 G/DL (ref 12–15.9)
IRON 24H UR-MRATE: 86 MCG/DL (ref 37–145)
IRON SATN MFR SERPL: 32 % (ref 20–50)
MCH RBC QN AUTO: 32.2 PG (ref 26.6–33)
MCHC RBC AUTO-ENTMCNC: 33.4 G/DL (ref 31.5–35.7)
MCV RBC AUTO: 96.4 FL (ref 79–97)
PLATELET # BLD AUTO: 204 10*3/MM3 (ref 140–450)
PMV BLD AUTO: 9.1 FL (ref 6–12)
RBC # BLD AUTO: 3.32 10*6/MM3 (ref 3.77–5.28)
TIBC SERPL-MCNC: 273 MCG/DL (ref 298–536)
TRANSFERRIN SERPL-MCNC: 183 MG/DL (ref 200–360)
WBC NRBC COR # BLD: 6.38 10*3/MM3 (ref 3.4–10.8)

## 2023-06-02 PROCEDURE — 36415 COLL VENOUS BLD VENIPUNCTURE: CPT

## 2023-06-02 PROCEDURE — 85027 COMPLETE CBC AUTOMATED: CPT | Performed by: INTERNAL MEDICINE

## 2023-06-02 PROCEDURE — 82728 ASSAY OF FERRITIN: CPT | Performed by: INTERNAL MEDICINE

## 2023-06-02 PROCEDURE — 96372 THER/PROPH/DIAG INJ SC/IM: CPT

## 2023-06-02 PROCEDURE — 84466 ASSAY OF TRANSFERRIN: CPT | Performed by: INTERNAL MEDICINE

## 2023-06-02 PROCEDURE — 25010000002 EPOETIN ALFA PER 1000 UNITS: Performed by: INTERNAL MEDICINE

## 2023-06-02 PROCEDURE — 83540 ASSAY OF IRON: CPT | Performed by: INTERNAL MEDICINE

## 2023-06-02 RX ADMIN — ERYTHROPOIETIN 20000 UNITS: 20000 INJECTION, SOLUTION INTRAVENOUS; SUBCUTANEOUS at 14:24

## 2023-06-27 PROBLEM — D63.1 ANEMIA OF CHRONIC RENAL FAILURE: Status: ACTIVE | Noted: 2023-06-27

## 2023-06-27 PROBLEM — N18.9 ANEMIA OF CHRONIC RENAL FAILURE: Status: ACTIVE | Noted: 2023-06-27

## 2023-07-28 ENCOUNTER — HOSPITAL ENCOUNTER (OUTPATIENT)
Dept: INFUSION THERAPY | Facility: HOSPITAL | Age: 83
Discharge: HOME | End: 2023-07-28
Payer: MEDICARE

## 2023-07-28 VITALS
DIASTOLIC BLOOD PRESSURE: 84 MMHG | TEMPERATURE: 97.7 F | RESPIRATION RATE: 18 BRPM | SYSTOLIC BLOOD PRESSURE: 135 MMHG | OXYGEN SATURATION: 98 % | HEART RATE: 99 BPM

## 2023-07-28 DIAGNOSIS — D64.9 SYMPTOMATIC ANEMIA: Primary | ICD-10-CM

## 2023-07-28 LAB
DEPRECATED RDW RBC AUTO: 48.1 FL (ref 37–54)
ERYTHROCYTE [DISTWIDTH] IN BLOOD BY AUTOMATED COUNT: 13.7 % (ref 12.3–15.4)
FERRITIN SERPL-MCNC: 306 NG/ML (ref 13–150)
HCT VFR BLD AUTO: 31 % (ref 34–46.6)
HGB BLD-MCNC: 10.2 G/DL (ref 12–15.9)
IRON 24H UR-MRATE: 70 MCG/DL (ref 37–145)
IRON SATN MFR SERPL: 27 % (ref 20–50)
MCH RBC QN AUTO: 32 PG (ref 26.6–33)
MCHC RBC AUTO-ENTMCNC: 32.9 G/DL (ref 31.5–35.7)
MCV RBC AUTO: 97.2 FL (ref 79–97)
PLATELET # BLD AUTO: 205 10*3/MM3 (ref 140–450)
PMV BLD AUTO: 9.3 FL (ref 6–12)
RBC # BLD AUTO: 3.19 10*6/MM3 (ref 3.77–5.28)
TIBC SERPL-MCNC: 258 MCG/DL (ref 298–536)
TRANSFERRIN SERPL-MCNC: 173 MG/DL (ref 200–360)
WBC NRBC COR # BLD: 5.96 10*3/MM3 (ref 3.4–10.8)

## 2023-07-28 PROCEDURE — 36415 COLL VENOUS BLD VENIPUNCTURE: CPT

## 2023-07-28 PROCEDURE — 83540 ASSAY OF IRON: CPT | Performed by: INTERNAL MEDICINE

## 2023-07-28 PROCEDURE — 82728 ASSAY OF FERRITIN: CPT | Performed by: INTERNAL MEDICINE

## 2023-07-28 PROCEDURE — 84466 ASSAY OF TRANSFERRIN: CPT | Performed by: INTERNAL MEDICINE

## 2023-07-28 PROCEDURE — 85027 COMPLETE CBC AUTOMATED: CPT | Performed by: INTERNAL MEDICINE

## 2023-07-28 PROCEDURE — 96372 THER/PROPH/DIAG INJ SC/IM: CPT

## 2023-07-28 PROCEDURE — 25010000002 EPOETIN ALFA PER 1000 UNITS: Performed by: INTERNAL MEDICINE

## 2023-07-28 RX ADMIN — ERYTHROPOIETIN 20000 UNITS: 20000 INJECTION, SOLUTION INTRAVENOUS; SUBCUTANEOUS at 14:41

## 2023-08-25 ENCOUNTER — HOSPITAL ENCOUNTER (OUTPATIENT)
Dept: INFUSION THERAPY | Facility: HOSPITAL | Age: 83
Discharge: HOME | End: 2023-08-25
Payer: MEDICARE

## 2023-08-25 VITALS
OXYGEN SATURATION: 94 % | RESPIRATION RATE: 16 BRPM | TEMPERATURE: 96.9 F | SYSTOLIC BLOOD PRESSURE: 145 MMHG | DIASTOLIC BLOOD PRESSURE: 75 MMHG | HEART RATE: 57 BPM

## 2023-08-25 DIAGNOSIS — D63.1 ANEMIA OF CHRONIC RENAL FAILURE, UNSPECIFIED CKD STAGE: ICD-10-CM

## 2023-08-25 DIAGNOSIS — N18.4 CHRONIC KIDNEY DISEASE, STAGE IV (SEVERE): ICD-10-CM

## 2023-08-25 DIAGNOSIS — N18.9 ANEMIA OF CHRONIC RENAL FAILURE, UNSPECIFIED CKD STAGE: ICD-10-CM

## 2023-08-25 DIAGNOSIS — D64.9 SYMPTOMATIC ANEMIA: Primary | ICD-10-CM

## 2023-08-25 DIAGNOSIS — D63.1 ERYTHROPOIETIN DEFICIENCY ANEMIA: ICD-10-CM

## 2023-08-25 LAB
DEPRECATED RDW RBC AUTO: 49.3 FL (ref 37–54)
ERYTHROCYTE [DISTWIDTH] IN BLOOD BY AUTOMATED COUNT: 13.9 % (ref 12.3–15.4)
FERRITIN SERPL-MCNC: 345 NG/ML (ref 13–150)
HCT VFR BLD AUTO: 30.7 % (ref 34–46.6)
HGB BLD-MCNC: 10.3 G/DL (ref 12–15.9)
IRON 24H UR-MRATE: 39 MCG/DL (ref 37–145)
IRON SATN MFR SERPL: 15 % (ref 20–50)
MCH RBC QN AUTO: 32.8 PG (ref 26.6–33)
MCHC RBC AUTO-ENTMCNC: 33.6 G/DL (ref 31.5–35.7)
MCV RBC AUTO: 97.8 FL (ref 79–97)
PLATELET # BLD AUTO: 189 10*3/MM3 (ref 140–450)
PMV BLD AUTO: 9.3 FL (ref 6–12)
RBC # BLD AUTO: 3.14 10*6/MM3 (ref 3.77–5.28)
TIBC SERPL-MCNC: 258 MCG/DL (ref 298–536)
TRANSFERRIN SERPL-MCNC: 173 MG/DL (ref 200–360)
WBC NRBC COR # BLD: 6.72 10*3/MM3 (ref 3.4–10.8)

## 2023-08-25 PROCEDURE — 83540 ASSAY OF IRON: CPT | Performed by: INTERNAL MEDICINE

## 2023-08-25 PROCEDURE — 25010000002 EPOETIN ALFA PER 1000 UNITS: Performed by: INTERNAL MEDICINE

## 2023-08-25 PROCEDURE — 96372 THER/PROPH/DIAG INJ SC/IM: CPT

## 2023-08-25 PROCEDURE — 36415 COLL VENOUS BLD VENIPUNCTURE: CPT

## 2023-08-25 PROCEDURE — 84466 ASSAY OF TRANSFERRIN: CPT | Performed by: INTERNAL MEDICINE

## 2023-08-25 PROCEDURE — 25010000002 FERUMOXYTOL 510 MG/17ML SOLUTION 17 ML VIAL: Performed by: INTERNAL MEDICINE

## 2023-08-25 PROCEDURE — 85027 COMPLETE CBC AUTOMATED: CPT | Performed by: INTERNAL MEDICINE

## 2023-08-25 PROCEDURE — 96365 THER/PROPH/DIAG IV INF INIT: CPT

## 2023-08-25 PROCEDURE — 96374 THER/PROPH/DIAG INJ IV PUSH: CPT

## 2023-08-25 PROCEDURE — 82728 ASSAY OF FERRITIN: CPT | Performed by: INTERNAL MEDICINE

## 2023-08-25 RX ADMIN — ERYTHROPOIETIN 20000 UNITS: 20000 INJECTION, SOLUTION INTRAVENOUS; SUBCUTANEOUS at 15:02

## 2023-08-25 RX ADMIN — FERUMOXYTOL 510 MG: 510 INJECTION INTRAVENOUS at 15:38

## 2023-09-01 ENCOUNTER — HOSPITAL ENCOUNTER (OUTPATIENT)
Dept: INFUSION THERAPY | Facility: HOSPITAL | Age: 83
Discharge: HOME | End: 2023-09-01
Payer: MEDICARE

## 2023-09-01 VITALS
RESPIRATION RATE: 20 BRPM | SYSTOLIC BLOOD PRESSURE: 148 MMHG | TEMPERATURE: 96.6 F | HEART RATE: 55 BPM | OXYGEN SATURATION: 97 % | DIASTOLIC BLOOD PRESSURE: 73 MMHG

## 2023-09-01 DIAGNOSIS — N18.4 CHRONIC KIDNEY DISEASE, STAGE IV (SEVERE): Primary | ICD-10-CM

## 2023-09-01 DIAGNOSIS — D63.1 ANEMIA OF CHRONIC RENAL FAILURE, UNSPECIFIED CKD STAGE: ICD-10-CM

## 2023-09-01 DIAGNOSIS — N18.9 ANEMIA OF CHRONIC RENAL FAILURE, UNSPECIFIED CKD STAGE: ICD-10-CM

## 2023-09-01 DIAGNOSIS — D63.1 ERYTHROPOIETIN DEFICIENCY ANEMIA: ICD-10-CM

## 2023-09-01 PROCEDURE — 25010000002 FERUMOXYTOL 510 MG/17ML SOLUTION 17 ML VIAL: Performed by: INTERNAL MEDICINE

## 2023-09-01 PROCEDURE — 96365 THER/PROPH/DIAG IV INF INIT: CPT

## 2023-09-01 PROCEDURE — 96374 THER/PROPH/DIAG INJ IV PUSH: CPT

## 2023-09-01 RX ADMIN — FERUMOXYTOL 510 MG: 510 INJECTION INTRAVENOUS at 13:58

## 2023-09-29 ENCOUNTER — HOSPITAL ENCOUNTER (OUTPATIENT)
Dept: INFUSION THERAPY | Facility: HOSPITAL | Age: 83
Discharge: HOME | End: 2023-09-29
Payer: MEDICARE

## 2023-09-29 VITALS
TEMPERATURE: 96.9 F | DIASTOLIC BLOOD PRESSURE: 83 MMHG | HEART RATE: 54 BPM | SYSTOLIC BLOOD PRESSURE: 124 MMHG | OXYGEN SATURATION: 99 % | RESPIRATION RATE: 20 BRPM

## 2023-09-29 DIAGNOSIS — D64.9 SYMPTOMATIC ANEMIA: Primary | ICD-10-CM

## 2023-09-29 LAB
DEPRECATED RDW RBC AUTO: 51.1 FL (ref 37–54)
ERYTHROCYTE [DISTWIDTH] IN BLOOD BY AUTOMATED COUNT: 14.1 % (ref 12.3–15.4)
FERRITIN SERPL-MCNC: 618 NG/ML (ref 13–150)
HCT VFR BLD AUTO: 31.5 % (ref 34–46.6)
HGB BLD-MCNC: 10.5 G/DL (ref 12–15.9)
IRON 24H UR-MRATE: 118 MCG/DL (ref 37–145)
IRON SATN MFR SERPL: 50 % (ref 20–50)
MCH RBC QN AUTO: 32.7 PG (ref 26.6–33)
MCHC RBC AUTO-ENTMCNC: 33.3 G/DL (ref 31.5–35.7)
MCV RBC AUTO: 98.1 FL (ref 79–97)
PLATELET # BLD AUTO: 184 10*3/MM3 (ref 140–450)
PMV BLD AUTO: 9.4 FL (ref 6–12)
RBC # BLD AUTO: 3.21 10*6/MM3 (ref 3.77–5.28)
TIBC SERPL-MCNC: 235 MCG/DL (ref 298–536)
TRANSFERRIN SERPL-MCNC: 158 MG/DL (ref 200–360)
WBC NRBC COR # BLD: 6.07 10*3/MM3 (ref 3.4–10.8)

## 2023-09-29 PROCEDURE — 83540 ASSAY OF IRON: CPT | Performed by: INTERNAL MEDICINE

## 2023-09-29 PROCEDURE — 85027 COMPLETE CBC AUTOMATED: CPT | Performed by: INTERNAL MEDICINE

## 2023-09-29 PROCEDURE — 96372 THER/PROPH/DIAG INJ SC/IM: CPT

## 2023-09-29 PROCEDURE — 84466 ASSAY OF TRANSFERRIN: CPT | Performed by: INTERNAL MEDICINE

## 2023-09-29 PROCEDURE — 25010000002 EPOETIN ALFA PER 1000 UNITS: Performed by: INTERNAL MEDICINE

## 2023-09-29 PROCEDURE — 82728 ASSAY OF FERRITIN: CPT | Performed by: INTERNAL MEDICINE

## 2023-09-29 PROCEDURE — 36415 COLL VENOUS BLD VENIPUNCTURE: CPT

## 2023-09-29 RX ADMIN — ERYTHROPOIETIN 20000 UNITS: 20000 INJECTION, SOLUTION INTRAVENOUS; SUBCUTANEOUS at 15:09

## 2023-10-27 ENCOUNTER — HOSPITAL ENCOUNTER (OUTPATIENT)
Dept: INFUSION THERAPY | Facility: HOSPITAL | Age: 83
Discharge: HOME | End: 2023-10-27
Payer: MEDICARE

## 2023-10-27 VITALS
DIASTOLIC BLOOD PRESSURE: 83 MMHG | HEART RATE: 65 BPM | TEMPERATURE: 97.5 F | OXYGEN SATURATION: 98 % | RESPIRATION RATE: 20 BRPM | SYSTOLIC BLOOD PRESSURE: 129 MMHG

## 2023-10-27 DIAGNOSIS — D64.9 SYMPTOMATIC ANEMIA: Primary | ICD-10-CM

## 2023-10-27 LAB
DEPRECATED RDW RBC AUTO: 48.6 FL (ref 37–54)
ERYTHROCYTE [DISTWIDTH] IN BLOOD BY AUTOMATED COUNT: 13.7 % (ref 12.3–15.4)
FERRITIN SERPL-MCNC: 530 NG/ML (ref 13–150)
HCT VFR BLD AUTO: 30.5 % (ref 34–46.6)
HGB BLD-MCNC: 10.4 G/DL (ref 12–15.9)
IRON 24H UR-MRATE: 59 MCG/DL (ref 37–145)
IRON SATN MFR SERPL: 24 % (ref 20–50)
MCH RBC QN AUTO: 32.9 PG (ref 26.6–33)
MCHC RBC AUTO-ENTMCNC: 34.1 G/DL (ref 31.5–35.7)
MCV RBC AUTO: 96.5 FL (ref 79–97)
PLATELET # BLD AUTO: 162 10*3/MM3 (ref 140–450)
PMV BLD AUTO: 9.5 FL (ref 6–12)
RBC # BLD AUTO: 3.16 10*6/MM3 (ref 3.77–5.28)
TIBC SERPL-MCNC: 244 MCG/DL (ref 298–536)
TRANSFERRIN SERPL-MCNC: 164 MG/DL (ref 200–360)
WBC NRBC COR # BLD: 5.78 10*3/MM3 (ref 3.4–10.8)

## 2023-10-27 PROCEDURE — 85027 COMPLETE CBC AUTOMATED: CPT | Performed by: INTERNAL MEDICINE

## 2023-10-27 PROCEDURE — 36415 COLL VENOUS BLD VENIPUNCTURE: CPT

## 2023-10-27 PROCEDURE — 84466 ASSAY OF TRANSFERRIN: CPT | Performed by: INTERNAL MEDICINE

## 2023-10-27 PROCEDURE — 25010000002 EPOETIN ALFA PER 1000 UNITS: Performed by: INTERNAL MEDICINE

## 2023-10-27 PROCEDURE — 82728 ASSAY OF FERRITIN: CPT | Performed by: INTERNAL MEDICINE

## 2023-10-27 PROCEDURE — 83540 ASSAY OF IRON: CPT | Performed by: INTERNAL MEDICINE

## 2023-10-27 PROCEDURE — 96372 THER/PROPH/DIAG INJ SC/IM: CPT

## 2023-10-27 RX ADMIN — ERYTHROPOIETIN 20000 UNITS: 20000 INJECTION, SOLUTION INTRAVENOUS; SUBCUTANEOUS at 14:42

## 2023-11-24 ENCOUNTER — HOSPITAL ENCOUNTER (OUTPATIENT)
Dept: INFUSION THERAPY | Facility: HOSPITAL | Age: 83
Discharge: HOME | End: 2023-11-24
Admitting: INTERNAL MEDICINE
Payer: MEDICARE

## 2023-11-24 VITALS
TEMPERATURE: 96.8 F | OXYGEN SATURATION: 99 % | RESPIRATION RATE: 20 BRPM | HEART RATE: 64 BPM | SYSTOLIC BLOOD PRESSURE: 128 MMHG | DIASTOLIC BLOOD PRESSURE: 76 MMHG

## 2023-11-24 DIAGNOSIS — N18.9 ANEMIA OF CHRONIC RENAL FAILURE, UNSPECIFIED CKD STAGE: ICD-10-CM

## 2023-11-24 DIAGNOSIS — D63.1 ANEMIA OF CHRONIC RENAL FAILURE, UNSPECIFIED CKD STAGE: ICD-10-CM

## 2023-11-24 DIAGNOSIS — D63.1 ERYTHROPOIETIN DEFICIENCY ANEMIA: ICD-10-CM

## 2023-11-24 DIAGNOSIS — N18.4 CHRONIC KIDNEY DISEASE, STAGE IV (SEVERE): Primary | ICD-10-CM

## 2023-11-24 DIAGNOSIS — D64.9 SYMPTOMATIC ANEMIA: ICD-10-CM

## 2023-11-24 LAB
BASOPHILS # BLD AUTO: 0.03 10*3/MM3 (ref 0–0.2)
BASOPHILS NFR BLD AUTO: 0.6 % (ref 0–1.5)
DEPRECATED RDW RBC AUTO: 47.9 FL (ref 37–54)
EOSINOPHIL # BLD AUTO: 0.16 10*3/MM3 (ref 0–0.4)
EOSINOPHIL NFR BLD AUTO: 3.1 % (ref 0.3–6.2)
ERYTHROCYTE [DISTWIDTH] IN BLOOD BY AUTOMATED COUNT: 13.5 % (ref 12.3–15.4)
FERRITIN SERPL-MCNC: 520 NG/ML (ref 13–150)
HCT VFR BLD AUTO: 31.8 % (ref 34–46.6)
HGB BLD-MCNC: 10.5 G/DL (ref 12–15.9)
IMM GRANULOCYTES # BLD AUTO: 0.01 10*3/MM3 (ref 0–0.05)
IMM GRANULOCYTES NFR BLD AUTO: 0.2 % (ref 0–0.5)
IRON 24H UR-MRATE: 82 MCG/DL (ref 37–145)
IRON SATN MFR SERPL: 31 % (ref 20–50)
LYMPHOCYTES # BLD AUTO: 1.17 10*3/MM3 (ref 0.7–3.1)
LYMPHOCYTES NFR BLD AUTO: 22.8 % (ref 19.6–45.3)
MCH RBC QN AUTO: 31.9 PG (ref 26.6–33)
MCHC RBC AUTO-ENTMCNC: 33 G/DL (ref 31.5–35.7)
MCV RBC AUTO: 96.7 FL (ref 79–97)
MONOCYTES # BLD AUTO: 0.42 10*3/MM3 (ref 0.1–0.9)
MONOCYTES NFR BLD AUTO: 8.2 % (ref 5–12)
NEUTROPHILS NFR BLD AUTO: 3.35 10*3/MM3 (ref 1.7–7)
NEUTROPHILS NFR BLD AUTO: 65.1 % (ref 42.7–76)
NRBC BLD AUTO-RTO: 0 /100 WBC (ref 0–0.2)
PLATELET # BLD AUTO: 177 10*3/MM3 (ref 140–450)
PMV BLD AUTO: 9.4 FL (ref 6–12)
RBC # BLD AUTO: 3.29 10*6/MM3 (ref 3.77–5.28)
TIBC SERPL-MCNC: 261 MCG/DL (ref 298–536)
TRANSFERRIN SERPL-MCNC: 175 MG/DL (ref 200–360)
WBC NRBC COR # BLD AUTO: 5.14 10*3/MM3 (ref 3.4–10.8)

## 2023-11-24 PROCEDURE — 83540 ASSAY OF IRON: CPT | Performed by: INTERNAL MEDICINE

## 2023-11-24 PROCEDURE — 36415 COLL VENOUS BLD VENIPUNCTURE: CPT

## 2023-11-24 PROCEDURE — 96372 THER/PROPH/DIAG INJ SC/IM: CPT

## 2023-11-24 PROCEDURE — 85025 COMPLETE CBC W/AUTO DIFF WBC: CPT | Performed by: INTERNAL MEDICINE

## 2023-11-24 PROCEDURE — 84466 ASSAY OF TRANSFERRIN: CPT | Performed by: INTERNAL MEDICINE

## 2023-11-24 PROCEDURE — 82728 ASSAY OF FERRITIN: CPT | Performed by: INTERNAL MEDICINE

## 2023-11-24 PROCEDURE — 25010000002 EPOETIN ALFA PER 1000 UNITS: Performed by: INTERNAL MEDICINE

## 2023-11-24 RX ADMIN — ERYTHROPOIETIN 20000 UNITS: 20000 INJECTION, SOLUTION INTRAVENOUS; SUBCUTANEOUS at 14:45

## 2023-12-22 ENCOUNTER — HOSPITAL ENCOUNTER (OUTPATIENT)
Dept: INFUSION THERAPY | Facility: HOSPITAL | Age: 83
Discharge: HOME | End: 2023-12-22
Admitting: INTERNAL MEDICINE
Payer: MEDICARE

## 2023-12-22 VITALS
SYSTOLIC BLOOD PRESSURE: 132 MMHG | HEART RATE: 64 BPM | DIASTOLIC BLOOD PRESSURE: 71 MMHG | OXYGEN SATURATION: 99 % | RESPIRATION RATE: 20 BRPM | TEMPERATURE: 96.9 F

## 2023-12-22 DIAGNOSIS — D63.1 ERYTHROPOIETIN DEFICIENCY ANEMIA: ICD-10-CM

## 2023-12-22 DIAGNOSIS — N18.4 CHRONIC KIDNEY DISEASE, STAGE IV (SEVERE): Primary | ICD-10-CM

## 2023-12-22 DIAGNOSIS — D63.1 ANEMIA OF CHRONIC RENAL FAILURE, UNSPECIFIED CKD STAGE: ICD-10-CM

## 2023-12-22 DIAGNOSIS — N18.9 ANEMIA OF CHRONIC RENAL FAILURE, UNSPECIFIED CKD STAGE: ICD-10-CM

## 2023-12-22 DIAGNOSIS — D64.9 SYMPTOMATIC ANEMIA: ICD-10-CM

## 2023-12-22 LAB
BASOPHILS # BLD AUTO: 0.04 10*3/MM3 (ref 0–0.2)
BASOPHILS NFR BLD AUTO: 0.6 % (ref 0–1.5)
DEPRECATED RDW RBC AUTO: 44.3 FL (ref 37–54)
EOSINOPHIL # BLD AUTO: 0.19 10*3/MM3 (ref 0–0.4)
EOSINOPHIL NFR BLD AUTO: 2.8 % (ref 0.3–6.2)
ERYTHROCYTE [DISTWIDTH] IN BLOOD BY AUTOMATED COUNT: 13.1 % (ref 12.3–15.4)
FERRITIN SERPL-MCNC: 450 NG/ML (ref 13–150)
HCT VFR BLD AUTO: 30.6 % (ref 34–46.6)
HGB BLD-MCNC: 10.2 G/DL (ref 12–15.9)
IMM GRANULOCYTES # BLD AUTO: 0.01 10*3/MM3 (ref 0–0.05)
IMM GRANULOCYTES NFR BLD AUTO: 0.1 % (ref 0–0.5)
IRON 24H UR-MRATE: 51 MCG/DL (ref 37–145)
IRON SATN MFR SERPL: 21 % (ref 20–50)
LYMPHOCYTES # BLD AUTO: 1.64 10*3/MM3 (ref 0.7–3.1)
LYMPHOCYTES NFR BLD AUTO: 23.9 % (ref 19.6–45.3)
MCH RBC QN AUTO: 31.3 PG (ref 26.6–33)
MCHC RBC AUTO-ENTMCNC: 33.3 G/DL (ref 31.5–35.7)
MCV RBC AUTO: 93.9 FL (ref 79–97)
MONOCYTES # BLD AUTO: 0.53 10*3/MM3 (ref 0.1–0.9)
MONOCYTES NFR BLD AUTO: 7.7 % (ref 5–12)
NEUTROPHILS NFR BLD AUTO: 4.46 10*3/MM3 (ref 1.7–7)
NEUTROPHILS NFR BLD AUTO: 64.9 % (ref 42.7–76)
NRBC BLD AUTO-RTO: 0 /100 WBC (ref 0–0.2)
PLATELET # BLD AUTO: 166 10*3/MM3 (ref 140–450)
PMV BLD AUTO: 9.5 FL (ref 6–12)
RBC # BLD AUTO: 3.26 10*6/MM3 (ref 3.77–5.28)
TIBC SERPL-MCNC: 246 MCG/DL (ref 298–536)
TRANSFERRIN SERPL-MCNC: 165 MG/DL (ref 200–360)
WBC NRBC COR # BLD AUTO: 6.87 10*3/MM3 (ref 3.4–10.8)

## 2023-12-22 PROCEDURE — 36415 COLL VENOUS BLD VENIPUNCTURE: CPT

## 2023-12-22 PROCEDURE — 82728 ASSAY OF FERRITIN: CPT | Performed by: INTERNAL MEDICINE

## 2023-12-22 PROCEDURE — 84466 ASSAY OF TRANSFERRIN: CPT | Performed by: INTERNAL MEDICINE

## 2023-12-22 PROCEDURE — 83540 ASSAY OF IRON: CPT | Performed by: INTERNAL MEDICINE

## 2023-12-22 PROCEDURE — 25010000002 EPOETIN ALFA PER 1000 UNITS: Performed by: INTERNAL MEDICINE

## 2023-12-22 PROCEDURE — 85025 COMPLETE CBC W/AUTO DIFF WBC: CPT | Performed by: INTERNAL MEDICINE

## 2023-12-22 PROCEDURE — 96372 THER/PROPH/DIAG INJ SC/IM: CPT

## 2023-12-22 RX ADMIN — ERYTHROPOIETIN 20000 UNITS: 20000 INJECTION, SOLUTION INTRAVENOUS; SUBCUTANEOUS at 14:36

## 2024-02-02 ENCOUNTER — HOSPITAL ENCOUNTER (OUTPATIENT)
Dept: INFUSION THERAPY | Facility: HOSPITAL | Age: 84
Discharge: HOME | End: 2024-02-02
Payer: MEDICARE

## 2024-02-02 VITALS
OXYGEN SATURATION: 99 % | DIASTOLIC BLOOD PRESSURE: 84 MMHG | RESPIRATION RATE: 18 BRPM | HEART RATE: 60 BPM | SYSTOLIC BLOOD PRESSURE: 145 MMHG | TEMPERATURE: 96.9 F

## 2024-02-02 DIAGNOSIS — N18.4 CHRONIC KIDNEY DISEASE, STAGE IV (SEVERE): Primary | ICD-10-CM

## 2024-02-02 DIAGNOSIS — D63.1 ANEMIA OF CHRONIC RENAL FAILURE, UNSPECIFIED CKD STAGE: ICD-10-CM

## 2024-02-02 DIAGNOSIS — N18.9 ANEMIA OF CHRONIC RENAL FAILURE, UNSPECIFIED CKD STAGE: ICD-10-CM

## 2024-02-02 DIAGNOSIS — D64.9 SYMPTOMATIC ANEMIA: ICD-10-CM

## 2024-02-02 DIAGNOSIS — D63.1 ERYTHROPOIETIN DEFICIENCY ANEMIA: ICD-10-CM

## 2024-02-02 LAB
BASOPHILS # BLD AUTO: 0.01 10*3/MM3 (ref 0–0.2)
BASOPHILS NFR BLD AUTO: 0.1 % (ref 0–1.5)
DEPRECATED RDW RBC AUTO: 47.2 FL (ref 37–54)
EOSINOPHIL # BLD AUTO: 0.01 10*3/MM3 (ref 0–0.4)
EOSINOPHIL NFR BLD AUTO: 0.1 % (ref 0.3–6.2)
ERYTHROCYTE [DISTWIDTH] IN BLOOD BY AUTOMATED COUNT: 13.4 % (ref 12.3–15.4)
FERRITIN SERPL-MCNC: 436 NG/ML (ref 13–150)
HCT VFR BLD AUTO: 28.9 % (ref 34–46.6)
HGB BLD-MCNC: 9.3 G/DL (ref 12–15.9)
IMM GRANULOCYTES # BLD AUTO: 0.03 10*3/MM3 (ref 0–0.05)
IMM GRANULOCYTES NFR BLD AUTO: 0.4 % (ref 0–0.5)
IRON 24H UR-MRATE: 76 MCG/DL (ref 37–145)
IRON SATN MFR SERPL: 28 % (ref 20–50)
LYMPHOCYTES # BLD AUTO: 1.32 10*3/MM3 (ref 0.7–3.1)
LYMPHOCYTES NFR BLD AUTO: 16.4 % (ref 19.6–45.3)
MCH RBC QN AUTO: 31.1 PG (ref 26.6–33)
MCHC RBC AUTO-ENTMCNC: 32.2 G/DL (ref 31.5–35.7)
MCV RBC AUTO: 96.7 FL (ref 79–97)
MONOCYTES # BLD AUTO: 0.46 10*3/MM3 (ref 0.1–0.9)
MONOCYTES NFR BLD AUTO: 5.7 % (ref 5–12)
NEUTROPHILS NFR BLD AUTO: 6.21 10*3/MM3 (ref 1.7–7)
NEUTROPHILS NFR BLD AUTO: 77.3 % (ref 42.7–76)
NRBC BLD AUTO-RTO: 0 /100 WBC (ref 0–0.2)
PLATELET # BLD AUTO: 214 10*3/MM3 (ref 140–450)
PMV BLD AUTO: 9 FL (ref 6–12)
RBC # BLD AUTO: 2.99 10*6/MM3 (ref 3.77–5.28)
TIBC SERPL-MCNC: 271 MCG/DL (ref 298–536)
TRANSFERRIN SERPL-MCNC: 182 MG/DL (ref 200–360)
WBC NRBC COR # BLD AUTO: 8.04 10*3/MM3 (ref 3.4–10.8)

## 2024-02-02 PROCEDURE — 83540 ASSAY OF IRON: CPT | Performed by: INTERNAL MEDICINE

## 2024-02-02 PROCEDURE — 96372 THER/PROPH/DIAG INJ SC/IM: CPT

## 2024-02-02 PROCEDURE — 36415 COLL VENOUS BLD VENIPUNCTURE: CPT

## 2024-02-02 PROCEDURE — 84466 ASSAY OF TRANSFERRIN: CPT | Performed by: INTERNAL MEDICINE

## 2024-02-02 PROCEDURE — 85025 COMPLETE CBC W/AUTO DIFF WBC: CPT | Performed by: INTERNAL MEDICINE

## 2024-02-02 PROCEDURE — 25010000002 EPOETIN ALFA PER 1000 UNITS: Performed by: INTERNAL MEDICINE

## 2024-02-02 PROCEDURE — 82728 ASSAY OF FERRITIN: CPT | Performed by: INTERNAL MEDICINE

## 2024-02-02 RX ADMIN — ERYTHROPOIETIN 20000 UNITS: 20000 INJECTION, SOLUTION INTRAVENOUS; SUBCUTANEOUS at 14:04

## 2024-03-15 ENCOUNTER — HOSPITAL ENCOUNTER (OUTPATIENT)
Dept: INFUSION THERAPY | Facility: HOSPITAL | Age: 84
Discharge: HOME | End: 2024-03-15
Payer: MEDICARE

## 2024-03-15 VITALS
OXYGEN SATURATION: 94 % | RESPIRATION RATE: 20 BRPM | HEART RATE: 51 BPM | TEMPERATURE: 96.6 F | DIASTOLIC BLOOD PRESSURE: 66 MMHG | SYSTOLIC BLOOD PRESSURE: 131 MMHG

## 2024-03-15 DIAGNOSIS — D63.1 ERYTHROPOIETIN DEFICIENCY ANEMIA: ICD-10-CM

## 2024-03-15 DIAGNOSIS — N18.4 CHRONIC KIDNEY DISEASE, STAGE IV (SEVERE): ICD-10-CM

## 2024-03-15 DIAGNOSIS — N18.9 ANEMIA OF CHRONIC RENAL FAILURE, UNSPECIFIED CKD STAGE: ICD-10-CM

## 2024-03-15 DIAGNOSIS — D63.1 ANEMIA OF CHRONIC RENAL FAILURE, UNSPECIFIED CKD STAGE: ICD-10-CM

## 2024-03-15 DIAGNOSIS — D64.9 SYMPTOMATIC ANEMIA: Primary | ICD-10-CM

## 2024-03-15 LAB
BASOPHILS # BLD AUTO: 0.04 10*3/MM3 (ref 0–0.2)
BASOPHILS NFR BLD AUTO: 0.9 % (ref 0–1.5)
DEPRECATED RDW RBC AUTO: 47.1 FL (ref 37–54)
EOSINOPHIL # BLD AUTO: 0.14 10*3/MM3 (ref 0–0.4)
EOSINOPHIL NFR BLD AUTO: 3.2 % (ref 0.3–6.2)
ERYTHROCYTE [DISTWIDTH] IN BLOOD BY AUTOMATED COUNT: 13.6 % (ref 12.3–15.4)
FERRITIN SERPL-MCNC: 326 NG/ML (ref 13–150)
HCT VFR BLD AUTO: 29.5 % (ref 34–46.6)
HGB BLD-MCNC: 9.6 G/DL (ref 12–15.9)
IMM GRANULOCYTES # BLD AUTO: 0.01 10*3/MM3 (ref 0–0.05)
IMM GRANULOCYTES NFR BLD AUTO: 0.2 % (ref 0–0.5)
IRON 24H UR-MRATE: 55 MCG/DL (ref 37–145)
IRON SATN MFR SERPL: 23 % (ref 20–50)
LYMPHOCYTES # BLD AUTO: 1.18 10*3/MM3 (ref 0.7–3.1)
LYMPHOCYTES NFR BLD AUTO: 26.9 % (ref 19.6–45.3)
MCH RBC QN AUTO: 31.3 PG (ref 26.6–33)
MCHC RBC AUTO-ENTMCNC: 32.5 G/DL (ref 31.5–35.7)
MCV RBC AUTO: 96.1 FL (ref 79–97)
MONOCYTES # BLD AUTO: 0.36 10*3/MM3 (ref 0.1–0.9)
MONOCYTES NFR BLD AUTO: 8.2 % (ref 5–12)
NEUTROPHILS NFR BLD AUTO: 2.66 10*3/MM3 (ref 1.7–7)
NEUTROPHILS NFR BLD AUTO: 60.6 % (ref 42.7–76)
NRBC BLD AUTO-RTO: 0 /100 WBC (ref 0–0.2)
PLATELET # BLD AUTO: 161 10*3/MM3 (ref 140–450)
PMV BLD AUTO: 9.1 FL (ref 6–12)
RBC # BLD AUTO: 3.07 10*6/MM3 (ref 3.77–5.28)
TIBC SERPL-MCNC: 244 MCG/DL (ref 298–536)
TRANSFERRIN SERPL-MCNC: 164 MG/DL (ref 200–360)
WBC NRBC COR # BLD AUTO: 4.39 10*3/MM3 (ref 3.4–10.8)

## 2024-03-15 PROCEDURE — 36415 COLL VENOUS BLD VENIPUNCTURE: CPT

## 2024-03-15 PROCEDURE — 85025 COMPLETE CBC W/AUTO DIFF WBC: CPT | Performed by: INTERNAL MEDICINE

## 2024-03-15 PROCEDURE — 96372 THER/PROPH/DIAG INJ SC/IM: CPT

## 2024-03-15 PROCEDURE — 25010000002 EPOETIN ALFA PER 1000 UNITS: Performed by: INTERNAL MEDICINE

## 2024-03-15 PROCEDURE — 83540 ASSAY OF IRON: CPT | Performed by: INTERNAL MEDICINE

## 2024-03-15 PROCEDURE — 82728 ASSAY OF FERRITIN: CPT | Performed by: INTERNAL MEDICINE

## 2024-03-15 PROCEDURE — 84466 ASSAY OF TRANSFERRIN: CPT | Performed by: INTERNAL MEDICINE

## 2024-03-15 RX ADMIN — ERYTHROPOIETIN 20000 UNITS: 20000 INJECTION, SOLUTION INTRAVENOUS; SUBCUTANEOUS at 15:19

## 2024-04-12 ENCOUNTER — HOSPITAL ENCOUNTER (OUTPATIENT)
Dept: INFUSION THERAPY | Facility: HOSPITAL | Age: 84
Discharge: HOME | End: 2024-04-12
Payer: MEDICARE

## 2024-04-12 VITALS
DIASTOLIC BLOOD PRESSURE: 64 MMHG | HEART RATE: 57 BPM | TEMPERATURE: 96.9 F | SYSTOLIC BLOOD PRESSURE: 141 MMHG | OXYGEN SATURATION: 98 % | RESPIRATION RATE: 16 BRPM

## 2024-04-12 DIAGNOSIS — D63.1 ERYTHROPOIETIN DEFICIENCY ANEMIA: Primary | ICD-10-CM

## 2024-04-12 DIAGNOSIS — D64.9 SYMPTOMATIC ANEMIA: ICD-10-CM

## 2024-04-12 LAB
DEPRECATED RDW RBC AUTO: 50.6 FL (ref 37–54)
ERYTHROCYTE [DISTWIDTH] IN BLOOD BY AUTOMATED COUNT: 14.3 % (ref 12.3–15.4)
FERRITIN SERPL-MCNC: 291 NG/ML (ref 13–150)
HCT VFR BLD AUTO: 30 % (ref 34–46.6)
HGB BLD-MCNC: 9.4 G/DL (ref 12–15.9)
IRON 24H UR-MRATE: 59 MCG/DL (ref 37–145)
IRON SATN MFR SERPL: 22 % (ref 20–50)
MCH RBC QN AUTO: 30.3 PG (ref 26.6–33)
MCHC RBC AUTO-ENTMCNC: 31.3 G/DL (ref 31.5–35.7)
MCV RBC AUTO: 96.8 FL (ref 79–97)
PLATELET # BLD AUTO: 187 10*3/MM3 (ref 140–450)
PMV BLD AUTO: 9.1 FL (ref 6–12)
RBC # BLD AUTO: 3.1 10*6/MM3 (ref 3.77–5.28)
TIBC SERPL-MCNC: 274 MCG/DL (ref 298–536)
TRANSFERRIN SERPL-MCNC: 184 MG/DL (ref 200–360)
WBC NRBC COR # BLD AUTO: 5.43 10*3/MM3 (ref 3.4–10.8)

## 2024-04-12 PROCEDURE — 82728 ASSAY OF FERRITIN: CPT | Performed by: INTERNAL MEDICINE

## 2024-04-12 PROCEDURE — 85027 COMPLETE CBC AUTOMATED: CPT | Performed by: INTERNAL MEDICINE

## 2024-04-12 PROCEDURE — 96372 THER/PROPH/DIAG INJ SC/IM: CPT

## 2024-04-12 PROCEDURE — 36415 COLL VENOUS BLD VENIPUNCTURE: CPT

## 2024-04-12 PROCEDURE — 84466 ASSAY OF TRANSFERRIN: CPT | Performed by: INTERNAL MEDICINE

## 2024-04-12 PROCEDURE — 83540 ASSAY OF IRON: CPT | Performed by: INTERNAL MEDICINE

## 2024-04-12 PROCEDURE — 25010000002 EPOETIN ALFA PER 1000 UNITS: Performed by: INTERNAL MEDICINE

## 2024-04-12 RX ADMIN — ERYTHROPOIETIN 20000 UNITS: 20000 INJECTION, SOLUTION INTRAVENOUS; SUBCUTANEOUS at 14:26

## 2024-05-10 ENCOUNTER — HOSPITAL ENCOUNTER (OUTPATIENT)
Dept: INFUSION THERAPY | Facility: HOSPITAL | Age: 84
Discharge: HOME | End: 2024-05-10
Payer: MEDICARE

## 2024-05-10 VITALS
HEART RATE: 50 BPM | RESPIRATION RATE: 20 BRPM | TEMPERATURE: 96.9 F | DIASTOLIC BLOOD PRESSURE: 62 MMHG | OXYGEN SATURATION: 99 % | SYSTOLIC BLOOD PRESSURE: 119 MMHG

## 2024-05-10 DIAGNOSIS — N18.9 ANEMIA OF CHRONIC RENAL FAILURE, UNSPECIFIED CKD STAGE: ICD-10-CM

## 2024-05-10 DIAGNOSIS — N18.4 CHRONIC KIDNEY DISEASE, STAGE IV (SEVERE): Primary | ICD-10-CM

## 2024-05-10 DIAGNOSIS — D63.1 ANEMIA OF CHRONIC RENAL FAILURE, UNSPECIFIED CKD STAGE: ICD-10-CM

## 2024-05-10 DIAGNOSIS — D63.1 ERYTHROPOIETIN DEFICIENCY ANEMIA: ICD-10-CM

## 2024-05-10 DIAGNOSIS — D64.9 SYMPTOMATIC ANEMIA: ICD-10-CM

## 2024-05-10 LAB
BASOPHILS # BLD AUTO: 0.03 10*3/MM3 (ref 0–0.2)
BASOPHILS NFR BLD AUTO: 0.6 % (ref 0–1.5)
DEPRECATED RDW RBC AUTO: 47.8 FL (ref 37–54)
EOSINOPHIL # BLD AUTO: 0.13 10*3/MM3 (ref 0–0.4)
EOSINOPHIL NFR BLD AUTO: 2.6 % (ref 0.3–6.2)
ERYTHROCYTE [DISTWIDTH] IN BLOOD BY AUTOMATED COUNT: 13.9 % (ref 12.3–15.4)
FERRITIN SERPL-MCNC: 282 NG/ML (ref 13–150)
HCT VFR BLD AUTO: 31.4 % (ref 34–46.6)
HGB BLD-MCNC: 10.3 G/DL (ref 12–15.9)
IMM GRANULOCYTES # BLD AUTO: 0.01 10*3/MM3 (ref 0–0.05)
IMM GRANULOCYTES NFR BLD AUTO: 0.2 % (ref 0–0.5)
IRON 24H UR-MRATE: 81 MCG/DL (ref 37–145)
IRON SATN MFR SERPL: 28 % (ref 20–50)
LYMPHOCYTES # BLD AUTO: 1.45 10*3/MM3 (ref 0.7–3.1)
LYMPHOCYTES NFR BLD AUTO: 29.4 % (ref 19.6–45.3)
MCH RBC QN AUTO: 31 PG (ref 26.6–33)
MCHC RBC AUTO-ENTMCNC: 32.8 G/DL (ref 31.5–35.7)
MCV RBC AUTO: 94.6 FL (ref 79–97)
MONOCYTES # BLD AUTO: 0.4 10*3/MM3 (ref 0.1–0.9)
MONOCYTES NFR BLD AUTO: 8.1 % (ref 5–12)
NEUTROPHILS NFR BLD AUTO: 2.92 10*3/MM3 (ref 1.7–7)
NEUTROPHILS NFR BLD AUTO: 59.1 % (ref 42.7–76)
NRBC BLD AUTO-RTO: 0 /100 WBC (ref 0–0.2)
PLATELET # BLD AUTO: 189 10*3/MM3 (ref 140–450)
PMV BLD AUTO: 9.4 FL (ref 6–12)
RBC # BLD AUTO: 3.32 10*6/MM3 (ref 3.77–5.28)
TIBC SERPL-MCNC: 289 MCG/DL (ref 298–536)
TRANSFERRIN SERPL-MCNC: 194 MG/DL (ref 200–360)
WBC NRBC COR # BLD AUTO: 4.94 10*3/MM3 (ref 3.4–10.8)

## 2024-05-10 PROCEDURE — 84466 ASSAY OF TRANSFERRIN: CPT | Performed by: INTERNAL MEDICINE

## 2024-05-10 PROCEDURE — 36415 COLL VENOUS BLD VENIPUNCTURE: CPT

## 2024-05-10 PROCEDURE — 96372 THER/PROPH/DIAG INJ SC/IM: CPT

## 2024-05-10 PROCEDURE — 83540 ASSAY OF IRON: CPT | Performed by: INTERNAL MEDICINE

## 2024-05-10 PROCEDURE — 85025 COMPLETE CBC W/AUTO DIFF WBC: CPT | Performed by: INTERNAL MEDICINE

## 2024-05-10 PROCEDURE — 25010000002 EPOETIN ALFA PER 1000 UNITS: Performed by: INTERNAL MEDICINE

## 2024-05-10 PROCEDURE — 82728 ASSAY OF FERRITIN: CPT | Performed by: INTERNAL MEDICINE

## 2024-05-10 RX ADMIN — ERYTHROPOIETIN 20000 UNITS: 20000 INJECTION, SOLUTION INTRAVENOUS; SUBCUTANEOUS at 14:18

## 2024-06-06 RX ORDER — SODIUM CHLORIDE 9 MG/ML
20 INJECTION, SOLUTION INTRAVENOUS ONCE
OUTPATIENT
Start: 2024-06-07

## 2024-06-07 ENCOUNTER — HOSPITAL ENCOUNTER (OUTPATIENT)
Dept: INFUSION THERAPY | Facility: HOSPITAL | Age: 84
Discharge: HOME | End: 2024-06-07
Admitting: INTERNAL MEDICINE
Payer: MEDICARE

## 2024-06-07 VITALS
TEMPERATURE: 97.1 F | RESPIRATION RATE: 20 BRPM | DIASTOLIC BLOOD PRESSURE: 79 MMHG | SYSTOLIC BLOOD PRESSURE: 132 MMHG | OXYGEN SATURATION: 97 % | HEART RATE: 77 BPM

## 2024-06-07 DIAGNOSIS — N18.9 ANEMIA OF CHRONIC RENAL FAILURE, UNSPECIFIED CKD STAGE: ICD-10-CM

## 2024-06-07 DIAGNOSIS — D64.9 SYMPTOMATIC ANEMIA: Primary | ICD-10-CM

## 2024-06-07 DIAGNOSIS — D63.1 ANEMIA OF CHRONIC RENAL FAILURE, UNSPECIFIED CKD STAGE: ICD-10-CM

## 2024-06-07 LAB
BASOPHILS # BLD AUTO: 0.04 10*3/MM3 (ref 0–0.2)
BASOPHILS NFR BLD AUTO: 0.7 % (ref 0–1.5)
DEPRECATED RDW RBC AUTO: 46.4 FL (ref 37–54)
EOSINOPHIL # BLD AUTO: 0.13 10*3/MM3 (ref 0–0.4)
EOSINOPHIL NFR BLD AUTO: 2.3 % (ref 0.3–6.2)
ERYTHROCYTE [DISTWIDTH] IN BLOOD BY AUTOMATED COUNT: 13.6 % (ref 12.3–15.4)
FERRITIN SERPL-MCNC: 289 NG/ML (ref 13–150)
HCT VFR BLD AUTO: 35.2 % (ref 34–46.6)
HGB BLD-MCNC: 11.8 G/DL (ref 12–15.9)
IMM GRANULOCYTES # BLD AUTO: 0.02 10*3/MM3 (ref 0–0.05)
IMM GRANULOCYTES NFR BLD AUTO: 0.4 % (ref 0–0.5)
IRON 24H UR-MRATE: 65 MCG/DL (ref 37–145)
IRON SATN MFR SERPL: 24 % (ref 20–50)
LYMPHOCYTES # BLD AUTO: 1.52 10*3/MM3 (ref 0.7–3.1)
LYMPHOCYTES NFR BLD AUTO: 27 % (ref 19.6–45.3)
MCH RBC QN AUTO: 31.6 PG (ref 26.6–33)
MCHC RBC AUTO-ENTMCNC: 33.5 G/DL (ref 31.5–35.7)
MCV RBC AUTO: 94.4 FL (ref 79–97)
MONOCYTES # BLD AUTO: 0.45 10*3/MM3 (ref 0.1–0.9)
MONOCYTES NFR BLD AUTO: 8 % (ref 5–12)
NEUTROPHILS NFR BLD AUTO: 3.48 10*3/MM3 (ref 1.7–7)
NEUTROPHILS NFR BLD AUTO: 61.6 % (ref 42.7–76)
NRBC BLD AUTO-RTO: 0 /100 WBC (ref 0–0.2)
PLATELET # BLD AUTO: 196 10*3/MM3 (ref 140–450)
PMV BLD AUTO: 9.4 FL (ref 6–12)
RBC # BLD AUTO: 3.73 10*6/MM3 (ref 3.77–5.28)
TIBC SERPL-MCNC: 276 MCG/DL (ref 298–536)
TRANSFERRIN SERPL-MCNC: 185 MG/DL (ref 200–360)
WBC NRBC COR # BLD AUTO: 5.64 10*3/MM3 (ref 3.4–10.8)

## 2024-06-07 PROCEDURE — G0463 HOSPITAL OUTPT CLINIC VISIT: HCPCS

## 2024-06-07 PROCEDURE — 82728 ASSAY OF FERRITIN: CPT | Performed by: INTERNAL MEDICINE

## 2024-06-07 PROCEDURE — 36415 COLL VENOUS BLD VENIPUNCTURE: CPT

## 2024-06-07 PROCEDURE — 84466 ASSAY OF TRANSFERRIN: CPT | Performed by: INTERNAL MEDICINE

## 2024-06-07 PROCEDURE — 83540 ASSAY OF IRON: CPT | Performed by: INTERNAL MEDICINE

## 2024-06-07 PROCEDURE — 85025 COMPLETE CBC W/AUTO DIFF WBC: CPT | Performed by: INTERNAL MEDICINE

## 2024-07-12 ENCOUNTER — HOSPITAL ENCOUNTER (OUTPATIENT)
Dept: INFUSION THERAPY | Facility: HOSPITAL | Age: 84
Discharge: HOME | End: 2024-07-12
Payer: MEDICARE

## 2024-07-12 VITALS
TEMPERATURE: 97.1 F | DIASTOLIC BLOOD PRESSURE: 82 MMHG | RESPIRATION RATE: 18 BRPM | SYSTOLIC BLOOD PRESSURE: 132 MMHG | HEART RATE: 70 BPM | OXYGEN SATURATION: 98 %

## 2024-07-12 DIAGNOSIS — N18.9 ANEMIA OF CHRONIC RENAL FAILURE, UNSPECIFIED CKD STAGE: ICD-10-CM

## 2024-07-12 DIAGNOSIS — D64.9 SYMPTOMATIC ANEMIA: Primary | ICD-10-CM

## 2024-07-12 DIAGNOSIS — D63.1 ANEMIA OF CHRONIC RENAL FAILURE, UNSPECIFIED CKD STAGE: ICD-10-CM

## 2024-07-12 LAB
DEPRECATED RDW RBC AUTO: 49.8 FL (ref 37–54)
ERYTHROCYTE [DISTWIDTH] IN BLOOD BY AUTOMATED COUNT: 14.5 % (ref 12.3–15.4)
FERRITIN SERPL-MCNC: 232 NG/ML (ref 13–150)
HCT VFR BLD AUTO: 30.8 % (ref 34–46.6)
HGB BLD-MCNC: 10.1 G/DL (ref 12–15.9)
IRON 24H UR-MRATE: 53 MCG/DL (ref 37–145)
IRON SATN MFR SERPL: 20 % (ref 20–50)
MCH RBC QN AUTO: 31.2 PG (ref 26.6–33)
MCHC RBC AUTO-ENTMCNC: 32.8 G/DL (ref 31.5–35.7)
MCV RBC AUTO: 95.1 FL (ref 79–97)
PLATELET # BLD AUTO: 165 10*3/MM3 (ref 140–450)
PMV BLD AUTO: 9.1 FL (ref 6–12)
RBC # BLD AUTO: 3.24 10*6/MM3 (ref 3.77–5.28)
TIBC SERPL-MCNC: 271 MCG/DL (ref 298–536)
TRANSFERRIN SERPL-MCNC: 182 MG/DL (ref 200–360)
WBC NRBC COR # BLD AUTO: 4.91 10*3/MM3 (ref 3.4–10.8)

## 2024-07-12 PROCEDURE — 25010000002 EPOETIN ALFA PER 1000 UNITS: Performed by: INTERNAL MEDICINE

## 2024-07-12 PROCEDURE — 36415 COLL VENOUS BLD VENIPUNCTURE: CPT

## 2024-07-12 PROCEDURE — 83540 ASSAY OF IRON: CPT | Performed by: INTERNAL MEDICINE

## 2024-07-12 PROCEDURE — 84466 ASSAY OF TRANSFERRIN: CPT | Performed by: INTERNAL MEDICINE

## 2024-07-12 PROCEDURE — 85027 COMPLETE CBC AUTOMATED: CPT | Performed by: INTERNAL MEDICINE

## 2024-07-12 PROCEDURE — 96372 THER/PROPH/DIAG INJ SC/IM: CPT

## 2024-07-12 PROCEDURE — 82728 ASSAY OF FERRITIN: CPT | Performed by: INTERNAL MEDICINE

## 2024-07-12 RX ADMIN — ERYTHROPOIETIN 20000 UNITS: 20000 INJECTION, SOLUTION INTRAVENOUS; SUBCUTANEOUS at 14:41

## 2024-08-09 ENCOUNTER — HOSPITAL ENCOUNTER (OUTPATIENT)
Dept: INFUSION THERAPY | Facility: HOSPITAL | Age: 84
Discharge: HOME | End: 2024-08-09
Payer: MEDICARE

## 2024-08-09 VITALS
DIASTOLIC BLOOD PRESSURE: 68 MMHG | HEART RATE: 64 BPM | RESPIRATION RATE: 18 BRPM | SYSTOLIC BLOOD PRESSURE: 147 MMHG | OXYGEN SATURATION: 99 % | TEMPERATURE: 96.6 F

## 2024-08-09 DIAGNOSIS — D64.9 SYMPTOMATIC ANEMIA: Primary | ICD-10-CM

## 2024-08-09 DIAGNOSIS — N18.9 ANEMIA OF CHRONIC RENAL FAILURE, UNSPECIFIED CKD STAGE: ICD-10-CM

## 2024-08-09 DIAGNOSIS — N18.4 CHRONIC KIDNEY DISEASE, STAGE IV (SEVERE): ICD-10-CM

## 2024-08-09 DIAGNOSIS — D63.1 ERYTHROPOIETIN DEFICIENCY ANEMIA: ICD-10-CM

## 2024-08-09 DIAGNOSIS — D63.1 ANEMIA OF CHRONIC RENAL FAILURE, UNSPECIFIED CKD STAGE: ICD-10-CM

## 2024-08-09 LAB
DEPRECATED RDW RBC AUTO: 48 FL (ref 37–54)
ERYTHROCYTE [DISTWIDTH] IN BLOOD BY AUTOMATED COUNT: 14 % (ref 12.3–15.4)
FERRITIN SERPL-MCNC: 297 NG/ML (ref 13–150)
HCT VFR BLD AUTO: 28.4 % (ref 34–46.6)
HGB BLD-MCNC: 9.3 G/DL (ref 12–15.9)
IRON 24H UR-MRATE: 46 MCG/DL (ref 37–145)
IRON SATN MFR SERPL: 17 % (ref 20–50)
MCH RBC QN AUTO: 30.9 PG (ref 26.6–33)
MCHC RBC AUTO-ENTMCNC: 32.7 G/DL (ref 31.5–35.7)
MCV RBC AUTO: 94.4 FL (ref 79–97)
PLATELET # BLD AUTO: 197 10*3/MM3 (ref 140–450)
PMV BLD AUTO: 9.3 FL (ref 6–12)
RBC # BLD AUTO: 3.01 10*6/MM3 (ref 3.77–5.28)
TIBC SERPL-MCNC: 277 MCG/DL (ref 298–536)
TRANSFERRIN SERPL-MCNC: 186 MG/DL (ref 200–360)
WBC NRBC COR # BLD AUTO: 5.19 10*3/MM3 (ref 3.4–10.8)

## 2024-08-09 PROCEDURE — 82728 ASSAY OF FERRITIN: CPT | Performed by: INTERNAL MEDICINE

## 2024-08-09 PROCEDURE — 96372 THER/PROPH/DIAG INJ SC/IM: CPT

## 2024-08-09 PROCEDURE — 25010000002 FERUMOXYTOL 510 MG/17ML SOLUTION 17 ML VIAL: Performed by: INTERNAL MEDICINE

## 2024-08-09 PROCEDURE — 84466 ASSAY OF TRANSFERRIN: CPT | Performed by: INTERNAL MEDICINE

## 2024-08-09 PROCEDURE — 36415 COLL VENOUS BLD VENIPUNCTURE: CPT

## 2024-08-09 PROCEDURE — 96374 THER/PROPH/DIAG INJ IV PUSH: CPT

## 2024-08-09 PROCEDURE — 85027 COMPLETE CBC AUTOMATED: CPT | Performed by: INTERNAL MEDICINE

## 2024-08-09 PROCEDURE — 25010000002 EPOETIN ALFA PER 1000 UNITS: Performed by: INTERNAL MEDICINE

## 2024-08-09 PROCEDURE — 83540 ASSAY OF IRON: CPT | Performed by: INTERNAL MEDICINE

## 2024-08-09 RX ORDER — SODIUM CHLORIDE 9 MG/ML
20 INJECTION, SOLUTION INTRAVENOUS ONCE
Status: DISCONTINUED | OUTPATIENT
Start: 2024-08-09 | End: 2024-08-11 | Stop reason: HOSPADM

## 2024-08-09 RX ORDER — SODIUM CHLORIDE 9 MG/ML
20 INJECTION, SOLUTION INTRAVENOUS ONCE
Status: CANCELLED | OUTPATIENT
Start: 2024-08-16

## 2024-08-09 RX ADMIN — FERUMOXYTOL 510 MG: 510 INJECTION INTRAVENOUS at 15:57

## 2024-08-09 RX ADMIN — ERYTHROPOIETIN 20000 UNITS: 20000 INJECTION, SOLUTION INTRAVENOUS; SUBCUTANEOUS at 14:59

## 2024-08-10 ENCOUNTER — APPOINTMENT (OUTPATIENT)
Dept: GENERAL RADIOLOGY | Facility: HOSPITAL | Age: 84
End: 2024-08-10
Payer: MEDICARE

## 2024-08-10 ENCOUNTER — HOSPITAL ENCOUNTER (EMERGENCY)
Facility: HOSPITAL | Age: 84
Discharge: HOME | End: 2024-08-10
Attending: EMERGENCY MEDICINE
Payer: MEDICARE

## 2024-08-10 VITALS
RESPIRATION RATE: 18 BRPM | DIASTOLIC BLOOD PRESSURE: 89 MMHG | SYSTOLIC BLOOD PRESSURE: 167 MMHG | HEART RATE: 70 BPM | OXYGEN SATURATION: 94 % | TEMPERATURE: 97.7 F

## 2024-08-10 DIAGNOSIS — M25.531 WRIST PAIN, ACUTE, RIGHT: Primary | ICD-10-CM

## 2024-08-10 PROCEDURE — 73070 X-RAY EXAM OF ELBOW: CPT

## 2024-08-10 PROCEDURE — 73110 X-RAY EXAM OF WRIST: CPT

## 2024-08-10 PROCEDURE — 99283 EMERGENCY DEPT VISIT LOW MDM: CPT

## 2024-08-10 RX ORDER — PREDNISONE 20 MG/1
20 TABLET ORAL DAILY
Qty: 5 TABLET | Refills: 0 | Status: SHIPPED | OUTPATIENT
Start: 2024-08-10 | End: 2024-08-15

## 2024-08-10 RX ORDER — HYDROCODONE BITARTRATE AND ACETAMINOPHEN 5; 325 MG/1; MG/1
1 TABLET ORAL EVERY 6 HOURS PRN
Qty: 10 TABLET | Refills: 0 | Status: SHIPPED | OUTPATIENT
Start: 2024-08-10

## 2024-08-10 RX ORDER — HYDROCODONE BITARTRATE AND ACETAMINOPHEN 5; 325 MG/1; MG/1
1 TABLET ORAL EVERY 6 HOURS PRN
Status: DISCONTINUED | OUTPATIENT
Start: 2024-08-10 | End: 2024-08-11 | Stop reason: HOSPADM

## 2024-08-10 RX ADMIN — HYDROCODONE BITARTRATE AND ACETAMINOPHEN 1 TABLET: 5; 325 TABLET ORAL at 20:01

## 2024-08-10 NOTE — ED PROVIDER NOTES
EMERGENCY DEPARTMENT ENCOUNTER  Room Number:  18/18  PCP: Dannie Mathews MD  Independent Historians: Patient and Family      HPI:  Chief Complaint: had concerns including Arm Pain.     Context: Lowell Min is a 84 y.o. female with a medical history of CKD, anemia, anxiety, paroxysmal A-fib, CHF, diabetes who presents to the ED c/o acute wrist pain.  Patient states she had an iron infusion yesterday and her right arm with the IV placement in the right antecubital fossa.  Patient states she awoke today with pain in her right wrist.  Pain is quite severe and she states that has gradually worsened to include pain throughout her arm.  Patient denies any falls or injuries to the extremity.      Review of prior external notes (non-ED) -and- Review of prior external test results outside of this encounter: Office visit with nephrology from 7/17/2024 reviewed and notable for presentation secondary to CKD 3.  Plan at that visit was to obtain outpatient laboratory evaluation including ANCA screen, MELODY antibodies, complement C3 and C4 and IgG    Prescription drug monitoring program review:         PAST MEDICAL HISTORY  Active Ambulatory Problems     Diagnosis Date Noted    Chronic kidney disease, stage IV (severe) 03/31/2022    Erythropoietin deficiency anemia 03/31/2022    Symptomatic anemia 04/03/2022    Anxiety associated with depression 04/03/2022    Iron deficiency anemia 04/03/2022    PAF (paroxysmal atrial fibrillation) 04/03/2022    Shortness of breath 04/03/2022    Chronic diastolic congestive heart failure 04/03/2022    Diabetic polyneuropathy associated with type 2 diabetes mellitus 04/12/2022    PERLA (acute kidney injury) 10/07/2022    Foot pain, bilateral 10/13/2022    Anemia of chronic renal failure 06/27/2023     Resolved Ambulatory Problems     Diagnosis Date Noted    Hypomagnesemia 10/12/2022     Past Medical History:   Diagnosis Date    Anxiety     Atrial fibrillation     Depression     Elevated cholesterol      Hypertension     Type 2 diabetes mellitus          PAST SURGICAL HISTORY  Past Surgical History:   Procedure Laterality Date    APPENDECTOMY      CHOLECYSTECTOMY      COLONOSCOPY      CYSTOSCOPY BOTOX INJECTION OF BLADDER N/A     HYSTERECTOMY      TUMOR REMOVAL Left     benign tumor from left breast         FAMILY HISTORY  No family history on file.      SOCIAL HISTORY  Social History     Socioeconomic History    Marital status: Single   Tobacco Use    Smoking status: Never    Smokeless tobacco: Never   Vaping Use    Vaping status: Never Used   Substance and Sexual Activity    Alcohol use: Never    Drug use: Never    Sexual activity: Defer         ALLERGIES  Ibuprofen      REVIEW OF SYSTEMS  Review of Systems  Included in HPI  All systems reviewed and negative except for those discussed in HPI.      PHYSICAL EXAM    I have reviewed the triage vital signs and nursing notes.    ED Triage Vitals [08/10/24 1842]   Temp Heart Rate Resp BP SpO2   97.7 °F (36.5 °C) 67 18 -- 94 %      Temp src Heart Rate Source Patient Position BP Location FiO2 (%)   -- -- -- -- --       Physical Exam  GENERAL: alert, no acute distress  SKIN: Warm, dry  HENT: Normocephalic, atraumatic  EYES: no scleral icterus  CV: regular rhythm, regular rate  RESPIRATORY: normal effort, lungs clear  ABDOMEN: soft, nontender, nondistended  MUSCULOSKELETAL: no deformity.  Tenderness to palpation noted to the right wrist with significant tenderness with any movement of the right hand.  Pulses, sensation and movement are intact.  There is tenderness throughout the right arm but the greatest focus seems to be the right wrist and I suspect the rest is radiation.  IV site and right antecubital fossa has a small amount of mild ecchymosis with no tenderness to palpation and no palpable masses or knots.  NEURO: alert, moves all extremities, follows commands            LAB RESULTS  No results found for this or any previous visit (from the past 24  hour(s)).      RADIOLOGY  XR Elbow 2 View Right    Result Date: 8/10/2024  RIGHT ELBOW: AP, LATERAL  HISTORY: Right elbow pain. Recent iron infusion in the antecubital fossa.  FINDINGS: There is no evidence for fracture or osseous abnormality. There appears to be soft tissue swelling along the anterior medial elbow involving the subcutaneous fat.      No evidence for fracture or acute abnormality. There appears to be soft tissue swelling.  This report was finalized on 8/10/2024 10:00 PM by Cecil Patel M.D on Workstation: [email or web address]      XR Wrist 3+ View Right    Result Date: 8/10/2024  RIGHT WRIST:  3 VIEWS  HISTORY: Right wrist pain following an iron infusion yesterday  COMPARISON: None  FINDINGS: There is mild ulnar minus variance and there is chondrocalcinosis overlying the triangular fibrocartilage. Within the lunate there is a degenerative subcortical cyst measuring 6 mm. Chronic degenerative changes are present at the triscaphe joint with marginal spur formation. There is soft tissue swelling at the wrist though no fracture or acute osseous abnormality is evident      Soft tissue swelling without evidence for fracture or acute osseous abnormality  This report was finalized on 8/10/2024 8:55 PM by Cecil Patel M.D on Workstation: [email or web address]         MEDICATIONS GIVEN IN ER  Medications   HYDROcodone-acetaminophen (NORCO) 5-325 MG per tablet 1 tablet (1 tablet Oral Given 8/10/24 2001)         ORDERS PLACED DURING THIS VISIT:  Orders Placed This Encounter   Procedures    XR Wrist 3+ View Right    XR Elbow 2 View Right         OUTPATIENT MEDICATION MANAGEMENT:  Current Facility-Administered Medications Ordered in Epic   Medication Dose Route Frequency Provider Last Rate Last Admin    epoetin nazanin (EPOGEN,PROCRIT) injection 20,000 Units  20,000 Units Subcutaneous Q30 Days Ryann Foster MD   20,000 Units at 08/09/24 1459    HYDROcodone-acetaminophen (NORCO) 5-325 MG per tablet 1 tablet  1 tablet  Oral Q6H PRN Rodo Javier MD   1 tablet at 08/10/24 2001    sodium chloride 0.9 % infusion  20 mL/hr Intravenous Once Ryann Foster MD         Current Outpatient Medications Ordered in Epic   Medication Sig Dispense Refill    acetaminophen (TYLENOL) 325 MG tablet Take 2 tablets by mouth Every 4 (Four) Hours As Needed for Mild Pain .      amLODIPine (NORVASC) 5 MG tablet Take 1 tablet by mouth Daily. 30 tablet 0    apixaban (ELIQUIS) 2.5 MG tablet tablet Take 1 tablet by mouth 2 (Two) Times a Day. Indications: Atrial Fibrillation 60 tablet     atorvastatin (LIPITOR) 40 MG tablet Take 1 tablet by mouth every night at bedtime.      carvedilol (COREG) 12.5 MG tablet Take 1 tablet by mouth 2 (two) times daily with meals.      gabapentin (NEURONTIN) 100 MG capsule Take 1 capsule by mouth 3 (Three) Times a Day. 15 capsule 0    HYDROcodone-acetaminophen (NORCO) 5-325 MG per tablet Take 1 tablet by mouth Every 6 (Six) Hours As Needed for Moderate Pain. 10 tablet 0    O2 (OXYGEN) 2 L/min Every Night. Uses when sleeping      predniSONE (DELTASONE) 20 MG tablet Take 1 tablet by mouth Daily for 5 days. 5 tablet 0    sennosides-docusate (PERICOLACE) 8.6-50 MG per tablet Take 2 tablets by mouth 2 (Two) Times a Day.      sodium bicarbonate 650 MG tablet Take 1 tablet by mouth 3 (Three) Times a Day.      venlafaxine XR (EFFEXOR-XR) 75 MG 24 hr capsule Take 1 capsule by mouth Daily.           PROCEDURES  Procedures            PROGRESS, DATA ANALYSIS, CONSULTS, AND MEDICAL DECISION MAKING  All labs have been independently interpreted by me.  All radiology studies have been reviewed by me. All EKG's have been independently viewed and interpreted by me.  Discussion below represents my analysis of pertinent findings related to patient's condition, differential diagnosis, treatment plan and final disposition.    Differential diagnosis includes but is not limited to arthritis, gout, IV infiltration, DVT.    Clinical Scores:                    ED Course as of 08/10/24 2235   Sat Aug 10, 2024   2210 Elbow x-ray interpreted by me demonstrates evidence of fracture, malalignment, no evidence of extravasated iron [MW]   2228 Reassessed patient after pain medication and she demonstrates mild to moderate improvement in symptoms.  X-ray imaging is overall unremarkable for any acute bony abnormalities.  My primary suspicion is a flareup of arthritis versus gout.  No infectious symptoms at this time.  We will wrap the wrist in Ace bandage to help with support and trial a low-dose of steroids.  I will additionally send some pain medication to the pharmacy.  Patient is to follow closely with primary care.  Patient and family are agreeable with this plan.  I discussed return precautions with both patient and family member [MW]      ED Course User Index  [MW] Rodo Javier MD             AS OF 22:35 EDT VITALS:    BP - 167/89  HR - 70  TEMP - 97.7 °F (36.5 °C)  O2 SATS - 94%    COMPLEXITY OF CARE  Admission was considered but after careful review of the patient's presentation, physical examination, diagnostic results, and response to treatment the patient may be safely discharged with outpatient follow-up.      DIAGNOSIS  Final diagnoses:   Wrist pain, acute, right         DISPOSITION  ED Disposition       ED Disposition   Discharge    Condition   Stable    Comment   --                Please note that portions of this document were completed with a voice recognition program.    Note Disclaimer: At [facility], we believe that sharing information builds trust and better relationships. You are receiving this note because you recently visited [facility]. It is possible you will see health information before a provider has talked with you about it. This kind of information can be easy to misunderstand. To help you fully understand what it means for your health, we urge you to discuss this note with your provider.         Rodo Javier,  MD  08/10/24 8771

## 2024-08-11 NOTE — DISCHARGE INSTRUCTIONS
Please follow-up with your primary care physician early next week for repeat assessment and further evaluation    Please return to the emergency department with new or worsening symptoms including but not limited to uncontrolled pain, fevers, chills, redness of the extremity, inability to use the right hand.    Please take all medications as prescribed and monitor your blood sugar appropriately.  Please do not drive or operate heavy machinery while using narcotic pain medication

## 2024-08-19 ENCOUNTER — HOSPITAL ENCOUNTER (OUTPATIENT)
Dept: INFUSION THERAPY | Facility: HOSPITAL | Age: 84
Discharge: HOME | End: 2024-08-19
Admitting: INTERNAL MEDICINE
Payer: MEDICARE

## 2024-08-19 VITALS
HEART RATE: 53 BPM | OXYGEN SATURATION: 95 % | DIASTOLIC BLOOD PRESSURE: 89 MMHG | SYSTOLIC BLOOD PRESSURE: 122 MMHG | TEMPERATURE: 97.5 F | RESPIRATION RATE: 18 BRPM

## 2024-08-19 DIAGNOSIS — N18.4 CHRONIC KIDNEY DISEASE, STAGE IV (SEVERE): ICD-10-CM

## 2024-08-19 DIAGNOSIS — N18.9 ANEMIA OF CHRONIC RENAL FAILURE, UNSPECIFIED CKD STAGE: Primary | ICD-10-CM

## 2024-08-19 DIAGNOSIS — D63.1 ERYTHROPOIETIN DEFICIENCY ANEMIA: ICD-10-CM

## 2024-08-19 DIAGNOSIS — D63.1 ANEMIA OF CHRONIC RENAL FAILURE, UNSPECIFIED CKD STAGE: Primary | ICD-10-CM

## 2024-08-19 PROCEDURE — 96365 THER/PROPH/DIAG IV INF INIT: CPT

## 2024-08-19 PROCEDURE — 25010000002 FERUMOXYTOL 510 MG/17ML SOLUTION 17 ML VIAL: Performed by: INTERNAL MEDICINE

## 2024-08-19 PROCEDURE — 96374 THER/PROPH/DIAG INJ IV PUSH: CPT

## 2024-08-19 RX ORDER — SODIUM CHLORIDE 9 MG/ML
20 INJECTION, SOLUTION INTRAVENOUS ONCE
Status: CANCELLED | OUTPATIENT
Start: 2024-08-23

## 2024-08-19 RX ORDER — SODIUM CHLORIDE 9 MG/ML
20 INJECTION, SOLUTION INTRAVENOUS ONCE
Status: DISCONTINUED | OUTPATIENT
Start: 2024-08-19 | End: 2024-08-21 | Stop reason: HOSPADM

## 2024-08-19 RX ADMIN — FERUMOXYTOL 510 MG: 510 INJECTION INTRAVENOUS at 14:03

## 2024-09-06 ENCOUNTER — APPOINTMENT (OUTPATIENT)
Dept: GENERAL RADIOLOGY | Facility: HOSPITAL | Age: 84
DRG: 291 | End: 2024-09-06
Payer: MEDICARE

## 2024-09-06 ENCOUNTER — HOSPITAL ENCOUNTER (INPATIENT)
Facility: HOSPITAL | Age: 84
LOS: 7 days | Discharge: NURSING, REHAB OR LONG-TERM CARE | DRG: 291 | End: 2024-09-14
Attending: EMERGENCY MEDICINE | Admitting: STUDENT IN AN ORGANIZED HEALTH CARE EDUCATION/TRAINING PROGRAM
Payer: MEDICARE

## 2024-09-06 ENCOUNTER — HOSPITAL ENCOUNTER (OUTPATIENT)
Dept: INFUSION THERAPY | Facility: HOSPITAL | Age: 84
Discharge: HOME | End: 2024-09-06
Payer: MEDICARE

## 2024-09-06 VITALS
RESPIRATION RATE: 20 BRPM | HEART RATE: 58 BPM | DIASTOLIC BLOOD PRESSURE: 73 MMHG | TEMPERATURE: 97.1 F | SYSTOLIC BLOOD PRESSURE: 139 MMHG | OXYGEN SATURATION: 94 %

## 2024-09-06 DIAGNOSIS — E11.42 DIABETIC POLYNEUROPATHY ASSOCIATED WITH TYPE 2 DIABETES MELLITUS: ICD-10-CM

## 2024-09-06 DIAGNOSIS — I10 HYPERTENSION NOT AT GOAL: ICD-10-CM

## 2024-09-06 DIAGNOSIS — N18.9 CHRONIC KIDNEY DISEASE, UNSPECIFIED CKD STAGE: ICD-10-CM

## 2024-09-06 DIAGNOSIS — N18.9 ANEMIA OF CHRONIC RENAL FAILURE, UNSPECIFIED CKD STAGE: ICD-10-CM

## 2024-09-06 DIAGNOSIS — M25.531 WRIST PAIN, ACUTE, RIGHT: ICD-10-CM

## 2024-09-06 DIAGNOSIS — D63.1 ANEMIA OF CHRONIC RENAL FAILURE, UNSPECIFIED CKD STAGE: ICD-10-CM

## 2024-09-06 DIAGNOSIS — D64.9 CHRONIC ANEMIA: ICD-10-CM

## 2024-09-06 DIAGNOSIS — I50.9 ACUTE CONGESTIVE HEART FAILURE, UNSPECIFIED HEART FAILURE TYPE: Primary | ICD-10-CM

## 2024-09-06 DIAGNOSIS — D64.9 SYMPTOMATIC ANEMIA: Primary | ICD-10-CM

## 2024-09-06 PROBLEM — I50.33 ACUTE ON CHRONIC DIASTOLIC CHF (CONGESTIVE HEART FAILURE): Status: ACTIVE | Noted: 2024-09-06

## 2024-09-06 PROBLEM — E78.5 HLD (HYPERLIPIDEMIA): Status: ACTIVE | Noted: 2024-09-06

## 2024-09-06 PROBLEM — E66.9 OBESITY (BMI 30-39.9): Status: ACTIVE | Noted: 2024-09-06

## 2024-09-06 LAB
ALBUMIN SERPL-MCNC: 3.5 G/DL (ref 3.5–5.2)
ALBUMIN/GLOB SERPL: 1.3 G/DL
ALP SERPL-CCNC: 144 U/L (ref 39–117)
ALT SERPL W P-5'-P-CCNC: 14 U/L (ref 1–33)
ANION GAP SERPL CALCULATED.3IONS-SCNC: 11 MMOL/L (ref 5–15)
AST SERPL-CCNC: 15 U/L (ref 1–32)
BASOPHILS # BLD AUTO: 0.02 10*3/MM3 (ref 0–0.2)
BASOPHILS NFR BLD AUTO: 0.4 % (ref 0–1.5)
BILIRUB SERPL-MCNC: 0.6 MG/DL (ref 0–1.2)
BUN SERPL-MCNC: 30 MG/DL (ref 8–23)
BUN/CREAT SERPL: 12.3 (ref 7–25)
CALCIUM SPEC-SCNC: 8.8 MG/DL (ref 8.6–10.5)
CHLORIDE SERPL-SCNC: 111 MMOL/L (ref 98–107)
CO2 SERPL-SCNC: 22 MMOL/L (ref 22–29)
CREAT SERPL-MCNC: 2.44 MG/DL (ref 0.57–1)
DEPRECATED RDW RBC AUTO: 51.2 FL (ref 37–54)
DEPRECATED RDW RBC AUTO: 53.1 FL (ref 37–54)
EGFRCR SERPLBLD CKD-EPI 2021: 19.1 ML/MIN/1.73
EOSINOPHIL # BLD AUTO: 0.12 10*3/MM3 (ref 0–0.4)
EOSINOPHIL NFR BLD AUTO: 2.6 % (ref 0.3–6.2)
ERYTHROCYTE [DISTWIDTH] IN BLOOD BY AUTOMATED COUNT: 14.3 % (ref 12.3–15.4)
ERYTHROCYTE [DISTWIDTH] IN BLOOD BY AUTOMATED COUNT: 14.4 % (ref 12.3–15.4)
FERRITIN SERPL-MCNC: 570 NG/ML (ref 13–150)
GLOBULIN UR ELPH-MCNC: 2.7 GM/DL
GLUCOSE SERPL-MCNC: 125 MG/DL (ref 65–99)
HCT VFR BLD AUTO: 27.8 % (ref 34–46.6)
HCT VFR BLD AUTO: 28.9 % (ref 34–46.6)
HGB BLD-MCNC: 9 G/DL (ref 12–15.9)
HGB BLD-MCNC: 9.1 G/DL (ref 12–15.9)
IMM GRANULOCYTES # BLD AUTO: 0.01 10*3/MM3 (ref 0–0.05)
IMM GRANULOCYTES NFR BLD AUTO: 0.2 % (ref 0–0.5)
IRON 24H UR-MRATE: 79 MCG/DL (ref 37–145)
IRON SATN MFR SERPL: 31 % (ref 20–50)
LYMPHOCYTES # BLD AUTO: 1 10*3/MM3 (ref 0.7–3.1)
LYMPHOCYTES NFR BLD AUTO: 21.3 % (ref 19.6–45.3)
MCH RBC QN AUTO: 31.7 PG (ref 26.6–33)
MCH RBC QN AUTO: 31.8 PG (ref 26.6–33)
MCHC RBC AUTO-ENTMCNC: 31.5 G/DL (ref 31.5–35.7)
MCHC RBC AUTO-ENTMCNC: 32.4 G/DL (ref 31.5–35.7)
MCV RBC AUTO: 100.7 FL (ref 79–97)
MCV RBC AUTO: 98.2 FL (ref 79–97)
MONOCYTES # BLD AUTO: 0.47 10*3/MM3 (ref 0.1–0.9)
MONOCYTES NFR BLD AUTO: 10 % (ref 5–12)
NEUTROPHILS NFR BLD AUTO: 3.08 10*3/MM3 (ref 1.7–7)
NEUTROPHILS NFR BLD AUTO: 65.5 % (ref 42.7–76)
NRBC BLD AUTO-RTO: 0 /100 WBC (ref 0–0.2)
NT-PROBNP SERPL-MCNC: 9505 PG/ML (ref 0–1800)
PLATELET # BLD AUTO: 162 10*3/MM3 (ref 140–450)
PLATELET # BLD AUTO: 172 10*3/MM3 (ref 140–450)
PMV BLD AUTO: 10 FL (ref 6–12)
PMV BLD AUTO: 9.9 FL (ref 6–12)
POTASSIUM SERPL-SCNC: 4.8 MMOL/L (ref 3.5–5.2)
PROT SERPL-MCNC: 6.2 G/DL (ref 6–8.5)
RBC # BLD AUTO: 2.83 10*6/MM3 (ref 3.77–5.28)
RBC # BLD AUTO: 2.87 10*6/MM3 (ref 3.77–5.28)
SODIUM SERPL-SCNC: 144 MMOL/L (ref 136–145)
TIBC SERPL-MCNC: 256 MCG/DL (ref 298–536)
TRANSFERRIN SERPL-MCNC: 172 MG/DL (ref 200–360)
TROPONIN T SERPL HS-MCNC: 37 NG/L
WBC NRBC COR # BLD AUTO: 4.7 10*3/MM3 (ref 3.4–10.8)
WBC NRBC COR # BLD AUTO: 4.72 10*3/MM3 (ref 3.4–10.8)

## 2024-09-06 PROCEDURE — 99291 CRITICAL CARE FIRST HOUR: CPT

## 2024-09-06 PROCEDURE — 93010 ELECTROCARDIOGRAM REPORT: CPT | Performed by: INTERNAL MEDICINE

## 2024-09-06 PROCEDURE — G0378 HOSPITAL OBSERVATION PER HR: HCPCS

## 2024-09-06 PROCEDURE — 85025 COMPLETE CBC W/AUTO DIFF WBC: CPT | Performed by: EMERGENCY MEDICINE

## 2024-09-06 PROCEDURE — 96372 THER/PROPH/DIAG INJ SC/IM: CPT

## 2024-09-06 PROCEDURE — 25010000002 EPOETIN ALFA PER 1000 UNITS: Performed by: INTERNAL MEDICINE

## 2024-09-06 PROCEDURE — 93005 ELECTROCARDIOGRAM TRACING: CPT | Performed by: EMERGENCY MEDICINE

## 2024-09-06 PROCEDURE — 84484 ASSAY OF TROPONIN QUANT: CPT | Performed by: EMERGENCY MEDICINE

## 2024-09-06 PROCEDURE — 80053 COMPREHEN METABOLIC PANEL: CPT | Performed by: EMERGENCY MEDICINE

## 2024-09-06 PROCEDURE — 85027 COMPLETE CBC AUTOMATED: CPT | Performed by: INTERNAL MEDICINE

## 2024-09-06 PROCEDURE — 25010000002 FUROSEMIDE PER 20 MG: Performed by: EMERGENCY MEDICINE

## 2024-09-06 PROCEDURE — 36415 COLL VENOUS BLD VENIPUNCTURE: CPT

## 2024-09-06 PROCEDURE — 83540 ASSAY OF IRON: CPT | Performed by: INTERNAL MEDICINE

## 2024-09-06 PROCEDURE — 84466 ASSAY OF TRANSFERRIN: CPT | Performed by: INTERNAL MEDICINE

## 2024-09-06 PROCEDURE — 82728 ASSAY OF FERRITIN: CPT | Performed by: INTERNAL MEDICINE

## 2024-09-06 PROCEDURE — 83880 ASSAY OF NATRIURETIC PEPTIDE: CPT | Performed by: EMERGENCY MEDICINE

## 2024-09-06 PROCEDURE — 71045 X-RAY EXAM CHEST 1 VIEW: CPT

## 2024-09-06 RX ORDER — HYDROCODONE BITARTRATE AND ACETAMINOPHEN 5; 325 MG/1; MG/1
1 TABLET ORAL EVERY 6 HOURS PRN
Status: DISCONTINUED | OUTPATIENT
Start: 2024-09-06 | End: 2024-09-08

## 2024-09-06 RX ORDER — AMOXICILLIN 250 MG
2 CAPSULE ORAL 2 TIMES DAILY
Status: DISCONTINUED | OUTPATIENT
Start: 2024-09-06 | End: 2024-09-14 | Stop reason: HOSPADM

## 2024-09-06 RX ORDER — GABAPENTIN 100 MG/1
100 CAPSULE ORAL 3 TIMES DAILY
Status: DISCONTINUED | OUTPATIENT
Start: 2024-09-06 | End: 2024-09-14 | Stop reason: HOSPADM

## 2024-09-06 RX ORDER — ATORVASTATIN CALCIUM 20 MG/1
40 TABLET, FILM COATED ORAL DAILY
Status: DISCONTINUED | OUTPATIENT
Start: 2024-09-07 | End: 2024-09-14 | Stop reason: HOSPADM

## 2024-09-06 RX ORDER — FUROSEMIDE 10 MG/ML
60 INJECTION INTRAMUSCULAR; INTRAVENOUS ONCE
Status: COMPLETED | OUTPATIENT
Start: 2024-09-06 | End: 2024-09-06

## 2024-09-06 RX ORDER — AMOXICILLIN 250 MG
2 CAPSULE ORAL 2 TIMES DAILY PRN
Status: DISCONTINUED | OUTPATIENT
Start: 2024-09-06 | End: 2024-09-14 | Stop reason: HOSPADM

## 2024-09-06 RX ORDER — VENLAFAXINE HYDROCHLORIDE 75 MG/1
75 CAPSULE, EXTENDED RELEASE ORAL DAILY
Status: DISCONTINUED | OUTPATIENT
Start: 2024-09-07 | End: 2024-09-14 | Stop reason: HOSPADM

## 2024-09-06 RX ORDER — BISACODYL 5 MG/1
5 TABLET, DELAYED RELEASE ORAL DAILY PRN
Status: DISCONTINUED | OUTPATIENT
Start: 2024-09-06 | End: 2024-09-14 | Stop reason: HOSPADM

## 2024-09-06 RX ORDER — ACETAMINOPHEN 160 MG/5ML
650 SOLUTION ORAL EVERY 4 HOURS PRN
Status: DISCONTINUED | OUTPATIENT
Start: 2024-09-06 | End: 2024-09-14 | Stop reason: HOSPADM

## 2024-09-06 RX ORDER — SODIUM CHLORIDE 0.9 % (FLUSH) 0.9 %
10 SYRINGE (ML) INJECTION AS NEEDED
Status: DISCONTINUED | OUTPATIENT
Start: 2024-09-06 | End: 2024-09-14 | Stop reason: HOSPADM

## 2024-09-06 RX ORDER — NITROGLYCERIN 0.4 MG/1
0.4 TABLET SUBLINGUAL
Status: DISCONTINUED | OUTPATIENT
Start: 2024-09-06 | End: 2024-09-14 | Stop reason: HOSPADM

## 2024-09-06 RX ORDER — ACETAMINOPHEN 650 MG/1
650 SUPPOSITORY RECTAL EVERY 4 HOURS PRN
Status: DISCONTINUED | OUTPATIENT
Start: 2024-09-06 | End: 2024-09-14 | Stop reason: HOSPADM

## 2024-09-06 RX ORDER — SODIUM BICARBONATE 650 MG/1
650 TABLET ORAL 3 TIMES DAILY
Status: DISCONTINUED | OUTPATIENT
Start: 2024-09-06 | End: 2024-09-09

## 2024-09-06 RX ORDER — AMLODIPINE BESYLATE 5 MG/1
5 TABLET ORAL
Status: DISCONTINUED | OUTPATIENT
Start: 2024-09-07 | End: 2024-09-07

## 2024-09-06 RX ORDER — CARVEDILOL 12.5 MG/1
12.5 TABLET ORAL 2 TIMES DAILY WITH MEALS
Status: DISCONTINUED | OUTPATIENT
Start: 2024-09-06 | End: 2024-09-13

## 2024-09-06 RX ORDER — ONDANSETRON 4 MG/1
4 TABLET, ORALLY DISINTEGRATING ORAL EVERY 6 HOURS PRN
Status: DISCONTINUED | OUTPATIENT
Start: 2024-09-06 | End: 2024-09-14 | Stop reason: HOSPADM

## 2024-09-06 RX ORDER — ACETAMINOPHEN 325 MG/1
650 TABLET ORAL EVERY 4 HOURS PRN
Status: DISCONTINUED | OUTPATIENT
Start: 2024-09-06 | End: 2024-09-14 | Stop reason: HOSPADM

## 2024-09-06 RX ORDER — BISACODYL 10 MG
10 SUPPOSITORY, RECTAL RECTAL DAILY PRN
Status: DISCONTINUED | OUTPATIENT
Start: 2024-09-06 | End: 2024-09-14 | Stop reason: HOSPADM

## 2024-09-06 RX ORDER — ONDANSETRON 2 MG/ML
4 INJECTION INTRAMUSCULAR; INTRAVENOUS EVERY 6 HOURS PRN
Status: DISCONTINUED | OUTPATIENT
Start: 2024-09-06 | End: 2024-09-14 | Stop reason: HOSPADM

## 2024-09-06 RX ORDER — POLYETHYLENE GLYCOL 3350 17 G/17G
17 POWDER, FOR SOLUTION ORAL DAILY PRN
Status: DISCONTINUED | OUTPATIENT
Start: 2024-09-06 | End: 2024-09-14 | Stop reason: HOSPADM

## 2024-09-06 RX ADMIN — GABAPENTIN 100 MG: 100 CAPSULE ORAL at 23:05

## 2024-09-06 RX ADMIN — CARVEDILOL 12.5 MG: 12.5 TABLET, FILM COATED ORAL at 23:05

## 2024-09-06 RX ADMIN — FUROSEMIDE 60 MG: 10 INJECTION, SOLUTION INTRAMUSCULAR; INTRAVENOUS at 18:39

## 2024-09-06 RX ADMIN — SODIUM BICARBONATE 650 MG: 650 TABLET ORAL at 23:04

## 2024-09-06 RX ADMIN — ERYTHROPOIETIN 20000 UNITS: 20000 INJECTION, SOLUTION INTRAVENOUS; SUBCUTANEOUS at 14:35

## 2024-09-06 RX ADMIN — APIXABAN 2.5 MG: 2.5 TABLET, FILM COATED ORAL at 23:05

## 2024-09-06 NOTE — ED NOTES
"Nursing report ED to floor  Lowell Min  84 y.o.  female    HPI :  HPI (Adult)  Stated Reason for Visit: leg swelling    Chief Complaint  Chief Complaint   Patient presents with    Edema       Admitting doctor:   Vanessa Salazar MD    Admitting diagnosis:   There were no encounter diagnoses.    Code status:   Current Code Status       Date Active Code Status Order ID Comments User Context       Prior            Allergies:   Ibuprofen    Isolation:   No active isolations    Intake and Output  No intake or output data in the 24 hours ending 09/06/24 1830    Weight:       09/06/24  1510   Weight: 98.9 kg (218 lb)       Most recent vitals:   Vitals:    09/06/24 1510 09/06/24 1706 09/06/24 1806   BP:  146/76 135/94   Pulse: 72 64 67   Resp: 16     Temp: 96.7 °F (35.9 °C)     TempSrc: Tympanic     SpO2: 93% 93% 95%   Weight: 98.9 kg (218 lb)     Height: 170.2 cm (67\")         Active LDAs/IV Access:   Lines, Drains & Airways       Active LDAs       Name Placement date Placement time Site Days    Peripheral IV 09/06/24 1739 Anterior;Left Forearm 09/06/24  1739  Forearm  less than 1    External Urinary Catheter 09/06/24  1755  --  less than 1                    Labs (abnormal labs have a star):   Labs Reviewed   COMPREHENSIVE METABOLIC PANEL - Abnormal; Notable for the following components:       Result Value    Glucose 125 (*)     BUN 30 (*)     Creatinine 2.44 (*)     Chloride 111 (*)     Alkaline Phosphatase 144 (*)     eGFR 19.1 (*)     All other components within normal limits    Narrative:     GFR Normal >60  Chronic Kidney Disease <60  Kidney Failure <15    The GFR formula is only valid for adults with stable renal function between ages 18 and 70.   SINGLE HS TROPONIN T - Abnormal; Notable for the following components:    HS Troponin T 37 (*)     All other components within normal limits    Narrative:     High Sensitive Troponin T Reference Range:  <14.0 ng/L- Negative Female for AMI  <22.0 ng/L- Negative Male " for AMI  >=14 - Abnormal Female indicating possible myocardial injury.  >=22 - Abnormal Male indicating possible myocardial injury.   Clinicians would have to utilize clinical acumen, EKG, Troponin, and serial changes to determine if it is an Acute Myocardial Infarction or myocardial injury due to an underlying chronic condition.        BNP (IN-HOUSE) - Abnormal; Notable for the following components:    proBNP 9,505.0 (*)     All other components within normal limits    Narrative:     This assay is used as an aid in the diagnosis of individuals suspected of having heart failure. It can be used as an aid in the diagnosis of acute decompensated heart failure (ADHF) in patients presenting with signs and symptoms of ADHF to the emergency department (ED). In addition, NT-proBNP of <300 pg/mL indicates ADHF is not likely.    Age Range Result Interpretation  NT-proBNP Concentration (pg/mL:      <50             Positive            >450                   Gray                 300-450                    Negative             <300    50-75           Positive            >900                  Gray                300-900                  Negative            <300      >75             Positive            >1800                  Gray                300-1800                  Negative            <300   CBC WITH AUTO DIFFERENTIAL - Abnormal; Notable for the following components:    RBC 2.83 (*)     Hemoglobin 9.0 (*)     Hematocrit 27.8 (*)     MCV 98.2 (*)     All other components within normal limits   CBC AND DIFFERENTIAL    Narrative:     The following orders were created for panel order CBC & Differential.  Procedure                               Abnormality         Status                     ---------                               -----------         ------                     CBC Auto Differential[[id number]]        Abnormal            Final result                 Please view results for these tests on the individual orders.        EKG:   ECG 12 Lead Other; Leg swelling   Preliminary Result   HEART RATE=62  bpm   RR Sgdvhcwd=693  ms   NE Interval=  ms   P Horizontal Axis=  deg   P Front Axis=  deg   QRSD Llgaiyen=563  ms   QT Evljeomr=704  ms   PViW=416  ms   QRS Axis=-52  deg   T Wave Axis=122  deg   - ABNORMAL ECG -   Atrial fibrillation   Left bundle branch block   Date and Time of Study:2024-09-06 16:42:18          Meds given in ED:   Medications   sodium chloride 0.9 % flush 10 mL (has no administration in time range)   furosemide (LASIX) injection 60 mg (has no administration in time range)       Imaging results:  XR Chest 1 View    Result Date: 9/6/2024  As described.  This report was finalized on 9/6/2024 5:04 PM by Dr. Pérez Swartz M.D on Workstation: [email or web address]       Ambulatory status:   - assist    Social issues:   Social History     Socioeconomic History    Marital status: Single   Tobacco Use    Smoking status: Never    Smokeless tobacco: Never   Vaping Use    Vaping status: Never Used   Substance and Sexual Activity    Alcohol use: Never    Drug use: Never    Sexual activity: Defer       Peripheral Neurovascular  Peripheral Neurovascular (Adult)  Peripheral Neurovascular WDL: WDL, pulse assessment  Pulse Assessment: posterior tibial    Neuro Cognitive  Neuro Cognitive (Adult)  Cognitive/Neuro/Behavioral WDL: WDL, level of consciousness, orientation  Level of Consciousness: Alert  Orientation: oriented x 4    Learning  Learning Assessment (Adult)  Learning Readiness and Ability: no barriers identified    Respiratory  Respiratory (Adult)  Airway WDL: WDL  Respiratory WDL  Respiratory WDL: .WDL except, all  Rhythm/Pattern, Respiratory: shortness of breath    Abdominal Pain       Pain Assessments  Pain (Adult)  (0-10) Pain Rating: Rest: 0  (0-10) Pain Rating: Activity: 0    NIH Stroke Scale       Lety Eller RN  09/06/24 18:30 EDT

## 2024-09-06 NOTE — ED PROVIDER NOTES
EMERGENCY DEPARTMENT ENCOUNTER  Room Number:  ISSA/ISSA  PCP: Dannie Mathews MD  Independent Historians: Patient and Family -patient's daughter      HPI:  Chief Complaint: had concerns including Edema.,  Dyspnea, generalized weakness and unintentional weight gain of 10 pounds over the past month    A complete HPI/ROS/PMH/PSH/SH/FH are unobtainable due to: None    Chronic or social conditions impacting patient care (Social Determinants of Health): None      Context: Lowell Min is a 84 y.o. female with a medical history of hypertension, CKD, anemia and metabolic acidosis who presents to the ED c/o acute worsening swelling in the bilateral legs as well as shortness of breath and an unintentional weight gain over the past several weeks.  Patient says that the bilateral feet and ankles and legs have become more heavy and swollen.  This is making it difficult for her to ambulate safely at home.  She denies any chest pain.  Her daughter has noticed that the patient is acting very short of breath with minimal exertion and patient confirms this.  Patient has not had any fevers or flulike symptoms.  She denies any abdominal pain or vomiting or diarrhea although she does say that it feels like the swelling has extended up to her abdomen and groin level.      Review of prior external notes (non-ED) -and- Review of prior external test results outside of this encounter: I independently reviewed the nephrology office progress note from August 15, 2024 and patient had follow-up care for CKD stage IV as well as hypertension.    Prescription drug monitoring program review: GABY reviewed by Vanessa Salazar MD, Alexei Prakash MD   N/A and GABY query complete and reviewed. Patient receives regular prescriptions for controlled substances.    PAST MEDICAL HISTORY  Active Ambulatory Problems     Diagnosis Date Noted    Chronic kidney disease, stage IV (severe) 03/31/2022    Erythropoietin deficiency anemia 03/31/2022     Symptomatic anemia 04/03/2022    Anxiety associated with depression 04/03/2022    Iron deficiency anemia 04/03/2022    PAF (paroxysmal atrial fibrillation) 04/03/2022    Shortness of breath 04/03/2022    Chronic diastolic congestive heart failure 04/03/2022    Diabetic polyneuropathy associated with type 2 diabetes mellitus 04/12/2022    PERLA (acute kidney injury) 10/07/2022    Foot pain, bilateral 10/13/2022    Anemia of chronic renal failure 06/27/2023     Resolved Ambulatory Problems     Diagnosis Date Noted    Hypomagnesemia 10/12/2022     Past Medical History:   Diagnosis Date    Anxiety     Atrial fibrillation     CHF (congestive heart failure)     Depression     Elevated cholesterol     Hypertension     Type 2 diabetes mellitus          PAST SURGICAL HISTORY  Past Surgical History:   Procedure Laterality Date    APPENDECTOMY      CHOLECYSTECTOMY      COLONOSCOPY      CYSTOSCOPY BOTOX INJECTION OF BLADDER N/A     HYSTERECTOMY      TUMOR REMOVAL Left     benign tumor from left breast       FAMILY HISTORY  History reviewed. No pertinent family history.      SOCIAL HISTORY  Social History     Socioeconomic History    Marital status: Single   Tobacco Use    Smoking status: Never    Smokeless tobacco: Never   Vaping Use    Vaping status: Never Used   Substance and Sexual Activity    Alcohol use: Never    Drug use: Never    Sexual activity: Defer       ALLERGIES  Ibuprofen      REVIEW OF SYSTEMS  Review of Systems  Included in HPI  All systems reviewed and negative except for those discussed in HPI.      PHYSICAL EXAM    I have reviewed the triage vital signs and nursing notes.    ED Triage Vitals   Temp Heart Rate Resp BP SpO2   09/06/24 1510 09/06/24 1510 09/06/24 1510 09/06/24 1706 09/06/24 1510   96.7 °F (35.9 °C) 72 16 146/76 93 %      Temp src Heart Rate Source Patient Position BP Location FiO2 (%)   09/06/24 1510 09/06/24 1510 -- -- --   Tympanic Monitor          Physical Exam  GENERAL: alert, no acute  distress  SKIN: Warm, dry, no rashes  HENT: Normocephalic, atraumatic  EYES: no scleral icterus, normal conjunctivae  CV: Irregularly irregular rhythm, regular rate  RESPIRATORY: normal effort, lungs clear bilaterally  ABDOMEN: soft, obese, nondistended, nontender, no masses palpable  MUSCULOSKELETAL: no deformity.  Moderate edema to the bilateral feet and ankles and legs.  NEURO: alert, moves all extremities, follows commands      LAB RESULTS  Recent Results (from the past 24 hour(s))   CBC (No Diff)    Collection Time: 09/06/24  2:05 PM    Specimen: Blood   Result Value Ref Range    WBC 4.72 3.40 - 10.80 10*3/mm3    RBC 2.87 (L) 3.77 - 5.28 10*6/mm3    Hemoglobin 9.1 (L) 12.0 - 15.9 g/dL    Hematocrit 28.9 (L) 34.0 - 46.6 %    .7 (H) 79.0 - 97.0 fL    MCH 31.7 26.6 - 33.0 pg    MCHC 31.5 31.5 - 35.7 g/dL    RDW 14.4 12.3 - 15.4 %    RDW-SD 53.1 37.0 - 54.0 fl    MPV 9.9 6.0 - 12.0 fL    Platelets 172 140 - 450 10*3/mm3   Iron Profile    Collection Time: 09/06/24  2:05 PM    Specimen: Blood   Result Value Ref Range    Iron 79 37 - 145 mcg/dL    Iron Saturation (TSAT) 31 20 - 50 %    Transferrin 172 (L) 200 - 360 mg/dL    TIBC 256 (L) 298 - 536 mcg/dL   Ferritin    Collection Time: 09/06/24  2:05 PM    Specimen: Blood   Result Value Ref Range    Ferritin 570.00 (H) 13.00 - 150.00 ng/mL   Comprehensive Metabolic Panel    Collection Time: 09/06/24  2:05 PM    Specimen: Blood   Result Value Ref Range    Glucose 125 (H) 65 - 99 mg/dL    BUN 30 (H) 8 - 23 mg/dL    Creatinine 2.44 (H) 0.57 - 1.00 mg/dL    Sodium 144 136 - 145 mmol/L    Potassium 4.8 3.5 - 5.2 mmol/L    Chloride 111 (H) 98 - 107 mmol/L    CO2 22.0 22.0 - 29.0 mmol/L    Calcium 8.8 8.6 - 10.5 mg/dL    Total Protein 6.2 6.0 - 8.5 g/dL    Albumin 3.5 3.5 - 5.2 g/dL    ALT (SGPT) 14 1 - 33 U/L    AST (SGOT) 15 1 - 32 U/L    Alkaline Phosphatase 144 (H) 39 - 117 U/L    Total Bilirubin 0.6 0.0 - 1.2 mg/dL    Globulin 2.7 gm/dL    A/G Ratio 1.3 g/dL     BUN/Creatinine Ratio 12.3 7.0 - 25.0    Anion Gap 11.0 5.0 - 15.0 mmol/L    eGFR 19.1 (L) >60.0 mL/min/1.73   Single High Sensitivity Troponin T    Collection Time: 09/06/24  2:05 PM    Specimen: Blood   Result Value Ref Range    HS Troponin T 37 (H) <14 ng/L   BNP    Collection Time: 09/06/24  2:05 PM    Specimen: Blood   Result Value Ref Range    proBNP 9,505.0 (H) 0.0 - 1,800.0 pg/mL   CBC Auto Differential    Collection Time: 09/06/24  2:05 PM    Specimen: Blood   Result Value Ref Range    WBC 4.70 3.40 - 10.80 10*3/mm3    RBC 2.83 (L) 3.77 - 5.28 10*6/mm3    Hemoglobin 9.0 (L) 12.0 - 15.9 g/dL    Hematocrit 27.8 (L) 34.0 - 46.6 %    MCV 98.2 (H) 79.0 - 97.0 fL    MCH 31.8 26.6 - 33.0 pg    MCHC 32.4 31.5 - 35.7 g/dL    RDW 14.3 12.3 - 15.4 %    RDW-SD 51.2 37.0 - 54.0 fl    MPV 10.0 6.0 - 12.0 fL    Platelets 162 140 - 450 10*3/mm3    Neutrophil % 65.5 42.7 - 76.0 %    Lymphocyte % 21.3 19.6 - 45.3 %    Monocyte % 10.0 5.0 - 12.0 %    Eosinophil % 2.6 0.3 - 6.2 %    Basophil % 0.4 0.0 - 1.5 %    Immature Grans % 0.2 0.0 - 0.5 %    Neutrophils, Absolute 3.08 1.70 - 7.00 10*3/mm3    Lymphocytes, Absolute 1.00 0.70 - 3.10 10*3/mm3    Monocytes, Absolute 0.47 0.10 - 0.90 10*3/mm3    Eosinophils, Absolute 0.12 0.00 - 0.40 10*3/mm3    Basophils, Absolute 0.02 0.00 - 0.20 10*3/mm3    Immature Grans, Absolute 0.01 0.00 - 0.05 10*3/mm3    nRBC 0.0 0.0 - 0.2 /100 WBC   ECG 12 Lead Other; Leg swelling    Collection Time: 09/06/24  4:42 PM   Result Value Ref Range    QT Interval 483 ms    QTC Interval 491 ms         RADIOLOGY  XR Chest 1 View    Result Date: 9/6/2024  XR CHEST 1 VW-  HISTORY: Female who is 84 years-old, leg swelling  TECHNIQUE: Frontal view of the chest  COMPARISON: 4/3/2022  FINDINGS: The heart is enlarged. Aorta is calcified. Pulmonary vasculature is mildly congested. Obscuration of the left hemidiaphragm suggests atelectasis/infiltrate/effusion, follow-up suggested. Minimal likely atelectasis or  scarring at the right lateral costophrenic angle. No pneumothorax. No acute osseous process.      As described.  This report was finalized on 9/6/2024 5:04 PM by Dr. Pérez Swartz M.D on Workstation: [id number]         MEDICATIONS GIVEN IN ER  Medications   sodium chloride 0.9 % flush 10 mL (has no administration in time range)   sodium chloride 0.9 % flush 10 mL (has no administration in time range)   nitroglycerin (NITROSTAT) SL tablet 0.4 mg (has no administration in time range)   acetaminophen (TYLENOL) tablet 650 mg (has no administration in time range)     Or   acetaminophen (TYLENOL) 160 MG/5ML oral solution 650 mg (has no administration in time range)     Or   acetaminophen (TYLENOL) suppository 650 mg (has no administration in time range)   sennosides-docusate (PERICOLACE) 8.6-50 MG per tablet 2 tablet (has no administration in time range)     And   polyethylene glycol (MIRALAX) packet 17 g (has no administration in time range)     And   bisacodyl (DULCOLAX) EC tablet 5 mg (has no administration in time range)     And   bisacodyl (DULCOLAX) suppository 10 mg (has no administration in time range)   ondansetron ODT (ZOFRAN-ODT) disintegrating tablet 4 mg (has no administration in time range)     Or   ondansetron (ZOFRAN) injection 4 mg (has no administration in time range)   furosemide (LASIX) injection 60 mg (60 mg Intravenous Given 9/6/24 1839)         ORDERS PLACED DURING THIS VISIT:  Orders Placed This Encounter   Procedures    XR Chest 1 View    Comprehensive Metabolic Panel    Single High Sensitivity Troponin T    BNP    CBC Auto Differential    Comprehensive Metabolic Panel    CBC (No Diff)    Diet: Cardiac, Diabetic; Healthy Heart (2-3 Na+); Consistent Carbohydrate; Fluid Consistency: Thin (IDDSI 0)    Monitor Blood Pressure    Continuous Pulse Oximetry    Vital Signs    Oral Care    Place Sequential Compression Device    Maintain Sequential Compression Device    Maintain IV Access    Telemetry -  Place Orders & Notify Provider of Results When Patient Experiences Acute Chest Pain, Dysrhythmia or Respiratory Distress    May Be Off Telemetry for Tests    Up With Assistance    Daily Weights    Strict Intake & Output    Code Status and Medical Interventions: CPR (Attempt to Resuscitate); Full Support    LHA (on-call MD unless specified) Details    Incentive Spirometry    ECG 12 Lead Other; Leg swelling    Insert Peripheral IV    Initiate Observation Status    CBC & Differential         OUTPATIENT MEDICATION MANAGEMENT:  Current Facility-Administered Medications Ordered in Epic   Medication Dose Route Frequency Provider Last Rate Last Admin    acetaminophen (TYLENOL) tablet 650 mg  650 mg Oral Q4H PRN King Javier APRN        Or    acetaminophen (TYLENOL) 160 MG/5ML oral solution 650 mg  650 mg Oral Q4H PRN King Javier APRN        Or    acetaminophen (TYLENOL) suppository 650 mg  650 mg Rectal Q4H PRN King Javier APRN        sennosides-docusate (PERICOLACE) 8.6-50 MG per tablet 2 tablet  2 tablet Oral BID PRN King Javier APRN        And    polyethylene glycol (MIRALAX) packet 17 g  17 g Oral Daily PRN King Javier APRN        And    bisacodyl (DULCOLAX) EC tablet 5 mg  5 mg Oral Daily PRN King Javier APRN        And    bisacodyl (DULCOLAX) suppository 10 mg  10 mg Rectal Daily PRN King Javier APRN        epoetin nazanin (EPOGEN,PROCRIT) injection 20,000 Units  20,000 Units Subcutaneous Q30 Days Ryann Foster MD   20,000 Units at 09/06/24 1435    nitroglycerin (NITROSTAT) SL tablet 0.4 mg  0.4 mg Sublingual Q5 Min PRN King Javier APRN        ondansetron ODT (ZOFRAN-ODT) disintegrating tablet 4 mg  4 mg Oral Q6H PRN King Javier APRN        Or    ondansetron (ZOFRAN) injection 4 mg  4 mg Intravenous Q6H PRN King Javier APRN        sodium chloride 0.9 % flush 10 mL  10 mL Intravenous PRN Alexei Prakash MD        sodium chloride  0.9 % flush 10 mL  10 mL Intravenous PRN King Javier APRN         Current Outpatient Medications Ordered in Epic   Medication Sig Dispense Refill    acetaminophen (TYLENOL) 325 MG tablet Take 2 tablets by mouth Every 4 (Four) Hours As Needed for Mild Pain .      amLODIPine (NORVASC) 5 MG tablet Take 1 tablet by mouth Daily. 30 tablet 0    apixaban (ELIQUIS) 2.5 MG tablet tablet Take 1 tablet by mouth 2 (Two) Times a Day. Indications: Atrial Fibrillation 60 tablet     atorvastatin (LIPITOR) 40 MG tablet Take 1 tablet by mouth every night at bedtime.      carvedilol (COREG) 12.5 MG tablet Take 1 tablet by mouth 2 (two) times daily with meals.      gabapentin (NEURONTIN) 100 MG capsule Take 1 capsule by mouth 3 (Three) Times a Day. 15 capsule 0    HYDROcodone-acetaminophen (NORCO) 5-325 MG per tablet Take 1 tablet by mouth Every 6 (Six) Hours As Needed for Moderate Pain. 10 tablet 0    O2 (OXYGEN) 2 L/min Every Night. Uses when sleeping      sodium bicarbonate 650 MG tablet Take 1 tablet by mouth 3 (Three) Times a Day.      venlafaxine XR (EFFEXOR-XR) 75 MG 24 hr capsule Take 1 capsule by mouth Daily.      sennosides-docusate (PERICOLACE) 8.6-50 MG per tablet Take 2 tablets by mouth 2 (Two) Times a Day.           PROCEDURES  Procedures      Critical care provider statement:    Critical care time (minutes): 32.   Critical care time was exclusive of:  Separately billable procedures and treating other patients   Critical care was necessary to treat or prevent imminent or life-threatening deterioration of the following conditions:  Cardiac Failure and Respiratory Failure   Critical care was time spent personally by me on the following activities:  Development of treatment plan with patient or surrogate, discussions with consultants, evaluation of patient's response to treatment, examination of patient, obtaining history from patient or surrogate, ordering and performing treatments and interventions, ordering and  review of laboratory studies, ordering and review of radiographic studies, pulse oximetry, re-evaluation of patient's condition and review of old charts. Critical Care indicators: COPD/CHF Severe Exacerbation       PROGRESS, DATA ANALYSIS, CONSULTS, AND MEDICAL DECISION MAKING  All labs have been independently interpreted by me.  All radiology studies have been reviewed by me. All EKG's have been independently viewed and interpreted by me.  Discussion below represents my analysis of pertinent findings related to patient's condition, differential diagnosis, treatment plan and final disposition.    Differential diagnosis includes but is not limited to congestive heart failure, DVT, PERLA, electrolyte abnormality, pneumonia, pulmonary [place].    Clinical Scores:                   ED Course as of 09/06/24 2012   Fri Sep 06, 2024   1759 EKG         EKG time/Interp time: 1642/1644  Rhythm/Rate: Atrial fibrillation, 62 bpm  P waves and VT: None  QRS, axis: 176 ms borderline left axis deviation, left bundle branch block.  ST and T waves: No ST segment elevations are present.  Independently interpreted by me contemporaneously with treatment   [SONIA]   1810 I discussed with Dr. Salazar from [facility] about this patient.  He agrees to admit her to the hospitalist service for further medical management today. [SONIA]   1949 proBNP(!): 9,505.0 [SONIA]   1950 I independently interpreted the chest x-ray and my findings are: No pneumothorax.  There is vascular congestion present.  Left base is obscured with probable effusion. [SONIA]   1951 Findings are most consistent with acute exacerbation of CHF.  Thankfully, her renal function is actually little bit better than it has previously demonstrated an old labs.  I will initiate some diuresis here with IV Lasix x 1.  Making arrangements to admit to the hospital for continued diuresis needs.  Room air saturations remain normal.  Will continue to monitor her cardiac and respiratory status throughout the  ED visit. [SONIA]   2010 HS Troponin T(!): 37 [SONIA]   2010 BUN(!): 30 [SONIA]   2010 Creatinine(!): 2.44 [SONIA]   2010 Glucose(!): 125 [SONIA]   2010 Hemoglobin(!): 9.0 [SONIA]   2010 Hematocrit(!): 27.8 [SONIA]      ED Course User Index  [SONIA] Alexei Prakash MD           AS OF 20:12 EDT VITALS:    BP - 138/89  HR - 66  TEMP - 96.7 °F (35.9 °C) (Tympanic)  O2 SATS - 93%    COMPLEXITY OF CARE  The patient requires admission.      DIAGNOSIS  Final diagnoses:   Acute congestive heart failure, unspecified heart failure type   Chronic kidney disease, unspecified CKD stage   Chronic anemia   Hypertension not at goal         DISPOSITION  ED Disposition       ED Disposition   Decision to Admit    Condition   --    Comment   Level of Care: Telemetry [5]   Diagnosis: Acute exacerbation of CHF (congestive heart failure) [544159]   Admitting Physician: BRYANT BERUMEN [[id number]]   Attending Physician: BRYANT BERUMEN [[id number]]                  Please note that portions of this document were completed with a voice recognition program.    Note Disclaimer: At [facility], we believe that sharing information builds trust and better relationships. You are receiving this note because you recently visited [facility]. It is possible you will see health information before a provider has talked with you about it. This kind of information can be easy to misunderstand. To help you fully understand what it means for your health, we urge you to discuss this note with your provider.         Alexei Prakash MD  09/06/24 2012

## 2024-09-06 NOTE — ED NOTES
Pt states that she has had increased swelling in her legs over the last week. Reports a hx of CHF but denies being on a diuretic.

## 2024-09-07 ENCOUNTER — APPOINTMENT (OUTPATIENT)
Dept: CARDIOLOGY | Facility: HOSPITAL | Age: 84
DRG: 291 | End: 2024-09-07
Payer: MEDICARE

## 2024-09-07 LAB
ALBUMIN SERPL-MCNC: 3.1 G/DL (ref 3.5–5.2)
ALBUMIN/GLOB SERPL: 1.1 G/DL
ALP SERPL-CCNC: 134 U/L (ref 39–117)
ALT SERPL W P-5'-P-CCNC: 10 U/L (ref 1–33)
ANION GAP SERPL CALCULATED.3IONS-SCNC: 8.4 MMOL/L (ref 5–15)
AORTIC DIMENSIONLESS INDEX: 0.8 (DI)
ASCENDING AORTA: 3 CM
AST SERPL-CCNC: 11 U/L (ref 1–32)
BH CV ECHO MEAS - ACS: 1 CM
BH CV ECHO MEAS - AI P1/2T: 533.1 MSEC
BH CV ECHO MEAS - AO MAX PG: 24.2 MMHG
BH CV ECHO MEAS - AO MEAN PG: 10.5 MMHG
BH CV ECHO MEAS - AO ROOT DIAM: 3.2 CM
BH CV ECHO MEAS - AO V2 MAX: 246.1 CM/SEC
BH CV ECHO MEAS - AO V2 VTI: 57.1 CM
BH CV ECHO MEAS - AVA(I,D): 1.24 CM2
BH CV ECHO MEAS - EDV(CUBED): 129.1 ML
BH CV ECHO MEAS - EDV(MOD-SP2): 115 ML
BH CV ECHO MEAS - EDV(MOD-SP4): 163 ML
BH CV ECHO MEAS - EF(MOD-BP): 44.1 %
BH CV ECHO MEAS - EF(MOD-SP2): 47 %
BH CV ECHO MEAS - EF(MOD-SP4): 42.9 %
BH CV ECHO MEAS - ESV(CUBED): 56.4 ML
BH CV ECHO MEAS - ESV(MOD-SP2): 61 ML
BH CV ECHO MEAS - ESV(MOD-SP4): 93 ML
BH CV ECHO MEAS - FS: 24.1 %
BH CV ECHO MEAS - IVS/LVPW: 0.98 CM
BH CV ECHO MEAS - IVSD: 1.09 CM
BH CV ECHO MEAS - LAT PEAK E' VEL: 6.2 CM/SEC
BH CV ECHO MEAS - LV MASS(C)D: 211 GRAMS
BH CV ECHO MEAS - LV MAX PG: 4.4 MMHG
BH CV ECHO MEAS - LV MEAN PG: 2.22 MMHG
BH CV ECHO MEAS - LV V1 MAX: 104.3 CM/SEC
BH CV ECHO MEAS - LV V1 VTI: 24.5 CM
BH CV ECHO MEAS - LVIDD: 5.1 CM
BH CV ECHO MEAS - LVIDS: 3.8 CM
BH CV ECHO MEAS - LVOT AREA: 2.9 CM2
BH CV ECHO MEAS - LVOT DIAM: 1.92 CM
BH CV ECHO MEAS - LVPWD: 1.11 CM
BH CV ECHO MEAS - MED PEAK E' VEL: 6.6 CM/SEC
BH CV ECHO MEAS - MR MAX PG: 135.7 MMHG
BH CV ECHO MEAS - MR MAX VEL: 582.4 CM/SEC
BH CV ECHO MEAS - MV A DUR: 0.14 SEC
BH CV ECHO MEAS - MV A MAX VEL: 47.7 CM/SEC
BH CV ECHO MEAS - MV DEC SLOPE: 786.9 CM/SEC2
BH CV ECHO MEAS - MV DEC TIME: 0.18 SEC
BH CV ECHO MEAS - MV E MAX VEL: 136 CM/SEC
BH CV ECHO MEAS - MV E/A: 2.8
BH CV ECHO MEAS - MV MAX PG: 13.1 MMHG
BH CV ECHO MEAS - MV MEAN PG: 4.1 MMHG
BH CV ECHO MEAS - MV P1/2T: 68.3 MSEC
BH CV ECHO MEAS - MV V2 VTI: 50.3 CM
BH CV ECHO MEAS - MVA(P1/2T): 3.2 CM2
BH CV ECHO MEAS - MVA(VTI): 1.41 CM2
BH CV ECHO MEAS - PA ACC TIME: 0.13 SEC
BH CV ECHO MEAS - PA V2 MAX: 104.7 CM/SEC
BH CV ECHO MEAS - RAP SYSTOLE: 15 MMHG
BH CV ECHO MEAS - RV MAX PG: 4.4 MMHG
BH CV ECHO MEAS - RV V1 MAX: 104.9 CM/SEC
BH CV ECHO MEAS - RV V1 VTI: 24.2 CM
BH CV ECHO MEAS - RVSP: 61.5 MMHG
BH CV ECHO MEAS - SV(LVOT): 70.6 ML
BH CV ECHO MEAS - SV(MOD-SP2): 54 ML
BH CV ECHO MEAS - SV(MOD-SP4): 70 ML
BH CV ECHO MEAS - TAPSE (>1.6): 1.8 CM
BH CV ECHO MEAS - TR MAX PG: 46.5 MMHG
BH CV ECHO MEAS - TR MAX VEL: 340.9 CM/SEC
BH CV ECHO MEASUREMENTS AVERAGE E/E' RATIO: 21.25
BH CV XLRA - RV BASE: 4.1 CM
BH CV XLRA - RV LENGTH: 7.6 CM
BH CV XLRA - RV MID: 3.5 CM
BH CV XLRA - TDI S': 7.3 CM/SEC
BILIRUB SERPL-MCNC: 0.4 MG/DL (ref 0–1.2)
BUN SERPL-MCNC: 30 MG/DL (ref 8–23)
BUN/CREAT SERPL: 12.3 (ref 7–25)
CALCIUM SPEC-SCNC: 8.5 MG/DL (ref 8.6–10.5)
CHLORIDE SERPL-SCNC: 111 MMOL/L (ref 98–107)
CO2 SERPL-SCNC: 24.6 MMOL/L (ref 22–29)
CREAT SERPL-MCNC: 2.43 MG/DL (ref 0.57–1)
CREAT UR-MCNC: 21.1 MG/DL
DEPRECATED RDW RBC AUTO: 51.8 FL (ref 37–54)
EGFRCR SERPLBLD CKD-EPI 2021: 19.2 ML/MIN/1.73
ERYTHROCYTE [DISTWIDTH] IN BLOOD BY AUTOMATED COUNT: 14.4 % (ref 12.3–15.4)
GEN 5 2HR TROPONIN T REFLEX: 33 NG/L
GLOBULIN UR ELPH-MCNC: 2.7 GM/DL
GLUCOSE SERPL-MCNC: 130 MG/DL (ref 65–99)
HCT VFR BLD AUTO: 26.5 % (ref 34–46.6)
HGB BLD-MCNC: 8.6 G/DL (ref 12–15.9)
MAGNESIUM SERPL-MCNC: 1.9 MG/DL (ref 1.6–2.4)
MCH RBC QN AUTO: 31.6 PG (ref 26.6–33)
MCHC RBC AUTO-ENTMCNC: 32.5 G/DL (ref 31.5–35.7)
MCV RBC AUTO: 97.4 FL (ref 79–97)
PHOSPHATE SERPL-MCNC: 3.6 MG/DL (ref 2.5–4.5)
PLATELET # BLD AUTO: 155 10*3/MM3 (ref 140–450)
PMV BLD AUTO: 9.7 FL (ref 6–12)
POTASSIUM SERPL-SCNC: 4.4 MMOL/L (ref 3.5–5.2)
PROT ?TM UR-MCNC: 16.4 MG/DL
PROT SERPL-MCNC: 5.8 G/DL (ref 6–8.5)
PROT/CREAT UR: 777.3 MG/G CREA (ref 0–200)
RBC # BLD AUTO: 2.72 10*6/MM3 (ref 3.77–5.28)
SINUS: 2.6 CM
SODIUM SERPL-SCNC: 144 MMOL/L (ref 136–145)
STJ: 2.3 CM
TROPONIN T DELTA: -3 NG/L
TROPONIN T SERPL HS-MCNC: 36 NG/L
TSH SERPL DL<=0.05 MIU/L-ACNC: 2.22 UIU/ML (ref 0.27–4.2)
TSH SERPL DL<=0.05 MIU/L-ACNC: 2.3 UIU/ML (ref 0.27–4.2)
URATE SERPL-MCNC: 5.4 MG/DL (ref 2.4–5.7)
WBC NRBC COR # BLD AUTO: 3.46 10*3/MM3 (ref 3.4–10.8)

## 2024-09-07 PROCEDURE — 85027 COMPLETE CBC AUTOMATED: CPT | Performed by: NURSE PRACTITIONER

## 2024-09-07 PROCEDURE — 84156 ASSAY OF PROTEIN URINE: CPT | Performed by: INTERNAL MEDICINE

## 2024-09-07 PROCEDURE — 99221 1ST HOSP IP/OBS SF/LOW 40: CPT | Performed by: INTERNAL MEDICINE

## 2024-09-07 PROCEDURE — 83735 ASSAY OF MAGNESIUM: CPT | Performed by: STUDENT IN AN ORGANIZED HEALTH CARE EDUCATION/TRAINING PROGRAM

## 2024-09-07 PROCEDURE — 82570 ASSAY OF URINE CREATININE: CPT | Performed by: INTERNAL MEDICINE

## 2024-09-07 PROCEDURE — 84443 ASSAY THYROID STIM HORMONE: CPT | Performed by: STUDENT IN AN ORGANIZED HEALTH CARE EDUCATION/TRAINING PROGRAM

## 2024-09-07 PROCEDURE — 25010000002 FUROSEMIDE PER 20 MG: Performed by: STUDENT IN AN ORGANIZED HEALTH CARE EDUCATION/TRAINING PROGRAM

## 2024-09-07 PROCEDURE — 84484 ASSAY OF TROPONIN QUANT: CPT | Performed by: INTERNAL MEDICINE

## 2024-09-07 PROCEDURE — 93306 TTE W/DOPPLER COMPLETE: CPT | Performed by: INTERNAL MEDICINE

## 2024-09-07 PROCEDURE — 25510000001 PERFLUTREN 6.52 MG/ML SUSPENSION 2 ML VIAL: Performed by: INTERNAL MEDICINE

## 2024-09-07 PROCEDURE — 84443 ASSAY THYROID STIM HORMONE: CPT | Performed by: INTERNAL MEDICINE

## 2024-09-07 PROCEDURE — 84550 ASSAY OF BLOOD/URIC ACID: CPT | Performed by: INTERNAL MEDICINE

## 2024-09-07 PROCEDURE — 80053 COMPREHEN METABOLIC PANEL: CPT | Performed by: NURSE PRACTITIONER

## 2024-09-07 PROCEDURE — 84100 ASSAY OF PHOSPHORUS: CPT | Performed by: STUDENT IN AN ORGANIZED HEALTH CARE EDUCATION/TRAINING PROGRAM

## 2024-09-07 PROCEDURE — 93306 TTE W/DOPPLER COMPLETE: CPT

## 2024-09-07 RX ORDER — FUROSEMIDE 10 MG/ML
40 INJECTION INTRAMUSCULAR; INTRAVENOUS EVERY 12 HOURS
Status: DISPENSED | OUTPATIENT
Start: 2024-09-07 | End: 2024-09-14

## 2024-09-07 RX ORDER — METOLAZONE 5 MG/1
5 TABLET ORAL DAILY
Status: DISCONTINUED | OUTPATIENT
Start: 2024-09-07 | End: 2024-09-11

## 2024-09-07 RX ORDER — AMLODIPINE BESYLATE 5 MG/1
5 TABLET ORAL
Status: DISCONTINUED | OUTPATIENT
Start: 2024-09-07 | End: 2024-09-12

## 2024-09-07 RX ADMIN — SENNOSIDES AND DOCUSATE SODIUM 2 TABLET: 50; 8.6 TABLET ORAL at 08:34

## 2024-09-07 RX ADMIN — AMLODIPINE BESYLATE 5 MG: 5 TABLET ORAL at 08:33

## 2024-09-07 RX ADMIN — SODIUM BICARBONATE 650 MG: 650 TABLET ORAL at 20:14

## 2024-09-07 RX ADMIN — METOLAZONE 5 MG: 5 TABLET ORAL at 14:43

## 2024-09-07 RX ADMIN — FUROSEMIDE 40 MG: 10 INJECTION, SOLUTION INTRAMUSCULAR; INTRAVENOUS at 20:14

## 2024-09-07 RX ADMIN — GABAPENTIN 100 MG: 100 CAPSULE ORAL at 16:10

## 2024-09-07 RX ADMIN — PERFLUTREN 3 ML: 6.52 INJECTION, SUSPENSION INTRAVENOUS at 14:50

## 2024-09-07 RX ADMIN — SODIUM BICARBONATE 650 MG: 650 TABLET ORAL at 08:34

## 2024-09-07 RX ADMIN — APIXABAN 2.5 MG: 2.5 TABLET, FILM COATED ORAL at 20:14

## 2024-09-07 RX ADMIN — GABAPENTIN 100 MG: 100 CAPSULE ORAL at 20:14

## 2024-09-07 RX ADMIN — Medication 10 ML: at 08:34

## 2024-09-07 RX ADMIN — FUROSEMIDE 40 MG: 10 INJECTION, SOLUTION INTRAMUSCULAR; INTRAVENOUS at 08:34

## 2024-09-07 RX ADMIN — VENLAFAXINE HYDROCHLORIDE 75 MG: 75 CAPSULE, EXTENDED RELEASE ORAL at 08:33

## 2024-09-07 RX ADMIN — APIXABAN 2.5 MG: 2.5 TABLET, FILM COATED ORAL at 08:34

## 2024-09-07 RX ADMIN — CARVEDILOL 12.5 MG: 12.5 TABLET, FILM COATED ORAL at 08:34

## 2024-09-07 RX ADMIN — SODIUM BICARBONATE 650 MG: 650 TABLET ORAL at 16:10

## 2024-09-07 RX ADMIN — GABAPENTIN 100 MG: 100 CAPSULE ORAL at 08:34

## 2024-09-07 RX ADMIN — ATORVASTATIN CALCIUM 40 MG: 20 TABLET, FILM COATED ORAL at 08:33

## 2024-09-07 RX ADMIN — CARVEDILOL 12.5 MG: 12.5 TABLET, FILM COATED ORAL at 17:37

## 2024-09-07 RX ADMIN — AMLODIPINE BESYLATE 5 MG: 5 TABLET ORAL at 14:43

## 2024-09-07 NOTE — PROGRESS NOTES
" LOS: 0 days     Name: Lowell Min  Age: 84 y.o.  Sex: female  :  1940  MRN: [id number]         Primary Care Physician: Dannie Mathews MD    Subjective   Subjective  Reports ongoing swelling and shortness of breath.  Currently requiring 2 L.  Typically only wears oxygen at night.    Objective   Vital Signs  Temp:  [96.7 °F (35.9 °C)-97.6 °F (36.4 °C)] 97.6 °F (36.4 °C)  Heart Rate:  [56-72] 56  Resp:  [16-20] 18  BP: (135-159)/() 142/109  Body mass index is 33.63 kg/m².    Objective:  General Appearance:  Comfortable and in no acute distress.    Vital signs: (most recent): Blood pressure (!) 142/109, pulse 56, temperature 97.6 °F (36.4 °C), temperature source Oral, resp. rate 18, height 170.2 cm (67\"), weight 97.4 kg (214 lb 11.7 oz), SpO2 98%.    Lungs:  Normal effort and normal respiratory rate.  She is not in respiratory distress.  There are rales and decreased breath sounds.    Heart: Normal rate.  Regular rhythm.    Abdomen: Abdomen is soft.  Bowel sounds are normal.   There is no abdominal tenderness.     Extremities: There is dependent edema.  There is no local swelling.    Neurological: Patient is alert and oriented to person, place and time.    Skin:  Warm and dry.                Results Review:       I reviewed the patient's new clinical results.    Results from last 7 days   Lab Units 24  0422 24  1405   WBC 10*3/mm3 3.46 4.70  4.72   HEMOGLOBIN g/dL 8.6* 9.0*  9.1*   PLATELETS 10*3/mm3 155 162  172     Results from last 7 days   Lab Units 24  0422 24  1405   SODIUM mmol/L 144 144   POTASSIUM mmol/L 4.4 4.8   CHLORIDE mmol/L 111* 111*   CO2 mmol/L 24.6 22.0   BUN mg/dL 30* 30*   CREATININE mg/dL 2.43* 2.44*   CALCIUM mg/dL 8.5* 8.8   GLUCOSE mg/dL 130* 125*                 Scheduled Meds:   amLODIPine, 5 mg, Oral, Q24H  apixaban, 2.5 mg, Oral, BID  atorvastatin, 40 mg, Oral, Daily  carvedilol, 12.5 mg, Oral, BID With Meals  furosemide, 40 mg, Intravenous, " Q12H  gabapentin, 100 mg, Oral, TID  sennosides-docusate, 2 tablet, Oral, BID  sodium bicarbonate, 650 mg, Oral, TID  venlafaxine XR, 75 mg, Oral, Daily      PRN Meds:     acetaminophen **OR** acetaminophen **OR** acetaminophen    senna-docusate sodium **AND** polyethylene glycol **AND** bisacodyl **AND** bisacodyl    HYDROcodone-acetaminophen    nitroglycerin    ondansetron ODT **OR** ondansetron    [COMPLETED] Insert Peripheral IV **AND** sodium chloride    sodium chloride  Continuous Infusions:       Assessment & Plan   Active Hospital Problems    Diagnosis  POA    **Acute on chronic diastolic CHF (congestive heart failure) [I50.33]  Yes    Obesity (BMI 30-39.9) [E66.9]  Yes    HLD (hyperlipidemia) [E78.5]  Yes    Diabetic polyneuropathy associated with type 2 diabetes mellitus [E11.42]  Yes    Anxiety associated with depression [F41.8]  Yes    Iron deficiency anemia [D50.9]  Yes    PAF (paroxysmal atrial fibrillation) [I48.0]  Yes    Chronic kidney disease, stage IV (severe) [N18.4]  Yes      Resolved Hospital Problems   No resolved problems to display.       Assessment & Plan    -Currently on IV Lasix.  Nephrology and cardiology consulted upon admission last night.  -Echo pending  -Monitor I's/O's and daily weights.  Wean oxygen as tolerated  -Renal function unchanged from yesterday  -Anemia noted.  Baseline hemoglobin 9-10.  History of iron deficiency.  Monitor for now  -A-fib rate controlled.  On low-dose Eliquis  -Continue Norvasc  -Blood sugars well-controlled.  I do not think she needs sliding scale right now  -Therapy services    Expected discharge date/ time has not been documented.     Jovanni Giron MD  [facility]  09/07/24  09:03 EDT

## 2024-09-07 NOTE — CONSULTS
Thank you very much for the consult    Reason For The Consult: Acute on chronic kidney injury congestive heart failure    HPI: Ms. Etienne is a pleasant 84 years old white female with history of metabolic syndrome with type 2 diabetes hypertension dyslipidemia history of chronic diastolic CHF history of hypertension and atrial fibrillation patient has chronic kidney disease stage IV with proteinuria indicative of diabetic nephropathy, followed by my partner Dr. Garzon daily,   The patient admitted with increased shortness of breath and swelling and getting off the balance denied any chest pain.  Denied any urinary Complaint of dysuria frequency of urination or hesitancy of urination patient has a serum creatinine of 2.43 with EGFR of 19 positive proteinuria on urine analysis.  Denies any over-the-counter intake of any nephrotoxins, patient appeared to be in a congestive heart failure with peripheral edema elevated JVD and bibasilar crackles also has extremely elevated proBNP no  PMH:   Past Medical History:   Diagnosis Date    Anxiety     Atrial fibrillation     CHF (congestive heart failure)     Chronic kidney disease, stage IV (severe)     Depression     Elevated cholesterol     Hypertension     Type 2 diabetes mellitus       Past Surgical History:   Procedure Laterality Date    APPENDECTOMY      CHOLECYSTECTOMY      COLONOSCOPY      CYSTOSCOPY BOTOX INJECTION OF BLADDER N/A     HYSTERECTOMY      TUMOR REMOVAL Left     benign tumor from left breast     FHX: History reviewed. No pertinent family history.  SHX:   Social History     Substance and Sexual Activity   Alcohol Use Never      Social History     Tobacco Use   Smoking Status Never   Smokeless Tobacco Never      Social History     Substance and Sexual Activity   Drug Use Never   In no acute distress on supplemental O2    Home Medications:   Medications Prior to Admission   Medication Sig Dispense Refill Last Dose    acetaminophen (TYLENOL) 325 MG tablet  Take 2 tablets by mouth Every 4 (Four) Hours As Needed for Mild Pain .   9/5/2024    amLODIPine (NORVASC) 5 MG tablet Take 1 tablet by mouth Daily. 30 tablet 0 9/6/2024    apixaban (ELIQUIS) 2.5 MG tablet tablet Take 1 tablet by mouth 2 (Two) Times a Day. Indications: Atrial Fibrillation 60 tablet  9/6/2024    atorvastatin (LIPITOR) 40 MG tablet Take 1 tablet by mouth every night at bedtime.   9/5/2024    carvedilol (COREG) 12.5 MG tablet Take 1 tablet by mouth 2 (two) times daily with meals.   9/6/2024    gabapentin (NEURONTIN) 100 MG capsule Take 1 capsule by mouth 3 (Three) Times a Day. 15 capsule 0 9/6/2024    HYDROcodone-acetaminophen (NORCO) 5-325 MG per tablet Take 1 tablet by mouth Every 6 (Six) Hours As Needed for Moderate Pain. 10 tablet 0 Past Month    O2 (OXYGEN) 2 L/min Every Night. Uses when sleeping   9/5/2024    sodium bicarbonate 650 MG tablet Take 1 tablet by mouth 3 (Three) Times a Day.   9/6/2024    venlafaxine XR (EFFEXOR-XR) 75 MG 24 hr capsule Take 1 capsule by mouth Daily.   9/6/2024    sennosides-docusate (PERICOLACE) 8.6-50 MG per tablet Take 2 tablets by mouth 2 (Two) Times a Day.   More than a month       Allergies:   Allergies   Allergen Reactions    Ibuprofen Other (See Comments)     Decrease urine output         Physical Exam:  General: In no acute distress on supplemental O2    Vital Signs  Vitals:    09/07/24 0900   BP: (!) 142/109   Pulse:    Resp: 17   Temp: 97.8 °F (36.6 °C)   SpO2:      I/O this shift:  In: -   Out: 700 [Urine:700]  No intake/output data recorded.      HEENT:  Normocephalic, Atraumatic, EOMI, PERRLA, NO icterus,  Neck:  Supple,  positive JVD, No Carotid artery bruit, No lymphadenopathy  Chest:  bibasilar crackles  Cardiovascular: Regular rate and rhythm. Positive S1 & S2, No rub, murmur or gallop.  Abdominal: Soft, nontender, no palpable organomegaly, no abdominal bruit, positive bowel sounds  Musculoskeletal: No joint tenderness or swelling, good range of  motion, Adequate muscle strength on both sides, no cyanosis, clubbing , positive pitting.  Edema on lower extremities.  Neuro: Alert oriented x3, CN1 - 12 intact, No focal sensory or motor deficit.      Review of Systems:   CNS: Denied headaches, blurred vision, tingling or numbness in any part of body. No weakness or any impaired speech.  CVS: No chest pain or shortness of breath, No orthopnea or PND or exertional dyspnea,  Pulmonary: Denies shortness of breath, coughs, hematemesis, night sweats or weight loss.  GI: Denies diarrhea, nausea, vomiting. Denies weight loss, hematemesis, melena.  : Denies dysuria, frequency or hesitancy of urination  Vascular: Denies claudication, resting pain, tingling numbness or weakness in any part of the body.  Musculoskeletal: Denies Joint tenderness or pain, denies stiffness in joints, denies fever or weight loss, or skin rashes.    Current Labs  Lab Results (last 24 hours)       Procedure Component Value Units Date/Time    High Sensitivity Troponin T 2Hr [[id number]]  (Abnormal) Collected: 09/07/24 0655    Specimen: Blood Updated: 09/07/24 0800     HS Troponin T 33 ng/L      Troponin T Delta -3 ng/L     Narrative:      High Sensitive Troponin T Reference Range:  <14.0 ng/L- Negative Female for AMI  <22.0 ng/L- Negative Male for AMI  >=14 - Abnormal Female indicating possible myocardial injury.  >=22 - Abnormal Male indicating possible myocardial injury.   Clinicians would have to utilize clinical acumen, EKG, Troponin, and serial changes to determine if it is an Acute Myocardial Infarction or myocardial injury due to an underlying chronic condition.         High Sensitivity Troponin T [[id number]]  (Abnormal) Collected: 09/07/24 0422    Specimen: Blood Updated: 09/07/24 0649     HS Troponin T 36 ng/L     Narrative:      High Sensitive Troponin T Reference Range:  <14.0 ng/L- Negative Female for AMI  <22.0 ng/L- Negative Male for AMI  >=14 - Abnormal Female indicating possible  myocardial injury.  >=22 - Abnormal Male indicating possible myocardial injury.   Clinicians would have to utilize clinical acumen, EKG, Troponin, and serial changes to determine if it is an Acute Myocardial Infarction or myocardial injury due to an underlying chronic condition.         TSH [[id number]]  (Normal) Collected: 09/07/24 0422    Specimen: Blood Updated: 09/07/24 0508     TSH 2.220 uIU/mL     Comprehensive Metabolic Panel [[id number]]  (Abnormal) Collected: 09/07/24 0422    Specimen: Blood Updated: 09/07/24 0501     Glucose 130 mg/dL      BUN 30 mg/dL      Creatinine 2.43 mg/dL      Sodium 144 mmol/L      Potassium 4.4 mmol/L      Chloride 111 mmol/L      CO2 24.6 mmol/L      Calcium 8.5 mg/dL      Total Protein 5.8 g/dL      Albumin 3.1 g/dL      ALT (SGPT) 10 U/L      AST (SGOT) 11 U/L      Alkaline Phosphatase 134 U/L      Total Bilirubin 0.4 mg/dL      Globulin 2.7 gm/dL      A/G Ratio 1.1 g/dL      BUN/Creatinine Ratio 12.3     Anion Gap 8.4 mmol/L      eGFR 19.2 mL/min/1.73     Narrative:      GFR Normal >60  Chronic Kidney Disease <60  Kidney Failure <15    The GFR formula is only valid for adults with stable renal function between ages 18 and 70.    Magnesium [[id number]]  (Normal) Collected: 09/07/24 0422    Specimen: Blood Updated: 09/07/24 0501     Magnesium 1.9 mg/dL     Phosphorus [[id number]]  (Normal) Collected: 09/07/24 0422    Specimen: Blood Updated: 09/07/24 0501     Phosphorus 3.6 mg/dL     CBC (No Diff) [[id number]]  (Abnormal) Collected: 09/07/24 0422    Specimen: Blood Updated: 09/07/24 0440     WBC 3.46 10*3/mm3      RBC 2.72 10*6/mm3      Hemoglobin 8.6 g/dL      Hematocrit 26.5 %      MCV 97.4 fL      MCH 31.6 pg      MCHC 32.5 g/dL      RDW 14.4 %      RDW-SD 51.8 fl      MPV 9.7 fL      Platelets 155 10*3/mm3     CBC & Differential [[id number]]  (Abnormal) Collected: 09/06/24 1405    Specimen: Blood Updated: 09/06/24 7158    Narrative:      The following orders were created for panel  order CBC & Differential.  Procedure                               Abnormality         Status                     ---------                               -----------         ------                     CBC Auto Differential[[id number]]        Abnormal            Final result                 Please view results for these tests on the individual orders.    CBC Auto Differential [[id number]]  (Abnormal) Collected: 09/06/24 1405    Specimen: Blood Updated: 09/06/24 1716     WBC 4.70 10*3/mm3      RBC 2.83 10*6/mm3      Hemoglobin 9.0 g/dL      Hematocrit 27.8 %      MCV 98.2 fL      MCH 31.8 pg      MCHC 32.4 g/dL      RDW 14.3 %      RDW-SD 51.2 fl      MPV 10.0 fL      Platelets 162 10*3/mm3      Neutrophil % 65.5 %      Lymphocyte % 21.3 %      Monocyte % 10.0 %      Eosinophil % 2.6 %      Basophil % 0.4 %      Immature Grans % 0.2 %      Neutrophils, Absolute 3.08 10*3/mm3      Lymphocytes, Absolute 1.00 10*3/mm3      Monocytes, Absolute 0.47 10*3/mm3      Eosinophils, Absolute 0.12 10*3/mm3      Basophils, Absolute 0.02 10*3/mm3      Immature Grans, Absolute 0.01 10*3/mm3      nRBC 0.0 /100 WBC     Comprehensive Metabolic Panel [[id number]]  (Abnormal) Collected: 09/06/24 1405    Specimen: Blood Updated: 09/06/24 1659     Glucose 125 mg/dL      BUN 30 mg/dL      Creatinine 2.44 mg/dL      Sodium 144 mmol/L      Potassium 4.8 mmol/L      Chloride 111 mmol/L      CO2 22.0 mmol/L      Calcium 8.8 mg/dL      Total Protein 6.2 g/dL      Albumin 3.5 g/dL      ALT (SGPT) 14 U/L      AST (SGOT) 15 U/L      Alkaline Phosphatase 144 U/L      Total Bilirubin 0.6 mg/dL      Globulin 2.7 gm/dL      A/G Ratio 1.3 g/dL      BUN/Creatinine Ratio 12.3     Anion Gap 11.0 mmol/L      eGFR 19.1 mL/min/1.73     Narrative:      GFR Normal >60  Chronic Kidney Disease <60  Kidney Failure <15    The GFR formula is only valid for adults with stable renal function between ages 18 and 70.    Single High Sensitivity Troponin T [[id number]]   (Abnormal) Collected: 09/06/24 1405    Specimen: Blood Updated: 09/06/24 1659     HS Troponin T 37 ng/L     Narrative:      High Sensitive Troponin T Reference Range:  <14.0 ng/L- Negative Female for AMI  <22.0 ng/L- Negative Male for AMI  >=14 - Abnormal Female indicating possible myocardial injury.  >=22 - Abnormal Male indicating possible myocardial injury.   Clinicians would have to utilize clinical acumen, EKG, Troponin, and serial changes to determine if it is an Acute Myocardial Infarction or myocardial injury due to an underlying chronic condition.         BNP [[id number]]  (Abnormal) Collected: 09/06/24 1405    Specimen: Blood Updated: 09/06/24 1659     proBNP 9,505.0 pg/mL     Narrative:      This assay is used as an aid in the diagnosis of individuals suspected of having heart failure. It can be used as an aid in the diagnosis of acute decompensated heart failure (ADHF) in patients presenting with signs and symptoms of ADHF to the emergency department (ED). In addition, NT-proBNP of <300 pg/mL indicates ADHF is not likely.    Age Range Result Interpretation  NT-proBNP Concentration (pg/mL:      <50             Positive            >450                   Gray                 300-450                    Negative             <300    50-75           Positive            >900                  Gray                300-900                  Negative            <300      >75             Positive            >1800                  Gray                300-1800                  Negative            <300          Current Radiology  Imaging Results (Last 24 Hours)       Procedure Component Value Units Date/Time    XR Chest 1 View [[id number]] Collected: 09/06/24 1703     Updated: 09/06/24 1707    Narrative:      XR CHEST 1 VW-     HISTORY: Female who is 84 years-old, leg swelling     TECHNIQUE: Frontal view of the chest     COMPARISON: 4/3/2022     FINDINGS: The heart is enlarged. Aorta is calcified. Pulmonary  vasculature is  mildly congested. Obscuration of the left hemidiaphragm  suggests atelectasis/infiltrate/effusion, follow-up suggested. Minimal  likely atelectasis or scarring at the right lateral costophrenic angle.  No pneumothorax. No acute osseous process.       Impression:      As described.     This report was finalized on 9/6/2024 5:04 PM by Dr. Pérez Swartz M.D on Workstation: [id number]             Current Meds    Current Facility-Administered Medications:     acetaminophen (TYLENOL) tablet 650 mg, 650 mg, Oral, Q4H PRN **OR** acetaminophen (TYLENOL) 160 MG/5ML oral solution 650 mg, 650 mg, Oral, Q4H PRN **OR** acetaminophen (TYLENOL) suppository 650 mg, 650 mg, Rectal, Q4H PRN, King Javier APRN    amLODIPine (NORVASC) tablet 5 mg, 5 mg, Oral, Q24H, King Javier APRN, 5 mg at 09/07/24 0833    apixaban (ELIQUIS) tablet 2.5 mg, 2.5 mg, Oral, BID, King Javier APRN, 2.5 mg at 09/07/24 0834    atorvastatin (LIPITOR) tablet 40 mg, 40 mg, Oral, Daily, King Javier APRN, 40 mg at 09/07/24 0833    sennosides-docusate (PERICOLACE) 8.6-50 MG per tablet 2 tablet, 2 tablet, Oral, BID PRN **AND** polyethylene glycol (MIRALAX) packet 17 g, 17 g, Oral, Daily PRN **AND** bisacodyl (DULCOLAX) EC tablet 5 mg, 5 mg, Oral, Daily PRN **AND** bisacodyl (DULCOLAX) suppository 10 mg, 10 mg, Rectal, Daily PRN, King Javier APRN    carvedilol (COREG) tablet 12.5 mg, 12.5 mg, Oral, BID With Meals, King Javier APRN, 12.5 mg at 09/07/24 0834    furosemide (LASIX) injection 40 mg, 40 mg, Intravenous, Q12H, Vanessa Salazar MD, 40 mg at 09/07/24 0834    gabapentin (NEURONTIN) capsule 100 mg, 100 mg, Oral, TID, King Javier APRN, 100 mg at 09/07/24 0834    HYDROcodone-acetaminophen (NORCO) 5-325 MG per tablet 1 tablet, 1 tablet, Oral, Q6H PRN, King Javier APRN    nitroglycerin (NITROSTAT) SL tablet 0.4 mg, 0.4 mg, Sublingual, Q5 Min PRN, King Javier APRN    ondansetron  ODT (ZOFRAN-ODT) disintegrating tablet 4 mg, 4 mg, Oral, Q6H PRN **OR** ondansetron (ZOFRAN) injection 4 mg, 4 mg, Intravenous, Q6H PRN, King Javier APRN    sennosides-docusate (PERICOLACE) 8.6-50 MG per tablet 2 tablet, 2 tablet, Oral, BID, King Javier APRN, 2 tablet at 09/07/24 0834    sodium bicarbonate tablet 650 mg, 650 mg, Oral, TID, King Javier, APRN, 650 mg at 09/07/24 0834    [COMPLETED] Insert Peripheral IV, , , Once **AND** sodium chloride 0.9 % flush 10 mL, 10 mL, Intravenous, PRN, Alexei Prakash MD    sodium chloride 0.9 % flush 10 mL, 10 mL, Intravenous, PRN, King Javier APRN, 10 mL at 09/07/24 0834    venlafaxine XR (EFFEXOR-XR) 24 hr capsule 75 mg, 75 mg, Oral, Daily, King Javier APRN, 75 mg at 09/07/24 0833    Facility-Administered Medications Ordered in Other Encounters:     epoetin nazanin (EPOGEN,PROCRIT) injection 20,000 Units, 20,000 Units, Subcutaneous, Q30 Days, Ryann Foster MD, 20,000 Units at 09/06/24 1435      Assessment and plan:          Acute on chronic diastolic CHF (congestive heart failure)    Chronic kidney disease, stage IV (severe)    Anxiety associated with depression    Iron deficiency anemia    PAF (paroxysmal atrial fibrillation)    Diabetic polyneuropathy associated with type 2 diabetes mellitus    Obesity (BMI 30-39.9)    HLD (hyperlipidemia)  Patient with chronic kidney disease stage IV with mild acute on chronic kidney injury secondary to cardiorenal syndrome with decompensated heart failure optimize diuretics control heart rate and control blood pressure.  Check urine lites urinary eosinophils bladder scan and renal ultrasound will follow closely        Josee Mcdermott MD FACP, FASN.  09/07/24  12:00 EDT

## 2024-09-07 NOTE — H&P
Patient Name:  Lowell Min  YOB: 1940  MRN:  [id number]  Admit Date:  9/6/2024  Patient Care Team:  Dannie Mathews MD as PCP - General (Internal Medicine)      Subjective   History Present Illness     Chief Complaint   Patient presents with    Edema     History of Present Illness  Ms. Min is a 84 y.o. non-smoker with a history of chronic diastolic CHF, CKD stage IV, anxiety associate with depression, RADHA, not insulin-dependent type 2 diabetes, and PAF that presents to [facility] complaining of worsening bilateral lower extremity swelling and shortness of breath with exertion.  Patient reports ongoing symptoms x 1 week.  She also has noticed a 10 pound weight gain in the last month.  She uses O2 at night but has been using it more frequently lately.  She is not prescribed diuretics secondary to her chronic kidney disease.  She denies any chest pain, nausea, or vomiting.  She does have occasional dizziness.  Chest x-ray shows mild pulmonary vascular congestion.  Labs obtained show proBNP of 9500.  She was given a dose of IV Lasix in the ED and we were asked admit for further observation.    Review of Systems   Constitutional:  Negative for chills and fever.   HENT:  Negative for congestion and rhinorrhea.    Eyes:  Negative for photophobia and visual disturbance.   Respiratory:  Positive for shortness of breath. Negative for cough.    Cardiovascular:  Positive for leg swelling. Negative for chest pain and palpitations.   Gastrointestinal:  Negative for constipation, diarrhea, nausea and vomiting.   Endocrine: Negative for cold intolerance and heat intolerance.   Genitourinary:  Negative for difficulty urinating and dysuria.   Musculoskeletal:  Negative for gait problem and joint swelling.   Skin:  Negative for rash and wound.   Neurological:  Negative for dizziness, light-headedness and headaches.   Psychiatric/Behavioral:  Negative for sleep disturbance and suicidal ideas.          Personal History     Past Medical History:   Diagnosis Date    Anxiety     Atrial fibrillation     CHF (congestive heart failure)     Chronic kidney disease, stage IV (severe)     Depression     Elevated cholesterol     Hypertension     Type 2 diabetes mellitus      Past Surgical History:   Procedure Laterality Date    APPENDECTOMY      CHOLECYSTECTOMY      COLONOSCOPY      CYSTOSCOPY BOTOX INJECTION OF BLADDER N/A     HYSTERECTOMY      TUMOR REMOVAL Left     benign tumor from left breast     History reviewed. No pertinent family history.  Social History     Tobacco Use    Smoking status: Never    Smokeless tobacco: Never   Vaping Use    Vaping status: Never Used   Substance Use Topics    Alcohol use: Never    Drug use: Never     Current Facility-Administered Medications on File Prior to Encounter   Medication Dose Route Frequency Provider Last Rate Last Admin    epoetin nazanin (EPOGEN,PROCRIT) injection 20,000 Units  20,000 Units Subcutaneous Q30 Days Ryann Foster MD   20,000 Units at 09/06/24 1435    [DISCONTINUED] epoetin nazanin (EPOGEN,PROCRIT) injection 20,000 Units  20,000 Units Subcutaneous Q30 Days Ryann Foster MD         Current Outpatient Medications on File Prior to Encounter   Medication Sig Dispense Refill    acetaminophen (TYLENOL) 325 MG tablet Take 2 tablets by mouth Every 4 (Four) Hours As Needed for Mild Pain .      amLODIPine (NORVASC) 5 MG tablet Take 1 tablet by mouth Daily. 30 tablet 0    apixaban (ELIQUIS) 2.5 MG tablet tablet Take 1 tablet by mouth 2 (Two) Times a Day. Indications: Atrial Fibrillation 60 tablet     atorvastatin (LIPITOR) 40 MG tablet Take 1 tablet by mouth every night at bedtime.      carvedilol (COREG) 12.5 MG tablet Take 1 tablet by mouth 2 (two) times daily with meals.      gabapentin (NEURONTIN) 100 MG capsule Take 1 capsule by mouth 3 (Three) Times a Day. 15 capsule 0    HYDROcodone-acetaminophen (NORCO) 5-325 MG per tablet Take 1 tablet by mouth Every 6  (Six) Hours As Needed for Moderate Pain. 10 tablet 0    O2 (OXYGEN) 2 L/min Every Night. Uses when sleeping      sodium bicarbonate 650 MG tablet Take 1 tablet by mouth 3 (Three) Times a Day.      venlafaxine XR (EFFEXOR-XR) 75 MG 24 hr capsule Take 1 capsule by mouth Daily.      sennosides-docusate (PERICOLACE) 8.6-50 MG per tablet Take 2 tablets by mouth 2 (Two) Times a Day.       Allergies   Allergen Reactions    Ibuprofen Other (See Comments)     Decrease urine output         Objective    Objective     Vital Signs  Temp:  [96.7 °F (35.9 °C)-97.3 °F (36.3 °C)] 97.3 °F (36.3 °C)  Heart Rate:  [58-72] 72  Resp:  [16-20] 18  BP: (135-159)/(73-94) 159/82  SpO2:  [93 %-96 %] 96 %  on   ;   Device (Oxygen Therapy): room air  Body mass index is 34.14 kg/m².    Physical Exam  Vitals and nursing note reviewed.   Constitutional:       General: She is not in acute distress.     Appearance: She is well-developed. She is obese. She is not toxic-appearing.   HENT:      Head: Normocephalic and atraumatic.   Eyes:      General: No scleral icterus.        Right eye: No discharge.         Left eye: No discharge.      Conjunctiva/sclera: Conjunctivae normal.   Neck:      Vascular: No JVD.   Cardiovascular:      Rate and Rhythm: Normal rate. Rhythm irregularly irregular.      Heart sounds: Normal heart sounds. No murmur heard.     No friction rub. No gallop.   Pulmonary:      Effort: Pulmonary effort is normal. No respiratory distress.      Breath sounds: Normal breath sounds. No wheezing or rales.   Abdominal:      General: Bowel sounds are normal. There is no distension.      Palpations: Abdomen is soft.      Tenderness: There is no abdominal tenderness. There is no guarding.   Musculoskeletal:         General: No tenderness or deformity. Normal range of motion.      Cervical back: Normal range of motion and neck supple.      Right lower le+      Left lower le+   Skin:     General: Skin is warm and dry.      Capillary  Refill: Capillary refill takes less than 2 seconds.   Neurological:      Mental Status: She is alert and oriented to person, place, and time.   Psychiatric:         Behavior: Behavior normal.     Results Review:  I reviewed the patient's new clinical results.  I reviewed the patient's new imaging results and agree with the interpretation.  I reviewed the patient's other test results and agree with the interpretation  I personally viewed and interpreted the patient's EKG/Telemetry data    Lab Results (last 24 hours)       Procedure Component Value Units Date/Time    CBC (No Diff) [[id number]]  (Abnormal) Collected: 09/06/24 1405    Specimen: Blood Updated: 09/06/24 1419     WBC 4.72 10*3/mm3      RBC 2.87 10*6/mm3      Hemoglobin 9.1 g/dL      Hematocrit 28.9 %      .7 fL      MCH 31.7 pg      MCHC 31.5 g/dL      RDW 14.4 %      RDW-SD 53.1 fl      MPV 9.9 fL      Platelets 172 10*3/mm3     Iron Profile [[id number]]  (Abnormal) Collected: 09/06/24 1405    Specimen: Blood Updated: 09/06/24 1458     Iron 79 mcg/dL      Iron Saturation (TSAT) 31 %      Transferrin 172 mg/dL      TIBC 256 mcg/dL     Ferritin [[id number]]  (Abnormal) Collected: 09/06/24 1405    Specimen: Blood Updated: 09/06/24 1459     Ferritin 570.00 ng/mL     Narrative:      Results may be falsely decreased if patient taking Biotin.      CBC & Differential [[id number]]  (Abnormal) Collected: 09/06/24 1405    Specimen: Blood Updated: 09/06/24 1716    Narrative:      The following orders were created for panel order CBC & Differential.  Procedure                               Abnormality         Status                     ---------                               -----------         ------                     CBC Auto Differential[[id number]]        Abnormal            Final result                 Please view results for these tests on the individual orders.    Comprehensive Metabolic Panel [[id number]]  (Abnormal) Collected: 09/06/24 1405    Specimen:  Blood Updated: 09/06/24 1659     Glucose 125 mg/dL      BUN 30 mg/dL      Creatinine 2.44 mg/dL      Sodium 144 mmol/L      Potassium 4.8 mmol/L      Chloride 111 mmol/L      CO2 22.0 mmol/L      Calcium 8.8 mg/dL      Total Protein 6.2 g/dL      Albumin 3.5 g/dL      ALT (SGPT) 14 U/L      AST (SGOT) 15 U/L      Alkaline Phosphatase 144 U/L      Total Bilirubin 0.6 mg/dL      Globulin 2.7 gm/dL      A/G Ratio 1.3 g/dL      BUN/Creatinine Ratio 12.3     Anion Gap 11.0 mmol/L      eGFR 19.1 mL/min/1.73     Narrative:      GFR Normal >60  Chronic Kidney Disease <60  Kidney Failure <15    The GFR formula is only valid for adults with stable renal function between ages 18 and 70.    Single High Sensitivity Troponin T [[id number]]  (Abnormal) Collected: 09/06/24 1405    Specimen: Blood Updated: 09/06/24 1659     HS Troponin T 37 ng/L     Narrative:      High Sensitive Troponin T Reference Range:  <14.0 ng/L- Negative Female for AMI  <22.0 ng/L- Negative Male for AMI  >=14 - Abnormal Female indicating possible myocardial injury.  >=22 - Abnormal Male indicating possible myocardial injury.   Clinicians would have to utilize clinical acumen, EKG, Troponin, and serial changes to determine if it is an Acute Myocardial Infarction or myocardial injury due to an underlying chronic condition.         BNP [[id number]]  (Abnormal) Collected: 09/06/24 1405    Specimen: Blood Updated: 09/06/24 1659     proBNP 9,505.0 pg/mL     Narrative:      This assay is used as an aid in the diagnosis of individuals suspected of having heart failure. It can be used as an aid in the diagnosis of acute decompensated heart failure (ADHF) in patients presenting with signs and symptoms of ADHF to the emergency department (ED). In addition, NT-proBNP of <300 pg/mL indicates ADHF is not likely.    Age Range Result Interpretation  NT-proBNP Concentration (pg/mL:      <50             Positive            >450                   Gray                 300-450                     Negative             <300    50-75           Positive            >900                  Gray                300-900                  Negative            <300      >75             Positive            >1800                  Gray                300-1800                  Negative            <300    CBC Auto Differential [[id number]]  (Abnormal) Collected: 09/06/24 1405    Specimen: Blood Updated: 09/06/24 1716     WBC 4.70 10*3/mm3      RBC 2.83 10*6/mm3      Hemoglobin 9.0 g/dL      Hematocrit 27.8 %      MCV 98.2 fL      MCH 31.8 pg      MCHC 32.4 g/dL      RDW 14.3 %      RDW-SD 51.2 fl      MPV 10.0 fL      Platelets 162 10*3/mm3      Neutrophil % 65.5 %      Lymphocyte % 21.3 %      Monocyte % 10.0 %      Eosinophil % 2.6 %      Basophil % 0.4 %      Immature Grans % 0.2 %      Neutrophils, Absolute 3.08 10*3/mm3      Lymphocytes, Absolute 1.00 10*3/mm3      Monocytes, Absolute 0.47 10*3/mm3      Eosinophils, Absolute 0.12 10*3/mm3      Basophils, Absolute 0.02 10*3/mm3      Immature Grans, Absolute 0.01 10*3/mm3      nRBC 0.0 /100 WBC             Imaging Results (Last 24 Hours)       Procedure Component Value Units Date/Time    XR Chest 1 View [[id number]] Collected: 09/06/24 1703     Updated: 09/06/24 1707    Narrative:      XR CHEST 1 VW-     HISTORY: Female who is 84 years-old, leg swelling     TECHNIQUE: Frontal view of the chest     COMPARISON: 4/3/2022     FINDINGS: The heart is enlarged. Aorta is calcified. Pulmonary  vasculature is mildly congested. Obscuration of the left hemidiaphragm  suggests atelectasis/infiltrate/effusion, follow-up suggested. Minimal  likely atelectasis or scarring at the right lateral costophrenic angle.  No pneumothorax. No acute osseous process.       Impression:      As described.     This report was finalized on 9/6/2024 5:04 PM by Dr. Pérez Swartz M.D on Workstation: [id number]               Results for orders placed during the hospital encounter of  04/03/22    Adult Transthoracic Echo Complete W/ Cont if Necessary Per Protocol    Interpretation Summary  · Left ventricular ejection fraction appears to be 61 - 65%. Left ventricular systolic function is normal.  · Left ventricular diastolic function is consistent with (grade II w/high LAP) pseudonormalization.  · Moderate aortic valve stenosis is present. Aortic valve area is 1.31 cm2.Peak velocity of the flow distal to the aortic valve is 317.6 cm/s. Aortic valve maximum pressure gradient is 40.4 mmHg. Aortic valve mean pressure gradient is 20.9 mmHg. Aortic valve dimensionless index is 0.4 .  · Mild mitral valve regurgitation is present. Mild mitral valve stenosis is present. The mitral valve mean gradient is 3.2 mmHg.  · The left atrial cavity is severely dilated.  · Mild tricuspid valve regurgitation is present. Calculated right ventricular systolic pressure from tricuspid regurgitation is 66.2 mmHg. Severe pulmonary hypertension is present      ECG 12 Lead Other; Leg swelling   Preliminary Result   HEART RATE=62  bpm   RR Rtunxuke=163  ms   MT Interval=  ms   P Horizontal Axis=  deg   P Front Axis=  deg   QRSD Gspjgmyn=621  ms   QT Wfijqkzi=953  ms   TPcL=669  ms   QRS Axis=-52  deg   T Wave Axis=122  deg   - ABNORMAL ECG -   Atrial fibrillation   Left bundle branch block   Date and Time of Study:2024-09-06 16:42:18           Assessment/Plan     Active Hospital Problems    Diagnosis  POA    **Acute on chronic diastolic CHF (congestive heart failure) [I50.33]  Yes    Obesity (BMI 30-39.9) [E66.9]  Yes    HLD (hyperlipidemia) [E78.5]  Yes    Diabetic polyneuropathy associated with type 2 diabetes mellitus [E11.42]  Yes    Anxiety associated with depression [F41.8]  Yes    Iron deficiency anemia [D50.9]  Yes    PAF (paroxysmal atrial fibrillation) [I48.0]  Yes    Chronic kidney disease, stage IV (severe) [N18.4]  Yes      Resolved Hospital Problems   No resolved problems to display.       Ms. Min is a very  pleasant 84 y.o. non-smoker with a history of chronic diastolic CHF who presents with exertional dyspnea and bilateral lower extremity leg swelling.    Acute on chronic congestive heart failure  -She has been given a dose of IV Lasix in ED, will continue  -Cardiology consultation in a.m.  -2D echo in a.m.  -Daily weights  -Strict I's and O    CKD stage IV  -Creat 2.44 today which appears to be around her baseline.  Will monitor closely while on IV diuretics  -Avoid nephrotoxins  -Trend BMP    Paroxysmal atrial fibrillation  -Rate is currently controlled.  Will resume carvedilol and Eliquis.    Type 2 diabetes mellitus  -Accu-Cheks 4 times a day with meals and at bedtime  -Moderate dose correctional factor with hypoglycemic protocol  -Last hemoglobin A1c 6.5 on 6/13/2024    Essential hypertension  -Stable, resume amlodipine    Anemia of chronic disease  History of RADHA  -Hemoglobin today 9.  It appears back in July her hemoglobin was around 10.  She reports no signs or symptoms of bleeding.  -Repeat CBC in a.m.    Anxiety and depression  -Continue home regimen    HLD  -Resume atorvastatin      I discussed the patient's findings and my recommendations with the patient, her daughter, and Dr. Salazar.    VTE Prophylaxis -Eliquis.  Code Status - Full code.       RAMAKRISHNA Peterson  [facility]  09/06/24  21:32 EDT

## 2024-09-07 NOTE — CONSULTS
[facility]  Cardiology Consult Note    Patient Identification:  Name: Lowell Min  Age: 84 y.o.  Sex: female  :  1940  MRN: [id number]             Requesting Physician: Dr. Giron    Reason for Consultation / Chief Complaint: management recommendations shortness of breath    History of Present Illness:   84-year-old female normally followed at [facility] with Dr. Orozco, came to the hospital with worsening of shortness of breath has a history of atrial fibrillation on chronic anticoagulation also has moderate aortic stenosis    Comorbid cardiac risk factors:     Past Medical History:  Past Medical History:   Diagnosis Date    Anxiety     Atrial fibrillation     CHF (congestive heart failure)     Chronic kidney disease, stage IV (severe)     Depression     Elevated cholesterol     Hypertension     Type 2 diabetes mellitus      Past Surgical History:  Past Surgical History:   Procedure Laterality Date    APPENDECTOMY      CHOLECYSTECTOMY      COLONOSCOPY      CYSTOSCOPY BOTOX INJECTION OF BLADDER N/A     HYSTERECTOMY      TUMOR REMOVAL Left     benign tumor from left breast      Allergies:  Allergies   Allergen Reactions    Ibuprofen Other (See Comments)     Decrease urine output       Home Meds:  Medications Prior to Admission   Medication Sig Dispense Refill Last Dose    acetaminophen (TYLENOL) 325 MG tablet Take 2 tablets by mouth Every 4 (Four) Hours As Needed for Mild Pain .   2024    amLODIPine (NORVASC) 5 MG tablet Take 1 tablet by mouth Daily. 30 tablet 0 2024    apixaban (ELIQUIS) 2.5 MG tablet tablet Take 1 tablet by mouth 2 (Two) Times a Day. Indications: Atrial Fibrillation 60 tablet  2024    atorvastatin (LIPITOR) 40 MG tablet Take 1 tablet by mouth every night at bedtime.   2024    carvedilol (COREG) 12.5 MG tablet Take 1 tablet by mouth 2 (two) times daily with meals.   2024    gabapentin (NEURONTIN) 100 MG capsule Take 1 capsule by mouth 3 (Three)  Times a Day. 15 capsule 0 9/6/2024    HYDROcodone-acetaminophen (NORCO) 5-325 MG per tablet Take 1 tablet by mouth Every 6 (Six) Hours As Needed for Moderate Pain. 10 tablet 0 Past Month    O2 (OXYGEN) 2 L/min Every Night. Uses when sleeping   9/5/2024    sodium bicarbonate 650 MG tablet Take 1 tablet by mouth 3 (Three) Times a Day.   9/6/2024    venlafaxine XR (EFFEXOR-XR) 75 MG 24 hr capsule Take 1 capsule by mouth Daily.   9/6/2024    sennosides-docusate (PERICOLACE) 8.6-50 MG per tablet Take 2 tablets by mouth 2 (Two) Times a Day.   More than a month     Current Meds:   [unfilled]  Social History:   Social History     Tobacco Use    Smoking status: Never    Smokeless tobacco: Never   Substance Use Topics    Alcohol use: Never      Family History:  History reviewed. No pertinent family history.     Review of Systems    Constitutional: No weakness,fatigue, fever, rigors, chills   Eyes: No vision changes, eye pain   ENT/oropharynx: No difficulty swallowing, sore throat, epistaxis, changes in hearing   Cardiovascular: No chest pain, chest tightness, palpitations, paroxysmal nocturnal dyspnea, orthopnea, diaphoresis, dizziness / syncopal episode   Respiratory: Moderate shortness of breath, dyspnea on exertion, cough, wheezing hemoptysis   Gastrointestinal: No abdominal pain, nausea, vomiting, diarrhea, bloody stools   Genitourinary: No hematuria, dysuria   Neurological: No headache, tremors, numbness,  one-sided weakness    Musculoskeletal: No cramps, myalgias,  joint pain, joint swelling   Integument: No rash, edema           Constitutional:  Temp:  [96.7 °F (35.9 °C)-97.8 °F (36.6 °C)] 97.8 °F (36.6 °C)  Heart Rate:  [56-72] 56  Resp:  [16-20] 17  BP: (135-159)/() 142/109    Physical Exam   General:  Appears in no acute distress  Eyes: PERTL,  HEENT:  No JVD. Thyroid not visibly enlarged. No mucosal pallor or cyanosis  Respiratory: Respirations regular and unlabored at rest. BBS with good air entry in all  fields. No crackles, rubs or wheezes auscultated  Cardiovascular: S1S2 irregular rate and rhythm. sys murmur, rub or gallop auscultated. No carotid bruits. DP/PT pulses    . No pretibial pitting edema  Gastrointestinal: Abdomen soft, flat, non tender. Bowel sounds present. No hepatosplenomegaly. No ascites  Musculoskeletal: JAY x4. No abnormal movements  Extremities: No digital clubbing or cyanosis  Skin: Color pink. Skin warm and dry to touch. No rashes  No xanthoma  Neuro: AAO x3 CN II-XII grossly intact              Cardiographics  ECG:     Telemetry:    Echocardiogram:     Imaging  Chest X-ray:     Lab Review   Results from last 7 days   Lab Units 09/07/24  0655 09/07/24  0422 09/06/24  1405   HSTROP T ng/L 33* 36* 37*     Results from last 7 days   Lab Units 09/07/24  0422   MAGNESIUM mg/dL 1.9     Results from last 7 days   Lab Units 09/07/24  0422   SODIUM mmol/L 144   POTASSIUM mmol/L 4.4   BUN mg/dL 30*   CREATININE mg/dL 2.43*   CALCIUM mg/dL 8.5*     @[id number]@  Results from last 7 days   Lab Units 09/07/24  0422 09/06/24  1405   WBC 10*3/mm3 3.46 4.70  4.72   HEMOGLOBIN g/dL 8.6* 9.0*  9.1*   HEMATOCRIT % 26.5* 27.8*  28.9*   PLATELETS 10*3/mm3 155 162  172             Assessment:    Renal failure  Anemia  Hypertension  Borderline elevated troponin due to renal  Aortic stenosis  Chronic anticoagulation  Left bundle branch block  Chronic atrial fibrillation  Recommendations / Plan:   Patient normally followed by Dr. Orozco at [facility], has moderate aortic stenosis  Will check the echocardiogram  Diuretics per renal    Blake Lizarraga MD  9/7/2024, 12:34 EDT      EMR Dragon/Transcription:   Dictated utilizing Dragon dictation

## 2024-09-07 NOTE — PLAN OF CARE
Goal Outcome Evaluation:  Pt admitted to CCU 09/06 around 20:00. No complaints of pain or discomfort. Became bradycardic while sleeping, had one 6 beat run of vtach. Consulted cardiology (See Orders). Will continue to monitor.

## 2024-09-07 NOTE — PLAN OF CARE
Goal Outcome Evaluation:  HR has been as low as 39, all other vitals WNL. Pt is on 2 liters NC. Diuretics given with almost 2 liters of UOP. Pt has no c/o pain or nausea. Pt tolerating PO intake, appetite is good. BLE +2 edema still noted. Pt shifting weight in bed. Call bell in reach, bed alarm set. Family at bedside.

## 2024-09-08 ENCOUNTER — APPOINTMENT (OUTPATIENT)
Dept: GENERAL RADIOLOGY | Facility: HOSPITAL | Age: 84
DRG: 291 | End: 2024-09-08
Payer: MEDICARE

## 2024-09-08 LAB
ALBUMIN SERPL-MCNC: 3.5 G/DL (ref 3.5–5.2)
ANION GAP SERPL CALCULATED.3IONS-SCNC: 12 MMOL/L (ref 5–15)
BUN SERPL-MCNC: 32 MG/DL (ref 8–23)
BUN/CREAT SERPL: 12.5 (ref 7–25)
CALCIUM SPEC-SCNC: 9.3 MG/DL (ref 8.6–10.5)
CHLORIDE SERPL-SCNC: 101 MMOL/L (ref 98–107)
CO2 SERPL-SCNC: 25 MMOL/L (ref 22–29)
CREAT SERPL-MCNC: 2.55 MG/DL (ref 0.57–1)
DEPRECATED RDW RBC AUTO: 53.3 FL (ref 37–54)
EGFRCR SERPLBLD CKD-EPI 2021: 18.1 ML/MIN/1.73
ERYTHROCYTE [DISTWIDTH] IN BLOOD BY AUTOMATED COUNT: 14.5 % (ref 12.3–15.4)
GLUCOSE SERPL-MCNC: 121 MG/DL (ref 65–99)
HCT VFR BLD AUTO: 30.1 % (ref 34–46.6)
HGB BLD-MCNC: 9.6 G/DL (ref 12–15.9)
MAGNESIUM SERPL-MCNC: 1.8 MG/DL (ref 1.6–2.4)
MCH RBC QN AUTO: 31.7 PG (ref 26.6–33)
MCHC RBC AUTO-ENTMCNC: 31.9 G/DL (ref 31.5–35.7)
MCV RBC AUTO: 99.3 FL (ref 79–97)
PHOSPHATE SERPL-MCNC: 3.1 MG/DL (ref 2.5–4.5)
PLATELET # BLD AUTO: 180 10*3/MM3 (ref 140–450)
PMV BLD AUTO: 9.7 FL (ref 6–12)
POTASSIUM SERPL-SCNC: 3.9 MMOL/L (ref 3.5–5.2)
QT INTERVAL: 483 MS
QTC INTERVAL: 491 MS
RBC # BLD AUTO: 3.03 10*6/MM3 (ref 3.77–5.28)
SODIUM SERPL-SCNC: 138 MMOL/L (ref 136–145)
URATE SERPL-MCNC: 6 MG/DL (ref 2.4–5.7)
WBC NRBC COR # BLD AUTO: 6.95 10*3/MM3 (ref 3.4–10.8)

## 2024-09-08 PROCEDURE — 73560 X-RAY EXAM OF KNEE 1 OR 2: CPT

## 2024-09-08 PROCEDURE — 84550 ASSAY OF BLOOD/URIC ACID: CPT | Performed by: INTERNAL MEDICINE

## 2024-09-08 PROCEDURE — 99232 SBSQ HOSP IP/OBS MODERATE 35: CPT | Performed by: INTERNAL MEDICINE

## 2024-09-08 PROCEDURE — 80069 RENAL FUNCTION PANEL: CPT | Performed by: INTERNAL MEDICINE

## 2024-09-08 PROCEDURE — 25010000002 FUROSEMIDE PER 20 MG: Performed by: STUDENT IN AN ORGANIZED HEALTH CARE EDUCATION/TRAINING PROGRAM

## 2024-09-08 PROCEDURE — 25010000002 EPOETIN ALFA-EPBX 10000 UNIT/ML SOLUTION: Performed by: INTERNAL MEDICINE

## 2024-09-08 PROCEDURE — 25010000002 MORPHINE PER 10 MG: Performed by: NURSE PRACTITIONER

## 2024-09-08 PROCEDURE — 25010000002 ONDANSETRON PER 1 MG: Performed by: NURSE PRACTITIONER

## 2024-09-08 PROCEDURE — 85027 COMPLETE CBC AUTOMATED: CPT | Performed by: INTERNAL MEDICINE

## 2024-09-08 PROCEDURE — 83735 ASSAY OF MAGNESIUM: CPT | Performed by: INTERNAL MEDICINE

## 2024-09-08 RX ORDER — LIDOCAINE 4 G/G
1 PATCH TOPICAL
Status: DISCONTINUED | OUTPATIENT
Start: 2024-09-08 | End: 2024-09-14 | Stop reason: HOSPADM

## 2024-09-08 RX ORDER — MORPHINE SULFATE 2 MG/ML
2 INJECTION, SOLUTION INTRAMUSCULAR; INTRAVENOUS
Status: DISCONTINUED | OUTPATIENT
Start: 2024-09-08 | End: 2024-09-14 | Stop reason: HOSPADM

## 2024-09-08 RX ORDER — HYDROCODONE BITARTRATE AND ACETAMINOPHEN 5; 325 MG/1; MG/1
1 TABLET ORAL EVERY 4 HOURS PRN
Status: DISCONTINUED | OUTPATIENT
Start: 2024-09-08 | End: 2024-09-14 | Stop reason: HOSPADM

## 2024-09-08 RX ADMIN — EPOETIN ALFA-EPBX 10000 UNITS: 10000 INJECTION, SOLUTION INTRAVENOUS; SUBCUTANEOUS at 12:57

## 2024-09-08 RX ADMIN — METOLAZONE 5 MG: 5 TABLET ORAL at 08:08

## 2024-09-08 RX ADMIN — ATORVASTATIN CALCIUM 40 MG: 20 TABLET, FILM COATED ORAL at 08:08

## 2024-09-08 RX ADMIN — HYDROCODONE BITARTRATE AND ACETAMINOPHEN 1 TABLET: 5; 325 TABLET ORAL at 15:58

## 2024-09-08 RX ADMIN — Medication 10 ML: at 08:07

## 2024-09-08 RX ADMIN — SODIUM BICARBONATE 650 MG: 650 TABLET ORAL at 15:58

## 2024-09-08 RX ADMIN — MORPHINE SULFATE 2 MG: 2 INJECTION, SOLUTION INTRAMUSCULAR; INTRAVENOUS at 04:40

## 2024-09-08 RX ADMIN — Medication 10 ML: at 09:58

## 2024-09-08 RX ADMIN — SODIUM BICARBONATE 650 MG: 650 TABLET ORAL at 20:15

## 2024-09-08 RX ADMIN — GABAPENTIN 100 MG: 100 CAPSULE ORAL at 15:58

## 2024-09-08 RX ADMIN — SODIUM BICARBONATE 650 MG: 650 TABLET ORAL at 08:08

## 2024-09-08 RX ADMIN — MORPHINE SULFATE 2 MG: 2 INJECTION, SOLUTION INTRAMUSCULAR; INTRAVENOUS at 09:57

## 2024-09-08 RX ADMIN — Medication 10 ML: at 11:17

## 2024-09-08 RX ADMIN — SENNOSIDES AND DOCUSATE SODIUM 2 TABLET: 50; 8.6 TABLET ORAL at 08:08

## 2024-09-08 RX ADMIN — ONDANSETRON 4 MG: 2 INJECTION, SOLUTION INTRAMUSCULAR; INTRAVENOUS at 11:17

## 2024-09-08 RX ADMIN — HYDROCODONE BITARTRATE AND ACETAMINOPHEN 1 TABLET: 5; 325 TABLET ORAL at 20:16

## 2024-09-08 RX ADMIN — APIXABAN 2.5 MG: 2.5 TABLET, FILM COATED ORAL at 20:16

## 2024-09-08 RX ADMIN — AMLODIPINE BESYLATE 5 MG: 5 TABLET ORAL at 08:08

## 2024-09-08 RX ADMIN — FUROSEMIDE 40 MG: 10 INJECTION, SOLUTION INTRAMUSCULAR; INTRAVENOUS at 20:15

## 2024-09-08 RX ADMIN — VENLAFAXINE HYDROCHLORIDE 75 MG: 75 CAPSULE, EXTENDED RELEASE ORAL at 08:08

## 2024-09-08 RX ADMIN — GABAPENTIN 100 MG: 100 CAPSULE ORAL at 08:08

## 2024-09-08 RX ADMIN — CARVEDILOL 12.5 MG: 12.5 TABLET, FILM COATED ORAL at 17:38

## 2024-09-08 RX ADMIN — LIDOCAINE 1 PATCH: 4 PATCH TOPICAL at 12:09

## 2024-09-08 RX ADMIN — FUROSEMIDE 40 MG: 10 INJECTION, SOLUTION INTRAMUSCULAR; INTRAVENOUS at 08:09

## 2024-09-08 RX ADMIN — APIXABAN 2.5 MG: 2.5 TABLET, FILM COATED ORAL at 08:08

## 2024-09-08 RX ADMIN — CARVEDILOL 12.5 MG: 12.5 TABLET, FILM COATED ORAL at 08:08

## 2024-09-08 RX ADMIN — MORPHINE SULFATE 2 MG: 2 INJECTION, SOLUTION INTRAMUSCULAR; INTRAVENOUS at 16:01

## 2024-09-08 RX ADMIN — HYDROCODONE BITARTRATE AND ACETAMINOPHEN 1 TABLET: 5; 325 TABLET ORAL at 08:07

## 2024-09-08 RX ADMIN — SENNOSIDES AND DOCUSATE SODIUM 2 TABLET: 50; 8.6 TABLET ORAL at 20:16

## 2024-09-08 RX ADMIN — HYDROCODONE BITARTRATE AND ACETAMINOPHEN 1 TABLET: 5; 325 TABLET ORAL at 12:09

## 2024-09-08 RX ADMIN — GABAPENTIN 100 MG: 100 CAPSULE ORAL at 20:16

## 2024-09-08 RX ADMIN — HYDROCODONE BITARTRATE AND ACETAMINOPHEN 1 TABLET: 5; 325 TABLET ORAL at 02:00

## 2024-09-08 NOTE — PLAN OF CARE
Goal Outcome Evaluation:  Pt remains in CCU as telemetry pt. She is on 2L nasal cannula. IV diuretics given (see output). Lower bilateral extremities still have 2+ edema. Pt c/o left leg pain, see mar, no relief, currently awaiting xray. Will continue to monitor.

## 2024-09-08 NOTE — PROGRESS NOTES
[facility]  Cardiology Progress Note    Patient Identification:  Name: Lowell Min  Age: 84 y.o.  Sex: female  :  1940  MRN: [id number]                 Follow Up / Chief Complaint: Shortness of breath    Interval History:       Subjective:  Shortness of breath better    Objective:    Past Medical History:  Past Medical History:   Diagnosis Date    Anxiety     Atrial fibrillation     CHF (congestive heart failure)     Chronic kidney disease, stage IV (severe)     Depression     Elevated cholesterol     Hypertension     Type 2 diabetes mellitus      Past Surgical History:  Past Surgical History:   Procedure Laterality Date    APPENDECTOMY      CHOLECYSTECTOMY      COLONOSCOPY      CYSTOSCOPY BOTOX INJECTION OF BLADDER N/A     HYSTERECTOMY      TUMOR REMOVAL Left     benign tumor from left breast        Social History:   Social History     Tobacco Use    Smoking status: Never    Smokeless tobacco: Never   Substance Use Topics    Alcohol use: Never      Family History:  History reviewed. No pertinent family history.       Allergies:  Allergies   Allergen Reactions    Ibuprofen Other (See Comments)     Decrease urine output       Scheduled Meds:  amLODIPine, 5 mg, Q24H  apixaban, 2.5 mg, BID  atorvastatin, 40 mg, Daily  carvedilol, 12.5 mg, BID With Meals  epoetin nazanin/nazanin-epbx, 10,000 Units, Weekly  furosemide, 40 mg, Q12H  gabapentin, 100 mg, TID  Lidocaine, 1 patch, Q24H  metOLazone, 5 mg, Daily  sennosides-docusate, 2 tablet, BID  sodium bicarbonate, 650 mg, TID  venlafaxine XR, 75 mg, Daily            INTAKE AND OUTPUT:    Intake/Output Summary (Last 24 hours) at 2024 1503  Last data filed at 2024 0800  Gross per 24 hour   Intake --   Output 3900 ml   Net -3900 ml       Review of Systems:   GI:  Cardiac:  Pulmonary: Shortness of breath better    Constitutional:  Temp:  [97.6 °F (36.4 °C)-98.5 °F (36.9 °C)] 98.5 °F (36.9 °C)  Heart Rate:  [55-72] 70  Resp:  [18-26] 20  BP:  (134-150)/() 134/77    Physical Exam:  General:  Appears in no acute distress  Eyes: PERTL,  HEENT:  No JVD. Thyroid not visibly enlarged. No mucosal pallor or cyanosis  Respiratory: Respirations regular and unlabored at rest. BBS with good air entry in all fields. No crackles, rubs or wheezes auscultated  Cardiovascular: S1S2 Regular rate and rhythm. sys murmur, rub or gallop. No carotid bruits. DP/PT pulses     . No pretibial pitting edema  Gastrointestinal: Abdomen soft, flat, non tender. Bowel sounds present. No hepatosplenomegaly. No ascites  Musculoskeletal: JAY x4. No abnormal movements  Extremities: No digital clubbing or cyanosis  Skin: Color pink. Skin warm and dry to touch. No rashes    Neuro: AAO x3 CN II-XII grossly intact  Psych: Mood and affect normal, pleasant and cooperative          Cardiographics  Telemetry:     ECG:     Echocardiogram:     Lab Review   Results from last 7 days   Lab Units 09/07/24  0655 09/07/24  0422 09/06/24  1405   HSTROP T ng/L 33* 36* 37*     Results from last 7 days   Lab Units 09/08/24  0618   MAGNESIUM mg/dL 1.8     Results from last 7 days   Lab Units 09/08/24  0618   SODIUM mmol/L 138   POTASSIUM mmol/L 3.9   BUN mg/dL 32*   CREATININE mg/dL 2.55*   CALCIUM mg/dL 9.3     @[email or web address]@  Results from last 7 days   Lab Units 09/08/24  0618 09/07/24  0422 09/06/24  1405   WBC 10*3/mm3 6.95 3.46 4.70  4.72   HEMOGLOBIN g/dL 9.6* 8.6* 9.0*  9.1*   HEMATOCRIT % 30.1* 26.5* 27.8*  28.9*   PLATELETS 10*3/mm3 180 155 162  172             Assessment:    Renal failure  Anemia  Hypertension  Borderline elevated troponin due to renal  Aortic stenosis  Chronic anticoagulation  Left bundle branch block  Chronic atrial fibrillation      Plan:  Echo shows severe tricuspid regurgitation severe mitral regurgitation and aortic stenosis    Diuretics per renal    No further cardiac workup is needed patient will be followed up by the regular cardiologist Dr. Orozco at the [facility]  "[facility]    MD MALCOM Tristan/Transcription:   \"Dictated utilizing Dragon dictation\".     "

## 2024-09-08 NOTE — PROGRESS NOTES
: The chart reviewed.  Patient feels better today diuresing well with diuretics    Vital Signs  Vitals:    09/08/24 1100   BP: 134/77   Pulse:    Resp: 20   Temp: 98.5 °F (36.9 °C)   SpO2:      I/O this shift:  In: -   Out: 1000 [Urine:1000]  I/O last 3 completed shifts:  In: -   Out: 4050 [Urine:4050]      Physical Exam:    General: In no acute distress  HEENT:  Normocephalic, Atraumatic, EOMI, PERRLA, NO icterus,  Neck:  Supple,  positive JVD, No Carotid artery bruit, No lymphadenopathy  Chest: Clear to auscultation bilaterally,   Cardiovascular: Regular rate and rhythm. Positive S1 & S2, No rub, murmur or gallop.  Abdominal: Soft, nontender, no palpable organomegaly, no abdominal bruit, positive bowel sounds  Musculoskeletal: No joint tenderness or swelling, good range of motion, Adequate muscle strength on both sides, no cyanosis, clubbing positive,   Assessment and plan: Edema on lower extremities.  Neuro: Alert oriented x3, CN1 - 12 intact, No focal sensory or motor deficit.      Review of Systems:   CNS: Denied headaches, blurred vision, tingling or numbness in any part of body. No weakness or any impaired speech.  CVS: No chest pain or shortness of breath, No orthopnea or PND or exertional dyspnea,  Pulmonary: Denies shortness of breath, coughs, hematemesis, night sweats or weight loss.  GI: Denies diarrhea, nausea, vomiting. Denies weight loss, hematemesis, melena.  : Denies dysuria, frequency or hesitancy of urination  Vascular: Denies claudication, resting pain, tingling numbness or weakness in any part of the body.  Musculoskeletal: Denies Joint tenderness or pain, denies stiffness in joints, denies fever or weight loss, or skin rashes.    Current Labs  [unfilled]  Lab Results (last 24 hours)       Procedure Component Value Units Date/Time    Uric Acid [[id number]]  (Abnormal) Collected: 09/08/24 0618    Specimen: Blood Updated: 09/08/24 1134     Uric Acid 6.0 mg/dL     Renal Function Panel  [[id number]]  (Abnormal) Collected: 09/08/24 0618    Specimen: Blood Updated: 09/08/24 0738     Glucose 121 mg/dL      BUN 32 mg/dL      Creatinine 2.55 mg/dL      Sodium 138 mmol/L      Potassium 3.9 mmol/L      Chloride 101 mmol/L      CO2 25.0 mmol/L      Calcium 9.3 mg/dL      Albumin 3.5 g/dL      Phosphorus 3.1 mg/dL      Anion Gap 12.0 mmol/L      BUN/Creatinine Ratio 12.5     eGFR 18.1 mL/min/1.73     Narrative:      GFR Normal >60  Chronic Kidney Disease <60  Kidney Failure <15    The GFR formula is only valid for adults with stable renal function between ages 18 and 70.    Magnesium [[id number]]  (Normal) Collected: 09/08/24 0618    Specimen: Blood Updated: 09/08/24 0712     Magnesium 1.8 mg/dL     CBC (No Diff) [[id number]]  (Abnormal) Collected: 09/08/24 0618    Specimen: Blood Updated: 09/08/24 0650     WBC 6.95 10*3/mm3      RBC 3.03 10*6/mm3      Hemoglobin 9.6 g/dL      Hematocrit 30.1 %      MCV 99.3 fL      MCH 31.7 pg      MCHC 31.9 g/dL      RDW 14.5 %      RDW-SD 53.3 fl      MPV 9.7 fL      Platelets 180 10*3/mm3     TSH [[id number]]  (Normal) Collected: 09/07/24 1420    Specimen: Blood Updated: 09/07/24 1503     TSH 2.300 uIU/mL     Uric Acid [[id number]]  (Normal) Collected: 09/07/24 1420    Specimen: Blood Updated: 09/07/24 1456     Uric Acid 5.4 mg/dL     Protein / Creatinine Ratio, Urine - Urine, Clean Catch [[id number]]  (Abnormal) Collected: 09/07/24 1303    Specimen: Urine, Clean Catch Updated: 09/07/24 1343     Protein/Creatinine Ratio, Urine 777.3 mg/G Crea      Creatinine, Urine 21.1 mg/dL      Total Protein, Urine 16.4 mg/dL           Current Radiology  Imaging Results (Last 24 Hours)       Procedure Component Value Units Date/Time    XR Knee 1 or 2 View Left [[id number]] Collected: 09/08/24 0704     Updated: 09/08/24 0709    Narrative:      XR KNEE 1 OR 2 VW LEFT-     INDICATIONS: Pain and swelling     TECHNIQUE: 3 VIEWS OF THE LEFT KNEE     COMPARISON: 4/4/2022     FINDINGS:     Mild to  moderate knee effusion is noted. No acute fracture, erosion, or  dislocation is identified. Moderate to prominent degenerative spurring  is present. Mixed sclerotic and lucent focus at the distal femoral shaft  suggests enchondroma, appears stable. Follow-up/further evaluation can  be obtained as indications persist.       Impression:         As described.           This report was finalized on 9/8/2024 7:06 AM by Dr. Pérez Swartz M.D on [facility]station[email or web address]             Current Meds    Current Facility-Administered Medications:     acetaminophen (TYLENOL) tablet 650 mg, 650 mg, Oral, Q4H PRN **OR** acetaminophen (TYLENOL) 160 MG/5ML oral solution 650 mg, 650 mg, Oral, Q4H PRN **OR** acetaminophen (TYLENOL) suppository 650 mg, 650 mg, Rectal, Q4H PRN, King Javier APRN    amLODIPine (NORVASC) tablet 5 mg, 5 mg, Oral, Q24H, Blake Lizarraga MD, 5 mg at 09/08/24 0808    apixaban (ELIQUIS) tablet 2.5 mg, 2.5 mg, Oral, BID, King Javier APRN, 2.5 mg at 09/08/24 0808    atorvastatin (LIPITOR) tablet 40 mg, 40 mg, Oral, Daily, King Javier APRN, 40 mg at 09/08/24 0808    sennosides-docusate (PERICOLACE) 8.6-50 MG per tablet 2 tablet, 2 tablet, Oral, BID PRN **AND** polyethylene glycol (MIRALAX) packet 17 g, 17 g, Oral, Daily PRN **AND** bisacodyl (DULCOLAX) EC tablet 5 mg, 5 mg, Oral, Daily PRN **AND** bisacodyl (DULCOLAX) suppository 10 mg, 10 mg, Rectal, Daily PRN, King Javier APRN    carvedilol (COREG) tablet 12.5 mg, 12.5 mg, Oral, BID With Meals, King Javier APRN, 12.5 mg at 09/08/24 0808    furosemide (LASIX) injection 40 mg, 40 mg, Intravenous, Q12H, Vanessa Salazar MD, 40 mg at 09/08/24 0809    gabapentin (NEURONTIN) capsule 100 mg, 100 mg, Oral, TID, King Javier APRN, 100 mg at 09/08/24 0808    HYDROcodone-acetaminophen (NORCO) 5-325 MG per tablet 1 tablet, 1 tablet, Oral, Q4H PRN, Jovanni Giron MD, 1 tablet at 09/08/24 1209     Lidocaine 4 % 1 patch, 1 patch, Transdermal, Q24H, Jovanni Giron MD, 1 patch at 09/08/24 1209    metOLazone (ZAROXOLYN) tablet 5 mg, 5 mg, Oral, Daily, Josee Mcdermott MD, 5 mg at 09/08/24 0808    morphine injection 2 mg, 2 mg, Intravenous, Q3H PRN, King Javier APRN, 2 mg at 09/08/24 0957    nitroglycerin (NITROSTAT) SL tablet 0.4 mg, 0.4 mg, Sublingual, Q5 Min PRN, King Javier APRN    ondansetron ODT (ZOFRAN-ODT) disintegrating tablet 4 mg, 4 mg, Oral, Q6H PRN **OR** ondansetron (ZOFRAN) injection 4 mg, 4 mg, Intravenous, Q6H PRN, King Javier APRN, 4 mg at 09/08/24 1117    sennosides-docusate (PERICOLACE) 8.6-50 MG per tablet 2 tablet, 2 tablet, Oral, BID, King Javier APRN, 2 tablet at 09/08/24 0808    sodium bicarbonate tablet 650 mg, 650 mg, Oral, TID, King Javier APRN, 650 mg at 09/08/24 0808    [COMPLETED] Insert Peripheral IV, , , Once **AND** sodium chloride 0.9 % flush 10 mL, 10 mL, Intravenous, PRN, Alexei Prakash MD    sodium chloride 0.9 % flush 10 mL, 10 mL, Intravenous, PRN, King Javier APRN, 10 mL at 09/08/24 1117    venlafaxine XR (EFFEXOR-XR) 24 hr capsule 75 mg, 75 mg, Oral, Daily, King Javier APRN, 75 mg at 09/08/24 0808    Assessment and plan: 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 Higgins to  further discussed with the patient as she is approaching stage v,   Cardiology following to assess LV function proteinuria secondary to diabetic nephropathy  Anemia of renal failure iron stores adequate started on EPO          Acute on chronic diastolic CHF (congestive heart failure)    Chronic kidney disease, stage IV (severe)    Anxiety associated with depression    Iron deficiency anemia    PAF (paroxysmal atrial fibrillation)    Diabetic polyneuropathy associated with type 2 diabetes mellitus    Obesity (BMI  30-39.9)    HLD (hyperlipidemia)          Josee Mcdermott MD FACP, FASN.  09/08/24  12:14 EDT

## 2024-09-08 NOTE — PROGRESS NOTES
" LOS: 1 day     Name: Lowell Min  Age: 84 y.o.  Sex: female  :  1940  MRN: [id number]         Primary Care Physician: Dannie Mathews MD    Subjective   Subjective  Main complaint is that of acute left knee pain and swelling which began overnight last night.  She is not able to flex the knee.  Unable to work with physical therapy today.  States she gets injections in the left knee every 3 months    Objective   Vital Signs  Temp:  [97.3 °F (36.3 °C)-98.2 °F (36.8 °C)] 97.7 °F (36.5 °C)  Heart Rate:  [55-72] 70  Resp:  [18-26] 26  BP: (139-150)/() 141/86  Body mass index is 33.22 kg/m².    Objective:  General Appearance:  Comfortable and in no acute distress.    Vital signs: (most recent): Blood pressure 141/86, pulse 70, temperature 97.7 °F (36.5 °C), temperature source Oral, resp. rate 26, height 170.2 cm (67\"), weight 96.2 kg (212 lb 1.3 oz), SpO2 (!) 87%.    Lungs:  Normal effort and normal respiratory rate.    Heart: Normal rate.  Regular rhythm.    Abdomen: Abdomen is soft.  Bowel sounds are normal.   There is no abdominal tenderness.     Extremities: There is effusion and local swelling.  There is no dependent edema.  (Swelling and pain around the left knee.  No erythema or warmth)  Neurological: Patient is alert and oriented to person, place and time.    Skin:  Warm and dry.                Results Review:       I reviewed the patient's new clinical results.    Results from last 7 days   Lab Units 24  1405   WBC 10*3/mm3 6.95 3.46 4.70  4.72   HEMOGLOBIN g/dL 9.6* 8.6* 9.0*  9.1*   PLATELETS 10*3/mm3 180 155 162  172     Results from last 7 days   Lab Units 242 24  1405   SODIUM mmol/L 138 144 144   POTASSIUM mmol/L 3.9 4.4 4.8   CHLORIDE mmol/L 101 111* 111*   CO2 mmol/L 25.0 24.6 22.0   BUN mg/dL 32* 30* 30*   CREATININE mg/dL 2.55* 2.43* 2.44*   CALCIUM mg/dL 9.3 8.5* 8.8   GLUCOSE mg/dL 121* 130* 125*       "           Scheduled Meds:   amLODIPine, 5 mg, Oral, Q24H  apixaban, 2.5 mg, Oral, BID  atorvastatin, 40 mg, Oral, Daily  carvedilol, 12.5 mg, Oral, BID With Meals  furosemide, 40 mg, Intravenous, Q12H  gabapentin, 100 mg, Oral, TID  metOLazone, 5 mg, Oral, Daily  sennosides-docusate, 2 tablet, Oral, BID  sodium bicarbonate, 650 mg, Oral, TID  venlafaxine XR, 75 mg, Oral, Daily      PRN Meds:     acetaminophen **OR** acetaminophen **OR** acetaminophen    senna-docusate sodium **AND** polyethylene glycol **AND** bisacodyl **AND** bisacodyl    HYDROcodone-acetaminophen    Morphine    nitroglycerin    ondansetron ODT **OR** ondansetron    [COMPLETED] Insert Peripheral IV **AND** sodium chloride    sodium chloride  Continuous Infusions:       Assessment & Plan   Active Hospital Problems    Diagnosis  POA    **Acute on chronic diastolic CHF (congestive heart failure) [I50.33]  Yes    Obesity (BMI 30-39.9) [E66.9]  Yes    HLD (hyperlipidemia) [E78.5]  Yes    Diabetic polyneuropathy associated with type 2 diabetes mellitus [E11.42]  Yes    Anxiety associated with depression [F41.8]  Yes    Iron deficiency anemia [D50.9]  Yes    PAF (paroxysmal atrial fibrillation) [I48.0]  Yes    Chronic kidney disease, stage IV (severe) [N18.4]  Yes      Resolved Hospital Problems   No resolved problems to display.       Assessment & Plan    -Consult orthopedic surgery for acute onset left knee swelling and pain overnight.  X-ray of the knee performed this morning noted.  Check uric acid.  Pain control  -Currently on IV Lasix and metolazone as per nephrology.  Renal function stable.  -Cardiology following as well.  Echo noted.  -Monitor I's/O's and daily weights.  Wean oxygen as tolerated  -Anemia noted.  Baseline hemoglobin 9-10.  History of iron deficiency.  Monitor for now  -A-fib rate controlled.  On low-dose Eliquis  -Blood pressure stable.  Continue Norvasc  -Blood sugars well-controlled.    -Therapy services      Expected discharge  date/ time has not been documented.     Jovanni Giron MD  [facility]  09/08/24  11:02 EDT

## 2024-09-08 NOTE — SIGNIFICANT NOTE
09/08/24 1007   OTHER   Discipline physical therapist   Rehab Time/Intention   Session Not Performed patient/family declined evaluation  (Pt reports significant L knee pain that started last night/this AM. XR observed. Pt LLE currently elevated and ice donned. unable to tolerate mobilization, will follow up.)   Recommendation   PT - Next Appointment 09/09/24

## 2024-09-08 NOTE — PLAN OF CARE
Goal Outcome Evaluation:VSS throughout shift. Pt continues to c/o pain in left knee. Swelling and tenderness noted. Ortho sx consult called in. Pt taking PO norco and IV morphine for pain management. Pt tolerating PO intake, appetite is good. Pt educated that staff will now turn her every 2 hours as she is not weight shifting enough. Call bell in reach.

## 2024-09-09 ENCOUNTER — APPOINTMENT (OUTPATIENT)
Dept: GENERAL RADIOLOGY | Facility: HOSPITAL | Age: 84
DRG: 291 | End: 2024-09-09
Payer: MEDICARE

## 2024-09-09 ENCOUNTER — APPOINTMENT (OUTPATIENT)
Dept: ULTRASOUND IMAGING | Facility: HOSPITAL | Age: 84
DRG: 291 | End: 2024-09-09
Payer: MEDICARE

## 2024-09-09 LAB
ALBUMIN SERPL-MCNC: 3.3 G/DL (ref 3.5–5.2)
ANION GAP SERPL CALCULATED.3IONS-SCNC: 9.3 MMOL/L (ref 5–15)
APPEARANCE FLD: ABNORMAL
BUN SERPL-MCNC: 41 MG/DL (ref 8–23)
BUN/CREAT SERPL: 12.9 (ref 7–25)
CALCIUM SPEC-SCNC: 8.8 MG/DL (ref 8.6–10.5)
CHLORIDE SERPL-SCNC: 96 MMOL/L (ref 98–107)
CO2 SERPL-SCNC: 29.7 MMOL/L (ref 22–29)
COLOR FLD: ABNORMAL
CREAT SERPL-MCNC: 3.18 MG/DL (ref 0.57–1)
CRYSTALS FLD MICRO: NORMAL
DEPRECATED RDW RBC AUTO: 52.6 FL (ref 37–54)
EGFRCR SERPLBLD CKD-EPI 2021: 13.9 ML/MIN/1.73
ERYTHROCYTE [DISTWIDTH] IN BLOOD BY AUTOMATED COUNT: 14.5 % (ref 12.3–15.4)
GLUCOSE SERPL-MCNC: 97 MG/DL (ref 65–99)
HCT VFR BLD AUTO: 27.5 % (ref 34–46.6)
HGB BLD-MCNC: 8.9 G/DL (ref 12–15.9)
LYMPHOCYTES NFR FLD MANUAL: 1 %
MAGNESIUM SERPL-MCNC: 1.6 MG/DL (ref 1.6–2.4)
MCH RBC QN AUTO: 32.2 PG (ref 26.6–33)
MCHC RBC AUTO-ENTMCNC: 32.4 G/DL (ref 31.5–35.7)
MCV RBC AUTO: 99.6 FL (ref 79–97)
METHOD: ABNORMAL
MONOCYTES NFR FLD: 3 %
NEUTROPHILS NFR FLD MANUAL: 96 %
NUC CELL # FLD: 3731 /MM3
PHOSPHATE SERPL-MCNC: 4.4 MG/DL (ref 2.5–4.5)
PLATELET # BLD AUTO: 173 10*3/MM3 (ref 140–450)
PMV BLD AUTO: 9.8 FL (ref 6–12)
POTASSIUM SERPL-SCNC: 4.4 MMOL/L (ref 3.5–5.2)
RBC # BLD AUTO: 2.76 10*6/MM3 (ref 3.77–5.28)
RBC # FLD AUTO: 4000 /MM3
SODIUM SERPL-SCNC: 135 MMOL/L (ref 136–145)
WBC NRBC COR # BLD AUTO: 7.17 10*3/MM3 (ref 3.4–10.8)

## 2024-09-09 PROCEDURE — 89060 EXAM SYNOVIAL FLUID CRYSTALS: CPT | Performed by: ORTHOPAEDIC SURGERY

## 2024-09-09 PROCEDURE — 77002 NEEDLE LOCALIZATION BY XRAY: CPT

## 2024-09-09 PROCEDURE — 0S9D3ZX DRAINAGE OF LEFT KNEE JOINT, PERCUTANEOUS APPROACH, DIAGNOSTIC: ICD-10-PCS | Performed by: RADIOLOGY

## 2024-09-09 PROCEDURE — 97162 PT EVAL MOD COMPLEX 30 MIN: CPT

## 2024-09-09 PROCEDURE — 87015 SPECIMEN INFECT AGNT CONCNTJ: CPT | Performed by: ORTHOPAEDIC SURGERY

## 2024-09-09 PROCEDURE — 83735 ASSAY OF MAGNESIUM: CPT | Performed by: INTERNAL MEDICINE

## 2024-09-09 PROCEDURE — 25010000002 LIDOCAINE 1 % SOLUTION: Performed by: INTERNAL MEDICINE

## 2024-09-09 PROCEDURE — 87070 CULTURE OTHR SPECIMN AEROBIC: CPT | Performed by: ORTHOPAEDIC SURGERY

## 2024-09-09 PROCEDURE — 97110 THERAPEUTIC EXERCISES: CPT

## 2024-09-09 PROCEDURE — 99232 SBSQ HOSP IP/OBS MODERATE 35: CPT

## 2024-09-09 PROCEDURE — 25010000002 FUROSEMIDE PER 20 MG: Performed by: STUDENT IN AN ORGANIZED HEALTH CARE EDUCATION/TRAINING PROGRAM

## 2024-09-09 PROCEDURE — 80069 RENAL FUNCTION PANEL: CPT | Performed by: INTERNAL MEDICINE

## 2024-09-09 PROCEDURE — 89051 BODY FLUID CELL COUNT: CPT | Performed by: ORTHOPAEDIC SURGERY

## 2024-09-09 PROCEDURE — 85027 COMPLETE CBC AUTOMATED: CPT | Performed by: INTERNAL MEDICINE

## 2024-09-09 RX ORDER — LIDOCAINE HYDROCHLORIDE 10 MG/ML
10 INJECTION, SOLUTION INFILTRATION; PERINEURAL
Status: COMPLETED | OUTPATIENT
Start: 2024-09-09 | End: 2024-09-09

## 2024-09-09 RX ADMIN — APIXABAN 2.5 MG: 2.5 TABLET, FILM COATED ORAL at 21:58

## 2024-09-09 RX ADMIN — ATORVASTATIN CALCIUM 40 MG: 20 TABLET, FILM COATED ORAL at 08:19

## 2024-09-09 RX ADMIN — SENNOSIDES AND DOCUSATE SODIUM 2 TABLET: 50; 8.6 TABLET ORAL at 21:58

## 2024-09-09 RX ADMIN — SENNOSIDES AND DOCUSATE SODIUM 2 TABLET: 50; 8.6 TABLET ORAL at 08:10

## 2024-09-09 RX ADMIN — SODIUM BICARBONATE 650 MG: 650 TABLET ORAL at 08:13

## 2024-09-09 RX ADMIN — VENLAFAXINE HYDROCHLORIDE 75 MG: 75 CAPSULE, EXTENDED RELEASE ORAL at 08:13

## 2024-09-09 RX ADMIN — GABAPENTIN 100 MG: 100 CAPSULE ORAL at 21:58

## 2024-09-09 RX ADMIN — HYDROCODONE BITARTRATE AND ACETAMINOPHEN 1 TABLET: 5; 325 TABLET ORAL at 09:35

## 2024-09-09 RX ADMIN — APIXABAN 2.5 MG: 2.5 TABLET, FILM COATED ORAL at 08:12

## 2024-09-09 RX ADMIN — LIDOCAINE HYDROCHLORIDE 4 ML: 10 INJECTION, SOLUTION INFILTRATION; PERINEURAL at 10:26

## 2024-09-09 RX ADMIN — FUROSEMIDE 40 MG: 10 INJECTION, SOLUTION INTRAMUSCULAR; INTRAVENOUS at 08:14

## 2024-09-09 RX ADMIN — GABAPENTIN 100 MG: 100 CAPSULE ORAL at 08:19

## 2024-09-09 RX ADMIN — METOLAZONE 5 MG: 5 TABLET ORAL at 08:13

## 2024-09-09 RX ADMIN — GABAPENTIN 100 MG: 100 CAPSULE ORAL at 15:16

## 2024-09-09 RX ADMIN — CARVEDILOL 12.5 MG: 12.5 TABLET, FILM COATED ORAL at 08:11

## 2024-09-09 RX ADMIN — HYDROCODONE BITARTRATE AND ACETAMINOPHEN 1 TABLET: 5; 325 TABLET ORAL at 15:22

## 2024-09-09 RX ADMIN — AMLODIPINE BESYLATE 5 MG: 5 TABLET ORAL at 08:12

## 2024-09-09 NOTE — CONSULTS
ORTHOPEDIC SURGERY CONSULT      Patient: Lowell Min  Date of Admission: 9/6/2024  4:25 PM  YOB: 1940  Medical Record Number: [id number]  Attending Physician: Jovanni Giron*  Consulting Physician: Paras Rodriguez MD    CHIEF COMPLAINT: Left knee pain and swelling x 1 day    HISTORY OF PRESENT ILLNESS: Patient is a 84 y.o. year old female presents to [facility] with above complaints.  I was consulted for further evaluation and treatment.  She reports a history of left knee osteoarthritis and she receives intermittent steroid injections every 3 months by an outside orthopedic surgeon.  She reports for the last day and a half she started having some increased knee pain and swelling without injury.  She states her knee usually does not swell up this much.  Last steroid injection was given 2 months ago.  The swelling and pain is limiting her ability to ambulate in the hospital.  She was admitted for acute on chronic congestive heart failure    ALLERGIES:   Allergies   Allergen Reactions    Ibuprofen Other (See Comments)     Decrease urine output         HOME MEDICATIONS:  Medications Prior to Admission   Medication Sig Dispense Refill Last Dose    acetaminophen (TYLENOL) 325 MG tablet Take 2 tablets by mouth Every 4 (Four) Hours As Needed for Mild Pain .   9/5/2024    amLODIPine (NORVASC) 5 MG tablet Take 1 tablet by mouth Daily. 30 tablet 0 9/6/2024    apixaban (ELIQUIS) 2.5 MG tablet tablet Take 1 tablet by mouth 2 (Two) Times a Day. Indications: Atrial Fibrillation 60 tablet  9/6/2024    atorvastatin (LIPITOR) 40 MG tablet Take 1 tablet by mouth every night at bedtime.   9/5/2024    carvedilol (COREG) 12.5 MG tablet Take 1 tablet by mouth 2 (two) times daily with meals.   9/6/2024    gabapentin (NEURONTIN) 100 MG capsule Take 1 capsule by mouth 3 (Three) Times a Day. 15 capsule 0 9/6/2024    HYDROcodone-acetaminophen (NORCO) 5-325 MG per tablet Take 1 tablet by  mouth Every 6 (Six) Hours As Needed for Moderate Pain. 10 tablet 0 Past Month    O2 (OXYGEN) 2 L/min Every Night. Uses when sleeping   9/5/2024    sodium bicarbonate 650 MG tablet Take 1 tablet by mouth 3 (Three) Times a Day.   9/6/2024    venlafaxine XR (EFFEXOR-XR) 75 MG 24 hr capsule Take 1 capsule by mouth Daily.   9/6/2024    sennosides-docusate (PERICOLACE) 8.6-50 MG per tablet Take 2 tablets by mouth 2 (Two) Times a Day.   More than a month       CURRENT MEDICATIONS:  Scheduled Meds:amLODIPine, 5 mg, Oral, Q24H  apixaban, 2.5 mg, Oral, BID  atorvastatin, 40 mg, Oral, Daily  carvedilol, 12.5 mg, Oral, BID With Meals  epoetin nazanin/nazanin-epbx, 10,000 Units, Subcutaneous, Weekly  furosemide, 40 mg, Intravenous, Q12H  gabapentin, 100 mg, Oral, TID  Lidocaine, 1 patch, Transdermal, Q24H  metOLazone, 5 mg, Oral, Daily  sennosides-docusate, 2 tablet, Oral, BID  sodium bicarbonate, 650 mg, Oral, TID  venlafaxine XR, 75 mg, Oral, Daily      Continuous Infusions:   PRN Meds:.  acetaminophen **OR** acetaminophen **OR** acetaminophen    senna-docusate sodium **AND** polyethylene glycol **AND** bisacodyl **AND** bisacodyl    HYDROcodone-acetaminophen    Morphine    nitroglycerin    ondansetron ODT **OR** ondansetron    [COMPLETED] Insert Peripheral IV **AND** sodium chloride    sodium chloride    Past Medical History:   Diagnosis Date    Anxiety     Atrial fibrillation     CHF (congestive heart failure)     Chronic kidney disease, stage IV (severe)     Depression     Elevated cholesterol     Hypertension     Type 2 diabetes mellitus      Past Surgical History:   Procedure Laterality Date    APPENDECTOMY      CHOLECYSTECTOMY      COLONOSCOPY      CYSTOSCOPY BOTOX INJECTION OF BLADDER N/A     HYSTERECTOMY      TUMOR REMOVAL Left     benign tumor from left breast     Social History     Occupational History    Not on file   Tobacco Use    Smoking status: Never    Smokeless tobacco: Never   Vaping Use    Vaping status: Never  Used   Substance and Sexual Activity    Alcohol use: Never    Drug use: Never    Sexual activity: Defer      Social History     Social History Narrative    Not on file     History reviewed. No pertinent family history.    REVIEW OF SYSTEMS:    HEENT: Patient denies any headaches, vision changes, change in hearing, or tinnitus, Patient denies epistaxis, sinus pain, hoarseness, or dysphagia   Pulmonary: Patient denies any cough, congestion, acute change in SOA or wheezing.   Cardiovascular: Patient denies any change in chest pain, dyspnea, palpitations, weakness, intolerance of exercise, varicosities, change in murmur   Gastrointestinal:  Patient denies change in appetite, melena, change in bowel habits.   Genital/Urinary: Patient denies dysuria, change in color of urine, change in frequency of urination, pain with urgency, change in incontinence, retention.   Musculoskeletal: Patient denies complaints of acute changes in symptoms of other joints not mentioned above.   Neurological: Patient denies changes in dizziness, tremor, ataxia, or difficulty in speaking or changes in memory.   Endocrine system: Patient denies acute changes in tremors, palpitations, polyuria, polydipsia, polyphagia, diaphoresis, exophthalmos, or goiter.   Psychological: Patient denies thoughts/plans or harming self or other; denies acute changes in depression,  insomnia, night terrors, alaina, disorientation.   Skin: Patient denies any bruising, rashes, discoloration, pruritus,or wounds not mentioned in history of present illness or chief complaint above.   Hematopoietic: Patient denies current bleeding, epistaxis, hematuria, or melena.    PHYSICAL EXAM:   Vitals:  Vitals:    09/08/24 2338 09/08/24 2340 09/09/24 0419 09/09/24 0542   BP:  112/61 119/59    BP Location:  Right arm Right arm    Patient Position:  Lying Lying    Pulse:  60 63    Resp:  18 18    Temp: 98.3 °F (36.8 °C)  98 °F (36.7 °C)    TempSrc: Oral  Oral    SpO2:  91% 95%     Weight:    93 kg (205 lb 0.4 oz)   Height:           General:  84 y.o. female who appears about stated age.    Alert, cooperative, in no acute distress         Head:    Normocephalic, without obvious abnormality, atraumatic   Eyes:            Lids and lashes normal, conjunctivae and sclerae normal, no         icterus, no pallor, corneas clear, PERRLA   Ears:    Ears appear intact with no abnormalities noted   Throat:   No oral lesions, no thrush, oral mucosa moist   Neck:   No adenopathy, supple, trachea midline, no JVD   Back:     Limited exam shows no severe kyphosis present,no visible           erythema, no excessive  tenderness to palpation.    Lungs:     Respirations regular, even and unlabored.     Heart:    Normal rate, Pulses palpable   Chest Wall:    No abnormalities observed.   Abdomen:     Normal bowel sounds, no masses, no organomegaly, soft              non-tender, non-distended, no guarding, no rebound                      tenderness   Rectal:     Deferred   Pulses:   Pulses palpable and equal bilaterally   Skin:   No bleeding, bruising or rash   Lymph nodes:   No palpable adenopathy   Extremities:     Left knee: Skin is intact.  Has mild to moderate warmth.  There is a large effusion present.  Range of motion is very limited secondary to patient's complaints of pain.  Ligaments are stable.  She is neurovascular intact distally.    DIAGNOSTIC TEST:  XR KNEE 1 OR 2 VW LEFT-     INDICATIONS: Pain and swelling     TECHNIQUE: 3 VIEWS OF THE LEFT KNEE     COMPARISON: 4/4/2022     FINDINGS:     Mild to moderate knee effusion is noted. No acute fracture, erosion, or  dislocation is identified. Moderate to prominent degenerative spurring  is present. Mixed sclerotic and lucent focus at the distal femoral shaft  suggests enchondroma, appears stable. Follow-up/further evaluation can  be obtained as indications persist.     IMPRESSION:     As described.           This report was finalized on 9/8/2024 7:06 AM by  Dr. Pérez Swartz M.D on Workstation: [id number]    ASSESSMENT:  Large left knee effusion without history of trauma.  Differential diagnosis is hemarthrosis, gout, pseudogout, infection, or synovitis from osteoarthritis exacerbation.    Acute on chronic diastolic CHF (congestive heart failure)    Chronic kidney disease, stage IV (severe)    Anxiety associated with depression    Iron deficiency anemia    PAF (paroxysmal atrial fibrillation)    Diabetic polyneuropathy associated with type 2 diabetes mellitus    Obesity (BMI 30-39.9)    HLD (hyperlipidemia)      PLAN:    Will ask interventional radiology to aspirate the knee and send the fluid for appropriate laboratories.  Will follow-up tomorrow for definitive recommendation.  Thank you for the consultation.    The above diagnosis and treatment plan was discussed with the patient.  They were educated in treatment options for their condition.   They were given the opportunity to ask questions and were answered to their satisfaction.  They agreed to proceed with the above treatment plan.        Paras Rodriguez MD  Date: 9/9/2024

## 2024-09-09 NOTE — PROGRESS NOTES
[facility]  Cardiology Progress Note    Patient Identification:  Name: Lowell Min  Age: 84 y.o.  Sex: female  :  1940  MRN: [id number]                 Follow Up / Chief Complaint: Shortness of breath    Interval History: resting in bed, shortness of breath improving, on 2l nc        Objective:    Past Medical History:  Past Medical History:   Diagnosis Date    Anxiety     Atrial fibrillation     CHF (congestive heart failure)     Chronic kidney disease, stage IV (severe)     Depression     Elevated cholesterol     Hypertension     Type 2 diabetes mellitus      Past Surgical History:  Past Surgical History:   Procedure Laterality Date    APPENDECTOMY      CHOLECYSTECTOMY      COLONOSCOPY      CYSTOSCOPY BOTOX INJECTION OF BLADDER N/A     HYSTERECTOMY      TUMOR REMOVAL Left     benign tumor from left breast        Social History:   Social History     Tobacco Use    Smoking status: Never    Smokeless tobacco: Never   Substance Use Topics    Alcohol use: Never      Family History:  History reviewed. No pertinent family history.       Allergies:  Allergies   Allergen Reactions    Ibuprofen Other (See Comments)     Decrease urine output       Scheduled Meds:  amLODIPine, 5 mg, Q24H  apixaban, 2.5 mg, BID  atorvastatin, 40 mg, Daily  carvedilol, 12.5 mg, BID With Meals  epoetin nazanin/nazanin-epbx, 10,000 Units, Weekly  [Held by provider] furosemide, 40 mg, Q12H  gabapentin, 100 mg, TID  Lidocaine, 1 patch, Q24H  [Held by provider] metOLazone, 5 mg, Daily  sennosides-docusate, 2 tablet, BID  venlafaxine XR, 75 mg, Daily            INTAKE AND OUTPUT:    Intake/Output Summary (Last 24 hours) at 2024 1049  Last data filed at 2024 0542  Gross per 24 hour   Intake --   Output 1250 ml   Net -1250 ml       Review of Systems:   GI: no n/v or abd pain  Cardiac: no chest pain or palpitations  Pulmonary: shortness of breath improved      Constitutional:  Temp:  [97.6 °F (36.4 °C)-98.5 °F (36.9 °C)] 98.5  °F (36.9 °C)  Heart Rate:  [54-77] 77  Resp:  [15-22] 18  BP: (112-134)/(57-83) 120/57    Physical Exam:  General:  Alert, cooperative, appears in no acute distress  Respiratory: Clear to auscultation.  Normal respiratory effort and rate.  Cardiovascular: S1S2 Regular rate and rhythm. No murmur, rub or gallop.   Gastrointestinal: soft, non tender. Bowel sounds present.   Extremities: JAY x4. No pretibial pitting edema. Adequate musculoskeletal strength.   Neuro: AAO x3 CN II-XII grossly intact          Cardiographics    Echocardiogram:     Interpretation Summary         Left ventricular systolic function is mildly decreased. Calculated left ventricular EF = 44.1%    The following left ventricular wall segments are hypokinetic: mid anterior, apical anterior, basal anterolateral, mid anterolateral, apical lateral, basal inferolateral, mid inferolateral, apical inferior, mid inferior, apical septal, basal inferoseptal, mid inferoseptal, apex hypokinetic, mid anteroseptal, basal anterior, basal inferior and basal inferoseptal.    Left ventricular diastolic function was indeterminate.    The left atrial cavity is severely dilated.    Mild aortic valve stenosis is present. Aortic valve area is 1.2 cm2.    The right atrial cavity is severely  dilated.    Peak velocity of the flow distal to the aortic valve is 246.1 cm/s. Aortic valve mean pressure gradient is 11 mmHg.    Severe mitral valve regurgitation is present.    Mild mitral valve stenosis is present. The mitral valve mean gradient is 4 mmHg.    Severe tricuspid valve regurgitation is present.    Estimated right ventricular systolic pressure from tricuspid regurgitation is markedly elevated (>55 mmHg). Calculated right ventricular systolic pressure from tricuspid regurgitation is 62 mmHg.     Lab Review   Results from last 7 days   Lab Units 09/07/24  0655 09/07/24  0422 09/06/24  1405   HSTROP T ng/L 33* 36* 37*     Results from last 7 days   Lab Units  "09/09/24  0548   MAGNESIUM mg/dL 1.6     Results from last 7 days   Lab Units 09/09/24  0548   SODIUM mmol/L 135*   POTASSIUM mmol/L 4.4   BUN mg/dL 41*   CREATININE mg/dL 3.18*   CALCIUM mg/dL 8.8     @[id number]@  Results from last 7 days   Lab Units 09/09/24  0548 09/08/24  0618 09/07/24  0422   WBC 10*3/mm3 7.17 6.95 3.46   HEMOGLOBIN g/dL 8.9* 9.6* 8.6*   HEMATOCRIT % 27.5* 30.1* 26.5*   PLATELETS 10*3/mm3 173 180 155             Assessment:    Renal failure  Anemia  Hypertension  Borderline elevated troponin due to renal  Aortic stenosis  Chronic anticoagulation  Left bundle branch block  Chronic atrial fibrillation      Plan:  BP stable  Afib, rate controlled, ac on eliquis, hgb 8.9, stable   Echo with EF 44% severe TR, MR, AS  No further cardiac workup indicated-will follow regular cardiologist Dr Orozco as outpatient  Diuresing per nephrology      RAMAKRISHNA Weiner/Transcription:   \"Dictated utilizing Dragon dictation\".     "

## 2024-09-09 NOTE — PLAN OF CARE
Problem: Adult Inpatient Plan of Care  Goal: Absence of Hospital-Acquired Illness or Injury  Intervention: Identify and Manage Fall Risk  Recent Flowsheet Documentation  Taken 9/9/2024 1900 by Ni Garza RN  Safety Promotion/Fall Prevention: safety round/check completed  Taken 9/9/2024 1800 by Ni Garza RN  Safety Promotion/Fall Prevention: safety round/check completed  Taken 9/9/2024 1700 by Ni Garza RN  Safety Promotion/Fall Prevention: safety round/check completed  Taken 9/9/2024 1600 by Ni Garza RN  Safety Promotion/Fall Prevention: safety round/check completed  Taken 9/9/2024 1400 by Ni Garza RN  Safety Promotion/Fall Prevention: safety round/check completed  Taken 9/9/2024 1200 by Ni Garza RN  Safety Promotion/Fall Prevention: safety round/check completed  Taken 9/9/2024 1000 by Ni Garza RN  Safety Promotion/Fall Prevention: safety round/check completed  Taken 9/9/2024 0800 by Ni Garza RN  Safety Promotion/Fall Prevention: safety round/check completed  Intervention: Prevent Skin Injury  Recent Flowsheet Documentation  Taken 9/9/2024 1400 by Ni Garza RN  Skin Protection: incontinence pads utilized  Taken 9/9/2024 0800 by Ni Garza RN  Body Position: supine  Skin Protection: incontinence pads utilized  Intervention: Prevent and Manage VTE (Venous Thromboembolism) Risk  Recent Flowsheet Documentation  Taken 9/9/2024 1400 by Ni Garza RN  VTE Prevention/Management:   bilateral   sequential compression devices on  Range of Motion: active ROM (range of motion) encouraged  Taken 9/9/2024 0800 by Ni Garza RN  VTE Prevention/Management:   bilateral   sequential compression devices on  Range of Motion: active ROM (range of motion) encouraged  Intervention: Prevent Infection  Recent Flowsheet Documentation  Taken 9/9/2024 1900 by Ni Garza RN  Infection Prevention:   single patient room provided   rest/sleep  promoted  Taken 9/9/2024 1800 by Ni Garza RN  Infection Prevention:   rest/sleep promoted   single patient room provided  Taken 9/9/2024 1700 by Ni Garza RN  Infection Prevention:   single patient room provided   rest/sleep promoted  Taken 9/9/2024 1600 by Ni Garza RN  Infection Prevention:   single patient room provided   rest/sleep promoted  Taken 9/9/2024 1400 by Ni Garza RN  Infection Prevention:   single patient room provided   rest/sleep promoted  Taken 9/9/2024 1200 by Ni Garza RN  Infection Prevention:   single patient room provided   rest/sleep promoted  Taken 9/9/2024 1000 by Ni Garza RN  Infection Prevention:   single patient room provided   rest/sleep promoted  Taken 9/9/2024 0800 by Ni Garza RN  Infection Prevention:   single patient room provided   rest/sleep promoted  Goal: Optimal Comfort and Wellbeing  Intervention: Monitor Pain and Promote Comfort  Recent Flowsheet Documentation  Taken 9/9/2024 0800 by Ni Garza RN  Pain Management Interventions:   pillow support provided   position adjusted  Intervention: Provide Person-Centered Care  Recent Flowsheet Documentation  Taken 9/9/2024 1400 by Ni Garza RN  Trust Relationship/Rapport:   care explained   choices provided  Taken 9/9/2024 0800 by Ni Garza RN  Trust Relationship/Rapport:   care explained   choices provided   Goal Outcome Evaluation:

## 2024-09-09 NOTE — PROGRESS NOTES
"   LOS: 2 days     Chief Complaint/ Reason for encounter: CKD/CHF    Subjective   09/09/24 : She is feeling better today, no complaints, weight down about 13 pounds since admission  Less short of breath, on supplemental oxygen at her home rate of 2 L  Decreased edema, no chest pain  Good appetite      Medical history reviewed:  History of Present Illness    Subjective    History taken from: Patient and chart    Vital Signs  Temp:  [97.1 °F (36.2 °C)-98.5 °F (36.9 °C)] 97.1 °F (36.2 °C)  Heart Rate:  [54-79] 79  Resp:  [15-22] 18  BP: ()/(57-83) 98/73       Wt Readings from Last 1 Encounters:   09/09/24 0542 93 kg (205 lb 0.4 oz)   09/08/24 0614 96.2 kg (212 lb 1.3 oz)   09/07/24 1449 97.1 kg (214 lb)   09/07/24 0518 97.4 kg (214 lb 11.7 oz)   09/06/24 1510 98.9 kg (218 lb)       Objective:  Vital signs: (most recent): Blood pressure 98/73, pulse 79, temperature 97.1 °F (36.2 °C), temperature source Axillary, resp. rate 18, height 170.2 cm (67\"), weight 93 kg (205 lb 0.4 oz), SpO2 90%.                Objective:  General Appearance:  Comfortable, well-appearing, in no acute distress and not in pain.  Awake, alert  HEENT: Mucous membranes moist, no injury, oropharynx clear  Lungs:  Normal effort and normal respiratory rate.  Breath sounds clear to auscultation.  No  respiratory distress.  No rales, decreased breath sounds or rhonchi.    Heart: Normal rate.  Regular rhythm.  S1, S2 normal.  No murmur.   Abdomen: Abdomen is soft.  Bowel sounds are normal, no abdominal tenderness.  There is no rebound or guarding  Extremities: Trace edema of bilateral lower extremities  Skin:  Warm and dry with no rashes      Results Review:    Intake/Output:     Intake/Output Summary (Last 24 hours) at 9/9/2024 1338  Last data filed at 9/9/2024 0542  Gross per 24 hour   Intake --   Output 1250 ml   Net -1250 ml         DATA:  Radiology and Labs:  The following labs independently reviewed by me. Additional labs ordered for tomorrow " a.m.  Interval notes, chart personally reviewed by me.   Old records independently reviewed showing CKD 4 followed by Dr. Ryann Foster  The following radiologic studies independently viewed by me, findings echo showed EF 44%, Initial chest x-ray showed mildly congested pulmonary vasculature  New problems include CHF exacerbation, new PERLA  Discussed with patient herself at bedside    Risk/ complexity of medical care/ medical decision making high risk, new PERLA with CHF exacerbation on IV diuretics, high risk medications requiring close monitoring of renal function and electrolytes      Labs:   Recent Results (from the past 24 hour(s))   CBC (No Diff)    Collection Time: 09/09/24  5:48 AM    Specimen: Blood   Result Value Ref Range    WBC 7.17 3.40 - 10.80 10*3/mm3    RBC 2.76 (L) 3.77 - 5.28 10*6/mm3    Hemoglobin 8.9 (L) 12.0 - 15.9 g/dL    Hematocrit 27.5 (L) 34.0 - 46.6 %    MCV 99.6 (H) 79.0 - 97.0 fL    MCH 32.2 26.6 - 33.0 pg    MCHC 32.4 31.5 - 35.7 g/dL    RDW 14.5 12.3 - 15.4 %    RDW-SD 52.6 37.0 - 54.0 fl    MPV 9.8 6.0 - 12.0 fL    Platelets 173 140 - 450 10*3/mm3   Renal Function Panel    Collection Time: 09/09/24  5:48 AM    Specimen: Blood   Result Value Ref Range    Glucose 97 65 - 99 mg/dL    BUN 41 (H) 8 - 23 mg/dL    Creatinine 3.18 (H) 0.57 - 1.00 mg/dL    Sodium 135 (L) 136 - 145 mmol/L    Potassium 4.4 3.5 - 5.2 mmol/L    Chloride 96 (L) 98 - 107 mmol/L    CO2 29.7 (H) 22.0 - 29.0 mmol/L    Calcium 8.8 8.6 - 10.5 mg/dL    Albumin 3.3 (L) 3.5 - 5.2 g/dL    Phosphorus 4.4 2.5 - 4.5 mg/dL    Anion Gap 9.3 5.0 - 15.0 mmol/L    BUN/Creatinine Ratio 12.9 7.0 - 25.0    eGFR 13.9 (L) >60.0 mL/min/1.73   Magnesium    Collection Time: 09/09/24  5:48 AM    Specimen: Blood   Result Value Ref Range    Magnesium 1.6 1.6 - 2.4 mg/dL   Crystal Exam, Fluid - Synovial Fluid,    Collection Time: 09/09/24 10:39 AM    Specimen: Synovial Fluid   Result Value Ref Range    Crystals, Fluid       Intracellular  crystals observed exhibiting polarization characteristics of Calcium Pyrophosphate   Body fluid cell count - Body Fluid, Knee, Left    Collection Time: 09/09/24 10:39 AM    Specimen: Knee, Left; Body Fluid   Result Value Ref Range    Color, Fluid Red     Appearance, Fluid Turbid (A) Clear    RBC, Fluid 4,000 /mm3    Nucleated Cells, Fluid 3,731 /mm3    Method: Automated Sysmex XN Method    Body fluid differential - Body Fluid, Knee, Left    Collection Time: 09/09/24 10:39 AM    Specimen: Knee, Left; Body Fluid   Result Value Ref Range    Neutrophils, Fluid 96 %    Lymphocytes, Fluid 1 %    Monocytes, Fluid 3 %       Radiology:  Pertinent radiology studies were reviewed as described above      Medications have been reviewed separately in chart overview      ASSESSMENT:  New PERLA, likely getting a bit volume contracted from diuretics.  Looks euvolemic on exam  CKD stage IV.  Recent outpatient creatinine around 2.4With some progression  Acute systolic CHF, improving with diuresis  Anemia of CKD  Aortic stenosis  A-fib on anticoagulation      Plan:  Renal function worse again today  Likely volume contracted  Will hold IV Lasix and metolazone today  Encourage patient to double up on her fluid intake a bit today and recheck labs in a.m.  If renal function is stable we will plan to restart oral diuretics in a.m.  Anticoagulation per cardiology BP a bit soft.  Will hold amlodipine to allow for improved renal perfusion        Isaiah Foster MD  [facility]   Office phone number: [phone]  Answering service phone number: [phone]    09/09/24  13:38 EDT    Dictation performed using Dragon dictation software

## 2024-09-09 NOTE — PLAN OF CARE
Goal Outcome Evaluation:  Plan of Care Reviewed With: patient           Outcome Evaluation: Pt is an 83 yo female adm with SOA, B LE swelling with acute exacerbation of CHF, had pain and inc swelling L knee, had L knee aspirated earlier today, and when she woke up this morning she had R ankle pain, gout is suspected. Pt was agreeable today but lethargic from earlier procedure and had trouble staying awake, she was assisted to sitting edge of bed with moderate assist and 2 standing attempts were made but pt unable to clear buttocks, not able to tolerate weight through L knee and R ankle. Pt presents with pain, weakness, impaired functional mobility and activity tolerance and will benefit from PT to address. Pt lives alone, uses a quad cane and is currently significantly below her baseline and would not be safe to return home alone, rec SNF at discharge      Anticipated Discharge Disposition (PT): skilled nursing facility

## 2024-09-09 NOTE — PROGRESS NOTES
" LOS: 2 days     Name: Lowell Min  Age: 84 y.o.  Sex: female  :  1940  MRN: [id number]         Primary Care Physician: Dannie Mathews MD    Subjective   Subjective  Nuys much in the way of shortness of breath.  On 2 L.  Main complaint is that of left knee pain and now having right ankle pain with associated swelling in both regions.  Going for left knee arthrocentesis today.    Objective   Vital Signs  Temp:  [97.6 °F (36.4 °C)-98.5 °F (36.9 °C)] 98.5 °F (36.9 °C)  Heart Rate:  [54-77] 77  Resp:  [15-22] 22  BP: (112-134)/(59-83) 124/83  Body mass index is 32.11 kg/m².    Objective:  General Appearance:  Comfortable and in no acute distress.    Vital signs: (most recent): Blood pressure 124/83, pulse 77, temperature 98.5 °F (36.9 °C), temperature source Oral, resp. rate 22, height 170.2 cm (67\"), weight 93 kg (205 lb 0.4 oz), SpO2 95%.    Lungs:  Normal effort and normal respiratory rate.    Heart: Normal rate.  Regular rhythm.    Abdomen: Abdomen is soft.  Bowel sounds are normal.   There is no abdominal tenderness.     Extremities: There is local swelling.  There is no dependent edema.  (She has pain and swelling in the left knee and right ankle.  No associated erythema or warmth appreciated)  Neurological: Patient is alert and oriented to person, place and time.    Skin:  Warm and dry.                Results Review:       I reviewed the patient's new clinical results.    Results from last 7 days   Lab Units 24  0548 24  0618 24  0422 24  1405   WBC 10*3/mm3 7.17 6.95 3.46 4.70  4.72   HEMOGLOBIN g/dL 8.9* 9.6* 8.6* 9.0*  9.1*   PLATELETS 10*3/mm3 173 180 155 162  172     Results from last 7 days   Lab Units 24  0548 24  0618 24  0422 24  1405   SODIUM mmol/L 135* 138 144 144   POTASSIUM mmol/L 4.4 3.9 4.4 4.8   CHLORIDE mmol/L 96* 101 111* 111*   CO2 mmol/L 29.7* 25.0 24.6 22.0   BUN mg/dL 41* 32* 30* 30*   CREATININE mg/dL 3.18* 2.55* 2.43* 2.44* "   CALCIUM mg/dL 8.8 9.3 8.5* 8.8   GLUCOSE mg/dL 97 121* 130* 125*                 Scheduled Meds:   amLODIPine, 5 mg, Oral, Q24H  apixaban, 2.5 mg, Oral, BID  atorvastatin, 40 mg, Oral, Daily  carvedilol, 12.5 mg, Oral, BID With Meals  epoetin nazanin/nazanin-epbx, 10,000 Units, Subcutaneous, Weekly  furosemide, 40 mg, Intravenous, Q12H  gabapentin, 100 mg, Oral, TID  Lidocaine, 1 patch, Transdermal, Q24H  metOLazone, 5 mg, Oral, Daily  sennosides-docusate, 2 tablet, Oral, BID  sodium bicarbonate, 650 mg, Oral, TID  venlafaxine XR, 75 mg, Oral, Daily      PRN Meds:     acetaminophen **OR** acetaminophen **OR** acetaminophen    senna-docusate sodium **AND** polyethylene glycol **AND** bisacodyl **AND** bisacodyl    HYDROcodone-acetaminophen    Morphine    nitroglycerin    ondansetron ODT **OR** ondansetron    [COMPLETED] Insert Peripheral IV **AND** sodium chloride    sodium chloride  Continuous Infusions:       Assessment & Plan   Active Hospital Problems    Diagnosis  POA    **Acute on chronic diastolic CHF (congestive heart failure) [I50.33]  Yes    Obesity (BMI 30-39.9) [E66.9]  Yes    HLD (hyperlipidemia) [E78.5]  Yes    Diabetic polyneuropathy associated with type 2 diabetes mellitus [E11.42]  Yes    Anxiety associated with depression [F41.8]  Yes    Iron deficiency anemia [D50.9]  Yes    PAF (paroxysmal atrial fibrillation) [I48.0]  Yes    Chronic kidney disease, stage IV (severe) [N18.4]  Yes      Resolved Hospital Problems   No resolved problems to display.       Assessment & Plan    -Orthopedic surgery planning for left knee fluid sampling.  Patient also now having pain and some swelling in the right ankle.  Uric acid has risen.  Await fluid results.  If found to have gout or pseudogout will initiate on prednisone.  -Currently on IV Lasix and metolazone as per nephrology.  Renal function stable.  -Cardiology following as well.  Echo noted.  No cardiac intervention recommended at this time  -Monitor I's/O's and  daily weights.  Wean oxygen as tolerated  -Anemia noted.  Baseline hemoglobin 9-10.  History of iron deficiency.  Monitor for now  -A-fib rate controlled.  On low-dose Eliquis  -Blood pressure stable.  Continue Norvasc  -Blood sugars well-controlled.    -Therapy services      Expected discharge date/ time has not been documented.     Jovanni Giron MD  [facility]  09/09/24  09:36 EDT

## 2024-09-09 NOTE — PLAN OF CARE
Goal Outcome Evaluation:  VSS through shift. Complaints of left knee pain still present. Orthopedic sx consulted. During my shift pt received PO norco, tolerated well. Was able to sleep, pt did not ask for any more PRN pain medications. Will continue to monitor.

## 2024-09-09 NOTE — THERAPY EVALUATION
Patient Name: Lowell Min  : 1940    MRN: [id number]                              Today's Date: 2024       Admit Date: 2024    Visit Dx:     ICD-10-CM ICD-9-CM   1. Acute congestive heart failure, unspecified heart failure type  I50.9 428.0   2. Chronic kidney disease, unspecified CKD stage  N18.9 585.9   3. Chronic anemia  D64.9 285.9   4. Hypertension not at goal  I10 401.9     Patient Active Problem List   Diagnosis    Chronic kidney disease, stage IV (severe)    Erythropoietin deficiency anemia    Symptomatic anemia    Anxiety associated with depression    Iron deficiency anemia    PAF (paroxysmal atrial fibrillation)    Shortness of breath    Chronic diastolic congestive heart failure    Diabetic polyneuropathy associated with type 2 diabetes mellitus    PERLA (acute kidney injury)    Foot pain, bilateral    Anemia of chronic renal failure    Acute on chronic diastolic CHF (congestive heart failure)    Obesity (BMI 30-39.9)    HLD (hyperlipidemia)     Past Medical History:   Diagnosis Date    Anxiety     Atrial fibrillation     CHF (congestive heart failure)     Chronic kidney disease, stage IV (severe)     Depression     Elevated cholesterol     Hypertension     Type 2 diabetes mellitus      Past Surgical History:   Procedure Laterality Date    APPENDECTOMY      CHOLECYSTECTOMY      COLONOSCOPY      CYSTOSCOPY BOTOX INJECTION OF BLADDER N/A     HYSTERECTOMY      TUMOR REMOVAL Left     benign tumor from left breast      General Information       Row Name 24 1612          Physical Therapy Time and Intention    Document Type evaluation  -PC     Mode of Treatment physical therapy  -PC       Row Name 24 1612          General Information    Patient Profile Reviewed yes  -PC     Prior Level of Function independent:  -PC     Existing Precautions/Restrictions fall  -PC     Barriers to Rehab medically complex  -PC       Row Name 24 1612          Living Environment    People in Home  alone  -PC       Row Name 09/09/24 1612          Home Main Entrance    Number of Stairs, Main Entrance seven  -PC     Stair Railings, Main Entrance railings safe and in good condition  -       Row Name 09/09/24 1612          Stairs Within Home, Primary    Stairs, Within Home, Primary pt states that she has a bilevel home but once she goes up 7 stairs she can stay on one level  -       Row Name 09/09/24 1612          Cognition    Orientation Status (Cognition) oriented x 3  -PC       Row Name 09/09/24 1612          Safety Issues, Functional Mobility    Impairments Affecting Function (Mobility) balance;endurance/activity tolerance;strength  -PC     Comment, Safety Issues/Impairments (Mobility) pt was lethargic from L knee aspiration  -PC               User Key  (r) = Recorded By, (t) = Taken By, (c) = Cosigned By      Initials Name Provider Type    PC Kerry Luque, PT Physical Therapist                   Mobility       Row Name 09/09/24 1614          Bed Mobility    Bed Mobility supine-sit  -PC     Supine-Sit [place] (Bed Mobility) moderate assist (50% patient effort)  -PC       Row Name 09/09/24 1614          Sit-Stand Transfer    Sit-Stand [place] (Transfers) maximum assist (25% patient effort);2 person assist  -PC     Assistive Device (Sit-Stand Transfers) walker, front-wheeled  -PC     Comment, (Sit-Stand Transfer) 2 attempts to stand, pt unable to clear buttocks  -PC               User Key  (r) = Recorded By, (t) = Taken By, (c) = Cosigned By      Initials Name Provider Type    PC Kerry Luque, PT Physical Therapist                   Obj/Interventions       Row Name 09/09/24 1614          Range of Motion Comprehensive    Comment, General Range of Motion L knee pain limited L knee ROM, R ankle pain limited R ankle ROM, otherwise, WFL  -PC       Row Name 09/09/24 1614          Strength Comprehensive (MMT)    Comment, General Manual Muscle Testing (MMT) Assessment B UE 3+/5, L LE 3-/5, RLE 3/5   -PC       Row Name 09/09/24 1614          Balance    Balance Assessment sitting static balance;sitting dynamic balance;standing static balance;standing dynamic balance  -PC     Static Sitting Balance contact guard  -PC     Dynamic Sitting Balance minimal assist  -PC     Position, Sitting Balance sitting edge of bed  -PC               User Key  (r) = Recorded By, (t) = Taken By, (c) = Cosigned By      Initials Name Provider Type    PC Kerry Luque, PT Physical Therapist                   Goals/Plan       Row Name 09/09/24 1622          Bed Mobility Goal 1 (PT)    Activity/Assistive Device (Bed Mobility Goal 1, PT) sit to supine/supine to sit  -PC     [place] Level/Cues Needed (Bed Mobility Goal 1, PT) contact guard required  -PC     Time Frame (Bed Mobility Goal 1, PT) 2 weeks  -PC       Row Name 09/09/24 1622          Transfer Goal 1 (PT)    Activity/Assistive Device (Transfer Goal 1, PT) sit-to-stand/stand-to-sit  -PC     [place] Level/Cues Needed (Transfer Goal 1, PT) minimum assist (75% or more patient effort)  -PC     Time Frame (Transfer Goal 1, PT) 2 weeks  -PC       Row Name 09/09/24 1622          Gait Training Goal 1 (PT)    Activity/Assistive Device (Gait Training Goal 1, PT) gait (walking locomotion);assistive device use  -PC     [place] Level (Gait Training Goal 1, PT) contact guard required  -PC     Distance (Gait Training Goal 1, PT) 25  -PC     Time Frame (Gait Training Goal 1, PT) 2 weeks  -PC       Row Name 09/09/24 1622          Therapy Assessment/Plan (PT)    Planned Therapy Interventions (PT) balance training;bed mobility training;gait training;transfer training;strengthening  -PC               User Key  (r) = Recorded By, (t) = Taken By, (c) = Cosigned By      Initials Name Provider Type    PC Kerry Luque, PT Physical Therapist                   Clinical Impression       Row Name 09/09/24 1616          Pain    Pre/Posttreatment Pain Comment pt did not rate, c/o L knee pain  and R ankle pain, unable to tolerate bearing any weight, improved with rest  -PC     Pain Intervention(s) Repositioned;Rest  -PC       Row Name 09/09/24 1616          Plan of Care Review    Plan of Care Reviewed With patient  -PC     Outcome Evaluation Pt is an 83 yo female adm with SOA, B LE swelling with acute exacerbation of CHF, had pain and inc swelling L knee, had L knee aspirated earlier today, and when she woke up this morning she had R ankle pain, gout is suspected. Pt was agreeable today but lethargic from earlier procedure and had trouble staying awake, she was assisted to sitting edge of bed with moderate assist and 2 standing attempts were made but pt unable to clear buttocks, not able to tolerate weight through L knee and R ankle. Pt presents with pain, weakness, impaired functional mobility and activity tolerance and will benefit from PT to address. Pt lives alone, uses a quad cane and is currently significantly below her baseline and would not be safe to return home alone, rec SNF at discharge  -PC       Row Name 09/09/24 1616          Therapy Assessment/Plan (PT)    Rehab Potential (PT) fair, will monitor progress closely  -PC     Criteria for Skilled Interventions Met (PT) yes;meets criteria  -PC     Therapy Frequency (PT) 5 times/wk  -PC       Row Name 09/09/24 1616          Positioning and Restraints    Pre-Treatment Position in bed  -PC     Post Treatment Position bed  -PC     In Bed supine;call light within reach;encouraged to call for assist;exit alarm on  -PC               User Key  (r) = Recorded By, (t) = Taken By, (c) = Cosigned By      Initials Name Provider Type    PC Kerry Luque, PT Physical Therapist                   Outcome Measures       Row Name 09/09/24 1623 09/09/24 0800       How much help from another person do you currently need...    Turning from your back to your side while in flat bed without using bedrails? 3  -PC 4  -EM    Moving from lying on back to sitting on the  side of a flat bed without bedrails? 3  -PC 3  -EM    Moving to and from a bed to a chair (including a wheelchair)? 1  -PC 3  -EM    Standing up from a chair using your arms (e.g., wheelchair, bedside chair)? 1  -PC 1  -EM    Climbing 3-5 steps with a railing? 1  -PC 1  -EM    To walk in hospital room? 1  -PC 1  -EM    AM-PAC 6 Clicks Score (PT) 10  -PC 13  -EM    Highest Level of Mobility Goal 4 --> Transfer to chair/commode  -PC 4 --> Transfer to chair/commode  -EM              User Key  (r) = Recorded By, (t) = Taken By, (c) = Cosigned By      Initials Name Provider Type    PC Kerry Luque, PT Physical Therapist    Ni Jones, RN Registered Nurse                                 Physical Therapy Education       Title: PT OT SLP Therapies (In Progress)       Topic: Physical Therapy (In Progress)       Point: Mobility training (In Progress)       Learning Progress Summary             Patient Acceptance, E,D, NR by  at 9/9/2024 1623                         Point: Home exercise program (In Progress)       Learning Progress Summary             Patient Acceptance, E,D, NR by  at 9/9/2024 1623                         Point: Body mechanics (In Progress)       Learning Progress Summary             Patient Acceptance, E,D, NR by  at 9/9/2024 1623                         Point: Precautions (In Progress)       Learning Progress Summary             Patient Acceptance, E,D, NR by  at 9/9/2024 1623                                         User Key       Initials Effective Dates Name Provider Type Discipline     06/16/21 -  Kerry Luque, PT Physical Therapist PT                  PT Recommendation and Plan  Planned Therapy Interventions (PT): balance training, bed mobility training, gait training, transfer training, strengthening  Plan of Care Reviewed With: patient  Outcome Evaluation: Pt is an 83 yo female adm with SOA, B LE swelling with acute exacerbation of CHF, had pain and inc swelling L knee, had L  knee aspirated earlier today, and when she woke up this morning she had R ankle pain, gout is suspected. Pt was agreeable today but lethargic from earlier procedure and had trouble staying awake, she was assisted to sitting edge of bed with moderate assist and 2 standing attempts were made but pt unable to clear buttocks, not able to tolerate weight through L knee and R ankle. Pt presents with pain, weakness, impaired functional mobility and activity tolerance and will benefit from PT to address. Pt lives alone, uses a quad cane and is currently significantly below her baseline and would not be safe to return home alone, rec SNF at discharge     Time Calculation:         PT Charges       Row Name 09/09/24 1624             Time Calculation    Start Time 1451  -PC      Stop Time 1512  -PC      Time Calculation (min) 21 min  -PC      PT Received On 09/09/24  -PC      PT - Next Appointment 09/10/24  -PC      PT Goal Re-Cert Due Date 09/16/24  -PC                User Key  (r) = Recorded By, (t) = Taken By, (c) = Cosigned By      Initials Name Provider Type    PC Kerry Luque PT Physical Therapist                  Therapy Charges for Today       Code Description Service Date Service Provider Modifiers Qty    69178965685 HC PT EVAL MOD COMPLEXITY 3 9/9/2024 Kerry Luque, PT GP 1    12035006968 HC PT THER PROC EA 15 MIN 9/9/2024 Kerry Luque PT GP 1            PT G-Codes  AM-PAC 6 Clicks Score (PT): 10  PT Discharge Summary  Anticipated Discharge Disposition (PT): skilled nursing facility    Kerry Luque PT  9/9/2024

## 2024-09-10 PROBLEM — M11.20 CALCIUM PYROPHOSPHATE DEPOSITION DISEASE (CPPD): Status: ACTIVE | Noted: 2024-09-10

## 2024-09-10 LAB
ALBUMIN SERPL-MCNC: 3 G/DL (ref 3.5–5.2)
ANION GAP SERPL CALCULATED.3IONS-SCNC: 10 MMOL/L (ref 5–15)
BUN SERPL-MCNC: 54 MG/DL (ref 8–23)
BUN/CREAT SERPL: 13 (ref 7–25)
CALCIUM SPEC-SCNC: 8.5 MG/DL (ref 8.6–10.5)
CHLORIDE SERPL-SCNC: 95 MMOL/L (ref 98–107)
CHLORIDE UR-SCNC: 88 MMOL/L
CO2 SERPL-SCNC: 31 MMOL/L (ref 22–29)
CREAT SERPL-MCNC: 4.14 MG/DL (ref 0.57–1)
CREAT UR-MCNC: 71.2 MG/DL
DEPRECATED RDW RBC AUTO: 50.2 FL (ref 37–54)
EGFRCR SERPLBLD CKD-EPI 2021: 10.1 ML/MIN/1.73
ERYTHROCYTE [DISTWIDTH] IN BLOOD BY AUTOMATED COUNT: 13.9 % (ref 12.3–15.4)
GLUCOSE SERPL-MCNC: 148 MG/DL (ref 65–99)
HCT VFR BLD AUTO: 26.6 % (ref 34–46.6)
HGB BLD-MCNC: 8.4 G/DL (ref 12–15.9)
MCH RBC QN AUTO: 31.6 PG (ref 26.6–33)
MCHC RBC AUTO-ENTMCNC: 31.6 G/DL (ref 31.5–35.7)
MCV RBC AUTO: 100 FL (ref 79–97)
PHOSPHATE SERPL-MCNC: 4.8 MG/DL (ref 2.5–4.5)
PLATELET # BLD AUTO: 172 10*3/MM3 (ref 140–450)
PMV BLD AUTO: 9.9 FL (ref 6–12)
POTASSIUM SERPL-SCNC: 4.3 MMOL/L (ref 3.5–5.2)
PROT ?TM UR-MCNC: 20.2 MG/DL
RBC # BLD AUTO: 2.66 10*6/MM3 (ref 3.77–5.28)
SODIUM SERPL-SCNC: 136 MMOL/L (ref 136–145)
SODIUM UR-SCNC: 77 MMOL/L
WBC NRBC COR # BLD AUTO: 6.11 10*3/MM3 (ref 3.4–10.8)

## 2024-09-10 PROCEDURE — 84300 ASSAY OF URINE SODIUM: CPT | Performed by: INTERNAL MEDICINE

## 2024-09-10 PROCEDURE — 85027 COMPLETE CBC AUTOMATED: CPT | Performed by: INTERNAL MEDICINE

## 2024-09-10 PROCEDURE — 63710000001 METHYLPREDNISOLONE 4 MG TABLET THERAPY PACK 21 EACH DISP PACK: Performed by: ORTHOPAEDIC SURGERY

## 2024-09-10 PROCEDURE — 84156 ASSAY OF PROTEIN URINE: CPT | Performed by: INTERNAL MEDICINE

## 2024-09-10 PROCEDURE — 82436 ASSAY OF URINE CHLORIDE: CPT | Performed by: INTERNAL MEDICINE

## 2024-09-10 PROCEDURE — 99232 SBSQ HOSP IP/OBS MODERATE 35: CPT | Performed by: INTERNAL MEDICINE

## 2024-09-10 PROCEDURE — 82570 ASSAY OF URINE CREATININE: CPT | Performed by: INTERNAL MEDICINE

## 2024-09-10 PROCEDURE — 80069 RENAL FUNCTION PANEL: CPT | Performed by: INTERNAL MEDICINE

## 2024-09-10 PROCEDURE — 25810000003 SODIUM CHLORIDE 0.9 % SOLUTION: Performed by: INTERNAL MEDICINE

## 2024-09-10 PROCEDURE — 97110 THERAPEUTIC EXERCISES: CPT

## 2024-09-10 RX ORDER — SODIUM CHLORIDE 9 MG/ML
100 INJECTION, SOLUTION INTRAVENOUS CONTINUOUS
Status: DISCONTINUED | OUTPATIENT
Start: 2024-09-10 | End: 2024-09-11

## 2024-09-10 RX ORDER — METHYLPREDNISOLONE 4 MG
8 TABLET, DOSE PACK ORAL 2 TIMES DAILY
Status: DISCONTINUED | OUTPATIENT
Start: 2024-09-10 | End: 2024-09-14 | Stop reason: HOSPADM

## 2024-09-10 RX ADMIN — VENLAFAXINE HYDROCHLORIDE 75 MG: 75 CAPSULE, EXTENDED RELEASE ORAL at 08:23

## 2024-09-10 RX ADMIN — ATORVASTATIN CALCIUM 40 MG: 20 TABLET, FILM COATED ORAL at 08:22

## 2024-09-10 RX ADMIN — SENNOSIDES AND DOCUSATE SODIUM 2 TABLET: 50; 8.6 TABLET ORAL at 08:22

## 2024-09-10 RX ADMIN — LIDOCAINE 1 PATCH: 4 PATCH TOPICAL at 08:23

## 2024-09-10 RX ADMIN — APIXABAN 2.5 MG: 2.5 TABLET, FILM COATED ORAL at 20:19

## 2024-09-10 RX ADMIN — GABAPENTIN 100 MG: 100 CAPSULE ORAL at 08:22

## 2024-09-10 RX ADMIN — SODIUM CHLORIDE 100 ML/HR: 9 INJECTION, SOLUTION INTRAVENOUS at 20:19

## 2024-09-10 RX ADMIN — CARVEDILOL 12.5 MG: 12.5 TABLET, FILM COATED ORAL at 08:23

## 2024-09-10 RX ADMIN — GABAPENTIN 100 MG: 100 CAPSULE ORAL at 20:19

## 2024-09-10 RX ADMIN — METHYLPREDNISOLONE 8 MG: 4 TABLET ORAL at 08:23

## 2024-09-10 RX ADMIN — APIXABAN 2.5 MG: 2.5 TABLET, FILM COATED ORAL at 08:22

## 2024-09-10 RX ADMIN — METHYLPREDNISOLONE 8 MG: 4 TABLET ORAL at 20:19

## 2024-09-10 RX ADMIN — GABAPENTIN 100 MG: 100 CAPSULE ORAL at 16:01

## 2024-09-10 RX ADMIN — SENNOSIDES AND DOCUSATE SODIUM 2 TABLET: 50; 8.6 TABLET ORAL at 20:19

## 2024-09-10 RX ADMIN — SODIUM CHLORIDE 100 ML/HR: 9 INJECTION, SOLUTION INTRAVENOUS at 11:29

## 2024-09-10 RX ADMIN — CARVEDILOL 12.5 MG: 12.5 TABLET, FILM COATED ORAL at 18:08

## 2024-09-10 NOTE — CASE MANAGEMENT/SOCIAL WORK
Discharge Planning Assessment  [facility]     Patient Name: Lowell Min  MRN: [id number]  Today's Date: 9/10/2024    Admit Date: 9/6/2024    Plan: Pending progress with PT/OT when ordered and able to participate.   Discharge Needs Assessment       Row Name 09/10/24 1511       Living Environment    People in Home alone    Current Living Arrangements home    Potentially Unsafe Housing Conditions none    Primary Care Provided by self    Provides Primary Care For no one    Family Caregiver if Needed none    Quality of Family Relationships helpful;supportive    Able to Return to Prior Arrangements yes       Resource/Environmental Concerns    Resource/Environmental Concerns none    Transportation Concerns none  states daughter transports as needed.       Transportation Needs    In the past 12 months, has lack of transportation kept you from medical appointments or from getting medications? no    In the past 12 months, has lack of transportation kept you from meetings, work, or from getting things needed for daily living? No       Food Insecurity    Within the past 12 months, you worried that your food would run out before you got the money to buy more. Never true    Within the past 12 months, the food you bought just didn't last and you didn't have money to get more. Never true       Transition Planning    Patient/Family Anticipates Transition to home    Patient/Family Anticipated Services at Transition home health care;skilled nursing    Transportation Anticipated family or friend will provide;health plan transportation       Discharge Needs Assessment    Readmission Within the Last 30 Days no previous admission in last 30 days    Equipment Currently Used at Home cane, quad;bath bench;wheelchair;oxygen  home o2 at 2l hs    Concerns to be Addressed discharge planning    Equipment Needed After Discharge other (see comments)  TBD    Discharge Facility/Level of Care Needs other (see comments)  TBD    Provided Post  Acute Provider List? Yes    Post Acute Provider List Nursing Home;DME Supplier;Home Health;Inpatient Rehab    Provided Post Acute Provider Quality & Resource List? Yes    Post Acute Provider Quality and Resource List Home Health;Inpatient Rehab;Nursing Home    Delivered To Patient    Method of Delivery In person    Current Discharge Risk lives alone;chronically ill                   Discharge Plan       Row Name 09/10/24 1522       Plan    Plan Pending progress with PT/OT when ordered and able to participate.    Roadmap to Recovery Yes    Patient/Family in Agreement with Plan yes    Plan Comments Spoke with patient at bedside. Facesheet, PCP,  and pharmacy confirmed. Patient states she lives alone and is IADL with her cane in her home. She states her daughter assists her as needed and provides her transportation. Patient has used [facility] home care in the past and if PT recommends snf, she would like a referral to [facility]. PT has not yet been ordered due to patients c/o leg pain.                  Continued Care and Services - Admitted Since 9/6/2024    No active coordination exists for this encounter.       Expected Discharge Date and Time       Expected Discharge Date Expected Discharge Time    Sep 11, 2024            Demographic Summary       Row Name 09/10/24 1500       General Information    Admission Type inpatient    Arrived From emergency department    Required Notices Provided Important Message from Medicare    Referral Source admission list    Reason for Consult discharge planning    Preferred Language English                   Functional Status       Row Name 09/10/24 1528       Functional Status, IADL    Medications independent    Meal Preparation independent    Housekeeping assistive person    Laundry assistive person    Shopping assistive person    IADL Comments daughter helps as needed.       Mental Status    General Appearance WDL WDL       Mental Status Summary    Recent Changes in Mental  Status/Cognitive Functioning no changes       Employment/    Employment Status retired                               Becka Arenas, RN

## 2024-09-10 NOTE — THERAPY TREATMENT NOTE
Patient Name: Lowell Min  : 1940    MRN: [id number]                              Today's Date: 9/10/2024       Admit Date: 2024    Visit Dx:     ICD-10-CM ICD-9-CM   1. Acute congestive heart failure, unspecified heart failure type  I50.9 428.0   2. Chronic kidney disease, unspecified CKD stage  N18.9 585.9   3. Chronic anemia  D64.9 285.9   4. Hypertension not at goal  I10 401.9     Patient Active Problem List   Diagnosis    Chronic kidney disease, stage IV (severe)    Erythropoietin deficiency anemia    Symptomatic anemia    Anxiety associated with depression    Iron deficiency anemia    PAF (paroxysmal atrial fibrillation)    Shortness of breath    Chronic diastolic congestive heart failure    Diabetic polyneuropathy associated with type 2 diabetes mellitus    PERLA (acute kidney injury)    Foot pain, bilateral    Anemia of chronic renal failure    Acute on chronic diastolic CHF (congestive heart failure)    Obesity (BMI 30-39.9)    HLD (hyperlipidemia)    Calcium pyrophosphate deposition disease (CPPD)     Past Medical History:   Diagnosis Date    Anxiety     Atrial fibrillation     CHF (congestive heart failure)     Chronic kidney disease, stage IV (severe)     Depression     Elevated cholesterol     Hypertension     Type 2 diabetes mellitus      Past Surgical History:   Procedure Laterality Date    APPENDECTOMY      CHOLECYSTECTOMY      COLONOSCOPY      CYSTOSCOPY BOTOX INJECTION OF BLADDER N/A     HYSTERECTOMY      TUMOR REMOVAL Left     benign tumor from left breast      General Information       Row Name 09/10/24 4275          Physical Therapy Time and Intention    Document Type therapy note (daily note)  -PC (r) DG (t) PC (c)     Mode of Treatment physical therapy;individual therapy  -PC (r) DG (t) PC (c)       Row Name 09/10/24 8286          General Information    Patient Profile Reviewed yes  -PC (r) DG (t) PC (c)     Existing Precautions/Restrictions fall  -PC (r) DG (t) PC (c)                User Key  (r) = Recorded By, (t) = Taken By, (c) = Cosigned By      Initials Name Provider Type    PC Kerry Luque, PT Physical Therapist    Andre Garcia, PT Student PT Student                   Mobility       Row Name 09/10/24 1556          Bed Mobility    Supine-Sit [place] (Bed Mobility) moderate assist (50% patient effort);2 person assist  -PC (r) DG (t) PC (c)     Assistive Device (Bed Mobility) draw sheet  -PC (r) DG (t) PC (c)     Comment, (Bed Mobility) Needed help with with getting legs off the bed and shoulders up.  -PC (r) DG (t) PC (c)       Row Name 09/10/24 1556          Sit-Stand Transfer    Sit-Stand [place] (Transfers) moderate assist (50% patient effort);2 person assist  -PC (r) DG (t) PC (c)     Assistive Device (Sit-Stand Transfers) walker, front-wheeled  -PC (r) DG (t) PC (c)       Row Name 09/10/24 1556          Gait/Stairs (Locomotion)    [place] Level (Gait) moderate assist (50% patient effort)  -PC (r) DG (t) PC (c)     Assistive Device (Gait) walker, front-wheeled  -PC (r) DG (t) PC (c)     Distance in Feet (Gait) 4  -PC (r) DG (t) PC (c)     Deviations/Abnormal Patterns (Gait) guillermo decreased;gait speed decreased;stride length decreased  -PC (r) DG (t) PC (c)     Bilateral Gait Deviations forward flexed posture;knee buckling bilaterally  -PC (r) DG (t) PC (c)     Comment, (Gait/Stairs) Took 4 side-steps to the right towards HOB Mod A x2 with rxw.  -PC (r) DG (t) PC (c)               User Key  (r) = Recorded By, (t) = Taken By, (c) = Cosigned By      Initials Name Provider Type    PC Kerry Luque, PT Physical Therapist    Anrde Garcia, PT Student PT Student                   Obj/Interventions       Row Name 09/10/24 1558          Motor Skills    Therapeutic Exercise --  In supine, QS x5e reps, ankle pumps x10e reps. At EOB, LAQ x5e reps, seated marches x5e reps.  -PC (r) DG (t) PC (c)       Row Name 09/10/24 1550          Balance    Static Sitting Balance  contact guard  -PC (r) DG (t) PC (c)     Dynamic Sitting Balance contact guard  -PC (r) DG (t) PC (c)     Position, Sitting Balance sitting edge of bed  -PC (r) DG (t) PC (c)     Static Standing Balance moderate assist;2-person assist  -PC (r) DG (t) PC (c)     Dynamic Standing Balance moderate assist;2-person assist  -PC (r) DG (t) PC (c)     Position/Device Used, Standing Balance walker, front-wheeled  -PC (r) DG (t) PC (c)     Comment, Balance Unsteady during balance.  -PC (r) DG (t) PC (c)               User Key  (r) = Recorded By, (t) = Taken By, (c) = Cosigned By      Initials Name Provider Type    Kerry Vences, PT Physical Therapist    Andre Garcia, MIRNA Student PT Student                   Goals/Plan    No documentation.                  Clinical Impression       Row Name 09/10/24 1600          Pain    Pretreatment Pain Rating 8/10  -PC (r) DG (t) PC (c)     Posttreatment Pain Rating 6/10  -PC (r) DG (t) PC (c)     Pain Location - Side/Orientation Right;Left  -PC (r) DG (t) PC (c)     Pain Location lower  -PC (r) DG (t) PC (c)     Pain Location - ankle;knee  -PC (r) DG (t) PC (c)     Pre/Posttreatment Pain Comment When asked patient stated L knee pain was 8/10 and R ankle pain was 6/10 using the NPRS.  -PC (r) DG (t) PC (c)     Pain Intervention(s) Medication (See MAR);Repositioned  -PC (r) DG (t) PC (c)       Row Name 09/10/24 1600          Plan of Care Review    Plan of Care Reviewed With patient  -PC (r) DG (t) PC (c)     Progress improving  -PC (r) DG (t) PC (c)     Outcome Evaluation Pt was awake and alert in bed upon arrival to room. Pt expressed having 8/10 L knee pain and 6/10 R ankle pain. Pt was able to follow verbal commands and agreeable to PT. Getting EOB Mod A x2, STS Mod A x2 with rxw. Was able to take 4 right side steps towards HOB. Pt was about to stand for 2 minutes consistently. Pt shows improvement with STS initation, duration and taking side-steps. Pt continues to benefit from  skilled PT. DC recommendation to SNF.  -PC (r) DG (t) PC (c)       Row Name 09/10/24 1600          Positioning and Restraints    Pre-Treatment Position in bed  -PC (r) DG (t) PC (c)     Post Treatment Position bed  -PC (r) DG (t) PC (c)     In Bed notified nsg;call light within reach;encouraged to call for assist;exit alarm on  -PC (r) DG (t) PC (c)               User Key  (r) = Recorded By, (t) = Taken By, (c) = Cosigned By      Initials Name Provider Type    PC Kerry Luque, PT Physical Therapist    Andre Garcia, PT Student PT Student                   Outcome Measures       Row Name 09/10/24 1605          How much help from another person do you currently need...    Turning from your back to your side while in flat bed without using bedrails? 2  -PC (r) DG (t) PC (c)     Moving from lying on back to sitting on the side of a flat bed without bedrails? 2  -PC (r) DG (t) PC (c)     Moving to and from a bed to a chair (including a wheelchair)? 2  -PC (r) DG (t) PC (c)     Standing up from a chair using your arms (e.g., wheelchair, bedside chair)? 2  -PC (r) DG (t) PC (c)     Climbing 3-5 steps with a railing? 1  -PC (r) DG (t) PC (c)     To walk in hospital room? 1  -PC (r) DG (t) PC (c)     AM-PAC 6 Clicks Score (PT) 10  -PC (r) DG (t)     Highest Level of Mobility Goal 4 --> Transfer to chair/commode  -PC (r) DG (t)       Row Name 09/10/24 1605          Functional Assessment    Outcome Measure Options AM-PAC 6 Clicks Basic Mobility (PT)  -PC (r) DG (t) PC (c)               User Key  (r) = Recorded By, (t) = Taken By, (c) = Cosigned By      Initials Name Provider Type    Kerry Vences, PT Physical Therapist    Andre Garcia, PT Student PT Student                                 Physical Therapy Education       Title: PT OT SLP Therapies (In Progress)       Topic: Physical Therapy (In Progress)       Point: Mobility training (In Progress)       Learning Progress Summary             Patient Acceptance,  E, NR by DG at 9/10/2024 1606    Acceptance, E,D, NR by PC at 9/9/2024 1623                         Point: Home exercise program (In Progress)       Learning Progress Summary             Patient Acceptance, E,D, NR by PC at 9/9/2024 1623                         Point: Body mechanics (In Progress)       Learning Progress Summary             Patient Acceptance, E,D, NR by PC at 9/9/2024 1623                         Point: Precautions (In Progress)       Learning Progress Summary             Patient Acceptance, E,D, NR by PC at 9/9/2024 1623                                         User Key       Initials Effective Dates Name Provider Type Discipline    PC 06/16/21 -  Kerry Luque PT Physical Therapist PT    DG 08/22/24 -  Andre Cormier PT Student PT Student PT                  PT Recommendation and Plan     Plan of Care Reviewed With: patient  Progress: improving  Outcome Evaluation: Pt was awake and alert in bed upon arrival to room. Pt expressed having 8/10 L knee pain and 6/10 R ankle pain. Pt was able to follow verbal commands and agreeable to PT. Getting EOB Mod A x2, STS Mod A x2 with rxw. Was able to take 4 right side steps towards HOB. Pt was about to stand for 2 minutes consistently. Pt shows improvement with STS initation, duration and taking side-steps. Pt continues to benefit from skilled PT. DC recommendation to SNF.     Time Calculation:         PT Charges       Row Name 09/10/24 1606             Time Calculation    Start Time 1535  -PC (r) DG (t) PC (c)      Stop Time 1554  -PC (r) DG (t) PC (c)      Time Calculation (min) 19 min  -PC (r) DG (t)      PT Received On 09/10/24  -PC (r) DG (t) PC (c)      PT - Next Appointment 09/11/24  -PC (r) DG (t) PC (c)         Time Calculation- PT    Total Timed Code Minutes- PT 19 minute(s)  -PC (r) DG (t) PC (c)         Timed Charges    87599 - PT Therapeutic Exercise Minutes 19  -PC (r) DG (t) PC (c)         Total Minutes    Timed Charges Total Minutes 19  -PC  (r) DG (t)       Total Minutes 19  -PC (r) DG (t)                User Key  (r) = Recorded By, (t) = Taken By, (c) = Cosigned By      Initials Name Provider Type    Kerry Vences PT Physical Therapist    Andre Garcia, PT Student PT Student                  Therapy Charges for Today       Code Description Service Date Service Provider Modifiers Qty    94331519071 HC PT THER PROC EA 15 MIN 9/10/2024 Andre Cormier, PT Student GP 1            PT G-Codes  Outcome Measure Options: AM-PAC 6 Clicks Basic Mobility (PT)  AM-PAC 6 Clicks Score (PT): 10       Andre Cormier PT Student  9/10/2024

## 2024-09-10 NOTE — PLAN OF CARE
Goal Outcome Evaluation:  Plan of Care Reviewed With: patient        Progress: improving  Outcome Evaluation: Pt was awake and alert in bed upon arrival to room. Pt expressed having 8/10 L knee pain and 6/10 R ankle pain. Pt was able to follow verbal commands and agreeable to PT. Getting EOB Mod A x2, STS Mod A x2 with rxw. Was able to take 4 right side steps towards HOB. Pt was about to stand for 2 minutes consistently. Pt shows improvement with STS initation, duration and taking side-steps. Pt continues to benefit from skilled PT. DC recommendation to SNF.

## 2024-09-10 NOTE — PROGRESS NOTES
" LOS: 3 days     Name: Lowell Min  Age: 84 y.o.  Sex: female  :  1940  MRN: [id number]         Primary Care Physician: Dannie Mathews MD    Subjective   Subjective  No change in pain in the left knee or right ankle.  Denies shortness of breath.  Denies shortness of breath.    Objective   Vital Signs  Temp:  [97.1 °F (36.2 °C)-99 °F (37.2 °C)] 99 °F (37.2 °C)  Heart Rate:  [60-79] 61  Resp:  [18-27] 20  BP: ()/(50-73) 112/61  Body mass index is 31.97 kg/m².    Objective:  General Appearance:  Comfortable and in no acute distress.    Vital signs: (most recent): Blood pressure 112/61, pulse 61, temperature 99 °F (37.2 °C), temperature source Oral, resp. rate 20, height 170.2 cm (67\"), weight 92.6 kg (204 lb 2.3 oz), SpO2 96%.    Lungs:  Normal effort and normal respiratory rate.    Heart: Normal rate.  Regular rhythm.    Abdomen: Abdomen is soft.  Bowel sounds are normal.   There is no abdominal tenderness.     Extremities: There is no dependent edema or local swelling.  (Ongoing swelling and pain to the left knee and right ankle.  No associated erythema or warmth)  Neurological: Patient is alert and oriented to person, place and time.    Skin:  Warm and dry.                Results Review:       I reviewed the patient's new clinical results.    Results from last 7 days   Lab Units 09/10/24  0858 09/09/24  0548 24  1405   WBC 10*3/mm3 6.11 7.17 6.95 3.46 4.70  4.72   HEMOGLOBIN g/dL 8.4* 8.9* 9.6* 8.6* 9.0*  9.1*   PLATELETS 10*3/mm3 172 173 180 155 162  172     Results from last 7 days   Lab Units 09/10/24  0858 24  0548 2418 24  1405   SODIUM mmol/L 136 135* 138 144 144   POTASSIUM mmol/L 4.3 4.4 3.9 4.4 4.8   CHLORIDE mmol/L 95* 96* 101 111* 111*   CO2 mmol/L 31.0* 29.7* 25.0 24.6 22.0   BUN mg/dL 54* 41* 32* 30* 30*   CREATININE mg/dL 4.14* 3.18* 2.55* 2.43* 2.44*   CALCIUM mg/dL 8.5* 8.8 9.3 8.5* 8.8   GLUCOSE mg/dL 148* " 97 121* 130* 125*                 Scheduled Meds:   [Held by provider] amLODIPine, 5 mg, Oral, Q24H  apixaban, 2.5 mg, Oral, BID  atorvastatin, 40 mg, Oral, Daily  carvedilol, 12.5 mg, Oral, BID With Meals  epoetin nazanin/nazanin-epbx, 10,000 Units, Subcutaneous, Weekly  [Held by provider] furosemide, 40 mg, Intravenous, Q12H  gabapentin, 100 mg, Oral, TID  Lidocaine, 1 patch, Transdermal, Q24H  methylPREDNISolone, 8 mg, Oral, BID  [Held by provider] metOLazone, 5 mg, Oral, Daily  sennosides-docusate, 2 tablet, Oral, BID  venlafaxine XR, 75 mg, Oral, Daily      PRN Meds:     acetaminophen **OR** acetaminophen **OR** acetaminophen    senna-docusate sodium **AND** polyethylene glycol **AND** bisacodyl **AND** bisacodyl    HYDROcodone-acetaminophen    Morphine    nitroglycerin    ondansetron ODT **OR** ondansetron    [COMPLETED] Insert Peripheral IV **AND** sodium chloride    sodium chloride  Continuous Infusions:  sodium chloride, 100 mL/hr, Last Rate: 100 mL/hr (09/10/24 1129)        Assessment & Plan   Active Hospital Problems    Diagnosis  POA    **Acute on chronic diastolic CHF (congestive heart failure) [I50.33]  Yes    Calcium pyrophosphate deposition disease (CPPD) [M11.20]  Unknown    Obesity (BMI 30-39.9) [E66.9]  Yes    HLD (hyperlipidemia) [E78.5]  Yes    Diabetic polyneuropathy associated with type 2 diabetes mellitus [E11.42]  Yes    Anxiety associated with depression [F41.8]  Yes    Iron deficiency anemia [D50.9]  Yes    PAF (paroxysmal atrial fibrillation) [I48.0]  Yes    Chronic kidney disease, stage IV (severe) [N18.4]  Yes      Resolved Hospital Problems   No resolved problems to display.       Assessment & Plan    -Initiated on Medrol Dosepak for CPPD of the right ankle and left knee.  -Diuretics and antihypertensives placed on hold.  Management as per nephrology.  Renal function has worsened the past couple days and blood pressure remains a bit soft.  Now on normal saline.  -Cardiology following as  well.  Echo noted.  No cardiac intervention recommended at this time  -Monitor I's/O's and daily weights.  Wean oxygen as tolerated  -Anemia noted.  Baseline hemoglobin 9-10.  History of iron deficiency.  Will start some oral iron.  Monitor for now  -A-fib rate controlled.  On low-dose Eliquis  -Blood sugars well-controlled.  Will keep an eye on these as steroids initiated today.  -Therapy services      Expected Discharge Date: 9/11/2024; Expected Discharge Time:      Jovanni Giron MD  [facility]  09/10/24  11:30 EDT

## 2024-09-10 NOTE — PLAN OF CARE
Problem: Adult Inpatient Plan of Care  Goal: Plan of Care Review  Outcome: Ongoing, Progressing  Goal: Patient-Specific Goal (Individualized)  Outcome: Ongoing, Progressing  Goal: Absence of Hospital-Acquired Illness or Injury  Outcome: Ongoing, Progressing  Intervention: Identify and Manage Fall Risk  Recent Flowsheet Documentation  Taken 9/10/2024 1800 by Kari Sanchez RN  Safety Promotion/Fall Prevention:   clutter free environment maintained   safety round/check completed  Taken 9/10/2024 1600 by Kari Sanchez RN  Safety Promotion/Fall Prevention:   clutter free environment maintained   safety round/check completed  Taken 9/10/2024 1430 by Kari Sanchez RN  Safety Promotion/Fall Prevention:   clutter free environment maintained   safety round/check completed  Taken 9/10/2024 1200 by Kari Sanchez RN  Safety Promotion/Fall Prevention:   clutter free environment maintained   safety round/check completed  Taken 9/10/2024 1000 by Kari Sanchez RN  Safety Promotion/Fall Prevention:   clutter free environment maintained   safety round/check completed  Taken 9/10/2024 0830 by Kari Sanchez RN  Safety Promotion/Fall Prevention:   clutter free environment maintained   safety round/check completed  Intervention: Prevent Skin Injury  Recent Flowsheet Documentation  Taken 9/10/2024 1800 by Kari Sanchez RN  Body Position: position changed independently  Taken 9/10/2024 1600 by Kari Sanchez RN  Body Position: position changed independently  Taken 9/10/2024 1430 by Kari Sanchez RN  Body Position: position changed independently  Skin Protection:   tubing/devices free from skin contact   skin-to-device areas padded  Taken 9/10/2024 1200 by Kari Sanchez RN  Body Position: position changed independently  Taken 9/10/2024 1000 by Kari Sanchez RN  Body Position: position changed independently  Taken 9/10/2024 0830 by Kari Sanchez RN  Body Position:   tilted   right  Skin Protection:    tubing/devices free from skin contact   skin-to-device areas padded  Intervention: Prevent and Manage VTE (Venous Thromboembolism) Risk  Recent Flowsheet Documentation  Taken 9/10/2024 1430 by Kari Sanchez RN  VTE Prevention/Management:   bilateral   sequential compression devices on  Taken 9/10/2024 0830 by Kari Sanchez RN  Activity Management: activity encouraged  VTE Prevention/Management:   bilateral   sequential compression devices on  Intervention: Prevent Infection  Recent Flowsheet Documentation  Taken 9/10/2024 1800 by Kari Sanchez RN  Infection Prevention: single patient room provided  Taken 9/10/2024 1600 by Kari Sanchez RN  Infection Prevention: single patient room provided  Taken 9/10/2024 1430 by Kari Sanchez RN  Infection Prevention: single patient room provided  Taken 9/10/2024 1200 by Kari Sanchez RN  Infection Prevention: single patient room provided  Taken 9/10/2024 1000 by Kari Sanchez RN  Infection Prevention: single patient room provided  Taken 9/10/2024 0830 by Kari Sanchez RN  Infection Prevention: single patient room provided  Goal: Optimal Comfort and Wellbeing  Outcome: Ongoing, Progressing  Intervention: Provide Person-Centered Care  Recent Flowsheet Documentation  Taken 9/10/2024 1430 by Kari Sanchez RN  Trust Relationship/Rapport:   care explained   thoughts/feelings acknowledged  Taken 9/10/2024 0830 by Kari Sanchez RN  Trust Relationship/Rapport:   care explained   thoughts/feelings acknowledged  Goal: Readiness for Transition of Care  Outcome: Ongoing, Progressing     Problem: Fall Injury Risk  Goal: Absence of Fall and Fall-Related Injury  Outcome: Ongoing, Progressing  Intervention: Identify and Manage Contributors  Recent Flowsheet Documentation  Taken 9/10/2024 1800 by Kari Sanchez RN  Medication Review/Management: medications reviewed  Taken 9/10/2024 1600 by Kari Sanchez RN  Medication Review/Management: medications  reviewed  Taken 9/10/2024 1430 by Kari Sanchez RN  Medication Review/Management: medications reviewed  Taken 9/10/2024 1200 by Kari Sanchez RN  Medication Review/Management: medications reviewed  Taken 9/10/2024 1000 by Kari Sanchez RN  Medication Review/Management: medications reviewed  Taken 9/10/2024 0830 by Kari Sanchez RN  Medication Review/Management: medications reviewed  Intervention: Promote Injury-Free Environment  Recent Flowsheet Documentation  Taken 9/10/2024 1800 by Kari Sanchez RN  Safety Promotion/Fall Prevention:   clutter free environment maintained   safety round/check completed  Taken 9/10/2024 1600 by Kari Sanchez RN  Safety Promotion/Fall Prevention:   clutter free environment maintained   safety round/check completed  Taken 9/10/2024 1430 by Kari Sanchez RN  Safety Promotion/Fall Prevention:   clutter free environment maintained   safety round/check completed  Taken 9/10/2024 1200 by Kari Sanchez RN  Safety Promotion/Fall Prevention:   clutter free environment maintained   safety round/check completed  Taken 9/10/2024 1000 by Kari Sanchez RN  Safety Promotion/Fall Prevention:   clutter free environment maintained   safety round/check completed  Taken 9/10/2024 0830 by Kari Sanchez RN  Safety Promotion/Fall Prevention:   clutter free environment maintained   safety round/check completed   Goal Outcome Evaluation:

## 2024-09-10 NOTE — PROGRESS NOTES
DATE OF ADMISSION: 9/6/2024  4:25 PM     LOS: 3 days         Component 1 d ago   Crystals, Fluid Intracellular crystals observed exhibiting polarization characteristics of Calcium Pyrophosphate   Resulting Agency  [facility]        Impression:  Pseudogout    Plan: ordered medrol dose pack.  Should help both knee and ankle involvement.  Will sign off.  Patient can follow up with established orthopedist.  Will see prn.    Paras Rodriguez MD    Date: 9/10/2024  Time: 06:16 EDT

## 2024-09-10 NOTE — PROGRESS NOTES
[facility]  Cardiology Progress Note    Patient Identification:  Name: Lowell Min  Age: 84 y.o.  Sex: female  :  1940  MRN: [id number]                 Follow Up / Chief Complaint: Shortness of breath    Interval History: Resting in bed.  Shortness of breath with exertion.  No chest pain.  Diuresed well with nephrology.        Objective:    Past Medical History:  Past Medical History:   Diagnosis Date    Anxiety     Atrial fibrillation     CHF (congestive heart failure)     Chronic kidney disease, stage IV (severe)     Depression     Elevated cholesterol     Hypertension     Type 2 diabetes mellitus      Past Surgical History:  Past Surgical History:   Procedure Laterality Date    APPENDECTOMY      CHOLECYSTECTOMY      COLONOSCOPY      CYSTOSCOPY BOTOX INJECTION OF BLADDER N/A     HYSTERECTOMY      TUMOR REMOVAL Left     benign tumor from left breast        Social History:   Social History     Tobacco Use    Smoking status: Never    Smokeless tobacco: Never   Substance Use Topics    Alcohol use: Never      Family History:  History reviewed. No pertinent family history.       Allergies:  Allergies   Allergen Reactions    Ibuprofen Other (See Comments)     Decrease urine output       Scheduled Meds:  [Held by provider] amLODIPine, 5 mg, Q24H  apixaban, 2.5 mg, BID  atorvastatin, 40 mg, Daily  carvedilol, 12.5 mg, BID With Meals  epoetin nazanin/nazanin-epbx, 10,000 Units, Weekly  [Held by provider] furosemide, 40 mg, Q12H  gabapentin, 100 mg, TID  Lidocaine, 1 patch, Q24H  methylPREDNISolone, 8 mg, BID  [Held by provider] metOLazone, 5 mg, Daily  sennosides-docusate, 2 tablet, BID  venlafaxine XR, 75 mg, Daily            INTAKE AND OUTPUT:    Intake/Output Summary (Last 24 hours) at 9/10/2024 1313  Last data filed at 9/10/2024 0509  Gross per 24 hour   Intake --   Output 2000 ml   Net -2000 ml       ROS  Constitutional: Awake and alert, no fever. No nosebleeds  Abdomen           no abdominal pain  "  Cardiac              no chest pain  Pulmonary          no shortness of breath      /50 (BP Location: Right arm, Patient Position: Lying)   Pulse 61   Temp 98.3 °F (36.8 °C) (Oral)   Resp 18   Ht 170.2 cm (67\")   Wt 92.6 kg (204 lb 2.3 oz)   SpO2 96%   BMI 31.97 kg/m²   General appearance: No acute changes   Neck: Trachea midline; NECK, supple, no thyromegaly or lymphadenopathy   Lungs: Normal size and shape, normal breath sounds, equal distribution of air, no rales or rhonchi   CV: S1-S2 regular, sys murmurs, no rub, no gallop   Abdomen: Soft, nontender; no masses , no abnormal abdominal sounds   Extremities: No deformity , normal color , no peripheral edema   Skin: Normal temperature, turgor and texture; no rash, ulcers        I reviewed the patient's new clinical results, and personally reviewed and interpreted the patient's ECG and telemetry data from the last 24 hours      Cardiographics    Echocardiogram:     Interpretation Summary         Left ventricular systolic function is mildly decreased. Calculated left ventricular EF = 44.1%    The following left ventricular wall segments are hypokinetic: mid anterior, apical anterior, basal anterolateral, mid anterolateral, apical lateral, basal inferolateral, mid inferolateral, apical inferior, mid inferior, apical septal, basal inferoseptal, mid inferoseptal, apex hypokinetic, mid anteroseptal, basal anterior, basal inferior and basal inferoseptal.    Left ventricular diastolic function was indeterminate.    The left atrial cavity is severely dilated.    Mild aortic valve stenosis is present. Aortic valve area is 1.2 cm2.    The right atrial cavity is severely  dilated.    Peak velocity of the flow distal to the aortic valve is 246.1 cm/s. Aortic valve mean pressure gradient is 11 mmHg.    Severe mitral valve regurgitation is present.    Mild mitral valve stenosis is present. The mitral valve mean gradient is 4 mmHg.    Severe tricuspid valve " "regurgitation is present.    Estimated right ventricular systolic pressure from tricuspid regurgitation is markedly elevated (>55 mmHg). Calculated right ventricular systolic pressure from tricuspid regurgitation is 62 mmHg.     Lab Review   Results from last 7 days   Lab Units 09/07/24  0655 09/07/24  0422 09/06/24  1405   HSTROP T ng/L 33* 36* 37*     Results from last 7 days   Lab Units 09/09/24  0548   MAGNESIUM mg/dL 1.6     Results from last 7 days   Lab Units 09/10/24  0858   SODIUM mmol/L 136   POTASSIUM mmol/L 4.3   BUN mg/dL 54*   CREATININE mg/dL 4.14*   CALCIUM mg/dL 8.5*     @LABRCNTIPbnp@  Results from last 7 days   Lab Units 09/10/24  0858 09/09/24  0548 09/08/24  0618   WBC 10*3/mm3 6.11 7.17 6.95   HEMOGLOBIN g/dL 8.4* 8.9* 9.6*   HEMATOCRIT % 26.6* 27.5* 30.1*   PLATELETS 10*3/mm3 172 173 180             Assessment:    Renal failure  Anemia  Hypertension  Borderline elevated troponin due to renal  Aortic stenosis  Chronic anticoagulation  Left bundle branch block  Chronic atrial fibrillation       Plan:  Blood pressure stable.  A-fib with rate controlled.  ac on eliquis, hgb 8.4, stable   Echo with EF 44% severe TR, MR, AS  No further cardiac workup indicated-will follow regular cardiologist Dr Orozco as outpatient  Diuresing per nephrology      Neris Marquis, RAMAKRISHNA  84-year-old with aortic stenosis shortness of breath much better denies any chest pains or tightness in the chest continue conservative management    MD MALCOM Tristan/Transcription:   \"Dictated utilizing Dragon dictation\".     "

## 2024-09-10 NOTE — PLAN OF CARE
Problem: Adult Inpatient Plan of Care  Goal: Plan of Care Review  Outcome: Ongoing, Progressing   Goal Outcome Evaluation: vss, pt c/o of minimal pain overnight

## 2024-09-10 NOTE — PROGRESS NOTES
"   LOS: 3 days     Chief Complaint/ Reason for encounter: CKD/CHF    Subjective   09/09/24 : She is feeling better today, no complaints, weight down about 13 pounds since admission  Less short of breath, on supplemental oxygen at her home rate of 2 L  Decreased edema, no chest pain  Good appetite    9/10: She looks and feels well denies new complaints, shortness of breath further improved  Good appetite no nausea  Minimal edema    Medical history reviewed:  History of Present Illness    Subjective    History taken from: Patient and chart    Vital Signs  Temp:  [97.1 °F (36.2 °C)-98.5 °F (36.9 °C)] 98.5 °F (36.9 °C)  Heart Rate:  [60-79] 61  Resp:  [18-27] 18  BP: ()/(50-73) 113/72       Wt Readings from Last 1 Encounters:   09/10/24 0319 92.6 kg (204 lb 2.3 oz)   09/09/24 0542 93 kg (205 lb 0.4 oz)   09/08/24 0614 96.2 kg (212 lb 1.3 oz)   09/07/24 1449 97.1 kg (214 lb)   09/07/24 0518 97.4 kg (214 lb 11.7 oz)   09/06/24 1510 98.9 kg (218 lb)       Objective:  Vital signs: (most recent): Blood pressure 113/72, pulse 61, temperature 98.5 °F (36.9 °C), temperature source Oral, resp. rate 18, height 170.2 cm (67\"), weight 92.6 kg (204 lb 2.3 oz), SpO2 96%.                Objective:  General Appearance:  Comfortable, well-appearing, in no acute distress and not in pain.  Awake, alert  HEENT: Mucous membranes moist, no injury, oropharynx clear  Lungs:  Normal effort and normal respiratory rate.  Breath sounds clear to auscultation.  No  respiratory distress.  No rales, decreased breath sounds or rhonchi.    Heart: Normal rate.  Regular rhythm.  S1, S2 normal.  No murmur.   Abdomen: Abdomen is soft.  Bowel sounds are normal, no abdominal tenderness.  There is no rebound or guarding  Extremities: Trace edema of bilateral lower extremities  Skin:  Warm and dry with no rashes      Results Review:    Intake/Output:     Intake/Output Summary (Last 24 hours) at 9/10/2024 0959  Last data filed at 9/10/2024 0509  Gross per " 24 hour   Intake --   Output 2000 ml   Net -2000 ml         DATA:  Radiology and Labs:  The following labs independently reviewed by me. Additional labs ordered for tomorrow a.m.  Interval notes, chart personally reviewed by me.   Old records independently reviewed showing CKD 4 followed by Dr. Ryann Foster  The following radiologic studies independently viewed by me, findings echo showed EF 44%, Initial chest x-ray showed mildly congested pulmonary vasculature  Discussed with patient herself at bedside    Risk/ complexity of medical care/ medical decision making high risk, new PERLA with CHF exacerbation on IV diuretics, high risk medications requiring close monitoring of renal function and electrolytes      Labs:   Recent Results (from the past 24 hour(s))   Body Fluid Culture - Body Fluid, Knee, Left    Collection Time: 09/09/24 10:39 AM    Specimen: Knee, Left; Body Fluid   Result Value Ref Range    Body Fluid Culture No growth    Crystal Exam, Fluid - Synovial Fluid,    Collection Time: 09/09/24 10:39 AM    Specimen: Synovial Fluid   Result Value Ref Range    Crystals, Fluid       Intracellular crystals observed exhibiting polarization characteristics of Calcium Pyrophosphate   Body fluid cell count - Body Fluid, Knee, Left    Collection Time: 09/09/24 10:39 AM    Specimen: Knee, Left; Body Fluid   Result Value Ref Range    Color, Fluid Red     Appearance, Fluid Turbid (A) Clear    RBC, Fluid 4,000 /mm3    Nucleated Cells, Fluid 3,731 /mm3    Method: Automated Sysmex XN Method    Body fluid differential - Body Fluid, Knee, Left    Collection Time: 09/09/24 10:39 AM    Specimen: Knee, Left; Body Fluid   Result Value Ref Range    Neutrophils, Fluid 96 %    Lymphocytes, Fluid 1 %    Monocytes, Fluid 3 %       Radiology:  Pertinent radiology studies were reviewed as described above      Medications have been reviewed separately in chart overview      ASSESSMENT:  New PERLA, likely getting a bit volume contracted  from diuretics.  Looks euvolemic on exam  CKD stage IV.  Recent outpatient creatinine around 2.4With some progression  Acute systolic CHF, improving with diuresis  Anemia of CKD  Aortic stenosis  A-fib on anticoagulation      Plan:  Await a.m. labs.  Renal function was worse yesterday and diuretics were held  Likely volume contracted  Continue to hold IV Lasix and metolazone today  Plan to restart oral diuretics once renal function stabilizes  BP a bit soft, amlodipine also on hold  Encourage fluids today  Follow-up labs    Addendum: Labs reviewed, creatinine further increased up to 4.14 today  Diuretics remain on hold and will start gentle IV fluids      Isaiah Foster MD  [facility]   Office phone number: [phone]  Answering service phone number: [phone]    09/10/24  09:59 EDT    Dictation performed using Dragon dictation software

## 2024-09-11 LAB
ALBUMIN SERPL-MCNC: 3 G/DL (ref 3.5–5.2)
ANION GAP SERPL CALCULATED.3IONS-SCNC: 11 MMOL/L (ref 5–15)
BUN SERPL-MCNC: 61 MG/DL (ref 8–23)
BUN/CREAT SERPL: 15.2 (ref 7–25)
CALCIUM SPEC-SCNC: 8.5 MG/DL (ref 8.6–10.5)
CHLORIDE SERPL-SCNC: 94 MMOL/L (ref 98–107)
CO2 SERPL-SCNC: 26 MMOL/L (ref 22–29)
CREAT SERPL-MCNC: 4.01 MG/DL (ref 0.57–1)
DEPRECATED RDW RBC AUTO: 48 FL (ref 37–54)
EGFRCR SERPLBLD CKD-EPI 2021: 10.5 ML/MIN/1.73
ERYTHROCYTE [DISTWIDTH] IN BLOOD BY AUTOMATED COUNT: 13.8 % (ref 12.3–15.4)
GLUCOSE SERPL-MCNC: 136 MG/DL (ref 65–99)
HCT VFR BLD AUTO: 25.8 % (ref 34–46.6)
HGB BLD-MCNC: 8.5 G/DL (ref 12–15.9)
MCH RBC QN AUTO: 31.4 PG (ref 26.6–33)
MCHC RBC AUTO-ENTMCNC: 32.9 G/DL (ref 31.5–35.7)
MCV RBC AUTO: 95.2 FL (ref 79–97)
PHOSPHATE SERPL-MCNC: 5.1 MG/DL (ref 2.5–4.5)
PLATELET # BLD AUTO: 174 10*3/MM3 (ref 140–450)
PMV BLD AUTO: 9.7 FL (ref 6–12)
POTASSIUM SERPL-SCNC: 4.7 MMOL/L (ref 3.5–5.2)
RBC # BLD AUTO: 2.71 10*6/MM3 (ref 3.77–5.28)
SODIUM SERPL-SCNC: 131 MMOL/L (ref 136–145)
WBC NRBC COR # BLD AUTO: 5.54 10*3/MM3 (ref 3.4–10.8)

## 2024-09-11 PROCEDURE — 80069 RENAL FUNCTION PANEL: CPT | Performed by: INTERNAL MEDICINE

## 2024-09-11 PROCEDURE — 99232 SBSQ HOSP IP/OBS MODERATE 35: CPT

## 2024-09-11 PROCEDURE — 85027 COMPLETE CBC AUTOMATED: CPT | Performed by: INTERNAL MEDICINE

## 2024-09-11 PROCEDURE — 25810000003 SODIUM CHLORIDE 0.9 % SOLUTION: Performed by: INTERNAL MEDICINE

## 2024-09-11 PROCEDURE — 97530 THERAPEUTIC ACTIVITIES: CPT

## 2024-09-11 RX ADMIN — GABAPENTIN 100 MG: 100 CAPSULE ORAL at 08:44

## 2024-09-11 RX ADMIN — METHYLPREDNISOLONE 8 MG: 4 TABLET ORAL at 08:44

## 2024-09-11 RX ADMIN — CARVEDILOL 12.5 MG: 12.5 TABLET, FILM COATED ORAL at 17:28

## 2024-09-11 RX ADMIN — APIXABAN 2.5 MG: 2.5 TABLET, FILM COATED ORAL at 20:16

## 2024-09-11 RX ADMIN — HYDROCODONE BITARTRATE AND ACETAMINOPHEN 1 TABLET: 5; 325 TABLET ORAL at 20:16

## 2024-09-11 RX ADMIN — APIXABAN 2.5 MG: 2.5 TABLET, FILM COATED ORAL at 08:44

## 2024-09-11 RX ADMIN — ATORVASTATIN CALCIUM 40 MG: 20 TABLET, FILM COATED ORAL at 08:44

## 2024-09-11 RX ADMIN — SODIUM CHLORIDE 100 ML/HR: 9 INJECTION, SOLUTION INTRAVENOUS at 08:44

## 2024-09-11 RX ADMIN — GABAPENTIN 100 MG: 100 CAPSULE ORAL at 20:16

## 2024-09-11 RX ADMIN — GABAPENTIN 100 MG: 100 CAPSULE ORAL at 17:28

## 2024-09-11 RX ADMIN — CARVEDILOL 12.5 MG: 12.5 TABLET, FILM COATED ORAL at 08:43

## 2024-09-11 RX ADMIN — LIDOCAINE 1 PATCH: 4 PATCH TOPICAL at 08:44

## 2024-09-11 RX ADMIN — METHYLPREDNISOLONE 8 MG: 4 TABLET ORAL at 20:18

## 2024-09-11 RX ADMIN — SENNOSIDES AND DOCUSATE SODIUM 2 TABLET: 50; 8.6 TABLET ORAL at 08:44

## 2024-09-11 RX ADMIN — VENLAFAXINE HYDROCHLORIDE 75 MG: 75 CAPSULE, EXTENDED RELEASE ORAL at 08:43

## 2024-09-11 NOTE — PROGRESS NOTES
"   LOS: 4 days     Chief Complaint/ Reason for encounter: CKD/CHF    Subjective   09/09/24 : She is feeling better today, no complaints, weight down about 13 pounds since admission  Less short of breath, on supplemental oxygen at her home rate of 2 L  Decreased edema, no chest pain  Good appetite    9/10: She looks and feels well denies new complaints, shortness of breath further improved  Good appetite no nausea  Minimal edema    9/11: She looks and feels well denies complaints.  No worsening shortness of breath.  No chest pain  Good appetite no nausea or vomiting    Medical history reviewed:  History of Present Illness    Subjective    History taken from: Patient and chart    Vital Signs  Temp:  [97.8 °F (36.6 °C)-99 °F (37.2 °C)] 98 °F (36.7 °C)  Heart Rate:  [63-68] 64  Resp:  [18-20] 18  BP: (112-127)/(50-94) 120/94       Wt Readings from Last 1 Encounters:   09/10/24 0319 92.6 kg (204 lb 2.3 oz)   09/09/24 0542 93 kg (205 lb 0.4 oz)   09/08/24 0614 96.2 kg (212 lb 1.3 oz)   09/07/24 1449 97.1 kg (214 lb)   09/07/24 0518 97.4 kg (214 lb 11.7 oz)   09/06/24 1510 98.9 kg (218 lb)       Objective:  Vital signs: (most recent): Blood pressure 120/94, pulse 64, temperature 98 °F (36.7 °C), temperature source Oral, resp. rate 18, height 170.2 cm (67\"), weight 92.6 kg (204 lb 2.3 oz), SpO2 (!) 84%.                Objective:  General Appearance:  Comfortable, well-appearing, in no acute distress and not in pain.  Awake, alert  HEENT: Mucous membranes moist, no injury, oropharynx clear  Lungs:  Normal effort and normal respiratory rate.  Breath sounds clear to auscultation.  No  respiratory distress.  No rales, decreased breath sounds or rhonchi.    Heart: Normal rate.  Regular rhythm.  S1, S2 normal.  No murmur.   Abdomen: Abdomen is soft.  Bowel sounds are normal, no abdominal tenderness.  There is no rebound or guarding  Extremities: Trace edema of bilateral lower extremities  Skin:  Warm and dry with no " julius      Results Review:    Intake/Output:     Intake/Output Summary (Last 24 hours) at 9/11/2024 0916  Last data filed at 9/11/2024 0450  Gross per 24 hour   Intake --   Output 1550 ml   Net -1550 ml         DATA:  Radiology and Labs:  The following labs independently reviewed by me. Additional labs ordered for tomorrow a.m.  Interval notes, chart personally reviewed by me.   Old records independently reviewed showing CKD 4 followed by Dr. Ryann Foster  Discussed with patient herself at bedside    Risk/ complexity of medical care/ medical decision making high risk, new PERLA with CHF exacerbation       Labs:   Recent Results (from the past 24 hour(s))   Sodium, Urine, Random - Urine, Clean Catch    Collection Time: 09/10/24 11:53 AM    Specimen: Urine, Clean Catch   Result Value Ref Range    Sodium, Urine 77 mmol/L   Protein, Urine, Random - Urine, Clean Catch    Collection Time: 09/10/24 11:53 AM    Specimen: Urine, Clean Catch   Result Value Ref Range    Total Protein, Urine 20.2 mg/dL   Creatinine Urine Random (kidney function) GFR component - Urine, Clean Catch    Collection Time: 09/10/24 11:53 AM    Specimen: Urine, Clean Catch   Result Value Ref Range    Creatinine, Urine 71.2 mg/dL   Chloride, Urine, Random - Urine, Clean Catch    Collection Time: 09/10/24 11:53 AM    Specimen: Urine, Clean Catch   Result Value Ref Range    Chloride, Urine 88 mmol/L   Renal Function Panel    Collection Time: 09/11/24  7:05 AM    Specimen: Blood   Result Value Ref Range    Glucose 136 (H) 65 - 99 mg/dL    BUN 61 (H) 8 - 23 mg/dL    Creatinine 4.01 (H) 0.57 - 1.00 mg/dL    Sodium 131 (L) 136 - 145 mmol/L    Potassium 4.7 3.5 - 5.2 mmol/L    Chloride 94 (L) 98 - 107 mmol/L    CO2 26.0 22.0 - 29.0 mmol/L    Calcium 8.5 (L) 8.6 - 10.5 mg/dL    Albumin 3.0 (L) 3.5 - 5.2 g/dL    Phosphorus 5.1 (H) 2.5 - 4.5 mg/dL    Anion Gap 11.0 5.0 - 15.0 mmol/L    BUN/Creatinine Ratio 15.2 7.0 - 25.0    eGFR 10.5 (L) >60.0 mL/min/1.73    CBC (No Diff)    Collection Time: 09/11/24  7:05 AM    Specimen: Blood   Result Value Ref Range    WBC 5.54 3.40 - 10.80 10*3/mm3    RBC 2.71 (L) 3.77 - 5.28 10*6/mm3    Hemoglobin 8.5 (L) 12.0 - 15.9 g/dL    Hematocrit 25.8 (L) 34.0 - 46.6 %    MCV 95.2 79.0 - 97.0 fL    MCH 31.4 26.6 - 33.0 pg    MCHC 32.9 31.5 - 35.7 g/dL    RDW 13.8 12.3 - 15.4 %    RDW-SD 48.0 37.0 - 54.0 fl    MPV 9.7 6.0 - 12.0 fL    Platelets 174 140 - 450 10*3/mm3       Radiology:  Pertinent radiology studies were reviewed as described above      Medications have been reviewed separately in chart overview      ASSESSMENT:  New PERLA, likely getting a bit volume contracted from diuretics.  Looks euvolemic on exam  CKD stage IV.  Recent outpatient creatinine around 2.4With some progression  Acute systolic CHF, improving with diuresis  Anemia of CKD  Aortic stenosis  A-fib on anticoagulation      Plan:  Creatinine about the same today, hopefully starting to plateau a bit  Urine output remains excellent  Some worsening azotemia is likely due to oral steroids  Urine studies were not prerenal appearing  Stop IV fluids and continue to hold diuretics today  Monitor blood pressure off amlodipine    Follow-up labs.  No need for dialysis at this time  Epogen weekly for anemia of CKD        Isaiah Foster MD  [facility]   Office phone number: [phone]  Answering service phone number: [phone]    09/11/24  09:16 EDT    Dictation performed using Dragon dictation software

## 2024-09-11 NOTE — PROGRESS NOTES
[facility]  Cardiology Progress Note    Patient Identification:  Name: Lowell Min  Age: 84 y.o.  Sex: female  :  1940  MRN: [id number]                 Follow Up / Chief Complaint: Shortness of breath    Interval History: resting in bed, no chest pain or shortness of breath, complains of left foot pain      Objective:    Past Medical History:  Past Medical History:   Diagnosis Date    Anxiety     Atrial fibrillation     CHF (congestive heart failure)     Chronic kidney disease, stage IV (severe)     Depression     Elevated cholesterol     Hypertension     Type 2 diabetes mellitus      Past Surgical History:  Past Surgical History:   Procedure Laterality Date    APPENDECTOMY      CHOLECYSTECTOMY      COLONOSCOPY      CYSTOSCOPY BOTOX INJECTION OF BLADDER N/A     HYSTERECTOMY      TUMOR REMOVAL Left     benign tumor from left breast        Social History:   Social History     Tobacco Use    Smoking status: Never    Smokeless tobacco: Never   Substance Use Topics    Alcohol use: Never      Family History:  History reviewed. No pertinent family history.       Allergies:  Allergies   Allergen Reactions    Ibuprofen Other (See Comments)     Decrease urine output       Scheduled Meds:  [Held by provider] amLODIPine, 5 mg, Q24H  apixaban, 2.5 mg, BID  atorvastatin, 40 mg, Daily  carvedilol, 12.5 mg, BID With Meals  epoetin nazanin/nazanin-epbx, 10,000 Units, Weekly  [Held by provider] furosemide, 40 mg, Q12H  gabapentin, 100 mg, TID  Lidocaine, 1 patch, Q24H  methylPREDNISolone, 8 mg, BID  sennosides-docusate, 2 tablet, BID  venlafaxine XR, 75 mg, Daily            INTAKE AND OUTPUT:    Intake/Output Summary (Last 24 hours) at 2024 1005  Last data filed at 2024 0450  Gross per 24 hour   Intake --   Output 1550 ml   Net -1550 ml       Review of Systems:   GI: no n/v or abd pain  Cardiac: no chest pain or palpitations  Pulmonary: no shortness of breath or cough        /94 (BP Location: Right  "arm, Patient Position: Lying)   Pulse 64   Temp 98 °F (36.7 °C) (Oral)   Resp 18   Ht 170.2 cm (67\")   Wt 92.6 kg (204 lb 2.3 oz)   SpO2 (!) 84%   BMI 31.97 kg/m²   Physical Exam:  General:  Alert, cooperative, appears in no acute distress  Respiratory: Clear to auscultation.  Normal respiratory effort and rate.  Cardiovascular: S1S2 Regular rate and rhythm. No murmur, rub or gallop.   Gastrointestinal: soft, non tender. Bowel sounds present.   Extremities: JAY x4. No pretibial pitting edema. Adequate musculoskeletal strength.   Neuro: AAO x3 CN II-XII grossly intact            I reviewed the patient's new clinical results, and personally reviewed and interpreted the patient's ECG and telemetry data from the last 24 hours      Cardiographics    Echocardiogram:     Interpretation Summary         Left ventricular systolic function is mildly decreased. Calculated left ventricular EF = 44.1%    The following left ventricular wall segments are hypokinetic: mid anterior, apical anterior, basal anterolateral, mid anterolateral, apical lateral, basal inferolateral, mid inferolateral, apical inferior, mid inferior, apical septal, basal inferoseptal, mid inferoseptal, apex hypokinetic, mid anteroseptal, basal anterior, basal inferior and basal inferoseptal.    Left ventricular diastolic function was indeterminate.    The left atrial cavity is severely dilated.    Mild aortic valve stenosis is present. Aortic valve area is 1.2 cm2.    The right atrial cavity is severely  dilated.    Peak velocity of the flow distal to the aortic valve is 246.1 cm/s. Aortic valve mean pressure gradient is 11 mmHg.    Severe mitral valve regurgitation is present.    Mild mitral valve stenosis is present. The mitral valve mean gradient is 4 mmHg.    Severe tricuspid valve regurgitation is present.    Estimated right ventricular systolic pressure from tricuspid regurgitation is markedly elevated (>55 mmHg). Calculated right ventricular " "systolic pressure from tricuspid regurgitation is 62 mmHg.     Lab Review   Results from last 7 days   Lab Units 09/07/24  0655 09/07/24  0422 09/06/24  1405   HSTROP T ng/L 33* 36* 37*     Results from last 7 days   Lab Units 09/09/24  0548   MAGNESIUM mg/dL 1.6     Results from last 7 days   Lab Units 09/11/24  0705   SODIUM mmol/L 131*   POTASSIUM mmol/L 4.7   BUN mg/dL 61*   CREATININE mg/dL 4.01*   CALCIUM mg/dL 8.5*     @LABRCNTIPbnp@  Results from last 7 days   Lab Units 09/11/24  0705 09/10/24  0858 09/09/24  0548   WBC 10*3/mm3 5.54 6.11 7.17   HEMOGLOBIN g/dL 8.5* 8.4* 8.9*   HEMATOCRIT % 25.8* 26.6* 27.5*   PLATELETS 10*3/mm3 174 172 173             Assessment:    Renal failure  Anemia  Hypertension  Borderline elevated troponin due to renal  Aortic stenosis  Chronic anticoagulation  Left bundle branch block  Chronic atrial fibrillation       Plan:  BP and HR stable  Nephrology following, cr 4.01 today, diuretics on hold  Af, rate controlled, AC on eliuqis, hgb stable at 8.5  Echo with ef 44%, severe MR, TR, AS, conservative management        RAMAKRISHNA Weiner/Transcription:   \"Dictated utilizing Dragon dictation\".     "

## 2024-09-11 NOTE — PLAN OF CARE
AAOX3. 2L Nc. A-fib rate controlled. IV fluids D/C'd. Diuretics on hold. Up to chair for a few hours this shift.       Goal Outcome Evaluation:  Plan of Care Reviewed With: patient        Progress: improving            Problem: Adult Inpatient Plan of Care  Goal: Plan of Care Review  Outcome: Ongoing, Progressing  Flowsheets (Taken 9/11/2024 1931)  Progress: improving  Plan of Care Reviewed With: patient  Goal: Patient-Specific Goal (Individualized)  Outcome: Ongoing, Progressing  Goal: Absence of Hospital-Acquired Illness or Injury  Outcome: Ongoing, Progressing  Intervention: Identify and Manage Fall Risk  Recent Flowsheet Documentation  Taken 9/11/2024 1847 by Ale Mora, RN  Safety Promotion/Fall Prevention:   safety round/check completed   room organization consistent   nonskid shoes/slippers when out of bed   lighting adjusted   assistive device/personal items within reach   clutter free environment maintained   activity supervised  Taken 9/11/2024 1604 by Ale Mora, RN  Safety Promotion/Fall Prevention:   safety round/check completed   room organization consistent   nonskid shoes/slippers when out of bed   lighting adjusted   clutter free environment maintained   fall prevention program maintained   activity supervised   assistive device/personal items within reach  Taken 9/11/2024 1418 by Ale Mora, RN  Safety Promotion/Fall Prevention:   safety round/check completed   room organization consistent   nonskid shoes/slippers when out of bed   lighting adjusted   clutter free environment maintained   assistive device/personal items within reach   activity supervised  Taken 9/11/2024 1200 by Ale Mora, RN  Safety Promotion/Fall Prevention:   safety round/check completed   room organization consistent   nonskid shoes/slippers when out of bed   lighting adjusted   clutter free environment maintained   activity supervised   assistive device/personal items within reach  Taken 9/11/2024 1000  by Mora, Ale, RN  Safety Promotion/Fall Prevention:   safety round/check completed   room organization consistent   nonskid shoes/slippers when out of bed   lighting adjusted   activity supervised   assistive device/personal items within reach   clutter free environment maintained  Taken 9/11/2024 0843 by Ale Mora RN  Safety Promotion/Fall Prevention:   safety round/check completed   room organization consistent   nonskid shoes/slippers when out of bed   lighting adjusted   assistive device/personal items within reach   clutter free environment maintained   activity supervised  Intervention: Prevent Skin Injury  Recent Flowsheet Documentation  Taken 9/11/2024 1847 by Ale Mora RN  Body Position: supine, legs elevated  Taken 9/11/2024 1604 by Ale Mora RN  Body Position: legs elevated  Skin Protection:   adhesive use limited   incontinence pads utilized   tubing/devices free from skin contact   transparent dressing maintained   skin-to-skin areas padded   skin-to-device areas padded   protective footwear used  Taken 9/11/2024 1418 by Ale Mora RN  Body Position: sitting up in bed  Taken 9/11/2024 1200 by Ale Mora RN  Body Position: supine, legs elevated  Taken 9/11/2024 1000 by Ale Mora RN  Body Position: supine, legs elevated  Taken 9/11/2024 0843 by Ale Mora RN  Body Position: (pillows under each buttock)   patient/family refused   other (see comments)  Skin Protection:   adhesive use limited   incontinence pads utilized   tubing/devices free from skin contact   skin-to-device areas padded   skin-to-skin areas padded   transparent dressing maintained   protective footwear used  Intervention: Prevent and Manage VTE (Venous Thromboembolism) Risk  Recent Flowsheet Documentation  Taken 9/11/2024 1604 by Ale Mora RN  Activity Management: up in chair  VTE Prevention/Management:   bilateral   sequential compression devices off  Range of Motion:  active ROM (range of motion) encouraged  Taken 9/11/2024 0843 by Ale Mora RN  Activity Management: activity encouraged  VTE Prevention/Management:   bilateral   sequential compression devices off  Range of Motion: active ROM (range of motion) encouraged  Intervention: Prevent Infection  Recent Flowsheet Documentation  Taken 9/11/2024 1847 by Ale Mora RN  Infection Prevention:   single patient room provided   rest/sleep promoted   personal protective equipment utilized   hand hygiene promoted  Taken 9/11/2024 1604 by Ale Mora RN  Infection Prevention:   single patient room provided   rest/sleep promoted   personal protective equipment utilized   hand hygiene promoted  Taken 9/11/2024 1418 by Ale Mora RN  Infection Prevention:   single patient room provided   rest/sleep promoted   personal protective equipment utilized   hand hygiene promoted  Taken 9/11/2024 1200 by Ale Mora RN  Infection Prevention:   rest/sleep promoted   personal protective equipment utilized   hand hygiene promoted   single patient room provided  Taken 9/11/2024 1000 by Ale Mora RN  Infection Prevention:   single patient room provided   personal protective equipment utilized   rest/sleep promoted  Taken 9/11/2024 0843 by Ale Mora RN  Infection Prevention:   single patient room provided   rest/sleep promoted   hand hygiene promoted   personal protective equipment utilized  Goal: Optimal Comfort and Wellbeing  Outcome: Ongoing, Progressing  Intervention: Monitor Pain and Promote Comfort  Recent Flowsheet Documentation  Taken 9/11/2024 1604 by Ale Mora RN  Pain Management Interventions:   pillow support provided   position adjusted  Taken 9/11/2024 0843 by Ale Mora RN  Pain Management Interventions:   see MAR   diversional activity provided   pillow support provided  Intervention: Provide Person-Centered Care  Recent Flowsheet Documentation  Taken 9/11/2024 1604 by  Ale Mora, RN  Trust Relationship/Rapport:   care explained   choices provided   emotional support provided   empathic listening provided   questions answered   reassurance provided   questions encouraged   thoughts/feelings acknowledged  Taken 9/11/2024 0843 by Ale Mora, RN  Trust Relationship/Rapport:   care explained   choices provided   emotional support provided   empathic listening provided   questions answered   questions encouraged   reassurance provided   other (see comments)   thoughts/feelings acknowledged  Goal: Readiness for Transition of Care  Outcome: Ongoing, Progressing

## 2024-09-11 NOTE — PLAN OF CARE
Goal Outcome Evaluation:  Plan of Care Reviewed With: (P) patient        Progress: (P) improving  Outcome Evaluation: (P) Pt was awake and alert in bed upon arrival to room. Pt stated L knee and R ankle pain 7/10. Pt able to follow commands needing verbal cueing and agreeable to PT. Pt performed EOB Mod A, OOB Mod A with rxw., Side stepping towards HOB Min A with rxw, ambulated 5' to recliner Mod A x2 with rxw. Pt needed verbal cueing on how to use walker. Pt stated L knee and R ankle pain was 5/10 after activity. Pt was in recliner after tx. No chair alarm in place notified RN. Pt improved by increasing endurance and strength. Pt continues to benefit from skilled PT. DC recommendation is SNF.

## 2024-09-11 NOTE — PROGRESS NOTES
" LOS: 4 days     Name: Lowell Min  Age: 84 y.o.  Sex: female  :  1940  MRN: [id number]         Primary Care Physician: Dannie Mathews MD    Subjective   Subjective  Reports some slight improvement of her left knee and right ankle pain.  Was able to stand and take a couple of steps with PT yesterday.  Denies shortness of breath.    Objective   Vital Signs  Temp:  [97.8 °F (36.6 °C)-99 °F (37.2 °C)] 98 °F (36.7 °C)  Heart Rate:  [63-68] 64  Resp:  [18-20] 18  BP: (112-127)/(50-94) 120/94  Body mass index is 31.97 kg/m².    Objective:  General Appearance:  Comfortable and in no acute distress.    Vital signs: (most recent): Blood pressure 120/94, pulse 64, temperature 98 °F (36.7 °C), temperature source Oral, resp. rate 18, height 170.2 cm (67\"), weight 92.6 kg (204 lb 2.3 oz), SpO2 (!) 84%.    Lungs:  Normal effort and normal respiratory rate.  She is not in respiratory distress.  There are decreased breath sounds.    Heart: Normal rate.  Regular rhythm.    Abdomen: Abdomen is soft.  Bowel sounds are normal.   There is no abdominal tenderness.     Extremities: There is no dependent edema or local swelling.  (Still with swelling of the left knee and tenderness.  Also with tenderness in the right ankle but no swelling)  Neurological: Patient is alert and oriented to person, place and time.    Skin:  Warm and dry.                Results Review:       I reviewed the patient's new clinical results.    Results from last 7 days   Lab Units 24  0705 09/10/24  0858 24  0548 24  0618 24  0422 24  1405   WBC 10*3/mm3 5.54 6.11 7.17 6.95 3.46 4.70  4.72   HEMOGLOBIN g/dL 8.5* 8.4* 8.9* 9.6* 8.6* 9.0*  9.1*   PLATELETS 10*3/mm3 174 172 173 180 155 162  172     Results from last 7 days   Lab Units 24  0705 09/10/24  0858 24  0548 24  0618 24  0422 24  1405   SODIUM mmol/L 131* 136 135* 138 144 144   POTASSIUM mmol/L 4.7 4.3 4.4 3.9 4.4 4.8   CHLORIDE mmol/L " 94* 95* 96* 101 111* 111*   CO2 mmol/L 26.0 31.0* 29.7* 25.0 24.6 22.0   BUN mg/dL 61* 54* 41* 32* 30* 30*   CREATININE mg/dL 4.01* 4.14* 3.18* 2.55* 2.43* 2.44*   CALCIUM mg/dL 8.5* 8.5* 8.8 9.3 8.5* 8.8   GLUCOSE mg/dL 136* 148* 97 121* 130* 125*                 Scheduled Meds:   [Held by provider] amLODIPine, 5 mg, Oral, Q24H  apixaban, 2.5 mg, Oral, BID  atorvastatin, 40 mg, Oral, Daily  carvedilol, 12.5 mg, Oral, BID With Meals  epoetin nazanin/nazanin-epbx, 10,000 Units, Subcutaneous, Weekly  [Held by provider] furosemide, 40 mg, Intravenous, Q12H  gabapentin, 100 mg, Oral, TID  Lidocaine, 1 patch, Transdermal, Q24H  methylPREDNISolone, 8 mg, Oral, BID  sennosides-docusate, 2 tablet, Oral, BID  venlafaxine XR, 75 mg, Oral, Daily      PRN Meds:     acetaminophen **OR** acetaminophen **OR** acetaminophen    senna-docusate sodium **AND** polyethylene glycol **AND** bisacodyl **AND** bisacodyl    HYDROcodone-acetaminophen    Morphine    nitroglycerin    ondansetron ODT **OR** ondansetron    [COMPLETED] Insert Peripheral IV **AND** sodium chloride    sodium chloride  Continuous Infusions:       Assessment & Plan   Active Hospital Problems    Diagnosis  POA    **Acute on chronic diastolic CHF (congestive heart failure) [I50.33]  Yes    Calcium pyrophosphate deposition disease (CPPD) [M11.20]  Unknown    Obesity (BMI 30-39.9) [E66.9]  Yes    HLD (hyperlipidemia) [E78.5]  Yes    Diabetic polyneuropathy associated with type 2 diabetes mellitus [E11.42]  Yes    Anxiety associated with depression [F41.8]  Yes    Iron deficiency anemia [D50.9]  Yes    PAF (paroxysmal atrial fibrillation) [I48.0]  Yes    Chronic kidney disease, stage IV (severe) [N18.4]  Yes      Resolved Hospital Problems   No resolved problems to display.       Assessment & Plan    -Initiated on Medrol Dosepak for CPPD of the right ankle and left knee.  She reports some improvement of pain today.  Was able to stand and take a couple steps with physical therapy  yesterday.  -Diuretics and antihypertensives currently on hold.  Management as per nephrology.  Renal function stable from yesterday.  Blood pressure improved.  IV fluids discontinued.  -Cardiology following as well.  Echo noted.  No cardiac intervention recommended at this time  -Monitor I's/O's and daily weights.  Wean oxygen as tolerated  -Anemia noted.  Baseline hemoglobin 9-10.  History of iron deficiency.  Receiving weekly EPO  -A-fib rate controlled.  On low-dose Eliquis  -Blood sugars well-controlled.    -Therapy services      Expected Discharge Date: 9/11/2024; Expected Discharge Time:      Jovanni Giron MD  [facility]  09/11/24  09:35 EDT

## 2024-09-11 NOTE — THERAPY TREATMENT NOTE
Patient Name: Lowell Min  : 1940    MRN: [id number]                              Today's Date: 2024       Admit Date: 2024    Visit Dx:     ICD-10-CM ICD-9-CM   1. Acute congestive heart failure, unspecified heart failure type  I50.9 428.0   2. Chronic kidney disease, unspecified CKD stage  N18.9 585.9   3. Chronic anemia  D64.9 285.9   4. Hypertension not at goal  I10 401.9     Patient Active Problem List   Diagnosis    Chronic kidney disease, stage IV (severe)    Erythropoietin deficiency anemia    Symptomatic anemia    Anxiety associated with depression    Iron deficiency anemia    PAF (paroxysmal atrial fibrillation)    Shortness of breath    Chronic diastolic congestive heart failure    Diabetic polyneuropathy associated with type 2 diabetes mellitus    PERLA (acute kidney injury)    Foot pain, bilateral    Anemia of chronic renal failure    Acute on chronic diastolic CHF (congestive heart failure)    Obesity (BMI 30-39.9)    HLD (hyperlipidemia)    Calcium pyrophosphate deposition disease (CPPD)     Past Medical History:   Diagnosis Date    Anxiety     Atrial fibrillation     CHF (congestive heart failure)     Chronic kidney disease, stage IV (severe)     Depression     Elevated cholesterol     Hypertension     Type 2 diabetes mellitus      Past Surgical History:   Procedure Laterality Date    APPENDECTOMY      CHOLECYSTECTOMY      COLONOSCOPY      CYSTOSCOPY BOTOX INJECTION OF BLADDER N/A     HYSTERECTOMY      TUMOR REMOVAL Left     benign tumor from left breast      General Information       Row Name 24 1539          Physical Therapy Time and Intention    Document Type therapy note (daily note) (P)   -DG     Mode of Treatment physical therapy;individual therapy (P)   -DG       Row Name 24 1539          General Information    Patient Profile Reviewed yes (P)   -DG     Existing Precautions/Restrictions fall (P)   -DG               User Key  (r) = Recorded By, (t) = Taken By, (c) =  Cosigned By      Initials Name Provider Type    Andre Garcia, PT Student PT Student                   Mobility       Row Name 09/11/24 1539          Bed Mobility    Supine-Sit Thurman (Bed Mobility) moderate assist (50% patient effort) (P)   -DG       Row Name 09/11/24 1539          Sit-Stand Transfer    Sit-Stand Thurman (Transfers) moderate assist (50% patient effort) (P)   -DG     Assistive Device (Sit-Stand Transfers) walker, front-wheeled (P)   -DG       Row Name 09/11/24 1539          Gait/Stairs (Locomotion)    Thurman Level (Gait) moderate assist (50% patient effort);2 person assist (P)   -DG     Assistive Device (Gait) walker, front-wheeled (P)   -DG     Patient was able to Ambulate yes (P)   -DG     Distance in Feet (Gait) 5 (P)   -DG     Deviations/Abnormal Patterns (Gait) base of support, narrow;gait speed decreased;stride length decreased;guillermo decreased (P)   -DG     Right Sided Gait Deviations -- (P)   -DG     Comment, (Gait/Stairs) 4 side steps toward HOB Min A x2; Steps towards recliner Mod A x2 with R knee remaining slightly flexed. Maximal verbal cues needed to stay close to walker and to ambulate safely. (P)   -DG               User Key  (r) = Recorded By, (t) = Taken By, (c) = Cosigned By      Initials Name Provider Type    Andre Garcia, PT Student PT Student                   Obj/Interventions       Row Name 09/11/24 1542          Motor Skills    Therapeutic Exercise -- (P)   At EOB, performed ankle pumps x10e reps, LAQ x10e reps, seated marches x10 reps.  -DG       Row Name 09/11/24 1542          Balance    Static Sitting Balance standby assist (P)   -DG     Dynamic Sitting Balance standby assist (P)   -DG     Position, Sitting Balance sitting edge of bed (P)   -DG     Static Standing Balance minimal assist (P)   -DG     Dynamic Standing Balance minimal assist (P)   -DG     Position/Device Used, Standing Balance walker, front-wheeled (P)   -DG     Comment, Balance  During sitting balance, patient presented with a posterior lean but corrected via verbal cueing. (P)   -DG               User Key  (r) = Recorded By, (t) = Taken By, (c) = Cosigned By      Initials Name Provider Type    Anrde Garcia, PT Student PT Student                   Goals/Plan    No documentation.                  Clinical Impression       Row Name 09/11/24 1545          Pain    Pretreatment Pain Rating 7/10 (P)   -DG     Posttreatment Pain Rating 5/10 (P)   -DG     Pain Location - knee;ankle (P)   -DG     Pre/Posttreatment Pain Comment Pt stated having L knee and R ankle pain rating as 7/10. After exercise patient reported pain 5/10 for L knee and R ankle. (P)   -DG     Pain Intervention(s) Medication (See MAR);Repositioned (P)   -DG       Row Name 09/11/24 7457          Plan of Care Review    Plan of Care Reviewed With patient (P)   -DG     Progress improving (P)   -DG     Outcome Evaluation Pt was awake and alert in bed upon arrival to room. Pt stated L knee and R ankle pain 7/10. Pt able to follow commands needing verbal cueing and agreeable to PT. Pt performed EOB Mod A, OOB Mod A with rxw., Side stepping towards HOB Min A with rxw, ambulated 5' to recliner Mod A x2 with rxw. Pt needed verbal cueing on how to use walker. Pt stated L knee and R ankle pain was 5/10 after activity. Pt was in recliner after tx. No chair alarm in place notified RN. Pt improved by increasing endurance and strength. Pt continues to benefit from skilled PT. DC recommendation is SNF. (P)   -DG       Row Name 09/11/24 1545          Positioning and Restraints    Pre-Treatment Position in bed (P)   -DG     Post Treatment Position chair (P)   -DG     In Bed call light within reach;encouraged to call for assist (P)   -DG               User Key  (r) = Recorded By, (t) = Taken By, (c) = Cosigned By      Initials Name Provider Type    Andre Garcia, PT Student PT Student                   Outcome Measures       Row Name 09/11/24  1550 09/11/24 0843       How much help from another person do you currently need...    Turning from your back to your side while in flat bed without using bedrails? 2 (P)   -DG 2  -ST    Moving from lying on back to sitting on the side of a flat bed without bedrails? 2 (P)   -DG 2  -ST    Moving to and from a bed to a chair (including a wheelchair)? 2 (P)   -DG 2  -ST    Standing up from a chair using your arms (e.g., wheelchair, bedside chair)? 2 (P)   -DG 2  -ST    Climbing 3-5 steps with a railing? 1 (P)   -DG 1  -ST    To walk in hospital room? 2 (P)   -DG 1  -ST    AM-PAC 6 Clicks Score (PT) 11 (P)   -DG 10  -ST    Highest Level of Mobility Goal 4 --> Transfer to chair/commode (P)   -DG 4 --> Transfer to chair/commode  -ST      Row Name 09/11/24 1550          Functional Assessment    Outcome Measure Options AM-PAC 6 Clicks Basic Mobility (PT) (P)   -DG               User Key  (r) = Recorded By, (t) = Taken By, (c) = Cosigned By      Initials Name Provider Type    Ale Ocampo, RN Registered Nurse    Andre Garcia, PT Student PT Student                                 Physical Therapy Education       Title: PT OT SLP Therapies (In Progress)       Topic: Physical Therapy (In Progress)       Point: Mobility training (Done)       Learning Progress Summary             Patient Acceptance, E, VU,NR by DG at 9/11/2024 1551    Acceptance, E, NR by DG at 9/10/2024 1606    Acceptance, E,D, NR by PC at 9/9/2024 1623                         Point: Home exercise program (In Progress)       Learning Progress Summary             Patient Acceptance, E,D, NR by PC at 9/9/2024 1623                         Point: Body mechanics (In Progress)       Learning Progress Summary             Patient Acceptance, E,D, NR by PC at 9/9/2024 1623                         Point: Precautions (In Progress)       Learning Progress Summary             Patient Acceptance, E,D, NR by PC at 9/9/2024 1623                                          User Key       Initials Effective Dates Name Provider Type Discipline    PC 06/16/21 -  Kerry Luque, PT Physical Therapist PT    DG 08/22/24 -  Andre Cormier PT Student PT Student PT                  PT Recommendation and Plan     Plan of Care Reviewed With: (P) patient  Progress: (P) improving  Outcome Evaluation: (P) Pt was awake and alert in bed upon arrival to room. Pt stated L knee and R ankle pain 7/10. Pt able to follow commands needing verbal cueing and agreeable to PT. Pt performed EOB Mod A, OOB Mod A with rxw., Side stepping towards HOB Min A with rxw, ambulated 5' to recliner Mod A x2 with rxw. Pt needed verbal cueing on how to use walker. Pt stated L knee and R ankle pain was 5/10 after activity. Pt was in recliner after tx. No chair alarm in place notified RN. Pt improved by increasing endurance and strength. Pt continues to benefit from skilled PT. DC recommendation is SNF.     Time Calculation:         PT Charges       Row Name 09/11/24 1551             Time Calculation    Start Time 1517 (P)   -DG      Stop Time 1535 (P)   -DG      Time Calculation (min) 18 min (P)   -DG      PT Received On 09/11/24 (P)   -DG      PT - Next Appointment 09/12/24 (P)   -DG         Time Calculation- PT    Total Timed Code Minutes- PT 18 minute(s) (P)   -DG         Timed Charges    06779 - PT Therapeutic Exercise Minutes 5 (P)   -DG      19821 - PT Therapeutic Activity Minutes 13 (P)   -DG         Total Minutes    Timed Charges Total Minutes 18 (P)   -DG       Total Minutes 18 (P)   -DG                User Key  (r) = Recorded By, (t) = Taken By, (c) = Cosigned By      Initials Name Provider Type    DG Andre Cormier, PT Student PT Student                  Therapy Charges for Today       Code Description Service Date Service Provider Modifiers Qty    49887991624 HC PT THER PROC EA 15 MIN 9/10/2024 Andre Cormier, PT Student GP 1    32401890713 HC PT THERAPEUTIC ACT EA 15 MIN 9/11/2024 Andre Cormier, PT Student GP  1            PT G-Codes  Outcome Measure Options: (P) AM-PAC 6 Clicks Basic Mobility (PT)  AM-PAC 6 Clicks Score (PT): (P) 11       Andre Cormier PT Student  9/11/2024

## 2024-09-11 NOTE — CASE MANAGEMENT/SOCIAL WORK
Continued Stay Note  [facility]     Patient Name: Lowell Min  MRN: [id number]  Today's Date: 9/11/2024    Admit Date: 9/6/2024    Plan: Referrals to [facility] pending acceptance. No precert needed.   Discharge Plan       Row Name 09/11/24 1503       Plan    Plan Referrals to [facility] pending [facility]. No precert needed.    Roadmap to Recovery Yes    Patient/Family in Agreement with Plan yes    Plan Comments Spoke with patient at bedside and daughter over the phone. Reviewed PT recommendations of snf at dc. Verbalizes understanding. Choice list and cms compare given. Chose [facility]. Referral made and dcp sent. will need ems transportation VS wheelchair van                       Expected Discharge Date Expected Discharge Time    Sep 13, 2024               Becka Arenas RN

## 2024-09-11 NOTE — CASE MANAGEMENT/SOCIAL WORK
Continued Stay Note  [facility]     Patient Name: Lowell Min  MRN: [id number]  Today's Date: 9/11/2024    Admit Date: 9/6/2024    Plan: Accepted at [facility] snf. No precert needed   Discharge Plan       Row Name 09/11/24 1644       Plan    Plan Accepted at [facility] snf. No precert needed    Plan Comments DC plan: accepted at [facility]. Bed ready 9/12/2024.  No precert needed. Patient informed. Verbalizes understanding  Transport: will need ems VS w/c van  Pharmacy: updated to [facility]  Packet: will need      Row Name 09/11/24 1503       Plan    Plan Referrals to [facility] pending [facility]. No precert needed.    Roadmap to Recovery Yes    Patient/Family in Agreement with Plan yes    Plan Comments Spoke with patient at bedside and daughter over the phone. Reviewed PT recommendations of snf at [place]. Verbalizes understanding. Choice list and cms compare given. [facility] Referral made and dcp sent. will need ems transportation VS wheelchair radha Arenas RN

## 2024-09-12 ENCOUNTER — APPOINTMENT (OUTPATIENT)
Dept: GENERAL RADIOLOGY | Facility: HOSPITAL | Age: 84
DRG: 291 | End: 2024-09-12
Payer: MEDICARE

## 2024-09-12 LAB
ALBUMIN SERPL-MCNC: 3.2 G/DL (ref 3.5–5.2)
ANION GAP SERPL CALCULATED.3IONS-SCNC: 13.3 MMOL/L (ref 5–15)
BACTERIA UR QL AUTO: ABNORMAL /HPF
BILIRUB UR QL STRIP: NEGATIVE
BUN SERPL-MCNC: 79 MG/DL (ref 8–23)
BUN/CREAT SERPL: 18.2 (ref 7–25)
CALCIUM SPEC-SCNC: 8.7 MG/DL (ref 8.6–10.5)
CHLORIDE SERPL-SCNC: 94 MMOL/L (ref 98–107)
CHLORIDE UR-SCNC: 21 MMOL/L
CLARITY UR: CLEAR
CO2 SERPL-SCNC: 25.7 MMOL/L (ref 22–29)
COLOR UR: YELLOW
CREAT SERPL-MCNC: 4.34 MG/DL (ref 0.57–1)
CREAT UR-MCNC: 81.9 MG/DL
EGFRCR SERPLBLD CKD-EPI 2021: 9.6 ML/MIN/1.73
GLUCOSE SERPL-MCNC: 130 MG/DL (ref 65–99)
GLUCOSE UR STRIP-MCNC: NEGATIVE MG/DL
HGB UR QL STRIP.AUTO: NEGATIVE
HYALINE CASTS UR QL AUTO: ABNORMAL /LPF
KETONES UR QL STRIP: NEGATIVE
LEUKOCYTE ESTERASE UR QL STRIP.AUTO: ABNORMAL
NITRITE UR QL STRIP: NEGATIVE
PH UR STRIP.AUTO: 5.5 [PH] (ref 5–8)
PHOSPHATE SERPL-MCNC: 5.9 MG/DL (ref 2.5–4.5)
POTASSIUM SERPL-SCNC: 4.9 MMOL/L (ref 3.5–5.2)
PROT UR QL STRIP: ABNORMAL
RBC # UR STRIP: ABNORMAL /HPF
REF LAB TEST METHOD: ABNORMAL
SODIUM SERPL-SCNC: 133 MMOL/L (ref 136–145)
SODIUM UR-SCNC: 40 MMOL/L
SP GR UR STRIP: 1.01 (ref 1–1.03)
SQUAMOUS #/AREA URNS HPF: ABNORMAL /HPF
UROBILINOGEN UR QL STRIP: ABNORMAL
WBC # UR STRIP: ABNORMAL /HPF

## 2024-09-12 PROCEDURE — 82570 ASSAY OF URINE CREATININE: CPT | Performed by: INTERNAL MEDICINE

## 2024-09-12 PROCEDURE — 80069 RENAL FUNCTION PANEL: CPT | Performed by: INTERNAL MEDICINE

## 2024-09-12 PROCEDURE — 84300 ASSAY OF URINE SODIUM: CPT | Performed by: INTERNAL MEDICINE

## 2024-09-12 PROCEDURE — 63710000001 METHYLPREDNISOLONE 4 MG TABLET THERAPY PACK 21 EACH DISP PACK: Performed by: ORTHOPAEDIC SURGERY

## 2024-09-12 PROCEDURE — 81001 URINALYSIS AUTO W/SCOPE: CPT | Performed by: INTERNAL MEDICINE

## 2024-09-12 PROCEDURE — 71045 X-RAY EXAM CHEST 1 VIEW: CPT

## 2024-09-12 PROCEDURE — 82436 ASSAY OF URINE CHLORIDE: CPT | Performed by: INTERNAL MEDICINE

## 2024-09-12 PROCEDURE — 97110 THERAPEUTIC EXERCISES: CPT

## 2024-09-12 PROCEDURE — 25010000002 FUROSEMIDE PER 20 MG: Performed by: INTERNAL MEDICINE

## 2024-09-12 PROCEDURE — 99232 SBSQ HOSP IP/OBS MODERATE 35: CPT | Performed by: NURSE PRACTITIONER

## 2024-09-12 PROCEDURE — 97530 THERAPEUTIC ACTIVITIES: CPT

## 2024-09-12 RX ADMIN — ATORVASTATIN CALCIUM 40 MG: 20 TABLET, FILM COATED ORAL at 09:19

## 2024-09-12 RX ADMIN — METHYLPREDNISOLONE 8 MG: 4 TABLET ORAL at 09:18

## 2024-09-12 RX ADMIN — APIXABAN 2.5 MG: 2.5 TABLET, FILM COATED ORAL at 20:48

## 2024-09-12 RX ADMIN — METHYLPREDNISOLONE 8 MG: 4 TABLET ORAL at 20:48

## 2024-09-12 RX ADMIN — Medication 10 ML: at 20:49

## 2024-09-12 RX ADMIN — FUROSEMIDE 40 MG: 10 INJECTION, SOLUTION INTRAMUSCULAR; INTRAVENOUS at 20:48

## 2024-09-12 RX ADMIN — CARVEDILOL 12.5 MG: 12.5 TABLET, FILM COATED ORAL at 09:19

## 2024-09-12 RX ADMIN — VENLAFAXINE HYDROCHLORIDE 75 MG: 75 CAPSULE, EXTENDED RELEASE ORAL at 09:19

## 2024-09-12 RX ADMIN — HYDROCODONE BITARTRATE AND ACETAMINOPHEN 1 TABLET: 5; 325 TABLET ORAL at 04:21

## 2024-09-12 RX ADMIN — GABAPENTIN 100 MG: 100 CAPSULE ORAL at 20:48

## 2024-09-12 RX ADMIN — APIXABAN 2.5 MG: 2.5 TABLET, FILM COATED ORAL at 09:19

## 2024-09-12 RX ADMIN — GABAPENTIN 100 MG: 100 CAPSULE ORAL at 09:19

## 2024-09-12 RX ADMIN — Medication 5 MG: at 00:55

## 2024-09-12 RX ADMIN — SENNOSIDES AND DOCUSATE SODIUM 2 TABLET: 50; 8.6 TABLET ORAL at 09:19

## 2024-09-12 RX ADMIN — LIDOCAINE 1 PATCH: 4 PATCH TOPICAL at 09:15

## 2024-09-12 RX ADMIN — GABAPENTIN 100 MG: 100 CAPSULE ORAL at 17:06

## 2024-09-12 NOTE — PLAN OF CARE
Goal Outcome Evaluation:  Plan of Care Reviewed With: (P) patient        Progress: (P) improving  Outcome Evaluation: (P) Pt was awake and sleepy in bed upon arrival to room. Pt expressed L knee and R ankle are feeling better. Pt was able to follow commands and agreeable to PT. Pt performed bed mobility EOB Min A, OOB CGA with rxw, ambulated 15' to room door CGA with rxw. Slow gait speed and unsteady movements at times. Pt performed STS x5 CGA with rxw and was unsteady. Pt appeared drowsy throughout session. No alarm set [facility] [place] was notified. Pt continues to benefit from skilled PT. DC recommendation is SNF.

## 2024-09-12 NOTE — PLAN OF CARE
Goal Outcome Evaluation:   Pt was alert and oriented times two. Pt had to be reminded frequently that she was in the hospital ad could not get out of bed. PT had complaints of mild pain in back and was given pain medication that pt stated helped relieve pain. Pt has not slept through out the shift.

## 2024-09-12 NOTE — PROGRESS NOTES
"    DAILY PROGRESS NOTE  [facility]    Patient Identification:  Name: Lowell Min  Age: 84 y.o.  Sex: female  :  1940  MRN: [id number]         Primary Care Physician: Dannie Mathews MD    Subjective:  Interval History: Left knee feeling better with the above treatments.  No new chest pain troubles breathing nausea vomiting or confusion.  Denies fever    Objective: Conversational pleasant.  Resting in bed.  No family at bedside    Scheduled Meds:[Held by provider] amLODIPine, 5 mg, Oral, Q24H  apixaban, 2.5 mg, Oral, BID  atorvastatin, 40 mg, Oral, Daily  carvedilol, 12.5 mg, Oral, BID With Meals  epoetin nazanin/nazanin-epbx, 10,000 Units, Subcutaneous, Weekly  [Held by provider] furosemide, 40 mg, Intravenous, Q12H  gabapentin, 100 mg, Oral, TID  Lidocaine, 1 patch, Transdermal, Q24H  methylPREDNISolone, 8 mg, Oral, BID  sennosides-docusate, 2 tablet, Oral, BID  venlafaxine XR, 75 mg, Oral, Daily      Continuous Infusions:     Vital signs in last 24 hours:  Temp:  [97.3 °F (36.3 °C)-98.3 °F (36.8 °C)] 97.4 °F (36.3 °C)  Heart Rate:  [58-71] 71  Resp:  [18-27] 27  BP: (122-138)/(64-88) 125/69    Intake/Output:    Intake/Output Summary (Last 24 hours) at 2024 1317  Last data filed at 2024 0637  Gross per 24 hour   Intake --   Output 950 ml   Net -950 ml       Exam:  /69 (BP Location: Right arm, Patient Position: Lying)   Pulse 71   Temp 97.4 °F (36.3 °C) (Oral)   Resp 27   Ht 170.2 cm (67\")   Wt 98 kg (216 lb 0.8 oz)   SpO2 93%   BMI 33.84 kg/m²     General Appearance:    Alert, cooperative, no distress, AAOx3                         Throat:   Oral mucosa pink and moist                           Neck:   No JVD                         Lungs:    Clear to auscultation bilaterally, respirations unlabored                          Heart:    Regular rate and rhythm, S1 and S2 normal                  Abdomen:     Soft, nontender, bowel sounds active                 Extremities: Left " knee swelling improved, Lidoderm on top, NVM intact distally with trace edema       Data Review:  Labs in chart were reviewed.    Assessment:  Active Hospital Problems    Diagnosis  POA    **Acute on chronic diastolic CHF (congestive heart failure) [I50.33]  Yes    Calcium pyrophosphate deposition disease (CPPD) [M11.20]  Unknown    Obesity (BMI 30-39.9) [E66.9]  Yes    HLD (hyperlipidemia) [E78.5]  Yes    Diabetic polyneuropathy associated with type 2 diabetes mellitus [E11.42]  Yes    Anxiety associated with depression [F41.8]  Yes    Iron deficiency anemia [D50.9]  Yes    PAF (paroxysmal atrial fibrillation) [I48.0]  Yes    Chronic kidney disease, stage IV (severe) [N18.4]  Yes      Resolved Hospital Problems   No resolved problems to display.       Plan:    CPPD on Lidoderm with Medrol Dosepak and clinical improvement    Nephrology managing renal dysfunction with anticipation for plateau.  Further slight bump in creatinine from 4.0-4.3   -Off Lasix and Norvasc -current /69    Cardiology with no plans for intervention.  Echo reviewed.  PAF-RC-AC with Eliquis    Anemia of chronic disease with baseline creatinine 9-10/RADHA receiving EPO weekly    DM2 with CKD with stable BS    Therapies working with patient.  CCP coordinating needs with bed open on Saturday with hopes to transfer at that time or earlier if a bed arises        Artie Light MD  9/12/2024  13:17 EDT

## 2024-09-12 NOTE — CASE MANAGEMENT/SOCIAL WORK
Continued Stay Note  [facility]     Patient Name: Lowell Min  MRN: [id number]  Today's Date: 9/12/2024    Admit Date: 9/6/2024    Plan: Accepted at [facility]. No precert needed   Discharge Plan       Row Name 09/12/24 1330       Plan    Plan Comments DC plan: accepted at [facility]. No precert needed.   Transport: [facility] ems 9/14 at 12 noon  Pharmacy: updated to synchrony  Packet: in marlen Arenas RN

## 2024-09-12 NOTE — PROGRESS NOTES
"   LOS: 5 days     Chief Complaint/ Reason for encounter: CKD/CHF    Subjective   09/09/24 : She is feeling better today, no complaints, weight down about 13 pounds since admission  Less short of breath, on supplemental oxygen at her home rate of 2 L  Decreased edema, no chest pain  Good appetite    9/10: She looks and feels well denies new complaints, shortness of breath further improved  Good appetite no nausea  Minimal edema    9/11: She looks and feels well denies complaints.  No worsening shortness of breath.  No chest pain  Good appetite no nausea or vomiting    9/12: Denies complaints, requiring oxygen with some mild dyspnea  O2 sats low 90s, some increasing edema  Weight back to 216 which is near her admission weight  No fevers, chills, cough, or congestion  Denies dysuria    Medical history reviewed:  History of Present Illness    Subjective    History taken from: Patient and chart    Vital Signs  Temp:  [97.3 °F (36.3 °C)-98.3 °F (36.8 °C)] 97.4 °F (36.3 °C)  Heart Rate:  [58-71] 71  Resp:  [18-27] 27  BP: (122-138)/(64-88) 125/69       Wt Readings from Last 1 Encounters:   09/12/24 0559 98 kg (216 lb 0.8 oz)   09/10/24 0319 92.6 kg (204 lb 2.3 oz)   09/09/24 0542 93 kg (205 lb 0.4 oz)   09/08/24 0614 96.2 kg (212 lb 1.3 oz)   09/07/24 1449 97.1 kg (214 lb)   09/07/24 0518 97.4 kg (214 lb 11.7 oz)   09/06/24 1510 98.9 kg (218 lb)       Objective:  Vital signs: (most recent): Blood pressure 125/69, pulse 71, temperature 97.4 °F (36.3 °C), temperature source Oral, resp. rate 27, height 170.2 cm (67\"), weight 98 kg (216 lb 0.8 oz), SpO2 93%.                Objective:  General Appearance:  Comfortable, well-appearing, in no acute distress and not in pain.  Awake, alert  HEENT: Mucous membranes moist, no injury, oropharynx clear  Lungs:  Normal effort and normal respiratory rate.  Breath sounds clear to auscultation.  No  respiratory distress.  No rales, decreased breath sounds or rhonchi.    Heart: Normal " rate.  Regular rhythm.  S1, S2 normal.  No murmur.   Abdomen: Abdomen is soft.  Bowel sounds are normal, no abdominal tenderness.  There is no rebound or guarding  Extremities: Trace edema of bilateral lower extremities  Skin:  Warm and dry with no rashes      Results Review:    Intake/Output:     Intake/Output Summary (Last 24 hours) at 9/12/2024 1507  Last data filed at 9/12/2024 0637  Gross per 24 hour   Intake --   Output 950 ml   Net -950 ml         DATA:  Radiology and Labs:  The following labs independently reviewed by me. Additional labs ordered for tomorrow a.m.  Interval notes, chart personally reviewed by me.   Old records independently reviewed showing CKD 4 followed by Dr. Ryann Foster  Discussed with patient herself at bedside    Risk/ complexity of medical care/ medical decision making high risk, new PERLA with CHF exacerbation       Labs:   Recent Results (from the past 24 hour(s))   Renal Function Panel    Collection Time: 09/12/24  7:51 AM    Specimen: Blood   Result Value Ref Range    Glucose 130 (H) 65 - 99 mg/dL    BUN 79 (H) 8 - 23 mg/dL    Creatinine 4.34 (H) 0.57 - 1.00 mg/dL    Sodium 133 (L) 136 - 145 mmol/L    Potassium 4.9 3.5 - 5.2 mmol/L    Chloride 94 (L) 98 - 107 mmol/L    CO2 25.7 22.0 - 29.0 mmol/L    Calcium 8.7 8.6 - 10.5 mg/dL    Albumin 3.2 (L) 3.5 - 5.2 g/dL    Phosphorus 5.9 (H) 2.5 - 4.5 mg/dL    Anion Gap 13.3 5.0 - 15.0 mmol/L    BUN/Creatinine Ratio 18.2 7.0 - 25.0    eGFR 9.6 (L) >60.0 mL/min/1.73       Radiology:  Pertinent radiology studies were reviewed as described above      Medications have been reviewed separately in chart overview      ASSESSMENT:  New PERLA, initially suspected to be due to overdiuresis/volume contraction but no improvement with IV fluids.  Urine studies are not prerenal appearing  CKD stage IV.  Recent outpatient creatinine around 2.4With some progression  Acute systolic CHF, improved with diuresis but then worsening renal failure, does appear  somewhat fluid overloaded today on exam  Anemia of CKD  Aortic stenosis  A-fib on anticoagulation      Plan:  Unfortunately renal function worse again today  I do not think she is volume depleted at this point.  Her weight is back up to near her admission weight and she does have some edema today on exam  Furthermore, urine studies were not prerenal appearing  Hesitant to keep her off her diuretics much longer  Will restart IV Lasix today  Check bladder scan and a chest x-ray     worsening azotemia is likely due to oral steroids in addition to her PERLA  Monitor blood pressure off amlodipine    Follow-up labs.  No current acute need for dialysis but that may be necessary if renal function does not stabilize over the next several days  Epogen weekly for anemia of CKD  Will follow-up a.m. labs and monitor urine output closely      Isaiah Foster MD  [facility]   Office phone number: [phone]  Answering service phone number: [phone]    09/12/24  15:07 EDT    Dictation performed using Dragon dictation software

## 2024-09-12 NOTE — PROGRESS NOTES
[facility]  Cardiology Progress Note    Patient Identification:  Name: Lowell Min  Age: 84 y.o.  Sex: female  :  1940  MRN: [id number]                 Follow Up / Chief Complaint: Shortness of breath    Interval History: Resting in bed. No shob or cp.      Objective:    Past Medical History:  Past Medical History:   Diagnosis Date    Anxiety     Atrial fibrillation     CHF (congestive heart failure)     Chronic kidney disease, stage IV (severe)     Depression     Elevated cholesterol     Hypertension     Type 2 diabetes mellitus      Past Surgical History:  Past Surgical History:   Procedure Laterality Date    APPENDECTOMY      CHOLECYSTECTOMY      COLONOSCOPY      CYSTOSCOPY BOTOX INJECTION OF BLADDER N/A     HYSTERECTOMY      TUMOR REMOVAL Left     benign tumor from left breast        Social History:   Social History     Tobacco Use    Smoking status: Never    Smokeless tobacco: Never   Substance Use Topics    Alcohol use: Never      Family History:  History reviewed. No pertinent family history.       Allergies:  Allergies   Allergen Reactions    Ibuprofen Other (See Comments)     Decrease urine output       Scheduled Meds:  [Held by provider] amLODIPine, 5 mg, Q24H  apixaban, 2.5 mg, BID  atorvastatin, 40 mg, Daily  carvedilol, 12.5 mg, BID With Meals  epoetin nazanin/nazanin-epbx, 10,000 Units, Weekly  [Held by provider] furosemide, 40 mg, Q12H  gabapentin, 100 mg, TID  Lidocaine, 1 patch, Q24H  methylPREDNISolone, 8 mg, BID  sennosides-docusate, 2 tablet, BID  venlafaxine XR, 75 mg, Daily            INTAKE AND OUTPUT:    Intake/Output Summary (Last 24 hours) at 2024 1256  Last data filed at 2024 0637  Gross per 24 hour   Intake --   Output 950 ml   Net -950 ml       Review of Systems:   GI: no n/v or abd pain  Cardiac: no chest pain or palpitations  Pulmonary: no shortness of breath or cough        /69 (BP Location: Right arm, Patient Position: Lying)   Pulse 71   Temp  "97.4 °F (36.3 °C) (Oral)   Resp 27   Ht 170.2 cm (67\")   Wt 98 kg (216 lb 0.8 oz)   SpO2 93%   BMI 33.84 kg/m²     Physical Exam:  General:  Appears in no acute distress, resting in bed  Eyes: eom normal no conjunctival drainage  HEENT:  No JVD. Thyroid not visibly enlarged. No mucosal pallor or cyanosis  Respiratory: Respirations regular and unlabored at rest. BBS with good air entry in all fields. No crackles, rubs or wheezes auscultated  Cardiovascular: S1S2 irregularly irregular rhythm. No murmur, rub or gallop. No carotid bruits. DP/PT pulses     . No pretibial pitting edema  Gastrointestinal: Abdomen soft, flat, non tender. Bowel sounds present. No hepatosplenomegaly. No ascites  Skin:   Skin warm and dry to touch. No rashes    Psych: Mood and affect pleasant and cooperative      Cardiographics    Echocardiogram:     Interpretation Summary         Left ventricular systolic function is mildly decreased. Calculated left ventricular EF = 44.1%    The following left ventricular wall segments are hypokinetic: mid anterior, apical anterior, basal anterolateral, mid anterolateral, apical lateral, basal inferolateral, mid inferolateral, apical inferior, mid inferior, apical septal, basal inferoseptal, mid inferoseptal, apex hypokinetic, mid anteroseptal, basal anterior, basal inferior and basal inferoseptal.    Left ventricular diastolic function was indeterminate.    The left atrial cavity is severely dilated.    Mild aortic valve stenosis is present. Aortic valve area is 1.2 cm2.    The right atrial cavity is severely  dilated.    Peak velocity of the flow distal to the aortic valve is 246.1 cm/s. Aortic valve mean pressure gradient is 11 mmHg.    Severe mitral valve regurgitation is present.    Mild mitral valve stenosis is present. The mitral valve mean gradient is 4 mmHg.    Severe tricuspid valve regurgitation is present.    Estimated right ventricular systolic pressure from tricuspid regurgitation is " "markedly elevated (>55 mmHg). Calculated right ventricular systolic pressure from tricuspid regurgitation is 62 mmHg.     Lab Review   Results from last 7 days   Lab Units 09/07/24  0655 09/07/24  0422 09/06/24  1405   HSTROP T ng/L 33* 36* 37*     Results from last 7 days   Lab Units 09/09/24  0548   MAGNESIUM mg/dL 1.6     Results from last 7 days   Lab Units 09/12/24  0751   SODIUM mmol/L 133*   POTASSIUM mmol/L 4.9   BUN mg/dL 79*   CREATININE mg/dL 4.34*   CALCIUM mg/dL 8.7     @[id number]@  Results from last 7 days   Lab Units 09/11/24  0705 09/10/24  0858 09/09/24  0548   WBC 10*3/mm3 5.54 6.11 7.17   HEMOGLOBIN g/dL 8.5* 8.4* 8.9*   HEMATOCRIT % 25.8* 26.6* 27.5*   PLATELETS 10*3/mm3 174 172 173             Assessment:    Renal failure  Anemia  Hypertension  Borderline elevated troponin due to renal  Aortic stenosis  Chronic anticoagulation  Left bundle branch block  Chronic atrial fibrillation   Acute systolic chf ef 44 %. Continue coreg    Plan:  Blood pressure stable.   Diuresing by Nephrology, cr 4.34 today,  Atrial fibrillation with rate controlled. AC on Eliquis, hgb stable at 8.5 and plt 174  Echo with ef 44%, severe MR, TR, AS.  Conservative management        RAMAKRISHNA Flannery/Transcription:   \"Dictated utilizing Dragon dictation\".     "

## 2024-09-12 NOTE — CASE MANAGEMENT/SOCIAL WORK
Continued Stay Note  [facility]     Patient Name: Lowell Min  MRN: [id number]  Today's Date: 9/12/2024    Admit Date: 9/6/2024    Plan: Accepted at [facility]. No precert needed   Discharge Plan       Row Name 09/12/24 1330       Plan    Plan Comments DC plan: accepted at [facility]. No precert needed.   Transport: [facility] ems 9/14 at 12 noon  Pharmacy: updated to synchrony  Packet: in marlen Arenas RN

## 2024-09-12 NOTE — THERAPY TREATMENT NOTE
Patient Name: Lowell Min  : 1940    MRN: [id number]                              Today's Date: 2024       Admit Date: 2024    Visit Dx:     ICD-10-CM ICD-9-CM   1. Acute congestive heart failure, unspecified heart failure type  I50.9 428.0   2. Chronic kidney disease, unspecified CKD stage  N18.9 585.9   3. Chronic anemia  D64.9 285.9   4. Hypertension not at goal  I10 401.9     Patient Active Problem List   Diagnosis    Chronic kidney disease, stage IV (severe)    Erythropoietin deficiency anemia    Symptomatic anemia    Anxiety associated with depression    Iron deficiency anemia    PAF (paroxysmal atrial fibrillation)    Shortness of breath    Chronic diastolic congestive heart failure    Diabetic polyneuropathy associated with type 2 diabetes mellitus    PERLA (acute kidney injury)    Foot pain, bilateral    Anemia of chronic renal failure    Acute on chronic diastolic CHF (congestive heart failure)    Obesity (BMI 30-39.9)    HLD (hyperlipidemia)    Calcium pyrophosphate deposition disease (CPPD)     Past Medical History:   Diagnosis Date    Anxiety     Atrial fibrillation     CHF (congestive heart failure)     Chronic kidney disease, stage IV (severe)     Depression     Elevated cholesterol     Hypertension     Type 2 diabetes mellitus      Past Surgical History:   Procedure Laterality Date    APPENDECTOMY      CHOLECYSTECTOMY      COLONOSCOPY      CYSTOSCOPY BOTOX INJECTION OF BLADDER N/A     HYSTERECTOMY      TUMOR REMOVAL Left     benign tumor from left breast      General Information       Row Name 24 2810          Physical Therapy Time and Intention    Document Type therapy note (daily note)  -EM (r) DG (t) EM (c)     Mode of Treatment physical therapy;individual therapy  -EM (r) DG (t) EM (c)       Row Name 24 1549          General Information    Patient Profile Reviewed yes  -EM (r) DG (t) EM (c)     Existing Precautions/Restrictions fall  -EM (r) DG (t) EM (c)                User Key  (r) = Recorded By, (t) = Taken By, (c) = Cosigned By      Initials Name Provider Type    Neris Morgan, PT Physical Therapist    Andre Garcia, PT Student PT Student                   Mobility       Row Name 09/12/24 1548          Bed Mobility    Supine-Sit [place] (Bed Mobility) minimum assist (75% patient effort)  -EM (r) DG (t) EM (c)     Comment, (Bed Mobility) Needs help getting started to EOB.  -EM (r) DG (t) EM (c)       Row Name 09/12/24 1548          Sit-Stand Transfer    Sit-Stand [place] (Transfers) contact guard  -EM (r) DG (t) EM (c)     Assistive Device (Sit-Stand Transfers) walker, front-wheeled  -EM (r) DG (t) EM (c)     Comment, (Sit-Stand Transfer) CGA, presents with unsteadiness and a slight posterior tilt.  -EM (r) DG (t) EM (c)       Row Name 09/12/24 1548          Gait/Stairs (Locomotion)    [place] Level (Gait) minimum assist (75% patient effort)  -EM (r) DG (t) EM (c)     Assistive Device (Gait) walker, front-wheeled  -EM (r) DG (t) EM (c)     Patient was able to Ambulate yes  -EM (r) DG (t) EM (c)     Distance in Feet (Gait) 15  -EM (r) DG (t) EM (c)     Deviations/Abnormal Patterns (Gait) base of support, narrow;guillermo decreased;gait speed decreased;stride length decreased  -EM (r) DG (t) EM (c)     Bilateral Gait Deviations forward flexed posture  -EM (r) DG (t) EM (c)     Comment, (Gait/Stairs) Ambulated to room door using rxw. Very slow gait speed and slight posterior tilt.  -EM (r) DG (t) EM (c)               User Key  (r) = Recorded By, (t) = Taken By, (c) = Cosigned By      Initials Name Provider Type    Neris Morgan, PT Physical Therapist    Andre Garcia, PT Student PT Student                   Obj/Interventions       Row Name 09/12/24 1550          Motor Skills    Therapeutic Exercise --  In supine, performed ankle pumps x20e reps and QS x10 reps. At EOB performed LAQ x15e reps. STS x5 reps.  -EM (r) DG (t) EM (c)       Row  Name 09/12/24 1550          Balance    Static Sitting Balance standby assist  -EM (r) DG (t) EM (c)     Dynamic Sitting Balance standby assist  -EM (r) DG (t) EM (c)     Position, Sitting Balance sitting edge of bed;sitting in chair  -EM (r) DG (t) EM (c)     Static Standing Balance contact guard  -EM (r) DG (t) EM (c)     Dynamic Standing Balance contact guard  -EM (r) DG (t) EM (c)     Position/Device Used, Standing Balance walker, front-wheeled  -EM (r) DG (t) EM (c)     Comment, Balance unsteady CGA  -EM (r) DG (t) EM (c)               User Key  (r) = Recorded By, (t) = Taken By, (c) = Cosigned By      Initials Name Provider Type    Neris Morgan, PT Physical Therapist    Andre Garcia PT Student PT Student                   Goals/Plan    No documentation.                  Clinical Impression       Row Name 09/12/24 1551          Pain    Pre/Posttreatment Pain Comment Patient states that her L knee and R ankle are feeling much better.  -EM (r) DG (t) EM (c)       Row Name 09/12/24 1551          Plan of Care Review    Plan of Care Reviewed With patient  -EM (r) DG (t) EM (c)     Progress improving  -EM (r) DG (t) EM (c)     Outcome Evaluation Pt was awake and sleepy in bed upon arrival to room. Pt expressed L knee and R ankle are feeling better. Pt was able to follow commands and agreeable to PT. Pt performed bed mobility EOB Min A, OOB CGA with rxw, ambulated 15' to room door CGA with rxw. Slow gait speed and unsteady movements at times. Pt performed STS x5 CGA with rxw and was unsteady. Pt appeared drowsy throughout session. No alarm set [facility] [place] was notified. Pt continues to benefit from skilled PT. DC recommendation is SNF.  -EM (r) DG (t) EM (c)       Row Name 09/12/24 1551          Positioning and Restraints    Pre-Treatment Position in bed  -EM (r) DG (t) EM (c)     Post Treatment Position chair  -EM (r) DG (t) EM (c)     In Chair notified nsg;reclined;call light within reach;encouraged to  call for assist  -EM (r) DG (t) EM (c)               User Key  (r) = Recorded By, (t) = Taken By, (c) = Cosigned By      Initials Name Provider Type    Neris Morgan, PT Physical Therapist    Andre Garcia, PT Student PT Student                   Outcome Measures       Row Name 09/12/24 1557          How much help from another person do you currently need...    Turning from your back to your side while in flat bed without using bedrails? 3  -EM (r) DG (t) EM (c)     Moving from lying on back to sitting on the side of a flat bed without bedrails? 3  -EM (r) DG (t) EM (c)     Moving to and from a bed to a chair (including a wheelchair)? 3  -EM (r) DG (t) EM (c)     Standing up from a chair using your arms (e.g., wheelchair, bedside chair)? 4  -EM (r) DG (t) EM (c)     Climbing 3-5 steps with a railing? 2  -EM (r) DG (t) EM (c)     To walk in hospital room? 3  -EM (r) DG (t) EM (c)     AM-PAC 6 Clicks Score (PT) 18  -EM (r) DG (t)     Highest Level of Mobility Goal 6 --> Walk 10 steps or more  -EM (r) DG (t)       Row Name 09/12/24 1557          Functional Assessment    Outcome Measure Options AM-PAC 6 Clicks Basic Mobility (PT)  -EM (r) DG (t) EM (c)               User Key  (r) = Recorded By, (t) = Taken By, (c) = Cosigned By      Initials Name Provider Type    Neris Morgan, PT Physical Therapist    Andre Garcia, PT Student PT Student                                 Physical Therapy Education       Title: PT OT SLP Therapies (In Progress)       Topic: Physical Therapy (In Progress)       Point: Mobility training (In Progress)       Learning Progress Summary             Patient Acceptance, E, NR by BRIAN at 9/12/2024 1558    Nonacceptance, E, NL by  at 9/12/2024 0511    Acceptance, E, VU,NR by BRIAN at 9/11/2024 1551    Acceptance, E, NR by BRIAN at 9/10/2024 1606    Acceptance, E,D, NR by PC at 9/9/2024 8927                         Point: Home exercise program (In Progress)       Learning Progress  Summary             Patient Nonacceptance, E, NL by EF at 9/12/2024 0511    Acceptance, E,D, NR by PC at 9/9/2024 1623                         Point: Body mechanics (In Progress)       Learning Progress Summary             Patient Nonacceptance, E, NL by EF at 9/12/2024 0511    Acceptance, E,D, NR by PC at 9/9/2024 1623                         Point: Precautions (In Progress)       Learning Progress Summary             Patient Nonacceptance, E, NL by EF at 9/12/2024 0511    Acceptance, E,D, NR by PC at 9/9/2024 1623                                         User Key       Initials Effective Dates Name Provider Type Discipline    PC 06/16/21 -  Kerry Luque PT Physical Therapist PT    EF 01/09/24 -  Jessica Ballard, RN Registered Nurse Nurse     08/22/24 -  Andre Cormier, MIRNA Student PT Student PT                  PT Recommendation and Plan     Plan of Care Reviewed With: patient  Progress: improving  Outcome Evaluation: Pt was awake and sleepy in bed upon arrival to room. Pt expressed L knee and R ankle are feeling better. Pt was able to follow commands and agreeable to PT. Pt performed bed mobility EOB Min A, OOB CGA with rxw, ambulated 15' to room door CGA with rxw. Slow gait speed and unsteady movements at times. Pt performed STS x5 CGA with rxw and was unsteady. Pt appeared drowsy throughout session. No alarm set [facility] was notified. Pt continues to benefit from skilled PT. DC recommendation is SNF.     Time Calculation:         PT Charges       Row Name 09/12/24 1552             Time Calculation    Start Time 1520  -EM (r) DG (t) EM (c)      Stop Time 1544  -EM (r) DG (t) EM (c)      Time Calculation (min) 24 min  -EM (r) DG (t)      PT Received On 09/12/24  -EM (r) DG (t) EM (c)      PT - Next Appointment 09/13/24  -EM (r) DG (t) EM (c)         Time Calculation- PT    Total Timed Code Minutes- PT 24 minute(s)  -EM (r) DG (t) EM (c)         Timed Charges    19795 - PT Therapeutic Exercise Minutes 10  -EM  (r) DG (t) EM (c)      05297 - PT Therapeutic Activity Minutes 14  -EM (r) DG (t) EM (c)         Total Minutes    Timed Charges Total Minutes 24  -EM (r) DG (t)       Total Minutes 24  -EM (r) DG (t)                User Key  (r) = Recorded By, (t) = Taken By, (c) = Cosigned By      Initials Name Provider Type    EM Neris Brink, PT Physical Therapist    Andre Garcia, PT Student PT Student                  Therapy Charges for Today       Code Description Service Date Service Provider Modifiers Qty    26207205685 HC PT THERAPEUTIC ACT EA 15 MIN 9/11/2024 Andre Cormier, PT Student GP 1    55620076463 HC PT THER PROC EA 15 MIN 9/12/2024 Andre Cormier, PT Student GP 1    87138628234 HC PT THERAPEUTIC ACT EA 15 MIN 9/12/2024 Andre Cormier, PT Student GP 1            PT G-Codes  Outcome Measure Options: AM-PAC 6 Clicks Basic Mobility (PT)  AM-PAC 6 Clicks Score (PT): 18       Andre Cormier PT Student  9/12/2024

## 2024-09-12 NOTE — CASE MANAGEMENT/SOCIAL WORK
"Physicians Statement of Medical Necessity for  Ambulance Transportation    GENERAL INFORMATION     Name: Lowell Min  YOB: 1940  Medicare #: [id number]   Transport Date: 9/14/2024 (Valid for round trips this date, or for scheduled repetitive trips for 60 days from the date signed below.)  Origin: [facility]  Destination: [facility]  Is the Patient's stay covered under Medicare Part A (PPS/DRG?)Yes  Closest appropriate facility? Yes  If this a hosp-hosp transfer? No  Is this a hospice patient? No    MEDICAL NECESSITY QUESTIONAIRE    Ambulance Transportation is medically necessary only if other means of transportation are contraindicated or would be potentially harmful to the patient.  To meet this requirement, the patient must be either \"bed confined\" or suffer from a condition such that transport by means other than an ambulance is contraindicated by the patient's condition.  The following questions must be answered by the healthcare professional signing below for this form to be valid:     1) Describe the MEDICAL CONDITION (physical and/or mental) of this patient AT THE TIME OF AMBULANCE TRANSPORT that requires the patient to be transported in an ambulance, and why transport by other means is contraindicated by the patient's condition: confusion and cannot sit in chair safely for transportation. Legs need to be elevated  Past Medical History:   Diagnosis Date    Anxiety     Atrial fibrillation     CHF (congestive heart failure)     Chronic kidney disease, stage IV (severe)     Depression     Elevated cholesterol     Hypertension     Type 2 diabetes mellitus       Past Surgical History:   Procedure Laterality Date    APPENDECTOMY      CHOLECYSTECTOMY      COLONOSCOPY      CYSTOSCOPY BOTOX INJECTION OF BLADDER N/A     HYSTERECTOMY      TUMOR REMOVAL Left     benign tumor from left breast      2) Is this patient \"bed confined\" as defined below?Yes   To be \"bed confined\" the patient must satisfy all " three of the following criteria:  (1) unable to get up from bed without assistance; AND (2) unable to ambulate;  AND (3) unable to sit in a chair or wheelchair.  3) Can this patient safely be transported by car or wheelchair van (I.e., may safely sit during transport, without an attendant or monitoring?)No   4. In addition to completing questions 1-3 above, please check any of the following conditions that apply*:          *Note: supporting documentation for any boxes checked must be maintained in the patient's medical records Patient is confused, Moderate/severe pain on movement, and Unable to tolerate seated position for time needed to transport      SIGNATURE OF PHYSICIAN OR OTHER AUTHORIZED HEALTHCARE PROFESSIONAL    I certify that the above information is true and correct based on my evaluation of this patient, and represent that the patient requires transport by ambulance and that other forms of transport are contraindicated.  I understand that this information will be used by the Centers for Medicare and Medicaid Services (CMS) to support the determiniation of medical necessity for ambulance services, and I represent that I have personal knowledge of the patient's condition at the time of transport.    x   If this box is checked, I also certify that the patient is physically or mentally incapable of signing the ambulance service's claim form and that the institution with which I am affiliated has furnished care, services or assistance to the patient.  My signature below is made on behalf of the patient pursuant to 42 .36(b)(4). In accordance with 42 .37, the specific reason(s) that the patient is physically or mentally incapable of signing the claim for is as follows: confusion    Sgnature of Physician or Healthcare Professional   BREANNA Arenas rn9/14/2024 Date/Time:   9/12/2024     (For Scheduled repetitive transport, this form is not valid for transports performed more than 60 days after this  date).                                                                                                                                            --------------------------------------------------------------------------------------------  Printed Name and Credentials of Physician or Authorized Healthcare Professional     *Form must be signed by patient's attending physician for scheduled, repetitive transports,.  For non-repetitive ambulance transports, if unable to obtain the signature of the attending physician, any of the following may sign (please select below):     Physician  Clinical Nurse Specialist  Registered Nurse     Physician Assistant  Discharge Planner  Licensed Practical Nurse     Nurse Practitioner   x

## 2024-09-13 ENCOUNTER — APPOINTMENT (OUTPATIENT)
Dept: ULTRASOUND IMAGING | Facility: HOSPITAL | Age: 84
DRG: 291 | End: 2024-09-13
Payer: MEDICARE

## 2024-09-13 LAB
ALBUMIN SERPL-MCNC: 3.1 G/DL (ref 3.5–5.2)
ANION GAP SERPL CALCULATED.3IONS-SCNC: 12.2 MMOL/L (ref 5–15)
BUN SERPL-MCNC: 89 MG/DL (ref 8–23)
BUN/CREAT SERPL: 22 (ref 7–25)
CALCIUM SPEC-SCNC: 8.6 MG/DL (ref 8.6–10.5)
CHLORIDE SERPL-SCNC: 96 MMOL/L (ref 98–107)
CO2 SERPL-SCNC: 24.8 MMOL/L (ref 22–29)
CREAT SERPL-MCNC: 4.04 MG/DL (ref 0.57–1)
EGFRCR SERPLBLD CKD-EPI 2021: 10.4 ML/MIN/1.73
GLUCOSE SERPL-MCNC: 144 MG/DL (ref 65–99)
PHOSPHATE SERPL-MCNC: 6.2 MG/DL (ref 2.5–4.5)
POTASSIUM SERPL-SCNC: 4.9 MMOL/L (ref 3.5–5.2)
SODIUM SERPL-SCNC: 133 MMOL/L (ref 136–145)
URATE SERPL-MCNC: 9.4 MG/DL (ref 2.4–5.7)

## 2024-09-13 PROCEDURE — 97530 THERAPEUTIC ACTIVITIES: CPT

## 2024-09-13 PROCEDURE — 76775 US EXAM ABDO BACK WALL LIM: CPT

## 2024-09-13 PROCEDURE — 80069 RENAL FUNCTION PANEL: CPT | Performed by: INTERNAL MEDICINE

## 2024-09-13 PROCEDURE — 97110 THERAPEUTIC EXERCISES: CPT

## 2024-09-13 PROCEDURE — 25010000002 FUROSEMIDE PER 20 MG: Performed by: INTERNAL MEDICINE

## 2024-09-13 PROCEDURE — 84550 ASSAY OF BLOOD/URIC ACID: CPT | Performed by: INTERNAL MEDICINE

## 2024-09-13 PROCEDURE — 99232 SBSQ HOSP IP/OBS MODERATE 35: CPT

## 2024-09-13 RX ORDER — CARVEDILOL 6.25 MG/1
6.25 TABLET ORAL 2 TIMES DAILY WITH MEALS
Status: DISCONTINUED | OUTPATIENT
Start: 2024-09-13 | End: 2024-09-14 | Stop reason: HOSPADM

## 2024-09-13 RX ADMIN — GABAPENTIN 100 MG: 100 CAPSULE ORAL at 08:54

## 2024-09-13 RX ADMIN — APIXABAN 2.5 MG: 2.5 TABLET, FILM COATED ORAL at 21:30

## 2024-09-13 RX ADMIN — CARVEDILOL 12.5 MG: 12.5 TABLET, FILM COATED ORAL at 08:54

## 2024-09-13 RX ADMIN — METHYLPREDNISOLONE 8 MG: 4 TABLET ORAL at 08:55

## 2024-09-13 RX ADMIN — SENNOSIDES AND DOCUSATE SODIUM 2 TABLET: 50; 8.6 TABLET ORAL at 08:54

## 2024-09-13 RX ADMIN — ATORVASTATIN CALCIUM 40 MG: 20 TABLET, FILM COATED ORAL at 08:54

## 2024-09-13 RX ADMIN — GABAPENTIN 100 MG: 100 CAPSULE ORAL at 21:30

## 2024-09-13 RX ADMIN — LIDOCAINE 1 PATCH: 4 PATCH TOPICAL at 08:54

## 2024-09-13 RX ADMIN — GABAPENTIN 100 MG: 100 CAPSULE ORAL at 16:57

## 2024-09-13 RX ADMIN — VENLAFAXINE HYDROCHLORIDE 75 MG: 75 CAPSULE, EXTENDED RELEASE ORAL at 08:54

## 2024-09-13 RX ADMIN — CARVEDILOL 6.25 MG: 12.5 TABLET, FILM COATED ORAL at 17:00

## 2024-09-13 RX ADMIN — Medication 10 ML: at 21:30

## 2024-09-13 RX ADMIN — METHYLPREDNISOLONE 8 MG: 4 TABLET ORAL at 21:30

## 2024-09-13 RX ADMIN — APIXABAN 2.5 MG: 2.5 TABLET, FILM COATED ORAL at 08:54

## 2024-09-13 RX ADMIN — FUROSEMIDE 40 MG: 10 INJECTION, SOLUTION INTRAMUSCULAR; INTRAVENOUS at 21:30

## 2024-09-13 RX ADMIN — FUROSEMIDE 40 MG: 10 INJECTION, SOLUTION INTRAMUSCULAR; INTRAVENOUS at 08:54

## 2024-09-13 NOTE — PLAN OF CARE
Problem: Adult Inpatient Plan of Care  Goal: Plan of Care Review  Outcome: Ongoing, Progressing  Goal: Patient-Specific Goal (Individualized)  Outcome: Ongoing, Progressing  Goal: Absence of Hospital-Acquired Illness or Injury  Outcome: Ongoing, Progressing  Intervention: Identify and Manage Fall Risk  Recent Flowsheet Documentation  Taken 9/13/2024 1800 by Kari Sanchez RN  Safety Promotion/Fall Prevention:   clutter free environment maintained   safety round/check completed  Taken 9/13/2024 1600 by Kari Sanchez RN  Safety Promotion/Fall Prevention:   clutter free environment maintained   safety round/check completed  Taken 9/13/2024 1415 by Kari Sanchez RN  Safety Promotion/Fall Prevention:   clutter free environment maintained   safety round/check completed  Taken 9/13/2024 1200 by Kari Sanchez RN  Safety Promotion/Fall Prevention:   clutter free environment maintained   safety round/check completed  Taken 9/13/2024 1000 by Kari Sanchez RN  Safety Promotion/Fall Prevention:   clutter free environment maintained   safety round/check completed  Intervention: Prevent Skin Injury  Recent Flowsheet Documentation  Taken 9/13/2024 1800 by Kari Sanchez RN  Body Position: position changed independently  Taken 9/13/2024 1600 by Kari Sanchez RN  Body Position: position changed independently  Taken 9/13/2024 1415 by Kari Sanchez RN  Body Position: position changed independently  Skin Protection:   tubing/devices free from skin contact   skin-to-device areas padded  Taken 9/13/2024 1200 by Kari Sanchez RN  Body Position: position changed independently  Taken 9/13/2024 1000 by Kari Sanchez RN  Body Position: position changed independently  Intervention: Prevent and Manage VTE (Venous Thromboembolism) Risk  Recent Flowsheet Documentation  Taken 9/13/2024 1415 by Kari Sanchez RN  VTE Prevention/Management:   bilateral   sequential compression devices off  Intervention: Prevent  Infection  Recent Flowsheet Documentation  Taken 9/13/2024 1800 by Kari Sanchez RN  Infection Prevention: single patient room provided  Taken 9/13/2024 1600 by Kari Sanchez RN  Infection Prevention: single patient room provided  Taken 9/13/2024 1415 by Kari Sanchez RN  Infection Prevention: single patient room provided  Taken 9/13/2024 1200 by Kari Sanchez RN  Infection Prevention: single patient room provided  Taken 9/13/2024 1000 by Kari Sanchez RN  Infection Prevention: single patient room provided  Goal: Optimal Comfort and Wellbeing  Outcome: Ongoing, Progressing  Intervention: Provide Person-Centered Care  Recent Flowsheet Documentation  Taken 9/13/2024 1415 by Kari Sanchez RN  Trust Relationship/Rapport:   care explained   thoughts/feelings acknowledged  Taken 9/13/2024 0830 by Kari Sanchez RN  Trust Relationship/Rapport:   care explained   thoughts/feelings acknowledged  Goal: Readiness for Transition of Care  Outcome: Ongoing, Progressing     Problem: Fall Injury Risk  Goal: Absence of Fall and Fall-Related Injury  Outcome: Ongoing, Progressing  Intervention: Identify and Manage Contributors  Recent Flowsheet Documentation  Taken 9/13/2024 1800 by Kari Sanchez RN  Medication Review/Management: medications reviewed  Taken 9/13/2024 1600 by Kari Sanchez RN  Medication Review/Management: medications reviewed  Taken 9/13/2024 1415 by Kari Sanchez RN  Medication Review/Management: medications reviewed  Taken 9/13/2024 1200 by Kari Sanchez RN  Medication Review/Management: medications reviewed  Taken 9/13/2024 1000 by Kari Sanchez RN  Medication Review/Management: medications reviewed  Intervention: Promote Injury-Free Environment  Recent Flowsheet Documentation  Taken 9/13/2024 1800 by Kari Sanchez RN  Safety Promotion/Fall Prevention:   clutter free environment maintained   safety round/check completed  Taken 9/13/2024 1600 by Kari Sanchez RN  Safety  Promotion/Fall Prevention:   clutter free environment maintained   safety round/check completed  Taken 9/13/2024 1415 by Kari Sanchez RN  Safety Promotion/Fall Prevention:   clutter free environment maintained   safety round/check completed  Taken 9/13/2024 1200 by Kari Sanchez RN  Safety Promotion/Fall Prevention:   clutter free environment maintained   safety round/check completed  Taken 9/13/2024 1000 by Kari Sanchez RN  Safety Promotion/Fall Prevention:   clutter free environment maintained   safety round/check completed   Goal Outcome Evaluation:   Pt remain in ccu on 2L sating 94%. A & O worked with PT OT. Up in chair all day. Vital stable. Ongoing plan of care.

## 2024-09-13 NOTE — PROGRESS NOTES
"   LOS: 6 days     Chief Complaint/ Reason for encounter: CKD/CHF    Subjective   09/09/24 : She is feeling better today, no complaints, weight down about 13 pounds since admission  Less short of breath, on supplemental oxygen at her home rate of 2 L  Decreased edema, no chest pain  Good appetite    9/10: She looks and feels well denies new complaints, shortness of breath further improved  Good appetite no nausea  Minimal edema    9/11: She looks and feels well denies complaints.  No worsening shortness of breath.  No chest pain  Good appetite no nausea or vomiting    9/12: Denies complaints, requiring oxygen with some mild dyspnea  O2 sats low 90s, some increasing edema  Weight back to 216 which is near her admission weight  No fevers, chills, cough, or congestion  Denies dysuria    9/13: No new complaints or events.  She feels little better today  Edema and shortness of breath a little better with IV Lasix      Medical history reviewed:  History of Present Illness    Subjective    History taken from: Patient and chart    Vital Signs  Temp:  [96.7 °F (35.9 °C)-97.5 °F (36.4 °C)] 97.5 °F (36.4 °C)  Heart Rate:  [48-60] 56  Resp:  [18-25] 18  BP: (127-142)/(66-88) 135/75       Wt Readings from Last 1 Encounters:   09/13/24 0400 94.1 kg (207 lb 7.3 oz)   09/12/24 0559 98 kg (216 lb 0.8 oz)   09/10/24 0319 92.6 kg (204 lb 2.3 oz)   09/09/24 0542 93 kg (205 lb 0.4 oz)   09/08/24 0614 96.2 kg (212 lb 1.3 oz)   09/07/24 1449 97.1 kg (214 lb)   09/07/24 0518 97.4 kg (214 lb 11.7 oz)   09/06/24 1510 98.9 kg (218 lb)       Objective:  Vital signs: (most recent): Blood pressure 135/75, pulse 56, temperature 97.5 °F (36.4 °C), temperature source Oral, resp. rate 18, height 170.2 cm (67\"), weight 94.1 kg (207 lb 7.3 oz), SpO2 96%.                Objective:  General Appearance:  Comfortable, well-appearing, in no acute distress and not in pain.  Awake, alert  HEENT: Mucous membranes moist, no injury, oropharynx clear  Lungs:  " Normal effort and normal respiratory rate.  Breath sounds clear to auscultation.  No  respiratory distress.  No rales, decreased breath sounds or rhonchi.    Heart: Normal rate.  Regular rhythm.  S1, S2 normal.  No murmur.   Abdomen: Abdomen is soft.  Bowel sounds are normal, no abdominal tenderness.  There is no rebound or guarding  Extremities: Trace edema of bilateral lower extremities  Skin:  Warm and dry with no rashes      Results Review:    Intake/Output:     Intake/Output Summary (Last 24 hours) at 9/13/2024 1117  Last data filed at 9/13/2024 0433  Gross per 24 hour   Intake --   Output 1550 ml   Net -1550 ml         DATA:  Radiology and Labs:  The following labs independently reviewed by me. Additional labs ordered for tomorrow a.m.  Interval notes, chart personally reviewed by me.   Old records independently reviewed showing CKD 4 followed by Dr. Ryann Foster  Discussed with patient herself at bedside    Risk/ complexity of medical care/ medical decision making high risk, new PERLA with CHF exacerbation       Labs:   Recent Results (from the past 24 hour(s))   Urinalysis With Microscopic If Indicated (No Culture) - Urine, Clean Catch    Collection Time: 09/12/24  9:08 PM    Specimen: Urine, Clean Catch   Result Value Ref Range    Color, UA Yellow Yellow, Straw    Appearance, UA Clear Clear    pH, UA 5.5 5.0 - 8.0    Specific Gravity, UA 1.015 1.005 - 1.030    Glucose, UA Negative Negative    Ketones, UA Negative Negative    Bilirubin, UA Negative Negative    Blood, UA Negative Negative    Protein, UA 30 mg/dL (1+) (A) Negative    Leuk Esterase, UA Trace (A) Negative    Nitrite, UA Negative Negative    Urobilinogen, UA 1.0 E.U./dL 0.2 - 1.0 E.U./dL   Sodium, Urine, Random - Urine, Clean Catch    Collection Time: 09/12/24  9:08 PM    Specimen: Urine, Clean Catch   Result Value Ref Range    Sodium, Urine 40 mmol/L   Creatinine Urine Random (kidney function) GFR component - Urine, Clean Catch    Collection  Time: 09/12/24  9:08 PM    Specimen: Urine, Clean Catch   Result Value Ref Range    Creatinine, Urine 81.9 mg/dL   Chloride, Urine, Random - Urine, Clean Catch    Collection Time: 09/12/24  9:08 PM    Specimen: Urine, Clean Catch   Result Value Ref Range    Chloride, Urine 21 mmol/L   Urinalysis, Microscopic Only - Urine, Clean Catch    Collection Time: 09/12/24  9:08 PM    Specimen: Urine, Clean Catch   Result Value Ref Range    RBC, UA 0-2 None Seen, 0-2 /HPF    WBC, UA 3-5 (A) None Seen, 0-2 /HPF    Bacteria, UA None Seen None Seen /HPF    Squamous Epithelial Cells, UA 3-6 (A) None Seen, 0-2 /HPF    Hyaline Casts, UA 0-2 None Seen /LPF    Methodology Automated Microscopy    Renal Function Panel    Collection Time: 09/13/24  6:13 AM    Specimen: Blood   Result Value Ref Range    Glucose 144 (H) 65 - 99 mg/dL    BUN 89 (H) 8 - 23 mg/dL    Creatinine 4.04 (H) 0.57 - 1.00 mg/dL    Sodium 133 (L) 136 - 145 mmol/L    Potassium 4.9 3.5 - 5.2 mmol/L    Chloride 96 (L) 98 - 107 mmol/L    CO2 24.8 22.0 - 29.0 mmol/L    Calcium 8.6 8.6 - 10.5 mg/dL    Albumin 3.1 (L) 3.5 - 5.2 g/dL    Phosphorus 6.2 (H) 2.5 - 4.5 mg/dL    Anion Gap 12.2 5.0 - 15.0 mmol/L    BUN/Creatinine Ratio 22.0 7.0 - 25.0    eGFR 10.4 (L) >60.0 mL/min/1.73   Uric Acid    Collection Time: 09/13/24  6:13 AM    Specimen: Blood   Result Value Ref Range    Uric Acid 9.4 (H) 2.4 - 5.7 mg/dL       Radiology:  Pertinent radiology studies were reviewed as described above      Medications have been reviewed separately in chart overview      ASSESSMENT:  PERLA, initially suspected to be due to overdiuresis/volume contraction but no improvement with IV fluids.  Urine studies were not prerenal appearing  CKD stage IV.  Recent outpatient creatinine around 2.4With some progression  Acute systolic CHF, improved with diuresis but then worsening renal failure, does appear somewhat fluid overloaded today on exam  Anemia of CKD  Aortic stenosis  A-fib on  anticoagulation      Plan:  Renal function improved slightly today  Bladder scan less than 200 cc, UA fairly bland.  For completeness sake we will get a renal ultrasound today to evaluate upper tracts  Will give some additional IV Lasix today then hold and reassess volume status in a.m.  Chest x-ray showed similar congestion as prior exam with decreasing airspace opacity  Furthermore, urine studies were not prerenal appearing  Hesitant to keep her off her diuretics much longer  Will restart IV Lasix today  Check bladder scan and a chest x-ray     worsening azotemia is likely due to oral steroids in addition to her PERLA  Monitor blood pressure off amlodipine    Follow-up a.m. labs.    No current acute need for dialysis but that may be necessary if renal function does not stabilize further over the next several days  Epogen weekly for anemia of CKD  Will follow-up a.m. labs and monitor urine output closely      Isaiah Foster MD  [facility]   Office phone number: [phone]  Answering service phone number: [phone]    09/13/24  11:17 EDT    Dictation performed using Dragon dictation software

## 2024-09-13 NOTE — PROGRESS NOTES
"    DAILY PROGRESS NOTE  [facility]    Patient Identification:  Name: Lowell Min  Age: 84 y.o.  Sex: female  :  1940  MRN: [id number]         Primary Care Physician: Dannie Mathews MD    Subjective:  Interval History: Not voicing anything new to me this morning.  Resting comfortably in bed.  Easily awakened and conversational.  Denies any chest pain nausea or vomiting    Objective: No family at bedside    Scheduled Meds:apixaban, 2.5 mg, Oral, BID  atorvastatin, 40 mg, Oral, Daily  carvedilol, 6.25 mg, Oral, BID With Meals  epoetin nazanin/nazanin-epbx, 10,000 Units, Subcutaneous, Weekly  furosemide, 40 mg, Intravenous, Q12H  gabapentin, 100 mg, Oral, TID  Lidocaine, 1 patch, Transdermal, Q24H  methylPREDNISolone, 8 mg, Oral, BID  sennosides-docusate, 2 tablet, Oral, BID  venlafaxine XR, 75 mg, Oral, Daily      Continuous Infusions:     Vital signs in last 24 hours:  Temp:  [96.7 °F (35.9 °C)-97.5 °F (36.4 °C)] 97.5 °F (36.4 °C)  Heart Rate:  [48-60] 56  Resp:  [18-25] 18  BP: (127-142)/(66-88) 135/75    Intake/Output:    Intake/Output Summary (Last 24 hours) at 2024 1141  Last data filed at 2024 0433  Gross per 24 hour   Intake --   Output 1550 ml   Net -1550 ml       Exam:  /75 (Patient Position: Lying)   Pulse 56   Temp 97.5 °F (36.4 °C) (Oral)   Resp 18   Ht 170.2 cm (67\")   Wt 94.1 kg (207 lb 7.3 oz)   SpO2 96%   BMI 32.49 kg/m²     General Appearance:    Alert, cooperative, AOx3                         Throat:   Oral mucosa pink and moist                           Neck:   No JVD                         Lungs:    Clear to auscultation bilaterally, respirations unlabored                          Heart:    Regular rate and rhythm, S1 and S2 normal                  Abdomen:     Soft, nontender, bowel sounds active                 Extremities: GW/moving all/trace edema        Data Review:  Labs in chart were reviewed.    Assessment:  Active Hospital Problems    Diagnosis  " POA    **Acute on chronic diastolic CHF (congestive heart failure) [I50.33]  Yes    Calcium pyrophosphate deposition disease (CPPD) [M11.20]  Unknown    Obesity (BMI 30-39.9) [E66.9]  Yes    HLD (hyperlipidemia) [E78.5]  Yes    Diabetic polyneuropathy associated with type 2 diabetes mellitus [E11.42]  Yes    Anxiety associated with depression [F41.8]  Yes    Iron deficiency anemia [D50.9]  Yes    PAF (paroxysmal atrial fibrillation) [I48.0]  Yes    Chronic kidney disease, stage IV (severe) [N18.4]  Yes      Resolved Hospital Problems   No resolved problems to display.       Plan:    Renal dosing diuretics with restart of IV Lasix   -Creatinine 4.04 with a uric at 9.4 and a K of 4.9    Cardiology decreasing Coreg for some bradycardia    CPPD on Lidoderm/MDP    ACD on EPO    PAF-RC-AC with Eliquis    DM2 with CKD with stable BS    No DC available until Saturday secondary to bed availability    Artie Light MD  9/13/2024  11:41 EDT

## 2024-09-13 NOTE — PROGRESS NOTES
[facility]  Cardiology Progress Note    Patient Identification:  Name: Lowell Min  Age: 84 y.o.  Sex: female  :  1940  MRN: [id number]                 Follow Up / Chief Complaint: Shortness of breath    Interval History: Resting in bed, on 2l nc, no chest pain or shortness of breath, complains of knee pain      Objective:    Past Medical History:  Past Medical History:   Diagnosis Date    Anxiety     Atrial fibrillation     CHF (congestive heart failure)     Chronic kidney disease, stage IV (severe)     Depression     Elevated cholesterol     Hypertension     Type 2 diabetes mellitus      Past Surgical History:  Past Surgical History:   Procedure Laterality Date    APPENDECTOMY      CHOLECYSTECTOMY      COLONOSCOPY      CYSTOSCOPY BOTOX INJECTION OF BLADDER N/A     HYSTERECTOMY      TUMOR REMOVAL Left     benign tumor from left breast        Social History:   Social History     Tobacco Use    Smoking status: Never    Smokeless tobacco: Never   Substance Use Topics    Alcohol use: Never      Family History:  History reviewed. No pertinent family history.       Allergies:  Allergies   Allergen Reactions    Ibuprofen Other (See Comments)     Decrease urine output       Scheduled Meds:  apixaban, 2.5 mg, BID  atorvastatin, 40 mg, Daily  carvedilol, 12.5 mg, BID With Meals  epoetin nazanin/nazanin-epbx, 10,000 Units, Weekly  furosemide, 40 mg, Q12H  gabapentin, 100 mg, TID  Lidocaine, 1 patch, Q24H  methylPREDNISolone, 8 mg, BID  sennosides-docusate, 2 tablet, BID  venlafaxine XR, 75 mg, Daily            INTAKE AND OUTPUT:    Intake/Output Summary (Last 24 hours) at 2024 0911  Last data filed at 2024 0433  Gross per 24 hour   Intake --   Output 1550 ml   Net -1550 ml       Review of Systems:   GI: no n/v or abd pain  Cardiac: no chest pain or palpitations  Pulmonary: no shortness of breath or cough      /75 (Patient Position: Lying)   Pulse 56   Temp 97.5 °F (36.4 °C) (Oral)   Resp  "18   Ht 170.2 cm (67\")   Wt 94.1 kg (207 lb 7.3 oz)   SpO2 96%   BMI 32.49 kg/m²     Physical Exam:  General:  Alert, cooperative, appears in no acute distress  Respiratory: Clear to auscultation.  Normal respiratory effort and rate.  Cardiovascular: S1S2 Regular rate and rhythm. + murmur, no rub or gallop.   Gastrointestinal: soft, non tender. Bowel sounds present.   Extremities: JAY x4. No pretibial pitting edema. Adequate musculoskeletal strength.   Neuro: AAO x3 CN II-XII grossly intact              Cardiographics    Echocardiogram:     Interpretation Summary         Left ventricular systolic function is mildly decreased. Calculated left ventricular EF = 44.1%    The following left ventricular wall segments are hypokinetic: mid anterior, apical anterior, basal anterolateral, mid anterolateral, apical lateral, basal inferolateral, mid inferolateral, apical inferior, mid inferior, apical septal, basal inferoseptal, mid inferoseptal, apex hypokinetic, mid anteroseptal, basal anterior, basal inferior and basal inferoseptal.    Left ventricular diastolic function was indeterminate.    The left atrial cavity is severely dilated.    Mild aortic valve stenosis is present. Aortic valve area is 1.2 cm2.    The right atrial cavity is severely  dilated.    Peak velocity of the flow distal to the aortic valve is 246.1 cm/s. Aortic valve mean pressure gradient is 11 mmHg.    Severe mitral valve regurgitation is present.    Mild mitral valve stenosis is present. The mitral valve mean gradient is 4 mmHg.    Severe tricuspid valve regurgitation is present.    Estimated right ventricular systolic pressure from tricuspid regurgitation is markedly elevated (>55 mmHg). Calculated right ventricular systolic pressure from tricuspid regurgitation is 62 mmHg.     Lab Review   Results from last 7 days   Lab Units 09/07/24  0655 09/07/24  0422 09/06/24  1405   HSTROP T ng/L 33* 36* 37*     Results from last 7 days   Lab Units " "09/09/24  0548   MAGNESIUM mg/dL 1.6     Results from last 7 days   Lab Units 09/13/24  0613   SODIUM mmol/L 133*   POTASSIUM mmol/L 4.9   BUN mg/dL 89*   CREATININE mg/dL 4.04*   CALCIUM mg/dL 8.6     @[id number]@  Results from last 7 days   Lab Units 09/11/24  0705 09/10/24  0858 09/09/24  0548   WBC 10*3/mm3 5.54 6.11 7.17   HEMOGLOBIN g/dL 8.5* 8.4* 8.9*   HEMATOCRIT % 25.8* 26.6* 27.5*   PLATELETS 10*3/mm3 174 172 173             Assessment:    Renal failure  Anemia  Hypertension  Borderline elevated troponin due to renal  Aortic stenosis  Chronic anticoagulation  Left bundle branch block  Chronic atrial fibrillation   Acute systolic chf ef 44 %. Continue coreg    Plan:  BP and HR stable  Afib, rate controlled, some lower hr in 40s, I will decrease coreg 6.25. AC on eliquis  Echo with EF 44%, severe MR, TR, AS, treating conservatively, she will follow with her primary cardiologist as outpatient with [facility].   Noted cxr yesterday with some improvement, net neg 1.5L,  IV diuresis per nephrology,       RAMAKRISHNA Weiner/Transcription:   \"Dictated utilizing Dragon dictation\".     "

## 2024-09-13 NOTE — PLAN OF CARE
Goal Outcome Evaluation:  Plan of Care Reviewed With: patient        Progress: improving  Outcome Evaluation: Pt was awake and sleepy in bed upon arrival to room. Pt expressed not having pain, R ankle felt better but L knee was sore and stiff. Pt was able to follow commands and agreeable to PT. Pt performed bed mobility EOB Mod A, OOB CGA with rxw, ambulated 15' to room door Min A with rxw. Slow gait speed and unsteady movements at times. Pt performed STS x5 Min A with rxw. Pt was demostrated shaky upper extremities and a posterior lean. Pt appeared drowsy throughout session. Pt continues to benefit from skilled PT. DC recommendation is SNF.

## 2024-09-13 NOTE — PLAN OF CARE
Goal Outcome Evaluation:  Plan of Care Reviewed With: patient        Progress: improving  Outcome Evaluation: Pt oriented and following commands. AVSS on 2LNC. No acute chanages overnight.

## 2024-09-13 NOTE — THERAPY TREATMENT NOTE
Patient Name: Lowell Min  : 1940    MRN: [id number]                              Today's Date: 2024       Admit Date: 2024    Visit Dx:     ICD-10-CM ICD-9-CM   1. Acute congestive heart failure, unspecified heart failure type  I50.9 428.0   2. Chronic kidney disease, unspecified CKD stage  N18.9 585.9   3. Chronic anemia  D64.9 285.9   4. Hypertension not at goal  I10 401.9     Patient Active Problem List   Diagnosis    Chronic kidney disease, stage IV (severe)    Erythropoietin deficiency anemia    Symptomatic anemia    Anxiety associated with depression    Iron deficiency anemia    PAF (paroxysmal atrial fibrillation)    Shortness of breath    Chronic diastolic congestive heart failure    Diabetic polyneuropathy associated with type 2 diabetes mellitus    PERLA (acute kidney injury)    Foot pain, bilateral    Anemia of chronic renal failure    Acute on chronic diastolic CHF (congestive heart failure)    Obesity (BMI 30-39.9)    HLD (hyperlipidemia)    Calcium pyrophosphate deposition disease (CPPD)     Past Medical History:   Diagnosis Date    Anxiety     Atrial fibrillation     CHF (congestive heart failure)     Chronic kidney disease, stage IV (severe)     Depression     Elevated cholesterol     Hypertension     Type 2 diabetes mellitus      Past Surgical History:   Procedure Laterality Date    APPENDECTOMY      CHOLECYSTECTOMY      COLONOSCOPY      CYSTOSCOPY BOTOX INJECTION OF BLADDER N/A     HYSTERECTOMY      TUMOR REMOVAL Left     benign tumor from left breast      General Information       Row Name 24 1506          Physical Therapy Time and Intention    Document Type therapy note (daily note)  -EM (r) DG (t) EM (c)     Mode of Treatment physical therapy;individual therapy  -EM (r) DG (t) EM (c)       Row Name 24 1503          General Information    Patient Profile Reviewed yes  -EM (r) DG (t) EM (c)     Existing Precautions/Restrictions fall  -EM (r) DG (t) EM (c)                User Key  (r) = Recorded By, (t) = Taken By, (c) = Cosigned By      Initials Name Provider Type    Neris Morgan, PT Physical Therapist    Andre Garcia, PT Student PT Student                   Mobility       Row Name 09/13/24 1506          Bed Mobility    Supine-Sit [place] (Bed Mobility) moderate assist (50% patient effort)  -EM (r) DG (t) EM (c)       Row Name 09/13/24 1506          Sit-Stand Transfer    Sit-Stand [place] (Transfers) minimum assist (75% patient effort)  -EM (r) DG (t) EM (c)     Assistive Device (Sit-Stand Transfers) walker, front-wheeled  -EM (r) DG (t) EM (c)     Comment, (Sit-Stand Transfer) STS x5 with rest breaks, upper extremities were shaky, posterior lean present.  -EM (r) DG (t) EM (c)       Row Name 09/13/24 1506          Gait/Stairs (Locomotion)    [place] Level (Gait) minimum assist (75% patient effort)  -EM (r) DG (t) EM (c)     Assistive Device (Gait) walker, front-wheeled  -EM (r) DG (t) EM (c)     Patient was able to Ambulate yes  -EM (r) DG (t) EM (c)     Distance in Feet (Gait) 15  -EM (r) DG (t) EM (c)     Deviations/Abnormal Patterns (Gait) base of support, narrow;guillermo decreased;gait speed decreased;stride length decreased  -EM (r) DG (t) EM (c)     Comment, (Gait/Stairs) Ambulated to room door using rxw. Very slow gait speed, slight posterior lean, shaky upper extremities.  -EM (r) DG (t) EM (c)               User Key  (r) = Recorded By, (t) = Taken By, (c) = Cosigned By      Initials Name Provider Type    Neris Morgan PT Physical Therapist    Andre Garcia, PT Student PT Student                   Obj/Interventions       Row Name 09/13/24 1509          Motor Skills    Therapeutic Exercise --  At EOB, performed ankle pumps x10e reps, LAQ x10e reps, seated marches x10 reps. STS x5 reps with rest breaks.  -EM (r) DG (t) EM (c)       Row Name 09/13/24 1509          Balance    Static Sitting Balance standby assist  -EM (r) DG (t)  EM (c)     Dynamic Sitting Balance standby assist  -EM (r) DG (t) EM (c)     Position, Sitting Balance sitting edge of bed  -EM (r) DG (t) EM (c)     Static Standing Balance contact guard  -EM (r) DG (t) EM (c)     Dynamic Standing Balance contact guard  -EM (r) DG (t) EM (c)     Position/Device Used, Standing Balance walker, front-wheeled  -EM (r) DG (t) EM (c)     Comment, Balance Usteady standing balance with shaky upper extremities.  -EM (r) DG (t) EM (c)               User Key  (r) = Recorded By, (t) = Taken By, (c) = Cosigned By      Initials Name Provider Type    EM Neris Brink, PT Physical Therapist    Andre Garcia PT Student PT Student                   Goals/Plan    No documentation.                  Clinical Impression       Row Name 09/13/24 1510          Pain    Pre/Posttreatment Pain Comment Did not express having pain when asked. Stated L knee was sore.  -EM (r) DG (t) EM (c)       Row Name 09/13/24 1510          Plan of Care Review    Outcome Evaluation Pt was awake and sleepy in bed upon arrival to room. Pt expressed not having pain, R ankle felt better but L knee was sore and stiff. Pt was able to follow commands and agreeable to PT. Pt performed bed mobility EOB Mod A, OOB CGA with rxw, ambulated 15' to room door Min A with rxw. Slow gait speed and unsteady movements at times. Pt performed STS x5 Min A with rxw. Pt was demostrated shaky upper extremities and a posterior lean. Pt appeared drowsy throughout session. Pt continues to benefit from skilled PT. DC recommendation is SNF.  -EM (r) DG (t) EM (c)       Row Name 09/13/24 1510          Positioning and Restraints    Pre-Treatment Position in bed  -EM (r) DG (t) EM (c)     Post Treatment Position chair  -EM (r) DG (t) EM (c)     In Chair notified nsg;reclined;call light within reach;encouraged to call for assist;exit alarm on  -EM (r) DG (t) EM (c)               User Key  (r) = Recorded By, (t) = Taken By, (c) = Cosigned By       Initials Name Provider Type    Neris Morgan, PT Physical Therapist    Andre Garcia, PT Student PT Student                   Outcome Measures       Row Name 09/13/24 1514          How much help from another person do you currently need...    Turning from your back to your side while in flat bed without using bedrails? 2  -EM (r) DG (t) EM (c)     Moving from lying on back to sitting on the side of a flat bed without bedrails? 2  -EM (r) DG (t) EM (c)     Moving to and from a bed to a chair (including a wheelchair)? 3  -EM (r) DG (t) EM (c)     Standing up from a chair using your arms (e.g., wheelchair, bedside chair)? 3  -EM (r) DG (t) EM (c)     Climbing 3-5 steps with a railing? 1  -EM (r) DG (t) EM (c)     To walk in hospital room? 3  -EM (r) DG (t) EM (c)     AM-PAC 6 Clicks Score (PT) 14  -EM (r) DG (t)     Highest Level of Mobility Goal 4 --> Transfer to chair/commode  -EM (r) DG (t)       Row Name 09/13/24 1514          Functional Assessment    Outcome Measure Options AM-PAC 6 Clicks Basic Mobility (PT)  -EM (r) DG (t) EM (c)               User Key  (r) = Recorded By, (t) = Taken By, (c) = Cosigned By      Initials Name Provider Type    Neris Morgan, PT Physical Therapist    Andre Garcia, PT Student PT Student                                 Physical Therapy Education       Title: PT OT SLP Therapies (In Progress)       Topic: Physical Therapy (In Progress)       Point: Mobility training (In Progress)       Learning Progress Summary             Patient Acceptance, E, NR by DG at 9/13/2024 1515    Acceptance, E, NR by DG at 9/12/2024 1558    Nonacceptance, E, NL by  at 9/12/2024 0511    Acceptance, E, VU,NR by DG at 9/11/2024 1551    Acceptance, E, NR by DG at 9/10/2024 1606    Acceptance, E,D, NR by PC at 9/9/2024 1623                         Point: Home exercise program (In Progress)       Learning Progress Summary             Patient Nonacceptance, E, NL by EF at 9/12/2024  0511    Acceptance, E,D, NR by PC at 9/9/2024 1623                         Point: Body mechanics (In Progress)       Learning Progress Summary             Patient Nonacceptance, E, NL by EF at 9/12/2024 0511    Acceptance, E,D, NR by PC at 9/9/2024 1623                         Point: Precautions (In Progress)       Learning Progress Summary             Patient Nonacceptance, E, NL by EF at 9/12/2024 0511    Acceptance, E,D, NR by PC at 9/9/2024 1623                                         User Key       Initials Effective Dates Name Provider Type Discipline    PC 06/16/21 -  Kerry Luque, PT Physical Therapist PT    EF 01/09/24 -  Jessica Ballard, RN Registered Nurse Nurse     08/22/24 -  Andre Cormier PT Student PT Student PT                  PT Recommendation and Plan     Plan of Care Reviewed With: patient  Progress: improving  Outcome Evaluation: Pt was awake and sleepy in bed upon arrival to room. Pt expressed not having pain, R ankle felt better but L knee was sore and stiff. Pt was able to follow commands and agreeable to PT. Pt performed bed mobility EOB Mod A, OOB CGA with rxw, ambulated 15' to room door Min A with rxw. Slow gait speed and unsteady movements at times. Pt performed STS x5 Min A with rxw. Pt was demostrated shaky upper extremities and a posterior lean. Pt appeared drowsy throughout session. Pt continues to benefit from skilled PT. DC recommendation is SNF.     Time Calculation:         PT Charges       Row Name 09/13/24 1516             Time Calculation    Start Time 1437  -EM (r) DG (t) EM (c)      Stop Time 1502  -EM (r) DG (t) EM (c)      Time Calculation (min) 25 min  -EM (r) DG (t)      PT Received On 09/13/24  -EM (r) DG (t) EM (c)      PT - Next Appointment 09/16/24  -EM (r) DG (t) EM (c)         Time Calculation- PT    Total Timed Code Minutes- PT 25 minute(s)  -EM (r) DG (t) EM (c)         Timed Charges    68530 - PT Therapeutic Exercise Minutes 10  -EM (r) DG (t) EM (c)       63728 - PT Therapeutic Activity Minutes 15  -EM (r) DG (t) EM (c)         Total Minutes    Timed Charges Total Minutes 25  -EM (r) DG (t)       Total Minutes 25  -EM (r) DG (t)                User Key  (r) = Recorded By, (t) = Taken By, (c) = Cosigned By      Initials Name Provider Type    EM Neris Brink, PT Physical Therapist     Andre Cormier, PT Student PT Student                  Therapy Charges for Today       Code Description Service Date Service Provider Modifiers Qty    48684151344 HC PT THER PROC EA 15 MIN 9/12/2024 Chantelle Cormierta, PT Student GP 1    67035125863 HC PT THERAPEUTIC ACT EA 15 MIN 9/12/2024 Casa Andre, PT Student GP 1    10591856898 HC PT THER PROC EA 15 MIN 9/13/2024 Casa Andre, PT Student GP 1    68916694598 HC PT THERAPEUTIC ACT EA 15 MIN 9/13/2024 Casa Andre, PT Student GP 1            PT G-Codes  Outcome Measure Options: AM-PAC 6 Clicks Basic Mobility (PT)  AM-PAC 6 Clicks Score (PT): 14       Andre Cormier, PT Student  9/13/2024

## 2024-09-14 VITALS
SYSTOLIC BLOOD PRESSURE: 142 MMHG | RESPIRATION RATE: 18 BRPM | DIASTOLIC BLOOD PRESSURE: 86 MMHG | HEART RATE: 54 BPM | TEMPERATURE: 97.6 F | WEIGHT: 207.45 LBS | BODY MASS INDEX: 32.56 KG/M2 | OXYGEN SATURATION: 99 % | HEIGHT: 67 IN

## 2024-09-14 LAB
ALBUMIN SERPL-MCNC: 3.3 G/DL (ref 3.5–5.2)
ANION GAP SERPL CALCULATED.3IONS-SCNC: 17 MMOL/L (ref 5–15)
BACTERIA FLD CULT: NORMAL
BUN SERPL-MCNC: 95 MG/DL (ref 8–23)
BUN/CREAT SERPL: 27.3 (ref 7–25)
CALCIUM SPEC-SCNC: 8.9 MG/DL (ref 8.6–10.5)
CHLORIDE SERPL-SCNC: 95 MMOL/L (ref 98–107)
CO2 SERPL-SCNC: 23 MMOL/L (ref 22–29)
CREAT SERPL-MCNC: 3.48 MG/DL (ref 0.57–1)
EGFRCR SERPLBLD CKD-EPI 2021: 12.5 ML/MIN/1.73
GLUCOSE SERPL-MCNC: 129 MG/DL (ref 65–99)
PHOSPHATE SERPL-MCNC: 5.9 MG/DL (ref 2.5–4.5)
POTASSIUM SERPL-SCNC: 4.9 MMOL/L (ref 3.5–5.2)
SODIUM SERPL-SCNC: 135 MMOL/L (ref 136–145)

## 2024-09-14 PROCEDURE — 99232 SBSQ HOSP IP/OBS MODERATE 35: CPT | Performed by: NURSE PRACTITIONER

## 2024-09-14 PROCEDURE — 80069 RENAL FUNCTION PANEL: CPT | Performed by: INTERNAL MEDICINE

## 2024-09-14 RX ORDER — GABAPENTIN 100 MG/1
100 CAPSULE ORAL 3 TIMES DAILY
Qty: 15 CAPSULE | Refills: 0 | Status: SHIPPED | OUTPATIENT
Start: 2024-09-14

## 2024-09-14 RX ORDER — HYDROCODONE BITARTRATE AND ACETAMINOPHEN 5; 325 MG/1; MG/1
1 TABLET ORAL EVERY 6 HOURS PRN
Qty: 10 TABLET | Refills: 0 | Status: SHIPPED | OUTPATIENT
Start: 2024-09-14

## 2024-09-14 RX ORDER — LIDOCAINE 4 G/G
1 PATCH TOPICAL
Start: 2024-09-15

## 2024-09-14 RX ORDER — METHYLPREDNISOLONE 4 MG
TABLET, DOSE PACK ORAL
Qty: 21 TABLET | Refills: 0 | Status: SHIPPED | OUTPATIENT
Start: 2024-09-14

## 2024-09-14 RX ADMIN — APIXABAN 2.5 MG: 2.5 TABLET, FILM COATED ORAL at 09:30

## 2024-09-14 RX ADMIN — CARVEDILOL 6.25 MG: 12.5 TABLET, FILM COATED ORAL at 09:30

## 2024-09-14 RX ADMIN — METHYLPREDNISOLONE 8 MG: 4 TABLET ORAL at 09:31

## 2024-09-14 RX ADMIN — SENNOSIDES AND DOCUSATE SODIUM 2 TABLET: 50; 8.6 TABLET ORAL at 09:30

## 2024-09-14 RX ADMIN — GABAPENTIN 100 MG: 100 CAPSULE ORAL at 09:30

## 2024-09-14 RX ADMIN — ATORVASTATIN CALCIUM 40 MG: 20 TABLET, FILM COATED ORAL at 09:30

## 2024-09-14 RX ADMIN — LIDOCAINE 1 PATCH: 4 PATCH TOPICAL at 09:30

## 2024-09-14 RX ADMIN — VENLAFAXINE HYDROCHLORIDE 75 MG: 75 CAPSULE, EXTENDED RELEASE ORAL at 09:30

## 2024-09-14 NOTE — PROGRESS NOTES
"   LOS: 7 days     Chief Complaint/ Reason for encounter: CKD/CHF    Subjective   09/09/24 : She is feeling better today, no complaints, weight down about 13 pounds since admission  Less short of breath, on supplemental oxygen at her home rate of 2 L  Decreased edema, no chest pain  Good appetite    9/10: She looks and feels well denies new complaints, shortness of breath further improved  Good appetite no nausea  Minimal edema    9/11: She looks and feels well denies complaints.  No worsening shortness of breath.  No chest pain  Good appetite no nausea or vomiting    9/12: Denies complaints, requiring oxygen with some mild dyspnea  O2 sats low 90s, some increasing edema  Weight back to 216 which is near her admission weight  No fevers, chills, cough, or congestion  Denies dysuria    9/13: No new complaints or events.  She feels little better today  Edema and shortness of breath a little better with IV Lasix    9/14 continues to feel better, less edema after IV lasix yesterday       Medical history reviewed:  History of Present Illness    Subjective    History taken from: Patient and chart    Vital Signs  Temp:  [97.5 °F (36.4 °C)-97.8 °F (36.6 °C)] 97.6 °F (36.4 °C)  Heart Rate:  [46-56] 54  Resp:  [18] 18  BP: (121-142)/(64-86) 142/86       Wt Readings from Last 1 Encounters:   09/13/24 0400 94.1 kg (207 lb 7.3 oz)   09/12/24 0559 98 kg (216 lb 0.8 oz)   09/10/24 0319 92.6 kg (204 lb 2.3 oz)   09/09/24 0542 93 kg (205 lb 0.4 oz)   09/08/24 0614 96.2 kg (212 lb 1.3 oz)   09/07/24 1449 97.1 kg (214 lb)   09/07/24 0518 97.4 kg (214 lb 11.7 oz)   09/06/24 1510 98.9 kg (218 lb)       Objective:  Vital signs: (most recent): Blood pressure 142/86, pulse 54, temperature 97.6 °F (36.4 °C), temperature source Oral, resp. rate 18, height 170.2 cm (67\"), weight 94.1 kg (207 lb 7.3 oz), SpO2 99%.                Objective:  General Appearance:  Comfortable, well-appearing, in no acute distress and not in pain.  Awake, " alert  HEENT: Mucous membranes moist, no injury, oropharynx clear  Lungs:  Normal effort and normal respiratory rate.  Breath sounds clear to auscultation.  No  respiratory distress.  No rales, decreased breath sounds or rhonchi.    Heart: Normal rate.  Regular rhythm.  S1, S2 normal.  No murmur.   Abdomen: Abdomen is soft.  Bowel sounds are normal, no abdominal tenderness.  There is no rebound or guarding  Extremities: Trace edema of bilateral lower extremities  Skin:  Warm and dry with no rashes      Results Review:    Intake/Output:     Intake/Output Summary (Last 24 hours) at 9/14/2024 0947  Last data filed at 9/14/2024 0934  Gross per 24 hour   Intake 240 ml   Output 4800 ml   Net -4560 ml         DATA:  Radiology and Labs:  The following labs independently reviewed by me. Additional labs ordered for tomorrow a.m.  Interval notes, chart personally reviewed by me.   Old records independently reviewed showing CKD 4 followed by Dr. Ryann Foster  Discussed with patient herself at bedside    Risk/ complexity of medical care/ medical decision making high risk, new PERLA with CHF exacerbation       Labs:   Recent Results (from the past 24 hour(s))   Renal Function Panel    Collection Time: 09/14/24  6:40 AM    Specimen: Blood   Result Value Ref Range    Glucose 129 (H) 65 - 99 mg/dL    BUN 95 (H) 8 - 23 mg/dL    Creatinine 3.48 (H) 0.57 - 1.00 mg/dL    Sodium 135 (L) 136 - 145 mmol/L    Potassium 4.9 3.5 - 5.2 mmol/L    Chloride 95 (L) 98 - 107 mmol/L    CO2 23.0 22.0 - 29.0 mmol/L    Calcium 8.9 8.6 - 10.5 mg/dL    Albumin 3.3 (L) 3.5 - 5.2 g/dL    Phosphorus 5.9 (H) 2.5 - 4.5 mg/dL    Anion Gap 17.0 (H) 5.0 - 15.0 mmol/L    BUN/Creatinine Ratio 27.3 (H) 7.0 - 25.0    eGFR 12.5 (L) >60.0 mL/min/1.73       Radiology:  Pertinent radiology studies were reviewed as described above      Medications have been reviewed separately in chart overview  US reviewed  IMPRESSION:  Mild bilateral renal atrophy. There is a 1.7 cm  hypoechoic  lesion within the right kidney that is most likely a cyst though not  definitively a cyst on this exam. This could be further evaluated with  multiphasic CT or MRI.     ASSESSMENT:  PERLA, initially suspected to be due to overdiuresis/volume contraction but no improvement with IV fluids.  Urine studies were not prerenal appearing  CKD stage IV.  Recent outpatient creatinine around 2.4With some progression  Acute systolic CHF, improved with diuresis but then worsening renal failure, does appear somewhat fluid overloaded today on exam  Anemia of CKD  Aortic stenosis  A-fib on anticoagulation      Plan:  Cr down to 3.4   Diuresed 3.5L with IV lasix   worsening azotemia is likely due to oral steroids in addition to her PERLA  Monitor blood pressure off amlodipine  On b-blocker per cardiology, dose decreased due to bradycardia     No hydro per US, possible cyst per US, no further eval at this time due to goals   Epogen weekly for anemia of CKD    Dose all medications for GFR <40   Minimize all nephrotoxic agents including IV dye and NSAIDs  Monitor electrolytes and volume   Call with any questions or concerns     If dc'd recommend she FU in 2-3 weeks with renal     Ryann Foster MD   Office phone number [phone]  Answering service phone number: [phone]      Ryann Foster MD  [facility]   Office phone number: [phone]  Answering service phone number: [phone]    09/14/24  09:47 EDT    Dictation performed using Dragon dictation software

## 2024-09-14 NOTE — CASE MANAGEMENT/SOCIAL WORK
Continued Stay Note  [facility]     Patient Name: Lowell Min  MRN: [id number]  Today's Date: 9/14/2024    Admit Date: 9/6/2024    Plan: Accepted at [facility]. No precert needed   Discharge Plan       Row Name 09/14/24 1128       Plan    Plan Comments CCP rescheduled [facility] for 3 PM today and RN updated. Benoit HARDWICK CCP                   Discharge Codes    No documentation.                 Expected Discharge Date and Time       Expected Discharge Date Expected Discharge Time    Sep 14, 2024               Benoit Crabtree RN

## 2024-09-14 NOTE — PROGRESS NOTES
HOSPITAL FOLLOW UP NOTE    Patient Name: Lowell Min  Patient : 1940        Date of Service:24  Provider of Service: RAMAKRISHNA Lizama  Place of Service: [facility]  Referral Provider: Vanessa Salazar MD          Follow Up:  shortness of breath    Interval Hx: Diuresed well on IV Lasix, VSS        OBJECTIVE  Temp:  [97.5 °F (36.4 °C)-97.8 °F (36.6 °C)] 97.6 °F (36.4 °C)  Heart Rate:  [46-56] 51  Resp:  [18] 18  BP: (121-142)/(64-86) 142/86     Intake/Output Summary (Last 24 hours) at 2024 0916  Last data filed at 2024 0600  Gross per 24 hour   Intake 240 ml   Output 3800 ml   Net -3560 ml     Body mass index is 32.49 kg/m².      09/10/24  0319 24  0559 24  0400   Weight: 92.6 kg (204 lb 2.3 oz) 98 kg (216 lb 0.8 oz) 94.1 kg (207 lb 7.3 oz)         Physical Exam:     General Appearance:    Alert, cooperative, in no acute distress   Head:    Normocephalic, without obvious abnormality, atraumatic   Eyes:            Conjunctivae and sclerae normal, no   icterus, no pallor, corneas clear, PERRLA   Neck:   No adenopathy, supple, trachea midline, no thyromegaly, no   carotid bruit, no JVD   Lungs:     Clear to auscultation,respirations regular, even and unlabored    Heart:    Regular rhythm and normal rate, normal S1 and S2, no murmur, no gallop, no rub, no click   Chest Wall:    No abnormalities observed   Abdomen:     Normal bowel sounds, no masses, no organomegaly, soft, nontender, nondistended, no guarding, no rebound  tenderness   Extremities:   Moves all extremities well, no edema, no cyanosis, no redness   Pulses:   Pulses palpable and equal bilaterally.          CURRENT MEDS    Scheduled Meds:apixaban, 2.5 mg, Oral, BID  atorvastatin, 40 mg, Oral, Daily  carvedilol, 6.25 mg, Oral, BID With Meals  epoetin nazanin/nazanin-epbx, 10,000 Units, Subcutaneous, Weekly  gabapentin, 100 mg, Oral, TID  Lidocaine, 1 patch, Transdermal, Q24H  methylPREDNISolone, 8  mg, Oral, BID  sennosides-docusate, 2 tablet, Oral, BID  venlafaxine XR, 75 mg, Oral, Daily      Continuous Infusions:       Lab Review:   Results from last 7 days   Lab Units 09/14/24  0640 09/13/24  0613   SODIUM mmol/L 135* 133*   POTASSIUM mmol/L 4.9 4.9   CHLORIDE mmol/L 95* 96*   CO2 mmol/L 23.0 24.8   BUN mg/dL 95* 89*   CREATININE mg/dL 3.48* 4.04*   GLUCOSE mg/dL 129* 144*   CALCIUM mg/dL 8.9 8.6         Results from last 7 days   Lab Units 09/11/24  0705 09/10/24  0858   WBC 10*3/mm3 5.54 6.11   HEMOGLOBIN g/dL 8.5* 8.4*   HEMATOCRIT % 25.8* 26.6*   PLATELETS 10*3/mm3 174 172         Results from last 7 days   Lab Units 09/09/24  0548 09/08/24  0618   MAGNESIUM mg/dL 1.6 1.8             Results from last 7 days   Lab Units 09/07/24  1420   TSH uIU/mL 2.300     TTE 09/07/2024:    Left ventricular systolic function is mildly decreased. Calculated left ventricular EF = 44.1%    The following left ventricular wall segments are hypokinetic: mid anterior, apical anterior, basal anterolateral, mid anterolateral, apical lateral, basal inferolateral, mid inferolateral, apical inferior, mid inferior, apical septal, basal inferoseptal, mid inferoseptal, apex hypokinetic, mid anteroseptal, basal anterior, basal inferior and basal inferoseptal.    Left ventricular diastolic function was indeterminate.    The left atrial cavity is severely dilated.    Mild aortic valve stenosis is present. Aortic valve area is 1.2 cm2.    The right atrial cavity is severely  dilated.    Peak velocity of the flow distal to the aortic valve is 246.1 cm/s. Aortic valve mean pressure gradient is 11 mmHg.    Severe mitral valve regurgitation is present.    Mild mitral valve stenosis is present. The mitral valve mean gradient is 4 mmHg.    Severe tricuspid valve regurgitation is present.    Estimated right ventricular systolic pressure from tricuspid regurgitation is markedly elevated (>55 mmHg). Calculated right ventricular systolic pressure  from tricuspid regurgitation is 62 mmHg.         ASSESSMENT & PLAN    Acute on chronic diastolic CHF (congestive heart failure)    Chronic kidney disease, stage IV (severe)    Anxiety associated with depression    Iron deficiency anemia    PAF (paroxysmal atrial fibrillation)    Diabetic polyneuropathy associated with type 2 diabetes mellitus    Obesity (BMI 30-39.9)    HLD (hyperlipidemia)    Calcium pyrophosphate deposition disease (CPPD)    1.  Paroxysmal atrial fibrillation: Rate controlled.  On beta-blocker.  Anticoagulation with Eliquis  2.  Acute on chronic diastolic heart failure: On carvedilol. Initially down about 13 pounds in weight then back to admission weight.  Diuretics were due held for a bit due to to worsening renal failure.  Creatinine improved yesterday and one-time dose of IV Lasix was given.  -3.5L balance over the last 24 hours  3.  Chronic kidney disease stage IV: Diuresis per nephrology  4.  Non-rheumatic aortic stenosis: mild.  Mean PG 11 mm Hg, GOVIND 1.2 cm²  5.   Non-rheumatic mitral valve: severe MR prior to diuresis, mild MS  6.   RVSP 62 mmHg 2/2 severe tricuspid regurgitation    Primary cardiologist is Dr. Orozco with [facility].  She will follow up with him upon discharge. .     Rosa Pantoja, APRN  09/14/24

## 2024-09-14 NOTE — DISCHARGE SUMMARY
Date of Discharge:  9/14/2024    PCP: Dannie Mathews MD    Discharge Diagnosis:   Active Hospital Problems    Diagnosis  POA    **Acute on chronic diastolic CHF (congestive heart failure) [I50.33]  Yes    Calcium pyrophosphate deposition disease (CPPD) [M11.20]  Unknown    Obesity (BMI 30-39.9) [E66.9]  Yes    HLD (hyperlipidemia) [E78.5]  Yes    Diabetic polyneuropathy associated with type 2 diabetes mellitus [E11.42]  Yes    Anxiety associated with depression [F41.8]  Yes    Iron deficiency anemia [D50.9]  Yes    PAF (paroxysmal atrial fibrillation) [I48.0]  Yes    Chronic kidney disease, stage IV (severe) [N18.4]  Yes      Resolved Hospital Problems   No resolved problems to display.          Consults       Date and Time Order Name Status Description    9/8/2024 11:02 AM Inpatient Orthopedic Surgery Consult Completed     9/7/2024  1:01 AM Inpatient Nephrology Consult      9/6/2024  9:36 PM Inpatient Cardiology Consult      9/6/2024  6:01 PM [facility] (on-call MD unless specified) Details            Hospital Course  84 y.o. female who was admitted for complaints of shortness of breath and edema.  She is seen in consultation by cardiology as well as nephrology.  Given her CKD, nephrology manage diuretics and fluid balance.  She was treated with IV diuretics and awaiting further recommendations regarding p.o. transition as patient was not on diuretics prior to admission.  We did hold her amlodipine while here and her blood pressure starting to equilibrate so we will resume that at discharge.  She is also maintained on a beta-blocker.  No additional plans for intervention from cardiology perspective.  They have been monitoring bradycardia and she has been maintained on a beta-blocker with no adjustment.  Echocardiogram obtained and you can see report for further clarification.  Paroxysmal A-fib is rate controlled and anticoagulated with Eliquis.  Her anemia of chronic disease is stable and EPO is given per renal  directions on a weekly basis.  She has diabetes complicated by CKD though overall blood sugar trends are stable.  She has worked well with therapies as she also has complaints of some knee pain in which she underwent joint aspiration was found to have CPPD and is currently maintained on Lidoderm/Medrol Dosepak.  Due to her poor PT evaluations, CCP is ordered generalized subacute rehab and patient is medically stable with no objection from renal perspective as transportation has been lined up for this afternoon at noon.  Vital signs are stable as well as afebrile and patient clinically much improved and she is thankful for the care she received while here while amenable to the above plan.      Temp:  [97.5 °F (36.4 °C)-97.8 °F (36.6 °C)] 97.6 °F (36.4 °C)  Heart Rate:  [46-56] 54  Resp:  [18] 18  BP: (121-142)/(64-86) 142/86  Body mass index is 32.49 kg/m².    Physical Exam  Constitutional:       Comments: Conversational pleasant.  Nontoxic.  No family at bedside   HENT:      Head: Normocephalic.      Nose: Nose normal.      Mouth/Throat:      Mouth: Mucous membranes are moist.      Pharynx: Oropharynx is clear.   Cardiovascular:      Rate and Rhythm: Normal rate and regular rhythm.   Pulmonary:      Effort: Pulmonary effort is normal. No respiratory distress.      Breath sounds: Normal breath sounds.   Abdominal:      General: Bowel sounds are normal. There is no distension.      Palpations: Abdomen is soft.      Tenderness: There is no abdominal tenderness.   Musculoskeletal:      Comments: Trace edema   Neurological:      Mental Status: She is alert and oriented to person, place, and time.      Cranial Nerves: No cranial nerve deficit.      Motor: Weakness present.      Gait: Gait abnormal.       Disposition: Skilled Nursing Facility (DC - External)       Discharge Medications        New Medications        Instructions Start Date   Lidocaine 4 %   1 patch, Transdermal, Every 24 Hours Scheduled, Remove & Discard  patch within 12 hours or as directed by MD   Start Date: September 15, 2024     methylPREDNISolone 4 MG dose pack  Commonly known as: MEDROL   Take as directed on package instructions.      naloxone 4 MG/0.1ML nasal spray  Commonly known as: NARCAN   Call 911. Don't prime. [place] in 1 nostril for overdose. Repeat in 2-3 minutes in other nostril if no or minimal breathing/responsiveness.             Continue These Medications        Instructions Start Date   acetaminophen 325 MG tablet  Commonly known as: TYLENOL   650 mg, Oral, Every 4 Hours PRN      amLODIPine 5 MG tablet  Commonly known as: NORVASC   5 mg, Oral, Every 24 Hours Scheduled      apixaban 2.5 MG tablet tablet  Commonly known as: ELIQUIS   2.5 mg, Oral, 2 Times Daily      atorvastatin 40 MG tablet  Commonly known as: LIPITOR   1 tablet, Oral, Every Night at Bedtime      carvedilol 12.5 MG tablet  Commonly known as: COREG   Take 1 tablet by mouth 2 (two) times daily with meals.      gabapentin 100 MG capsule  Commonly known as: NEURONTIN   100 mg, Oral, 3 Times Daily      HYDROcodone-acetaminophen 5-325 MG per tablet  Commonly known as: NORCO   1 tablet, Oral, Every 6 Hours PRN      O2  Commonly known as: OXYGEN   2 L/min, Nightly, Uses when sleeping      sennosides-docusate 8.6-50 MG per tablet  Commonly known as: PERICOLACE   2 tablets, Oral, 2 Times Daily      sodium bicarbonate 650 MG tablet   650 mg, Oral, 3 Times Daily      venlafaxine XR 75 MG 24 hr capsule  Commonly known as: EFFEXOR-XR   1 capsule, Oral, Daily                Additional Instructions for the Follow-ups that You Need to Schedule       Discharge Follow-up with PCP   As directed       Currently Documented PCP:    Dannie Mathews MD    PCP Phone Number:    [phone]     Follow Up Details: PCP post rehab.  Renal and cardiology per their recommendations               Contact information for follow-up providers       Dannie Mathews MD .    Specialty: Internal Medicine  Why: PCP post  rehab.  Renal and cardiology per their recommendations  Contact information:  [address]  [address]  [place] [address]  [phone]                       Contact information for after-discharge care       Destination       [facility] .    Service: Skilled Nursing  Contact information:  [address] [address] [phone]                                  Future Appointments   Date Time Provider Department Center   10/4/2024  2:00 PM ROOM 2 DANI [place]عراق[facility]  DANI Light MD  [facility]  09/14/24    Discharge time spent greater than 30 minutes.

## 2024-09-15 NOTE — CASE MANAGEMENT/SOCIAL WORK
Case Management Discharge Note      Final Note: [facility] via [facility]    Provided Post Acute Provider List?: Yes  Post Acute Provider List: Nursing Home, DME Supplier, Home Health, Inpatient Rehab  Provided Post Acute Provider Quality & Resource List?: Yes  Post Acute Provider Quality and Resource List: Home Health, Inpatient Rehab, Nursing Home  Delivered To: Patient  Method of Delivery: In person    Selected Continued Care - Discharged on 9/14/2024 Admission date: 9/6/2024 - Discharge disposition: Skilled Nursing Facility (DC - External)      Destination Coordination complete.      Service Provider Selected Services Address Phone Fax Patient Preferred    [facility] [address] [address] [phone] --              Durable Medical Equipment    No services have been selected for the patient.                Dialysis/Infusion    No services have been selected for the patient.                Home Medical Care    No services have been selected for the patient.                Therapy    No services have been selected for the patient.                Community Resources    No services have been selected for the patient.                Community & DME    No services have been selected for the patient.                    Transportation Services  Ambulance: [facility]    Final Discharge Disposition Code: 03 - skilled nursing facility (SNF)

## 2024-10-18 ENCOUNTER — HOSPITAL ENCOUNTER (INPATIENT)
Facility: HOSPITAL | Age: 84
LOS: 8 days | Discharge: NURSING, REHAB OR LONG-TERM CARE | End: 2024-10-26
Attending: EMERGENCY MEDICINE | Admitting: STUDENT IN AN ORGANIZED HEALTH CARE EDUCATION/TRAINING PROGRAM
Payer: MEDICARE

## 2024-10-18 ENCOUNTER — APPOINTMENT (OUTPATIENT)
Dept: GENERAL RADIOLOGY | Facility: HOSPITAL | Age: 84
End: 2024-10-18
Payer: MEDICARE

## 2024-10-18 DIAGNOSIS — I50.31 ACUTE DIASTOLIC CONGESTIVE HEART FAILURE: Primary | ICD-10-CM

## 2024-10-18 DIAGNOSIS — N18.9 CHRONIC KIDNEY DISEASE, UNSPECIFIED CKD STAGE: ICD-10-CM

## 2024-10-18 PROBLEM — I50.9 ACUTE CHF: Status: ACTIVE | Noted: 2024-10-18

## 2024-10-18 LAB
ALBUMIN SERPL-MCNC: 3.2 G/DL (ref 3.5–5.2)
ALBUMIN/GLOB SERPL: 0.9 G/DL
ALP SERPL-CCNC: 265 U/L (ref 39–117)
ALT SERPL W P-5'-P-CCNC: 13 U/L (ref 1–33)
ANION GAP SERPL CALCULATED.3IONS-SCNC: 9.9 MMOL/L (ref 5–15)
AST SERPL-CCNC: 16 U/L (ref 1–32)
BASOPHILS # BLD AUTO: 0.03 10*3/MM3 (ref 0–0.2)
BASOPHILS NFR BLD AUTO: 0.4 % (ref 0–1.5)
BILIRUB SERPL-MCNC: 0.9 MG/DL (ref 0–1.2)
BUN SERPL-MCNC: 40 MG/DL (ref 8–23)
BUN/CREAT SERPL: 16.9 (ref 7–25)
CALCIUM SPEC-SCNC: 9 MG/DL (ref 8.6–10.5)
CHLORIDE SERPL-SCNC: 106 MMOL/L (ref 98–107)
CO2 SERPL-SCNC: 24.1 MMOL/L (ref 22–29)
CREAT SERPL-MCNC: 2.37 MG/DL (ref 0.57–1)
DEPRECATED RDW RBC AUTO: 52.2 FL (ref 37–54)
EGFRCR SERPLBLD CKD-EPI 2021: 19.8 ML/MIN/1.73
EOSINOPHIL # BLD AUTO: 0.1 10*3/MM3 (ref 0–0.4)
EOSINOPHIL NFR BLD AUTO: 1.3 % (ref 0.3–6.2)
ERYTHROCYTE [DISTWIDTH] IN BLOOD BY AUTOMATED COUNT: 15.4 % (ref 12.3–15.4)
GLOBULIN UR ELPH-MCNC: 3.4 GM/DL
GLUCOSE SERPL-MCNC: 119 MG/DL (ref 65–99)
HCT VFR BLD AUTO: 25.4 % (ref 34–46.6)
HGB BLD-MCNC: 8 G/DL (ref 12–15.9)
HOLD SPECIMEN: NORMAL
HOLD SPECIMEN: NORMAL
IMM GRANULOCYTES # BLD AUTO: 0.02 10*3/MM3 (ref 0–0.05)
IMM GRANULOCYTES NFR BLD AUTO: 0.3 % (ref 0–0.5)
LYMPHOCYTES # BLD AUTO: 1.04 10*3/MM3 (ref 0.7–3.1)
LYMPHOCYTES NFR BLD AUTO: 13.3 % (ref 19.6–45.3)
MCH RBC QN AUTO: 30 PG (ref 26.6–33)
MCHC RBC AUTO-ENTMCNC: 31.5 G/DL (ref 31.5–35.7)
MCV RBC AUTO: 95.1 FL (ref 79–97)
MONOCYTES # BLD AUTO: 0.48 10*3/MM3 (ref 0.1–0.9)
MONOCYTES NFR BLD AUTO: 6.1 % (ref 5–12)
NEUTROPHILS NFR BLD AUTO: 6.15 10*3/MM3 (ref 1.7–7)
NEUTROPHILS NFR BLD AUTO: 78.6 % (ref 42.7–76)
NRBC BLD AUTO-RTO: 0 /100 WBC (ref 0–0.2)
NT-PROBNP SERPL-MCNC: ABNORMAL PG/ML (ref 0–1800)
PLATELET # BLD AUTO: 176 10*3/MM3 (ref 140–450)
PMV BLD AUTO: 9.3 FL (ref 6–12)
POTASSIUM SERPL-SCNC: 5 MMOL/L (ref 3.5–5.2)
PROT SERPL-MCNC: 6.6 G/DL (ref 6–8.5)
RBC # BLD AUTO: 2.67 10*6/MM3 (ref 3.77–5.28)
SODIUM SERPL-SCNC: 140 MMOL/L (ref 136–145)
TROPONIN T SERPL HS-MCNC: 47 NG/L
WBC NRBC COR # BLD AUTO: 7.82 10*3/MM3 (ref 3.4–10.8)
WHOLE BLOOD HOLD COAG: NORMAL
WHOLE BLOOD HOLD SPECIMEN: NORMAL

## 2024-10-18 PROCEDURE — 83880 ASSAY OF NATRIURETIC PEPTIDE: CPT

## 2024-10-18 PROCEDURE — 93005 ELECTROCARDIOGRAM TRACING: CPT | Performed by: EMERGENCY MEDICINE

## 2024-10-18 PROCEDURE — 99285 EMERGENCY DEPT VISIT HI MDM: CPT

## 2024-10-18 PROCEDURE — 80053 COMPREHEN METABOLIC PANEL: CPT

## 2024-10-18 PROCEDURE — 25010000002 FUROSEMIDE PER 20 MG: Performed by: EMERGENCY MEDICINE

## 2024-10-18 PROCEDURE — 71045 X-RAY EXAM CHEST 1 VIEW: CPT

## 2024-10-18 PROCEDURE — 93010 ELECTROCARDIOGRAM REPORT: CPT | Performed by: INTERNAL MEDICINE

## 2024-10-18 PROCEDURE — 36415 COLL VENOUS BLD VENIPUNCTURE: CPT

## 2024-10-18 PROCEDURE — 84484 ASSAY OF TROPONIN QUANT: CPT

## 2024-10-18 PROCEDURE — 85025 COMPLETE CBC W/AUTO DIFF WBC: CPT

## 2024-10-18 PROCEDURE — 93005 ELECTROCARDIOGRAM TRACING: CPT

## 2024-10-18 RX ORDER — FUROSEMIDE 10 MG/ML
80 INJECTION INTRAMUSCULAR; INTRAVENOUS ONCE
Status: COMPLETED | OUTPATIENT
Start: 2024-10-18 | End: 2024-10-18

## 2024-10-18 RX ORDER — BISACODYL 5 MG/1
5 TABLET, DELAYED RELEASE ORAL DAILY PRN
Status: DISCONTINUED | OUTPATIENT
Start: 2024-10-18 | End: 2024-10-24

## 2024-10-18 RX ORDER — ACETAMINOPHEN 325 MG/1
650 TABLET ORAL EVERY 4 HOURS PRN
Status: DISCONTINUED | OUTPATIENT
Start: 2024-10-18 | End: 2024-10-26 | Stop reason: HOSPADM

## 2024-10-18 RX ORDER — NICOTINE POLACRILEX 4 MG
15 LOZENGE BUCCAL
Status: DISCONTINUED | OUTPATIENT
Start: 2024-10-18 | End: 2024-10-26 | Stop reason: HOSPADM

## 2024-10-18 RX ORDER — DEXTROSE MONOHYDRATE 25 G/50ML
25 INJECTION, SOLUTION INTRAVENOUS
Status: DISCONTINUED | OUTPATIENT
Start: 2024-10-18 | End: 2024-10-26 | Stop reason: HOSPADM

## 2024-10-18 RX ORDER — POLYETHYLENE GLYCOL 3350 17 G/17G
17 POWDER, FOR SOLUTION ORAL DAILY PRN
Status: DISCONTINUED | OUTPATIENT
Start: 2024-10-18 | End: 2024-10-24

## 2024-10-18 RX ORDER — INSULIN LISPRO 100 [IU]/ML
2-7 INJECTION, SOLUTION INTRAVENOUS; SUBCUTANEOUS
Status: DISCONTINUED | OUTPATIENT
Start: 2024-10-19 | End: 2024-10-26 | Stop reason: HOSPADM

## 2024-10-18 RX ORDER — ONDANSETRON 2 MG/ML
4 INJECTION INTRAMUSCULAR; INTRAVENOUS EVERY 6 HOURS PRN
Status: DISCONTINUED | OUTPATIENT
Start: 2024-10-18 | End: 2024-10-26 | Stop reason: HOSPADM

## 2024-10-18 RX ORDER — NITROGLYCERIN 0.4 MG/1
0.4 TABLET SUBLINGUAL
Status: DISCONTINUED | OUTPATIENT
Start: 2024-10-18 | End: 2024-10-26 | Stop reason: HOSPADM

## 2024-10-18 RX ORDER — SODIUM CHLORIDE 0.9 % (FLUSH) 0.9 %
10 SYRINGE (ML) INJECTION AS NEEDED
Status: DISCONTINUED | OUTPATIENT
Start: 2024-10-18 | End: 2024-10-26 | Stop reason: HOSPADM

## 2024-10-18 RX ORDER — IBUPROFEN 600 MG/1
1 TABLET ORAL
Status: DISCONTINUED | OUTPATIENT
Start: 2024-10-18 | End: 2024-10-26 | Stop reason: HOSPADM

## 2024-10-18 RX ORDER — BISACODYL 10 MG
10 SUPPOSITORY, RECTAL RECTAL DAILY PRN
Status: DISCONTINUED | OUTPATIENT
Start: 2024-10-18 | End: 2024-10-24

## 2024-10-18 RX ORDER — ONDANSETRON 4 MG/1
4 TABLET, ORALLY DISINTEGRATING ORAL EVERY 6 HOURS PRN
Status: DISCONTINUED | OUTPATIENT
Start: 2024-10-18 | End: 2024-10-26 | Stop reason: HOSPADM

## 2024-10-18 RX ORDER — AMOXICILLIN 250 MG
2 CAPSULE ORAL 2 TIMES DAILY PRN
Status: DISCONTINUED | OUTPATIENT
Start: 2024-10-18 | End: 2024-10-24

## 2024-10-18 RX ADMIN — FUROSEMIDE 80 MG: 10 INJECTION, SOLUTION INTRAMUSCULAR; INTRAVENOUS at 21:34

## 2024-10-19 LAB
ANION GAP SERPL CALCULATED.3IONS-SCNC: 8.3 MMOL/L (ref 5–15)
BUN SERPL-MCNC: 38 MG/DL (ref 8–23)
BUN/CREAT SERPL: 16.6 (ref 7–25)
CALCIUM SPEC-SCNC: 8.8 MG/DL (ref 8.6–10.5)
CHLORIDE SERPL-SCNC: 105 MMOL/L (ref 98–107)
CO2 SERPL-SCNC: 24.7 MMOL/L (ref 22–29)
CREAT SERPL-MCNC: 2.29 MG/DL (ref 0.57–1)
DEPRECATED RDW RBC AUTO: 53 FL (ref 37–54)
EGFRCR SERPLBLD CKD-EPI 2021: 20.6 ML/MIN/1.73
ERYTHROCYTE [DISTWIDTH] IN BLOOD BY AUTOMATED COUNT: 15.3 % (ref 12.3–15.4)
GLUCOSE BLDC GLUCOMTR-MCNC: 115 MG/DL (ref 70–130)
GLUCOSE BLDC GLUCOMTR-MCNC: 129 MG/DL (ref 70–130)
GLUCOSE BLDC GLUCOMTR-MCNC: 134 MG/DL (ref 70–130)
GLUCOSE BLDC GLUCOMTR-MCNC: 150 MG/DL (ref 70–130)
GLUCOSE SERPL-MCNC: 110 MG/DL (ref 65–99)
HCT VFR BLD AUTO: 22.5 % (ref 34–46.6)
HGB BLD-MCNC: 7.3 G/DL (ref 12–15.9)
MCH RBC QN AUTO: 31.3 PG (ref 26.6–33)
MCHC RBC AUTO-ENTMCNC: 32.4 G/DL (ref 31.5–35.7)
MCV RBC AUTO: 96.6 FL (ref 79–97)
PLATELET # BLD AUTO: 151 10*3/MM3 (ref 140–450)
PMV BLD AUTO: 9.7 FL (ref 6–12)
POTASSIUM SERPL-SCNC: 4.5 MMOL/L (ref 3.5–5.2)
QT INTERVAL: 446 MS
QTC INTERVAL: 495 MS
RBC # BLD AUTO: 2.33 10*6/MM3 (ref 3.77–5.28)
SODIUM SERPL-SCNC: 138 MMOL/L (ref 136–145)
WBC NRBC COR # BLD AUTO: 5.89 10*3/MM3 (ref 3.4–10.8)

## 2024-10-19 PROCEDURE — 82948 REAGENT STRIP/BLOOD GLUCOSE: CPT

## 2024-10-19 PROCEDURE — 80048 BASIC METABOLIC PNL TOTAL CA: CPT

## 2024-10-19 PROCEDURE — 99222 1ST HOSP IP/OBS MODERATE 55: CPT | Performed by: NURSE PRACTITIONER

## 2024-10-19 PROCEDURE — 85027 COMPLETE CBC AUTOMATED: CPT

## 2024-10-19 PROCEDURE — 25010000002 FUROSEMIDE PER 20 MG: Performed by: INTERNAL MEDICINE

## 2024-10-19 PROCEDURE — 63710000001 INSULIN LISPRO (HUMAN) PER 5 UNITS

## 2024-10-19 RX ORDER — VENLAFAXINE HYDROCHLORIDE 75 MG/1
75 CAPSULE, EXTENDED RELEASE ORAL DAILY
Status: DISCONTINUED | OUTPATIENT
Start: 2024-10-19 | End: 2024-10-26 | Stop reason: HOSPADM

## 2024-10-19 RX ORDER — SODIUM BICARBONATE 650 MG/1
650 TABLET ORAL 3 TIMES DAILY
Status: DISCONTINUED | OUTPATIENT
Start: 2024-10-19 | End: 2024-10-21

## 2024-10-19 RX ORDER — CARVEDILOL 12.5 MG/1
12.5 TABLET ORAL ONCE
Status: COMPLETED | OUTPATIENT
Start: 2024-10-19 | End: 2024-10-19

## 2024-10-19 RX ORDER — AMLODIPINE BESYLATE 5 MG/1
5 TABLET ORAL
Status: DISCONTINUED | OUTPATIENT
Start: 2024-10-19 | End: 2024-10-20

## 2024-10-19 RX ORDER — ATORVASTATIN CALCIUM 20 MG/1
40 TABLET, FILM COATED ORAL DAILY
Status: DISCONTINUED | OUTPATIENT
Start: 2024-10-19 | End: 2024-10-26 | Stop reason: HOSPADM

## 2024-10-19 RX ORDER — CARVEDILOL 12.5 MG/1
12.5 TABLET ORAL 2 TIMES DAILY WITH MEALS
Status: DISCONTINUED | OUTPATIENT
Start: 2024-10-19 | End: 2024-10-26 | Stop reason: HOSPADM

## 2024-10-19 RX ORDER — GABAPENTIN 100 MG/1
100 CAPSULE ORAL 3 TIMES DAILY
Status: COMPLETED | OUTPATIENT
Start: 2024-10-19 | End: 2024-10-19

## 2024-10-19 RX ORDER — FUROSEMIDE 10 MG/ML
60 INJECTION INTRAMUSCULAR; INTRAVENOUS EVERY 12 HOURS
Status: DISCONTINUED | OUTPATIENT
Start: 2024-10-19 | End: 2024-10-21

## 2024-10-19 RX ADMIN — CARVEDILOL 12.5 MG: 12.5 TABLET, FILM COATED ORAL at 02:53

## 2024-10-19 RX ADMIN — GABAPENTIN 100 MG: 100 CAPSULE ORAL at 22:00

## 2024-10-19 RX ADMIN — ATORVASTATIN CALCIUM 40 MG: 20 TABLET, FILM COATED ORAL at 09:04

## 2024-10-19 RX ADMIN — FUROSEMIDE 60 MG: 10 INJECTION, SOLUTION INTRAMUSCULAR; INTRAVENOUS at 23:36

## 2024-10-19 RX ADMIN — INSULIN LISPRO 2 UNITS: 100 INJECTION, SOLUTION INTRAVENOUS; SUBCUTANEOUS at 12:17

## 2024-10-19 RX ADMIN — APIXABAN 2.5 MG: 2.5 TABLET, FILM COATED ORAL at 09:03

## 2024-10-19 RX ADMIN — VENLAFAXINE HYDROCHLORIDE 75 MG: 75 CAPSULE, EXTENDED RELEASE ORAL at 09:04

## 2024-10-19 RX ADMIN — CARVEDILOL 12.5 MG: 12.5 TABLET, FILM COATED ORAL at 09:03

## 2024-10-19 RX ADMIN — CARVEDILOL 12.5 MG: 12.5 TABLET, FILM COATED ORAL at 17:00

## 2024-10-19 RX ADMIN — AMLODIPINE BESYLATE 5 MG: 5 TABLET ORAL at 09:04

## 2024-10-19 RX ADMIN — SODIUM BICARBONATE 650 MG: 650 TABLET ORAL at 16:59

## 2024-10-19 RX ADMIN — APIXABAN 2.5 MG: 2.5 TABLET, FILM COATED ORAL at 00:43

## 2024-10-19 RX ADMIN — FUROSEMIDE 60 MG: 10 INJECTION, SOLUTION INTRAMUSCULAR; INTRAVENOUS at 12:17

## 2024-10-19 RX ADMIN — SODIUM BICARBONATE 650 MG: 650 TABLET ORAL at 09:04

## 2024-10-19 RX ADMIN — GABAPENTIN 100 MG: 100 CAPSULE ORAL at 09:04

## 2024-10-19 RX ADMIN — SODIUM BICARBONATE 650 MG: 650 TABLET ORAL at 22:00

## 2024-10-19 RX ADMIN — APIXABAN 2.5 MG: 2.5 TABLET, FILM COATED ORAL at 22:00

## 2024-10-19 RX ADMIN — GABAPENTIN 100 MG: 100 CAPSULE ORAL at 16:59

## 2024-10-19 NOTE — ED NOTES
.Nursing report ED to floor  Lowell Min  84 y.o.  female    HPI :  HPI  Stated Reason for Visit: soa  History Obtained From: patient    Chief Complaint  Chief Complaint   Patient presents with    Shortness of Breath    Leg Swelling     Lowell Min is a 84 y.o. female with a medical history of CKD, CHF, chronic anemia who presents to the ED c/o acute shortness of breath and leg swelling.  She reports that symptoms are worsened over the last few days.  She is currently residing at [facility] for rehab.  She believes that they have been weighing her regularly there and she has lost weight recently.  Nonetheless she has developed increased leg swelling and increased shortness of breath.  She typically wears oxygen at night but she has been wearing it some during the day recently due to her shortness of breath.  She denies fever or chills.  She denies chest pain.  She is anticoagulated due to her history of atrial fibrillation.  She reports that she had similar symptoms in September for which she was hospitalized.  She is not on Lasix regularly due to her chronic kidney disease but she has been taking it some recently at [facility] and states that she has not been urinating as much as she would have expected.      Review of prior external notes (non-ED) -and- Review of prior external test results outside of this encounter: I reviewed recent discharge summary from September where patient was hospitalized for CHF exacerbation.      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    Symptomatic anemia 04/03/2022    Anxiety associated with depression 04/03/2022    Iron deficiency anemia 04/03/2022    PAF (paroxysmal atrial fibrillation) 04/03/2022    Shortness of breath 04/03/2022    Chronic diastolic congestive heart failure 04/03/2022    Diabetic polyneuropathy associated with type 2  "diabetes mellitus 04/12/2022    PERLA (acute kidney injury) 10/07/2022    Foot pain, bilateral 10/13/2022    Anemia of chronic renal failure 06/27/2023    Acute on chronic diastolic CHF (congestive heart failure) 09/06/2024    Obesity (BMI 30-39.9) 09/06/2024    HLD (hyperlipidemia) 09/06/2024    Calcium pyrophosphate deposition disease (CPPD) 09/10/2024      Admitting doctor:   Christian Wright MD    Admitting diagnosis:   The primary encounter diagnosis was Acute diastolic congestive heart failure. A diagnosis of Chronic kidney disease, unspecified CKD stage was also pertinent to this visit.    Code status:   Current Code Status       Date Active Code Status Order ID Comments User Context       Prior        Allergies:   Ibuprofen    Isolation:   No active isolations    Intake and Output  No intake or output data in the 24 hours ending 10/18/24 2112    Weight:   There were no vitals filed for this visit.    Most recent vitals:   Vitals:    10/18/24 1822 10/18/24 1841 10/18/24 2111   BP:  136/91    Pulse: 72  80   Resp: 16  18   Temp: 97 °F (36.1 °C)     TempSrc: Tympanic     SpO2: 91%  98%   Height:   170.2 cm (67\")     Active LDAs/IV Access:   Lines, Drains & Airways       Active LDAs       Name Placement date Placement time Site Days    External Urinary Catheter 09/06/24  1755  --  42                Labs (abnormal labs have a star):   Labs Reviewed   COMPREHENSIVE METABOLIC PANEL - Abnormal; Notable for the following components:       Result Value    Glucose 119 (*)     BUN 40 (*)     Creatinine 2.37 (*)     Albumin 3.2 (*)     Alkaline Phosphatase 265 (*)     eGFR 19.8 (*)     All other components within normal limits    Narrative:     GFR Normal >60  Chronic Kidney Disease <60  Kidney Failure <15    The GFR formula is only valid for adults with stable renal function between ages 18 and 70.   BNP (IN-HOUSE) - Abnormal; Notable for the following components:    proBNP 20,923.0 (*)     All other components within " normal limits    Narrative:     This assay is used as an aid in the diagnosis of individuals suspected of having heart failure. It can be used as an aid in the diagnosis of acute decompensated heart failure (ADHF) in patients presenting with signs and symptoms of ADHF to the emergency department (ED). In addition, NT-proBNP of <300 pg/mL indicates ADHF is not likely.    Age Range Result Interpretation  NT-proBNP Concentration (pg/mL:      <50             Positive            >450                   Gray                 300-450                    Negative             <300    50-75           Positive            >900                  Gray                300-900                  Negative            <300      >75             Positive            >1800                  Gray                300-1800                  Negative            <300   SINGLE HS TROPONIN T - Abnormal; Notable for the following components:    HS Troponin T 47 (*)     All other components within normal limits    Narrative:     High Sensitive Troponin T Reference Range:  <14.0 ng/L- Negative Female for AMI  <22.0 ng/L- Negative Male for AMI  >=14 - Abnormal Female indicating possible myocardial injury.  >=22 - Abnormal Male indicating possible myocardial injury.   Clinicians would have to utilize clinical acumen, EKG, Troponin, and serial changes to determine if it is an Acute Myocardial Infarction or myocardial injury due to an underlying chronic condition.        CBC WITH AUTO DIFFERENTIAL - Abnormal; Notable for the following components:    RBC 2.67 (*)     Hemoglobin 8.0 (*)     Hematocrit 25.4 (*)     Neutrophil % 78.6 (*)     Lymphocyte % 13.3 (*)     All other components within normal limits   RAINBOW DRAW    Narrative:     The following orders were created for panel order [place] Draw.  Procedure                               Abnormality         Status                     ---------                               -----------         ------                      Green Top (Gel)[[id number]]                                  Final result               Lavender Top[[id number]]                                     Final result               Gold Top - SST[[id number]]                                   Final result               Light Blue Top[[id number]]                                   Final result                 Please view results for these tests on the individual orders.   CBC AND DIFFERENTIAL    Narrative:     The following orders were created for panel order CBC & Differential.  Procedure                               Abnormality         Status                     ---------                               -----------         ------                     CBC Auto Differential[[id number]]        Abnormal            Final result                 Please view results for these tests on the individual orders.   GREEN TOP   LAVENDER TOP   GOLD TOP - SST   LIGHT BLUE TOP     EKG:   ECG 12 Lead ED Triage Standing Order; SOA   Preliminary Result   HEART RATE=74  bpm   RR Nxpgwghc=017  ms   ND Interval=  ms   P Horizontal Axis=  deg   P Front Axis=  deg   QRSD Jnsxbtex=746  ms   QT Pdbykxiy=753  ms   CYhP=127  ms   QRS Axis=-33  deg   T Wave Axis=111  deg   - ABNORMAL ECG -   Atrial fibrillation   Left bundle branch block   Date and Time of Study:2024-10-18 18:38:30        Meds given in ED:   Medications   sodium chloride 0.9 % flush 10 mL (has no administration in time range)   furosemide (LASIX) injection 80 mg (has no administration in time range)     Imaging results:  No radiology results for the last day    Ambulatory status:   - x1    Social issues:   Social History     Socioeconomic History    Marital status: Single   Tobacco Use    Smoking status: Never    Smokeless tobacco: Never   Vaping Use    Vaping status: Never Used   Substance and Sexual Activity    Alcohol use: Never    Drug use: Never    Sexual activity: Defer     Pain Assessments  Pain (Adult)  (0-10) Pain Rating:  Rest: 0     Dona Leon RN  10/18/24 21:12 EDT

## 2024-10-19 NOTE — PLAN OF CARE
Goal Outcome Evaluation:              Outcome Evaluation: A/ox4, able to voice own needs and wants. Denies any pain or discomfort. Maintainings sats on 2L NC. Sacrum red/blanchable.Educated pt on the importance of turning/repositioning to aid in the prevention of skin breakdown.  Diuretic in Use: IV lasix  Response to Diuretics (Output greater than intake): yes  Daily Weight (up or down): down  O2 Requirements: 2L NC  Functional Status (Activity level, tolerance and respiratory symptoms): improving slowly  Discharge Plans: TBD

## 2024-10-19 NOTE — PLAN OF CARE
Goal Outcome Evaluation:              Outcome Evaluation: VSS. O2 SATS STABLE ON 2 L/M N/C. LOST 2 LBS OVERNIGHT.          Diuretic in Use: IV LASIX IN ER  Response to Diuretics (Output greater than intake): GOOD  Daily Weight (up or down): DOWN  O2 Requirements: 2 L/M N/C.  Functional Status (Activity level, tolerance and respiratory symptoms): HAS REMAINED IN BED OVERNIGHT.  Discharge Plans:  TBD

## 2024-10-19 NOTE — CONSULTS
Date of Hospital Visit: 10/19/24  Encounter Provider: RAMAKRISHNA Wong  Place of Service: [facility] CARDIOLOGY  Patient Name: Lowell Min  :1940  [id number]  Referral Provider: No ref. provider found    Chief complaint:shortness of breath and leg swelling    History of Present Illness:  This is an 83 y/o female who follows with Dr. Orozco at [place]. She has a pmhx of permanent atrial fibrillation, chronic kidney disease, diabetes, hypertension, hyperlipidemia, obstructive sleep apnea, aortic stenosis, pulmonary hypertension. She was recently admitted in 2024 for acute on chronic CHF and was found to have a newly reduced EF of 44.1% with hypokinesis of multiple wall segments as described below. She received IV diuresis and further cardiac workup was deferred to her primary cardiologist as an outpatient.     She presented to the ED with complaints of shortness of breath and lower extremity edema. She tells me that she has noticed worsening shortness of breath and swelling for a couple of days. Taking a few steps would make her breath harder. She endorses weight gain as well. She denies any chest pain. She does feel her heart racing at times, particularly when she is more short of breath. She wears O2 at night but has been wearing it during the day at times recently. She notes decreased voiding amount recently despite taking her regular dose of furosemide.     Echocardiogram 24:    Left ventricular systolic function is mildly decreased. Calculated left ventricular EF = 44.1%    The following left ventricular wall segments are hypokinetic: mid anterior, apical anterior, basal anterolateral, mid anterolateral, apical lateral, basal inferolateral, mid inferolateral, apical inferior, mid inferior, apical septal, basal inferoseptal, mid inferoseptal, apex hypokinetic, mid anteroseptal, basal anterior, basal inferior and basal inferoseptal.    Left ventricular diastolic  function was indeterminate.    The left atrial cavity is severely dilated.    Mild aortic valve stenosis is present. Aortic valve area is 1.2 cm2.    The right atrial cavity is severely  dilated.    Peak velocity of the flow distal to the aortic valve is 246.1 cm/s. Aortic valve mean pressure gradient is 11 mmHg.    Severe mitral valve regurgitation is present.    Mild mitral valve stenosis is present. The mitral valve mean gradient is 4 mmHg.    Severe tricuspid valve regurgitation is present.    Estimated right ventricular systolic pressure from tricuspid regurgitation is markedly elevated (>55 mmHg). Calculated right ventricular systolic pressure from tricuspid regurgitation is 62 mmHg.    Past Medical History:   Diagnosis Date    Anxiety     Atrial fibrillation     CHF (congestive heart failure)     Chronic kidney disease, stage IV (severe)     Depression     Elevated cholesterol     Hypertension     Type 2 diabetes mellitus        Past Surgical History:   Procedure Laterality Date    APPENDECTOMY      CHOLECYSTECTOMY      COLONOSCOPY      CYSTOSCOPY BOTOX INJECTION OF BLADDER N/A     HYSTERECTOMY      TUMOR REMOVAL Left     benign tumor from left breast       Prior to Admission medications    Medication Sig Start Date End Date Taking? Authorizing Provider   acetaminophen (TYLENOL) 325 MG tablet Take 2 tablets by mouth Every 4 (Four) Hours As Needed for Mild Pain . 4/12/22  Yes Truman Grant MD   amLODIPine (NORVASC) 5 MG tablet Take 1 tablet by mouth Daily. 10/16/22  Yes Artie Light MD   apixaban (ELIQUIS) 2.5 MG tablet tablet Take 1 tablet by mouth 2 (Two) Times a Day. Indications: Atrial Fibrillation 4/12/22  Yes Truman Grant MD   atorvastatin (LIPITOR) 40 MG tablet Take 1 tablet by mouth every night at bedtime. 11/22/21  Yes Tayler Galicia MD   carvedilol (COREG) 12.5 MG tablet Take 1 tablet by mouth 2 (two) times daily with meals. 3/28/23  Yes Tayler Galicia MD    gabapentin (NEURONTIN) 100 MG capsule Take 1 capsule by mouth 3 (Three) Times a Day. 9/14/24  Yes Artie Light MD   HYDROcodone-acetaminophen (NORCO) 5-325 MG per tablet Take 1 tablet by mouth Every 6 (Six) Hours As Needed for Moderate Pain. 9/14/24  Yes Artie Light MD   Lidocaine 4 % Place 1 patch on the skin as directed by provider Daily. Remove & Discard patch within 12 hours or as directed by MD 9/15/24  Yes Artie Light MD   sennosides-docusate (PERICOLACE) 8.6-50 MG per tablet Take 2 tablets by mouth 2 (Two) Times a Day.  Patient taking differently: Take 2 tablets by mouth 2 (Two) Times a Day As Needed for Constipation. 4/12/22  Yes Truman Grant MD   sodium bicarbonate 650 MG tablet Take 1 tablet by mouth 3 (Three) Times a Day. 10/15/22  Yes Artie Light MD   venlafaxine XR (EFFEXOR-XR) 75 MG 24 hr capsule Take 1 capsule by mouth Daily. 2/27/23  Yes ProviderTayler MD   methylPREDNISolone (MEDROL) 4 MG dose pack Take as directed on package instructions. 9/14/24   Artie Light MD   naloxone (NARCAN) 4 MG/0.1ML nasal spray Call 911. Don't prime. [place] in 1 nostril for overdose. Repeat in 2-3 minutes in other nostril if no or minimal breathing/responsiveness. 9/14/24   Artie Light MD   O2 (OXYGEN) 2 L/min Every Night. Uses when sleeping    ProviderTayler MD        Allergies as of 10/18/2024 - Reviewed 10/18/2024   Allergen Reaction Noted    Ibuprofen Other (See Comments) 09/01/2012       Social History     Socioeconomic History    Marital status: Single   Tobacco Use    Smoking status: Never    Smokeless tobacco: Never   Vaping Use    Vaping status: Never Used   Substance and Sexual Activity    Alcohol use: Never    Drug use: Never    Sexual activity: Defer       History reviewed. No pertinent family history.    REVIEW OF SYSTEMS:   ROS was performed and is negative except as outlined in HPI     REVIEW OF SYSTEMS:   CONSTITUTIONAL: No weight  "loss, fever, chills, weakness or fatigue.   HEENT: Eyes: No visual loss, blurred vision, double vision or yellow sclerae. Ears, Nose, Throat: No hearing loss, sneezing, congestion, runny nose or sore throat.   SKIN: No rash or itching.     RESPIRATORY: +shortness of breath, no hemoptysis, cough or sputum.   GASTROINTESTINAL: No anorexia, nausea, vomiting or diarrhea. No abdominal pain, bright red blood per rectum or melena.  GENITOURINARY: No burning on urination, hematuria or increased frequency.  NEUROLOGICAL: No headache, dizziness, syncope, paralysis, ataxia, numbness or tingling in the extremities. No change in bowel or bladder control.   MUSCULOSKELETAL: No muscle, back pain, joint pain or stiffness.   HEMATOLOGIC: No anemia, bleeding or bruising.   LYMPHATICS: No enlarged nodes. No history of splenectomy.   PSYCHIATRIC: No history of depression, anxiety, hallucinations.   ENDOCRINOLOGIC: No reports of sweating, cold or heat intolerance. No polyuria or polydipsia.        Objective:   Temp:  [97 °F (36.1 °C)-97.9 °F (36.6 °C)] 97.9 °F (36.6 °C)  Heart Rate:  [67-80] 67  Resp:  [16-18] 16  BP: (126-142)/(63-91) 126/63  Body mass index is 33.15 kg/m².  Flowsheet Rows      Flowsheet Row First Filed Value   Admission Height 170.2 cm (67\") Documented at 10/18/2024 2111   Admission Weight 97.5 kg (215 lb) Documented at 10/18/2024 2142          Vitals:    10/19/24 0738   BP: 126/63   Pulse: 67   Resp: 16   Temp: 97.9 °F (36.6 °C)   SpO2:        Physical Exam:   General Appearance:    Awake alert and oriented in no acute distress.   Color:  Skin:  Neuro:  HEENT:    Lungs:     Pink  Warm and dry  No focal, motor or sensory deficits  Neck supple, pupils equal, round and reactive. No JVD, No Bruit  Clear to auscultation,respirations regular, even and                  unlabored    Heart:    Regular rate and rhythm, S1 and S2, 2/6 sys murmur, no gallop, no rub. 2+ BLE edema, DP/PT pulses are 2+   Chest Wall:    No " abnormalities observed   Abdomen:     Normal bowel sounds, no masses, no organomegaly, soft        non-tender, non-distended, no guarding, no ascites noted   Extremities:   Moves all extremities well, 2+ BLE edema, no cyanosis, no redness               I personally viewed and interpreted the patient's EKG/Telemetry data    Assessment:  Active Hospital Problems    Diagnosis  POA    **Acute CHF [I50.9]  Yes    Type 2 diabetes mellitus [E11.9]  Yes    PAF (paroxysmal atrial fibrillation) [I48.0]  Yes    Iron deficiency anemia [D50.9]  Yes    Chronic kidney disease, stage IV (severe) [N18.4]  Yes      Resolved Hospital Problems   No resolved problems to display.       Plan:  Acute on chronic systolic CHF: EF 44.1% on echo in September 2024. ProBNP 20,923. This is significantly higher than when she was here in September for volume overload. She was to undergo further evaluation with her primary cardiologist for her new decline in EF.  Cardiomyopathy: likely non-ischemic. Multiple wall segment hypokinesis on echo. On carvedilol for GDMT. Unable to add ACE/ARB/ARNI or MRA due to renal insuffiency  Pulmonary hypertension: secondary to severe MR and TR. Declined RHC in the past. RVSP 62 mmHg  PAF: on beta blocker and anticoagulated with apixaban.  CKD stage IV: diuresis per nephrology. Appreciate assistance.    -I discussed with the patient the severity of her valvular disease and explained that this is likely the underlying cause of her recurrent volume overload. We discussed proceeding with further testing as a part of the workup to see if she is a candidate for a clip or repair. She wants to continue to manage her valvular disease the best we can with medications.    RAMAKRISHNA Wong  10/19/24  08:29 EDT.  Electronically signed by RAMAKRISHNA Wong, 10/19/24, 8:29 AM EDT.

## 2024-10-19 NOTE — H&P
Patient Name:  Lowell Min  YOB: 1940  MRN:  [id number]  Admit Date:  10/18/2024  Patient Care Team:  Dannie Mathews MD as PCP - General (Internal Medicine)      Subjective   History Present Illness     Chief Complaint   Patient presents with    Shortness of Breath    Leg Swelling       Ms. Min is a 84 y.o. female with a history of CKD stage IV, RADHA, PAF, diabetes, chronic diastolic CHF, anxiety and depression, HDL, obesity that presents to [facility] complaining of shortness of breath and leg swelling for several days.  She has been staying at  [facility] rehab facility for several weeks.  Has actually lost some weight recently.  Shortness of breath moderate to severe and worsened with exertion.  No orthopnea.  No cough.  No leg swelling much worse than usual.  Normally wears oxygen at night and as needed during the day, lately has been needing oxygen every day while she is awake.  Recently started back on Lasix at [facility] for the leg swelling and she is typically not on a daily dose.  She denies fever, chills, sweats.  No nausea, vomiting, diarrhea or constipation.  No chest pain or palpitations.  Mobility is still limited but improving with rehab.  Symptoms are similar to her hospital admission last month.    She was noted with vascular congestion and cardiomegaly and elevated BNP in the ED.  She received 80 mg of Lasix.  Cardiology was consulted and she has been admitted to the hospital for further evaluation and treatment.      Review of Systems   Constitutional:  Negative for chills, diaphoresis and fatigue.   HENT:  Negative for congestion and rhinorrhea.    Respiratory:  Positive for shortness of breath. Negative for cough.    Cardiovascular:  Positive for leg swelling. Negative for chest pain and palpitations.   Gastrointestinal:  Positive for abdominal distention. Negative for abdominal pain, constipation, diarrhea and nausea.   Genitourinary:  Negative for  difficulty urinating.   Neurological:  Negative for dizziness and light-headedness.        Personal History     Past Medical History:   Diagnosis Date    Anxiety     Atrial fibrillation     CHF (congestive heart failure)     Chronic kidney disease, stage IV (severe)     Depression     Elevated cholesterol     Hypertension     Type 2 diabetes mellitus      Past Surgical History:   Procedure Laterality Date    APPENDECTOMY      CHOLECYSTECTOMY      COLONOSCOPY      CYSTOSCOPY BOTOX INJECTION OF BLADDER N/A     HYSTERECTOMY      TUMOR REMOVAL Left     benign tumor from left breast     History reviewed. No pertinent family history.  Social History     Tobacco Use    Smoking status: Never    Smokeless tobacco: Never   Vaping Use    Vaping status: Never Used   Substance Use Topics    Alcohol use: Never    Drug use: Never     No current facility-administered medications on file prior to encounter.     Current Outpatient Medications on File Prior to Encounter   Medication Sig Dispense Refill    acetaminophen (TYLENOL) 325 MG tablet Take 2 tablets by mouth Every 4 (Four) Hours As Needed for Mild Pain .      amLODIPine (NORVASC) 5 MG tablet Take 1 tablet by mouth Daily. 30 tablet 0    apixaban (ELIQUIS) 2.5 MG tablet tablet Take 1 tablet by mouth 2 (Two) Times a Day. Indications: Atrial Fibrillation 60 tablet     atorvastatin (LIPITOR) 40 MG tablet Take 1 tablet by mouth every night at bedtime.      carvedilol (COREG) 12.5 MG tablet Take 1 tablet by mouth 2 (two) times daily with meals.      gabapentin (NEURONTIN) 100 MG capsule Take 1 capsule by mouth 3 (Three) Times a Day. 15 capsule 0    HYDROcodone-acetaminophen (NORCO) 5-325 MG per tablet Take 1 tablet by mouth Every 6 (Six) Hours As Needed for Moderate Pain. 10 tablet 0    Lidocaine 4 % Place 1 patch on the skin as directed by provider Daily. Remove & Discard patch within 12 hours or as directed by MD      sennosides-docusate (PERICOLACE) 8.6-50 MG per tablet Take 2  tablets by mouth 2 (Two) Times a Day. (Patient taking differently: Take 2 tablets by mouth 2 (Two) Times a Day As Needed for Constipation.)      sodium bicarbonate 650 MG tablet Take 1 tablet by mouth 3 (Three) Times a Day.      venlafaxine XR (EFFEXOR-XR) 75 MG 24 hr capsule Take 1 capsule by mouth Daily.      methylPREDNISolone (MEDROL) 4 MG dose pack Take as directed on package instructions. 21 tablet 0    naloxone (NARCAN) 4 MG/0.1ML nasal spray Call 911. Don't prime. [place] in 1 nostril for overdose. Repeat in 2-3 minutes in other nostril if no or minimal breathing/responsiveness. 2 each 0    O2 (OXYGEN) 2 L/min Every Night. Uses when sleeping       Allergies   Allergen Reactions    Ibuprofen Other (See Comments)     Decrease urine output         Objective    Objective     Vital Signs  Temp:  [97 °F (36.1 °C)-97.9 °F (36.6 °C)] 97.9 °F (36.6 °C)  Heart Rate:  [67-80] 72  Resp:  [16-18] 18  BP: (111-142)/(50-91) 111/50  SpO2:  [91 %-100 %] 100 %  on  Flow (L/min) (Oxygen Therapy):  [2] 2;   Device (Oxygen Therapy): nasal cannula  Body mass index is 33.15 kg/m².    Physical Exam  Constitutional:       General: She is not in acute distress.     Appearance: She is obese. She is not ill-appearing.      Comments: Elderly and chronically ill-appearing   HENT:      Head: Normocephalic and atraumatic.      Nose: Nose normal.      Mouth/Throat:      Mouth: Mucous membranes are dry.   Eyes:      Extraocular Movements: Extraocular movements intact.      Pupils: Pupils are equal, round, and reactive to light.   Cardiovascular:      Rate and Rhythm: Normal rate and regular rhythm.      Pulses: Normal pulses.      Heart sounds: Normal heart sounds.   Pulmonary:      Effort: Pulmonary effort is normal. No respiratory distress.      Comments: Rales posterior bases  Wearing 2 liters NC  Abdominal:      General: Bowel sounds are normal. There is no distension.      Palpations: Abdomen is soft.   Musculoskeletal:         General:  No swelling or tenderness. Normal range of motion.      Cervical back: Normal range of motion and neck supple.   Skin:     General: Skin is warm and dry.   Neurological:      General: No focal deficit present.      Mental Status: She is alert and oriented to person, place, and time.         Results Review:  I reviewed the patient's new clinical results.      Lab Results (last 24 hours)       Procedure Component Value Units Date/Time    CBC & Differential [[id number]]  (Abnormal) Collected: 10/18/24 1846    Specimen: Blood from Arm, Right Updated: 10/18/24 1906    Narrative:      The following orders were created for panel order CBC & Differential.  Procedure                               Abnormality         Status                     ---------                               -----------         ------                     CBC Auto Differential[[id number]]        Abnormal            Final result                 Please view results for these tests on the individual orders.    Comprehensive Metabolic Panel [[id number]]  (Abnormal) Collected: 10/18/24 1846    Specimen: Blood from Arm, Right Updated: 10/1940     Glucose 119 mg/dL      BUN 40 mg/dL      Creatinine 2.37 mg/dL      Sodium 140 mmol/L      Potassium 5.0 mmol/L      Chloride 106 mmol/L      CO2 24.1 mmol/L      Calcium 9.0 mg/dL      Total Protein 6.6 g/dL      Albumin 3.2 g/dL      ALT (SGPT) 13 U/L      AST (SGOT) 16 U/L      Alkaline Phosphatase 265 U/L      Total Bilirubin 0.9 mg/dL      Globulin 3.4 gm/dL      A/G Ratio 0.9 g/dL      BUN/Creatinine Ratio 16.9     Anion Gap 9.9 mmol/L      eGFR 19.8 mL/min/1.73     Narrative:      GFR Normal >60  Chronic Kidney Disease <60  Kidney Failure <15    The GFR formula is only valid for adults with stable renal function between ages 18 and 70.    BNP [[id number]]  (Abnormal) Collected: 10/18/24 1846    Specimen: Blood from Arm, Right Updated: 10/1940     proBNP 20,923.0 pg/mL     Narrative:      This assay is  used as an aid in the diagnosis of individuals suspected of having heart failure. It can be used as an aid in the diagnosis of acute decompensated heart failure (ADHF) in patients presenting with signs and symptoms of ADHF to the emergency department (ED). In addition, NT-proBNP of <300 pg/mL indicates ADHF is not likely.    Age Range Result Interpretation  NT-proBNP Concentration (pg/mL:      <50             Positive            >450                   Gray                 300-450                    Negative             <300    50-75           Positive            >900                  Gray                300-900                  Negative            <300      >75             Positive            >1800                  Gray                300-1800                  Negative            <300    Single High Sensitivity Troponin T [[id number]]  (Abnormal) Collected: 10/18/24 1846    Specimen: Blood from Arm, Right Updated: 10/1940     HS Troponin T 47 ng/L     Narrative:      High Sensitive Troponin T Reference Range:  <14.0 ng/L- Negative Female for AMI  <22.0 ng/L- Negative Male for AMI  >=14 - Abnormal Female indicating possible myocardial injury.  >=22 - Abnormal Male indicating possible myocardial injury.   Clinicians would have to utilize clinical acumen, EKG, Troponin, and serial changes to determine if it is an Acute Myocardial Infarction or myocardial injury due to an underlying chronic condition.         CBC Auto Differential [[id number]]  (Abnormal) Collected: 10/18/24 1846    Specimen: Blood from Arm, Right Updated: 10/18/24 1906     WBC 7.82 10*3/mm3      RBC 2.67 10*6/mm3      Hemoglobin 8.0 g/dL      Hematocrit 25.4 %      MCV 95.1 fL      MCH 30.0 pg      MCHC 31.5 g/dL      RDW 15.4 %      RDW-SD 52.2 fl      MPV 9.3 fL      Platelets 176 10*3/mm3      Neutrophil % 78.6 %      Lymphocyte % 13.3 %      Monocyte % 6.1 %      Eosinophil % 1.3 %      Basophil % 0.4 %      Immature Grans % 0.3 %       Neutrophils, Absolute 6.15 10*3/mm3      Lymphocytes, Absolute 1.04 10*3/mm3      Monocytes, Absolute 0.48 10*3/mm3      Eosinophils, Absolute 0.10 10*3/mm3      Basophils, Absolute 0.03 10*3/mm3      Immature Grans, Absolute 0.02 10*3/mm3      nRBC 0.0 /100 WBC     Basic Metabolic Panel [[id number]]  (Abnormal) Collected: 10/19/24 0339    Specimen: Blood Updated: 10/19/24 0427     Glucose 110 mg/dL      BUN 38 mg/dL      Creatinine 2.29 mg/dL      Sodium 138 mmol/L      Potassium 4.5 mmol/L      Chloride 105 mmol/L      CO2 24.7 mmol/L      Calcium 8.8 mg/dL      BUN/Creatinine Ratio 16.6     Anion Gap 8.3 mmol/L      eGFR 20.6 mL/min/1.73     Narrative:      GFR Normal >60  Chronic Kidney Disease <60  Kidney Failure <15    The GFR formula is only valid for adults with stable renal function between ages 18 and 70.    CBC (No Diff) [[id number]]  (Abnormal) Collected: 10/19/24 0339    Specimen: Blood Updated: 10/19/24 0404     WBC 5.89 10*3/mm3      RBC 2.33 10*6/mm3      Hemoglobin 7.3 g/dL      Hematocrit 22.5 %      MCV 96.6 fL      MCH 31.3 pg      MCHC 32.4 g/dL      RDW 15.3 %      RDW-SD 53.0 fl      MPV 9.7 fL      Platelets 151 10*3/mm3     POC Glucose Once [[id number]]  (Normal) Collected: 10/19/24 0737    Specimen: Blood Updated: 10/19/24 0738     Glucose 115 mg/dL     POC Glucose Once [[id number]]  (Abnormal) Collected: 10/19/24 1122    Specimen: Blood Updated: 10/19/24 1125     Glucose 150 mg/dL             Imaging Results (Last 24 Hours)       Procedure Component Value Units Date/Time    XR Chest 1 View [[id number]] Collected: 10/18/24 1926     Updated: 10/18/24 1930    Narrative:      XR CHEST 1 VW-     Clinical: Shortness of breath     COMPARISON examination 9/12/2024     FINDINGS: There is cardiomegaly. Atherosclerotic calcification of the  aorta. There is vascular congestion. No gross pleural effusion seen. The  remainder is unremarkable.     This report was finalized on 10/18/2024 7:27 PM by Dr. Brennan  ARIANNA Suárez on Workstation: [id number]               Results for orders placed during the hospital encounter of 09/06/24    Adult Transthoracic Echo Complete W/ Cont if Necessary Per Protocol    Interpretation Summary    Left ventricular systolic function is mildly decreased. Calculated left ventricular EF = 44.1%    The following left ventricular wall segments are hypokinetic: mid anterior, apical anterior, basal anterolateral, mid anterolateral, apical lateral, basal inferolateral, mid inferolateral, apical inferior, mid inferior, apical septal, basal inferoseptal, mid inferoseptal, apex hypokinetic, mid anteroseptal, basal anterior, basal inferior and basal inferoseptal.    Left ventricular diastolic function was indeterminate.    The left atrial cavity is severely dilated.    Mild aortic valve stenosis is present. Aortic valve area is 1.2 cm2.    The right atrial cavity is severely  dilated.    Peak velocity of the flow distal to the aortic valve is 246.1 cm/s. Aortic valve mean pressure gradient is 11 mmHg.    Severe mitral valve regurgitation is present.    Mild mitral valve stenosis is present. The mitral valve mean gradient is 4 mmHg.    Severe tricuspid valve regurgitation is present.    Estimated right ventricular systolic pressure from tricuspid regurgitation is markedly elevated (>55 mmHg). Calculated right ventricular systolic pressure from tricuspid regurgitation is 62 mmHg.      ECG 12 Lead ED Triage Standing Order; SOA   Final Result   HEART RATE=74  bpm   RR Xrikzjjf=578  ms   [place] Interval=  ms   P Horizontal Axis=  deg   P Front Axis=  deg   QRSD Tlwbcirn=596  ms   QT Vleklqzl=105  ms   HWyK=506  ms   QRS Axis=-33  deg   T Wave Axis=111  deg   - ABNORMAL ECG -   Atrial fibrillation   Left bundle branch block   No change from previous tracing   Electronically Signed By: Enzo WattsKAILA) ([facility]) 2024-10-19 06:37:17   Date and Time of Study:2024-10-18 18:38:30      Telemetry Scan   Final Result       Telemetry Scan   Final Result      Telemetry Scan   Final Result      Telemetry Scan   Final Result           Assessment/Plan     Active Hospital Problems    Diagnosis  POA    **Acute CHF [I50.9]  Yes    Type 2 diabetes mellitus [E11.9]  Yes    HLD (hyperlipidemia) [E78.5]  Yes    PAF (paroxysmal atrial fibrillation) [I48.0]  Yes    Iron deficiency anemia [D50.9]  Yes    Chronic diastolic congestive heart failure [I50.32]  Yes    Anxiety associated with depression [F41.8]  Yes    Chronic kidney disease, stage IV (severe) [N18.4]  Yes      Resolved Hospital Problems   No resolved problems to display.     BNP was 20,000.  Vascular congestion on the chest x-ray.  Continue Lasix 60 mg IV every 12 hours, Coreg 12.5 mg twice daily.  Cardiology following.    Blood glucose readings acceptable here.  Cover glucose with lispro SSI, low-dose with meals and at bedtime.  No concentrated sweets diet.  Accu-Cheks before meals and at bedtime.    Rate controlled, continue Coreg 12.5 mg twice daily and Eliquis 2.5 mg twice daily.    Hemoglobin 7.3, down from 8.4 baseline.  Continue to monitor.    Continue Lipitor 40 mg nightly.    I discussed the patient's findings and my recommendations with patient.    VTE Prophylaxis - Eliquis (home med).  Code Status - Full code.       RAMAKRISHNA Andre  [facility]  10/19/24  14:55 EDT

## 2024-10-19 NOTE — CONSULTS
Patient Care Team:  Dannie Mathews MD as PCP - General (Internal Medicine)    Chief complaint: PERLA/Volume overload     Inpatient Nephrology Consult  Consult performed by: Hailey Barros MD  Consult ordered by: Michael Bernal MD             History of Present Illness  Patient is a 85 yo female with CKD3 followed by Dr HARRY Foster. She presented overnight with SOB and lower extremity edema.   She was noted to have a Cr of 2.3. she was started on diuretics and Cr is 2.2 today. Her baseline is around 1.8-2.2.  She is feeling better this am.       Review of Systems   Review of Systems - History obtained from chart review and the patient  General ROS: negative  Psychological ROS: negative  Ophthalmic ROS: negative  ENT ROS: negative  Allergy and Immunology ROS: negative  Hematological and Lymphatic ROS: negative  Endocrine ROS: negative  Respiratory ROS: per HPI  Cardiovascular ROS: no chest pain or dyspnea on exertion  Gastrointestinal ROS: no abdominal pain, change in bowel habits, or black or bloody stools  Genito-Urinary ROS: no dysuria, trouble voiding, or hematuria  Musculoskeletal ROS:per HPI  Neurological ROS: no TIA or stroke symptoms  Dermatological ROS: negative   Past Medical History:   Diagnosis Date    Anxiety     Atrial fibrillation     CHF (congestive heart failure)     Chronic kidney disease, stage IV (severe)     Depression     Elevated cholesterol     Hypertension     Type 2 diabetes mellitus    ,   Past Surgical History:   Procedure Laterality Date    APPENDECTOMY      CHOLECYSTECTOMY      COLONOSCOPY      CYSTOSCOPY BOTOX INJECTION OF BLADDER N/A     HYSTERECTOMY      TUMOR REMOVAL Left     benign tumor from left breast   , History reviewed. No pertinent family history.,   Social History     Socioeconomic History    Marital status: Single   Tobacco Use    Smoking status: Never    Smokeless tobacco: Never   Vaping Use    Vaping status: Never Used   Substance and Sexual Activity    Alcohol use:  Never    Drug use: Never    Sexual activity: Defer     E-cigarette/Vaping    E-cigarette/Vaping Use Never User      E-cigarette/Vaping Substances     E-cigarette/Vaping Devices         ,   Medications Prior to Admission   Medication Sig Dispense Refill Last Dose/Taking    acetaminophen (TYLENOL) 325 MG tablet Take 2 tablets by mouth Every 4 (Four) Hours As Needed for Mild Pain .   Taking As Needed    amLODIPine (NORVASC) 5 MG tablet Take 1 tablet by mouth Daily. 30 tablet 0 Taking    apixaban (ELIQUIS) 2.5 MG tablet tablet Take 1 tablet by mouth 2 (Two) Times a Day. Indications: Atrial Fibrillation 60 tablet  Taking    atorvastatin (LIPITOR) 40 MG tablet Take 1 tablet by mouth every night at bedtime.   Taking    carvedilol (COREG) 12.5 MG tablet Take 1 tablet by mouth 2 (two) times daily with meals.   Taking    gabapentin (NEURONTIN) 100 MG capsule Take 1 capsule by mouth 3 (Three) Times a Day. 15 capsule 0 Taking    HYDROcodone-acetaminophen (NORCO) 5-325 MG per tablet Take 1 tablet by mouth Every 6 (Six) Hours As Needed for Moderate Pain. 10 tablet 0 Taking As Needed    Lidocaine 4 % Place 1 patch on the skin as directed by provider Daily. Remove & Discard patch within 12 hours or as directed by MD   Taking    sennosides-docusate (PERICOLACE) 8.6-50 MG per tablet Take 2 tablets by mouth 2 (Two) Times a Day. (Patient taking differently: Take 2 tablets by mouth 2 (Two) Times a Day As Needed for Constipation.)   Taking Differently    sodium bicarbonate 650 MG tablet Take 1 tablet by mouth 3 (Three) Times a Day.   Taking    venlafaxine XR (EFFEXOR-XR) 75 MG 24 hr capsule Take 1 capsule by mouth Daily.   Taking    methylPREDNISolone (MEDROL) 4 MG dose pack Take as directed on package instructions. 21 tablet 0     naloxone (NARCAN) 4 MG/0.1ML nasal spray Call 911. Don't prime. [place] in 1 nostril for overdose. Repeat in 2-3 minutes in other nostril if no or minimal breathing/responsiveness. 2 each 0     O2 (OXYGEN) 2  L/min Every Night. Uses when sleeping      , Scheduled Meds:  amLODIPine, 5 mg, Oral, Q24H  apixaban, 2.5 mg, Oral, BID  atorvastatin, 40 mg, Oral, Daily  carvedilol, 12.5 mg, Oral, BID With Meals  gabapentin, 100 mg, Oral, TID  insulin lispro, 2-7 Units, Subcutaneous, 4x Daily AC & at Bedtime  sodium bicarbonate, 650 mg, Oral, TID  venlafaxine XR, 75 mg, Oral, Daily   , Continuous Infusions:   , PRN Meds:    acetaminophen    senna-docusate sodium **AND** polyethylene glycol **AND** bisacodyl **AND** bisacodyl    dextrose    dextrose    glucagon (human recombinant)    nitroglycerin    ondansetron ODT **OR** ondansetron    sodium chloride, and Allergies:  Ibuprofen    Objective     Vital Signs  Temp:  [97 °F (36.1 °C)-97.9 °F (36.6 °C)] 97.9 °F (36.6 °C)  Heart Rate:  [67-80] 67  Resp:  [16-18] 16  BP: (126-142)/(63-91) 126/63    No intake/output data recorded.  I/O last 3 completed shifts:  In: -   Out: 1600 [Urine:1600]    Physical Exam  Physical Examination: General appearance - alert, well appearing, and in no distress  Mental status - alert, oriented to person, place, and time  Chest - clear to auscultation, no wheezes, rales or rhonchi, symmetric air entry  Heart - normal rate, regular rhythm, normal S1, S2, no murmurs, rubs, clicks or gallops  Abdomen - soft, nontender, nondistended, no masses or organomegaly  Neurological - alert, oriented, normal speech, no focal findings or movement disorder noted  Extremities - peripheral pulses normal, no pedal edema, no clubbing or cyanosis   Results Review:    I reviewed the patient's new clinical results.    WBC WBC   Date Value Ref Range Status   10/19/2024 5.89 3.40 - 10.80 10*3/mm3 Final   10/18/2024 7.82 3.40 - 10.80 10*3/mm3 Final      HGB Hemoglobin   Date Value Ref Range Status   10/19/2024 7.3 (L) 12.0 - 15.9 g/dL Final   10/18/2024 8.0 (L) 12.0 - 15.9 g/dL Final      HCT Hematocrit   Date Value Ref Range Status   10/19/2024 22.5 (L) 34.0 - 46.6 % Final  "  10/18/2024 25.4 (L) 34.0 - 46.6 % Final      Platlets No results found for: \"LABPLAT\"   MCV MCV   Date Value Ref Range Status   10/19/2024 96.6 79.0 - 97.0 fL Final   10/18/2024 95.1 79.0 - 97.0 fL Final          Sodium Sodium   Date Value Ref Range Status   10/19/2024 138 136 - 145 mmol/L Final   10/18/2024 140 136 - 145 mmol/L Final      Potassium Potassium   Date Value Ref Range Status   10/19/2024 4.5 3.5 - 5.2 mmol/L Final   10/18/2024 5.0 3.5 - 5.2 mmol/L Final      Chloride Chloride   Date Value Ref Range Status   10/19/2024 105 98 - 107 mmol/L Final   10/18/2024 106 98 - 107 mmol/L Final      CO2 CO2   Date Value Ref Range Status   10/19/2024 24.7 22.0 - 29.0 mmol/L Final   10/18/2024 24.1 22.0 - 29.0 mmol/L Final      BUN BUN   Date Value Ref Range Status   10/19/2024 38 (H) 8 - 23 mg/dL Final   10/18/2024 40 (H) 8 - 23 mg/dL Final      Creatinine Creatinine   Date Value Ref Range Status   10/19/2024 2.29 (H) 0.57 - 1.00 mg/dL Final   10/18/2024 2.37 (H) 0.57 - 1.00 mg/dL Final      Calcium Calcium   Date Value Ref Range Status   10/19/2024 8.8 8.6 - 10.5 mg/dL Final   10/18/2024 9.0 8.6 - 10.5 mg/dL Final      PO4 No results found for: \"CAPO4\"   Albumin Albumin   Date Value Ref Range Status   10/18/2024 3.2 (L) 3.5 - 5.2 g/dL Final      Magnesium No results found for: \"MG\"   Uric Acid No results found for: \"URICACID\"         Assessment & Plan       Acute CHF    Chronic kidney disease, stage IV (severe)    Iron deficiency anemia    PAF (paroxysmal atrial fibrillation)    Type 2 diabetes mellitus      Assessment & Plan  CKD3  Volume overload   Sob   Anemia   CHF     Renal function close to her baseline.   Continue with current diuretic regimen.   Strict ins/outs, daily weights.   Na and fluid restriction   Will follow     Thanks for referral     I discussed the patients findings and my recommendations with patient    Hailey Barros MD  10/19/24  09:47 EDT            "

## 2024-10-19 NOTE — ED PROVIDER NOTES
EMERGENCY DEPARTMENT ENCOUNTER  Room Number:  19/19  PCP: Dannie Mathews MD  Independent Historians: Patient, daughter      HPI:  Chief Complaint: had concerns including Shortness of Breath and Leg Swelling.     A complete HPI/ROS/PMH/PSH/SH/FH are unobtainable due to: Nothing      Context: Lowell Min is a 84 y.o. female with a medical history of CKD, CHF, chronic anemia who presents to the ED c/o acute shortness of breath and leg swelling.  She reports that symptoms are worsened over the last few days.  She is currently residing at [facility] for rehab.  She believes that they have been weighing her regularly there and she has lost weight recently.  Nonetheless she has developed increased leg swelling and increased shortness of breath.  She typically wears oxygen at night but she has been wearing it some during the day recently due to her shortness of breath.  She denies fever or chills.  She denies chest pain.  She is anticoagulated due to her history of atrial fibrillation.  She reports that she had similar symptoms in September for which she was hospitalized.  She is not on Lasix regularly due to her chronic kidney disease but she has been taking it some recently at [facility] and states that she has not been urinating as much as she would have expected.      Review of prior external notes (non-ED) -and- Review of prior external test results outside of this encounter: I reviewed recent discharge summary from September where patient was hospitalized for CHF exacerbation.        PAST MEDICAL HISTORY  Active Ambulatory Problems     Diagnosis Date Noted    Chronic kidney disease, stage IV (severe) 03/31/2022    Erythropoietin deficiency anemia 03/31/2022    Symptomatic anemia 04/03/2022    Anxiety associated with depression 04/03/2022    Iron deficiency anemia 04/03/2022    PAF (paroxysmal atrial fibrillation) 04/03/2022    Shortness of breath 04/03/2022    Chronic diastolic congestive heart failure 04/03/2022     Diabetic polyneuropathy associated with type 2 diabetes mellitus 04/12/2022    PERLA (acute kidney injury) 10/07/2022    Foot pain, bilateral 10/13/2022    Anemia of chronic renal failure 06/27/2023    Acute on chronic diastolic CHF (congestive heart failure) 09/06/2024    Obesity (BMI 30-39.9) 09/06/2024    HLD (hyperlipidemia) 09/06/2024    Calcium pyrophosphate deposition disease (CPPD) 09/10/2024     Resolved Ambulatory Problems     Diagnosis Date Noted    Hypomagnesemia 10/12/2022     Past Medical History:   Diagnosis Date    Anxiety     Atrial fibrillation     CHF (congestive heart failure)     Depression     Elevated cholesterol     Hypertension     Type 2 diabetes mellitus          PAST SURGICAL HISTORY  Past Surgical History:   Procedure Laterality Date    APPENDECTOMY      CHOLECYSTECTOMY      COLONOSCOPY      CYSTOSCOPY BOTOX INJECTION OF BLADDER N/A     HYSTERECTOMY      TUMOR REMOVAL Left     benign tumor from left breast         FAMILY HISTORY  No family history on file.      SOCIAL HISTORY  Social History     Socioeconomic History    Marital status: Single   Tobacco Use    Smoking status: Never    Smokeless tobacco: Never   Vaping Use    Vaping status: Never Used   Substance and Sexual Activity    Alcohol use: Never    Drug use: Never    Sexual activity: Defer         ALLERGIES  Ibuprofen      REVIEW OF SYSTEMS  Review of all 14 systems is negative other than stated in the HPI above.      PHYSICAL EXAM    I have reviewed the triage vital signs and nursing notes.    ED Triage Vitals   Temp Heart Rate Resp BP SpO2   10/18/24 1822 10/18/24 1822 10/18/24 1822 10/18/24 1841 10/18/24 1822   97 °F (36.1 °C) 72 16 136/91 91 %      Temp src Heart Rate Source Patient Position BP Location FiO2 (%)   10/18/24 1822 10/18/24 1822 -- -- --   Tympanic Monitor            GENERAL: awake and alert, no acute distress  HENT: Normocephalic, atraumatic  EYES: no scleral icterus  CV: regular rhythm, regular rate no murmur  appreciated, 2+ pitting edema bilateral lower extremities  RESPIRATORY: normal effort, no wheezing, no crackles  ABDOMEN: soft, nontender throughout  MUSCULOSKELETAL: no deformity, no calf tenderness present bilaterally  NEURO: alert, moves all extremities, follows commands, cranial nerves II through XII intact, speech fluent and clear  PSYCH: calm, cooperative  SKIN: Warm, dry          LAB RESULTS  Recent Results (from the past 24 hours)   ECG 12 Lead ED Triage Standing Order; SOA    Collection Time: 10/18/24  6:38 PM   Result Value Ref Range    QT Interval 446 ms    QTC Interval 495 ms   Comprehensive Metabolic Panel    Collection Time: 10/18/24  6:46 PM    Specimen: Arm, Right; Blood   Result Value Ref Range    Glucose 119 (H) 65 - 99 mg/dL    BUN 40 (H) 8 - 23 mg/dL    Creatinine 2.37 (H) 0.57 - 1.00 mg/dL    Sodium 140 136 - 145 mmol/L    Potassium 5.0 3.5 - 5.2 mmol/L    Chloride 106 98 - 107 mmol/L    CO2 24.1 22.0 - 29.0 mmol/L    Calcium 9.0 8.6 - 10.5 mg/dL    Total Protein 6.6 6.0 - 8.5 g/dL    Albumin 3.2 (L) 3.5 - 5.2 g/dL    ALT (SGPT) 13 1 - 33 U/L    AST (SGOT) 16 1 - 32 U/L    Alkaline Phosphatase 265 (H) 39 - 117 U/L    Total Bilirubin 0.9 0.0 - 1.2 mg/dL    Globulin 3.4 gm/dL    A/G Ratio 0.9 g/dL    BUN/Creatinine Ratio 16.9 7.0 - 25.0    Anion Gap 9.9 5.0 - 15.0 mmol/L    eGFR 19.8 (L) >60.0 mL/min/1.73   BNP    Collection Time: 10/18/24  6:46 PM    Specimen: Arm, Right; Blood   Result Value Ref Range    proBNP 20,923.0 (H) 0.0 - 1,800.0 pg/mL   Single High Sensitivity Troponin T    Collection Time: 10/18/24  6:46 PM    Specimen: Arm, Right; Blood   Result Value Ref Range    HS Troponin T 47 (H) <14 ng/L   Green Top (Gel)    Collection Time: 10/18/24  6:46 PM   Result Value Ref Range    Extra Tube Hold for add-ons.    Lavender Top    Collection Time: 10/18/24  6:46 PM   Result Value Ref Range    Extra Tube hold for add-on    Gold Top - SST    Collection Time: 10/18/24  6:46 PM   Result Value Ref  Range    Extra Tube Hold for add-ons.    Light Blue Top    Collection Time: 10/18/24  6:46 PM   Result Value Ref Range    Extra Tube Hold for add-ons.    CBC Auto Differential    Collection Time: 10/18/24  6:46 PM    Specimen: Arm, Right; Blood   Result Value Ref Range    WBC 7.82 3.40 - 10.80 10*3/mm3    RBC 2.67 (L) 3.77 - 5.28 10*6/mm3    Hemoglobin 8.0 (L) 12.0 - 15.9 g/dL    Hematocrit 25.4 (L) 34.0 - 46.6 %    MCV 95.1 79.0 - 97.0 fL    MCH 30.0 26.6 - 33.0 pg    MCHC 31.5 31.5 - 35.7 g/dL    RDW 15.4 12.3 - 15.4 %    RDW-SD 52.2 37.0 - 54.0 fl    MPV 9.3 6.0 - 12.0 fL    Platelets 176 140 - 450 10*3/mm3    Neutrophil % 78.6 (H) 42.7 - 76.0 %    Lymphocyte % 13.3 (L) 19.6 - 45.3 %    Monocyte % 6.1 5.0 - 12.0 %    Eosinophil % 1.3 0.3 - 6.2 %    Basophil % 0.4 0.0 - 1.5 %    Immature Grans % 0.3 0.0 - 0.5 %    Neutrophils, Absolute 6.15 1.70 - 7.00 10*3/mm3    Lymphocytes, Absolute 1.04 0.70 - 3.10 10*3/mm3    Monocytes, Absolute 0.48 0.10 - 0.90 10*3/mm3    Eosinophils, Absolute 0.10 0.00 - 0.40 10*3/mm3    Basophils, Absolute 0.03 0.00 - 0.20 10*3/mm3    Immature Grans, Absolute 0.02 0.00 - 0.05 10*3/mm3    nRBC 0.0 0.0 - 0.2 /100 WBC       The above labs were ordered by me and independently reviewed by me.     RADIOLOGY  XR Chest 1 View    Result Date: 10/18/2024  XR CHEST 1 VW-  Clinical: Shortness of breath  COMPARISON examination 9/12/2024  FINDINGS: There is cardiomegaly. Atherosclerotic calcification of the aorta. There is vascular congestion. No gross pleural effusion seen. The remainder is unremarkable.  This report was finalized on 10/18/2024 7:27 PM by Dr. Mika Suárez M.D on Workstation: [email or web address]       The above radiology studies were ordered by me.  See ED course for independent interpretations.     MEDICATIONS GIVEN IN ER  Medications   sodium chloride 0.9 % flush 10 mL (has no administration in time range)   furosemide (LASIX) injection 80 mg (has no administration in time range)          ORDERS PLACED DURING THIS VISIT:  Orders Placed This Encounter   Procedures    XR Chest 1 View    [place] Draw    Comprehensive Metabolic Panel    BNP    Single High Sensitivity Troponin T    CBC Auto Differential    NPO Diet NPO Type: Strict NPO    Undress & Gown    Continuous Pulse Oximetry    Vital Signs    LHA (on-call MD unless specified) Details    Oxygen Therapy- Nasal Cannula; Titrate 1-6 LPM Per SpO2; 90 - 95%    ECG 12 Lead ED Triage Standing Order; SOA    Insert Peripheral IV    Inpatient Admission    CBC & Differential    Green Top (Gel)    Lavender Top    Gold Top - SST    Light Blue Top         OUTPATIENT MEDICATION MANAGEMENT:  Current Facility-Administered Medications Ordered in Epic   Medication Dose Route Frequency Provider Last Rate Last Admin    furosemide (LASIX) injection 80 mg  80 mg Intravenous Once Jeremy Kwong MD        sodium chloride 0.9 % flush 10 mL  10 mL Intravenous PRN Jeremy Kwong MD         Current Outpatient Medications Ordered in Epic   Medication Sig Dispense Refill    acetaminophen (TYLENOL) 325 MG tablet Take 2 tablets by mouth Every 4 (Four) Hours As Needed for Mild Pain .      amLODIPine (NORVASC) 5 MG tablet Take 1 tablet by mouth Daily. 30 tablet 0    apixaban (ELIQUIS) 2.5 MG tablet tablet Take 1 tablet by mouth 2 (Two) Times a Day. Indications: Atrial Fibrillation 60 tablet     atorvastatin (LIPITOR) 40 MG tablet Take 1 tablet by mouth every night at bedtime.      carvedilol (COREG) 12.5 MG tablet Take 1 tablet by mouth 2 (two) times daily with meals.      gabapentin (NEURONTIN) 100 MG capsule Take 1 capsule by mouth 3 (Three) Times a Day. 15 capsule 0    HYDROcodone-acetaminophen (NORCO) 5-325 MG per tablet Take 1 tablet by mouth Every 6 (Six) Hours As Needed for Moderate Pain. 10 tablet 0    Lidocaine 4 % Place 1 patch on the skin as directed by provider Daily. Remove & Discard patch within 12 hours or as directed by MD       methylPREDNISolone (MEDROL) 4 MG dose pack Take as directed on package instructions. 21 tablet 0    naloxone (NARCAN) 4 MG/0.1ML nasal spray Call 911. Don't prime. [place] in 1 nostril for overdose. Repeat in 2-3 minutes in other nostril if no or minimal breathing/responsiveness. 2 each 0    O2 (OXYGEN) 2 L/min Every Night. Uses when sleeping      sennosides-docusate (PERICOLACE) 8.6-50 MG per tablet Take 2 tablets by mouth 2 (Two) Times a Day.      sodium bicarbonate 650 MG tablet Take 1 tablet by mouth 3 (Three) Times a Day.      venlafaxine XR (EFFEXOR-XR) 75 MG 24 hr capsule Take 1 capsule by mouth Daily.           PROCEDURES  Procedures            PROGRESS, DATA ANALYSIS, CONSULTS, AND MEDICAL DECISION MAKING  All labs have been independently interpreted by me.  All radiology studies have been reviewed by me. All EKG's have been independently viewed and interpreted by me.  Discussion below represents my analysis of pertinent findings related to patient's condition, differential diagnosis, treatment plan and final disposition.    Differential diagnosis includes but is not limited to:  Acute CHF  Pneumonia  Acute renal failure      Clinical Scores:                  ED Course as of 10/18/24 2106   Fri Oct 18, 2024   2044 proBNP(!): 20,923.0 [JR]   2044 Hemoglobin(!): 8.0 [JR]   2044 WBC: 7.82 [JR]   2044 Creatinine(!): 2.37 [JR]   2044 Chest x-ray dependently interpreted PACS and demonstrates cardiomegaly with moderate vascular congestion. [JR]   2045 EKG interpreted by me:  Time: 1838  A-fib, 74  LBBB  No acute ischemic changes [JR]   2045 I reviewed prior echo from 9/7/2024 that showed EF 44%, mild aortic stenosis.  Severe mitral regurgitation, mild mitral stenosis, severe TR. [JR]   2046 I reviewed discharge summary from 9/14/2024.  Patient was admitted for shortness of breath and edema.  She was treated with IV diuretics. [JR]   2058 Discussed with RAMAKRISHNA Nagy for A, who agrees to admit on behalf of   [place]. [JR]      ED Course User Index  [JR] Jeremy Kwong MD             AS OF 21:06 EDT VITALS:    BP - 136/91  HR - 72  TEMP - 97 °F (36.1 °C) (Tympanic)  O2 SATS - 91%    COMPLEXITY OF CARE  The patient requires admission.      Chronic or social conditions impacting patient care (Social Determinants of Health):     DIAGNOSIS  Final diagnoses:   Acute diastolic congestive heart failure   Chronic kidney disease, unspecified CKD stage           DISPOSITION  Admit      Prescription drug monitoring program review:           Please note that portions of this document were completed with a voice recognition program.    Note Disclaimer: At [facility], we believe that sharing information builds trust and better relationships. You are receiving this note because you recently visited [facility]. It is possible you will see health information before a provider has talked with you about it. This kind of information can be easy to misunderstand. To help you fully understand what it means for your health, we urge you to discuss this note with your provider.         Jeremy Kwong MD  10/18/24 2858

## 2024-10-20 LAB
ANION GAP SERPL CALCULATED.3IONS-SCNC: 9 MMOL/L (ref 5–15)
BUN SERPL-MCNC: 39 MG/DL (ref 8–23)
BUN/CREAT SERPL: 16.1 (ref 7–25)
CALCIUM SPEC-SCNC: 8.6 MG/DL (ref 8.6–10.5)
CHLORIDE SERPL-SCNC: 103 MMOL/L (ref 98–107)
CO2 SERPL-SCNC: 26 MMOL/L (ref 22–29)
CREAT SERPL-MCNC: 2.42 MG/DL (ref 0.57–1)
EGFRCR SERPLBLD CKD-EPI 2021: 19.3 ML/MIN/1.73
GLUCOSE BLDC GLUCOMTR-MCNC: 113 MG/DL (ref 70–130)
GLUCOSE BLDC GLUCOMTR-MCNC: 114 MG/DL (ref 70–130)
GLUCOSE BLDC GLUCOMTR-MCNC: 142 MG/DL (ref 70–130)
GLUCOSE BLDC GLUCOMTR-MCNC: 152 MG/DL (ref 70–130)
GLUCOSE SERPL-MCNC: 106 MG/DL (ref 65–99)
POTASSIUM SERPL-SCNC: 4.4 MMOL/L (ref 3.5–5.2)
SODIUM SERPL-SCNC: 138 MMOL/L (ref 136–145)

## 2024-10-20 PROCEDURE — 63710000001 INSULIN LISPRO (HUMAN) PER 5 UNITS

## 2024-10-20 PROCEDURE — 97162 PT EVAL MOD COMPLEX 30 MIN: CPT

## 2024-10-20 PROCEDURE — 80048 BASIC METABOLIC PNL TOTAL CA: CPT | Performed by: INTERNAL MEDICINE

## 2024-10-20 PROCEDURE — 97530 THERAPEUTIC ACTIVITIES: CPT

## 2024-10-20 PROCEDURE — 99232 SBSQ HOSP IP/OBS MODERATE 35: CPT | Performed by: STUDENT IN AN ORGANIZED HEALTH CARE EDUCATION/TRAINING PROGRAM

## 2024-10-20 PROCEDURE — 25010000002 FUROSEMIDE PER 20 MG: Performed by: INTERNAL MEDICINE

## 2024-10-20 PROCEDURE — 82948 REAGENT STRIP/BLOOD GLUCOSE: CPT

## 2024-10-20 RX ORDER — ISOSORBIDE DINITRATE 10 MG/1
10 TABLET ORAL
Status: DISCONTINUED | OUTPATIENT
Start: 2024-10-20 | End: 2024-10-26 | Stop reason: HOSPADM

## 2024-10-20 RX ORDER — HYDRALAZINE HYDROCHLORIDE 10 MG/1
10 TABLET, FILM COATED ORAL EVERY 8 HOURS SCHEDULED
Status: DISCONTINUED | OUTPATIENT
Start: 2024-10-20 | End: 2024-10-26 | Stop reason: HOSPADM

## 2024-10-20 RX ADMIN — ISOSORBIDE DINITRATE 10 MG: 10 TABLET ORAL at 14:42

## 2024-10-20 RX ADMIN — ISOSORBIDE DINITRATE 10 MG: 10 TABLET ORAL at 17:39

## 2024-10-20 RX ADMIN — CARVEDILOL 12.5 MG: 12.5 TABLET, FILM COATED ORAL at 17:39

## 2024-10-20 RX ADMIN — HYDRALAZINE HYDROCHLORIDE 10 MG: 10 TABLET ORAL at 21:36

## 2024-10-20 RX ADMIN — SODIUM BICARBONATE 650 MG: 650 TABLET ORAL at 08:14

## 2024-10-20 RX ADMIN — CARVEDILOL 12.5 MG: 12.5 TABLET, FILM COATED ORAL at 08:14

## 2024-10-20 RX ADMIN — ATORVASTATIN CALCIUM 40 MG: 20 TABLET, FILM COATED ORAL at 08:14

## 2024-10-20 RX ADMIN — SODIUM BICARBONATE 650 MG: 650 TABLET ORAL at 21:36

## 2024-10-20 RX ADMIN — AMLODIPINE BESYLATE 5 MG: 5 TABLET ORAL at 08:14

## 2024-10-20 RX ADMIN — SODIUM BICARBONATE 650 MG: 650 TABLET ORAL at 16:14

## 2024-10-20 RX ADMIN — FUROSEMIDE 60 MG: 10 INJECTION, SOLUTION INTRAMUSCULAR; INTRAVENOUS at 10:12

## 2024-10-20 RX ADMIN — APIXABAN 2.5 MG: 2.5 TABLET, FILM COATED ORAL at 21:36

## 2024-10-20 RX ADMIN — INSULIN LISPRO 2 UNITS: 100 INJECTION, SOLUTION INTRAVENOUS; SUBCUTANEOUS at 17:39

## 2024-10-20 RX ADMIN — ACETAMINOPHEN 325MG 650 MG: 325 TABLET ORAL at 07:11

## 2024-10-20 RX ADMIN — APIXABAN 2.5 MG: 2.5 TABLET, FILM COATED ORAL at 08:14

## 2024-10-20 RX ADMIN — VENLAFAXINE HYDROCHLORIDE 75 MG: 75 CAPSULE, EXTENDED RELEASE ORAL at 08:14

## 2024-10-20 RX ADMIN — HYDRALAZINE HYDROCHLORIDE 10 MG: 10 TABLET ORAL at 16:14

## 2024-10-20 RX ADMIN — FUROSEMIDE 60 MG: 10 INJECTION, SOLUTION INTRAMUSCULAR; INTRAVENOUS at 23:34

## 2024-10-20 NOTE — PROGRESS NOTES
" LOS: 2 days   Patient Care Team:  Dannie Mathews MD as PCP - General (Internal Medicine)    Chief Complaint: PERLA, volume overload     Subjective     History of Present Illness  Patient is a 85 yo female with CKD3 followed by Dr HARRY Foster. She presented overnight with SOB and lower extremity edema.   She was noted to have a Cr of 2.3. she was started on diuretics and Cr is 2.2 today. Her baseline is around 1.8-2.2.  She is feeling better this am.      Subjective  Patient is feeling better this am.   Edema and SOB are improving    History taken from: patient chart    Objective     Vital Sign Min/Max for last 24 hours  Temp  Min: 97.3 °F (36.3 °C)  Max: 98.2 °F (36.8 °C)   BP  Min: 111/50  Max: 146/81   Pulse  Min: 63  Max: 72   Resp  Min: 18  Max: 18   SpO2  Min: 96 %  Max: 100 %   Flow (L/min) (Oxygen Therapy)  Min: 1  Max: 2   Weight  Min: 94.4 kg (208 lb 1.8 oz)  Max: 94.4 kg (208 lb 1.8 oz)     Flowsheet Rows      Flowsheet Row First Filed Value   Admission Height 170.2 cm (67\") Documented at 10/18/2024 2111   Admission Weight 97.5 kg (215 lb) Documented at 10/18/2024 2142            No intake/output data recorded.  I/O last 3 completed shifts:  In: 560 [P.O.:560]  Out: 5000 [Urine:5000]    Objective:  Vital signs: (most recent): Blood pressure 129/64, pulse 68, temperature 97.3 °F (36.3 °C), temperature source Oral, resp. rate 18, height 170.2 cm (67\"), weight 94.4 kg (208 lb 1.8 oz), SpO2 97%.              Physical Examination: General appearance - alert, well appearing, and in no distress  Mental status - alert, oriented to person, place, and time  Chest - clear to auscultation, no wheezes, rales or rhonchi, symmetric air entry  Heart - normal rate, regular rhythm, normal S1, S2, no murmurs, rubs, clicks or gallops  Abdomen - soft, nontender, nondistended, no masses or organomegaly  Neurological - alert, oriented, normal speech, no focal findings or movement disorder noted  Extremities - peripheral pulses " "normal, no pedal edema, no clubbing or cyanosis   Results Review:     I reviewed the patient's new clinical results.    WBC WBC   Date Value Ref Range Status   10/19/2024 5.89 3.40 - 10.80 10*3/mm3 Final   10/18/2024 7.82 3.40 - 10.80 10*3/mm3 Final      HGB Hemoglobin   Date Value Ref Range Status   10/19/2024 7.3 (L) 12.0 - 15.9 g/dL Final   10/18/2024 8.0 (L) 12.0 - 15.9 g/dL Final      HCT Hematocrit   Date Value Ref Range Status   10/19/2024 22.5 (L) 34.0 - 46.6 % Final   10/18/2024 25.4 (L) 34.0 - 46.6 % Final      Platlets No results found for: \"LABPLAT\"   MCV MCV   Date Value Ref Range Status   10/19/2024 96.6 79.0 - 97.0 fL Final   10/18/2024 95.1 79.0 - 97.0 fL Final          Sodium Sodium   Date Value Ref Range Status   10/20/2024 138 136 - 145 mmol/L Final   10/19/2024 138 136 - 145 mmol/L Final   10/18/2024 140 136 - 145 mmol/L Final      Potassium Potassium   Date Value Ref Range Status   10/20/2024 4.4 3.5 - 5.2 mmol/L Final   10/19/2024 4.5 3.5 - 5.2 mmol/L Final   10/18/2024 5.0 3.5 - 5.2 mmol/L Final      Chloride Chloride   Date Value Ref Range Status   10/20/2024 103 98 - 107 mmol/L Final   10/19/2024 105 98 - 107 mmol/L Final   10/18/2024 106 98 - 107 mmol/L Final      CO2 CO2   Date Value Ref Range Status   10/20/2024 26.0 22.0 - 29.0 mmol/L Final   10/19/2024 24.7 22.0 - 29.0 mmol/L Final   10/18/2024 24.1 22.0 - 29.0 mmol/L Final      BUN BUN   Date Value Ref Range Status   10/20/2024 39 (H) 8 - 23 mg/dL Final   10/19/2024 38 (H) 8 - 23 mg/dL Final   10/18/2024 40 (H) 8 - 23 mg/dL Final      Creatinine Creatinine   Date Value Ref Range Status   10/20/2024 2.42 (H) 0.57 - 1.00 mg/dL Final   10/19/2024 2.29 (H) 0.57 - 1.00 mg/dL Final   10/18/2024 2.37 (H) 0.57 - 1.00 mg/dL Final      Calcium Calcium   Date Value Ref Range Status   10/20/2024 8.6 8.6 - 10.5 mg/dL Final   10/19/2024 8.8 8.6 - 10.5 mg/dL Final   10/18/2024 9.0 8.6 - 10.5 mg/dL Final      PO4 No results found for: \"CAPO4\" " "  Albumin Albumin   Date Value Ref Range Status   10/18/2024 3.2 (L) 3.5 - 5.2 g/dL Final      Magnesium No results found for: \"MG\"   Uric Acid No results found for: \"URICACID\"     Medication Review:     Current Facility-Administered Medications:     acetaminophen (TYLENOL) tablet 650 mg, 650 mg, Oral, Q4H PRN, Farazell, Lilo D, APRN, 650 mg at 10/20/24 0711    amLODIPine (NORVASC) tablet 5 mg, 5 mg, Oral, Q24H, Frimargaritaell, Lilo D, APRN, 5 mg at 10/20/24 0814    apixaban (ELIQUIS) tablet 2.5 mg, 2.5 mg, Oral, BID, Frimargaritaell, Lilo D, APRN, 2.5 mg at 10/20/24 0814    atorvastatin (LIPITOR) tablet 40 mg, 40 mg, Oral, Daily, Frimargaritaell, Lilo D, APRN, 40 mg at 10/20/24 0814    sennosides-docusate (PERICOLACE) 8.6-50 MG per tablet 2 tablet, 2 tablet, Oral, BID PRN **AND** polyethylene glycol (MIRALAX) packet 17 g, 17 g, Oral, Daily PRN **AND** bisacodyl (DULCOLAX) EC tablet 5 mg, 5 mg, Oral, Daily PRN **AND** bisacodyl (DULCOLAX) suppository 10 mg, 10 mg, Rectal, Daily PRN, Keila Gastelumesa D, APRN    carvedilol (COREG) tablet 12.5 mg, 12.5 mg, Oral, BID With Meals, Keila Gastelumesa D, APRN, 12.5 mg at 10/20/24 0814    dextrose (D50W) (25 g/50 mL) IV injection 25 g, 25 g, Intravenous, Q15 Min PRN, Shorty Gasteluma D, APRN    dextrose (GLUTOSE) oral gel 15 g, 15 g, Oral, Q15 Min PRN, Oriana Lilo D, APRN    furosemide (LASIX) injection 60 mg, 60 mg, Intravenous, Q12H, Hailey Barros MD, 60 mg at 10/19/24 2336    glucagon (GLUCAGEN) injection 1 mg, 1 mg, Intramuscular, Q15 Min PRN, Lilo Gastelum APRN    insulin lispro (HUMALOG/ADMELOG) injection 2-7 Units, 2-7 Units, Subcutaneous, 4x Daily AC & at Bedtime, Lilo Gastelum APRN, 2 Units at 10/19/24 1217    nitroglycerin (NITROSTAT) SL tablet 0.4 mg, 0.4 mg, Sublingual, Q5 Min PRN, Lilo Gastelum APRN    ondansetron ODT (ZOFRAN-ODT) disintegrating tablet 4 mg, 4 mg, Oral, Q6H PRN **OR** ondansetron (ZOFRAN) injection 4 mg, 4 mg, Intravenous, Q6H PRN, " Lilo Gastelum, APRN    sodium bicarbonate tablet 650 mg, 650 mg, Oral, TID, Lilo Gastelum, APRN, 650 mg at 10/20/24 0814    sodium chloride 0.9 % flush 10 mL, 10 mL, Intravenous, PRN, Jeremy Kwong MD    venlafaxine XR (EFFEXOR-XR) 24 hr capsule 75 mg, 75 mg, Oral, Daily, Lilo Gastelum, APRN, 75 mg at 10/20/24 0814      Assessment & Plan       Acute CHF    Chronic kidney disease, stage IV (severe)    Anxiety associated with depression    Iron deficiency anemia    PAF (paroxysmal atrial fibrillation)    Chronic diastolic congestive heart failure    HLD (hyperlipidemia)    Type 2 diabetes mellitus      Assessment & Plan  CKD3  Volume overload   SOB  Anemia   CHF    Renal function remains stable at her baseline.   Will keep IV diuretics today. Could possibly change to PO tomorrow.   Continue with fluid and na restriction   Will follow     Hailey Barros MD  10/20/24  10:01 EDT

## 2024-10-20 NOTE — PLAN OF CARE
Goal Outcome Evaluation:              Outcome Evaluation: Up to chair with assist x 1 and walker. BLE edema improving. PRN tylenol adminstered for c/o L sided neck pain and it has since been effective.  Diuretic in Use:  IV lasix  Response to Diuretics (Output greater than intake): yes  Daily Weight (up or down): down  O2 Requirements: RA  Functional Status (Activity level, tolerance and respiratory symptoms): improving  Discharge Plans: TBD

## 2024-10-20 NOTE — THERAPY EVALUATION
Patient Name: Lowell Min  : 1940    MRN: [id number]                              Today's Date: 10/20/2024       Admit Date: 10/18/2024    Visit Dx:     ICD-10-CM ICD-9-CM   1. Acute diastolic congestive heart failure  I50.31 428.31     428.0   2. Chronic kidney disease, unspecified CKD stage  N18.9 585.9     Patient Active Problem List   Diagnosis    Chronic kidney disease, stage IV (severe)    Erythropoietin deficiency anemia    Symptomatic anemia    Anxiety associated with depression    Iron deficiency anemia    PAF (paroxysmal atrial fibrillation)    Shortness of breath    Chronic diastolic congestive heart failure    Diabetic polyneuropathy associated with type 2 diabetes mellitus    PERLA (acute kidney injury)    Foot pain, bilateral    Anemia of chronic renal failure    Acute on chronic diastolic CHF (congestive heart failure)    Obesity (BMI 30-39.9)    HLD (hyperlipidemia)    Calcium pyrophosphate deposition disease (CPPD)    Acute CHF    Type 2 diabetes mellitus     Past Medical History:   Diagnosis Date    Anxiety     Atrial fibrillation     CHF (congestive heart failure)     Chronic kidney disease, stage IV (severe)     Depression     Elevated cholesterol     Hypertension     Type 2 diabetes mellitus      Past Surgical History:   Procedure Laterality Date    APPENDECTOMY      CHOLECYSTECTOMY      COLONOSCOPY      CYSTOSCOPY BOTOX INJECTION OF BLADDER N/A     HYSTERECTOMY      TUMOR REMOVAL Left     benign tumor from left breast      General Information       Row Name 10/20/24 1417          Physical Therapy Time and Intention    Document Type evaluation  -LW     Mode of Treatment individual therapy;physical therapy  -LW       Row Name 10/20/24 1411          General Information    Patient Profile Reviewed yes  -LW     Prior Level of Function independent:  cane/walker  -LW     Existing Precautions/Restrictions fall  -LW     Barriers to Rehab none identified  -LW       Row Name 10/20/24 5322           Living Environment    People in Home alone  -       Row Name 10/20/24 1417          Stairs Within Home, Primary    Stairs, Within Home, Primary bi-level house, ~18 steps  -       Row Name 10/20/24 1417          Safety Issues/Impairments Affecting Functional Mobility    Impairments Affecting Function (Mobility) balance;endurance/activity tolerance;strength  -               User Key  (r) = Recorded By, (t) = Taken By, (c) = Cosigned By      Initials Name Provider Type    Farida Bond PT Physical Therapist                   Mobility       Row Name 10/20/24 1418          Bed Mobility    Comment, (Bed Mobility) NT, UIC  -LW       Row Name 10/20/24 1418          Sit-Stand Transfer    Sit-Stand [place] (Transfers) minimum assist (75% patient effort)  -LW     Assistive Device (Sit-Stand Transfers) walker, standard  -LW       Row Name 10/20/24 1418          Gait/Stairs (Locomotion)    [place] Level (Gait) minimum assist (75% patient effort)  -LW     Assistive Device (Gait) walker, standard  -LW     Distance in Feet (Gait) 10  -LW     Deviations/Abnormal Patterns (Gait) stride length decreased;gait speed decreased;festinating/shuffling;base of support, wide  -LW     Bilateral Gait Deviations forward flexed posture  -LW     Comment, (Gait/Stairs) used standard walker in room, unsteady gait, fatigued quickly  -               User Key  (r) = Recorded By, (t) = Taken By, (c) = Cosigned By      Initials Name Provider Type    Farida Bond PT Physical Therapist                   Obj/Interventions       Row Name 10/20/24 1419          Range of Motion Comprehensive    General Range of Motion no range of motion deficits identified  -       Row Name 10/20/24 1419          Strength Comprehensive (MMT)    General Manual Muscle Testing (MMT) Assessment lower extremity strength deficits identified  -     Comment, General Manual Muscle Testing (MMT) Assessment generalized weakness BLEs  -       Row Name  10/20/24 1419          Balance    Balance Assessment sitting static balance;sitting dynamic balance;standing static balance;standing dynamic balance  -LW     Dynamic Sitting Balance independent  -LW     Position, Sitting Balance sitting in chair;supported  -LW     Static Standing Balance minimal assist  -LW     Dynamic Standing Balance minimal assist  -LW     Position/Device Used, Standing Balance walker, standard  -LW     Balance Interventions sitting;sit to stand;standing;supported;static;dynamic  -LW               User Key  (r) = Recorded By, (t) = Taken By, (c) = Cosigned By      Initials Name Provider Type    LW Farida Urbina, PT Physical Therapist                   Goals/Plan       Row Name 10/20/24 1421          Bed Mobility Goal 1 (PT)    Activity/Assistive Device (Bed Mobility Goal 1, PT) bed mobility activities, all  -LW     [place] Level/Cues Needed (Bed Mobility Goal 1, PT) independent  -LW     Time Frame (Bed Mobility Goal 1, PT) 10 days  -LW       Row Name 10/20/24 1421          Transfer Goal 1 (PT)    Activity/Assistive Device (Transfer Goal 1, PT) sit-to-stand/stand-to-sit;bed-to-chair/chair-to-bed  -LW     [place] Level/Cues Needed (Transfer Goal 1, PT) contact guard required  -LW     Time Frame (Transfer Goal 1, PT) 10 days  -LW       Row Name 10/20/24 1421          Gait Training Goal 1 (PT)    Activity/Assistive Device (Gait Training Goal 1, PT) gait (walking locomotion);walker, rolling  -LW     [place] Level (Gait Training Goal 1, PT) contact guard required  -LW     Distance (Gait Training Goal 1, PT) 40  -LW     Time Frame (Gait Training Goal 1, PT) 10 days  -LW       Row Name 10/20/24 1421          Therapy Assessment/Plan (PT)    Planned Therapy Interventions (PT) balance training;bed mobility training;gait training;home exercise program;stretching;strengthening;stair training;ROM (range of motion);patient/family education;neuromuscular re-education;transfer training  -LW                User Key  (r) = Recorded By, (t) = Taken By, (c) = Cosigned By      Initials Name Provider Type    Farida Bond, MIRNA Physical Therapist                   Clinical Impression       Row Name 10/20/24 1419          Pain    Pretreatment Pain Rating 0/10 - no pain  -LW     Posttreatment Pain Rating 0/10 - no pain  -LW       Row Name 10/20/24 1419          Plan of Care Review    Plan of Care Reviewed With patient  -LW     Progress no change  -LW     Outcome Evaluation Pt is an 83 y/o female admitted from rehab to [facility] for heart failure. Prior to reab admission she was indep with all mobility and lives alone in a bi-level house with 18 steps. Today she presented with mobility below baseline, decreased tolerance to activity, and generalized LE weakness. She was [facility] on arrival and ambualted 10' with Magnus and standard walker. Patient will benefit from continued skilled PT addressing limitations in functional mobility to maximize safety and independence. Therapy recommendations include returning to rehab at d/c.  -LW       Row Name 10/20/24 1419          Therapy Assessment/Plan (PT)    Rehab Potential (PT) good  -LW     Criteria for Skilled Interventions Met (PT) yes  -LW     Therapy Frequency (PT) 5 times/wk  -LW       Row Name 10/20/24 1419          Positioning and Restraints    Pre-Treatment Position sitting in chair/recliner  -LW     Post Treatment Position chair  -LW     In Chair reclined;call light within reach;encouraged to call for assist;exit alarm on  -LW               User Key  (r) = Recorded By, (t) = Taken By, (c) = Cosigned By      Initials Name Provider Type    Farida Bond PT Physical Therapist                   Outcome Measures       Row Name 10/20/24 1421 10/20/24 0814       How much help from another person do you currently need...    Turning from your back to your side while in flat bed without using bedrails? 4  -LW 4  -SL    Moving from lying on back to sitting on the side of a flat bed without  bedrails? 3  -LW 4  -SL    Moving to and from a bed to a chair (including a wheelchair)? 3  -LW 4  -SL    Standing up from a chair using your arms (e.g., wheelchair, bedside chair)? 3  -LW 4  -SL    Climbing 3-5 steps with a railing? 3  -LW 3  -SL    To walk in hospital room? 3  -LW 3  -SL    AM-PAC 6 Clicks Score (PT) 19  -LW 22  -SL    Highest Level of Mobility Goal 6 --> Walk 10 steps or more  -LW 7 --> Walk 25 feet or more  -SL      Row Name 10/20/24 1421          Functional Assessment    Outcome Measure Options AM-PAC 6 Clicks Basic Mobility (PT)  -               User Key  (r) = Recorded By, (t) = Taken By, (c) = Cosigned By      Initials Name Provider Type     Ryann Clayton, RN Registered Nurse    Farida Bond, PT Physical Therapist                                 Physical Therapy Education       Title: PT OT SLP Therapies (In Progress)       Topic: Physical Therapy (In Progress)       Point: Mobility training (Done)       Learning Progress Summary            Patient Acceptance, E,TB, VU by  at 10/20/2024 1422                      Point: Home exercise program (Not Started)       Learner Progress:  Not documented in this visit.              Point: Body mechanics (Done)       Learning Progress Summary            Patient Acceptance, E,TB, VU by  at 10/20/2024 1422                      Point: Precautions (Done)       Learning Progress Summary            Patient Acceptance, E,TB, VU by  at 10/20/2024 1422                                      User Key       Initials Effective Dates Name Provider Type Discipline     05/08/23 -  Farida Urbina, MIRNA Physical Therapist PT                  PT Recommendation and Plan  Planned Therapy Interventions (PT): balance training, bed mobility training, gait training, home exercise program, stretching, strengthening, stair training, ROM (range of motion), patient/family education, neuromuscular re-education, transfer training  Progress: no change  Outcome Evaluation:  Pt is an 85 y/o female admitted from rehab to [facility] for heart failure. Prior to reab admission she was indep with all mobility and lives alone in a bi-level house with 18 steps. Today she presented with mobility below baseline, decreased tolerance to activity, and generalized LE weakness. She was [facility] on arrival and ambualted 10' with Magnus and standard walker. Patient will benefit from continued skilled PT addressing limitations in functional mobility to maximize safety and independence. Therapy recommendations include returning to rehab at d/c.     Time Calculation:         PT Charges       Row Name 10/20/24 1422             Time Calculation    Start Time 1321  -LW      Stop Time 1336  -LW      Time Calculation (min) 15 min  -LW      PT Received On 10/20/24  -LW      PT - Next Appointment 10/21/24  -LW      PT Goal Re-Cert Due Date 10/30/24  -LW         Time Calculation- PT    Total Timed Code Minutes- PT 10 minute(s)  -LW         Timed Charges    69863 - PT Therapeutic Activity Minutes 10  -LW         Total Minutes    Timed Charges Total Minutes 10  -LW       Total Minutes 10  -LW                User Key  (r) = Recorded By, (t) = Taken By, (c) = Cosigned By      Initials Name Provider Type    LW Farida Urbina, PT Physical Therapist                  Therapy Charges for Today       Code Description Service Date Service Provider Modifiers Qty    98206473596 HC PT THERAPEUTIC ACT EA 15 MIN 10/20/2024 Farida Urbina, PT GP 1    87396995277 HC PT EVAL MOD COMPLEXITY 2 10/20/2024 Farida Urbina, PT GP 1            PT G-Codes  Outcome Measure Options: AM-PAC 6 Clicks Basic Mobility (PT)  AM-PAC 6 Clicks Score (PT): 19  PT Discharge Summary  Anticipated Discharge Disposition (PT): skilled nursing facility    Farida Urbina PT  10/20/2024

## 2024-10-20 NOTE — PLAN OF CARE
Goal Outcome Evaluation:                     VSS. O2 SAT STABLE ON 1 L/M N/C.        Diuretic in Use: IV LASIX  Response to Diuretics (Output greater than intake): YES  Daily Weight (up or down): DOWN  O2 Requirements: 1 L/M N/C  Functional Status (Activity level, tolerance and respiratory symptoms): HAS BEEN IN BED OVERNIGHT   Discharge Plans:  TBD.

## 2024-10-20 NOTE — PLAN OF CARE
Goal Outcome Evaluation:  Plan of Care Reviewed With: patient        Progress: no change  Outcome Evaluation: Pt is an 83 y/o female admitted from rehab to [facility] for heart failure. Prior to reab admission she was indep with all mobility and lives alone in a bi-level house with 18 steps. Today she presented with mobility below baseline, decreased tolerance to activity, and generalized LE weakness. She was [facility] on arrival and ambualted 10' with Magnus and standard walker. Patient will benefit from continued skilled PT addressing limitations in functional mobility to maximize safety and independence. Therapy recommendations include returning to rehab at d/c.    Anticipated Discharge Disposition (PT): skilled nursing facility

## 2024-10-20 NOTE — PROGRESS NOTES
Name: Lowell Min ADMIT: 10/18/2024   : 1940  PCP: Dannie Mathews MD    MRN: [id number] LOS: 2 days   AGE/SEX: 84 y.o. female  ROOM: 30/1     Subjective   Subjective   Feels her breathing is a little bit better today.  She slept on her neck funny and has a little pain today.    Objective   Objective   Vital Signs  Temp:  [97.3 °F (36.3 °C)-98.2 °F (36.8 °C)] 97.3 °F (36.3 °C)  Heart Rate:  [63-72] 68  Resp:  [18] 18  BP: (111-146)/(50-81) 129/64  SpO2:  [96 %-100 %] 97 %  on  Flow (L/min) (Oxygen Therapy):  [1-2] 1;   Device (Oxygen Therapy): nasal cannula  Body mass index is 32.6 kg/m².  Physical Exam  Constitutional:       General: She is not in acute distress.  Pulmonary:      Effort: Pulmonary effort is normal. No respiratory distress.      Breath sounds: No stridor. Rales present.   Musculoskeletal:      Right lower leg: Edema present.      Left lower leg: Edema present.   Skin:     Coloration: Skin is not jaundiced.      Findings: No bruising.   Neurological:      General: No focal deficit present.      Mental Status: She is alert.   Psychiatric:         Mood and Affect: Mood normal.         Behavior: Behavior normal.         Results Review     I reviewed the patient's new clinical results.  Results from last 7 days   Lab Units 10/19/24  0339 10/18/24  1846   WBC 10*3/mm3 5.89 7.82   HEMOGLOBIN g/dL 7.3* 8.0*   PLATELETS 10*3/mm3 151 176     Results from last 7 days   Lab Units 10/20/24  0856 10/19/24  0339 10/18/24  1846   SODIUM mmol/L 138 138 140   POTASSIUM mmol/L 4.4 4.5 5.0   CHLORIDE mmol/L 103 105 106   CO2 mmol/L 26.0 24.7 24.1   BUN mg/dL 39* 38* 40*   CREATININE mg/dL 2.42* 2.29* 2.37*   GLUCOSE mg/dL 106* 110* 119*   EGFR mL/min/1.73 19.3* 20.6* 19.8*     Results from last 7 days   Lab Units 10/18/24  1846   ALBUMIN g/dL 3.2*   BILIRUBIN mg/dL 0.9   ALK PHOS U/L 265*   AST (SGOT) U/L 16   ALT (SGPT) U/L 13     Results from last 7 days   Lab Units 10/20/24  0856 10/19/24  0339  10/18/24  1846   CALCIUM mg/dL 8.6 8.8 9.0   ALBUMIN g/dL  --   --  3.2*       Glucose   Date/Time Value Ref Range Status   10/20/2024 0746 114 70 - 130 mg/dL Final   10/19/2024 2110 134 (H) 70 - 130 mg/dL Final   10/19/2024 1643 129 70 - 130 mg/dL Final   10/19/2024 1122 150 (H) 70 - 130 mg/dL Final   10/19/2024 0737 115 70 - 130 mg/dL Final       No radiology results for the last day  Scheduled Medications  amLODIPine, 5 mg, Oral, Q24H  apixaban, 2.5 mg, Oral, BID  atorvastatin, 40 mg, Oral, Daily  carvedilol, 12.5 mg, Oral, BID With Meals  furosemide, 60 mg, Intravenous, Q12H  insulin lispro, 2-7 Units, Subcutaneous, 4x Daily AC & at Bedtime  sodium bicarbonate, 650 mg, Oral, TID  venlafaxine XR, 75 mg, Oral, Daily    Infusions   Diet  Diet: Cardiac; Healthy Heart (2-3 Na+); Fluid Consistency: Thin (IDDSI 0)       Assessment/Plan     Active Hospital Problems    Diagnosis  POA    **Acute CHF [I50.9]  Yes    Type 2 diabetes mellitus [E11.9]  Yes    HLD (hyperlipidemia) [E78.5]  Yes    PAF (paroxysmal atrial fibrillation) [I48.0]  Yes    Iron deficiency anemia [D50.9]  Yes    Chronic diastolic congestive heart failure [I50.32]  Yes    Anxiety associated with depression [F41.8]  Yes    Chronic kidney disease, stage IV (severe) [N18.4]  Yes      Resolved Hospital Problems   No resolved problems to display.       84 y.o. female admitted with Acute CHF.      10/20/24  Net 2.8 L out yesterday.  Continue diuresis. PT and OT evals pending.    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  Anticipated discharge Pending PT and/or OT eval., when cleared by consultants            Pillo Yu MD  [facility]  10/20/24  10:08 EDT

## 2024-10-20 NOTE — PROGRESS NOTES
"    Patient Name: Lowell Min  :1940  84 y.o.      Patient Care Team:  Dannie Mathews MD as PCP - General (Internal Medicine)    Chief Complaint:   Acute on chronic HFrEF exacerbation    Interval History:   Breathing is slightly improved.    Objective   Vital Signs  Temp:  [97.3 °F (36.3 °C)-98.2 °F (36.8 °C)] 97.3 °F (36.3 °C)  Heart Rate:  [63-72] 68  Resp:  [18] 18  BP: (111-146)/(50-81) 129/64    Intake/Output Summary (Last 24 hours) at 10/20/2024 0921  Last data filed at 10/20/2024 0451  Gross per 24 hour   Intake 560 ml   Output 3400 ml   Net -2840 ml     Flowsheet Rows      Flowsheet Row First Filed Value   Admission Height 170.2 cm (67\") Documented at 10/18/2024 2111   Admission Weight 97.5 kg (215 lb) Documented at 10/18/2024 2142            GEN: no distress, alert and oriented  HEENT: NACT, EOMI, moist mucous membranes, nasal cannula in place  Lungs: CTAB, no wheezes, rales or rhonchi  CV: normal rate, regular rhythm, normal S1, S2, no murmurs, +2 radial pulses b/l, no carotid bruit, mild JVD  Abdomen: soft, nontender, nondistended, NABS  Extremities: 2+ edema  Skin: no rash, warm, dry  Heme/Lymph: no bruising  Psych: organized thought, normal behavior and affect    Results Review:    Results from last 7 days   Lab Units 10/19/24  0339   SODIUM mmol/L 138   POTASSIUM mmol/L 4.5   CHLORIDE mmol/L 105   CO2 mmol/L 24.7   BUN mg/dL 38*   CREATININE mg/dL 2.29*   GLUCOSE mg/dL 110*   CALCIUM mg/dL 8.8     Results from last 7 days   Lab Units 10/18/24  1846   HSTROP T ng/L 47*     Results from last 7 days   Lab Units 10/19/24  0339   WBC 10*3/mm3 5.89   HEMOGLOBIN g/dL 7.3*   HEMATOCRIT % 22.5*   PLATELETS 10*3/mm3 151                           Medication Review:   amLODIPine, 5 mg, Oral, Q24H  apixaban, 2.5 mg, Oral, BID  atorvastatin, 40 mg, Oral, Daily  carvedilol, 12.5 mg, Oral, BID With Meals  furosemide, 60 mg, Intravenous, Q12H  insulin lispro, 2-7 Units, Subcutaneous, 4x Daily AC & at " Bedtime  sodium bicarbonate, 650 mg, Oral, TID  venlafaxine XR, 75 mg, Oral, Daily              Assessment & Plan   #Acute on chronic HFrEF exacerbation  #Severe MR  #Permanent A-fib  #CKD  #Diabetes  #Hypertension  #Hyperlipidemia  #NELLY    84-year-old man with permanent A-fib, HFrEF (EF 40 to 45%) CKD, diabetes, hypertension, hyperlipidemia, NELLY who presented with shortness of breath and lower extremity edema, found to be volume overloaded due to acute on chronic HFrEF exacerbation.    Net -2.8 L    - Continue Lasix 60 mg IV twice daily, strict I/os, daily weights  - Continue carvedilol 12.5 mg twice daily  - Unable to add ACE inhibitor/ARB/Arni/MRA due to CKD  - Switch amlodipine to hydralazine/Isordil    Pj Lopez MD, FACC, [facility]  10/20/24  09:21 EDT

## 2024-10-21 PROBLEM — J96.11 CHRONIC RESPIRATORY FAILURE WITH HYPOXIA: Status: ACTIVE | Noted: 2024-10-21

## 2024-10-21 PROBLEM — I50.23 ACUTE ON CHRONIC SYSTOLIC CHF (CONGESTIVE HEART FAILURE): Status: ACTIVE | Noted: 2024-10-21

## 2024-10-21 LAB
ANION GAP SERPL CALCULATED.3IONS-SCNC: 9 MMOL/L (ref 5–15)
BUN SERPL-MCNC: 39 MG/DL (ref 8–23)
BUN/CREAT SERPL: 16.2 (ref 7–25)
CALCIUM SPEC-SCNC: 8.5 MG/DL (ref 8.6–10.5)
CHLORIDE SERPL-SCNC: 100 MMOL/L (ref 98–107)
CO2 SERPL-SCNC: 27 MMOL/L (ref 22–29)
CREAT SERPL-MCNC: 2.41 MG/DL (ref 0.57–1)
DEPRECATED RDW RBC AUTO: 51.2 FL (ref 37–54)
EGFRCR SERPLBLD CKD-EPI 2021: 19.4 ML/MIN/1.73
ERYTHROCYTE [DISTWIDTH] IN BLOOD BY AUTOMATED COUNT: 15.8 % (ref 12.3–15.4)
GLUCOSE BLDC GLUCOMTR-MCNC: 121 MG/DL (ref 70–130)
GLUCOSE BLDC GLUCOMTR-MCNC: 128 MG/DL (ref 70–130)
GLUCOSE BLDC GLUCOMTR-MCNC: 135 MG/DL (ref 70–130)
GLUCOSE BLDC GLUCOMTR-MCNC: 198 MG/DL (ref 70–130)
GLUCOSE SERPL-MCNC: 112 MG/DL (ref 65–99)
HCT VFR BLD AUTO: 21.5 % (ref 34–46.6)
HGB BLD-MCNC: 7.5 G/DL (ref 12–15.9)
MAGNESIUM SERPL-MCNC: 1.9 MG/DL (ref 1.6–2.4)
MCH RBC QN AUTO: 31.6 PG (ref 26.6–33)
MCHC RBC AUTO-ENTMCNC: 34.9 G/DL (ref 31.5–35.7)
MCV RBC AUTO: 90.7 FL (ref 79–97)
PHOSPHATE SERPL-MCNC: 3.1 MG/DL (ref 2.5–4.5)
PLATELET # BLD AUTO: 141 10*3/MM3 (ref 140–450)
PMV BLD AUTO: 10 FL (ref 6–12)
POTASSIUM SERPL-SCNC: 4.1 MMOL/L (ref 3.5–5.2)
RBC # BLD AUTO: 2.37 10*6/MM3 (ref 3.77–5.28)
SODIUM SERPL-SCNC: 136 MMOL/L (ref 136–145)
WBC NRBC COR # BLD AUTO: 4.97 10*3/MM3 (ref 3.4–10.8)

## 2024-10-21 PROCEDURE — 25010000002 FUROSEMIDE PER 20 MG: Performed by: INTERNAL MEDICINE

## 2024-10-21 PROCEDURE — 82948 REAGENT STRIP/BLOOD GLUCOSE: CPT

## 2024-10-21 PROCEDURE — 99232 SBSQ HOSP IP/OBS MODERATE 35: CPT | Performed by: NURSE PRACTITIONER

## 2024-10-21 PROCEDURE — 25010000002 ONDANSETRON PER 1 MG

## 2024-10-21 PROCEDURE — 85027 COMPLETE CBC AUTOMATED: CPT | Performed by: STUDENT IN AN ORGANIZED HEALTH CARE EDUCATION/TRAINING PROGRAM

## 2024-10-21 PROCEDURE — 87102 FUNGUS ISOLATION CULTURE: CPT | Performed by: HOSPITALIST

## 2024-10-21 PROCEDURE — 83735 ASSAY OF MAGNESIUM: CPT | Performed by: STUDENT IN AN ORGANIZED HEALTH CARE EDUCATION/TRAINING PROGRAM

## 2024-10-21 PROCEDURE — 80048 BASIC METABOLIC PNL TOTAL CA: CPT | Performed by: STUDENT IN AN ORGANIZED HEALTH CARE EDUCATION/TRAINING PROGRAM

## 2024-10-21 PROCEDURE — 63710000001 INSULIN LISPRO (HUMAN) PER 5 UNITS

## 2024-10-21 PROCEDURE — 84100 ASSAY OF PHOSPHORUS: CPT | Performed by: STUDENT IN AN ORGANIZED HEALTH CARE EDUCATION/TRAINING PROGRAM

## 2024-10-21 PROCEDURE — 97166 OT EVAL MOD COMPLEX 45 MIN: CPT

## 2024-10-21 PROCEDURE — 97530 THERAPEUTIC ACTIVITIES: CPT

## 2024-10-21 RX ORDER — FUROSEMIDE 40 MG
40 TABLET ORAL
Status: DISCONTINUED | OUTPATIENT
Start: 2024-10-22 | End: 2024-10-25

## 2024-10-21 RX ADMIN — ONDANSETRON 4 MG: 2 INJECTION INTRAMUSCULAR; INTRAVENOUS at 17:24

## 2024-10-21 RX ADMIN — FUROSEMIDE 60 MG: 10 INJECTION, SOLUTION INTRAMUSCULAR; INTRAVENOUS at 10:50

## 2024-10-21 RX ADMIN — APIXABAN 2.5 MG: 2.5 TABLET, FILM COATED ORAL at 09:13

## 2024-10-21 RX ADMIN — VENLAFAXINE HYDROCHLORIDE 75 MG: 75 CAPSULE, EXTENDED RELEASE ORAL at 09:13

## 2024-10-21 RX ADMIN — APIXABAN 2.5 MG: 2.5 TABLET, FILM COATED ORAL at 20:42

## 2024-10-21 RX ADMIN — ISOSORBIDE DINITRATE 10 MG: 10 TABLET ORAL at 17:28

## 2024-10-21 RX ADMIN — INSULIN LISPRO 2 UNITS: 100 INJECTION, SOLUTION INTRAVENOUS; SUBCUTANEOUS at 17:24

## 2024-10-21 RX ADMIN — HYDRALAZINE HYDROCHLORIDE 10 MG: 10 TABLET ORAL at 14:41

## 2024-10-21 RX ADMIN — HYDRALAZINE HYDROCHLORIDE 10 MG: 10 TABLET ORAL at 22:33

## 2024-10-21 RX ADMIN — ISOSORBIDE DINITRATE 10 MG: 10 TABLET ORAL at 09:13

## 2024-10-21 RX ADMIN — ISOSORBIDE DINITRATE 10 MG: 10 TABLET ORAL at 12:30

## 2024-10-21 RX ADMIN — ATORVASTATIN CALCIUM 40 MG: 20 TABLET, FILM COATED ORAL at 09:13

## 2024-10-21 RX ADMIN — CARVEDILOL 12.5 MG: 12.5 TABLET, FILM COATED ORAL at 09:13

## 2024-10-21 RX ADMIN — HYDRALAZINE HYDROCHLORIDE 10 MG: 10 TABLET ORAL at 06:33

## 2024-10-21 RX ADMIN — CARVEDILOL 12.5 MG: 12.5 TABLET, FILM COATED ORAL at 17:23

## 2024-10-21 RX ADMIN — ONDANSETRON 4 MG: 2 INJECTION INTRAMUSCULAR; INTRAVENOUS at 09:13

## 2024-10-21 RX ADMIN — Medication 10 ML: at 09:13

## 2024-10-21 RX ADMIN — SODIUM BICARBONATE 650 MG: 650 TABLET ORAL at 09:13

## 2024-10-21 NOTE — PROGRESS NOTES
Name: Lowell Min ADMIT: 10/18/2024   : 1940  PCP: Dannie Mathews MD    MRN: [id number] LOS: 3 days   AGE/SEX: 84 y.o. female  ROOM: 30/1     Subjective   Subjective   Feeling okay this AM. Had some nausea this AM but it has resolved. Tolerating po. No V/D/abd pain. No F/C/NS. No SOA or CP. Voiding well.       Objective   Objective   Vital Signs  Temp:  [97.5 °F (36.4 °C)-97.9 °F (36.6 °C)] 97.5 °F (36.4 °C)  Heart Rate:  [55-68] 61  Resp:  [18] 18  BP: (103-141)/(62-66) 103/65  SpO2:  [92 %-98 %] 93 %  on  Flow (L/min) (Oxygen Therapy):  [0-1] 0;   Device (Oxygen Therapy): room air  Body mass index is 32.98 kg/m².  Physical Exam  Vitals and nursing note reviewed.   Constitutional:       General: She is not in acute distress.     Appearance: She is ill-appearing (chronically). She is not toxic-appearing or diaphoretic.   HENT:      Head: Normocephalic.      Mouth/Throat:      Mouth: Mucous membranes are moist.      Pharynx: Oropharynx is clear.   Eyes:      General: No scleral icterus.        Right eye: No discharge.         Left eye: No discharge.      Conjunctiva/sclera: Conjunctivae normal.   Cardiovascular:      Rate and Rhythm: Normal rate. Rhythm irregular.      Pulses: Normal pulses.   Pulmonary:      Effort: Pulmonary effort is normal. No respiratory distress.      Breath sounds: Normal breath sounds. No wheezing or rales.   Abdominal:      General: Bowel sounds are normal. There is no distension.      Palpations: Abdomen is soft.      Tenderness: There is no abdominal tenderness.   Musculoskeletal:         General: Swelling (trace in BLEs) present.      Cervical back: Neck supple.   Skin:     General: Skin is warm and dry.      Capillary Refill: Capillary refill takes less than 2 seconds.      Coloration: Skin is pale. Skin is not jaundiced.   Neurological:      General: No focal deficit present.      Mental Status: She is alert and oriented to person, place, and time. Mental status is at  baseline.      Cranial Nerves: No cranial nerve deficit.      Coordination: Coordination normal.   Psychiatric:         Mood and Affect: Mood normal.         Behavior: Behavior normal.         Thought Content: Thought content normal.       Results Review     I reviewed the patient's new clinical results.  Results from last 7 days   Lab Units 10/21/24  0317 10/19/24  0339 10/18/24  1846   WBC 10*3/mm3 4.97 5.89 7.82   HEMOGLOBIN g/dL 7.5* 7.3* 8.0*   PLATELETS 10*3/mm3 141 151 176     Results from last 7 days   Lab Units 10/21/24  0317 10/20/24  0856 10/19/24  0339 10/18/24  1846   SODIUM mmol/L 136 138 138 140   POTASSIUM mmol/L 4.1 4.4 4.5 5.0   CHLORIDE mmol/L 100 103 105 106   CO2 mmol/L 27.0 26.0 24.7 24.1   BUN mg/dL 39* 39* 38* 40*   CREATININE mg/dL 2.41* 2.42* 2.29* 2.37*   GLUCOSE mg/dL 112* 106* 110* 119*   EGFR mL/min/1.73 19.4* 19.3* 20.6* 19.8*     Results from last 7 days   Lab Units 10/18/24  1846   ALBUMIN g/dL 3.2*   BILIRUBIN mg/dL 0.9   ALK PHOS U/L 265*   AST (SGOT) U/L 16   ALT (SGPT) U/L 13     Results from last 7 days   Lab Units 10/21/24  0317 10/20/24  0856 10/19/24  0339 10/18/24  1846   CALCIUM mg/dL 8.5* 8.6 8.8 9.0   ALBUMIN g/dL  --   --   --  3.2*   MAGNESIUM mg/dL 1.9  --   --   --    PHOSPHORUS mg/dL 3.1  --   --   --        Glucose   Date/Time Value Ref Range Status   10/21/2024 1222 135 (H) 70 - 130 mg/dL Final   10/21/2024 0730 121 70 - 130 mg/dL Final   10/20/2024 2050 142 (H) 70 - 130 mg/dL Final   10/20/2024 1618 152 (H) 70 - 130 mg/dL Final   10/20/2024 1124 113 70 - 130 mg/dL Final   10/20/2024 0746 114 70 - 130 mg/dL Final   10/19/2024 2110 134 (H) 70 - 130 mg/dL Final       No radiology results for the last day    I have personally reviewed all medications:  Scheduled Medications  apixaban, 2.5 mg, Oral, BID  atorvastatin, 40 mg, Oral, Daily  carvedilol, 12.5 mg, Oral, BID With Meals  [START ON 10/22/2024] furosemide, 40 mg, Oral, BID  hydrALAZINE, 10 mg, Oral, Q8H  insulin  lispro, 2-7 Units, Subcutaneous, 4x Daily AC & at Bedtime  isosorbide dinitrate, 10 mg, Oral, TID - Nitrates  venlafaxine XR, 75 mg, Oral, Daily    Infusions   Diet  Diet: Cardiac; Healthy Heart (2-3 Na+); Fluid Consistency: Thin (IDDSI 0)    I have personally reviewed:  [x]  Laboratory   []  Microbiology   []  Radiology   [x]  EKG/Telemetry  []  Cardiology/Vascular   []  Pathology    [x]  Records       Assessment/Plan     Active Hospital Problems    Diagnosis  POA    **Acute on chronic systolic CHF (congestive heart failure) [I50.23]  Yes    Chronic respiratory failure with hypoxia [J96.11]  Yes    Type 2 diabetes mellitus [E11.9]  Yes    HLD (hyperlipidemia) [E78.5]  Yes    PAF (paroxysmal atrial fibrillation) [I48.0]  Yes    Iron deficiency anemia [D50.9]  Yes    Chronic diastolic congestive heart failure [I50.32]  Yes    Anxiety associated with depression [F41.8]  Yes    Chronic kidney disease, stage IV (severe) [N18.4]  Yes      Resolved Hospital Problems   No resolved problems to display.     85yo woman with RADHA, CKD4, HTN, DM2, PAF, pulm HTN, CHRF, and chronic combined CHF, who presented to ER with worsening SOA and BLE edema. She was admitted with acute on chronic HRrEF.    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  HTN  -BNP 21,000 on admission, CXR with vascular congestion, bilateral lower extremity edema  -Cardiology and Nephrology following  -IV Lasix changing to oral in AM tomorrow  -cont Coreg, hydralazine, and Isordil per Cardiology  -no ACE/ARB/ARNI/MRA due to CKD     CKD4  -Cr stable at baseline  -Nephrology following     DM2 with hyperglycemia  -A1c 6.5 in June, not on meds PTA  -Sugars acceptable here  -continue SSI and monitor for hypoglycemia     PAF  -RC: Coreg  -AC: Eliquis  -HRs acceptable     HLD  -Statin    Anemia NOS  -no bleeding evident here  -check anemia labs in AM  -continue to monitor Hgb and transfuse if <7    Eliquis (home med) for DVT prophylaxis.  Full  code.  Discussed with patient, nursing staff, CCP, and care team on multidisciplinary rounds.  Anticipate discharge  back to SNF  in 1-2 days.  Expected Discharge Date: 10/24/2024; Expected Discharge Time:       Trevor Ely MD  [facility]  10/21/24  15:33 EDT

## 2024-10-21 NOTE — PLAN OF CARE
Goal Outcome Evaluation:              Outcome Evaluation: VSS. O2 SAT STABLE ON 1 L/M N/C.          Diuretic in Use: IV LASIX  Response to Diuretics (Output greater than intake):  YES  Daily Weight (up or down): UP  O2 Requirements: 1 L/M N/C  Functional Status (Activity level, tolerance and respiratory symptoms): UP CHAIR  Discharge Plans:  TBD

## 2024-10-21 NOTE — THERAPY EVALUATION
Patient Name: Lowell Min  : 1940    MRN: [id number]                              Today's Date: 10/21/2024       Admit Date: 10/18/2024    Visit Dx:     ICD-10-CM ICD-9-CM   1. Acute diastolic congestive heart failure  I50.31 428.31     428.0   2. Chronic kidney disease, unspecified CKD stage  N18.9 585.9     Patient Active Problem List   Diagnosis    Chronic kidney disease, stage IV (severe)    Erythropoietin deficiency anemia    Symptomatic anemia    Anxiety associated with depression    Iron deficiency anemia    PAF (paroxysmal atrial fibrillation)    Shortness of breath    Chronic diastolic congestive heart failure    Diabetic polyneuropathy associated with type 2 diabetes mellitus    PERLA (acute kidney injury)    Foot pain, bilateral    Anemia of chronic renal failure    Acute on chronic diastolic CHF (congestive heart failure)    Obesity (BMI 30-39.9)    HLD (hyperlipidemia)    Calcium pyrophosphate deposition disease (CPPD)    Type 2 diabetes mellitus    Acute on chronic systolic CHF (congestive heart failure)    Chronic respiratory failure with hypoxia     Past Medical History:   Diagnosis Date    Anxiety     Atrial fibrillation     CHF (congestive heart failure)     Chronic kidney disease, stage IV (severe)     Depression     Elevated cholesterol     Hypertension     Type 2 diabetes mellitus      Past Surgical History:   Procedure Laterality Date    APPENDECTOMY      CHOLECYSTECTOMY      COLONOSCOPY      CYSTOSCOPY BOTOX INJECTION OF BLADDER N/A     HYSTERECTOMY      TUMOR REMOVAL Left     benign tumor from left breast      General Information       Row Name 10/21/24 1128          OT Time and Intention    Subjective Information complains of;weakness;fatigue;pain  -LE     Document Type evaluation;therapy note (daily note)  -LE     Mode of Treatment individual therapy;occupational therapy  -LE     Patient Effort good  -LE     Symptoms Noted During/After Treatment fatigue  -LE       Row Name 10/21/24  1128          General Information    Patient Profile Reviewed yes  -LE     Prior Level of Function transfer;ADL's;min assist:  reports working with therapy for walking and ADL with assistance.  -LE     Existing Precautions/Restrictions fall  -LE     Barriers to Rehab none identified  -LE       Row Name 10/21/24 1128          Living Environment    People in Home --  alone prior to SNU in 9/2024  -LE       Row Name 10/21/24 1128          Cognition    Orientation Status (Cognition) oriented x 4  -LE       Row Name 10/21/24 1128          Safety Issues/Impairments Affecting Functional Mobility    Safety Issues Affecting Function (Mobility) problem-solving  -LE     Impairments Affecting Function (Mobility) endurance/activity tolerance;strength;pain  -LE     Comment, Safety Issues/Impairments (Mobility) non skid socks and gait belt worn, easily fatigued/weak. pt also c/o being cold and shivers at times. OT provides has pt do ex arm raises to warm up and uses blankets and wraps around shoulders too once in chair.  -LE               User Key  (r) = Recorded By, (t) = Taken By, (c) = Cosigned By      Initials Name Provider Type    Alma Delia Rodriguez OTR Occupational Therapist                     Mobility/ADL's       Row Name 10/21/24 1131          Bed Mobility    Bed Mobility supine-sit;sit-supine;scooting/bridging  -LE     Supine-Sit Traverse (Bed Mobility) standby assist;set up  -LE     Comment, (Bed Mobility) increase effort to move to EOB  -LE       Row Name 10/21/24 1131          Transfers    Transfers sit-stand transfer;stand-sit transfer;bed-chair transfer;toilet transfer  -LE       Row Name 10/21/24 1131          Bed-Chair Transfer    Bed-Chair Traverse (Transfers) contact guard;set up;verbal cues;minimum assist (75% patient effort)  -LE     Comment, (Bed-Chair Transfer) increase time and effort to shift forward to stand. pt reports does not always use a walker but needed walker during OT.  -LE       Row Name  10/21/24 1131          Sit-Stand Transfer    Sit-Stand [place] (Transfers) minimum assist (75% patient effort);set up;verbal cues  -LE       Row Name 10/21/24 1131          Stand-Sit Transfer    Stand-Sit [place] (Transfers) set up;verbal cues;minimum assist (75% patient effort);contact guard  -LE     Assistive Device (Stand-Sit Transfers) walker, front-wheeled  -LE       Row Name 10/21/24 1131          Functional Mobility    Functional Mobility- Ind. Level contact guard assist;set up required  -LE     Functional Mobility- Device walker, front-wheeled  -LE     Functional Mobility- Comment bed to sink and to chair.  slow steps, uses walker.   note weakness in LE and begin to flex at knees.   pushes through walker to use UE to support, s/o L knee pain  -       Row Name 10/21/24 1131          Activities of Daily Living    BADL Assessment/Intervention toileting;feeding;grooming;lower body dressing;upper body dressing;bathing  -       Row Name 10/21/24 1131          Lower Body Dressing Assessment/Training    [place] Level (Lower Body Dressing) don;socks;maximum assist (25% patient effort)  -LE     Position (Lower Body Dressing) edge of bed sitting  -LE     Comment, (Lower Body Dressing) unable to reach L foot to li sock due to swollen/pain L knee.  reports could li sock prior to admit in September but sat on low chair.  -       Row Name 10/21/24 1131          Grooming Assessment/Training    Comment, (Grooming) walk to sink but unable to cont stand at sink to brush teeth.  -       Row Name 10/21/24 1131          Toileting Assessment/Training    [place] Level (Toileting) toileting skills;dependent (less than 25% patient effort)  -KRISTEL     Comment, (Toileting) using purwick.  has walked to BR with nsg for diarrhea.  -KRISTEL               User Key  (r) = Recorded By, (t) = Taken By, (c) = Cosigned By      Initials Name Provider Type    Alma Delia Rodriguez OTR Occupational Therapist                    Obj/Interventions       Row Name 10/21/24 1135          Range of Motion Comprehensive    General Range of Motion bilateral upper extremity ROM WFL  -LE       Row Name 10/21/24 1135          Strength Comprehensive (MMT)    Comment, General Manual Muscle Testing (MMT) Assessment B UE 4/5.  -LE       Row Name 10/21/24 1135          Balance    Balance Assessment sitting static balance;standing static balance;standing dynamic balance  -LE     Static Sitting Balance independent  -LE     Dynamic Sitting Balance standby assist  -LE     Static Standing Balance standby assist  -LE     Dynamic Standing Balance contact guard;standby assist  -LE     Position/Device Used, Standing Balance supported;walker, rolling  -LE     Balance Interventions standing;static;dynamic  -LE     Comment, Balance provided CGA as pt begins to fatigue/weaken with walking.  -LE               User Key  (r) = Recorded By, (t) = Taken By, (c) = Cosigned By      Initials Name Provider Type    Alma Delia Rodriguez OTR Occupational Therapist                   Goals/Plan       Row Name 10/21/24 1140          Transfer Goal 1 (OT)    Activity/Assistive Device (Transfer Goal 1, OT) sit-to-stand/stand-to-sit;bed-to-chair/chair-to-bed;toilet;commode, 3-in-1;walker, rolling  -LE     [place] Level/Cues Needed (Transfer Goal 1, OT) set-up required;standby assist  -LE     Time Frame (Transfer Goal 1, OT) 2 weeks  -LE     Progress/Outcome (Transfer Goal 1, OT) goal ongoing  -       Row Name 10/21/24 1140          Dressing Goal 1 (OT)    Activity/Device (Dressing Goal 1, OT) upper body dressing;lower body dressing;reacher;sock-aid;long-handled shoe horn  -LE     [place]/Cues Needed (Dressing Goal 1, OT) set-up required;minimum assist (75% or more patient effort);verbal cues required  -LE     Time Frame (Dressing Goal 1, OT) 2 weeks  -LE     Progress/Outcome (Dressing Goal 1, OT) goal ongoing  -       Row Name 10/21/24 1140          Toileting Goal 1 (OT)     Activity/Device (Toileting Goal 1, OT) toileting skills, all;commode, 3-in-1;grab bar/safety frame  -LE     [place] Level/Cues Needed (Toileting Goal 1, OT) set-up required;standby assist  -LE     Time Frame (Toileting Goal 1, OT) 2 weeks  -LE     Progress/Outcome (Toileting Goal 1, OT) goal ongoing  -LE       Row Name 10/21/24 1140          Strength Goal 1 (OT)    Strength Goal 1 (OT) Supervision for  B UE exercises to increase sustained reach and hold during ADL and mobility efforts.  -LE     Time Frame (Strength Goal 1, OT) 2 weeks  -LE     Progress/Outcome (Strength Goal 1, OT) goal ongoing  -LE       Row Name 10/21/24 1140          Problem Specific Goal 1 (OT)    Problem Specific Goal 1 (OT) SBA ambulation to/from bathroom with walker.  -LE     Time Frame (Problem Specific Goal 1, OT) 2 weeks  -LE     Progress/Outcome (Problem Specific Goal 1, OT) goal ongoing  -LE       Row Name 10/21/24 1140          Therapy Assessment/Plan (OT)    Planned Therapy Interventions (OT) activity tolerance training;adaptive equipment training;BADL retraining;functional balance retraining;patient/caregiver education/training;strengthening exercise;transfer/mobility retraining  -LE               User Key  (r) = Recorded By, (t) = Taken By, (c) = Cosigned By      Initials Name Provider Type    Alma Delia Rodriguez OTR Occupational Therapist                   Clinical Impression       Row Name 10/21/24 1136          Pain Assessment    Pretreatment Pain Rating 7/10  -LE     Posttreatment Pain Rating 7/10  -LE     Pain Management Interventions activity modification encouraged;exercise or physical activity utilized  -LE     Response to Pain Interventions no change per patient report  -LE     Pre/Posttreatment Pain Comment L knee pain past month, edema (pt reports taking off fluid, unusure if means diuretic or aspiration).   also c/o low back pain.  -LE       Row Name 10/21/24 1136          Plan of Care Review    Plan of Care Reviewed  With patient  -LE     Outcome Evaluation Pt admit from SNU with worsened SOA, B LE edema.  Noted 9/2024 admit for CHF and has been at rehab.  Pt was living alone prior to admit and rehab.  Pt presents in bed, agreeable to moving up to chair. Pt is using purwick due to diuretic but reports has been to bathroom for BM.   OT encourages pt to cont up to bathroom at least sometimes for toileting.   Pt is close SBA/CGA to walk to sink with plan to brush teeth before sitting in chair. Pt c/o L knee pain (present past month), becomes weak and fatigue during walk to sink and had to walk to chair without brushing teeth at sink.   Pt on room air with sats 94% during activity.  Will cont to follow for skilled OT and agree with plan to return to SNU at d/c.  -       Row Name 10/21/24 1136          Therapy Assessment/Plan (OT)    Patient/Family Therapy Goal Statement (OT) Return to prior level of function.  -LE     Rehab Potential (OT) fair  -LE     Criteria for Skilled Therapeutic Interventions Met (OT) meets criteria;yes  -LE     Therapy Frequency (OT) 5 times/wk  -       Row Name 10/21/24 1136          Therapy Plan Review/Discharge Plan (OT)    Equipment Needs Upon Discharge (OT) --  using walker.  recommend 3in1, shower chair. walker. may need AE for LBD.  -LE     Anticipated Discharge Disposition (OT) skilled nursing facility  -LE       Row Name 10/21/24 1136          Vital Signs    Pre SpO2 (%) 94  -LE     O2 Delivery Pre Treatment room air  -LE     O2 Delivery Intra Treatment room air  -LE     Post SpO2 (%) 94  -LE     O2 Delivery Post Treatment room air  -LE     Pre Patient Position Supine  -LE     Intra Patient Position Standing  -LE     Post Patient Position Sitting  -LE       Row Name 10/21/24 1136          Positioning and Restraints    Pre-Treatment Position in bed  -LE     Post Treatment Position chair  -LE     In Chair notified nsg;reclined;call light within reach;encouraged to call for assist;exit alarm on  -LE                User Key  (r) = Recorded By, (t) = Taken By, (c) = Cosigned By      Initials Name Provider Type    Alma Delia Rodriguez OTR Occupational Therapist                   Outcome Measures       Row Name 10/21/24 1141          How much help from another is currently needed...    Putting on and taking off regular lower body clothing? 2  -LE     Bathing (including washing, rinsing, and drying) 2  -LE     Toileting (which includes using toilet bed pan or urinal) 1  -LE     Putting on and taking off regular upper body clothing 3  -LE     Taking care of personal grooming (such as brushing teeth) 3  -LE     Eating meals 4  -LE     AM-PAC 6 Clicks Score (OT) 15  -LE       Row Name 10/21/24 0913          How much help from another person do you currently need...    Turning from your back to your side while in flat bed without using bedrails? 4  -LJ     Moving from lying on back to sitting on the side of a flat bed without bedrails? 3  -LJ     Moving to and from a bed to a chair (including a wheelchair)? 3  -LJ     Standing up from a chair using your arms (e.g., wheelchair, bedside chair)? 3  -LJ     Climbing 3-5 steps with a railing? 3  -LJ     To walk in hospital room? 3  -LJ     AM-PAC 6 Clicks Score (PT) 19  -LJ     Highest Level of Mobility Goal 6 --> Walk 10 steps or more  -       Row Name 10/21/24 1141          Functional Assessment    Outcome Measure Options AM-PAC 6 Clicks Daily Activity (OT)  -LE               User Key  (r) = Recorded By, (t) = Taken By, (c) = Cosigned By      Initials Name Provider Type    Alma Delia Rodriguez OTR Occupational Therapist    Christina Lee, RN Registered Nurse                    Occupational Therapy Education       Title: PT OT SLP Therapies (In Progress)       Topic: Occupational Therapy (In Progress)       Point: ADL training (Done)       Description:   Instruct learner(s) on proper safety adaptation and remediation techniques during self care or transfers.   Instruct in  proper use of assistive devices.                  Learning Progress Summary            Patient Acceptance, E, Bed IU by LE at 10/21/2024 1142    Comment: role of OT, plan of care, benefit of actigity                      Point: Home exercise program (Not Started)       Description:   Instruct learner(s) on appropriate technique for monitoring, assisting and/or progressing therapeutic exercises/activities.                  Learner Progress:  Not documented in this visit.              Point: Precautions (Done)       Description:   Instruct learner(s) on prescribed precautions during self-care and functional transfers.                  Learning Progress Summary            Patient Acceptance, E, Bed IU by KRISTEL at 10/21/2024 1142    Comment: role of OT, plan of care, benefit of actigity                      Point: Body mechanics (Done)       Description:   Instruct learner(s) on proper positioning and spine alignment during self-care, functional mobility activities and/or exercises.                  Learning Progress Summary            Patient Acceptance, E, Bed IU by KRISTEL at 10/21/2024 1142    Comment: role of OT, plan of care, benefit of actigity                                      User Key       Initials Effective Dates Name Provider Type Discipline     06/16/21 -  Alma Delia Solorzano OTR Occupational Therapist OT                  OT Recommendation and Plan  Planned Therapy Interventions (OT): activity tolerance training, adaptive equipment training, BADL retraining, functional balance retraining, patient/caregiver education/training, strengthening exercise, transfer/mobility retraining  Therapy Frequency (OT): 5 times/wk  Plan of Care Review  Plan of Care Reviewed With: patient  Outcome Evaluation: Pt admit from SNU with worsened SOA, B LE edema.  Noted 9/2024 admit for CHF and has been at rehab.  Pt was living alone prior to admit and rehab.  Pt presents in bed, agreeable to moving up to chair. Pt is using purwick due to  diuretic but reports has been to bathroom for BM.   OT encourages pt to cont up to bathroom at least sometimes for toileting.   Pt is close SBA/CGA to walk to sink with plan to brush teeth before sitting in chair. Pt c/o L knee pain (present past month), becomes weak and fatigue during walk to sink and had to walk to chair without brushing teeth at sink.   Pt on room air with sats 94% during activity.  Will cont to follow for skilled OT and agree with plan to return to SNU at d/c.     Time Calculation:   Evaluation Complexity (OT)  Review Occupational Profile/Medical/Therapy History Complexity: expanded/moderate complexity  Assessment, Occupational Performance/Identification of Deficit Complexity: 3-5 performance deficits  Clinical Decision Making Complexity (OT): detailed assessment/moderate complexity  Overall Complexity of Evaluation (OT): moderate complexity     Time Calculation- OT       Row Name 10/21/24 1142             Time Calculation- OT    OT Start Time 1059  -LE      OT Stop Time 1118  -LE      OT Time Calculation (min) 19 min  -LE      Total Timed Code Minutes- OT 8 minute(s)  -LE      OT Received On 10/21/24  -LE      OT - Next Appointment 10/22/24  -LE      OT Goal Re-Cert Due Date 11/04/24  -LE         Timed Charges    51318 - OT Therapeutic Activity Minutes 8  -LE         Total Minutes    Timed Charges Total Minutes 8  -LE       Total Minutes 8  -LE                User Key  (r) = Recorded By, (t) = Taken By, (c) = Cosigned By      Initials Name Provider Type    Alma Delia Rodriguez, OTR Occupational Therapist                  Therapy Charges for Today       Code Description Service Date Service Provider Modifiers Qty    [id number]  OT THERAPEUTIC ACT EA 15 MIN 10/21/2024 Alma Delia Solorzano OTCLOVER GO 1    [id number]  OT EVAL MOD COMPLEXITY 2 10/21/2024 Alma Delia Solorzano OTR GO 1                 VIOLETTA Solorzano  10/21/2024

## 2024-10-21 NOTE — PLAN OF CARE
Goal Outcome Evaluation:  Plan of Care Reviewed With: patient           Outcome Evaluation: Pt admit from SNU with worsened SOA, B LE edema.  Noted 9/2024 admit for CHF and has been at rehab.  Pt was living alone prior to admit and rehab.  Pt presents in bed, agreeable to moving up to chair. Pt is using purwick due to diuretic but reports has been to bathroom for BM.   OT encourages pt to cont up to bathroom at least sometimes for toileting.   Pt is close SBA/CGA to walk to sink with plan to brush teeth before sitting in chair. Pt c/o L knee pain (present past month), becomes weak and fatigue during walk to sink and had to walk to chair without brushing teeth at sink.   Pt on room air with sats 94% during activity.  Will cont to follow for skilled OT and agree with plan to return to SNU at d/c.    Anticipated Discharge Disposition (OT): skilled nursing facility

## 2024-10-21 NOTE — PLAN OF CARE
Goal Outcome Evaluation:  Plan of Care Reviewed With: patient        Progress: improving  Outcome Evaluation: Pt was seen for PT tx this PM. Pt was UIC and stood w/ CGA. Pt then amb approx 35' w/ slow antalgic gait req CGA and use of fww. Pt rested in chair then requested to get to BR toilet. Pt amb to / from BR toilet w/ CGA and use of fww. Stood at toilet for hygiene req SBA and use of fww. Pt was back in chair at end of session. Sats remained above 90% on RA during session.    Anticipated Discharge Disposition (PT): skilled nursing facility

## 2024-10-21 NOTE — DISCHARGE PLACEMENT REQUEST
"Becca Min (84 y.o. Female)       Date of Birth   1940    Social Security Number       Address   [address] [facility] [address]    Home Phone   [phone]    MRN   [id number]       [facility]    Marital Status   Single                            Admission Date   10/18/24    Admission Type   Emergency    Admitting Provider   Pillo Yu MD    Attending Provider   Trevor Ely MD    Department, Room/Bed   [address], [address]       Discharge Date       Discharge Disposition       Discharge Destination                                 Attending Provider: Trevor Ely MD    Allergies: Ibuprofen    Isolation: Contact   Infection: MRSA/History Only (04/03/22), Lala Auris (rule out) (10/21/24)   Code Status: CPR    Ht: 170.2 cm (67\")   Wt: 95.5 kg (210 lb 8.6 oz)    Admission Cmt: None   Principal Problem: Acute CHF [I50.9]                   Active Insurance as of 10/18/2024       Primary Coverage       Payor Plan Insurance Group Employer/Plan Group    MEDICARE MEDICARE A & B        Payor Plan Address Payor Plan Phone Number Payor Plan Fax Number Effective Dates    [address] [phone]  1/1/2005 - None Entered    [facility] [id number]         Subscriber Name Subscriber Birth Date Member ID       BECCA MIN 1940 [id number]               Secondary Coverage       Payor Plan Insurance Group Employer/Plan Group    [facility] SUPP [id number]       Payor Plan Address Payor Plan Phone Number Payor Plan Fax Number Effective Dates    [address]   12/1/2016 - None Entered    [facility] [id number]         Subscriber Name Subscriber Birth Date Member ID       BECCA MIN 1940 [id number]                     Emergency Contacts        (Rel.) Home Phone Work Phone Mobile Phone    ANATANABELA (Daughter) [phone] -- [phone]    MAXX MIN (Son) -- -- [phone]                "

## 2024-10-21 NOTE — PLAN OF CARE
Goal Outcome Evaluation: pt continued to be monitored for acute CHF. Pt has denied shortness of breath and is currently on room air with saturation in the high 90s. Pt has been a-fib on the monitor; eliquis given. Pt has been up in the chair for most of the day. Pt has had nausea for most of the shift and was given 2 doses of zofran. IV lasix given, will be switching to po. Pt hemoglobin 7.5. pt has pure wick on with care maintained. Candida auris swab pending, contact precautions started and maintained with PPE available outside the room. Pt glucose monitored and insulin given according to sliding scale. Pt has been in good spirits and has visited with family. Pt will plan to discharge to rehab in 1-2 days. Pt has incentive spirometer at bedside and has tolerated it well. Pt is A/O x4, is agreeable to the plan of care and verbalizes understanding.       Problem: Adult Inpatient Plan of Care  Goal: Plan of Care Review  Outcome: Progressing  Goal: Patient-Specific Goal (Individualized)  Outcome: Progressing  Goal: Absence of Hospital-Acquired Illness or Injury  Outcome: Progressing  Intervention: Identify and Manage Fall Risk  Recent Flowsheet Documentation  Taken 10/21/2024 1724 by Christina Rosario RN  Safety Promotion/Fall Prevention: activity supervised  Taken 10/21/2024 1400 by Christina Rosario RN  Safety Promotion/Fall Prevention: activity supervised  Taken 10/21/2024 1228 by Christina Rosario RN  Safety Promotion/Fall Prevention: activity supervised  Taken 10/21/2024 1000 by Christina Rosario RN  Safety Promotion/Fall Prevention:   activity supervised   assistive device/personal items within reach  Taken 10/21/2024 0913 by Christina Rosario RN  Safety Promotion/Fall Prevention:   room organization consistent   safety round/check completed   activity supervised  Taken 10/21/2024 0730 by Christina Rosario RN  Safety Promotion/Fall Prevention: activity supervised  Intervention: Prevent Skin Injury  Recent  Flowsheet Documentation  Taken 10/21/2024 1724 by Christina Rosario RN  Body Position: position changed independently  Taken 10/21/2024 1400 by Christina Rosario RN  Body Position: position changed independently  Taken 10/21/2024 1228 by Christina Rosario RN  Body Position: position changed independently  Taken 10/21/2024 1000 by Christina Rosario RN  Body Position: position changed independently  Taken 10/21/2024 0913 by Christina Rosario RN  Body Position: position changed independently  Skin Protection: incontinence pads utilized  Taken 10/21/2024 0730 by Christina Rosario RN  Body Position: position changed independently  Intervention: Prevent Infection  Recent Flowsheet Documentation  Taken 10/21/2024 1724 by Christina Rosario RN  Infection Prevention: single patient room provided  Taken 10/21/2024 1400 by Christina Rosario RN  Infection Prevention: single patient room provided  Taken 10/21/2024 1228 by Christina Rosario RN  Infection Prevention: single patient room provided  Taken 10/21/2024 1000 by Christina Rosario RN  Infection Prevention: single patient room provided  Taken 10/21/2024 0913 by Christina Rosario RN  Infection Prevention: single patient room provided  Taken 10/21/2024 0730 by Christina Rosario RN  Infection Prevention: single patient room provided  Goal: Optimal Comfort and Wellbeing  Outcome: Progressing  Intervention: Monitor Pain and Promote Comfort  Recent Flowsheet Documentation  Taken 10/21/2024 0913 by Christina Rosario RN  Pain Management Interventions: care clustered  Intervention: Provide Person-Centered Care  Recent Flowsheet Documentation  Taken 10/21/2024 1400 by Christina Rosario RN  Trust Relationship/Rapport:   care explained   choices provided  Taken 10/21/2024 0913 by Christina Rosario RN  Trust Relationship/Rapport:   care explained   choices provided  Goal: Readiness for Transition of Care  Outcome: Progressing     Problem: Comorbidity Management  Goal: Maintenance of  Heart Failure Symptom Control  Outcome: Progressing  Intervention: Maintain Heart Failure Management  Recent Flowsheet Documentation  Taken 10/21/2024 1724 by Christina Rosario RN  Medication Review/Management: medications reviewed  Taken 10/21/2024 1400 by Christina Rosario RN  Medication Review/Management: medications reviewed  Taken 10/21/2024 1228 by Christina Rosario RN  Medication Review/Management: medications reviewed  Taken 10/21/2024 1000 by Christina Rosario RN  Medication Review/Management: medications reviewed  Taken 10/21/2024 0913 by Christina Rosario RN  Medication Review/Management: medications reviewed  Taken 10/21/2024 0730 by Christina Rosario RN  Medication Review/Management: medications reviewed     Problem: Heart Failure  Goal: Optimal Coping  Outcome: Progressing  Goal: Optimal Cardiac Output and Blood Flow  Outcome: Progressing  Goal: Stable Heart Rate and Rhythm  Outcome: Progressing  Goal: Fluid and Electrolyte Balance  Outcome: Progressing  Goal: Optimal Functional Ability  Outcome: Progressing  Intervention: Optimize Functional Ability  Recent Flowsheet Documentation  Taken 10/21/2024 1724 by Christina Rosario RN  Activity Management: up in chair  Taken 10/21/2024 1400 by Christina Rosario RN  Activity Management: up in chair  Taken 10/21/2024 1228 by Christina Rosario RN  Activity Management: activity encouraged  Taken 10/21/2024 1000 by Christina Rosario RN  Activity Management: activity encouraged  Taken 10/21/2024 0913 by Christina Rosario RN  Activity Management: activity encouraged  Taken 10/21/2024 0730 by Christina Rosario RN  Activity Management: activity encouraged  Goal: Improved Oral Intake  Outcome: Progressing  Goal: Effective Oxygenation and Ventilation  Outcome: Progressing  Intervention: Promote Airway Secretion Clearance  Recent Flowsheet Documentation  Taken 10/21/2024 1724 by Christina Rosario RN  Activity Management: up in chair  Taken 10/21/2024 1400 by Abraham  MULU Vasquez  Activity Management: up in chair  Cough And Deep Breathing: done independently per patient  Taken 10/21/2024 1228 by Christina Rosario RN  Activity Management: activity encouraged  Taken 10/21/2024 1000 by Christina Rosario RN  Activity Management: activity encouraged  Taken 10/21/2024 0913 by Christina Rosario RN  Activity Management: activity encouraged  Cough And Deep Breathing: done independently per patient  Taken 10/21/2024 0730 by Christina Rosario RN  Activity Management: activity encouraged  Intervention: Optimize Oxygenation and Ventilation  Recent Flowsheet Documentation  Taken 10/21/2024 1724 by Christina Rosario RN  Head of Bed (HOB) Positioning: (chair) --  Taken 10/21/2024 1400 by Christina Rosario RN  Head of Bed (HOB) Positioning: (chair) --  Taken 10/21/2024 1228 by Christina Rosario RN  Head of Bed (HOB) Positioning: (chair\) --  Taken 10/21/2024 1000 by Christina Rosaroi RN  Head of Bed (HOB) Positioning: HOB elevated  Taken 10/21/2024 0913 by Christina Rosario RN  Head of Bed (HOB) Positioning: HOB elevated  Taken 10/21/2024 0730 by Christina Rosario RN  Head of Bed (HOB) Positioning: HOB elevated  Goal: Effective Breathing Pattern During Sleep  Outcome: Progressing  Intervention: Monitor and Manage Obstructive Sleep Apnea  Recent Flowsheet Documentation  Taken 10/21/2024 1724 by Christina Rosario RN  Medication Review/Management: medications reviewed  Taken 10/21/2024 1400 by Christina Rosario RN  Medication Review/Management: medications reviewed  Taken 10/21/2024 1228 by Christina Rosario RN  Medication Review/Management: medications reviewed  Taken 10/21/2024 1000 by Christina Rosario, RN  Medication Review/Management: medications reviewed  Taken 10/21/2024 0913 by Christina Rosario RN  Medication Review/Management: medications reviewed  Taken 10/21/2024 0730 by Christina Rosario RN  Medication Review/Management: medications reviewed     Problem: Fall Injury Risk  Goal: Absence  of Fall and Fall-Related Injury  Outcome: Progressing  Intervention: Identify and Manage Contributors  Recent Flowsheet Documentation  Taken 10/21/2024 1724 by Christina Rosario, RN  Medication Review/Management: medications reviewed  Taken 10/21/2024 1400 by Christina Rosario RN  Medication Review/Management: medications reviewed  Taken 10/21/2024 1228 by Christina Rosario RN  Medication Review/Management: medications reviewed  Taken 10/21/2024 1000 by Christina Rosario RN  Medication Review/Management: medications reviewed  Taken 10/21/2024 0913 by Christina Rosario RN  Medication Review/Management: medications reviewed  Taken 10/21/2024 0730 by Christina Rosario RN  Medication Review/Management: medications reviewed  Intervention: Promote Injury-Free Environment  Recent Flowsheet Documentation  Taken 10/21/2024 1724 by Christina Rosario RN  Safety Promotion/Fall Prevention: activity supervised  Taken 10/21/2024 1400 by Christina Rosario RN  Safety Promotion/Fall Prevention: activity supervised  Taken 10/21/2024 1228 by Christina Rosario RN  Safety Promotion/Fall Prevention: activity supervised  Taken 10/21/2024 1000 by Christina Rosario RN  Safety Promotion/Fall Prevention:   activity supervised   assistive device/personal items within reach  Taken 10/21/2024 0913 by Christina Rosario RN  Safety Promotion/Fall Prevention:   room organization consistent   safety round/check completed   activity supervised  Taken 10/21/2024 0730 by Christina Rosario RN  Safety Promotion/Fall Prevention: activity supervised

## 2024-10-21 NOTE — THERAPY TREATMENT NOTE
Patient Name: Lowell Min  : 1940    MRN: [id number]                              Today's Date: 10/21/2024       Admit Date: 10/18/2024    Visit Dx:     ICD-10-CM ICD-9-CM   1. Acute diastolic congestive heart failure  I50.31 428.31     428.0   2. Chronic kidney disease, unspecified CKD stage  N18.9 585.9     Patient Active Problem List   Diagnosis    Chronic kidney disease, stage IV (severe)    Erythropoietin deficiency anemia    Symptomatic anemia    Anxiety associated with depression    Iron deficiency anemia    PAF (paroxysmal atrial fibrillation)    Shortness of breath    Chronic diastolic congestive heart failure    Diabetic polyneuropathy associated with type 2 diabetes mellitus    PERLA (acute kidney injury)    Foot pain, bilateral    Anemia of chronic renal failure    Acute on chronic diastolic CHF (congestive heart failure)    Obesity (BMI 30-39.9)    HLD (hyperlipidemia)    Calcium pyrophosphate deposition disease (CPPD)    Type 2 diabetes mellitus    Acute on chronic systolic CHF (congestive heart failure)    Chronic respiratory failure with hypoxia     Past Medical History:   Diagnosis Date    Anxiety     Atrial fibrillation     CHF (congestive heart failure)     Chronic kidney disease, stage IV (severe)     Depression     Elevated cholesterol     Hypertension     Type 2 diabetes mellitus      Past Surgical History:   Procedure Laterality Date    APPENDECTOMY      CHOLECYSTECTOMY      COLONOSCOPY      CYSTOSCOPY BOTOX INJECTION OF BLADDER N/A     HYSTERECTOMY      TUMOR REMOVAL Left     benign tumor from left breast      General Information       Row Name 10/21/24 1530          Physical Therapy Time and Intention    Document Type therapy note (daily note)  -PH     Mode of Treatment physical therapy  -PH       Row Name 10/21/24 1530          General Information    Existing Precautions/Restrictions fall  -PH       Row Name 10/21/24 1531          Cognition    Orientation Status (Cognition) oriented  x 4  -PH       Row Name 10/21/24 1530          Safety Issues/Impairments Affecting Functional Mobility    Impairments Affecting Function (Mobility) endurance/activity tolerance;strength;balance  -PH     Comment, Safety Issues/Impairments (Mobility) gt belt and non skid socks donned; R knee pain limiting  -PH               User Key  (r) = Recorded By, (t) = Taken By, (c) = Cosigned By      Initials Name Provider Type    PH Kiara Navarro PTA Physical Therapist Assistant                   Mobility       Row Name 10/21/24 1532          Bed Mobility    Comment, (Bed Mobility) UIC and returned to chair after gait.  -PH       Row Name 10/21/24 1532          Sit-Stand Transfer    Sit-Stand [place] (Transfers) contact guard  -PH     Assistive Device (Sit-Stand Transfers) walker, front-wheeled  -PH     Comment, (Sit-Stand Transfer) sit to stand 3x; from recliner 2x, from low toilet 1x  -PH       Row Name 10/21/24 1532          Gait/Stairs (Locomotion)    [place] Level (Gait) contact guard  -PH     Assistive Device (Gait) walker, front-wheeled  -PH     Distance in Feet (Gait) 35  35 then to BR and back to chair  -PH     Deviations/Abnormal Patterns (Gait) base of support, wide;guillermo decreased;gait speed decreased;stride length decreased;antalgic  -PH     Bilateral Gait Deviations forward flexed posture  -PH     Right Sided Gait Deviations weight shift ability decreased  -PH     [place] Level (Stairs) unable to assess  -PH     Comment, (Gait/Stairs) pt amb 35' then sat in chair for rest; pt reported need for BM and amb to toilet then back to chair; pt slow and antalgic w/ no overt LOB  -PH               User Key  (r) = Recorded By, (t) = Taken By, (c) = Cosigned By      Initials Name Provider Type     Kiara Navarro PTA Physical Therapist Assistant                   Obj/Interventions       Row Name 10/21/24 1534          Balance    Balance Assessment sitting static balance;sitting dynamic  balance;standing static balance  -PH     Static Sitting Balance standby assist  -PH     Dynamic Sitting Balance standby assist  -PH     Static Standing Balance contact guard  -PH     Position/Device Used, Standing Balance walker, front-wheeled  -PH               User Key  (r) = Recorded By, (t) = Taken By, (c) = Cosigned By      Initials Name Provider Type    PH Kiara Navarro, PTA Physical Therapist Assistant                   Goals/Plan    No documentation.                  Clinical Impression       Row Name 10/21/24 1535          Pain    Pretreatment Pain Rating 5/10  -PH     Posttreatment Pain Rating 6/10  -PH     Pain Management Interventions diversional activity provided;positioning techniques utilized  -PH     Response to Pain Interventions activity participation with tolerable pain  -PH     Pre/Posttreatment Pain Comment Pt c/o pain in back and neck; pain in knee w/ mobility  -PH       Row Name 10/21/24 1535          Plan of Care Review    Plan of Care Reviewed With patient  -PH     Progress improving  -PH     Outcome Evaluation Pt was seen for PT tx this PM. Pt was UIC and stood w/ CGA. Pt then amb approx 35' w/ slow antalgic gait req CGA and use of fww. Pt rested in chair then requested to get to BR toilet. Pt amb to / from BR toilet w/ CGA and use of fww. Stood at toilet for hygiene req SBA and use of fww. Pt was back in chair at end of session. Sats remained above 90% on RA during session.  -PH       Row Name 10/21/24 1535          Vital Signs    O2 Delivery Pre Treatment room air  -PH     O2 Delivery Intra Treatment room air  -PH     O2 Delivery Post Treatment room air  -PH       Row Name 10/21/24 1535          Positioning and Restraints    Pre-Treatment Position in bed  -PH     Post Treatment Position chair  -PH     In Chair reclined;call light within reach;encouraged to call for assist;exit alarm on;notified nsg  -PH               User Key  (r) = Recorded By, (t) = Taken By, (c) = Cosigned By       Initials Name Provider Type     Kiara Navarro PTA Physical Therapist Assistant                   Outcome Measures       Row Name 10/21/24 1538 10/21/24 1400       How much help from another person do you currently need...    Turning from your back to your side while in flat bed without using bedrails? 4  -PH 4  -LJ    Moving from lying on back to sitting on the side of a flat bed without bedrails? 3  -PH 3  -LJ    Moving to and from a bed to a chair (including a wheelchair)? 3  -PH 3  -LJ    Standing up from a chair using your arms (e.g., wheelchair, bedside chair)? 3  -PH 3  -LJ    Climbing 3-5 steps with a railing? 2  -PH 3  -LJ    To walk in hospital room? 3  -PH 3  -LJ    AM-PAC 6 Clicks Score (PT) 18  -PH 19  -    Highest Level of Mobility Goal 6 --> Walk 10 steps or more  -PH 6 --> Walk 10 steps or more  -      Row Name 10/21/24 0913          How much help from another person do you currently need...    Turning from your back to your side while in flat bed without using bedrails? 4  -LJ     Moving from lying on back to sitting on the side of a flat bed without bedrails? 3  -LJ     Moving to and from a bed to a chair (including a wheelchair)? 3  -LJ     Standing up from a chair using your arms (e.g., wheelchair, bedside chair)? 3  -LJ     Climbing 3-5 steps with a railing? 3  -LJ     To walk in hospital room? 3  -     AM-PAC 6 Clicks Score (PT) 19  -     Highest Level of Mobility Goal 6 --> Walk 10 steps or more  -       Row Name 10/21/24 1538 10/21/24 1141       Functional Assessment    Outcome Measure Options AM-PAC 6 Clicks Basic Mobility (PT)  -PH AM-PAC 6 Clicks Daily Activity (OT)  -LE              User Key  (r) = Recorded By, (t) = Taken By, (c) = Cosigned By      Initials Name Provider Type    Alma Delia Rodriguez OTR Occupational Therapist     Kiara Navarro PTA Physical Therapist Assistant    Christina Lee, RN Registered Nurse                                  Physical Therapy Education       Title: PT OT SLP Therapies (In Progress)       Topic: Physical Therapy (Done)       Point: Mobility training (Done)       Learning Progress Summary            Patient Acceptance, E,TB, VU by  at 10/21/2024 1539    Acceptance, E,TB, VU by LW at 10/20/2024 1422                      Point: Home exercise program (Done)       Learning Progress Summary            Patient Acceptance, E,TB, VU by  at 10/21/2024 1539                      Point: Body mechanics (Done)       Learning Progress Summary            Patient Acceptance, E,TB, VU by  at 10/21/2024 1539    Acceptance, E,TB, VU by LW at 10/20/2024 1422                      Point: Precautions (Done)       Learning Progress Summary            Patient Acceptance, E,TB, VU by  at 10/21/2024 1539    Acceptance, E,TB, VU by LW at 10/20/2024 1422                                      User Key       Initials Effective Dates Name Provider Type Discipline     06/16/21 -  Kiara Navarro PTA Physical Therapist Assistant PT     05/08/23 -  Farida Urbina PT Physical Therapist PT                  PT Recommendation and Plan     Progress: improving  Outcome Evaluation: Pt was seen for PT tx this PM. Pt was UIC and stood w/ CGA. Pt then amb approx 35' w/ slow antalgic gait req CGA and use of fww. Pt rested in chair then requested to get to BR toilet. Pt amb to / from BR toilet w/ CGA and use of fww. Stood at toilet for hygiene req SBA and use of fww. Pt was back in chair at end of session. Sats remained above 90% on RA during session.     Time Calculation:         PT Charges       Row Name 10/21/24 1539             Time Calculation    Start Time 1304  -PH      Stop Time 1335  -PH      Time Calculation (min) 31 min  -PH      PT Received On 10/21/24  -PH      PT - Next Appointment 10/22/24  -PH         Timed Charges    32395 - PT Therapeutic Activity Minutes 31  -PH         Total Minutes    Timed Charges Total Minutes 31  -PH        Total Minutes 31  -PH                User Key  (r) = Recorded By, (t) = Taken By, (c) = Cosigned By      Initials Name Provider Type    Kiara Tapia PTA Physical Therapist Assistant                  Therapy Charges for Today       Code Description Service Date Service Provider Modifiers Qty    [id number]  PT THERAPEUTIC ACT EA 15 MIN 10/21/2024 Kiara Navarro PTA GP 2            PT G-Codes  Outcome Measure Options: AM-PAC 6 Clicks Basic Mobility (PT)  AM-PAC 6 Clicks Score (PT): 18  AM-PAC 6 Clicks Score (OT): 15  PT Discharge Summary  Anticipated Discharge Disposition (PT): skilled nursing facility    Kiara Navarro PTA  10/21/2024

## 2024-10-21 NOTE — CASE MANAGEMENT/SOCIAL WORK
Discharge Planning Assessment  [facility]     Patient Name: Lowell Min  MRN: [id number]  Today's Date: 10/21/2024    Admit Date: 10/18/2024    Plan: Return to SNF, will need ambulance transport   Discharge Needs Assessment       Row Name 10/21/24 1246       Living Environment    People in Home alone    Current Living Arrangements home    Potentially Unsafe Housing Conditions none    In the past 12 months has the electric, gas, oil, or water company threatened to shut off services in your home? No    Primary Care Provided by self    Provides Primary Care For no one    Family Caregiver if Needed child(yashira), adult    Family Caregiver Names Karen Dalton [phone]    Quality of Family Relationships helpful;involved;supportive    Able to Return to Prior Arrangements yes       Resource/Environmental Concerns    Resource/Environmental Concerns none    Transportation Concerns none       Transportation Needs    In the past 12 months, has lack of transportation kept you from medical appointments or from getting medications? no    In the past 12 months, has lack of transportation kept you from meetings, work, or from getting things needed for daily living? No       Food Insecurity    Within the past 12 months, you worried that your food would run out before you got the money to buy more. Never true    Within the past 12 months, the food you bought just didn't last and you didn't have money to get more. Never true       Transition Planning    Patient/Family Anticipates Transition to other (see comments)  SNF    Patient/Family Anticipated Services at Transition skilled nursing    Transportation Anticipated health plan transportation       Discharge Needs Assessment    Readmission Within the Last 30 Days no previous admission in last 30 days    Current Outpatient/Agency/Support Group skilled nursing facility    Equipment Currently Used at Home cane, straight    Concerns to be Addressed no discharge needs  identified;denies needs/concerns at this time    Do you want help finding or keeping work or a job? I do not need or want help    Do you want help with school or training? For example, starting or completing job training or getting a high school diploma, GED or equivalent No    Anticipated Changes Related to Illness none    Equipment Needed After Discharge none                   Discharge Plan       Row Name 10/21/24 8106       Plan    Plan Return to SNF, will need ambulance transport    Patient/Family in Agreement with Plan yes    Provided Post Acute Provider List? N/A    Provided Post Acute Provider Quality & Resource List? N/A    Plan Comments Met with pt at bedside. Introduced self and explained role of . Face sheet verified, PCP is Dannie Melchor. Pt denies any difficulty paying for medications, and she obtains her medications from [facility]. Pt lives alone, but stated that her daughter, Karen, could provide assistance with any care needs that may arise. Pt is somewhat independent in ADL's and she uses a cane for mobility.Pt wears o2 at night, and [facility] is her o2 provider. She has had caretenders in the past, and she is currently receiving skilled services at  [facility]. Pt has requested to return to [facility] at discharge. Referral placed in epic. Contacted Kelly MCDONALD/Lorna and informed of referral. Upon discharge, pt will need ambulance transport. Explained that CCP would follow to assess for discharge needs.                  Continued Care and Services - Admitted Since 10/18/2024       Destination       Service Provider Request Status Services Address Phone Fax Patient Preferred    [facility] CARE Pending - Request Sent -- [address] [id number] [phone] --                  Selected Continued Care - Prior Encounters Includes continued care and service providers with selected services from prior encounters from 7/20/2024 to  10/21/2024      Discharged on 9/14/2024 Admission date: 9/6/2024 - Discharge disposition: Skilled Nursing Facility (DC - External)      Destination       Service Provider Services Address Phone Fax Patient Preferred    [facility] [address] [id number] [phone] --                          Expected Discharge Date and Time       Expected Discharge Date Expected Discharge Time    Oct 24, 2024            Demographic Summary    No documentation.                  Functional Status    No documentation.                  Psychosocial    No documentation.                  Abuse/Neglect    No documentation.                  Legal    No documentation.                  Substance Abuse    No documentation.                  Patient Forms    No documentation.                     Shea Morales, RN

## 2024-10-21 NOTE — PROGRESS NOTES
[facility]  Cardiology Progress Note    Patient Identification:  Name: Lowell Min  Age: 84 y.o.  Sex: female  :  1940  MRN: [id number]                 Follow Up / Chief Complaint: Follow-up for acute on chronic heart failure with exacerbation    Interval History: Improvement shortness of breath, no chest pain.         Objective:    Past Medical History:  Past Medical History:   Diagnosis Date    Anxiety     Atrial fibrillation     CHF (congestive heart failure)     Chronic kidney disease, stage IV (severe)     Depression     Elevated cholesterol     Hypertension     Type 2 diabetes mellitus      Past Surgical History:  Past Surgical History:   Procedure Laterality Date    APPENDECTOMY      CHOLECYSTECTOMY      COLONOSCOPY      CYSTOSCOPY BOTOX INJECTION OF BLADDER N/A     HYSTERECTOMY      TUMOR REMOVAL Left     benign tumor from left breast        Social History:   Social History     Tobacco Use    Smoking status: Never    Smokeless tobacco: Never   Substance Use Topics    Alcohol use: Never      Family History:  History reviewed. No pertinent family history.       Allergies:  Allergies   Allergen Reactions    Ibuprofen Other (See Comments)     Decrease urine output       Scheduled Meds:  apixaban, 2.5 mg, BID  atorvastatin, 40 mg, Daily  carvedilol, 12.5 mg, BID With Meals  furosemide, 60 mg, Q12H  hydrALAZINE, 10 mg, Q8H  insulin lispro, 2-7 Units, 4x Daily AC & at Bedtime  isosorbide dinitrate, 10 mg, TID - Nitrates  sodium bicarbonate, 650 mg, TID  venlafaxine XR, 75 mg, Daily            INTAKE AND OUTPUT:    Intake/Output Summary (Last 24 hours) at 10/21/2024 1040  Last data filed at 10/21/2024 0913  Gross per 24 hour   Intake 1180 ml   Output 2900 ml   Net -1720 ml       Review of Systems:   GI: No nausea or vomiting  Cardiac: No chest pain  Pulmonary: Improvement with shortness of breath, no cough    Constitutional:  Temp:  [97.3 °F (36.3 °C)-97.9 °F (36.6 °C)] 97.7 °F (36.5  °C)  Heart Rate:  [55-68] 68  Resp:  [18] 18  BP: ()/(60-85) 126/66    Physical Exam:  General:  Appears in no acute distress, resting in bed  Eyes: eom normal no conjunctival drainage  HEENT:  No JVD. Thyroid not visibly enlarged. No mucosal pallor or cyanosis  Respiratory: Respirations regular and unlabored at rest. BBS with good air entry in all fields. +crackles, No rubs or wheezes auscultated  Cardiovascular: S1S2 Regular rate and rhythm. No murmur, rub or gallop. No carotid bruits. DP/PT pulses     . 1-2 + edema  Gastrointestinal: Abdomen soft, flat, non tender. Bowel sounds present. No hepatosplenomegaly. No ascites  Skin:   Skin warm and dry to touch. No rashes    Neuro: AAO x3 CN II-XII grossly intact  Psych: Mood and affect normal, pleasant and cooperative          I reviewed the patient's new clinical results, and personally reviewed and interpreted the patient's ECG and telemetry data from the last 24 hours        Cardiographics      ECG:     Echocardiogram:     Interpretation Summary         Left ventricular systolic function is mildly decreased. Calculated left ventricular EF = 44.1%    The following left ventricular wall segments are hypokinetic: mid anterior, apical anterior, basal anterolateral, mid anterolateral, apical lateral, basal inferolateral, mid inferolateral, apical inferior, mid inferior, apical septal, basal inferoseptal, mid inferoseptal, apex hypokinetic, mid anteroseptal, basal anterior, basal inferior and basal inferoseptal.    Left ventricular diastolic function was indeterminate.    The left atrial cavity is severely dilated.    Mild aortic valve stenosis is present. Aortic valve area is 1.2 cm2.    The right atrial cavity is severely  dilated.    Peak velocity of the flow distal to the aortic valve is 246.1 cm/s. Aortic valve mean pressure gradient is 11 mmHg.    Severe mitral valve regurgitation is present.    Mild mitral valve stenosis is present. The mitral valve mean  gradient is 4 mmHg.    Severe tricuspid valve regurgitation is present.    Estimated right ventricular systolic pressure from tricuspid regurgitation is markedly elevated (>55 mmHg). Calculated right ventricular systolic pressure from tricuspid regurgitation is 62 mmHg.     Lab Review   Results from last 7 days   Lab Units 10/18/24  1846   HSTROP T ng/L 47*     Results from last 7 days   Lab Units 10/21/24  0317   MAGNESIUM mg/dL 1.9     Results from last 7 days   Lab Units 10/21/24  0317   SODIUM mmol/L 136   POTASSIUM mmol/L 4.1   BUN mg/dL 39*   CREATININE mg/dL 2.41*   CALCIUM mg/dL 8.5*     @[id number]@  Results from last 7 days   Lab Units 10/21/24  0317 10/19/24  0339 10/18/24  1846   WBC 10*3/mm3 4.97 5.89 7.82   HEMOGLOBIN g/dL 7.5* 7.3* 8.0*   HEMATOCRIT % 21.5* 22.5* 25.4*   PLATELETS 10*3/mm3 141 151 176         Intake/Output Summary (Last 24 hours) at 10/21/2024 1045  Last data filed at 10/21/2024 0913  Gross per 24 hour   Intake 1180 ml   Output 3100 ml   Net -1920 ml           Assessment:  1.  Acute on chronic heart failure exacerbation probably due to valvular dysfunction  2.  Severe MR  3.  Permanent A-fib  4.  CKD: creatinine stable. nephrology following  5.  Diabetes mellitus  6.  Hypertension: Stable  7.  Hyperlipidemia: ALT/AST stable.  On statin  9.  NELLY        Plan:  1.  Acute on chronic heart failure exacerbation  a.  HFrEF with EF 40 to 45%.   b.  Creatinine stable. Continue IV Lasix per nephrology, daily weights, strict VINCENT's, carvedilol.  No ACE/ARB/Arni/MRA due to CKD.  10/20/2024 amlodipine was stopped and switched to hydralazine/Isordil.  Blood pressure tolerating.    2.  Severe MR:   a.  Seen on echocardiogram on 9/7/2024.   b. She has declined further testing in the past and wants to continue to use medications to treat severe MR.     3. Pulmonary hypertension: likely due to severe MR.     4.  Permanent A-fib  a.  Rate controlled.  Continue BB and A/C    5.  Hypertension:  "Stable   a.  Continue beta-blockade, hydralazine, isosorbide.    )10/21/2024  RAMAKRISHNA Flannery/Transcription:   \"Dictated utilizing Dragon dictation\".     "

## 2024-10-21 NOTE — PROGRESS NOTES
"   LOS: 3 days     Chief Complaint/ Reason for encounter: CHF exacerbation, CKD    Subjective   10/21/24 : Patient is doing well today with no new complaints  Good appetite with no nausea or vomiting  No shortness of breath chest pain or edema  Voiding well with no dysuria  She is feeling better with diuresis and her weight is down 5 pounds since admission  No edema, not requiring any oxygen supplementally        Medical history reviewed:  History of Present Illness    Subjective    History taken from: Patient and chart    Vital Signs  Temp:  [97.3 °F (36.3 °C)-97.9 °F (36.6 °C)] 97.7 °F (36.5 °C)  Heart Rate:  [55-68] 68  Resp:  [18] 18  BP: ()/(60-85) 126/66       Wt Readings from Last 1 Encounters:   10/21/24 0439 95.5 kg (210 lb 8.6 oz)   10/20/24 0451 94.4 kg (208 lb 1.8 oz)   10/19/24 0700 96 kg (211 lb 10.3 oz)   10/18/24 2214 97 kg (213 lb 13.5 oz)   10/18/24 2142 97.5 kg (215 lb)       Objective:  Vital signs: (most recent): Blood pressure 126/66, pulse 68, temperature 97.7 °F (36.5 °C), temperature source Oral, resp. rate 18, height 170.2 cm (67\"), weight 95.5 kg (210 lb 8.6 oz), SpO2 97%.                Objective:  General Appearance:  Comfortable, well-appearing, in no acute distress and not in pain.  Awake, alert  HEENT: Mucous membranes moist, no injury, oropharynx clear  Lungs:  Normal effort and normal respiratory rate.  Breath sounds clear to auscultation.  No  respiratory distress.  No rales, decreased breath sounds or rhonchi.    Heart: Normal rate.  Regular rhythm.  S1, S2 normal.  No murmur.   Abdomen: Abdomen is soft.  Bowel sounds are normal, no abdominal tenderness.  There is no rebound or guarding  Extremities: No edema of bilateral lower extremities  Skin:  Warm and dry with no rashes      Results Review:    Intake/Output:     Intake/Output Summary (Last 24 hours) at 10/21/2024 1217  Last data filed at 10/21/2024 0913  Gross per 24 hour   Intake 1180 ml   Output 3100 ml   Net -1920 ml "         DATA:  Radiology and Labs:  The following labs independently reviewed by me. Additional labs ordered for tomorrow a.m.  Interval notes, chart personally reviewed by me.   Old records independently reviewed showing progressive CKD, now stage IV  The following radiologic studies independently viewed by me, findings initial chest x-ray with cardiomegaly vascular congestion no effusions  New problems include CKD stage IV with progression  Discussed with patient herself at bedside    Risk/ complexity of medical care/ medical decision making moderate complexity, diuretic management    Labs:   Recent Results (from the past 24 hours)   POC Glucose Once    Collection Time: 10/20/24  4:18 PM    Specimen: Blood   Result Value Ref Range    Glucose 152 (H) 70 - 130 mg/dL   POC Glucose Once    Collection Time: 10/20/24  8:50 PM    Specimen: Blood   Result Value Ref Range    Glucose 142 (H) 70 - 130 mg/dL   Basic Metabolic Panel    Collection Time: 10/21/24  3:17 AM    Specimen: Blood   Result Value Ref Range    Glucose 112 (H) 65 - 99 mg/dL    BUN 39 (H) 8 - 23 mg/dL    Creatinine 2.41 (H) 0.57 - 1.00 mg/dL    Sodium 136 136 - 145 mmol/L    Potassium 4.1 3.5 - 5.2 mmol/L    Chloride 100 98 - 107 mmol/L    CO2 27.0 22.0 - 29.0 mmol/L    Calcium 8.5 (L) 8.6 - 10.5 mg/dL    BUN/Creatinine Ratio 16.2 7.0 - 25.0    Anion Gap 9.0 5.0 - 15.0 mmol/L    eGFR 19.4 (L) >60.0 mL/min/1.73   CBC (No Diff)    Collection Time: 10/21/24  3:17 AM    Specimen: Blood   Result Value Ref Range    WBC 4.97 3.40 - 10.80 10*3/mm3    RBC 2.37 (L) 3.77 - 5.28 10*6/mm3    Hemoglobin 7.5 (L) 12.0 - 15.9 g/dL    Hematocrit 21.5 (L) 34.0 - 46.6 %    MCV 90.7 79.0 - 97.0 fL    MCH 31.6 26.6 - 33.0 pg    MCHC 34.9 31.5 - 35.7 g/dL    RDW 15.8 (H) 12.3 - 15.4 %    RDW-SD 51.2 37.0 - 54.0 fl    MPV 10.0 6.0 - 12.0 fL    Platelets 141 140 - 450 10*3/mm3   Magnesium    Collection Time: 10/21/24  3:17 AM    Specimen: Blood   Result Value Ref Range     Magnesium 1.9 1.6 - 2.4 mg/dL   Phosphorus    Collection Time: 10/21/24  3:17 AM    Specimen: Blood   Result Value Ref Range    Phosphorus 3.1 2.5 - 4.5 mg/dL   POC Glucose Once    Collection Time: 10/21/24  7:30 AM    Specimen: Blood   Result Value Ref Range    Glucose 121 70 - 130 mg/dL       Radiology:  Pertinent radiology studies were reviewed as described above      Medications have been reviewed separately in chart overview      ASSESSMENT:  CKD stage IV with some progression, stable, current creatinine levels may be near baseline  Volume overload, improved.  Euvolemic on exam  Dyspnea, improved  Anemia of CKD  CHF, acute on chronic systolic, improved with diuresis      Plan:  Renal function remains stable  She is on IV Lasix but she looks euvolemic on exam and is on room air  Will transition to oral Lasix today  On hydralazine/Isordil combo per cardiology.  Echo results noted, EF 40 to 45%  Continue strict I's and O's, daily weights, low-sodium diet with modest fluid intake  Discharge planning: Okay from renal standpoint anytime      Isaiah Foster MD  [facility]   Office phone number: [phone]  Answering service phone number: [phone]    10/21/24  12:17 EDT    Dictation performed using Dragon dictation software

## 2024-10-22 LAB
ALBUMIN SERPL-MCNC: 3 G/DL (ref 3.5–5.2)
ANION GAP SERPL CALCULATED.3IONS-SCNC: 12.1 MMOL/L (ref 5–15)
BUN SERPL-MCNC: 39 MG/DL (ref 8–23)
BUN/CREAT SERPL: 14.2 (ref 7–25)
CALCIUM SPEC-SCNC: 8.8 MG/DL (ref 8.6–10.5)
CHLORIDE SERPL-SCNC: 97 MMOL/L (ref 98–107)
CO2 SERPL-SCNC: 24.9 MMOL/L (ref 22–29)
CREAT SERPL-MCNC: 2.75 MG/DL (ref 0.57–1)
DEPRECATED RDW RBC AUTO: 52.8 FL (ref 37–54)
EGFRCR SERPLBLD CKD-EPI 2021: 16.5 ML/MIN/1.73
ERYTHROCYTE [DISTWIDTH] IN BLOOD BY AUTOMATED COUNT: 15.3 % (ref 12.3–15.4)
FERRITIN SERPL-MCNC: 430 NG/ML (ref 13–150)
FOLATE SERPL-MCNC: 5.43 NG/ML (ref 4.78–24.2)
GLUCOSE BLDC GLUCOMTR-MCNC: 108 MG/DL (ref 70–130)
GLUCOSE BLDC GLUCOMTR-MCNC: 110 MG/DL (ref 70–130)
GLUCOSE BLDC GLUCOMTR-MCNC: 163 MG/DL (ref 70–130)
GLUCOSE BLDC GLUCOMTR-MCNC: 169 MG/DL (ref 70–130)
GLUCOSE SERPL-MCNC: 100 MG/DL (ref 65–99)
HCT VFR BLD AUTO: 23.1 % (ref 34–46.6)
HGB BLD-MCNC: 7.5 G/DL (ref 12–15.9)
IRON 24H UR-MRATE: 36 MCG/DL (ref 37–145)
IRON SATN MFR SERPL: 13 % (ref 20–50)
MAGNESIUM SERPL-MCNC: 1.9 MG/DL (ref 1.6–2.4)
MCH RBC QN AUTO: 30.9 PG (ref 26.6–33)
MCHC RBC AUTO-ENTMCNC: 32.5 G/DL (ref 31.5–35.7)
MCV RBC AUTO: 95.1 FL (ref 79–97)
PHOSPHATE SERPL-MCNC: 3.6 MG/DL (ref 2.5–4.5)
PLATELET # BLD AUTO: 134 10*3/MM3 (ref 140–450)
PMV BLD AUTO: 10.2 FL (ref 6–12)
POTASSIUM SERPL-SCNC: 3.8 MMOL/L (ref 3.5–5.2)
RBC # BLD AUTO: 2.43 10*6/MM3 (ref 3.77–5.28)
SODIUM SERPL-SCNC: 134 MMOL/L (ref 136–145)
TIBC SERPL-MCNC: 267 MCG/DL (ref 298–536)
TRANSFERRIN SERPL-MCNC: 179 MG/DL (ref 200–360)
VIT B12 BLD-MCNC: 611 PG/ML (ref 211–946)
WBC NRBC COR # BLD AUTO: 5.12 10*3/MM3 (ref 3.4–10.8)

## 2024-10-22 PROCEDURE — 82607 VITAMIN B-12: CPT | Performed by: HOSPITALIST

## 2024-10-22 PROCEDURE — 82728 ASSAY OF FERRITIN: CPT | Performed by: HOSPITALIST

## 2024-10-22 PROCEDURE — 82746 ASSAY OF FOLIC ACID SERUM: CPT | Performed by: HOSPITALIST

## 2024-10-22 PROCEDURE — 83735 ASSAY OF MAGNESIUM: CPT | Performed by: STUDENT IN AN ORGANIZED HEALTH CARE EDUCATION/TRAINING PROGRAM

## 2024-10-22 PROCEDURE — 82948 REAGENT STRIP/BLOOD GLUCOSE: CPT

## 2024-10-22 PROCEDURE — 84466 ASSAY OF TRANSFERRIN: CPT | Performed by: HOSPITALIST

## 2024-10-22 PROCEDURE — 80069 RENAL FUNCTION PANEL: CPT | Performed by: INTERNAL MEDICINE

## 2024-10-22 PROCEDURE — 63710000001 INSULIN LISPRO (HUMAN) PER 5 UNITS

## 2024-10-22 PROCEDURE — 85027 COMPLETE CBC AUTOMATED: CPT | Performed by: STUDENT IN AN ORGANIZED HEALTH CARE EDUCATION/TRAINING PROGRAM

## 2024-10-22 PROCEDURE — 99232 SBSQ HOSP IP/OBS MODERATE 35: CPT | Performed by: NURSE PRACTITIONER

## 2024-10-22 PROCEDURE — 97530 THERAPEUTIC ACTIVITIES: CPT

## 2024-10-22 PROCEDURE — 83540 ASSAY OF IRON: CPT | Performed by: HOSPITALIST

## 2024-10-22 RX ADMIN — ISOSORBIDE DINITRATE 10 MG: 10 TABLET ORAL at 12:23

## 2024-10-22 RX ADMIN — INSULIN LISPRO 2 UNITS: 100 INJECTION, SOLUTION INTRAVENOUS; SUBCUTANEOUS at 21:32

## 2024-10-22 RX ADMIN — HYDRALAZINE HYDROCHLORIDE 10 MG: 10 TABLET ORAL at 06:31

## 2024-10-22 RX ADMIN — ISOSORBIDE DINITRATE 10 MG: 10 TABLET ORAL at 17:06

## 2024-10-22 RX ADMIN — APIXABAN 2.5 MG: 2.5 TABLET, FILM COATED ORAL at 21:32

## 2024-10-22 RX ADMIN — HYDRALAZINE HYDROCHLORIDE 10 MG: 10 TABLET ORAL at 21:32

## 2024-10-22 RX ADMIN — ISOSORBIDE DINITRATE 10 MG: 10 TABLET ORAL at 08:36

## 2024-10-22 RX ADMIN — APIXABAN 2.5 MG: 2.5 TABLET, FILM COATED ORAL at 08:36

## 2024-10-22 RX ADMIN — CARVEDILOL 12.5 MG: 12.5 TABLET, FILM COATED ORAL at 17:06

## 2024-10-22 RX ADMIN — ATORVASTATIN CALCIUM 40 MG: 20 TABLET, FILM COATED ORAL at 08:36

## 2024-10-22 RX ADMIN — HYDRALAZINE HYDROCHLORIDE 10 MG: 10 TABLET ORAL at 12:23

## 2024-10-22 RX ADMIN — VENLAFAXINE HYDROCHLORIDE 75 MG: 75 CAPSULE, EXTENDED RELEASE ORAL at 08:36

## 2024-10-22 RX ADMIN — INSULIN LISPRO 2 UNITS: 100 INJECTION, SOLUTION INTRAVENOUS; SUBCUTANEOUS at 12:23

## 2024-10-22 RX ADMIN — CARVEDILOL 12.5 MG: 12.5 TABLET, FILM COATED ORAL at 08:36

## 2024-10-22 NOTE — PLAN OF CARE
Pt aox4, incot, pw on, bilateral edema LE 1-2+, po lasix on hold d/t crt being 2.75, 1 L nc, back to [facility] at [facility]

## 2024-10-22 NOTE — PLAN OF CARE
Goal Outcome Evaluation:  Plan of Care Reviewed With: patient           Outcome Evaluation: VSS, no c/o pain. Pt appeared to sleep well between care. Starting PO lasix today. Pt states SOA improved--encouraged to get OOB and move aas much as tollerated. Pt hopeful for D/C soon. Will CTM, safety maintained.       Diuretic in Use: IV lasix  Response to Diuretics (Output greater than intake): yes  Daily Weight (up or down): down  O2 Requirements: 1LNC PRN  Functional Status (Activity level, tolerance and respiratory symptoms): SOA--improving  Discharge Plans: Rehab in 1 day

## 2024-10-22 NOTE — PROGRESS NOTES
Name: Lowell Min ADMIT: 10/18/2024   : 1940  PCP: Dannie Mathews MD    MRN: [id number] LOS: 4 days   AGE/SEX: 84 y.o. female  ROOM: N430/1     Subjective   Subjective   Feeling better today. Tolerating po. No N/V/D/abd pain. No F/C/NS. No SOA or CP. Voiding well.       Objective   Objective   Vital Signs  Temp:  [97.5 °F (36.4 °C)-98.2 °F (36.8 °C)] 97.9 °F (36.6 °C)  Heart Rate:  [61-78] 78  Resp:  [18] 18  BP: (103-138)/(57-80) 138/69  SpO2:  [93 %-96 %] 96 %  on  Flow (L/min) (Oxygen Therapy):  [0-1] 1;   Device (Oxygen Therapy): nasal cannula  Body mass index is 30.87 kg/m².    (No change in exam today)    Physical Exam  Vitals and nursing note reviewed.   Constitutional:       General: She is not in acute distress.     Appearance: She is ill-appearing (chronically). She is not toxic-appearing or diaphoretic.   HENT:      Head: Normocephalic.      Mouth/Throat:      Mouth: Mucous membranes are moist.      Pharynx: Oropharynx is clear.   Eyes:      General: No scleral icterus.        Right eye: No discharge.         Left eye: No discharge.      Conjunctiva/sclera: Conjunctivae normal.   Cardiovascular:      Rate and Rhythm: Normal rate. Rhythm irregular.      Pulses: Normal pulses.   Pulmonary:      Effort: Pulmonary effort is normal. No respiratory distress.      Breath sounds: Normal breath sounds. No wheezing or rales.   Abdominal:      General: Bowel sounds are normal. There is no distension.      Palpations: Abdomen is soft.      Tenderness: There is no abdominal tenderness.   Musculoskeletal:         General: Swelling (trace in BLEs) present.      Cervical back: Neck supple.   Skin:     General: Skin is warm and dry.      Capillary Refill: Capillary refill takes less than 2 seconds.      Coloration: Skin is pale. Skin is not jaundiced.   Neurological:      General: No focal deficit present.      Mental Status: She is alert and oriented to person, place, and time. Mental status is at  baseline.      Cranial Nerves: No cranial nerve deficit.      Coordination: Coordination normal.   Psychiatric:         Mood and Affect: Mood normal.         Behavior: Behavior normal.         Thought Content: Thought content normal.       Results Review     I reviewed the patient's new clinical results.  Results from last 7 days   Lab Units 10/22/24  0339 10/21/24  0317 10/19/24  0339 10/18/24  1846   WBC 10*3/mm3 5.12 4.97 5.89 7.82   HEMOGLOBIN g/dL 7.5* 7.5* 7.3* 8.0*   PLATELETS 10*3/mm3 134* 141 151 176     Results from last 7 days   Lab Units 10/22/24  0339 10/21/24  0317 10/20/24  0856 10/19/24  0339   SODIUM mmol/L 134* 136 138 138   POTASSIUM mmol/L 3.8 4.1 4.4 4.5   CHLORIDE mmol/L 97* 100 103 105   CO2 mmol/L 24.9 27.0 26.0 24.7   BUN mg/dL 39* 39* 39* 38*   CREATININE mg/dL 2.75* 2.41* 2.42* 2.29*   GLUCOSE mg/dL 100* 112* 106* 110*   EGFR mL/min/1.73 16.5* 19.4* 19.3* 20.6*     Results from last 7 days   Lab Units 10/22/24  0339 10/18/24  1846   ALBUMIN g/dL 3.0* 3.2*   BILIRUBIN mg/dL  --  0.9   ALK PHOS U/L  --  265*   AST (SGOT) U/L  --  16   ALT (SGPT) U/L  --  13     Results from last 7 days   Lab Units 10/22/24  0339 10/21/24  0317 10/20/24  0856 10/19/24  0339 10/18/24  1846   CALCIUM mg/dL 8.8 8.5* 8.6 8.8 9.0   ALBUMIN g/dL 3.0*  --   --   --  3.2*   MAGNESIUM mg/dL 1.9 1.9  --   --   --    PHOSPHORUS mg/dL 3.6 3.1  --   --   --        Glucose   Date/Time Value Ref Range Status   10/22/2024 1130 169 (H) 70 - 130 mg/dL Final   10/22/2024 0739 110 70 - 130 mg/dL Final   10/21/2024 2100 128 70 - 130 mg/dL Final   10/21/2024 1659 198 (H) 70 - 130 mg/dL Final   10/21/2024 1222 135 (H) 70 - 130 mg/dL Final   10/21/2024 0730 121 70 - 130 mg/dL Final   10/20/2024 2050 142 (H) 70 - 130 mg/dL Final       No radiology results for the last day    I have personally reviewed all medications:  Scheduled Medications  apixaban, 2.5 mg, Oral, BID  atorvastatin, 40 mg, Oral, Daily  carvedilol, 12.5 mg, Oral, BID  With Meals  [Held by provider] furosemide, 40 mg, Oral, BID  hydrALAZINE, 10 mg, Oral, Q8H  insulin lispro, 2-7 Units, Subcutaneous, 4x Daily AC & at Bedtime  isosorbide dinitrate, 10 mg, Oral, TID - Nitrates  venlafaxine XR, 75 mg, Oral, Daily    Infusions   Diet  Diet: Cardiac; Healthy Heart (2-3 Na+); Fluid Consistency: Thin (IDDSI 0)    I have personally reviewed:  [x]  Laboratory   []  Microbiology   []  Radiology   [x]  EKG/Telemetry  []  Cardiology/Vascular   []  Pathology    []  Records       Assessment/Plan     Active Hospital Problems    Diagnosis  POA    **Acute on chronic systolic CHF (congestive heart failure) [I50.23]  Yes    Chronic respiratory failure with hypoxia [J96.11]  Yes    Type 2 diabetes mellitus [E11.9]  Yes    HLD (hyperlipidemia) [E78.5]  Yes    PAF (paroxysmal atrial fibrillation) [I48.0]  Yes    Iron deficiency anemia [D50.9]  Yes    Chronic diastolic congestive heart failure [I50.32]  Yes    Anxiety associated with depression [F41.8]  Yes    Chronic kidney disease, stage IV (severe) [N18.4]  Yes      Resolved Hospital Problems   No resolved problems to display.     83yo woman with RADHA, CKD4, HTN, DM2, PAF, pulm HTN, CHRF, and chronic combined CHF, who presented to ER with worsening SOA and BLE edema. She was admitted with acute on chronic HRrEF.    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  HTN  -BNP 21,000 on admission, CXR with vascular congestion, bilateral lower extremity edema  -Cardiology and Nephrology following  -diuresing well with falling weights and I<<Os  -IV Lasix changed to oral this AM but Nephrology has held as Cr is up  -cont Coreg, hydralazine, and Isordil per Cardiology  -no ACE/ARB/ARNI/MRA due to CKD     CKD4  -Cr up to 2.75 this AM so Lasix held  -Nephrology following     DM2 with hyperglycemia  -A1c 6.5 in June, not on meds PTA  -Sugars acceptable here  -continue SSI and monitor for hypoglycemia     PAF  -RC: Coreg  -AC: Eliquis  -HRs  acceptable     HLD  -Statin    Anemia of CKD  -no bleeding evident here  -Hgb stable  -labs c/w chronic disease anemia  -continue to monitor Hgb and transfuse if <7      Eliquis (home med) for DVT prophylaxis.  Full code.  Discussed with patient, nursing staff, CCP, and care team on multidisciplinary rounds.  Anticipate discharge  back to [facility]  when cleared by consultants.  Expected Discharge Date: 10/24/2024; Expected Discharge Time:       Trevor Ely MD  [facility]  10/22/24  12:17 EDT

## 2024-10-22 NOTE — THERAPY TREATMENT NOTE
Patient Name: Lowell Min  : 1940    MRN: [id number]                              Today's Date: 10/22/2024       Admit Date: 10/18/2024    Visit Dx:     ICD-10-CM ICD-9-CM   1. Acute diastolic congestive heart failure  I50.31 428.31     428.0   2. Chronic kidney disease, unspecified CKD stage  N18.9 585.9     Patient Active Problem List   Diagnosis    Chronic kidney disease, stage IV (severe)    Erythropoietin deficiency anemia    Symptomatic anemia    Anxiety associated with depression    Iron deficiency anemia    PAF (paroxysmal atrial fibrillation)    Shortness of breath    Chronic diastolic congestive heart failure    Diabetic polyneuropathy associated with type 2 diabetes mellitus    PERLA (acute kidney injury)    Foot pain, bilateral    Anemia of chronic renal failure    Acute on chronic diastolic CHF (congestive heart failure)    Obesity (BMI 30-39.9)    HLD (hyperlipidemia)    Calcium pyrophosphate deposition disease (CPPD)    Type 2 diabetes mellitus    Acute on chronic systolic CHF (congestive heart failure)    Chronic respiratory failure with hypoxia     Past Medical History:   Diagnosis Date    Anxiety     Atrial fibrillation     CHF (congestive heart failure)     Chronic kidney disease, stage IV (severe)     Depression     Elevated cholesterol     Hypertension     Type 2 diabetes mellitus      Past Surgical History:   Procedure Laterality Date    APPENDECTOMY      CHOLECYSTECTOMY      COLONOSCOPY      CYSTOSCOPY BOTOX INJECTION OF BLADDER N/A     HYSTERECTOMY      TUMOR REMOVAL Left     benign tumor from left breast      General Information       Row Name 10/22/24 1429          Physical Therapy Time and Intention    Document Type therapy note (daily note)  -PH     Mode of Treatment physical therapy  -PH       Row Name 10/22/24 1429          General Information    Existing Precautions/Restrictions fall  -PH       Row Name 10/22/24 1429          Safety Issues/Impairments Affecting Functional  Mobility    Impairments Affecting Function (Mobility) endurance/activity tolerance;strength;balance  -PH     Comment, Safety Issues/Impairments (Mobility) gt belt and non skid socks donned; L knee pain limiting  -PH               User Key  (r) = Recorded By, (t) = Taken By, (c) = Cosigned By      Initials Name Provider Type     Kiara Navarro PTA Physical Therapist Assistant                   Mobility       Row Name 10/22/24 1429          Bed Mobility    Comment, (Bed Mobility) UIC and returned to chair after gait  -PH       Row Name 10/22/24 1429          Sit-Stand Transfer    Sit-Stand [place] (Transfers) contact guard;verbal cues;nonverbal cues (demo/gesture)  -PH     Assistive Device (Sit-Stand Transfers) walker, front-wheeled  -PH       Row Name 10/22/24 1429          Gait/Stairs (Locomotion)    [place] Level (Gait) contact guard;verbal cues;nonverbal cues (demo/gesture)  -PH     Assistive Device (Gait) walker, front-wheeled  -PH     Distance in Feet (Gait) 40  -PH     Deviations/Abnormal Patterns (Gait) guillermo decreased;antalgic;base of support, wide;gait speed decreased;weight shifting decreased  -PH     Bilateral Gait Deviations forward flexed posture  -PH     Right Sided Gait Deviations weight shift ability decreased  -PH     Comment, (Gait/Stairs) slow and antalgic w/ noted SOA although sats remained above 90's. activity intolerance and pain limiting  -PH               User Key  (r) = Recorded By, (t) = Taken By, (c) = Cosigned By      Initials Name Provider Type     Kiara Navarro PTA Physical Therapist Assistant                   Obj/Interventions       Row Name 10/22/24 1431          Motor Skills    Therapeutic Exercise other (see comments)  BAP, LAQ; x 10 reps  -PH       Row Name 10/22/24 1431          Balance    Balance Assessment sitting static balance;sitting dynamic balance;standing static balance  -PH     Static Sitting Balance standby assist  -PH     Dynamic  Sitting Balance standby assist  -PH     Static Standing Balance contact guard  -PH     Position/Device Used, Standing Balance walker, front-wheeled  -PH               User Key  (r) = Recorded By, (t) = Taken By, (c) = Cosigned By      Initials Name Provider Type    PH Kiara Navarro PTA Physical Therapist Assistant                   Goals/Plan    No documentation.                  Clinical Impression       Row Name 10/22/24 1431          Pain    Pretreatment Pain Rating 2/10  -PH     Posttreatment Pain Rating 5/10  -PH     Pain Location knee  -PH     Pain Side/Orientation left  -PH     Pain Management Interventions exercise or physical activity utilized  -PH     Response to Pain Interventions activity participation with tolerable pain  -PH       Row Name 10/22/24 1431          Plan of Care Review    Plan of Care Reviewed With patient  -PH     Progress no change  -PH     Outcome Evaluation Pt was seen for PT tx this AM. Pt was UIC and stood w/ CGA. Pt amb approx 40' w/ CGA and use of fww. Pt was slow and antalgic w/ pain and activity intolerance limiting. Pt performed seated ther ex and was UIC w/ family in room at end of session. PT will prog as pt deonna.  -PH       Row Name 10/22/24 1431          Vital Signs    Pre SpO2 (%) 94  -PH     O2 Delivery Pre Treatment room air  pt said she had removed nc for lunch  -PH     Intra SpO2 (%) 94  -PH     O2 Delivery Intra Treatment room air  -PH     O2 Delivery Post Treatment supplemental O2  -PH       Row Name 10/22/24 1431          Positioning and Restraints    Pre-Treatment Position sitting in chair/recliner  -PH     Post Treatment Position chair  -PH     In Chair reclined;call light within reach;encouraged to call for assist;exit alarm on;with family/caregiver;notified nsg  -PH               User Key  (r) = Recorded By, (t) = Taken By, (c) = Cosigned By      Initials Name Provider Type    PH Kiara Navarro PTA Physical Therapist Assistant                    Outcome Measures       Row Name 10/22/24 1434 10/22/24 0800       How much help from another person do you currently need...    Turning from your back to your side while in flat bed without using bedrails? 4  -PH 4  -BC    Moving from lying on back to sitting on the side of a flat bed without bedrails? 3  -PH 3  -BC    Moving to and from a bed to a chair (including a wheelchair)? 3  -PH 3  -BC    Standing up from a chair using your arms (e.g., wheelchair, bedside chair)? 3  -PH 3  -BC    Climbing 3-5 steps with a railing? 2  -PH 2  -BC    To walk in hospital room? 3  -PH 3  -BC    AM-PAC 6 Clicks Score (PT) 18  -PH 18  -BC    Highest Level of Mobility Goal 6 --> Walk 10 steps or more  -PH 6 --> Walk 10 steps or more  -BC      Row Name 10/22/24 1434          Functional Assessment    Outcome Measure Options AM-PAC 6 Clicks Basic Mobility (PT)  -               User Key  (r) = Recorded By, (t) = Taken By, (c) = Cosigned By      Initials Name Provider Type    PH Kiara Navarro PTA Physical Therapist Assistant    Berna Mendoza RN Registered Nurse                                 Physical Therapy Education       Title: PT OT SLP Therapies (In Progress)       Topic: Physical Therapy (Done)       Point: Mobility training (Done)       Learning Progress Summary            Patient Acceptance, E,TB,D, VU by  at 10/22/2024 1434    Acceptance, E,TB, VU by  at 10/21/2024 1539    Acceptance, E,TB, VU by LW at 10/20/2024 1422                      Point: Home exercise program (Done)       Learning Progress Summary            Patient Acceptance, E,TB,D, VU by PH at 10/22/2024 1434    Acceptance, E,TB, VU by PH at 10/21/2024 1539                      Point: Body mechanics (Done)       Learning Progress Summary            Patient Acceptance, E,TB,D, VU by PH at 10/22/2024 1434    Acceptance, E,TB, VU by PH at 10/21/2024 1539    Acceptance, E,TB, VU by LW at 10/20/2024 1422                      Point: Precautions (Done)        Learning Progress Summary            Patient Acceptance, E,TB,D, VU by  at 10/22/2024 1434    Acceptance, E,TB, VU by  at 10/21/2024 1539    Acceptance, E,TB, VU by LW at 10/20/2024 1422                                      User Key       Initials Effective Dates Name Provider Type Discipline     06/16/21 -  Kiara Navarro PTA Physical Therapist Assistant PT    LW 05/08/23 -  Farida Urbina PT Physical Therapist PT                  PT Recommendation and Plan     Progress: no change  Outcome Evaluation: Pt was seen for PT tx this AM. Pt was UIC and stood w/ CGA. Pt amb approx 40' w/ CGA and use of fww. Pt was slow and antalgic w/ pain and activity intolerance limiting. Pt performed seated ther ex and was UIC w/ family in room at end of session. PT will prog as pt deonna.     Time Calculation:         PT Charges       Row Name 10/22/24 1434             Time Calculation    Start Time 1323  -PH      Stop Time 1337  -PH      Time Calculation (min) 14 min  -PH      PT Received On 10/22/24  -PH      PT - Next Appointment 10/23/24  -PH         Timed Charges    64261 - PT Therapeutic Activity Minutes 14  -PH         Total Minutes    Timed Charges Total Minutes 14  -PH       Total Minutes 14  -PH                User Key  (r) = Recorded By, (t) = Taken By, (c) = Cosigned By      Initials Name Provider Type     Kiara Navarro PTA Physical Therapist Assistant                  Therapy Charges for Today       Code Description Service Date Service Provider Modifiers Qty    79241115018 HC PT THERAPEUTIC ACT EA 15 MIN 10/21/2024 Kiara Navarro PTA GP 2    01538785173 HC PT THERAPEUTIC ACT EA 15 MIN 10/22/2024 Kiara Navarro PTA GP 1            PT G-Codes  Outcome Measure Options: AM-PAC 6 Clicks Basic Mobility (PT)  AM-PAC 6 Clicks Score (PT): 18  AM-PAC 6 Clicks Score (OT): 15  PT Discharge Summary  Anticipated Discharge Disposition (PT): skilled nursing facility    Kiara  Melanie, RHEA  10/22/2024

## 2024-10-22 NOTE — PROGRESS NOTES
[facility]  Cardiology Progress Note    Patient Identification:  Name: Lowell Min  Age: 84 y.o.  Sex: female  :  1940  MRN: [id number]                 Follow Up / Chief Complaint: Follow-up for acute on chronic heart failure with exacerbation    Interval History: Approximately decrease in wt by 17 pounds.  Improvement with shob, no cp         Objective:    Past Medical History:  Past Medical History:   Diagnosis Date    Anxiety     Atrial fibrillation     CHF (congestive heart failure)     Chronic kidney disease, stage IV (severe)     Depression     Elevated cholesterol     Hypertension     Type 2 diabetes mellitus      Past Surgical History:  Past Surgical History:   Procedure Laterality Date    APPENDECTOMY      CHOLECYSTECTOMY      COLONOSCOPY      CYSTOSCOPY BOTOX INJECTION OF BLADDER N/A     HYSTERECTOMY      TUMOR REMOVAL Left     benign tumor from left breast        Social History:   Social History     Tobacco Use    Smoking status: Never    Smokeless tobacco: Never   Substance Use Topics    Alcohol use: Never      Family History:  History reviewed. No pertinent family history.       Allergies:  Allergies   Allergen Reactions    Ibuprofen Other (See Comments)     Decrease urine output       Scheduled Meds:  apixaban, 2.5 mg, BID  atorvastatin, 40 mg, Daily  carvedilol, 12.5 mg, BID With Meals  [Held by provider] furosemide, 40 mg, BID  hydrALAZINE, 10 mg, Q8H  insulin lispro, 2-7 Units, 4x Daily AC & at Bedtime  isosorbide dinitrate, 10 mg, TID - Nitrates  venlafaxine XR, 75 mg, Daily            INTAKE AND OUTPUT:    Intake/Output Summary (Last 24 hours) at 10/22/2024 1347  Last data filed at 10/22/2024 0630  Gross per 24 hour   Intake 360 ml   Output 2150 ml   Net -1790 ml       Review of Systems:   GI: No nausea or vomiting  Cardiac: No chest pain  Pulmonary: Improvement with shortness of breath, no cough    Constitutional:  Temp:  [97.5 °F (36.4 °C)-98.2 °F (36.8 °C)] 97.9 °F  (36.6 °C)  Heart Rate:  [66-78] 78  Resp:  [18] 18  BP: (113-138)/(57-80) 138/69    Physical Exam:  General:  Appears in no acute distress, resting in bed  Eyes: eom normal no conjunctival drainage  HEENT:  No JVD. Thyroid not visibly enlarged. No mucosal pallor or cyanosis  Respiratory: Respirations regular and unlabored at rest. BBS with good air entry in all fields. No crackles, rubs or wheezes auscultated  Cardiovascular: S1S2 Regular rate and rhythm. No murmur, rub or gallop. No carotid bruits. DP/PT pulses     . No pretibial pitting edema  Gastrointestinal: Abdomen soft, flat, non tender. Bowel sounds present. No hepatosplenomegaly. No ascites  Skin:   Skin warm and dry to touch. No rashes    Neuro: AAO x3 CN II-XII grossly intact  Psych: Mood and affect normal, pleasant and cooperative          I reviewed the patient's new clinical results, and personally reviewed and interpreted the patient's ECG and telemetry data from the last 24 hours          Cardiographics      ECG:     Echocardiogram:     Interpretation Summary         Left ventricular systolic function is mildly decreased. Calculated left ventricular EF = 44.1%    The following left ventricular wall segments are hypokinetic: mid anterior, apical anterior, basal anterolateral, mid anterolateral, apical lateral, basal inferolateral, mid inferolateral, apical inferior, mid inferior, apical septal, basal inferoseptal, mid inferoseptal, apex hypokinetic, mid anteroseptal, basal anterior, basal inferior and basal inferoseptal.    Left ventricular diastolic function was indeterminate.    The left atrial cavity is severely dilated.    Mild aortic valve stenosis is present. Aortic valve area is 1.2 cm2.    The right atrial cavity is severely  dilated.    Peak velocity of the flow distal to the aortic valve is 246.1 cm/s. Aortic valve mean pressure gradient is 11 mmHg.    Severe mitral valve regurgitation is present.    Mild mitral valve stenosis is present.  The mitral valve mean gradient is 4 mmHg.    Severe tricuspid valve regurgitation is present.    Estimated right ventricular systolic pressure from tricuspid regurgitation is markedly elevated (>55 mmHg). Calculated right ventricular systolic pressure from tricuspid regurgitation is 62 mmHg.     Lab Review   Results from last 7 days   Lab Units 10/18/24  1846   HSTROP T ng/L 47*     Results from last 7 days   Lab Units 10/22/24  0339   MAGNESIUM mg/dL 1.9     Results from last 7 days   Lab Units 10/22/24  0339   SODIUM mmol/L 134*   POTASSIUM mmol/L 3.8   BUN mg/dL 39*   CREATININE mg/dL 2.75*   CALCIUM mg/dL 8.8     @[id number]@  Results from last 7 days   Lab Units 10/22/24  0339 10/21/24  0317 10/19/24  0339   WBC 10*3/mm3 5.12 4.97 5.89   HEMOGLOBIN g/dL 7.5* 7.5* 7.3*   HEMATOCRIT % 23.1* 21.5* 22.5*   PLATELETS 10*3/mm3 134* 141 151         Intake/Output Summary (Last 24 hours) at 10/22/2024 1347  Last data filed at 10/22/2024 0630  Gross per 24 hour   Intake 360 ml   Output 2150 ml   Net -1790 ml           Assessment:  1.  Acute on chronic heart failure exacerbation probably due to valvular dysfunction  2.  Severe MR  3.  Permanent A-fib  4.  CKD: creatinine stable. nephrology following  5.  Diabetes mellitus  6.  Hypertension: Stable  7.  Hyperlipidemia: ALT/AST stable.  On statin  9.  NELLY  10. Anemia: defer to IM        Plan:  1.  Acute on chronic heart failure exacerbation  a.  HFrEF with EF 40 to 45%.   b.  Creatinine slightly elevated. Nephrology holding lasix today. Continue daily weights, strict VINCENT's, coreg.  No ACE/ARB/Arni/MRA due to CKD.  10/20/2024 amlodipine was stopped and switched to hydralazine/Isordil.  Blood pressure tolerating new meds.    2.  Severe MR:   a.  Seen on echocardiogram on 9/7/2024.   b. She has declined further testing in the past and wants to continue to use medications to treat severe MR.     3. Pulmonary hypertension: likely due to severe MR.     4.  Permanent A-fib  a.   "Rate controlled. Continue BB and A/C    5.  Hypertension: stable   a.  Continue beta-blockade, hydralazine, isosorbide.    )10/22/2024  RAMAKRISHNA Flannery/Transcription:   \"Dictated utilizing Dragon dictation\".     "

## 2024-10-22 NOTE — PROGRESS NOTES
"   LOS: 4 days     Chief Complaint/ Reason for encounter: CHF exacerbation, CKD    Subjective   10/21/24 : Patient is doing well today with no new complaints  Good appetite with no nausea or vomiting  No shortness of breath chest pain or edema  Voiding well with no dysuria  She is feeling better with diuresis and her weight is down 5 pounds since admission  No edema, not requiring any oxygen supplementally    10/22: Clinically doing well with no new complaints  Not sure if it is accurate but her weight is down to 197 pounds today  Only on oxygen at night, 1 L  No edema no dyspnea no chest pain fevers or chills good oral intake with no nausea or vomiting      Medical history reviewed:  History of Present Illness    Subjective    History taken from: Patient and chart    Vital Signs  Temp:  [97.5 °F (36.4 °C)-98.2 °F (36.8 °C)] 97.9 °F (36.6 °C)  Heart Rate:  [61-78] 78  Resp:  [18] 18  BP: (103-138)/(57-80) 138/69       Wt Readings from Last 1 Encounters:   10/22/24 0537 89.4 kg (197 lb 1.5 oz)   10/21/24 0439 95.5 kg (210 lb 8.6 oz)   10/20/24 0451 94.4 kg (208 lb 1.8 oz)   10/19/24 0700 96 kg (211 lb 10.3 oz)   10/18/24 2214 97 kg (213 lb 13.5 oz)   10/18/24 2142 97.5 kg (215 lb)       Objective:  Vital signs: (most recent): Blood pressure 138/69, pulse 78, temperature 97.9 °F (36.6 °C), temperature source Oral, resp. rate 18, height 170.2 cm (67\"), weight 89.4 kg (197 lb 1.5 oz), SpO2 96%.                Objective:  General Appearance:  Comfortable, well-appearing, in no acute distress and not in pain.  Awake, alert  HEENT: Mucous membranes moist, no injury, oropharynx clear  Lungs:  Normal effort and normal respiratory rate.  Breath sounds clear to auscultation.  No  respiratory distress.  No rales, decreased breath sounds or rhonchi.    Heart: Normal rate.  Regular rhythm.  S1, S2 normal.  No murmur.   Abdomen: Abdomen is soft.  Bowel sounds are normal, no abdominal tenderness.  There is no rebound or " guarding  Extremities: No edema of bilateral lower extremities  Skin:  Warm and dry with no rashes      Results Review:    Intake/Output:     Intake/Output Summary (Last 24 hours) at 10/22/2024 1032  Last data filed at 10/22/2024 0630  Gross per 24 hour   Intake 360 ml   Output 2350 ml   Net -1990 ml         DATA:  Radiology and Labs:  The following labs independently reviewed by me. Additional labs ordered for tomorrow a.m.  Interval notes, chart personally reviewed by me.   Old records independently reviewed showing progressive CKD, now stage IV  Discussed with patient herself at bedside    Risk/ complexity of medical care/ medical decision making moderate complexity, diuretic management      Labs:   Recent Results (from the past 24 hours)   POC Glucose Once    Collection Time: 10/21/24 12:22 PM    Specimen: Blood   Result Value Ref Range    Glucose 135 (H) 70 - 130 mg/dL   POC Glucose Once    Collection Time: 10/21/24  4:59 PM    Specimen: Blood   Result Value Ref Range    Glucose 198 (H) 70 - 130 mg/dL   POC Glucose Once    Collection Time: 10/21/24  9:00 PM    Specimen: Blood   Result Value Ref Range    Glucose 128 70 - 130 mg/dL   CBC (No Diff)    Collection Time: 10/22/24  3:39 AM    Specimen: Blood   Result Value Ref Range    WBC 5.12 3.40 - 10.80 10*3/mm3    RBC 2.43 (L) 3.77 - 5.28 10*6/mm3    Hemoglobin 7.5 (L) 12.0 - 15.9 g/dL    Hematocrit 23.1 (L) 34.0 - 46.6 %    MCV 95.1 79.0 - 97.0 fL    MCH 30.9 26.6 - 33.0 pg    MCHC 32.5 31.5 - 35.7 g/dL    RDW 15.3 12.3 - 15.4 %    RDW-SD 52.8 37.0 - 54.0 fl    MPV 10.2 6.0 - 12.0 fL    Platelets 134 (L) 140 - 450 10*3/mm3   Magnesium    Collection Time: 10/22/24  3:39 AM    Specimen: Blood   Result Value Ref Range    Magnesium 1.9 1.6 - 2.4 mg/dL   Renal Function Panel    Collection Time: 10/22/24  3:39 AM    Specimen: Blood   Result Value Ref Range    Glucose 100 (H) 65 - 99 mg/dL    BUN 39 (H) 8 - 23 mg/dL    Creatinine 2.75 (H) 0.57 - 1.00 mg/dL    Sodium  134 (L) 136 - 145 mmol/L    Potassium 3.8 3.5 - 5.2 mmol/L    Chloride 97 (L) 98 - 107 mmol/L    CO2 24.9 22.0 - 29.0 mmol/L    Calcium 8.8 8.6 - 10.5 mg/dL    Albumin 3.0 (L) 3.5 - 5.2 g/dL    Phosphorus 3.6 2.5 - 4.5 mg/dL    Anion Gap 12.1 5.0 - 15.0 mmol/L    BUN/Creatinine Ratio 14.2 7.0 - 25.0    eGFR 16.5 (L) >60.0 mL/min/1.73   Ferritin    Collection Time: 10/22/24  3:39 AM    Specimen: Blood   Result Value Ref Range    Ferritin 430.00 (H) 13.00 - 150.00 ng/mL   Folate    Collection Time: 10/22/24  3:39 AM    Specimen: Blood   Result Value Ref Range    Folate 5.43 4.78 - 24.20 ng/mL   Iron Profile    Collection Time: 10/22/24  3:39 AM    Specimen: Blood   Result Value Ref Range    Iron 36 (L) 37 - 145 mcg/dL    Iron Saturation (TSAT) 13 (L) 20 - 50 %    Transferrin 179 (L) 200 - 360 mg/dL    TIBC 267 (L) 298 - 536 mcg/dL   Vitamin B12    Collection Time: 10/22/24  3:39 AM    Specimen: Blood   Result Value Ref Range    Vitamin B-12 611 211 - 946 pg/mL   POC Glucose Once    Collection Time: 10/22/24  7:39 AM    Specimen: Blood   Result Value Ref Range    Glucose 110 70 - 130 mg/dL       Radiology:  Pertinent radiology studies were reviewed as described above      Medications have been reviewed separately in chart overview      ASSESSMENT:  CKD stage IV with some progression, stable, mild PERLA today  Volume overload, improved.  Euvolemic on exam  Dyspnea, improved  Anemia of CKD  CHF, acute on chronic systolic, improved with diuresis      Plan:  Her renal function had been fairly stable but creatinine up to 2.7 today, from 2.4  Clinically euvolemic and may even be a touch hypovolemic  Will hold diuretics today and encourage oral fluid intake  Likely will need a lower dose of oral Bumex at discharge, perhaps 20 to 40 mg orally, once a day    On hydralazine/Isordil combo per cardiology.  Echo results noted, EF 40 to 45%  Continue strict I's and O's, daily weights, low-sodium diet  Okay to liberalize oral fluid  intake a bit today  Recheck labs in a.m.      Isaiah Foster MD  [facility]   Office phone number: [phone]  Answering service phone number: [phone]    10/22/24  10:32 EDT    Dictation performed using Dragon dictation software

## 2024-10-22 NOTE — PLAN OF CARE
Goal Outcome Evaluation:  Plan of Care Reviewed With: patient        Progress: no change  Outcome Evaluation: Pt was seen for PT tx this AM. Pt was UIC and stood w/ CGA. Pt amb approx 40' w/ CGA and use of fww. Pt was slow and antalgic w/ pain and activity intolerance limiting. Pt performed seated ther ex and was UIC w/ family in room at end of session. PT will prog as pt deonna.    Anticipated Discharge Disposition (PT): skilled nursing facility

## 2024-10-23 LAB
ALBUMIN SERPL-MCNC: 2.8 G/DL (ref 3.5–5.2)
ALP SERPL-CCNC: 224 U/L (ref 39–117)
ALT SERPL W P-5'-P-CCNC: 8 U/L (ref 1–33)
ANION GAP SERPL CALCULATED.3IONS-SCNC: 9 MMOL/L (ref 5–15)
AST SERPL-CCNC: 13 U/L (ref 1–32)
BACTERIA UR QL AUTO: ABNORMAL /HPF
BILIRUB CONJ SERPL-MCNC: 0.5 MG/DL (ref 0–0.3)
BILIRUB INDIRECT SERPL-MCNC: 0.4 MG/DL
BILIRUB SERPL-MCNC: 0.9 MG/DL (ref 0–1.2)
BILIRUB UR QL STRIP: NEGATIVE
BUN SERPL-MCNC: 38 MG/DL (ref 8–23)
BUN/CREAT SERPL: 12.7 (ref 7–25)
CALCIUM SPEC-SCNC: 8.7 MG/DL (ref 8.6–10.5)
CHLORIDE SERPL-SCNC: 99 MMOL/L (ref 98–107)
CHLORIDE UR-SCNC: <20 MMOL/L
CLARITY UR: ABNORMAL
CO2 SERPL-SCNC: 27 MMOL/L (ref 22–29)
COLOR UR: YELLOW
CREAT SERPL-MCNC: 2.99 MG/DL (ref 0.57–1)
DEPRECATED RDW RBC AUTO: 53.6 FL (ref 37–54)
EGFRCR SERPLBLD CKD-EPI 2021: 15 ML/MIN/1.73
ERYTHROCYTE [DISTWIDTH] IN BLOOD BY AUTOMATED COUNT: 15.6 % (ref 12.3–15.4)
GLUCOSE BLDC GLUCOMTR-MCNC: 137 MG/DL (ref 70–130)
GLUCOSE BLDC GLUCOMTR-MCNC: 149 MG/DL (ref 70–130)
GLUCOSE BLDC GLUCOMTR-MCNC: 150 MG/DL (ref 70–130)
GLUCOSE BLDC GLUCOMTR-MCNC: 177 MG/DL (ref 70–130)
GLUCOSE SERPL-MCNC: 110 MG/DL (ref 65–99)
GLUCOSE UR STRIP-MCNC: NEGATIVE MG/DL
HCT VFR BLD AUTO: 23.1 % (ref 34–46.6)
HGB BLD-MCNC: 7.5 G/DL (ref 12–15.9)
HGB UR QL STRIP.AUTO: ABNORMAL
HYALINE CASTS UR QL AUTO: ABNORMAL /LPF
KETONES UR QL STRIP: NEGATIVE
LEUKOCYTE ESTERASE UR QL STRIP.AUTO: ABNORMAL
MAGNESIUM SERPL-MCNC: 1.7 MG/DL (ref 1.6–2.4)
MCH RBC QN AUTO: 30.7 PG (ref 26.6–33)
MCHC RBC AUTO-ENTMCNC: 32.5 G/DL (ref 31.5–35.7)
MCV RBC AUTO: 94.7 FL (ref 79–97)
NITRITE UR QL STRIP: NEGATIVE
PH UR STRIP.AUTO: 7 [PH] (ref 5–8)
PHOSPHATE SERPL-MCNC: 3.5 MG/DL (ref 2.5–4.5)
PLATELET # BLD AUTO: 120 10*3/MM3 (ref 140–450)
PMV BLD AUTO: 9.6 FL (ref 6–12)
POTASSIUM SERPL-SCNC: 4.1 MMOL/L (ref 3.5–5.2)
PROT SERPL-MCNC: 6.1 G/DL (ref 6–8.5)
PROT UR QL STRIP: ABNORMAL
RBC # BLD AUTO: 2.44 10*6/MM3 (ref 3.77–5.28)
RBC # UR STRIP: ABNORMAL /HPF
REF LAB TEST METHOD: ABNORMAL
SODIUM SERPL-SCNC: 135 MMOL/L (ref 136–145)
SODIUM UR-SCNC: 46 MMOL/L
SP GR UR STRIP: 1.01 (ref 1–1.03)
SQUAMOUS #/AREA URNS HPF: ABNORMAL /HPF
UROBILINOGEN UR QL STRIP: ABNORMAL
WBC # UR STRIP: ABNORMAL /HPF
WBC NRBC COR # BLD AUTO: 4.42 10*3/MM3 (ref 3.4–10.8)

## 2024-10-23 PROCEDURE — 81001 URINALYSIS AUTO W/SCOPE: CPT | Performed by: INTERNAL MEDICINE

## 2024-10-23 PROCEDURE — 80048 BASIC METABOLIC PNL TOTAL CA: CPT | Performed by: INTERNAL MEDICINE

## 2024-10-23 PROCEDURE — 80076 HEPATIC FUNCTION PANEL: CPT | Performed by: HOSPITALIST

## 2024-10-23 PROCEDURE — 82436 ASSAY OF URINE CHLORIDE: CPT | Performed by: INTERNAL MEDICINE

## 2024-10-23 PROCEDURE — 82948 REAGENT STRIP/BLOOD GLUCOSE: CPT

## 2024-10-23 PROCEDURE — 82570 ASSAY OF URINE CREATININE: CPT | Performed by: INTERNAL MEDICINE

## 2024-10-23 PROCEDURE — 99232 SBSQ HOSP IP/OBS MODERATE 35: CPT

## 2024-10-23 PROCEDURE — 84300 ASSAY OF URINE SODIUM: CPT | Performed by: INTERNAL MEDICINE

## 2024-10-23 PROCEDURE — 85027 COMPLETE CBC AUTOMATED: CPT | Performed by: STUDENT IN AN ORGANIZED HEALTH CARE EDUCATION/TRAINING PROGRAM

## 2024-10-23 PROCEDURE — 84100 ASSAY OF PHOSPHORUS: CPT | Performed by: INTERNAL MEDICINE

## 2024-10-23 PROCEDURE — 83735 ASSAY OF MAGNESIUM: CPT | Performed by: STUDENT IN AN ORGANIZED HEALTH CARE EDUCATION/TRAINING PROGRAM

## 2024-10-23 PROCEDURE — 63710000001 INSULIN LISPRO (HUMAN) PER 5 UNITS

## 2024-10-23 PROCEDURE — 25810000003 SODIUM CHLORIDE 0.9 % SOLUTION: Performed by: INTERNAL MEDICINE

## 2024-10-23 PROCEDURE — 97110 THERAPEUTIC EXERCISES: CPT

## 2024-10-23 RX ORDER — SODIUM CHLORIDE 9 MG/ML
100 INJECTION, SOLUTION INTRAVENOUS CONTINUOUS
Status: ACTIVE | OUTPATIENT
Start: 2024-10-23 | End: 2024-10-23

## 2024-10-23 RX ADMIN — ATORVASTATIN CALCIUM 40 MG: 20 TABLET, FILM COATED ORAL at 09:05

## 2024-10-23 RX ADMIN — HYDRALAZINE HYDROCHLORIDE 10 MG: 10 TABLET ORAL at 06:49

## 2024-10-23 RX ADMIN — HYDRALAZINE HYDROCHLORIDE 10 MG: 10 TABLET ORAL at 21:41

## 2024-10-23 RX ADMIN — HYDRALAZINE HYDROCHLORIDE 10 MG: 10 TABLET ORAL at 14:57

## 2024-10-23 RX ADMIN — ISOSORBIDE DINITRATE 10 MG: 10 TABLET ORAL at 14:57

## 2024-10-23 RX ADMIN — ISOSORBIDE DINITRATE 10 MG: 10 TABLET ORAL at 18:55

## 2024-10-23 RX ADMIN — CARVEDILOL 12.5 MG: 12.5 TABLET, FILM COATED ORAL at 18:55

## 2024-10-23 RX ADMIN — CARVEDILOL 12.5 MG: 12.5 TABLET, FILM COATED ORAL at 09:05

## 2024-10-23 RX ADMIN — INSULIN LISPRO 2 UNITS: 100 INJECTION, SOLUTION INTRAVENOUS; SUBCUTANEOUS at 18:56

## 2024-10-23 RX ADMIN — APIXABAN 2.5 MG: 2.5 TABLET, FILM COATED ORAL at 21:41

## 2024-10-23 RX ADMIN — ISOSORBIDE DINITRATE 10 MG: 10 TABLET ORAL at 09:05

## 2024-10-23 RX ADMIN — SODIUM CHLORIDE 100 ML/HR: 9 INJECTION, SOLUTION INTRAVENOUS at 09:11

## 2024-10-23 RX ADMIN — VENLAFAXINE HYDROCHLORIDE 75 MG: 75 CAPSULE, EXTENDED RELEASE ORAL at 09:05

## 2024-10-23 RX ADMIN — APIXABAN 2.5 MG: 2.5 TABLET, FILM COATED ORAL at 09:05

## 2024-10-23 RX ADMIN — INSULIN LISPRO 2 UNITS: 100 INJECTION, SOLUTION INTRAVENOUS; SUBCUTANEOUS at 09:05

## 2024-10-23 NOTE — THERAPY TREATMENT NOTE
Patient Name: Lowell Min  : 1940    MRN: [id number]                              Today's Date: 10/23/2024       Admit Date: 10/18/2024    Visit Dx:     ICD-10-CM ICD-9-CM   1. Acute diastolic congestive heart failure  I50.31 428.31     428.0   2. Chronic kidney disease, unspecified CKD stage  N18.9 585.9     Patient Active Problem List   Diagnosis    Chronic kidney disease, stage IV (severe)    Erythropoietin deficiency anemia    Symptomatic anemia    Anxiety associated with depression    Iron deficiency anemia    PAF (paroxysmal atrial fibrillation)    Shortness of breath    Chronic diastolic congestive heart failure    Diabetic polyneuropathy associated with type 2 diabetes mellitus    PERLA (acute kidney injury)    Foot pain, bilateral    Anemia of chronic renal failure    Acute on chronic diastolic CHF (congestive heart failure)    Obesity (BMI 30-39.9)    HLD (hyperlipidemia)    Calcium pyrophosphate deposition disease (CPPD)    Type 2 diabetes mellitus    Acute on chronic systolic CHF (congestive heart failure)    Chronic respiratory failure with hypoxia     Past Medical History:   Diagnosis Date    Anxiety     Atrial fibrillation     CHF (congestive heart failure)     Chronic kidney disease, stage IV (severe)     Depression     Elevated cholesterol     Hypertension     Type 2 diabetes mellitus      Past Surgical History:   Procedure Laterality Date    APPENDECTOMY      CHOLECYSTECTOMY      COLONOSCOPY      CYSTOSCOPY BOTOX INJECTION OF BLADDER N/A     HYSTERECTOMY      TUMOR REMOVAL Left     benign tumor from left breast      General Information       Row Name 10/23/24 5216          Physical Therapy Time and Intention    Document Type therapy note (daily note)  -PH     Mode of Treatment physical therapy  -PH       Row Name 10/23/24 1355          General Information    Existing Precautions/Restrictions fall;oxygen therapy device and L/min  -PH       Row Name 10/23/24 4494          Safety  Issues/Impairments Affecting Functional Mobility    Impairments Affecting Function (Mobility) endurance/activity tolerance;strength;pain;balance  -PH     Comment, Safety Issues/Impairments (Mobility) gt belt and non skid socks donned; L knee pain limiting  -PH               User Key  (r) = Recorded By, (t) = Taken By, (c) = Cosigned By      Initials Name Provider Type     Kiara Navarro PTA Physical Therapist Assistant                   Mobility       Row Name 10/23/24 1359          Bed Mobility    Comment, (Bed Mobility) UIC and returned to chair after gait  -PH       Row Name 10/23/24 1350          Sit-Stand Transfer    Sit-Stand [place] (Transfers) contact guard;minimum assist (75% patient effort);verbal cues  -PH     Assistive Device (Sit-Stand Transfers) walker, front-wheeled  -PH     Comment, (Sit-Stand Transfer) from chair  -PH       Row Name 10/23/24 5070          Gait/Stairs (Locomotion)    [place] Level (Gait) contact guard  -PH     Assistive Device (Gait) walker, front-wheeled  -PH     Distance in Feet (Gait) 35  -PH     Deviations/Abnormal Patterns (Gait) guillermo decreased;gait speed decreased;antalgic;stride length decreased  -PH     Bilateral Gait Deviations forward flexed posture  -PH     Left Sided Gait Deviations weight shift ability decreased  -PH     [place] Level (Stairs) unable to assess  -PH     Comment, (Gait/Stairs) slow and antalgic w/ pt amb on 1L of O2; less SOA noted although still req a few min seated rest after gait 2/2 fatigue  -PH               User Key  (r) = Recorded By, (t) = Taken By, (c) = Cosigned By      Initials Name Provider Type     Kiara Navarro PTA Physical Therapist Assistant                   Obj/Interventions       Row Name 10/23/24 2338          Motor Skills    Therapeutic Exercise other (see comments)  BAP, HS, hip abd/add, SAQ, SLR; x 10-15 reps  -PH       Row Name 10/23/24 1350          Balance    Balance Assessment sitting  static balance;standing static balance  -PH     Static Sitting Balance standby assist  -PH     Static Standing Balance contact guard;supervision  -PH     Position/Device Used, Standing Balance walker, front-wheeled  -PH               User Key  (r) = Recorded By, (t) = Taken By, (c) = Cosigned By      Initials Name Provider Type    PH Kiara Navarro PTA Physical Therapist Assistant                   Goals/Plan    No documentation.                  Clinical Impression       Row Name 10/23/24 1358          Pain    Pretreatment Pain Rating 6/10  -PH     Posttreatment Pain Rating 6/10  -PH     Pain Management Interventions exercise or physical activity utilized  -PH     Response to Pain Interventions activity participation with tolerable pain  -PH     Pre/Posttreatment Pain Comment c/o chronic L knee and back pain  -PH       Row Name 10/23/24 1353          Plan of Care Review    Plan of Care Reviewed With patient  -PH     Progress no change  -PH     Outcome Evaluation Pt was seen for PT tx this PM. Pt was UIC and stood w/ min A. Pt amb approx 35' w/ CGA and use of fww. Gait was slow and antalgic w/ no overt LOB. Pt amb on 1L of O2 w/ less SOA noted although pt still req a few min rest in chair 2/2 fatigue after gait. Pt performed B LE ther ex and was reclined in chair at end of session. PT will prog as pt deonna.  -PH       Row Name 10/23/24 1350          Vital Signs    O2 Delivery Pre Treatment supplemental O2  -PH     O2 Delivery Intra Treatment supplemental O2  -PH     O2 Delivery Post Treatment supplemental O2  -PH       Row Name 10/23/24 1351          Positioning and Restraints    Pre-Treatment Position sitting in chair/recliner  -PH     Post Treatment Position chair  -PH     In Chair reclined;call light within reach;encouraged to call for assist;exit alarm on;notified nsg  -PH               User Key  (r) = Recorded By, (t) = Taken By, (c) = Cosigned By      Initials Name Provider Type    PH Melanie  RHEA Craig Physical Therapist Assistant                   Outcome Measures       Row Name 10/23/24 1404          How much help from another person do you currently need...    Turning from your back to your side while in flat bed without using bedrails? 4  -PH     Moving from lying on back to sitting on the side of a flat bed without bedrails? 3  -PH     Moving to and from a bed to a chair (including a wheelchair)? 3  -PH     Standing up from a chair using your arms (e.g., wheelchair, bedside chair)? 3  -PH     Climbing 3-5 steps with a railing? 2  -PH     To walk in hospital room? 3  -PH     AM-PAC 6 Clicks Score (PT) 18  -PH     Highest Level of Mobility Goal 6 --> Walk 10 steps or more  -PH       Row Name 10/23/24 1404          Functional Assessment    Outcome Measure Options AM-PAC 6 Clicks Basic Mobility (PT)  -PH               User Key  (r) = Recorded By, (t) = Taken By, (c) = Cosigned By      Initials Name Provider Type     Kiara Navarro PTA Physical Therapist Assistant                                 Physical Therapy Education       Title: PT OT SLP Therapies (In Progress)       Topic: Physical Therapy (Done)       Point: Mobility training (Done)       Learning Progress Summary            Patient Acceptance, E,TB,D, VU,DU,NR by  at 10/23/2024 1405    Acceptance, E,TB,D, VU by  at 10/22/2024 1434    Acceptance, E,TB, VU by  at 10/21/2024 1539    Acceptance, E,TB, VU by  at 10/20/2024 1422                      Point: Home exercise program (Done)       Learning Progress Summary            Patient Acceptance, E,TB,D, VU,DU,NR by  at 10/23/2024 1405    Acceptance, E,TB,D, VU by  at 10/22/2024 1434    Acceptance, E,TB, VU by  at 10/21/2024 1539                      Point: Body mechanics (Done)       Learning Progress Summary            Patient Acceptance, E,TB,D, VU,DU,NR by  at 10/23/2024 1405    Acceptance, E,TB,D, VU by  at 10/22/2024 1434    Acceptance, E,TB, VU by  at  10/21/2024 1539    Acceptance, E,TB, VU by LW at 10/20/2024 1422                      Point: Precautions (Done)       Learning Progress Summary            Patient Acceptance, E,TB,D, VU,DU,NR by  at 10/23/2024 1405    Acceptance, E,TB,D, VU by  at 10/22/2024 1434    Acceptance, E,TB, VU by  at 10/21/2024 1539    Acceptance, E,TB, VU by  at 10/20/2024 1422                                      User Key       Initials Effective Dates Name Provider Type Discipline     06/16/21 -  Kiara Navarro PTA Physical Therapist Assistant PT    LW 05/08/23 -  Farida Urbina PT Physical Therapist PT                  PT Recommendation and Plan     Progress: no change  Outcome Evaluation: Pt was seen for PT tx this PM. Pt was UIC and stood w/ min A. Pt amb approx 35' w/ CGA and use of fww. Gait was slow and antalgic w/ no overt LOB. Pt amb on 1L of O2 w/ less SOA noted although pt still req a few min rest in chair 2/2 fatigue after gait. Pt performed B LE ther ex and was reclined in chair at end of session. PT will prog as pt deonna.     Time Calculation:         PT Charges       Row Name 10/23/24 1405             Time Calculation    Start Time 1320  -PH      Stop Time 1339  -PH      Time Calculation (min) 19 min  -PH      PT Received On 10/23/24  -PH      PT - Next Appointment 10/24/24  -PH         Timed Charges    00268 - PT Therapeutic Exercise Minutes 11  -PH      39505 - PT Therapeutic Activity Minutes 8  -PH         Total Minutes    Timed Charges Total Minutes 19  -PH       Total Minutes 19  -PH                User Key  (r) = Recorded By, (t) = Taken By, (c) = Cosigned By      Initials Name Provider Type     Kiara Navarro PTA Physical Therapist Assistant                  Therapy Charges for Today       Code Description Service Date Service Provider Modifiers Qty    90535931994 HC PT THERAPEUTIC ACT EA 15 MIN 10/22/2024 Kiara Navarro PTA GP 1    52311491877 HC PT THER PROC EA 15 MIN 10/23/2024  Kiara Navarro, PTA GP 1            PT G-Codes  Outcome Measure Options: AM-PAC 6 Clicks Basic Mobility (PT)  AM-PAC 6 Clicks Score (PT): 18  AM-PAC 6 Clicks Score (OT): 15  PT Discharge Summary  Anticipated Discharge Disposition (PT): skilled nursing facility    Kiara Navarro PTA  10/23/2024

## 2024-10-23 NOTE — PROGRESS NOTES
[facility]  Cardiology Progress Note    Patient Identification:  Name: Lowell Min  Age: 84 y.o.  Sex: female  :  1940  MRN: [id number]                 Follow Up / Chief Complaint: Follow-up for acute on chronic heart failure with exacerbation    Interval History: resting in bed, no chest pain or shortness of breath       Objective:    Past Medical History:  Past Medical History:   Diagnosis Date    Anxiety     Atrial fibrillation     CHF (congestive heart failure)     Chronic kidney disease, stage IV (severe)     Depression     Elevated cholesterol     Hypertension     Type 2 diabetes mellitus      Past Surgical History:  Past Surgical History:   Procedure Laterality Date    APPENDECTOMY      CHOLECYSTECTOMY      COLONOSCOPY      CYSTOSCOPY BOTOX INJECTION OF BLADDER N/A     HYSTERECTOMY      TUMOR REMOVAL Left     benign tumor from left breast        Social History:   Social History     Tobacco Use    Smoking status: Never    Smokeless tobacco: Never   Substance Use Topics    Alcohol use: Never      Family History:  History reviewed. No pertinent family history.       Allergies:  Allergies   Allergen Reactions    Ibuprofen Other (See Comments)     Decrease urine output       Scheduled Meds:  apixaban, 2.5 mg, BID  atorvastatin, 40 mg, Daily  carvedilol, 12.5 mg, BID With Meals  [Held by provider] furosemide, 40 mg, BID  hydrALAZINE, 10 mg, Q8H  insulin lispro, 2-7 Units, 4x Daily AC & at Bedtime  isosorbide dinitrate, 10 mg, TID - Nitrates  venlafaxine XR, 75 mg, Daily            INTAKE AND OUTPUT:    Intake/Output Summary (Last 24 hours) at 10/23/2024 1206  Last data filed at 10/23/2024 0532  Gross per 24 hour   Intake 200 ml   Output 850 ml   Net -650 ml       Review of Systems:   GI: no n/v or abd pain  Cardiac: no chest pain or palpitations  Pulmonary: no shortness of breath or cough      Constitutional:  Temp:  [97.5 °F (36.4 °C)-98.4 °F (36.9 °C)] 97.9 °F (36.6 °C)  Heart Rate:   [59-64] 59  Resp:  [18] 18  BP: (102-122)/(56-66) 122/66    Physical Exam:  General:  Alert, cooperative, appears in no acute distress  Respiratory: Clear to auscultation.  Normal respiratory effort and rate.  Cardiovascular: S1S2 Regular rate and rhythm. No murmur, rub or gallop.   Gastrointestinal: soft, non tender. Bowel sounds present.   Extremities: JAY x4. No pretibial pitting edema. Adequate musculoskeletal strength.   Neuro: AAO x3 CN II-XII grossly intact        I reviewed the patient's new clinical results, and personally reviewed and interpreted the patient's ECG and telemetry data from the last 24 hours          Cardiographics  Echocardiogram:   Interpretation Summary    Left ventricular systolic function is mildly decreased. Calculated left ventricular EF = 44.1%    The following left ventricular wall segments are hypokinetic: mid anterior, apical anterior, basal anterolateral, mid anterolateral, apical lateral, basal inferolateral, mid inferolateral, apical inferior, mid inferior, apical septal, basal inferoseptal, mid inferoseptal, apex hypokinetic, mid anteroseptal, basal anterior, basal inferior and basal inferoseptal.    Left ventricular diastolic function was indeterminate.    The left atrial cavity is severely dilated.    Mild aortic valve stenosis is present. Aortic valve area is 1.2 cm2.    The right atrial cavity is severely  dilated.    Peak velocity of the flow distal to the aortic valve is 246.1 cm/s. Aortic valve mean pressure gradient is 11 mmHg.    Severe mitral valve regurgitation is present.    Mild mitral valve stenosis is present. The mitral valve mean gradient is 4 mmHg.    Severe tricuspid valve regurgitation is present.    Estimated right ventricular systolic pressure from tricuspid regurgitation is markedly elevated (>55 mmHg). Calculated right ventricular systolic pressure from tricuspid regurgitation is 62 mmHg.     Lab Review   Results from last 7 days   Lab Units  "10/18/24  1846   HSTROP T ng/L 47*     Results from last 7 days   Lab Units 10/23/24  0246   MAGNESIUM mg/dL 1.7     Results from last 7 days   Lab Units 10/23/24  0246   SODIUM mmol/L 135*   POTASSIUM mmol/L 4.1   BUN mg/dL 38*   CREATININE mg/dL 2.99*   CALCIUM mg/dL 8.7     @[id number]@  Results from last 7 days   Lab Units 10/23/24  0246 10/22/24  0339 10/21/24  0317   WBC 10*3/mm3 4.42 5.12 4.97   HEMOGLOBIN g/dL 7.5* 7.5* 7.5*   HEMATOCRIT % 23.1* 23.1* 21.5*   PLATELETS 10*3/mm3 120* 134* 141         Intake/Output Summary (Last 24 hours) at 10/23/2024 1206  Last data filed at 10/23/2024 0532  Gross per 24 hour   Intake 200 ml   Output 850 ml   Net -650 ml           Assessment:  1.  Acute on chronic heart failure exacerbation probably due to valvular dysfunction  2.  Severe MR  3.  Permanent A-fib  4.  CKD: creatinine stable. nephrology following  5.  Diabetes mellitus  6.  Hypertension: Stable  7.  Hyperlipidemia: ALT/AST stable.  On statin  9.  NELLY  10. Anemia: defer to IM        Plan:  1.  Acute on chronic heart failure exacerbation-echo ef 44%. Gdmt limited by ckd, continue coreg. Diuresing per nephrology, lasix on hold, IVF infusing.     2.  Severe MR:  She has declined further testing in the past, will defer further workup for MR to her primary cardiologist with [facility].     3. Pulmonary hypertension: likely due to severe MR.     4.  Permanent A-fib-rate controlled on coreg. Continue eliquis.    5.  Hypertension: bp stable    )10/23/2024  RAMAKRISHNA Weiner/Transcription:   \"Dictated utilizing Dragon dictation\".     " good minus

## 2024-10-23 NOTE — PLAN OF CARE
Goal Outcome Evaluation:  Plan of Care Reviewed With: patient        Progress: no change  Outcome Evaluation: Calm and cooperative with care. RA during the day. Up in chair for most of day. IVF initiated. Wt down from yesterday. Eliquis for chronic afib. Safety maintained.

## 2024-10-23 NOTE — PLAN OF CARE
Goal Outcome Evaluation:  Plan of Care Reviewed With: patient        Progress: no change  Outcome Evaluation: Pt was seen for PT tx this PM. Pt was UIC and stood w/ min A. Pt amb approx 35' w/ CGA and use of fww. Gait was slow and antalgic w/ no overt LOB. Pt amb on 1L of O2 w/ less SOA noted although pt still req a few min rest in chair 2/2 fatigue after gait. Pt performed B LE ther ex and was reclined in chair at end of session. PT will prog as pt deonna.    Anticipated Discharge Disposition (PT): skilled nursing facility

## 2024-10-23 NOTE — CASE MANAGEMENT/SOCIAL WORK
Continued Stay Note  [facility]     Patient Name: Lowell Min  MRN: [id number]  Today's Date: 10/23/2024    Admit Date: 10/18/2024    Plan: Return to [facility], will need ambulance transportation.   Discharge Plan       Row Name 10/23/24 1337       Plan    Plan Return to [facility], will need ambulance transportation.    Plan Comments CCP contacted Kelly MCDONALD/Lorna and updated on current status. CCP following for discharge readiness.                   Discharge Codes    No documentation.                 Expected Discharge Date and Time       Expected Discharge Date Expected Discharge Time    Oct 25, 2024               Shea Morales RN

## 2024-10-23 NOTE — CASE MANAGEMENT/SOCIAL WORK
"Physicians Statement of Medical Necessity for  Ambulance Transportation    GENERAL INFORMATION     Name: Lowell Min  YOB: 1940  Medicare #: [id number]  Transport Date: _________________________________ (Valid for round trips this date, or for scheduled repetitive trips for 60 days from the date signed below.)  Origin: [facility]  Destination: [facility]  Is the Patient's stay covered under Medicare Part A (PPS/DRG?)Yes  Closest appropriate facility? Yes  If this a hosp-hosp transfer? No  Is this a hospice patient? No    MEDICAL NECESSITY QUESTIONAIRE    Ambulance Transportation is medically necessary only if other means of transportation are contraindicated or would be potentially harmful to the patient.  To meet this requirement, the patient must be either \"bed confined\" or suffer from a condition such that transport by means other than an ambulance is contraindicated by the patient's condition.  The following questions must be answered by the healthcare professional signing below for this form to be valid:     1) Describe the MEDICAL CONDITION (physical and/or mental) of this patient AT THE TIME OF AMBULANCE TRANSPORT that requires the patient to be transported in an ambulance, and why transport by other means is contraindicated by the patient's condition:   Past Medical History:   Diagnosis Date    Anxiety     Atrial fibrillation     CHF (congestive heart failure)     Chronic kidney disease, stage IV (severe)     Depression     Elevated cholesterol     Hypertension     Type 2 diabetes mellitus       Past Surgical History:   Procedure Laterality Date    APPENDECTOMY      CHOLECYSTECTOMY      COLONOSCOPY      CYSTOSCOPY BOTOX INJECTION OF BLADDER N/A     HYSTERECTOMY      TUMOR REMOVAL Left     benign tumor from left breast      2) Is this patient \"bed confined\" as defined below?No    To be \"bed confined\" the patient must satisfy all three of the following criteria: "  (1) unable to get up from bed without assistance; AND (2) unable to ambulate;  AND (3) unable to sit in a chair or wheelchair.  3) Can this patient safely be transported by car or wheelchair van (I.e., may safely sit during transport, without an attendant or monitoring?)No   4. In addition to completing questions 1-3 above, please check any of the following conditions that apply*:          *Note: supporting documentation for any boxes checked must be maintained in the patient's medical records Moderate/severe pain on movement, Requires oxygen - unable to self administer, Unable to tolerate seated position for time needed to transport, and Other High falls risk, limited mobility      SIGNATURE OF PHYSICIAN OR OTHER AUTHORIZED HEALTHCARE PROFESSIONAL    I certify that the above information is true and correct based on my evaluation of this patient, and represent that the patient requires transport by ambulance and that other forms of transport are contraindicated.  I understand that this information will be used by the Centers for Medicare and Medicaid Services (CMS) to support the determiniation of medical necessity for ambulance services, and I represent that I have personal knowledge of the patient's condition at the time of transport.       If this box is checked, I also certify that the patient is physically or mentally incapable of signing the ambulance service's claim form and that the institution with which I am affiliated has furnished care, services or assistance to the patient.  My signature below is made on behalf of the patient pursuant to 42 .36(b)(4). In accordance with 42 .37, the specific reason(s) that the patient is physically or mentally incapable of signing the claim for is as follows: limited mobility    Signature of Physician or Healthcare Professional  Date/Time:        (For Scheduled repetitive transport, this form is not valid for transports performed more than 60 days after  this date).                                                                                                                                            --------------------------------------------------------------------------------------------  Printed Name and Credentials of Physician or Authorized Healthcare Professional     Form must be signed by patient's attending physician for scheduled, repetitive transports,.  For non-repetitive ambulance transports, if unable to obtain the signature of the attending physician, any of the following may sign (please select below):     Physician  Clinical Nurse Specialist  Registered Nurse     Physician Assistant  Discharge Planner  Licensed Practical Nurse     Nurse Practitioner

## 2024-10-23 NOTE — PROGRESS NOTES
"   LOS: 5 days     Chief Complaint/ Reason for encounter: CHF exacerbation, CKD    Subjective   10/21/24 : Patient is doing well today with no new complaints  Good appetite with no nausea or vomiting  No shortness of breath chest pain or edema  Voiding well with no dysuria  She is feeling better with diuresis and her weight is down 5 pounds since admission  No edema, not requiring any oxygen supplementally    10/22: Clinically doing well with no new complaints  Not sure if it is accurate but her weight is down to 197 pounds today  Only on oxygen at night, 1 L  No edema no dyspnea no chest pain fevers or chills good oral intake with no nausea or vomiting    10/23: Clinically doing well denies new complaints, no shortness of breath or increased oxygen requirements.  No edema, good urine output    Medical history reviewed:  History of Present Illness    Subjective    History taken from: Patient and chart    Vital Signs  Temp:  [97.5 °F (36.4 °C)-98.4 °F (36.9 °C)] 97.9 °F (36.6 °C)  Heart Rate:  [59-64] 59  Resp:  [18] 18  BP: (102-122)/(56-66) 122/66       Wt Readings from Last 1 Encounters:   10/22/24 0537 89.4 kg (197 lb 1.5 oz)   10/21/24 0439 95.5 kg (210 lb 8.6 oz)   10/20/24 0451 94.4 kg (208 lb 1.8 oz)   10/19/24 0700 96 kg (211 lb 10.3 oz)   10/18/24 2214 97 kg (213 lb 13.5 oz)   10/18/24 2142 97.5 kg (215 lb)       Objective:  Vital signs: (most recent): Blood pressure 122/66, pulse 59, temperature 97.9 °F (36.6 °C), temperature source Oral, resp. rate 18, height 170.2 cm (67\"), weight 89.4 kg (197 lb 1.5 oz), SpO2 97%.                Objective:  General Appearance:  Comfortable, well-appearing, in no acute distress and not in pain.  Awake, alert  HEENT: Mucous membranes moist, no injury, oropharynx clear  Lungs:  Normal effort and normal respiratory rate.  Breath sounds clear to auscultation.  No  respiratory distress.  No rales, decreased breath sounds or rhonchi.    Heart: Normal rate.  Regular rhythm.  " S1, S2 normal.  No murmur.   Abdomen: Abdomen is soft.  Bowel sounds are normal, no abdominal tenderness.  There is no rebound or guarding  Extremities: No edema of bilateral lower extremities  Skin:  Warm and dry with no rashes      Results Review:    Intake/Output:     Intake/Output Summary (Last 24 hours) at 10/23/2024 1059  Last data filed at 10/23/2024 0532  Gross per 24 hour   Intake 200 ml   Output 850 ml   Net -650 ml         DATA:  Radiology and Labs:  The following labs independently reviewed by me. Additional labs ordered for tomorrow a.m.  Interval notes, chart personally reviewed by me.   Old records independently reviewed showing progressive CKD, now stage IV  Discussed with patient herself at bedside    Risk/ complexity of medical care/ medical decision making moderate complexity, diuretic management      Labs:   Recent Results (from the past 24 hours)   POC Glucose Once    Collection Time: 10/22/24 11:30 AM    Specimen: Blood   Result Value Ref Range    Glucose 169 (H) 70 - 130 mg/dL   POC Glucose Once    Collection Time: 10/22/24  4:41 PM    Specimen: Blood   Result Value Ref Range    Glucose 108 70 - 130 mg/dL   POC Glucose Once    Collection Time: 10/22/24  9:16 PM    Specimen: Blood   Result Value Ref Range    Glucose 163 (H) 70 - 130 mg/dL   CBC (No Diff)    Collection Time: 10/23/24  2:46 AM    Specimen: Blood   Result Value Ref Range    WBC 4.42 3.40 - 10.80 10*3/mm3    RBC 2.44 (L) 3.77 - 5.28 10*6/mm3    Hemoglobin 7.5 (L) 12.0 - 15.9 g/dL    Hematocrit 23.1 (L) 34.0 - 46.6 %    MCV 94.7 79.0 - 97.0 fL    MCH 30.7 26.6 - 33.0 pg    MCHC 32.5 31.5 - 35.7 g/dL    RDW 15.6 (H) 12.3 - 15.4 %    RDW-SD 53.6 37.0 - 54.0 fl    MPV 9.6 6.0 - 12.0 fL    Platelets 120 (L) 140 - 450 10*3/mm3   Magnesium    Collection Time: 10/23/24  2:46 AM    Specimen: Blood   Result Value Ref Range    Magnesium 1.7 1.6 - 2.4 mg/dL   Hepatic Function Panel    Collection Time: 10/23/24  2:46 AM    Specimen: Blood    Result Value Ref Range    Total Protein 6.1 6.0 - 8.5 g/dL    Albumin 2.8 (L) 3.5 - 5.2 g/dL    ALT (SGPT) 8 1 - 33 U/L    AST (SGOT) 13 1 - 32 U/L    Alkaline Phosphatase 224 (H) 39 - 117 U/L    Total Bilirubin 0.9 0.0 - 1.2 mg/dL    Bilirubin, Direct 0.5 (H) 0.0 - 0.3 mg/dL    Bilirubin, Indirect 0.4 mg/dL   Phosphorus    Collection Time: 10/23/24  2:46 AM    Specimen: Blood   Result Value Ref Range    Phosphorus 3.5 2.5 - 4.5 mg/dL   Basic Metabolic Panel    Collection Time: 10/23/24  2:46 AM    Specimen: Blood   Result Value Ref Range    Glucose 110 (H) 65 - 99 mg/dL    BUN 38 (H) 8 - 23 mg/dL    Creatinine 2.99 (H) 0.57 - 1.00 mg/dL    Sodium 135 (L) 136 - 145 mmol/L    Potassium 4.1 3.5 - 5.2 mmol/L    Chloride 99 98 - 107 mmol/L    CO2 27.0 22.0 - 29.0 mmol/L    Calcium 8.7 8.6 - 10.5 mg/dL    BUN/Creatinine Ratio 12.7 7.0 - 25.0    Anion Gap 9.0 5.0 - 15.0 mmol/L    eGFR 15.0 (L) >60.0 mL/min/1.73   POC Glucose Once    Collection Time: 10/23/24  7:37 AM    Specimen: Blood   Result Value Ref Range    Glucose 177 (H) 70 - 130 mg/dL       Radiology:  Pertinent radiology studies were reviewed as described above      Medications have been reviewed separately in chart overview      ASSESSMENT:  CKD stage IV with some progression, stable, mild PERLA today  Volume overload, improved.  Euvolemic on exam  Dyspnea, improved  Anemia of CKD  CHF, acute on chronic systolic, improved with diuresis      Plan:  Her renal function is bit worse again today, creatinine still trending up from 2.4 and now almost 3.0, EGFR 15  Clinically euvolemic on exam  Will continue to hold diuretics today and give a liter of normal saline      Likely will need a lower dose of oral Lasix at discharge, perhaps 20 to 40 mg orally, once a day    On hydralazine/Isordil combo per cardiology.  Echo results noted, EF 40 to 45%  Continue strict I's and O's, daily weights, low-sodium diet  Okay to liberalize oral fluid intake a bit today    Discussed  need for outpatient dialysis education and planning as she is approaching ESRD      Isaiah Foster MD  [facility]   Office phone number: [phone]  Answering service phone number: [phone]    10/23/24  10:59 EDT    Dictation performed using Dragon dictation software

## 2024-10-23 NOTE — PROGRESS NOTES
Name: Lowell Min ADMIT: 10/18/2024   : 1940  PCP: Dannie Mathews MD    MRN: [id number] LOS: 5 days   AGE/SEX: 84 y.o. female  ROOM: 30/1     Subjective   Subjective   Feeling okay today. Tolerating po. No N/V/D/abd pain. No F/C/NS. No SOA or CP. Voiding well.       Objective   Objective   Vital Signs  Temp:  [97.5 °F (36.4 °C)-98.4 °F (36.9 °C)] 97.9 °F (36.6 °C)  Heart Rate:  [59-64] 59  Resp:  [18] 18  BP: (102-122)/(56-66) 122/66  SpO2:  [97 %-99 %] 97 %  on  Flow (L/min) (Oxygen Therapy):  [1] 1;   Device (Oxygen Therapy): nasal cannula  Body mass index is 30.87 kg/m².    (No change in exam today)    Physical Exam  Vitals and nursing note reviewed.   Constitutional:       General: She is not in acute distress.     Appearance: She is ill-appearing (chronically). She is not toxic-appearing or diaphoretic.   HENT:      Head: Normocephalic.      Mouth/Throat:      Mouth: Mucous membranes are moist.      Pharynx: Oropharynx is clear.   Eyes:      General: No scleral icterus.        Right eye: No discharge.         Left eye: No discharge.      Conjunctiva/sclera: Conjunctivae normal.   Cardiovascular:      Rate and Rhythm: Normal rate. Rhythm irregular.      Pulses: Normal pulses.   Pulmonary:      Effort: Pulmonary effort is normal. No respiratory distress.      Breath sounds: Normal breath sounds. No wheezing or rales.   Abdominal:      General: Bowel sounds are normal. There is no distension.      Palpations: Abdomen is soft.      Tenderness: There is no abdominal tenderness.   Musculoskeletal:         General: Swelling (trace in BLEs) present.      Cervical back: Neck supple.   Skin:     General: Skin is warm and dry.      Capillary Refill: Capillary refill takes less than 2 seconds.      Coloration: Skin is pale. Skin is not jaundiced.   Neurological:      General: No focal deficit present.      Mental Status: She is alert and oriented to person, place, and time. Mental status is at baseline.       Cranial Nerves: No cranial nerve deficit.      Coordination: Coordination normal.   Psychiatric:         Mood and Affect: Mood normal.         Behavior: Behavior normal.         Thought Content: Thought content normal.       Results Review     I reviewed the patient's new clinical results.  Results from last 7 days   Lab Units 10/23/24  0246 10/22/24  0339 10/21/24  0317 10/19/24  0339   WBC 10*3/mm3 4.42 5.12 4.97 5.89   HEMOGLOBIN g/dL 7.5* 7.5* 7.5* 7.3*   PLATELETS 10*3/mm3 120* 134* 141 151     Results from last 7 days   Lab Units 10/23/24  0246 10/22/24  0339 10/21/24  0317 10/20/24  0856   SODIUM mmol/L 135* 134* 136 138   POTASSIUM mmol/L 4.1 3.8 4.1 4.4   CHLORIDE mmol/L 99 97* 100 103   CO2 mmol/L 27.0 24.9 27.0 26.0   BUN mg/dL 38* 39* 39* 39*   CREATININE mg/dL 2.99* 2.75* 2.41* 2.42*   GLUCOSE mg/dL 110* 100* 112* 106*   EGFR mL/min/1.73 15.0* 16.5* 19.4* 19.3*     Results from last 7 days   Lab Units 10/23/24  0246 10/22/24  0339 10/18/24  1846   ALBUMIN g/dL 2.8* 3.0* 3.2*   BILIRUBIN mg/dL 0.9  --  0.9   ALK PHOS U/L 224*  --  265*   AST (SGOT) U/L 13  --  16   ALT (SGPT) U/L 8  --  13     Results from last 7 days   Lab Units 10/23/24  0246 10/22/24  0339 10/21/24  0317 10/20/24  0856 10/19/24  0339 10/18/24  1846   CALCIUM mg/dL 8.7 8.8 8.5* 8.6   < > 9.0   ALBUMIN g/dL 2.8* 3.0*  --   --   --  3.2*   MAGNESIUM mg/dL 1.7 1.9 1.9  --   --   --    PHOSPHORUS mg/dL 3.5 3.6 3.1  --   --   --     < > = values in this interval not displayed.       Glucose   Date/Time Value Ref Range Status   10/23/2024 1143 137 (H) 70 - 130 mg/dL Final   10/23/2024 0737 177 (H) 70 - 130 mg/dL Final   10/22/2024 2116 163 (H) 70 - 130 mg/dL Final   10/22/2024 1641 108 70 - 130 mg/dL Final   10/22/2024 1130 169 (H) 70 - 130 mg/dL Final   10/22/2024 0739 110 70 - 130 mg/dL Final   10/21/2024 2100 128 70 - 130 mg/dL Final       No radiology results for the last day    I have personally reviewed all medications:  Scheduled  Medications  apixaban, 2.5 mg, Oral, BID  atorvastatin, 40 mg, Oral, Daily  carvedilol, 12.5 mg, Oral, BID With Meals  [Held by provider] furosemide, 40 mg, Oral, BID  hydrALAZINE, 10 mg, Oral, Q8H  insulin lispro, 2-7 Units, Subcutaneous, 4x Daily AC & at Bedtime  isosorbide dinitrate, 10 mg, Oral, TID - Nitrates  venlafaxine XR, 75 mg, Oral, Daily    Infusions  sodium chloride, 100 mL/hr, Last Rate: 100 mL/hr (10/23/24 0911)    Diet  Diet: Cardiac; Healthy Heart (2-3 Na+); Fluid Consistency: Thin (IDDSI 0)    I have personally reviewed:  [x]  Laboratory   []  Microbiology   []  Radiology   [x]  EKG/Telemetry  []  Cardiology/Vascular   []  Pathology    []  Records       Assessment/Plan     Active Hospital Problems    Diagnosis  POA    **Acute on chronic systolic CHF (congestive heart failure) [I50.23]  Yes    Chronic respiratory failure with hypoxia [J96.11]  Yes    Type 2 diabetes mellitus [E11.9]  Yes    HLD (hyperlipidemia) [E78.5]  Yes    PAF (paroxysmal atrial fibrillation) [I48.0]  Yes    Iron deficiency anemia [D50.9]  Yes    Chronic diastolic congestive heart failure [I50.32]  Yes    Anxiety associated with depression [F41.8]  Yes    Chronic kidney disease, stage IV (severe) [N18.4]  Yes      Resolved Hospital Problems   No resolved problems to display.     85yo woman with RADHA, CKD4, HTN, DM2, PAF, pulm HTN, CHRF, and chronic combined CHF, who presented to ER with worsening SOA and BLE edema. She was admitted with acute on chronic HRrEF.    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  HTN  -BNP 21,000 on admission, CXR with vascular congestion, bilateral lower extremity edema  -Cardiology and Renal following  -diuresed well with falling weights and I<<Os  -O2 requirement down to 1L/min--pt uses O2 at home: 1L/min when sleeping and with exertion  -diuretic on hold now due to rising Cr  -cont Coreg, hydralazine, and Isordil per Cardiology  -no ACE/ARB/ARNI/MRA due to CKD     CKD4  -Cr  up to 2.99 this AM despite holding diuretic  -Renal has ordered IVFs     DM2 with hyperglycemia  -A1c 6.5 in June, not on meds PTA  -Sugars acceptable here  -continue SSI and monitor for hypoglycemia     PAF  -RC: Coreg  -AC: Eliquis  -HRs acceptable     HLD  -Statin    Anemia of CKD  -no bleeding evident here  -Hgb stable  -labs c/w chronic disease anemia  -continue to monitor Hgb and transfuse if <7      Eliquis (home med) for DVT prophylaxis.  Full code.  Discussed with patient, nursing staff, CCP, and care team on multidisciplinary rounds.  Anticipate discharge  back to [facility]  when cleared by consultants.  Expected Discharge Date: 10/25/2024; Expected Discharge Time:       Trevor Ely MD  [facility]  10/23/24  12:42 EDT

## 2024-10-24 LAB
ALBUMIN SERPL-MCNC: 2.9 G/DL (ref 3.5–5.2)
ANION GAP SERPL CALCULATED.3IONS-SCNC: 9 MMOL/L (ref 5–15)
BUN SERPL-MCNC: 40 MG/DL (ref 8–23)
BUN/CREAT SERPL: 14.5 (ref 7–25)
CALCIUM SPEC-SCNC: 8.6 MG/DL (ref 8.6–10.5)
CHLORIDE SERPL-SCNC: 98 MMOL/L (ref 98–107)
CO2 SERPL-SCNC: 24 MMOL/L (ref 22–29)
CREAT SERPL-MCNC: 2.76 MG/DL (ref 0.57–1)
CREAT UR-MCNC: 80.8 MG/DL
DEPRECATED RDW RBC AUTO: 55.1 FL (ref 37–54)
EGFRCR SERPLBLD CKD-EPI 2021: 16.5 ML/MIN/1.73
ERYTHROCYTE [DISTWIDTH] IN BLOOD BY AUTOMATED COUNT: 16 % (ref 12.3–15.4)
GLUCOSE BLDC GLUCOMTR-MCNC: 115 MG/DL (ref 70–130)
GLUCOSE BLDC GLUCOMTR-MCNC: 153 MG/DL (ref 70–130)
GLUCOSE BLDC GLUCOMTR-MCNC: 153 MG/DL (ref 70–130)
GLUCOSE BLDC GLUCOMTR-MCNC: 209 MG/DL (ref 70–130)
GLUCOSE SERPL-MCNC: 113 MG/DL (ref 65–99)
HCT VFR BLD AUTO: 22.7 % (ref 34–46.6)
HGB BLD-MCNC: 7.1 G/DL (ref 12–15.9)
MAGNESIUM SERPL-MCNC: 1.8 MG/DL (ref 1.6–2.4)
MCH RBC QN AUTO: 29.3 PG (ref 26.6–33)
MCHC RBC AUTO-ENTMCNC: 31.3 G/DL (ref 31.5–35.7)
MCV RBC AUTO: 93.8 FL (ref 79–97)
PHOSPHATE SERPL-MCNC: 3.8 MG/DL (ref 2.5–4.5)
PLATELET # BLD AUTO: 128 10*3/MM3 (ref 140–450)
PMV BLD AUTO: 10.4 FL (ref 6–12)
POTASSIUM SERPL-SCNC: 4.2 MMOL/L (ref 3.5–5.2)
RBC # BLD AUTO: 2.42 10*6/MM3 (ref 3.77–5.28)
SODIUM SERPL-SCNC: 131 MMOL/L (ref 136–145)
URATE SERPL-MCNC: 7.1 MG/DL (ref 2.4–5.7)
WBC NRBC COR # BLD AUTO: 4.56 10*3/MM3 (ref 3.4–10.8)

## 2024-10-24 PROCEDURE — 63710000001 INSULIN LISPRO (HUMAN) PER 5 UNITS

## 2024-10-24 PROCEDURE — 25010000002 ONDANSETRON PER 1 MG

## 2024-10-24 PROCEDURE — 84550 ASSAY OF BLOOD/URIC ACID: CPT | Performed by: INTERNAL MEDICINE

## 2024-10-24 PROCEDURE — 99232 SBSQ HOSP IP/OBS MODERATE 35: CPT

## 2024-10-24 PROCEDURE — 83735 ASSAY OF MAGNESIUM: CPT | Performed by: STUDENT IN AN ORGANIZED HEALTH CARE EDUCATION/TRAINING PROGRAM

## 2024-10-24 PROCEDURE — 80069 RENAL FUNCTION PANEL: CPT | Performed by: INTERNAL MEDICINE

## 2024-10-24 PROCEDURE — 85027 COMPLETE CBC AUTOMATED: CPT | Performed by: STUDENT IN AN ORGANIZED HEALTH CARE EDUCATION/TRAINING PROGRAM

## 2024-10-24 PROCEDURE — 82948 REAGENT STRIP/BLOOD GLUCOSE: CPT

## 2024-10-24 RX ORDER — AMOXICILLIN 250 MG
2 CAPSULE ORAL 2 TIMES DAILY
Status: DISCONTINUED | OUTPATIENT
Start: 2024-10-24 | End: 2024-10-26 | Stop reason: HOSPADM

## 2024-10-24 RX ADMIN — ISOSORBIDE DINITRATE 10 MG: 10 TABLET ORAL at 18:44

## 2024-10-24 RX ADMIN — HYDRALAZINE HYDROCHLORIDE 10 MG: 10 TABLET ORAL at 13:45

## 2024-10-24 RX ADMIN — CARVEDILOL 12.5 MG: 12.5 TABLET, FILM COATED ORAL at 08:55

## 2024-10-24 RX ADMIN — APIXABAN 2.5 MG: 2.5 TABLET, FILM COATED ORAL at 08:55

## 2024-10-24 RX ADMIN — HYDRALAZINE HYDROCHLORIDE 10 MG: 10 TABLET ORAL at 21:20

## 2024-10-24 RX ADMIN — ISOSORBIDE DINITRATE 10 MG: 10 TABLET ORAL at 08:55

## 2024-10-24 RX ADMIN — ISOSORBIDE DINITRATE 10 MG: 10 TABLET ORAL at 13:45

## 2024-10-24 RX ADMIN — ATORVASTATIN CALCIUM 40 MG: 20 TABLET, FILM COATED ORAL at 08:55

## 2024-10-24 RX ADMIN — APIXABAN 2.5 MG: 2.5 TABLET, FILM COATED ORAL at 21:20

## 2024-10-24 RX ADMIN — CARVEDILOL 12.5 MG: 12.5 TABLET, FILM COATED ORAL at 18:44

## 2024-10-24 RX ADMIN — HYDRALAZINE HYDROCHLORIDE 10 MG: 10 TABLET ORAL at 06:51

## 2024-10-24 RX ADMIN — ONDANSETRON 4 MG: 2 INJECTION INTRAMUSCULAR; INTRAVENOUS at 16:54

## 2024-10-24 RX ADMIN — SENNOSIDES AND DOCUSATE SODIUM 2 TABLET: 50; 8.6 TABLET ORAL at 13:44

## 2024-10-24 RX ADMIN — VENLAFAXINE HYDROCHLORIDE 75 MG: 75 CAPSULE, EXTENDED RELEASE ORAL at 08:55

## 2024-10-24 NOTE — PROGRESS NOTES
[facility]  Cardiology Progress Note    Patient Identification:  Name: Lowell Min  Age: 84 y.o.  Sex: female  :  1940  MRN: [id number]                 Follow Up / Chief Complaint: Follow-up for acute on chronic heart failure with exacerbation    Interval History: resting in bed, no chest pain, on 2l nc, shortness of breath improving       Objective:    Past Medical History:  Past Medical History:   Diagnosis Date    Anxiety     Atrial fibrillation     CHF (congestive heart failure)     Chronic kidney disease, stage IV (severe)     Depression     Elevated cholesterol     Hypertension     Type 2 diabetes mellitus      Past Surgical History:  Past Surgical History:   Procedure Laterality Date    APPENDECTOMY      CHOLECYSTECTOMY      COLONOSCOPY      CYSTOSCOPY BOTOX INJECTION OF BLADDER N/A     HYSTERECTOMY      TUMOR REMOVAL Left     benign tumor from left breast        Social History:   Social History     Tobacco Use    Smoking status: Never    Smokeless tobacco: Never   Substance Use Topics    Alcohol use: Never      Family History:  History reviewed. No pertinent family history.       Allergies:  Allergies   Allergen Reactions    Ibuprofen Other (See Comments)     Decrease urine output       Scheduled Meds:  apixaban, 2.5 mg, BID  atorvastatin, 40 mg, Daily  carvedilol, 12.5 mg, BID With Meals  [Held by provider] furosemide, 40 mg, BID  hydrALAZINE, 10 mg, Q8H  insulin lispro, 2-7 Units, 4x Daily AC & at Bedtime  isosorbide dinitrate, 10 mg, TID - Nitrates  venlafaxine XR, 75 mg, Daily            INTAKE AND OUTPUT:    Intake/Output Summary (Last 24 hours) at 10/24/2024 0913  Last data filed at 10/24/2024 0518  Gross per 24 hour   Intake 210 ml   Output 1850 ml   Net -1640 ml       Review of Systems:   GI: no n/v or abd pain  Cardiac: no chest pain or palpitations  Pulmonary: shortness of breath improving        Constitutional:  Temp:  [97.3 °F (36.3 °C)-98.6 °F (37 °C)] 97.3 °F (36.3  °C)  Heart Rate:  [65-76] 74  Resp:  [17-18] 18  BP: (115-133)/(54-75) 133/62    Physical Exam:  General:  Alert, cooperative, appears in no acute distress  Respiratory: Clear to auscultation.  Normal respiratory effort and rate.  Cardiovascular: S1S2 Regular rate and rhythm. No murmur, rub or gallop.   Gastrointestinal: soft, non tender. Bowel sounds present.   Extremities: JAY x4. No pretibial pitting edema. Adequate musculoskeletal strength.   Neuro: AAO x3 CN II-XII grossly intact        I reviewed the patient's new clinical results, and personally reviewed and interpreted the patient's ECG and telemetry data from the last 24 hours      Cardiographics  Echocardiogram:   Interpretation Summary    Left ventricular systolic function is mildly decreased. Calculated left ventricular EF = 44.1%    The following left ventricular wall segments are hypokinetic: mid anterior, apical anterior, basal anterolateral, mid anterolateral, apical lateral, basal inferolateral, mid inferolateral, apical inferior, mid inferior, apical septal, basal inferoseptal, mid inferoseptal, apex hypokinetic, mid anteroseptal, basal anterior, basal inferior and basal inferoseptal.    Left ventricular diastolic function was indeterminate.    The left atrial cavity is severely dilated.    Mild aortic valve stenosis is present. Aortic valve area is 1.2 cm2.    The right atrial cavity is severely  dilated.    Peak velocity of the flow distal to the aortic valve is 246.1 cm/s. Aortic valve mean pressure gradient is 11 mmHg.    Severe mitral valve regurgitation is present.    Mild mitral valve stenosis is present. The mitral valve mean gradient is 4 mmHg.    Severe tricuspid valve regurgitation is present.    Estimated right ventricular systolic pressure from tricuspid regurgitation is markedly elevated (>55 mmHg). Calculated right ventricular systolic pressure from tricuspid regurgitation is 62 mmHg.     Lab Review   Results from last 7 days   Lab  "Units 10/18/24  1846   HSTROP T ng/L 47*     Results from last 7 days   Lab Units 10/24/24  0312   MAGNESIUM mg/dL 1.8     Results from last 7 days   Lab Units 10/24/24  0312   SODIUM mmol/L 131*   POTASSIUM mmol/L 4.2   BUN mg/dL 40*   CREATININE mg/dL 2.76*   CALCIUM mg/dL 8.6     @[id number]@  Results from last 7 days   Lab Units 10/24/24  0312 10/23/24  0246 10/22/24  0339   WBC 10*3/mm3 4.56 4.42 5.12   HEMOGLOBIN g/dL 7.1* 7.5* 7.5*   HEMATOCRIT % 22.7* 23.1* 23.1*   PLATELETS 10*3/mm3 128* 120* 134*         Intake/Output Summary (Last 24 hours) at 10/24/2024 0913  Last data filed at 10/24/2024 0518  Gross per 24 hour   Intake 210 ml   Output 1850 ml   Net -1640 ml           Assessment:  1.  Acute on chronic heart failure exacerbation probably due to valvular dysfunction  2.  Severe MR  3.  Permanent A-fib  4.  CKD: creatinine stable. nephrology following  5.  Diabetes mellitus  6.  Hypertension: Stable  7.  Hyperlipidemia: ALT/AST stable.  On statin  9.  NELLY  10. Anemia: defer to IM        Plan:  1.  Acute on chronic heart failure exacerbation-echo ef 44%. Gdmt limited by ckd, continue coreg. Diuresing per nephrology, lasix on hold    2.  Severe MR: she has previously declined further workup for this, I discussed this with her, recommend further outpatient workup for this per primary cardiologist with vanessa, she will discuss with Dr Orozco.     3. Pulmonary hypertension: likely due to severe MR.     4.  Permanent A-fib: rate controlled on coreg, AC on eliquis    5.  Hypertension: BP stable    )10/24/2024  RAMAKRISHNA Weiner/Transcription:   \"Dictated utilizing Dragon dictation\".     "

## 2024-10-24 NOTE — PROGRESS NOTES
Name: Lowell Min ADMIT: 10/18/2024   : 1940  PCP: Dannie Mathews MD    MRN: [id number] LOS: 6 days   AGE/SEX: 84 y.o. female  ROOM: 30/     Subjective   Subjective   Feeling okay today--pretty tired as she did not sleep well last night. Tolerating po. No N/V/D/abd pain. No F/C/NS. No SOA or CP. Voiding well.       Objective   Objective   Vital Signs  Temp:  [97.3 °F (36.3 °C)-98.6 °F (37 °C)] 97.3 °F (36.3 °C)  Heart Rate:  [65-76] 74  Resp:  [17-18] 18  BP: (115-133)/(54-75) 133/62  SpO2:  [88 %-99 %] 97 %  on  Flow (L/min) (Oxygen Therapy):  [2] 2;   Device (Oxygen Therapy): nasal cannula  Body mass index is 31.79 kg/m².    (No change in exam today)    Physical Exam  Vitals and nursing note reviewed.   Constitutional:       General: She is not in acute distress.     Appearance: She is ill-appearing (chronically). She is not toxic-appearing or diaphoretic.   HENT:      Head: Normocephalic.      Mouth/Throat:      Mouth: Mucous membranes are moist.      Pharynx: Oropharynx is clear.   Eyes:      General: No scleral icterus.        Right eye: No discharge.         Left eye: No discharge.      Conjunctiva/sclera: Conjunctivae normal.   Cardiovascular:      Rate and Rhythm: Normal rate. Rhythm irregular.      Pulses: Normal pulses.   Pulmonary:      Effort: Pulmonary effort is normal. No respiratory distress.      Breath sounds: Normal breath sounds. No wheezing or rales.   Abdominal:      General: Bowel sounds are normal. There is no distension.      Palpations: Abdomen is soft.      Tenderness: There is no abdominal tenderness.   Musculoskeletal:         General: Swelling (trace in BLEs) present.      Cervical back: Neck supple.   Skin:     General: Skin is warm and dry.      Capillary Refill: Capillary refill takes less than 2 seconds.      Coloration: Skin is pale. Skin is not jaundiced.   Neurological:      General: No focal deficit present.      Mental Status: She is alert and oriented to  person, place, and time. Mental status is at baseline.      Cranial Nerves: No cranial nerve deficit.      Coordination: Coordination normal.   Psychiatric:         Mood and Affect: Mood normal.         Behavior: Behavior normal.         Thought Content: Thought content normal.       Results Review     I reviewed the patient's new clinical results.  Results from last 7 days   Lab Units 10/24/24  0312 10/23/24  0246 10/22/24  0339 10/21/24  0317   WBC 10*3/mm3 4.56 4.42 5.12 4.97   HEMOGLOBIN g/dL 7.1* 7.5* 7.5* 7.5*   PLATELETS 10*3/mm3 128* 120* 134* 141     Results from last 7 days   Lab Units 10/24/24  0312 10/23/24  0246 10/22/24  0339 10/21/24  0317   SODIUM mmol/L 131* 135* 134* 136   POTASSIUM mmol/L 4.2 4.1 3.8 4.1   CHLORIDE mmol/L 98 99 97* 100   CO2 mmol/L 24.0 27.0 24.9 27.0   BUN mg/dL 40* 38* 39* 39*   CREATININE mg/dL 2.76* 2.99* 2.75* 2.41*   GLUCOSE mg/dL 113* 110* 100* 112*   EGFR mL/min/1.73 16.5* 15.0* 16.5* 19.4*     Results from last 7 days   Lab Units 10/24/24  0312 10/23/24  0246 10/22/24  0339 10/18/24  1846   ALBUMIN g/dL 2.9* 2.8* 3.0* 3.2*   BILIRUBIN mg/dL  --  0.9  --  0.9   ALK PHOS U/L  --  224*  --  265*   AST (SGOT) U/L  --  13  --  16   ALT (SGPT) U/L  --  8  --  13     Results from last 7 days   Lab Units 10/24/24  0312 10/23/24  0246 10/22/24  0339 10/21/24  0317 10/19/24  0339 10/18/24  1846   CALCIUM mg/dL 8.6 8.7 8.8 8.5*   < > 9.0   ALBUMIN g/dL 2.9* 2.8* 3.0*  --   --  3.2*   MAGNESIUM mg/dL 1.8 1.7 1.9 1.9  --   --    PHOSPHORUS mg/dL 3.8 3.5 3.6 3.1  --   --     < > = values in this interval not displayed.       Glucose   Date/Time Value Ref Range Status   10/24/2024 1109 209 (H) 70 - 130 mg/dL Final   10/24/2024 0800 115 70 - 130 mg/dL Final   10/23/2024 2117 149 (H) 70 - 130 mg/dL Final   10/23/2024 1632 150 (H) 70 - 130 mg/dL Final   10/23/2024 1143 137 (H) 70 - 130 mg/dL Final   10/23/2024 0737 177 (H) 70 - 130 mg/dL Final   10/22/2024 2116 163 (H) 70 - 130 mg/dL Final        No radiology results for the last day    I have personally reviewed all medications:  Scheduled Medications  apixaban, 2.5 mg, Oral, BID  atorvastatin, 40 mg, Oral, Daily  carvedilol, 12.5 mg, Oral, BID With Meals  [Held by provider] furosemide, 40 mg, Oral, BID  hydrALAZINE, 10 mg, Oral, Q8H  insulin lispro, 2-7 Units, Subcutaneous, 4x Daily AC & at Bedtime  isosorbide dinitrate, 10 mg, Oral, TID - Nitrates  venlafaxine XR, 75 mg, Oral, Daily    Infusions     Diet  Diet: Cardiac; Healthy Heart (2-3 Na+); Fluid Consistency: Thin (IDDSI 0)    I have personally reviewed:  [x]  Laboratory   []  Microbiology   []  Radiology   [x]  EKG/Telemetry  []  Cardiology/Vascular   []  Pathology    []  Records       Assessment/Plan     Active Hospital Problems    Diagnosis  POA    **Acute on chronic systolic CHF (congestive heart failure) [I50.23]  Yes    Chronic respiratory failure with hypoxia [J96.11]  Yes    Type 2 diabetes mellitus [E11.9]  Yes    HLD (hyperlipidemia) [E78.5]  Yes    PAF (paroxysmal atrial fibrillation) [I48.0]  Yes    Iron deficiency anemia [D50.9]  Yes    Chronic diastolic congestive heart failure [I50.32]  Yes    Anxiety associated with depression [F41.8]  Yes    Chronic kidney disease, stage IV (severe) [N18.4]  Yes      Resolved Hospital Problems   No resolved problems to display.     83yo woman with RADHA, CKD4, HTN, DM2, PAF, pulm HTN, CHRF, and chronic combined CHF, who presented to ER with worsening SOA and BLE edema. She was admitted with acute on chronic HRrEF.    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  HTN  -BNP 21k on admission, CXR with vascular congestion, bilateral lower extremity edema  -Cardiology and Renal following  -diuresed well with falling weights and I<<Os  -O2 requirement down to 2L/min--pt uses O2 at home at 1L/min when sleeping and with exertion  -diuretic on hold now due to rising Cr yesterday  -cont Coreg, hydralazine, and Isordil per  Cardiology  -no ACE/ARB/ARNI/MRA due to CKD     CKD4  -Cr up to 2.99 yesterday AM despite holding diuretic, back down to 2.76 after IVFs per Renal     DM2 with hyperglycemia  -A1c 6.5 in June, not on meds PTA  -Sugars acceptable here  -continue SSI and monitor for hypoglycemia     PAF  -RC: Coreg  -AC: Eliquis  -HRs acceptable     HLD  -Statin    Anemia of CKD  -no bleeding evident here  -Hgb fairly stable  -continue to monitor Hgb and transfuse if <7    Constipation  -no BM since admission  -start bowel regimen      Eliquis (home med) should suffice for DVT prophylaxis.  Full code.  Discussed with patient, nursing staff, CCP, and care team on multidisciplinary rounds.  Anticipate discharge  back to [facility]  when cleared by consultants.  Expected Discharge Date: 10/25/2024; Expected Discharge Time:       Trevor Ely MD  [facility]  10/24/24  11:46 EDT

## 2024-10-24 NOTE — PLAN OF CARE
Goal Outcome Evaluation:              Outcome Evaluation: VSS 2LNC in place for bedtime. UA sent. rested during night. Maintain safety and continue to monitor.

## 2024-10-24 NOTE — PROGRESS NOTES
"   LOS: 6 days     Chief Complaint/ Reason for encounter: CHF exacerbation, CKD    Subjective   10/21/24 : Patient is doing well today with no new complaints  Good appetite with no nausea or vomiting  No shortness of breath chest pain or edema  Voiding well with no dysuria  She is feeling better with diuresis and her weight is down 5 pounds since admission  No edema, not requiring any oxygen supplementally    10/22: Clinically doing well with no new complaints  Not sure if it is accurate but her weight is down to 197 pounds today  Only on oxygen at night, 1 L  No edema no dyspnea no chest pain fevers or chills good oral intake with no nausea or vomiting    10/23: Clinically doing well denies new complaints, no shortness of breath or increased oxygen requirements.  No edema, good urine output    10/24: Clinically doing well denies new complaint specifically no worsening shortness of breath or edema  Reports good appetite with no nausea or vomiting    Medical history reviewed:  History of Present Illness    Subjective    History taken from: Patient and chart    Vital Signs  Temp:  [97.3 °F (36.3 °C)-98.6 °F (37 °C)] 97.3 °F (36.3 °C)  Heart Rate:  [65-76] 74  Resp:  [17-18] 18  BP: (115-133)/(54-75) 133/62       Wt Readings from Last 1 Encounters:   10/24/24 0518 92.1 kg (203 lb)   10/22/24 0537 89.4 kg (197 lb 1.5 oz)   10/21/24 0439 95.5 kg (210 lb 8.6 oz)   10/20/24 0451 94.4 kg (208 lb 1.8 oz)   10/19/24 0700 96 kg (211 lb 10.3 oz)   10/18/24 2214 97 kg (213 lb 13.5 oz)   10/18/24 2142 97.5 kg (215 lb)       Objective:  Vital signs: (most recent): Blood pressure 133/62, pulse 74, temperature 97.3 °F (36.3 °C), temperature source Oral, resp. rate 18, height 170.2 cm (67\"), weight 92.1 kg (203 lb), SpO2 97%.                Objective:  General Appearance:  Comfortable, well-appearing, in no acute distress and not in pain.  Awake, alert  HEENT: Mucous membranes moist, no injury, oropharynx clear  Lungs:  Normal effort " and normal respiratory rate.  Breath sounds clear to auscultation.  No  respiratory distress.  No rales, decreased breath sounds or rhonchi.    Heart: Normal rate.  Regular rhythm.  S1, S2 normal.  No murmur.   Abdomen: Abdomen is soft.  Bowel sounds are normal, no abdominal tenderness.  There is no rebound or guarding  Extremities: No edema of bilateral lower extremities  Skin:  Warm and dry with no rashes      Results Review:    Intake/Output:     Intake/Output Summary (Last 24 hours) at 10/24/2024 1323  Last data filed at 10/24/2024 0518  Gross per 24 hour   Intake 210 ml   Output 1550 ml   Net -1340 ml         DATA:  Radiology and Labs:  The following labs independently reviewed by me. Additional labs ordered for tomorrow a.m.  Interval notes, chart personally reviewed by me.   Old records independently reviewed showing progressive CKD, now stage IV  Discussed with patient herself at bedside    Risk/ complexity of medical care/ medical decision making moderate complexity, diuretic management      Labs:   Recent Results (from the past 24 hours)   POC Glucose Once    Collection Time: 10/23/24  4:32 PM    Specimen: Blood   Result Value Ref Range    Glucose 150 (H) 70 - 130 mg/dL   POC Glucose Once    Collection Time: 10/23/24  9:17 PM    Specimen: Blood   Result Value Ref Range    Glucose 149 (H) 70 - 130 mg/dL   Sodium, Urine, Random - Urine, Clean Catch    Collection Time: 10/23/24  9:41 PM    Specimen: Urine, Clean Catch   Result Value Ref Range    Sodium, Urine 46 mmol/L   Urinalysis With Microscopic If Indicated (No Culture) - Urine, Clean Catch    Collection Time: 10/23/24  9:41 PM    Specimen: Urine, Clean Catch   Result Value Ref Range    Color, UA Yellow Yellow, Straw    Appearance, UA Turbid (A) Clear    pH, UA 7.0 5.0 - 8.0    Specific Gravity, UA 1.012 1.005 - 1.030    Glucose, UA Negative Negative    Ketones, UA Negative Negative    Bilirubin, UA Negative Negative    Blood, UA Moderate (2+) (A)  Negative    Protein,  mg/dL (2+) (A) Negative    Leuk Esterase, UA Large (3+) (A) Negative    Nitrite, UA Negative Negative    Urobilinogen, UA 2.0 E.U./dL (A) 0.2 - 1.0 E.U./dL   Chloride, Urine, Random - Urine, Clean Catch    Collection Time: 10/23/24  9:41 PM    Specimen: Urine, Clean Catch   Result Value Ref Range    Chloride, Urine <20 mmol/L   Urinalysis, Microscopic Only - Urine, Clean Catch    Collection Time: 10/23/24  9:41 PM    Specimen: Urine, Clean Catch   Result Value Ref Range    RBC, UA 21-50 (A) None Seen, 0-2 /HPF    WBC, UA Too Numerous to Count (A) None Seen, 0-2 /HPF    Bacteria, UA 4+ (A) None Seen /HPF    Squamous Epithelial Cells, UA 0-2 None Seen, 0-2 /HPF    Hyaline Casts, UA 0-2 None Seen /LPF    Methodology Manual Light Microscopy    CBC (No Diff)    Collection Time: 10/24/24  3:12 AM    Specimen: Blood   Result Value Ref Range    WBC 4.56 3.40 - 10.80 10*3/mm3    RBC 2.42 (L) 3.77 - 5.28 10*6/mm3    Hemoglobin 7.1 (L) 12.0 - 15.9 g/dL    Hematocrit 22.7 (L) 34.0 - 46.6 %    MCV 93.8 79.0 - 97.0 fL    MCH 29.3 26.6 - 33.0 pg    MCHC 31.3 (L) 31.5 - 35.7 g/dL    RDW 16.0 (H) 12.3 - 15.4 %    RDW-SD 55.1 (H) 37.0 - 54.0 fl    MPV 10.4 6.0 - 12.0 fL    Platelets 128 (L) 140 - 450 10*3/mm3   Magnesium    Collection Time: 10/24/24  3:12 AM    Specimen: Blood   Result Value Ref Range    Magnesium 1.8 1.6 - 2.4 mg/dL   Renal Function Panel    Collection Time: 10/24/24  3:12 AM    Specimen: Blood   Result Value Ref Range    Glucose 113 (H) 65 - 99 mg/dL    BUN 40 (H) 8 - 23 mg/dL    Creatinine 2.76 (H) 0.57 - 1.00 mg/dL    Sodium 131 (L) 136 - 145 mmol/L    Potassium 4.2 3.5 - 5.2 mmol/L    Chloride 98 98 - 107 mmol/L    CO2 24.0 22.0 - 29.0 mmol/L    Calcium 8.6 8.6 - 10.5 mg/dL    Albumin 2.9 (L) 3.5 - 5.2 g/dL    Phosphorus 3.8 2.5 - 4.5 mg/dL    Anion Gap 9.0 5.0 - 15.0 mmol/L    BUN/Creatinine Ratio 14.5 7.0 - 25.0    eGFR 16.5 (L) >60.0 mL/min/1.73   Uric Acid    Collection Time:  10/24/24  3:12 AM    Specimen: Blood   Result Value Ref Range    Uric Acid 7.1 (H) 2.4 - 5.7 mg/dL   POC Glucose Once    Collection Time: 10/24/24  8:00 AM    Specimen: Blood   Result Value Ref Range    Glucose 115 70 - 130 mg/dL   POC Glucose Once    Collection Time: 10/24/24 11:09 AM    Specimen: Blood   Result Value Ref Range    Glucose 209 (H) 70 - 130 mg/dL       Radiology:  Pertinent radiology studies were reviewed as described above      Medications have been reviewed separately in chart overview      ASSESSMENT:  CKD stage IV with some progression, stable, mild PERLA today  Volume overload, improved.  Euvolemic on exam  Dyspnea, improved  Anemia of CKD  CHF, acute on chronic systolic, improved with diuresis      Plan:  Her renal function is better today with good urine output over the last 24 hours  No need for further IV fluids but will continue to hold diuretics again today  Plan to restart low-dose daily Lasix in a.m.  Clinically euvolemic on exam    On hydralazine/Isordil combo per cardiology.  Echo results noted, EF 40 to 45%  Continue strict I's and O's, daily weights, low-sodium diet  Okay to liberalize oral fluid intake a bit today    Discussed need for outpatient dialysis education and planning as she is approaching ESRD.  She will need close follow-up in our office after discharge      Isaiah Foster MD  [facility]   Office phone number: [phone]  Answering service phone number: [phone]    10/24/24  13:23 EDT    Dictation performed using Dragon dictation software

## 2024-10-25 LAB
ALBUMIN SERPL-MCNC: 3 G/DL (ref 3.5–5.2)
ANION GAP SERPL CALCULATED.3IONS-SCNC: 12 MMOL/L (ref 5–15)
BUN SERPL-MCNC: 37 MG/DL (ref 8–23)
BUN/CREAT SERPL: 14 (ref 7–25)
CALCIUM SPEC-SCNC: 8.9 MG/DL (ref 8.6–10.5)
CHLORIDE SERPL-SCNC: 101 MMOL/L (ref 98–107)
CO2 SERPL-SCNC: 23 MMOL/L (ref 22–29)
CREAT SERPL-MCNC: 2.65 MG/DL (ref 0.57–1)
DEPRECATED RDW RBC AUTO: 54.2 FL (ref 37–54)
EGFRCR SERPLBLD CKD-EPI 2021: 17.3 ML/MIN/1.73
ERYTHROCYTE [DISTWIDTH] IN BLOOD BY AUTOMATED COUNT: 15.5 % (ref 12.3–15.4)
GLUCOSE BLDC GLUCOMTR-MCNC: 106 MG/DL (ref 70–130)
GLUCOSE BLDC GLUCOMTR-MCNC: 132 MG/DL (ref 70–130)
GLUCOSE BLDC GLUCOMTR-MCNC: 134 MG/DL (ref 70–130)
GLUCOSE BLDC GLUCOMTR-MCNC: 152 MG/DL (ref 70–130)
GLUCOSE SERPL-MCNC: 99 MG/DL (ref 65–99)
HCT VFR BLD AUTO: 25 % (ref 34–46.6)
HGB BLD-MCNC: 7.7 G/DL (ref 12–15.9)
MAGNESIUM SERPL-MCNC: 1.8 MG/DL (ref 1.6–2.4)
MCH RBC QN AUTO: 29.6 PG (ref 26.6–33)
MCHC RBC AUTO-ENTMCNC: 30.8 G/DL (ref 31.5–35.7)
MCV RBC AUTO: 96.2 FL (ref 79–97)
PHOSPHATE SERPL-MCNC: 3.8 MG/DL (ref 2.5–4.5)
PLATELET # BLD AUTO: 141 10*3/MM3 (ref 140–450)
PMV BLD AUTO: 10.3 FL (ref 6–12)
POTASSIUM SERPL-SCNC: 4.3 MMOL/L (ref 3.5–5.2)
RBC # BLD AUTO: 2.6 10*6/MM3 (ref 3.77–5.28)
SODIUM SERPL-SCNC: 136 MMOL/L (ref 136–145)
WBC NRBC COR # BLD AUTO: 4.27 10*3/MM3 (ref 3.4–10.8)

## 2024-10-25 PROCEDURE — 85027 COMPLETE CBC AUTOMATED: CPT | Performed by: STUDENT IN AN ORGANIZED HEALTH CARE EDUCATION/TRAINING PROGRAM

## 2024-10-25 PROCEDURE — 25010000002 ONDANSETRON PER 1 MG

## 2024-10-25 PROCEDURE — 99232 SBSQ HOSP IP/OBS MODERATE 35: CPT

## 2024-10-25 PROCEDURE — 83735 ASSAY OF MAGNESIUM: CPT | Performed by: STUDENT IN AN ORGANIZED HEALTH CARE EDUCATION/TRAINING PROGRAM

## 2024-10-25 PROCEDURE — 63710000001 INSULIN LISPRO (HUMAN) PER 5 UNITS

## 2024-10-25 PROCEDURE — 82948 REAGENT STRIP/BLOOD GLUCOSE: CPT

## 2024-10-25 PROCEDURE — 80069 RENAL FUNCTION PANEL: CPT | Performed by: INTERNAL MEDICINE

## 2024-10-25 PROCEDURE — 97110 THERAPEUTIC EXERCISES: CPT

## 2024-10-25 RX ORDER — FUROSEMIDE 40 MG
40 TABLET ORAL DAILY
Status: DISCONTINUED | OUTPATIENT
Start: 2024-10-25 | End: 2024-10-26 | Stop reason: HOSPADM

## 2024-10-25 RX ADMIN — ONDANSETRON 4 MG: 2 INJECTION INTRAMUSCULAR; INTRAVENOUS at 20:34

## 2024-10-25 RX ADMIN — SENNOSIDES AND DOCUSATE SODIUM 2 TABLET: 50; 8.6 TABLET ORAL at 08:10

## 2024-10-25 RX ADMIN — ATORVASTATIN CALCIUM 40 MG: 20 TABLET, FILM COATED ORAL at 08:09

## 2024-10-25 RX ADMIN — CARVEDILOL 12.5 MG: 12.5 TABLET, FILM COATED ORAL at 08:09

## 2024-10-25 RX ADMIN — ONDANSETRON 4 MG: 2 INJECTION INTRAMUSCULAR; INTRAVENOUS at 10:52

## 2024-10-25 RX ADMIN — ISOSORBIDE DINITRATE 10 MG: 10 TABLET ORAL at 12:40

## 2024-10-25 RX ADMIN — INSULIN LISPRO 2 UNITS: 100 INJECTION, SOLUTION INTRAVENOUS; SUBCUTANEOUS at 12:41

## 2024-10-25 RX ADMIN — ISOSORBIDE DINITRATE 10 MG: 10 TABLET ORAL at 08:09

## 2024-10-25 RX ADMIN — HYDRALAZINE HYDROCHLORIDE 10 MG: 10 TABLET ORAL at 14:34

## 2024-10-25 RX ADMIN — SENNOSIDES AND DOCUSATE SODIUM 2 TABLET: 50; 8.6 TABLET ORAL at 21:28

## 2024-10-25 RX ADMIN — Medication 5 MG: at 21:28

## 2024-10-25 RX ADMIN — ISOSORBIDE DINITRATE 10 MG: 10 TABLET ORAL at 17:01

## 2024-10-25 RX ADMIN — CARVEDILOL 12.5 MG: 12.5 TABLET, FILM COATED ORAL at 17:01

## 2024-10-25 RX ADMIN — HYDRALAZINE HYDROCHLORIDE 10 MG: 10 TABLET ORAL at 06:55

## 2024-10-25 RX ADMIN — FUROSEMIDE 40 MG: 40 TABLET ORAL at 12:40

## 2024-10-25 RX ADMIN — VENLAFAXINE HYDROCHLORIDE 75 MG: 75 CAPSULE, EXTENDED RELEASE ORAL at 08:09

## 2024-10-25 RX ADMIN — APIXABAN 2.5 MG: 2.5 TABLET, FILM COATED ORAL at 08:09

## 2024-10-25 RX ADMIN — APIXABAN 2.5 MG: 2.5 TABLET, FILM COATED ORAL at 21:28

## 2024-10-25 RX ADMIN — HYDRALAZINE HYDROCHLORIDE 10 MG: 10 TABLET ORAL at 21:28

## 2024-10-25 NOTE — PROGRESS NOTES
Name: Lowell Min ADMIT: 10/18/2024   : 1940  PCP: Dannie Mathews MD    MRN: [id number] LOS: 7 days   AGE/SEX: 84 y.o. female  ROOM: 30/1     Subjective   Subjective   Feeling okay today but c/o some nausea again. Tolerating po. No V/D/abd pain. No F/C/NS. No SOA or CP. Voiding well w/o LUTS.       Objective   Objective   Vital Signs  Temp:  [97.3 °F (36.3 °C)-98.2 °F (36.8 °C)] 97.3 °F (36.3 °C)  Heart Rate:  [58-67] 66  Resp:  [16-18] 18  BP: (104-130)/(62-73) 130/73  SpO2:  [83 %-99 %] 99 %  on  Flow (L/min) (Oxygen Therapy):  [1] 1;   Device (Oxygen Therapy): nasal cannula  Body mass index is 30.9 kg/m².    (No change in exam today)    Physical Exam  Vitals and nursing note reviewed. Exam conducted with a chaperone present (RN).   Constitutional:       General: She is not in acute distress.     Appearance: She is ill-appearing (chronically). She is not toxic-appearing or diaphoretic.   HENT:      Head: Normocephalic.      Mouth/Throat:      Mouth: Mucous membranes are moist.      Pharynx: Oropharynx is clear.   Eyes:      General: No scleral icterus.        Right eye: No discharge.         Left eye: No discharge.      Conjunctiva/sclera: Conjunctivae normal.   Cardiovascular:      Rate and Rhythm: Normal rate. Rhythm irregular.      Pulses: Normal pulses.   Pulmonary:      Effort: Pulmonary effort is normal. No respiratory distress.      Breath sounds: Normal breath sounds. No wheezing or rales.   Abdominal:      General: Bowel sounds are normal. There is no distension.      Palpations: Abdomen is soft.      Tenderness: There is no abdominal tenderness.   Musculoskeletal:         General: Swelling (trace in BLEs) present.      Cervical back: Neck supple.   Skin:     General: Skin is warm and dry.      Capillary Refill: Capillary refill takes less than 2 seconds.      Coloration: Skin is pale. Skin is not jaundiced.   Neurological:      General: No focal deficit present.      Mental Status: She  is alert and oriented to person, place, and time. Mental status is at baseline.      Cranial Nerves: No cranial nerve deficit.      Coordination: Coordination normal.   Psychiatric:         Mood and Affect: Mood normal.         Behavior: Behavior normal.         Thought Content: Thought content normal.       Results Review     I reviewed the patient's new clinical results.  Results from last 7 days   Lab Units 10/25/24  0302 10/24/24  0312 10/23/24  0246 10/22/24  0339   WBC 10*3/mm3 4.27 4.56 4.42 5.12   HEMOGLOBIN g/dL 7.7* 7.1* 7.5* 7.5*   PLATELETS 10*3/mm3 141 128* 120* 134*     Results from last 7 days   Lab Units 10/25/24  0302 10/24/24  0312 10/23/24  0246 10/22/24  0339   SODIUM mmol/L 136 131* 135* 134*   POTASSIUM mmol/L 4.3 4.2 4.1 3.8   CHLORIDE mmol/L 101 98 99 97*   CO2 mmol/L 23.0 24.0 27.0 24.9   BUN mg/dL 37* 40* 38* 39*   CREATININE mg/dL 2.65* 2.76* 2.99* 2.75*   GLUCOSE mg/dL 99 113* 110* 100*   EGFR mL/min/1.73 17.3* 16.5* 15.0* 16.5*     Results from last 7 days   Lab Units 10/25/24  0302 10/24/24  0312 10/23/24  0246 10/22/24  0339 10/18/24  1846   ALBUMIN g/dL 3.0* 2.9* 2.8* 3.0* 3.2*   BILIRUBIN mg/dL  --   --  0.9  --  0.9   ALK PHOS U/L  --   --  224*  --  265*   AST (SGOT) U/L  --   --  13  --  16   ALT (SGPT) U/L  --   --  8  --  13     Results from last 7 days   Lab Units 10/25/24  0302 10/24/24  0312 10/23/24  0246 10/22/24  0339   CALCIUM mg/dL 8.9 8.6 8.7 8.8   ALBUMIN g/dL 3.0* 2.9* 2.8* 3.0*   MAGNESIUM mg/dL 1.8 1.8 1.7 1.9   PHOSPHORUS mg/dL 3.8 3.8 3.5 3.6       Glucose   Date/Time Value Ref Range Status   10/25/2024 1120 152 (H) 70 - 130 mg/dL Final   10/25/2024 0742 106 70 - 130 mg/dL Final   10/24/2024 2050 153 (H) 70 - 130 mg/dL Final   10/24/2024 1608 153 (H) 70 - 130 mg/dL Final   10/24/2024 1109 209 (H) 70 - 130 mg/dL Final   10/24/2024 0800 115 70 - 130 mg/dL Final   10/23/2024 2117 149 (H) 70 - 130 mg/dL Final       No radiology results for the last day    I have  personally reviewed all medications:  Scheduled Medications  apixaban, 2.5 mg, Oral, BID  atorvastatin, 40 mg, Oral, Daily  carvedilol, 12.5 mg, Oral, BID With Meals  furosemide, 40 mg, Oral, Daily  hydrALAZINE, 10 mg, Oral, Q8H  insulin lispro, 2-7 Units, Subcutaneous, 4x Daily AC & at Bedtime  isosorbide dinitrate, 10 mg, Oral, TID - Nitrates  senna-docusate sodium, 2 tablet, Oral, BID  venlafaxine XR, 75 mg, Oral, Daily    Infusions     Diet  Diet: Cardiac; Healthy Heart (2-3 Na+); Fluid Consistency: Thin (IDDSI 0)    I have personally reviewed:  [x]  Laboratory   []  Microbiology   []  Radiology   [x]  EKG/Telemetry  []  Cardiology/Vascular   []  Pathology    []  Records       Assessment/Plan     Active Hospital Problems    Diagnosis  POA    **Acute on chronic systolic CHF (congestive heart failure) [I50.23]  Yes    Chronic respiratory failure with hypoxia [J96.11]  Yes    Type 2 diabetes mellitus [E11.9]  Yes    HLD (hyperlipidemia) [E78.5]  Yes    PAF (paroxysmal atrial fibrillation) [I48.0]  Yes    Iron deficiency anemia [D50.9]  Yes    Chronic diastolic congestive heart failure [I50.32]  Yes    Anxiety associated with depression [F41.8]  Yes    Chronic kidney disease, stage IV (severe) [N18.4]  Yes      Resolved Hospital Problems   No resolved problems to display.     85yo woman with RADHA, CKD4, HTN, DM2, PAF, pulm HTN, CHRF, and chronic combined CHF, who presented to ER with worsening SOA and BLE edema. She was admitted with acute on chronic HRrEF.    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  HTN  -BNP 21k on admission, CXR with vascular congestion, bilateral lower extremity edema  -Cardiology and Renal following  -diuresed well with falling weights and I<<Os  -O2 requirement down to 1L/min--pt uses O2 at home at 1L/min when sleeping and with exertion  -Cr improved, Lasix restarted per Renal  -cont Coreg, hydralazine, and Isordil per Cardiology  -no ACE/ARB/ARNI/MRA due to CKD      CKD4  -Cr improved  -Lasix restarted this AM by Renal  -Continue to monitor     DM2 with hyperglycemia  -A1c 6.5 in June, not on meds PTA  -Sugars acceptable here  -continue SSI and monitor for hypoglycemia     PAF  -RC: Coreg  -AC: Eliquis  -HRs acceptable     HLD  -Statin    Anemia of CKD  -no bleeding evident here  -Hgb fairly stable  -continue to monitor Hgb and transfuse if <7    Constipation  Nausea  -no BM since admission so started bowel regimen on 10/24 with immediate results  -not sure why she is still nauseated, exam is benign and she is not vomiting  -check lipase and LFTs in AM      Eliquis (home med) should suffice for DVT prophylaxis.  Full code.  Discussed with patient, nursing staff, CCP, and care team on multidisciplinary rounds.  Anticipate discharge  back to [facility]  tomorrow.  Expected Discharge Date: 10/26/2024; Expected Discharge Time:       Trevor Ely MD  [facility]  10/25/24  12:47 EDT

## 2024-10-25 NOTE — PLAN OF CARE
Goal Outcome Evaluation:  Plan of Care Reviewed With: patient        Progress: no change  Outcome Evaluation: Calm and cooperative with care. RA during day. Did not eat well today, stating she felt nauseous. Zofran given as ordered. PO lasix restarted today. Up in recliner this afternoon. Ambulated to bathroom. DC to [facility] tomorrow at 1500. Safety measures in place.

## 2024-10-25 NOTE — PLAN OF CARE
Goal Outcome Evaluation:  Plan of Care Reviewed With: patient        Progress: no change  Outcome Evaluation: Cooperative with care. C/O trouble sleeping last night and not feeling well today. VSS. RA during the day. C/O nausea this afternoon. Zofran given. Ambulated to bathroom with standby assist. Safety maintained.

## 2024-10-25 NOTE — PLAN OF CARE
Goal Outcome Evaluation:  Plan of Care Reviewed With: patient           Outcome Evaluation: Continued weakness with limited activity tolerance during PT.  Able to ambulate 25' w/ contact assist using RW, slow shuffling steps w/ SOB.  Sp02 maintained >93% on room air.  Recommend continuing to sit in chair few hours/day and rec. DC to SNU.    Anticipated Discharge Disposition (PT): skilled nursing facility

## 2024-10-25 NOTE — CASE MANAGEMENT/SOCIAL WORK
Continued Stay Note  [facility]     Patient Name: oLwell Min  MRN: [id number]  Today's Date: 10/25/2024    Admit Date: 10/18/2024    [facility]   Discharge Plan       Row Name 10/25/24 1447       Plan    Plan DC to [facility], [facility] to transport    Plan Comments Pt to be dc to [facility] on 10-, with [facility] to transport at 1500. Informed pt and daughter, Karen Dalton, of discharge plans. Informed MD of plans as well. Kelly MCDONALD/Lorna informed of disposition. Please call report to [phone]. Please fax dc summary to [phone]. Prepared packet at nurses station.                   Discharge Codes    No documentation.                 Expected Discharge Date and Time       Expected Discharge Date Expected Discharge Time    Oct 26, 2024               Shea Morales RN

## 2024-10-25 NOTE — PROGRESS NOTES
[facility]  Cardiology Progress Note    Patient Identification:  Name: Lowell Min  Age: 84 y.o.  Sex: female  :  1940  MRN: [id number]                 Follow Up / Chief Complaint: Follow-up for acute on chronic heart failure with exacerbation    Interval History: sitting up in chair, no chest pain, shortness of breath improving.        Objective:    Past Medical History:  Past Medical History:   Diagnosis Date    Anxiety     Atrial fibrillation     CHF (congestive heart failure)     Chronic kidney disease, stage IV (severe)     Depression     Elevated cholesterol     Hypertension     Type 2 diabetes mellitus      Past Surgical History:  Past Surgical History:   Procedure Laterality Date    APPENDECTOMY      CHOLECYSTECTOMY      COLONOSCOPY      CYSTOSCOPY BOTOX INJECTION OF BLADDER N/A     HYSTERECTOMY      TUMOR REMOVAL Left     benign tumor from left breast        Social History:   Social History     Tobacco Use    Smoking status: Never    Smokeless tobacco: Never   Substance Use Topics    Alcohol use: Never      Family History:  History reviewed. No pertinent family history.       Allergies:  Allergies   Allergen Reactions    Ibuprofen Other (See Comments)     Decrease urine output       Scheduled Meds:  apixaban, 2.5 mg, BID  atorvastatin, 40 mg, Daily  carvedilol, 12.5 mg, BID With Meals  furosemide, 40 mg, Daily  hydrALAZINE, 10 mg, Q8H  insulin lispro, 2-7 Units, 4x Daily AC & at Bedtime  isosorbide dinitrate, 10 mg, TID - Nitrates  senna-docusate sodium, 2 tablet, BID  venlafaxine XR, 75 mg, Daily            INTAKE AND OUTPUT:    Intake/Output Summary (Last 24 hours) at 10/25/2024 1034  Last data filed at 10/24/2024 2121  Gross per 24 hour   Intake 120 ml   Output --   Net 120 ml       Review of Systems:   GI: no n/v or abd pain  Cardiac: no chest pain or palpitations  Pulmonary: no shortness of breath or cough        Constitutional:  Temp:  [97.3 °F (36.3 °C)-98.2 °F (36.8 °C)]  97.3 °F (36.3 °C)  Heart Rate:  [58-67] 66  Resp:  [16-18] 18  BP: (104-130)/(62-73) 130/73    Physical Exam:  General:  Alert, cooperative, appears in no acute distress  Respiratory: Clear to auscultation.  Normal respiratory effort and rate.  Cardiovascular: S1S2 Regular rate and rhythm. No murmur, rub or gallop.   Gastrointestinal: soft, non tender. Bowel sounds present.   Extremities: JAY x4. No pretibial pitting edema. Adequate musculoskeletal strength.   Neuro: AAO x3 CN II-XII grossly intact      I reviewed the patient's new clinical results, and personally reviewed and interpreted the patient's ECG and telemetry data from the last 24 hours      Cardiographics  Echocardiogram:   Interpretation Summary    Left ventricular systolic function is mildly decreased. Calculated left ventricular EF = 44.1%    The following left ventricular wall segments are hypokinetic: mid anterior, apical anterior, basal anterolateral, mid anterolateral, apical lateral, basal inferolateral, mid inferolateral, apical inferior, mid inferior, apical septal, basal inferoseptal, mid inferoseptal, apex hypokinetic, mid anteroseptal, basal anterior, basal inferior and basal inferoseptal.    Left ventricular diastolic function was indeterminate.    The left atrial cavity is severely dilated.    Mild aortic valve stenosis is present. Aortic valve area is 1.2 cm2.    The right atrial cavity is severely  dilated.    Peak velocity of the flow distal to the aortic valve is 246.1 cm/s. Aortic valve mean pressure gradient is 11 mmHg.    Severe mitral valve regurgitation is present.    Mild mitral valve stenosis is present. The mitral valve mean gradient is 4 mmHg.    Severe tricuspid valve regurgitation is present.    Estimated right ventricular systolic pressure from tricuspid regurgitation is markedly elevated (>55 mmHg). Calculated right ventricular systolic pressure from tricuspid regurgitation is 62 mmHg.     Lab Review   Results from last 7  "days   Lab Units 10/18/24  1846   HSTROP T ng/L 47*     Results from last 7 days   Lab Units 10/25/24  0302   MAGNESIUM mg/dL 1.8     Results from last 7 days   Lab Units 10/25/24  0302   SODIUM mmol/L 136   POTASSIUM mmol/L 4.3   BUN mg/dL 37*   CREATININE mg/dL 2.65*   CALCIUM mg/dL 8.9     @[id number]@  Results from last 7 days   Lab Units 10/25/24  0302 10/24/24  0312 10/23/24  0246   WBC 10*3/mm3 4.27 4.56 4.42   HEMOGLOBIN g/dL 7.7* 7.1* 7.5*   HEMATOCRIT % 25.0* 22.7* 23.1*   PLATELETS 10*3/mm3 141 128* 120*         Intake/Output Summary (Last 24 hours) at 10/25/2024 1034  Last data filed at 10/24/2024 2121  Gross per 24 hour   Intake 120 ml   Output --   Net 120 ml           Assessment:  1.  Acute on chronic heart failure exacerbation probably due to valvular dysfunction  2.  Severe MR  3.  Permanent A-fib  4.  CKD: creatinine stable. nephrology following  5.  Diabetes mellitus  6.  Hypertension: Stable  7.  Hyperlipidemia: ALT/AST stable.  On statin  9.  NELLY  10. Anemia: defer to IM        Plan:  1.  Acute on chronic heart failure exacerbation-echo ef 44%. Gdmt limited by ckd, continue coreg. Diuresing per nephrology, oral lasix resumed. Euvolemic on exam    2.  Severe MR: she has previously declined further workup for this, I discussed this with her, recommend further outpatient workup for this per primary cardiologist with vanessa, she will discuss with Dr Orozco.     3. Pulmonary hypertension: likely due to severe MR.     4.  Permanent A-fib:rate controlled on coreg, AC on eliquis    5.  Hypertension: BP stable    Cardiac stable, no change from cardiac standpoint    )10/25/2024  RAMAKRISHNA Weiner/Transcription:   \"Dictated utilizing Dragon dictation\".     "

## 2024-10-25 NOTE — PROGRESS NOTES
"Nutrition Services    Patient Name:  Lowell Min  YOB: 1940  MRN: [id number]  Admit Date:  10/18/2024    Assessment Date:  10/25/24    Summary: 85 yo female with Acute on chronic heart failure, HTN, DM Type 2, CHF, CKD, Stage 4      CLINICAL NUTRITION ASSESSMENT      Reason for Assessment Length of Stay     Diagnosis/Problem   As above, decreased intake   Medical/Surgical History Past Medical History:   Diagnosis Date    Anxiety     Atrial fibrillation     CHF (congestive heart failure)     Chronic kidney disease, stage IV (severe)     Depression     Elevated cholesterol     Hypertension     Type 2 diabetes mellitus        Past Surgical History:   Procedure Laterality Date    APPENDECTOMY      CHOLECYSTECTOMY      COLONOSCOPY      CYSTOSCOPY BOTOX INJECTION OF BLADDER N/A     HYSTERECTOMY      TUMOR REMOVAL Left     benign tumor from left breast        Anthropometrics        Current Height  Current Weight  BMI kg/m2 Height: 170.2 cm (67\")  Weight: 89.5 kg (197 lb 5 oz) (10/25/24 0426)  Body mass index is 30.9 kg/m².   Adjusted BMI (if applicable)    BMI Category Obese, Class I (30 - 34.9)   Ideal Body Weight (IBW) 142# +/- 10%   Usual Body Weight (UBW) Not specified   Weight Trend Loss, pt reports trying to lose weight PTA   Weight History Wt Readings from Last 30 Encounters:   10/25/24 0426 89.5 kg (197 lb 5 oz)   10/24/24 0518 92.1 kg (203 lb)   10/22/24 0537 89.4 kg (197 lb 1.5 oz)   10/21/24 0439 95.5 kg (210 lb 8.6 oz)   10/20/24 0451 94.4 kg (208 lb 1.8 oz)   10/19/24 0700 96 kg (211 lb 10.3 oz)   10/18/24 2214 97 kg (213 lb 13.5 oz)   10/18/24 2142 97.5 kg (215 lb)   09/13/24 0400 94.1 kg (207 lb 7.3 oz)   09/12/24 0559 98 kg (216 lb 0.8 oz)   09/10/24 0319 92.6 kg (204 lb 2.3 oz)   09/09/24 0542 93 kg (205 lb 0.4 oz)   09/08/24 0614 96.2 kg (212 lb 1.3 oz)   09/07/24 1449 97.1 kg (214 lb)   09/07/24 0518 97.4 kg (214 lb 11.7 oz)   09/06/24 1510 98.9 kg (218 lb)   10/14/22 0611 90.2 kg (198 " lb 14.4 oz)   10/13/22 0628 90.1 kg (198 lb 9.6 oz)   10/12/22 0526 89 kg (196 lb 3.4 oz)   10/11/22 0606 87.8 kg (193 lb 8 oz)   10/10/22 0554 89.1 kg (196 lb 6.4 oz)   10/07/22 2249 95.3 kg (210 lb)   04/12/22 0500 95.4 kg (210 lb 6.4 oz)   04/11/22 0525 112 kg (247 lb 9.2 oz)   04/10/22 2304 113 kg (248 lb 10.9 oz)   04/10/22 0300 112 kg (246 lb 4.1 oz)   04/09/22 0610 97.4 kg (214 lb 12.8 oz)   04/08/22 0410 97.6 kg (215 lb 1.6 oz)   04/07/22 0503 98.4 kg (217 lb)   04/06/22 0559 98.9 kg (218 lb)   04/05/22 0615 105 kg (232 lb 1.6 oz)   04/04/22 1150 111 kg (245 lb)   04/04/22 0526 112 kg (245 lb 14.4 oz)   04/03/22 0320 113 kg (249 lb)   04/03/22 0209 95.3 kg (210 lb)      --  Labs       Pertinent Labs    Results from last 7 days   Lab Units 10/25/24  0302 10/24/24  0312 10/23/24  0246 10/19/24  0339 10/18/24  1846   SODIUM mmol/L 136 131* 135*   < > 140   POTASSIUM mmol/L 4.3 4.2 4.1   < > 5.0   CHLORIDE mmol/L 101 98 99   < > 106   CO2 mmol/L 23.0 24.0 27.0   < > 24.1   BUN mg/dL 37* 40* 38*   < > 40*   CREATININE mg/dL 2.65* 2.76* 2.99*   < > 2.37*   CALCIUM mg/dL 8.9 8.6 8.7   < > 9.0   BILIRUBIN mg/dL  --   --  0.9  --  0.9   ALK PHOS U/L  --   --  224*  --  265*   ALT (SGPT) U/L  --   --  8  --  13   AST (SGOT) U/L  --   --  13  --  16   GLUCOSE mg/dL 99 113* 110*   < > 119*    < > = values in this interval not displayed.     Results from last 7 days   Lab Units 10/25/24  0302 10/24/24  0312 10/23/24  0246   MAGNESIUM mg/dL 1.8 1.8 1.7   PHOSPHORUS mg/dL 3.8 3.8 3.5   HEMOGLOBIN g/dL 7.7* 7.1* 7.5*   HEMATOCRIT % 25.0* 22.7* 23.1*   WBC 10*3/mm3 4.27 4.56 4.42   ALBUMIN g/dL 3.0* 2.9* 2.8*     Results from last 7 days   Lab Units 10/25/24  0302 10/24/24  0312 10/23/24  0246 10/22/24  0339 10/21/24  0317   PLATELETS 10*3/mm3 141 128* 120* 134* 141     COVID19   Date Value Ref Range Status   04/03/2022 Not Detected Not Detected - Ref. Range Final     Lab Results   Component Value Date    HGBA1C 6.5 (H)  06/13/2024          Medications           Scheduled Medications apixaban, 2.5 mg, Oral, BID  atorvastatin, 40 mg, Oral, Daily  carvedilol, 12.5 mg, Oral, BID With Meals  furosemide, 40 mg, Oral, Daily  hydrALAZINE, 10 mg, Oral, Q8H  insulin lispro, 2-7 Units, Subcutaneous, 4x Daily AC & at Bedtime  isosorbide dinitrate, 10 mg, Oral, TID - Nitrates  senna-docusate sodium, 2 tablet, Oral, BID  venlafaxine XR, 75 mg, Oral, Daily       Infusions     PRN Medications   acetaminophen    dextrose    dextrose    glucagon (human recombinant)    nitroglycerin    ondansetron ODT **OR** ondansetron    sodium chloride     Physical Findings          General Findings alert, sitting in chair   Oral/Mouth Cavity dental appliance, partial, fits well no problems chewing/swallowing   Edema  1+ (trace), 2+ (mild)   Gastrointestinal non-distended , no BM since admission   Skin  skin intact, bruising   Tubes/Drains/Lines none   NFPE Not indicated at this time   --  Current Nutrition Orders & Evaluation of Intake       Oral Nutrition     Food Allergies NKFA   Current PO Diet Diet: Cardiac; Healthy Heart (2-3 Na+); Fluid Consistency: Thin (IDDSI 0)   Supplement n/a   PO Evaluation     % PO Intake Poor past few days    Factors Affecting Intake: nausea   --  PES STATEMENT / NUTRITION DIAGNOSIS      Nutrition Dx Problem  No acute nutritional diagnosis     NUTRITION INTERVENTION / PLAN OF CARE      Intervention Goal(s) Tolerate PO  and Increase intake, will add Boost Breeze to aid po         RD Intervention/Action Supplement provided, Encourage intake, and Continue to monitor   --      Prescription/Orders:       PO Diet       Supplements       Enteral Nutrition       Parenteral Nutrition    New Prescription Ordered? Yes   --      Monitor/Evaluation PO intake, Supplement intake   Discharge Plan/Needs Pending clinical course   --    RD to follow per protocol.      Electronically signed by:  Blair Saenz RD  10/25/24 11:53 EDT

## 2024-10-25 NOTE — THERAPY TREATMENT NOTE
Patient Name: Lowell Min  : 1940    MRN: [id number]                              Today's Date: 10/25/2024       Admit Date: 10/18/2024    Visit Dx:     ICD-10-CM ICD-9-CM   1. Acute diastolic congestive heart failure  I50.31 428.31     428.0   2. Chronic kidney disease, unspecified CKD stage  N18.9 585.9     Patient Active Problem List   Diagnosis    Chronic kidney disease, stage IV (severe)    Erythropoietin deficiency anemia    Symptomatic anemia    Anxiety associated with depression    Iron deficiency anemia    PAF (paroxysmal atrial fibrillation)    Shortness of breath    Chronic diastolic congestive heart failure    Diabetic polyneuropathy associated with type 2 diabetes mellitus    PERLA (acute kidney injury)    Foot pain, bilateral    Anemia of chronic renal failure    Acute on chronic diastolic CHF (congestive heart failure)    Obesity (BMI 30-39.9)    HLD (hyperlipidemia)    Calcium pyrophosphate deposition disease (CPPD)    Type 2 diabetes mellitus    Acute on chronic systolic CHF (congestive heart failure)    Chronic respiratory failure with hypoxia     Past Medical History:   Diagnosis Date    Anxiety     Atrial fibrillation     CHF (congestive heart failure)     Chronic kidney disease, stage IV (severe)     Depression     Elevated cholesterol     Hypertension     Type 2 diabetes mellitus      Past Surgical History:   Procedure Laterality Date    APPENDECTOMY      CHOLECYSTECTOMY      COLONOSCOPY      CYSTOSCOPY BOTOX INJECTION OF BLADDER N/A     HYSTERECTOMY      TUMOR REMOVAL Left     benign tumor from left breast      General Information       Row Name 10/25/24 1431          Physical Therapy Time and Intention    Document Type therapy note (daily note)  -AR     Mode of Treatment physical therapy  -AR       Row Name 10/25/24 1431          General Information    Patient Profile Reviewed yes  -AR     Existing Precautions/Restrictions fall  -AR       Row Name 10/25/24 1431          Cognition     Orientation Status (Cognition) oriented x 4  -AR               User Key  (r) = Recorded By, (t) = Taken By, (c) = Cosigned By      Initials Name Provider Type    Coni Velázquez, PT Physical Therapist                   Mobility       Row Name 10/25/24 1432          Bed Mobility    Bed Mobility sit-supine  -AR     Supine-Sit [place] (Bed Mobility) not tested  -AR     Sit-Supine [place] (Bed Mobility) minimum assist (75% patient effort)  -AR     Assistive Device (Bed Mobility) head of bed elevated;bed rails  -AR       Row Name 10/25/24 1432          Sit-Stand Transfer    Sit-Stand [place] (Transfers) contact guard  -AR     Assistive Device (Sit-Stand Transfers) walker, front-wheeled  -AR       Row Name 10/25/24 1432          Gait/Stairs (Locomotion)    [place] Level (Gait) contact guard  -AR     Assistive Device (Gait) walker, front-wheeled  -AR     Distance in Feet (Gait) 25  -AR     Deviations/Abnormal Patterns (Gait) guillermo decreased;gait speed decreased;antalgic;stride length decreased  -AR     Bilateral Gait Deviations forward flexed posture  -AR               User Key  (r) = Recorded By, (t) = Taken By, (c) = Cosigned By      Initials Name Provider Type    Coni Velázquez, PT Physical Therapist                   Obj/Interventions       Row Name 10/25/24 1432          Balance    Dynamic Standing Balance minimal assist  -AR     Position/Device Used, Standing Balance walker, rolling  -AR               User Key  (r) = Recorded By, (t) = Taken By, (c) = Cosigned By      Initials Name Provider Type    Coni Velázquez, PT Physical Therapist                   Goals/Plan    No documentation.                  Clinical Impression       Row Name 10/25/24 1432          Pain    Pretreatment Pain Rating 0/10 - no pain  -AR     Posttreatment Pain Rating 0/10 - no pain  -AR     Response to Pain Interventions activity and movement patterns unchanged  -AR       Row Name 10/25/24 1432          Plan  of Care Review    Plan of Care Reviewed With patient  -AR     Outcome Evaluation Continued weakness with limited activity tolerance during PT.  Able to ambulate 25' w/ contact assist using RW, slow shuffling steps w/ SOB.  Sp02 maintained >93% on room air.  Recommend continuing to sit in chair few hours/day and rec. DC to SNU.  -AR       Row Name 10/25/24 1432          Therapy Assessment/Plan (PT)    Rehab Potential (PT) good  -AR     Criteria for Skilled Interventions Met (PT) yes  -AR     Therapy Frequency (PT) 5 times/wk  -AR       Row Name 10/25/24 1432          Vital Signs    Pre SpO2 (%) 95  -AR     O2 Delivery Pre Treatment room air  -AR     Intra SpO2 (%) 93  -AR     O2 Delivery Intra Treatment room air  -AR     Post SpO2 (%) 94  -AR     O2 Delivery Post Treatment room air  -AR       Row Name 10/25/24 1432          Positioning and Restraints    Pre-Treatment Position sitting in chair/recliner  -AR     Post Treatment Position bed  -AR     In Bed notified nsg;supine;call light within reach;encouraged to call for assist;exit alarm on  -AR               User Key  (r) = Recorded By, (t) = Taken By, (c) = Cosigned By      Initials Name Provider Type    Coni Velázquez, PT Physical Therapist                   Outcome Measures       Row Name 10/25/24 1436 10/25/24 0818       How much help from another person do you currently need...    Turning from your back to your side while in flat bed without using bedrails? 4  -AR 4  -GP    Moving from lying on back to sitting on the side of a flat bed without bedrails? 3  -AR 3  -GP    Moving to and from a bed to a chair (including a wheelchair)? 3  -AR 3  -GP    Standing up from a chair using your arms (e.g., wheelchair, bedside chair)? 3  -AR 3  -GP    Climbing 3-5 steps with a railing? 2  -AR 2  -GP    To walk in hospital room? 3  -AR 3  -GP    AM-PAC 6 Clicks Score (PT) 18  -AR 18  -GP    Highest Level of Mobility Goal 6 --> Walk 10 steps or more  -AR 6 --> Walk 10  steps or more  -GP      Row Name 10/25/24 1436          Functional Assessment    Outcome Measure Options AM-PAC 6 Clicks Basic Mobility (PT)  -AR               User Key  (r) = Recorded By, (t) = Taken By, (c) = Cosigned By      Initials Name Provider Type    GP Lali Delong, RN Registered Nurse    Coni Velázquez, PT Physical Therapist                                 Physical Therapy Education       Title: PT OT SLP Therapies (In Progress)       Topic: Physical Therapy (In Progress)       Point: Mobility training (In Progress)       Learning Progress Summary            Patient Acceptance, E, NR by AR at 10/25/2024 1436    Acceptance, E,TB,D, VU,DU,NR by PH at 10/23/2024 1405    Acceptance, E,TB,D, VU by PH at 10/22/2024 1434    Acceptance, E,TB, VU by PH at 10/21/2024 1539    Acceptance, E,TB, VU by LW at 10/20/2024 1422                      Point: Home exercise program (In Progress)       Learning Progress Summary            Patient Acceptance, E, NR by AR at 10/25/2024 1436    Acceptance, E,TB,D, VU,DU,NR by PH at 10/23/2024 1405    Acceptance, E,TB,D, VU by PH at 10/22/2024 1434    Acceptance, E,TB, VU by PH at 10/21/2024 1539                      Point: Body mechanics (In Progress)       Learning Progress Summary            Patient Acceptance, E, NR by AR at 10/25/2024 1436    Acceptance, E,TB,D, VU,DU,NR by PH at 10/23/2024 1405    Acceptance, E,TB,D, VU by PH at 10/22/2024 1434    Acceptance, E,TB, VU by PH at 10/21/2024 1539    Acceptance, E,TB, VU by LW at 10/20/2024 1422                      Point: Precautions (In Progress)       Learning Progress Summary            Patient Acceptance, E, NR by AR at 10/25/2024 1436    Acceptance, E,TB,D, VU,DU,NR by PH at 10/23/2024 1405    Acceptance, E,TB,D, VU by PH at 10/22/2024 1434    Acceptance, E,TB, VU by PH at 10/21/2024 1539    Acceptance, E,TB, VU by LW at 10/20/2024 1422                                      User Key       Initials Effective Dates Name  Provider Type Discipline    AR 06/16/21 -  Coni Price, PT Physical Therapist PT    PH 06/16/21 -  Kiara Navarro PTA Physical Therapist Assistant PT    LW 05/08/23 -  Farida Urbina PT Physical Therapist PT                  PT Recommendation and Plan     Outcome Evaluation: Continued weakness with limited activity tolerance during PT.  Able to ambulate 25' w/ contact assist using RW, slow shuffling steps w/ SOB.  Sp02 maintained >93% on room air.  Recommend continuing to sit in chair few hours/day and rec. DC to SNU.     Time Calculation:         PT Charges       Row Name 10/25/24 1431             Time Calculation    Start Time 1358  -AR      Stop Time 1416  -AR      Time Calculation (min) 18 min  -AR      PT Received On 10/25/24  -AR      PT - Next Appointment 10/28/24  -AR                User Key  (r) = Recorded By, (t) = Taken By, (c) = Cosigned By      Initials Name Provider Type    AR Coni Price, PT Physical Therapist                  Therapy Charges for Today       Code Description Service Date Service Provider Modifiers Qty    30109290150 HC PT THER PROC EA 15 MIN 10/25/2024 Coni Price, PT GP 1            PT G-Codes  Outcome Measure Options: AM-PAC 6 Clicks Basic Mobility (PT)  AM-PAC 6 Clicks Score (PT): 18  AM-PAC 6 Clicks Score (OT): 15  PT Discharge Summary  Anticipated Discharge Disposition (PT): skilled nursing facility    Coni Price PT  10/25/2024

## 2024-10-25 NOTE — PROGRESS NOTES
LOS: 7 days     Chief Complaint/ Reason for encounter: CHF exacerbation, CKD    Subjective   10/21/24 : Patient is doing well today with no new complaints  Good appetite with no nausea or vomiting  No shortness of breath chest pain or edema  Voiding well with no dysuria  She is feeling better with diuresis and her weight is down 5 pounds since admission  No edema, not requiring any oxygen supplementally    10/22: Clinically doing well with no new complaints  Not sure if it is accurate but her weight is down to 197 pounds today  Only on oxygen at night, 1 L  No edema no dyspnea no chest pain fevers or chills good oral intake with no nausea or vomiting    10/23: Clinically doing well denies new complaints, no shortness of breath or increased oxygen requirements.  No edema, good urine output    10/24: Clinically doing well denies new complaint specifically no worsening shortness of breath or edema  Reports good appetite with no nausea or vomiting    10/25: No new complaints or events, weight down a bit, good urine output overnight  She is on 1 L nasal cannula when sleeping but not during the day  No dyspnea chest pain fevers chills edema nausea or vomiting, good oral intake    Medical history reviewed:  History of Present Illness    Subjective    History taken from: Patient and chart    Vital Signs  Temp:  [97.3 °F (36.3 °C)-98.2 °F (36.8 °C)] 97.3 °F (36.3 °C)  Heart Rate:  [58-67] 66  Resp:  [16-18] 18  BP: (104-130)/(62-73) 130/73       Wt Readings from Last 1 Encounters:   10/25/24 0426 89.5 kg (197 lb 5 oz)   10/24/24 0518 92.1 kg (203 lb)   10/22/24 0537 89.4 kg (197 lb 1.5 oz)   10/21/24 0439 95.5 kg (210 lb 8.6 oz)   10/20/24 0451 94.4 kg (208 lb 1.8 oz)   10/19/24 0700 96 kg (211 lb 10.3 oz)   10/18/24 2214 97 kg (213 lb 13.5 oz)   10/18/24 2142 97.5 kg (215 lb)       Objective:  Vital signs: (most recent): Blood pressure 130/73, pulse 66, temperature 97.3 °F (36.3 °C), temperature source Oral, resp. rate  "18, height 170.2 cm (67\"), weight 89.5 kg (197 lb 5 oz), SpO2 99%.            Objective:  General Appearance:  Comfortable, well-appearing, in no acute distress and not in pain.  Awake, alert  HEENT: Mucous membranes moist, no injury, oropharynx clear  Lungs:  Normal effort and normal respiratory rate.  Breath sounds clear to auscultation.  No  respiratory distress.  No rales, decreased breath sounds or rhonchi.    Heart: Normal rate.  Regular rhythm.  S1, S2 normal.  No murmur.   Abdomen: Abdomen is soft.  Bowel sounds are normal, no abdominal tenderness.  There is no rebound or guarding  Extremities: No edema of bilateral lower extremities  Skin:  Warm and dry with no rashes      Results Review:    Intake/Output:     Intake/Output Summary (Last 24 hours) at 10/25/2024 0835  Last data filed at 10/24/2024 2121  Gross per 24 hour   Intake 120 ml   Output --   Net 120 ml         DATA:  Radiology and Labs:  The following labs independently reviewed by me. Additional labs ordered for tomorrow a.m.  Interval notes, chart personally reviewed by me.   Old records independently reviewed showing progressive CKD, now stage IV  Discussed with patient herself at bedside    Risk/ complexity of medical care/ medical decision making moderate complexity, diuretic management      Labs:   Recent Results (from the past 24 hours)   POC Glucose Once    Collection Time: 10/24/24 11:09 AM    Specimen: Blood   Result Value Ref Range    Glucose 209 (H) 70 - 130 mg/dL   POC Glucose Once    Collection Time: 10/24/24  4:08 PM    Specimen: Blood   Result Value Ref Range    Glucose 153 (H) 70 - 130 mg/dL   POC Glucose Once    Collection Time: 10/24/24  8:50 PM    Specimen: Blood   Result Value Ref Range    Glucose 153 (H) 70 - 130 mg/dL   CBC (No Diff)    Collection Time: 10/25/24  3:02 AM    Specimen: Blood   Result Value Ref Range    WBC 4.27 3.40 - 10.80 10*3/mm3    RBC 2.60 (L) 3.77 - 5.28 10*6/mm3    Hemoglobin 7.7 (L) 12.0 - 15.9 g/dL    " Hematocrit 25.0 (L) 34.0 - 46.6 %    MCV 96.2 79.0 - 97.0 fL    MCH 29.6 26.6 - 33.0 pg    MCHC 30.8 (L) 31.5 - 35.7 g/dL    RDW 15.5 (H) 12.3 - 15.4 %    RDW-SD 54.2 (H) 37.0 - 54.0 fl    MPV 10.3 6.0 - 12.0 fL    Platelets 141 140 - 450 10*3/mm3   Magnesium    Collection Time: 10/25/24  3:02 AM    Specimen: Blood   Result Value Ref Range    Magnesium 1.8 1.6 - 2.4 mg/dL   Renal Function Panel    Collection Time: 10/25/24  3:02 AM    Specimen: Blood   Result Value Ref Range    Glucose 99 65 - 99 mg/dL    BUN 37 (H) 8 - 23 mg/dL    Creatinine 2.65 (H) 0.57 - 1.00 mg/dL    Sodium 136 136 - 145 mmol/L    Potassium 4.3 3.5 - 5.2 mmol/L    Chloride 101 98 - 107 mmol/L    CO2 23.0 22.0 - 29.0 mmol/L    Calcium 8.9 8.6 - 10.5 mg/dL    Albumin 3.0 (L) 3.5 - 5.2 g/dL    Phosphorus 3.8 2.5 - 4.5 mg/dL    Anion Gap 12.0 5.0 - 15.0 mmol/L    BUN/Creatinine Ratio 14.0 7.0 - 25.0    eGFR 17.3 (L) >60.0 mL/min/1.73   POC Glucose Once    Collection Time: 10/25/24  7:42 AM    Specimen: Blood   Result Value Ref Range    Glucose 106 70 - 130 mg/dL       Radiology:  Pertinent radiology studies were reviewed as described above      Medications have been reviewed separately in chart overview      ASSESSMENT:  CKD stage IV with some progression, had new PERLA at this admission which is slightly better again today  Volume overload, improved.  Euvolemic on exam  Dyspnea, improved  Anemia of CKD  CHF, acute on chronic systolic, improved with diuresis      Plan:  Her renal function continues to improve and this may be her new baseline after adequate diuresis  Will plan to  restart oral Lasix today at 40 mg daily.  She had been on twice daily dosing earlier this admission  Clinically euvolemic on exam and oxygenation is adequate    On hydralazine/Isordil combo per cardiology.  Echo results noted, EF 40 to 45%  Continue strict I's and O's, daily weights, low-sodium diet    Discussed need for outpatient dialysis education and planning as she is  approaching ESRD.   Okay for discharge from renal standpoint anytime with close outpatient follow-up in our office and repeat chemistries next week      Isaiah Foster MD  [facility]   Office phone number: [phone]  Answering service phone number: [phone]    10/25/24  08:35 EDT    Dictation performed using Dragon dictation software

## 2024-10-25 NOTE — PLAN OF CARE
Goal Outcome Evaluation:              Outcome Evaluation: VSS. ONCE ASLEEP HER SATS DROPPED INTO THE 80'S. O2 PLACED AT 1 L/M N/C TO KEEP SATS FROM GOING BELOW 90% WHILE SLEEPING.       NO DIURECTICS.  Response to Diuretics (Output greater than intake):   Daily Weight (up or down): DOWN  O2 Requirements: 1 L/M N/C WHILE SLEEPING  Functional Status (Activity level, tolerance and respiratory symptoms): UP TO BR WITH WALKER   Discharge Plans:TBD

## 2024-10-26 VITALS
SYSTOLIC BLOOD PRESSURE: 109 MMHG | HEART RATE: 64 BPM | DIASTOLIC BLOOD PRESSURE: 60 MMHG | RESPIRATION RATE: 18 BRPM | HEIGHT: 67 IN | WEIGHT: 195.5 LBS | BODY MASS INDEX: 30.69 KG/M2 | TEMPERATURE: 97.5 F | OXYGEN SATURATION: 95 %

## 2024-10-26 LAB
ALBUMIN SERPL-MCNC: 3 G/DL (ref 3.5–5.2)
ALP SERPL-CCNC: 228 U/L (ref 39–117)
ALT SERPL W P-5'-P-CCNC: 10 U/L (ref 1–33)
ANION GAP SERPL CALCULATED.3IONS-SCNC: 13 MMOL/L (ref 5–15)
AST SERPL-CCNC: 13 U/L (ref 1–32)
BACTERIA ISLT: NORMAL
BILIRUB CONJ SERPL-MCNC: 0.4 MG/DL (ref 0–0.3)
BILIRUB INDIRECT SERPL-MCNC: 0.4 MG/DL
BILIRUB SERPL-MCNC: 0.8 MG/DL (ref 0–1.2)
BUN SERPL-MCNC: 38 MG/DL (ref 8–23)
BUN/CREAT SERPL: 14.2 (ref 7–25)
CALCIUM SPEC-SCNC: 8.8 MG/DL (ref 8.6–10.5)
CHLORIDE SERPL-SCNC: 100 MMOL/L (ref 98–107)
CO2 SERPL-SCNC: 22 MMOL/L (ref 22–29)
CREAT SERPL-MCNC: 2.68 MG/DL (ref 0.57–1)
DEPRECATED RDW RBC AUTO: 56.3 FL (ref 37–54)
EGFRCR SERPLBLD CKD-EPI 2021: 17.1 ML/MIN/1.73
ERYTHROCYTE [DISTWIDTH] IN BLOOD BY AUTOMATED COUNT: 15.7 % (ref 12.3–15.4)
GLUCOSE BLDC GLUCOMTR-MCNC: 107 MG/DL (ref 70–130)
GLUCOSE BLDC GLUCOMTR-MCNC: 169 MG/DL (ref 70–130)
GLUCOSE SERPL-MCNC: 105 MG/DL (ref 65–99)
HCT VFR BLD AUTO: 23.5 % (ref 34–46.6)
HGB BLD-MCNC: 7.3 G/DL (ref 12–15.9)
LIPASE SERPL-CCNC: 64 U/L (ref 13–60)
MAGNESIUM SERPL-MCNC: 1.8 MG/DL (ref 1.6–2.4)
MCH RBC QN AUTO: 30.5 PG (ref 26.6–33)
MCHC RBC AUTO-ENTMCNC: 31.1 G/DL (ref 31.5–35.7)
MCV RBC AUTO: 98.3 FL (ref 79–97)
PHOSPHATE SERPL-MCNC: 3.7 MG/DL (ref 2.5–4.5)
PLATELET # BLD AUTO: 147 10*3/MM3 (ref 140–450)
PMV BLD AUTO: 10.2 FL (ref 6–12)
POTASSIUM SERPL-SCNC: 4.1 MMOL/L (ref 3.5–5.2)
PROT SERPL-MCNC: 5.9 G/DL (ref 6–8.5)
RBC # BLD AUTO: 2.39 10*6/MM3 (ref 3.77–5.28)
SODIUM SERPL-SCNC: 135 MMOL/L (ref 136–145)
WBC NRBC COR # BLD AUTO: 3.82 10*3/MM3 (ref 3.4–10.8)

## 2024-10-26 PROCEDURE — 63710000001 INSULIN LISPRO (HUMAN) PER 5 UNITS

## 2024-10-26 PROCEDURE — 83690 ASSAY OF LIPASE: CPT | Performed by: HOSPITALIST

## 2024-10-26 PROCEDURE — 82948 REAGENT STRIP/BLOOD GLUCOSE: CPT

## 2024-10-26 PROCEDURE — 85027 COMPLETE CBC AUTOMATED: CPT | Performed by: STUDENT IN AN ORGANIZED HEALTH CARE EDUCATION/TRAINING PROGRAM

## 2024-10-26 PROCEDURE — 84100 ASSAY OF PHOSPHORUS: CPT | Performed by: HOSPITALIST

## 2024-10-26 PROCEDURE — 80048 BASIC METABOLIC PNL TOTAL CA: CPT | Performed by: INTERNAL MEDICINE

## 2024-10-26 PROCEDURE — 83735 ASSAY OF MAGNESIUM: CPT | Performed by: STUDENT IN AN ORGANIZED HEALTH CARE EDUCATION/TRAINING PROGRAM

## 2024-10-26 PROCEDURE — 80076 HEPATIC FUNCTION PANEL: CPT | Performed by: HOSPITALIST

## 2024-10-26 RX ORDER — HYDRALAZINE HYDROCHLORIDE 10 MG/1
10 TABLET, FILM COATED ORAL EVERY 8 HOURS SCHEDULED
Start: 2024-10-26

## 2024-10-26 RX ORDER — ISOSORBIDE DINITRATE 10 MG/1
10 TABLET ORAL
Start: 2024-10-26

## 2024-10-26 RX ORDER — FUROSEMIDE 40 MG
40 TABLET ORAL DAILY
Start: 2024-10-27

## 2024-10-26 RX ADMIN — FUROSEMIDE 40 MG: 40 TABLET ORAL at 08:11

## 2024-10-26 RX ADMIN — HYDRALAZINE HYDROCHLORIDE 10 MG: 10 TABLET ORAL at 06:41

## 2024-10-26 RX ADMIN — INSULIN LISPRO 2 UNITS: 100 INJECTION, SOLUTION INTRAVENOUS; SUBCUTANEOUS at 12:02

## 2024-10-26 RX ADMIN — ISOSORBIDE DINITRATE 10 MG: 10 TABLET ORAL at 08:11

## 2024-10-26 RX ADMIN — ATORVASTATIN CALCIUM 40 MG: 20 TABLET, FILM COATED ORAL at 08:11

## 2024-10-26 RX ADMIN — ISOSORBIDE DINITRATE 10 MG: 10 TABLET ORAL at 12:02

## 2024-10-26 RX ADMIN — APIXABAN 2.5 MG: 2.5 TABLET, FILM COATED ORAL at 08:11

## 2024-10-26 RX ADMIN — VENLAFAXINE HYDROCHLORIDE 75 MG: 75 CAPSULE, EXTENDED RELEASE ORAL at 08:11

## 2024-10-26 RX ADMIN — CARVEDILOL 12.5 MG: 12.5 TABLET, FILM COATED ORAL at 08:10

## 2024-10-26 RX ADMIN — HYDRALAZINE HYDROCHLORIDE 10 MG: 10 TABLET ORAL at 14:10

## 2024-10-26 NOTE — CASE MANAGEMENT/SOCIAL WORK
Case Management Discharge Note      Final Note: [facility]    Provided Post Acute Provider List?: N/A  Provided Post Acute Provider Quality & Resource List?: N/A    Selected Continued Care - Discharged on 10/26/2024 Admission date: 10/18/2024 - Discharge disposition: Skilled Nursing Facility (DC - External)      Destination Coordination complete.      Service Provider Services Address Phone Fax Patient Preferred    [facility] [address] [address] [phone] --              Durable Medical Equipment    No services have been selected for the patient.                Dialysis/Infusion    No services have been selected for the patient.                Home Medical Care    No services have been selected for the patient.                Therapy    No services have been selected for the patient.                Community Resources    No services have been selected for the patient.                Community & DME    No services have been selected for the patient.                    Selected Continued Care - Prior Encounters Includes continued care and service providers with selected services from prior encounters from 7/20/2024 to 10/26/2024      Discharged on 9/14/2024 Admission date: 9/6/2024 - Discharge disposition: Skilled Nursing Facility (DC - External)      Destination       Service Provider Services Address Phone Fax Patient Preferred    [facility] [address] [address] [phone] --                          Transportation Services  Ambulance: [facility]    Final Discharge Disposition Code: 03 - skilled nursing facility (SNF)

## 2024-10-26 NOTE — DISCHARGE SUMMARY
Patient Name: Lowell Min  : 1940  MRN: [id number]    Date of Admission: 10/18/2024  Date of Discharge:  10/26/2024  Primary Care Physician: Dannie Mathews MD      Chief Complaint:   Shortness of Breath and Leg Swelling      Discharge Diagnoses     Active Hospital Problems    Diagnosis  POA    **Acute on chronic systolic CHF (congestive heart failure) [I50.23]  Yes    Chronic respiratory failure with hypoxia [J96.11]  Yes    Type 2 diabetes mellitus [E11.9]  Yes    HLD (hyperlipidemia) [E78.5]  Yes    PAF (paroxysmal atrial fibrillation) [I48.0]  Yes    Iron deficiency anemia [D50.9]  Yes    Chronic diastolic congestive heart failure [I50.32]  Yes    Anxiety associated with depression [F41.8]  Yes    Chronic kidney disease, stage IV (severe) [N18.4]  Yes      Resolved Hospital Problems   No resolved problems to display.        Hospital Course     Very pleasant 85yo woman with RADHA, CKD4, HTN, DM2, PAF, pulm HTN, CHRF, and chronic combined CHF, who presented to ER with worsening SOA and BLE edema. She was admitted with acute on chronic HRrEF. Please see below for details of admission by problem:      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  HTN  -BNP 21k on admission, CXR with vascular congestion, bilateral lower extremity edema  -Cardiology and Renal followed  -diuresed well with falling weights and I<<Os  -O2 requirement down to RA now--pt uses O2 at home at 1L/min when sleeping and with exertion  -Cr improved, Lasix restarted per Renal, tolerating so far  -continued Coreg, hydralazine, and Isordil per Cardiology  -no ACE/ARB/ARNI/MRA due to CKD  -has f/u with Card at [facility] on      CKD4  -Cr improved/stable  -Lasix restarted by Renal  -Has f/u with Renal on 10/30     DM2 with hyperglycemia  -A1c 6.5 in , not on meds PTA  -Sugars acceptable here  -covered with SSI      PAF  -RC: Coreg  -AC: Eliquis  -HRs acceptable     HLD  -Statin     Anemia of CKD  -no bleeding  evident here  -Hgb fairly stable     Constipation  Nausea  -nausea better now that she is moving her bowels  -LFTs and lipase okay  -no vomiting  -continue to follow through medical staff at facility        Eliquis (home med) sufficed for DVT prophylaxis.  Full code confirmed.  Discussed with patient and RN.  Anticipate discharge back to [facility] this afternoon.    Day of Discharge     Subjective:  Feeling okay today. Nausea improved. Large BM this AM. Tolerating po. No V/D/abd pain. No F/C/NS. No SOA or CP. Voiding well w/o LUTS. Amenable to dc today.    Physical Exam:  Temp:  [97.5 °F (36.4 °C)-97.9 °F (36.6 °C)] 97.9 °F (36.6 °C)  Heart Rate:  [61-74] 73  Resp:  [18-20] 18  BP: (119-127)/(68-77) 123/73  Body mass index is 30.62 kg/m².  Physical Exam  Vitals and nursing note reviewed.   Constitutional:       General: She is not in acute distress.     Appearance: She is ill-appearing (chronically). She is not toxic-appearing or diaphoretic.   Cardiovascular:      Rate and Rhythm: Normal rate. Rhythm irregular.      Pulses: Normal pulses.   Pulmonary:      Effort: Pulmonary effort is normal. No respiratory distress.      Breath sounds: Normal breath sounds. No wheezing or rales.   Abdominal:      General: Bowel sounds are normal. There is no distension.      Palpations: Abdomen is soft.      Tenderness: There is no abdominal tenderness.   Musculoskeletal:         General: Swelling (trace in BLEs) present.      Cervical back: Neck supple.   Skin:     General: Skin is warm and dry.      Capillary Refill: Capillary refill takes less than 2 seconds.      Coloration: Skin is pale. Skin is not jaundiced.   Neurological:      General: No focal deficit present.      Mental Status: She is alert and oriented to person, place, and time. Mental status is at baseline.      Cranial Nerves: No cranial nerve deficit.      Coordination: Coordination normal.   Psychiatric:         Mood and Affect: Mood normal.         Behavior:  Behavior normal.         Thought Content: Thought content normal.         Consultants     Consult Orders (all) (From admission, onward)       Start     Ordered    10/19/24 0827  Inpatient Nephrology Consult  Once        Specialty:  Nephrology  Provider:  Ryann Foster MD    10/19/24 0827    10/18/24 2217  Inpatient Nephrology Consult  Once        Specialty:  Nephrology  Provider:  Christian Aguilera MD    10/18/24 2217    10/18/24 2216  Inpatient Cardiology Consult  Once        Specialty:  Cardiology  Provider:  Chaz Pacheco MD    10/18/24 2217    10/18/24 2048  [facility] (on-call MD unless specified) Details  Once        Specialty:  Hospitalist  Provider:  (Not yet assigned)    10/18/24 2047                  Procedures     * Surgery not found *    Imaging Results (All)       Procedure Component Value Units Date/Time    XR Chest 1 View [[id number]] Collected: 10/18/24 1926     Updated: 10/18/24 1930    Narrative:      XR CHEST 1 VW-     Clinical: Shortness of breath     COMPARISON examination 9/12/2024     FINDINGS: There is cardiomegaly. Atherosclerotic calcification of the  aorta. There is vascular congestion. No gross pleural effusion seen. The  remainder is unremarkable.     This report was finalized on 10/18/2024 7:27 PM by Dr. Mika Suárez M.D on Workstation: [id number]               Results for orders placed during the hospital encounter of 09/06/24    Adult Transthoracic Echo Complete W/ Cont if Necessary Per Protocol    Interpretation Summary    Left ventricular systolic function is mildly decreased. Calculated left ventricular EF = 44.1%    The following left ventricular wall segments are hypokinetic: mid anterior, apical anterior, basal anterolateral, mid anterolateral, apical lateral, basal inferolateral, mid inferolateral, apical inferior, mid inferior, apical septal, basal inferoseptal, mid inferoseptal, apex hypokinetic, mid anteroseptal, basal anterior, basal inferior and basal  inferoseptal.    Left ventricular diastolic function was indeterminate.    The left atrial cavity is severely dilated.    Mild aortic valve stenosis is present. Aortic valve area is 1.2 cm2.    The right atrial cavity is severely  dilated.    Peak velocity of the flow distal to the aortic valve is 246.1 cm/s. Aortic valve mean pressure gradient is 11 mmHg.    Severe mitral valve regurgitation is present.    Mild mitral valve stenosis is present. The mitral valve mean gradient is 4 mmHg.    Severe tricuspid valve regurgitation is present.    Estimated right ventricular systolic pressure from tricuspid regurgitation is markedly elevated (>55 mmHg). Calculated right ventricular systolic pressure from tricuspid regurgitation is 62 mmHg.    Pertinent Labs     Results from last 7 days   Lab Units 10/26/24  0316 10/25/24  0302 10/24/24  0312 10/23/24  0246   WBC 10*3/mm3 3.82 4.27 4.56 4.42   HEMOGLOBIN g/dL 7.3* 7.7* 7.1* 7.5*   PLATELETS 10*3/mm3 147 141 128* 120*     Results from last 7 days   Lab Units 10/26/24  0316 10/25/24  0302 10/24/24  0312 10/23/24  0246   SODIUM mmol/L 135* 136 131* 135*   POTASSIUM mmol/L 4.1 4.3 4.2 4.1   CHLORIDE mmol/L 100 101 98 99   CO2 mmol/L 22.0 23.0 24.0 27.0   BUN mg/dL 38* 37* 40* 38*   CREATININE mg/dL 2.68* 2.65* 2.76* 2.99*   GLUCOSE mg/dL 105* 99 113* 110*   EGFR mL/min/1.73 17.1* 17.3* 16.5* 15.0*     Results from last 7 days   Lab Units 10/26/24  0316 10/25/24  0302 10/24/24  0312 10/23/24  0246   ALBUMIN g/dL 3.0* 3.0* 2.9* 2.8*   BILIRUBIN mg/dL 0.8  --   --  0.9   ALK PHOS U/L 228*  --   --  224*   AST (SGOT) U/L 13  --   --  13   ALT (SGPT) U/L 10  --   --  8     Results from last 7 days   Lab Units 10/26/24  0316 10/25/24  0302 10/24/24  0312 10/23/24  0246   CALCIUM mg/dL 8.8 8.9 8.6 8.7   ALBUMIN g/dL 3.0* 3.0* 2.9* 2.8*   MAGNESIUM mg/dL 1.8 1.8 1.8 1.7   PHOSPHORUS mg/dL 3.7 3.8 3.8 3.5     Results from last 7 days   Lab Units 10/26/24  0316   LIPASE U/L 64*      "  Results from last 7 days   Lab Units 10/24/24  0312 10/23/24  2141   SODIUM UR mmol/L  --  46   CREATININE UR mg/dL  --  80.8   CHLORIDE UR mmol/L  --  <20   URIC ACID mg/dL 7.1*  --          Invalid input(s): \"LDLCALC\"          Test Results Pending at Discharge     Pending Results       None              Discharge Details        Discharge Medications        New Medications        Instructions Start Date   furosemide 40 MG tablet  Commonly known as: LASIX   40 mg, Oral, Daily   Start Date: October 27, 2024     hydrALAZINE 10 MG tablet  Commonly known as: APRESOLINE   10 mg, Oral, Every 8 Hours Scheduled      isosorbide dinitrate 10 MG tablet  Commonly known as: ISORDIL   10 mg, Oral, 3 Times Daily (Nitrates)             Continue These Medications        Instructions Start Date   acetaminophen 325 MG tablet  Commonly known as: TYLENOL   650 mg, Oral, Every 4 Hours PRN      apixaban 2.5 MG tablet tablet  Commonly known as: ELIQUIS   2.5 mg, Oral, 2 Times Daily      atorvastatin 40 MG tablet  Commonly known as: LIPITOR   1 tablet, Every Night at Bedtime      carvedilol 12.5 MG tablet  Commonly known as: COREG   Take 1 tablet by mouth 2 (two) times daily with meals.      O2  Commonly known as: OXYGEN   2 L/min, Nightly, Uses when sleeping      sennosides-docusate 8.6-50 MG per tablet  Commonly known as: PERICOLACE   2 tablets, Oral, 2 Times Daily      venlafaxine XR 75 MG 24 hr capsule  Commonly known as: EFFEXOR-XR   1 capsule, Daily             Stop These Medications      amLODIPine 5 MG tablet  Commonly known as: NORVASC     gabapentin 100 MG capsule  Commonly known as: NEURONTIN     HYDROcodone-acetaminophen 5-325 MG per tablet  Commonly known as: NORCO     Lidocaine 4 %     methylPREDNISolone 4 MG dose pack  Commonly known as: MEDROL     naloxone 4 MG/0.1ML nasal spray  Commonly known as: NARCAN     sodium bicarbonate 650 MG tablet              Allergies   Allergen Reactions    Ibuprofen Other (See Comments)     " Decrease urine output         Discharge Disposition:  Skilled Nursing Facility (DC - External)      Discharge Diet:  Diet Order   Procedures    Diet: Cardiac; Healthy Heart (2-3 Na+); Fluid Consistency: Thin (IDDSI 0)       Discharge Activity:   As tolerated    CODE STATUS:    Code Status and Medical Interventions: CPR (Attempt to Resuscitate); Full   Ordered at: 10/18/24 8036     Code Status (Patient has no pulse and is not breathing):    CPR (Attempt to Resuscitate)     Medical Interventions (Patient has pulse or is breathing):    Full       No future appointments.  Additional Instructions for the Follow-ups that You Need to Schedule       Discharge Follow-up with Specialty: Cardiology at [facility] on November 4th; 1 Week   As directed      Specialty: Cardiology at [facility] on November 4th   Follow Up: 1 Week        Discharge Follow-up with Specialty: Pulm at [facility] on November 20th   As directed      Specialty: Pulm at [facility] on November 20th        Discharge Follow-up with Specified Provider: Dr. Foster (Renal) on October 30th   As directed      To: Dr. Foster (Renal) on October 30th        Discharge Follow-up with Specified Provider: Neuro at [facility] on November 18th   As directed      To: Neuro at [facility] on November 18th        Discharge Follow-up with Specified Provider: Ortho at [facility] on November 8th   As directed      To: Ortho at [facility] on November 8th               Contact information for follow-up providers       Dannie Mathews MD .    Specialty: Internal Medicine  Contact information:  [address]  [place] [address] [phone]                       Contact information for after-discharge care       Destination       [facility] .    Service: Skilled Nursing  Contact information:  [address] [address] [phone]                                   Additional Instructions for the Follow-ups that You Need to Schedule        Discharge Follow-up with Specialty: Cardiology at [facility] on November 4th; 1 Week   As directed      Specialty: Cardiology at [facility] on November 4th   Follow Up: 1 Week        Discharge Follow-up with Specialty: Pulm at [facility] on November 20th   As directed      Specialty: Pulm at [facility] on November 20th        Discharge Follow-up with Specified Provider: Dr. Foster (Renal) on October 30th   As directed      To: Dr. Foster (Renal) on October 30th        Discharge Follow-up with Specified Provider: Neuro at [facility] on November 18th   As directed      To: Neuro at [facility] on November 18th        Discharge Follow-up with Specified Provider: Ortho at [facility] on November 8th   As directed      To: Ortho at [facility] on November 8th            Time Spent on Discharge:  Greater than 30 minutes      Trevor Ely MD  [facility]  10/26/24  11:11 EDT

## 2024-10-26 NOTE — PLAN OF CARE
Goal Outcome Evaluation:  Plan of Care Reviewed With: patient        Progress: no change  Outcome Evaluation: vitals stable.  pt denied pain.  pt expressed nausea.  meds given per orders.  isolation precautions maintained.  will continue to monitor.

## 2024-10-26 NOTE — NURSING NOTE
Placed call to [facility], report given to Fior. GRIFFIN and DC summary faxed to number provided [phone].

## 2024-10-26 NOTE — PROGRESS NOTES
"RENAL/KCC:     LOS: 8 days    Patient Care Team:  Dannie Mathews MD as PCP - General (Internal Medicine)    Chief Complaint:  PERLA/CKD4    Subjective     Interval History:   Chart reviewed  Feels well    Objective     Vital Sign Min/Max for last 24 hours  Temp  Min: 97.5 °F (36.4 °C)  Max: 97.9 °F (36.6 °C)   BP  Min: 119/68  Max: 127/77   Pulse  Min: 61  Max: 74   Resp  Min: 18  Max: 20   SpO2  Min: 97 %  Max: 98 %   Flow (L/min) (Oxygen Therapy)  Min: 1  Max: 1   Weight  Min: 88.7 kg (195 lb 8 oz)  Max: 88.7 kg (195 lb 8 oz)     Flowsheet Rows      Flowsheet Row First Filed Value   Admission Height 170.2 cm (67\") Documented at 10/18/2024 2111   Admission Weight 97.5 kg (215 lb) Documented at 10/18/2024 2142            No intake/output data recorded.  I/O last 3 completed shifts:  In: 1010 [P.O.:1010]  Out: -     Physical Exam:  GEN: Awake, NAD  ENT: PERRL, EOMI, MMM  NECK: Supple, no JVD  CHEST: CTAB, no W/R/C  CV: RRR, no M/G/R  ABD: Soft, NT, +BS  SKIN: Warm and Dry  NEURO: CN's intact      WBC WBC   Date Value Ref Range Status   10/26/2024 3.82 3.40 - 10.80 10*3/mm3 Final   10/25/2024 4.27 3.40 - 10.80 10*3/mm3 Final   10/24/2024 4.56 3.40 - 10.80 10*3/mm3 Final      HGB Hemoglobin   Date Value Ref Range Status   10/26/2024 7.3 (L) 12.0 - 15.9 g/dL Final   10/25/2024 7.7 (L) 12.0 - 15.9 g/dL Final   10/24/2024 7.1 (L) 12.0 - 15.9 g/dL Final      HCT Hematocrit   Date Value Ref Range Status   10/26/2024 23.5 (L) 34.0 - 46.6 % Final   10/25/2024 25.0 (L) 34.0 - 46.6 % Final   10/24/2024 22.7 (L) 34.0 - 46.6 % Final      Platlets No results found for: \"LABPLAT\"   MCV MCV   Date Value Ref Range Status   10/26/2024 98.3 (H) 79.0 - 97.0 fL Final   10/25/2024 96.2 79.0 - 97.0 fL Final   10/24/2024 93.8 79.0 - 97.0 fL Final          Sodium Sodium   Date Value Ref Range Status   10/26/2024 135 (L) 136 - 145 mmol/L Final   10/25/2024 136 136 - 145 mmol/L Final   10/24/2024 131 (L) 136 - 145 mmol/L Final      Potassium " "Potassium   Date Value Ref Range Status   10/26/2024 4.1 3.5 - 5.2 mmol/L Final   10/25/2024 4.3 3.5 - 5.2 mmol/L Final   10/24/2024 4.2 3.5 - 5.2 mmol/L Final      Chloride Chloride   Date Value Ref Range Status   10/26/2024 100 98 - 107 mmol/L Final   10/25/2024 101 98 - 107 mmol/L Final   10/24/2024 98 98 - 107 mmol/L Final      CO2 CO2   Date Value Ref Range Status   10/26/2024 22.0 22.0 - 29.0 mmol/L Final   10/25/2024 23.0 22.0 - 29.0 mmol/L Final   10/24/2024 24.0 22.0 - 29.0 mmol/L Final      BUN BUN   Date Value Ref Range Status   10/26/2024 38 (H) 8 - 23 mg/dL Final   10/25/2024 37 (H) 8 - 23 mg/dL Final   10/24/2024 40 (H) 8 - 23 mg/dL Final      Creatinine Creatinine   Date Value Ref Range Status   10/26/2024 2.68 (H) 0.57 - 1.00 mg/dL Final   10/25/2024 2.65 (H) 0.57 - 1.00 mg/dL Final   10/24/2024 2.76 (H) 0.57 - 1.00 mg/dL Final      Calcium Calcium   Date Value Ref Range Status   10/26/2024 8.8 8.6 - 10.5 mg/dL Final   10/25/2024 8.9 8.6 - 10.5 mg/dL Final   10/24/2024 8.6 8.6 - 10.5 mg/dL Final      PO4 No results found for: \"CAPO4\"   Albumin Albumin   Date Value Ref Range Status   10/26/2024 3.0 (L) 3.5 - 5.2 g/dL Final   10/25/2024 3.0 (L) 3.5 - 5.2 g/dL Final   10/24/2024 2.9 (L) 3.5 - 5.2 g/dL Final      Magnesium Magnesium   Date Value Ref Range Status   10/26/2024 1.8 1.6 - 2.4 mg/dL Final   10/25/2024 1.8 1.6 - 2.4 mg/dL Final   10/24/2024 1.8 1.6 - 2.4 mg/dL Final      Uric Acid Uric Acid   Date Value Ref Range Status   10/24/2024 7.1 (H) 2.4 - 5.7 mg/dL Final           Results Review:     I reviewed the patient's new clinical results.    apixaban, 2.5 mg, Oral, BID  atorvastatin, 40 mg, Oral, Daily  carvedilol, 12.5 mg, Oral, BID With Meals  furosemide, 40 mg, Oral, Daily  hydrALAZINE, 10 mg, Oral, Q8H  insulin lispro, 2-7 Units, Subcutaneous, 4x Daily AC & at Bedtime  isosorbide dinitrate, 10 mg, Oral, TID - Nitrates  senna-docusate sodium, 2 tablet, Oral, BID  venlafaxine XR, 75 mg, Oral, " Daily           Medication Review: Reviewed    Assessment & Plan       Acute on chronic systolic CHF (congestive heart failure)    Chronic kidney disease, stage IV (severe)    Anxiety associated with depression    Iron deficiency anemia    PAF (paroxysmal atrial fibrillation)    Chronic diastolic congestive heart failure    HLD (hyperlipidemia)    Type 2 diabetes mellitus    Chronic respiratory failure with hypoxia      Shivam: Cr stable at 2.6.  Lytes OK.  Continue daily Lasix.  BP at goal.  Dispo per primary.      Paras Payne MD  [facility]  10/26/24  08:06 EDT

## 2024-12-20 ENCOUNTER — APPOINTMENT (OUTPATIENT)
Dept: GENERAL RADIOLOGY | Facility: HOSPITAL | Age: 84
End: 2024-12-20
Payer: MEDICARE

## 2024-12-20 ENCOUNTER — HOSPITAL ENCOUNTER (INPATIENT)
Facility: HOSPITAL | Age: 84
LOS: 2 days | Discharge: HOME | End: 2024-12-24
Attending: EMERGENCY MEDICINE | Admitting: STUDENT IN AN ORGANIZED HEALTH CARE EDUCATION/TRAINING PROGRAM
Payer: MEDICARE

## 2024-12-20 DIAGNOSIS — N18.9 CHRONIC KIDNEY DISEASE, UNSPECIFIED CKD STAGE: ICD-10-CM

## 2024-12-20 DIAGNOSIS — I50.9 ACUTE ON CHRONIC CONGESTIVE HEART FAILURE, UNSPECIFIED HEART FAILURE TYPE: Primary | ICD-10-CM

## 2024-12-20 LAB
ALBUMIN SERPL-MCNC: 3.3 G/DL (ref 3.5–5.2)
ALBUMIN/GLOB SERPL: 1 G/DL
ALP SERPL-CCNC: 200 U/L (ref 39–117)
ALT SERPL W P-5'-P-CCNC: 8 U/L (ref 1–33)
ANION GAP SERPL CALCULATED.3IONS-SCNC: 10.7 MMOL/L (ref 5–15)
AST SERPL-CCNC: 18 U/L (ref 1–32)
BASOPHILS # BLD AUTO: 0.04 10*3/MM3 (ref 0–0.2)
BASOPHILS NFR BLD AUTO: 0.8 % (ref 0–1.5)
BILIRUB SERPL-MCNC: 1 MG/DL (ref 0–1.2)
BUN SERPL-MCNC: 36 MG/DL (ref 8–23)
BUN/CREAT SERPL: 12.9 (ref 7–25)
CALCIUM SPEC-SCNC: 9.4 MG/DL (ref 8.6–10.5)
CHLORIDE SERPL-SCNC: 105 MMOL/L (ref 98–107)
CO2 SERPL-SCNC: 21.3 MMOL/L (ref 22–29)
CREAT SERPL-MCNC: 2.8 MG/DL (ref 0.57–1)
DEPRECATED RDW RBC AUTO: 50.2 FL (ref 37–54)
EGFRCR SERPLBLD CKD-EPI 2021: 16.2 ML/MIN/1.73
EOSINOPHIL # BLD AUTO: 0.26 10*3/MM3 (ref 0–0.4)
EOSINOPHIL NFR BLD AUTO: 5.2 % (ref 0.3–6.2)
ERYTHROCYTE [DISTWIDTH] IN BLOOD BY AUTOMATED COUNT: 14.5 % (ref 12.3–15.4)
GEN 5 1HR TROPONIN T REFLEX: 50 NG/L
GLOBULIN UR ELPH-MCNC: 3.3 GM/DL
GLUCOSE BLDC GLUCOMTR-MCNC: 100 MG/DL (ref 70–130)
GLUCOSE BLDC GLUCOMTR-MCNC: 154 MG/DL (ref 70–130)
GLUCOSE SERPL-MCNC: 109 MG/DL (ref 65–99)
HBA1C MFR BLD: 5.8 % (ref 4.8–5.6)
HCT VFR BLD AUTO: 31.2 % (ref 34–46.6)
HGB BLD-MCNC: 9.9 G/DL (ref 12–15.9)
HOLD SPECIMEN: NORMAL
HOLD SPECIMEN: NORMAL
IMM GRANULOCYTES # BLD AUTO: 0.01 10*3/MM3 (ref 0–0.05)
IMM GRANULOCYTES NFR BLD AUTO: 0.2 % (ref 0–0.5)
LYMPHOCYTES # BLD AUTO: 1.21 10*3/MM3 (ref 0.7–3.1)
LYMPHOCYTES NFR BLD AUTO: 24.3 % (ref 19.6–45.3)
MCH RBC QN AUTO: 30.5 PG (ref 26.6–33)
MCHC RBC AUTO-ENTMCNC: 31.7 G/DL (ref 31.5–35.7)
MCV RBC AUTO: 96 FL (ref 79–97)
MONOCYTES # BLD AUTO: 0.49 10*3/MM3 (ref 0.1–0.9)
MONOCYTES NFR BLD AUTO: 9.9 % (ref 5–12)
NEUTROPHILS NFR BLD AUTO: 2.96 10*3/MM3 (ref 1.7–7)
NEUTROPHILS NFR BLD AUTO: 59.6 % (ref 42.7–76)
NRBC BLD AUTO-RTO: 0 /100 WBC (ref 0–0.2)
NT-PROBNP SERPL-MCNC: ABNORMAL PG/ML (ref 0–1800)
PLATELET # BLD AUTO: 147 10*3/MM3 (ref 140–450)
PMV BLD AUTO: 9.6 FL (ref 6–12)
POTASSIUM SERPL-SCNC: 4.1 MMOL/L (ref 3.5–5.2)
PROT SERPL-MCNC: 6.6 G/DL (ref 6–8.5)
QT INTERVAL: 470 MS
QTC INTERVAL: 522 MS
RBC # BLD AUTO: 3.25 10*6/MM3 (ref 3.77–5.28)
SODIUM SERPL-SCNC: 137 MMOL/L (ref 136–145)
TROPONIN T % DELTA: 4 %
TROPONIN T NUMERIC DELTA: 2 NG/L
TROPONIN T SERPL HS-MCNC: 48 NG/L
WBC NRBC COR # BLD AUTO: 4.97 10*3/MM3 (ref 3.4–10.8)
WHOLE BLOOD HOLD COAG: NORMAL
WHOLE BLOOD HOLD SPECIMEN: NORMAL

## 2024-12-20 PROCEDURE — 99285 EMERGENCY DEPT VISIT HI MDM: CPT

## 2024-12-20 PROCEDURE — 84484 ASSAY OF TROPONIN QUANT: CPT | Performed by: EMERGENCY MEDICINE

## 2024-12-20 PROCEDURE — 83036 HEMOGLOBIN GLYCOSYLATED A1C: CPT

## 2024-12-20 PROCEDURE — G0378 HOSPITAL OBSERVATION PER HR: HCPCS

## 2024-12-20 PROCEDURE — 25010000002 FUROSEMIDE PER 20 MG: Performed by: PHYSICIAN ASSISTANT

## 2024-12-20 PROCEDURE — 93010 ELECTROCARDIOGRAM REPORT: CPT | Performed by: INTERNAL MEDICINE

## 2024-12-20 PROCEDURE — 71045 X-RAY EXAM CHEST 1 VIEW: CPT

## 2024-12-20 PROCEDURE — 82948 REAGENT STRIP/BLOOD GLUCOSE: CPT

## 2024-12-20 PROCEDURE — 93005 ELECTROCARDIOGRAM TRACING: CPT

## 2024-12-20 PROCEDURE — 83880 ASSAY OF NATRIURETIC PEPTIDE: CPT

## 2024-12-20 PROCEDURE — 93005 ELECTROCARDIOGRAM TRACING: CPT | Performed by: EMERGENCY MEDICINE

## 2024-12-20 PROCEDURE — 80053 COMPREHEN METABOLIC PANEL: CPT

## 2024-12-20 PROCEDURE — 84484 ASSAY OF TROPONIN QUANT: CPT

## 2024-12-20 PROCEDURE — 85025 COMPLETE CBC W/AUTO DIFF WBC: CPT

## 2024-12-20 PROCEDURE — 36415 COLL VENOUS BLD VENIPUNCTURE: CPT

## 2024-12-20 RX ORDER — FUROSEMIDE 10 MG/ML
40 INJECTION INTRAMUSCULAR; INTRAVENOUS ONCE
Status: COMPLETED | OUTPATIENT
Start: 2024-12-20 | End: 2024-12-20

## 2024-12-20 RX ORDER — BISACODYL 10 MG
10 SUPPOSITORY, RECTAL RECTAL DAILY PRN
Status: DISCONTINUED | OUTPATIENT
Start: 2024-12-20 | End: 2024-12-24 | Stop reason: HOSPADM

## 2024-12-20 RX ORDER — NICOTINE POLACRILEX 4 MG
15 LOZENGE BUCCAL
Status: DISCONTINUED | OUTPATIENT
Start: 2024-12-20 | End: 2024-12-24 | Stop reason: HOSPADM

## 2024-12-20 RX ORDER — BISACODYL 5 MG/1
5 TABLET, DELAYED RELEASE ORAL DAILY PRN
Status: DISCONTINUED | OUTPATIENT
Start: 2024-12-20 | End: 2024-12-24 | Stop reason: HOSPADM

## 2024-12-20 RX ORDER — DEXTROSE MONOHYDRATE 25 G/50ML
25 INJECTION, SOLUTION INTRAVENOUS
Status: DISCONTINUED | OUTPATIENT
Start: 2024-12-20 | End: 2024-12-24 | Stop reason: HOSPADM

## 2024-12-20 RX ORDER — SODIUM CHLORIDE 0.9 % (FLUSH) 0.9 %
10 SYRINGE (ML) INJECTION AS NEEDED
Status: DISCONTINUED | OUTPATIENT
Start: 2024-12-20 | End: 2024-12-24 | Stop reason: HOSPADM

## 2024-12-20 RX ORDER — INSULIN LISPRO 100 [IU]/ML
2-7 INJECTION, SOLUTION INTRAVENOUS; SUBCUTANEOUS
Status: DISCONTINUED | OUTPATIENT
Start: 2024-12-20 | End: 2024-12-24 | Stop reason: HOSPADM

## 2024-12-20 RX ORDER — ONDANSETRON 4 MG/1
4 TABLET, ORALLY DISINTEGRATING ORAL EVERY 6 HOURS PRN
Status: DISCONTINUED | OUTPATIENT
Start: 2024-12-20 | End: 2024-12-24 | Stop reason: HOSPADM

## 2024-12-20 RX ORDER — ACETAMINOPHEN 325 MG/1
650 TABLET ORAL EVERY 4 HOURS PRN
Status: DISCONTINUED | OUTPATIENT
Start: 2024-12-20 | End: 2024-12-24 | Stop reason: HOSPADM

## 2024-12-20 RX ORDER — LIDOCAINE 50 MG/G
1 PATCH TOPICAL DAILY PRN
COMMUNITY

## 2024-12-20 RX ORDER — AMOXICILLIN 250 MG
2 CAPSULE ORAL 2 TIMES DAILY PRN
Status: DISCONTINUED | OUTPATIENT
Start: 2024-12-20 | End: 2024-12-24 | Stop reason: HOSPADM

## 2024-12-20 RX ORDER — NITROGLYCERIN 0.4 MG/1
0.4 TABLET SUBLINGUAL
Status: DISCONTINUED | OUTPATIENT
Start: 2024-12-20 | End: 2024-12-24 | Stop reason: HOSPADM

## 2024-12-20 RX ORDER — ONDANSETRON 2 MG/ML
4 INJECTION INTRAMUSCULAR; INTRAVENOUS EVERY 6 HOURS PRN
Status: DISCONTINUED | OUTPATIENT
Start: 2024-12-20 | End: 2024-12-24 | Stop reason: HOSPADM

## 2024-12-20 RX ORDER — POLYETHYLENE GLYCOL 3350 17 G/17G
17 POWDER, FOR SOLUTION ORAL DAILY PRN
Status: DISCONTINUED | OUTPATIENT
Start: 2024-12-20 | End: 2024-12-24 | Stop reason: HOSPADM

## 2024-12-20 RX ORDER — IBUPROFEN 600 MG/1
1 TABLET ORAL
Status: DISCONTINUED | OUTPATIENT
Start: 2024-12-20 | End: 2024-12-24 | Stop reason: HOSPADM

## 2024-12-20 RX ADMIN — ACETAMINOPHEN 650 MG: 325 TABLET ORAL at 22:19

## 2024-12-20 RX ADMIN — FUROSEMIDE 40 MG: 10 INJECTION, SOLUTION INTRAMUSCULAR; INTRAVENOUS at 17:46

## 2024-12-20 NOTE — ED PROVIDER NOTES
EMERGENCY DEPARTMENT MD ATTESTATION NOTE    Room Number:  E458/1  PCP: Dannie Mathews MD  Independent Historians: Patient    HPI:  A complete HPI/ROS/PMH/PSH/SH/FH are unobtainable due to: None    Chronic or social conditions impacting patient care (Social Determinants of Health): None      Context: Lowell Min is a 84 y.o. female with a medical history of CHF, CKD, diabetes and atrial fibrillation who presents to the ED c/o acute swelling in the bilateral feet, ankles and legs as well as shortness of breath with any activities.  Patient denies any chest pain.  She denies any fevers or flulike symptoms.  She has been taking her usual diuretic as directed but continues to feel worse with fatigue and dyspnea whenever she is up and walking around.      Review of prior external notes (non-ED) -and- Review of prior external test results outside of this encounter: I independently reviewed the hospitalist discharge summary from October 26, 2024.  Patient was admitted for acute on chronic systolic CHF at that time.  Diuresis initiated.  Cardiology and nephrology consulted.    Prescription drug monitoring program review: GABY reviewed by Matthew Schultz MD, Alexei Prakash MD         PHYSICAL EXAM    I have reviewed the triage vital signs and nursing notes.    ED Triage Vitals   Temp Heart Rate Resp BP SpO2   12/20/24 1613 12/20/24 1613 12/20/24 1613 12/20/24 1622 12/20/24 1613   96.9 °F (36.1 °C) 75 18 129/84 95 %      Temp src Heart Rate Source Patient Position BP Location FiO2 (%)   12/20/24 1613 12/20/24 1613 12/20/24 1622 12/20/24 1622 --   Tympanic Monitor Sitting Left arm        Physical Exam  GENERAL: alert, no acute distress  SKIN: Warm, dry  HENT: Normocephalic, atraumatic  EYES: no scleral icterus  CV: regular rhythm, regular rate  RESPIRATORY: normal effort, somewhat diminished at the bilateral bases.  No wheezes audible.  No stridor.  ABDOMEN: soft, nondistended, nontender  MUSCULOSKELETAL: no deformity.   Moderate edema symmetrically to the bilateral feet, ankles and lower legs.  NEURO: alert, moves all extremities, follows commands      MEDICATIONS GIVEN IN ER  Medications   sodium chloride 0.9 % flush 10 mL (has no administration in time range)   nitroglycerin (NITROSTAT) SL tablet 0.4 mg (has no administration in time range)   acetaminophen (TYLENOL) tablet 650 mg (650 mg Oral Given 12/20/24 2219)   sennosides-docusate (PERICOLACE) 8.6-50 MG per tablet 2 tablet (has no administration in time range)     And   polyethylene glycol (MIRALAX) packet 17 g (has no administration in time range)     And   bisacodyl (DULCOLAX) EC tablet 5 mg (has no administration in time range)     And   bisacodyl (DULCOLAX) suppository 10 mg (has no administration in time range)   ondansetron ODT (ZOFRAN-ODT) disintegrating tablet 4 mg (has no administration in time range)     Or   ondansetron (ZOFRAN) injection 4 mg (has no administration in time range)   dextrose (GLUTOSE) oral gel 15 g (has no administration in time range)   dextrose (D50W) (25 g/50 mL) IV injection 25 g (has no administration in time range)   glucagon (GLUCAGEN) injection 1 mg (has no administration in time range)   insulin lispro (HUMALOG/ADMELOG) injection 2-7 Units ( Subcutaneous Not Given 12/20/24 2028)   furosemide (LASIX) injection 40 mg (40 mg Intravenous Given 12/20/24 1746)         ORDERS PLACED DURING THIS VISIT:  Orders Placed This Encounter   Procedures    XR Chest 1 View    [place] Draw    Comprehensive Metabolic Panel    BNP    High Sensitivity Troponin T    CBC Auto Differential    High Sensitivity Troponin T 1Hr    Basic Metabolic Panel    CBC (No Diff)    Hemoglobin A1c    Diet: Cardiac; Healthy Heart (2-3 Na+); Fluid Consistency: Thin (IDDSI 0)    Undress & Gown    Vital Signs    Vital Signs    Maintain IV Access    Telemetry - Place Orders & Notify Provider of Results When Patient Experiences Acute Chest Pain, Dysrhythmia or Respiratory Distress     May Be Off Telemetry for Tests    Daily Weights    Oral Care    Place Sequential Compression Device    Maintain Sequential Compression Device    Continuous Pulse Oximetry    Up with assistance    Strict Intake & Output    Code Status and Medical Interventions: CPR (Attempt to Resuscitate); Full    LHA (on-call MD unless specified) Details    Inpatient Nephrology Consult    Inpatient Cardiology Consult    Oxygen Therapy- Nasal Cannula; Titrate 1-6 LPM Per SpO2; 90 - 95%    Incentive Spirometry    Oxygen Therapy- Nasal Cannula; Titrate 1-6 LPM Per SpO2; 90 - 95%    POC Glucose 4x Daily Before Meals & at Bedtime    POC Glucose Once    POC Glucose Once    ECG 12 Lead ED Triage Standing Order; SOA    Telemetry Scan    Insert Peripheral IV    Initiate Observation Status    CBC & Differential    Green Top (Gel)    Lavender Top    Gold Top - SST    Light Blue Top         PROCEDURES  Procedures        PROGRESS, DATA ANALYSIS, CONSULTS, AND MEDICAL DECISION MAKING  All labs have been independently interpreted by me.  All radiology studies have been reviewed by me. All EKG's have been independently viewed and interpreted by me.  Discussion below represents my analysis of pertinent findings related to patient's condition, differential diagnosis, treatment plan and final disposition.    Differential diagnosis includes but is not limited to CHF exacerbation, pneumonia, pneumothorax, viral URI, DVT.    Clinical Scores:                 MDM:  ED Course as of 12/21/24 0019   Fri Dec 20, 2024   1732 Patient presents with several day history of worsening pedal edema, exertional dyspnea.  Patient with known history of CHF, chronic kidney disease.  Vitals stable on room air.  Differential diagnoses include but not limited to CHF exacerbation, pneumonia, worsening kidney disease. [EE]   1734 Chest x-ray independently interpreted myself shows vascular congestion with cardiomegaly. [EE]   1737 EKG independently interpreted myself.  Time  1617.  Atrial fibrillation, rate 74.  QRS shows left bundle branch block, left axis deviation.  No significant ST abnormalities.  Similar to previous EKG from 10/18/2024. [EE]   1739 Creatinine(!): 2.80  Close to baseline [EE]   1739 proBNP(!): 14,959.0 [EE]   1900 Patient with worsening symptoms of CHF despite increasing oral Lasix.  Patient also with chronic kidney disease which will certainly worsen.  Plan to admit for diuresis. [EE]   1922 I discussed case with Lilo, nurse practitioner with [facility].  She agrees to admit to Dr. Schultz [EE]      ED Course User Index  [EE] Charlie Solorzano, PA       EKG         EKG time/Interp time: 1617/1720  Rhythm/Rate: Atrial fibrillation, 74 bpm  P waves and SD: None  QRS, axis: 171 ms, left bundle branch block, left axis deviation  ST and T waves: No acute ischemic changes are present  Independently interpreted by me contemporaneously with treatment    I independently interpreted the chest x-ray and my findings are: Cardiomegaly, vascular congestion, no pneumothorax    I have reviewed the labs from today's ED visit as well as the radiology report from chest x-ray.  Her clinical presentation is most consistent with a CHF exacerbation.  proBNP is quite elevated.  CK is also a little bit elevated above her typical baseline.  I do not suspect pneumonia since she has a normal white blood cell count, normal temperature and no evidence of infiltrate on the chest x-ray.  I think she would benefit from admission to the hospitalist with further plans for diuresis via IV.  She and her family are agreeable with that plan as outlined.      COMPLEXITY OF CARE  The patient requires admission.    Please note that portions of this document were completed with a voice recognition program.    Note Disclaimer: At [facility], we believe that sharing information builds trust and better relationships. You are receiving this note because you recently visited [facility]. It is possible you will see  health information before a provider has talked with you about it. This kind of information can be easy to misunderstand. To help you fully understand what it means for your health, we urge you to discuss this note with your provider.       Alexei Prakash MD  12/21/24 0022

## 2024-12-20 NOTE — ED PROVIDER NOTES
EMERGENCY DEPARTMENT ENCOUNTER    Room Number:  E458/1  Date of encounter:  12/20/2024  PCP: Dannie Mathews MD  Historian: Patient, son  Chronic or social conditions impacting care (social determinants of health): Full code from home    HPI:  Chief Complaint: Short of breath, pedal edema  A complete HPI/ROS/PMH/PSH/SH/FH are unobtainable due to: Nothing    Context: Lowell Min is a 84 y.o. female with a history of chronic kidney disease, CHF, A-fib, pulmonary hypertension, diabetes, who presents to the ED c/o acute exertional dyspnea, pedal edema.  Patient's symptoms have been worsening over the past several days.  Patient saw her primary care physician yesterday who increased her Lasix however the patient continues to worsen.  She denies any overt chest pain, orthopnea, fever.  She follows with [facility] as well as Dr. Foster in nephrology.    Review of prior external notes (non-ED):   Reviewed last admission from 10/18/2024 through 10/26/2024.  Patient admitted for CHF exacerbation.    Review of prior external test results outside of this encounter:  I reviewed an echocardiogram dated 9/7/2024.  EF 44%.    PAST MEDICAL HISTORY  Active Ambulatory Problems     Diagnosis Date Noted    Chronic kidney disease, stage IV (severe) 03/31/2022    Erythropoietin deficiency anemia 03/31/2022    Symptomatic anemia 04/03/2022    Anxiety associated with depression 04/03/2022    Iron deficiency anemia 04/03/2022    PAF (paroxysmal atrial fibrillation) 04/03/2022    Shortness of breath 04/03/2022    Chronic diastolic congestive heart failure 04/03/2022    Diabetic polyneuropathy associated with type 2 diabetes mellitus 04/12/2022    PERLA (acute kidney injury) 10/07/2022    Foot pain, bilateral 10/13/2022    Anemia of chronic renal failure 06/27/2023    Acute on chronic diastolic CHF (congestive heart failure) 09/06/2024    Obesity (BMI 30-39.9) 09/06/2024    HLD (hyperlipidemia) 09/06/2024    Calcium pyrophosphate  deposition disease (CPPD) 09/10/2024    Type 2 diabetes mellitus     Acute on chronic systolic CHF (congestive heart failure) 10/21/2024    Chronic respiratory failure with hypoxia 10/21/2024     Resolved Ambulatory Problems     Diagnosis Date Noted    Hypomagnesemia 10/12/2022     Past Medical History:   Diagnosis Date    Anxiety     Atrial fibrillation     CHF (congestive heart failure)     Depression     Elevated cholesterol     Hypertension          PAST SURGICAL HISTORY  Past Surgical History:   Procedure Laterality Date    APPENDECTOMY      CHOLECYSTECTOMY      COLONOSCOPY      CYSTOSCOPY BOTOX INJECTION OF BLADDER N/A     HYSTERECTOMY      TUMOR REMOVAL Left     benign tumor from left breast         FAMILY HISTORY  Family History   Problem Relation Age of Onset    Heart disease Mother          SOCIAL HISTORY  Social History     Socioeconomic History    Marital status: Single   Tobacco Use    Smoking status: Never     Passive exposure: Never    Smokeless tobacco: Never   Vaping Use    Vaping status: Never Used   Substance and Sexual Activity    Alcohol use: Never    Drug use: Never    Sexual activity: Defer         ALLERGIES  Ibuprofen        REVIEW OF SYSTEMS  All systems reviewed and negative except for those discussed in HPI.       PHYSICAL EXAM    I have reviewed the triage vital signs and nursing notes.    ED Triage Vitals   Temp Heart Rate Resp BP SpO2   12/20/24 1613 12/20/24 1613 12/20/24 1613 12/20/24 1622 12/20/24 1613   96.9 °F (36.1 °C) 75 18 129/84 95 %      Temp src Heart Rate Source Patient Position BP Location FiO2 (%)   12/20/24 1613 12/20/24 1613 12/20/24 1622 12/20/24 1622 --   Tympanic Monitor Sitting Left arm        Physical Exam  GENERAL: Alert, oriented, not distressed  HENT: head atraumatic, no nuchal rigidity  EYES: no scleral icterus, EOMI  CV: Irregular rhythm, regular rate, 3/6 systolic murmur  RESPIRATORY: normal effort, CTA  ABDOMEN: soft, nontender  MUSCULOSKELETAL: no deformity,  FROM, 2+ pedal edema  NEURO: alert, moves all extremities, follows commands  SKIN: warm, dry        LAB RESULTS  Recent Results (from the past 24 hours)   ECG 12 Lead ED Triage Standing Order; SOA    Collection Time: 12/20/24  4:17 PM   Result Value Ref Range    QT Interval 470 ms    QTC Interval 522 ms   Comprehensive Metabolic Panel    Collection Time: 12/20/24  4:35 PM    Specimen: Arm, Right; Blood   Result Value Ref Range    Glucose 109 (H) 65 - 99 mg/dL    BUN 36 (H) 8 - 23 mg/dL    Creatinine 2.80 (H) 0.57 - 1.00 mg/dL    Sodium 137 136 - 145 mmol/L    Potassium 4.1 3.5 - 5.2 mmol/L    Chloride 105 98 - 107 mmol/L    CO2 21.3 (L) 22.0 - 29.0 mmol/L    Calcium 9.4 8.6 - 10.5 mg/dL    Total Protein 6.6 6.0 - 8.5 g/dL    Albumin 3.3 (L) 3.5 - 5.2 g/dL    ALT (SGPT) 8 1 - 33 U/L    AST (SGOT) 18 1 - 32 U/L    Alkaline Phosphatase 200 (H) 39 - 117 U/L    Total Bilirubin 1.0 0.0 - 1.2 mg/dL    Globulin 3.3 gm/dL    A/G Ratio 1.0 g/dL    BUN/Creatinine Ratio 12.9 7.0 - 25.0    Anion Gap 10.7 5.0 - 15.0 mmol/L    eGFR 16.2 (L) >60.0 mL/min/1.73   BNP    Collection Time: 12/20/24  4:35 PM    Specimen: Arm, Right; Blood   Result Value Ref Range    proBNP 14,959.0 (H) 0.0 - 1,800.0 pg/mL   High Sensitivity Troponin T    Collection Time: 12/20/24  4:35 PM    Specimen: Arm, Right; Blood   Result Value Ref Range    HS Troponin T 48 (H) <14 ng/L   Green Top (Gel)    Collection Time: 12/20/24  4:35 PM   Result Value Ref Range    Extra Tube Hold for add-ons.    Lavender Top    Collection Time: 12/20/24  4:35 PM   Result Value Ref Range    Extra Tube hold for add-on    Gold Top - SST    Collection Time: 12/20/24  4:35 PM   Result Value Ref Range    Extra Tube Hold for add-ons.    Light Blue Top    Collection Time: 12/20/24  4:35 PM   Result Value Ref Range    Extra Tube Hold for add-ons.    CBC Auto Differential    Collection Time: 12/20/24  4:35 PM    Specimen: Arm, Right; Blood   Result Value Ref Range    WBC 4.97 3.40 -  10.80 10*3/mm3    RBC 3.25 (L) 3.77 - 5.28 10*6/mm3    Hemoglobin 9.9 (L) 12.0 - 15.9 g/dL    Hematocrit 31.2 (L) 34.0 - 46.6 %    MCV 96.0 79.0 - 97.0 fL    MCH 30.5 26.6 - 33.0 pg    MCHC 31.7 31.5 - 35.7 g/dL    RDW 14.5 12.3 - 15.4 %    RDW-SD 50.2 37.0 - 54.0 fl    MPV 9.6 6.0 - 12.0 fL    Platelets 147 140 - 450 10*3/mm3    Neutrophil % 59.6 42.7 - 76.0 %    Lymphocyte % 24.3 19.6 - 45.3 %    Monocyte % 9.9 5.0 - 12.0 %    Eosinophil % 5.2 0.3 - 6.2 %    Basophil % 0.8 0.0 - 1.5 %    Immature Grans % 0.2 0.0 - 0.5 %    Neutrophils, Absolute 2.96 1.70 - 7.00 10*3/mm3    Lymphocytes, Absolute 1.21 0.70 - 3.10 10*3/mm3    Monocytes, Absolute 0.49 0.10 - 0.90 10*3/mm3    Eosinophils, Absolute 0.26 0.00 - 0.40 10*3/mm3    Basophils, Absolute 0.04 0.00 - 0.20 10*3/mm3    Immature Grans, Absolute 0.01 0.00 - 0.05 10*3/mm3    nRBC 0.0 0.0 - 0.2 /100 WBC   Hemoglobin A1c    Collection Time: 12/20/24  4:35 PM    Specimen: Arm, Right; Blood   Result Value Ref Range    Hemoglobin A1C 5.80 (H) 4.80 - 5.60 %   High Sensitivity Troponin T 1Hr    Collection Time: 12/20/24  5:46 PM    Specimen: Arm, Left; Blood   Result Value Ref Range    HS Troponin T 50 (H) <14 ng/L    Troponin T Numeric Delta 2 ng/L    Troponin T % Delta 4 Abnormal if >/= 20% %   POC Glucose Once    Collection Time: 12/20/24  8:25 PM    Specimen: Blood   Result Value Ref Range    Glucose 100 70 - 130 mg/dL   POC Glucose Once    Collection Time: 12/20/24  9:50 PM    Specimen: Blood   Result Value Ref Range    Glucose 154 (H) 70 - 130 mg/dL       Ordered the above labs and independently reviewed the results.        RADIOLOGY  XR Chest 1 View    Result Date: 12/20/2024  XR CHEST 1 VW-   INDICATION: Shortness of air  COMPARISON: Chest radiograph October 18, 2024  TECHNIQUE: 1 view chest  FINDINGS:  Vascular congestion. Small amount of subsegmental atelectasis at the bases. No effusions. Stable mediastinum. Enlarged cardiac silhouette. Calcified left hilar lymph  nodes, consistent with prior granulomatous infection.      Cardiomegaly with vascular congestion, without edema  This report was finalized on 12/20/2024 4:54 PM by Dr. Michael Peña M.D on Workstation: [id number]       I ordered the above noted radiological studies. Reviewed by me and discussed with radiologist.  See dictation for official radiology interpretation.      MEDICATIONS GIVEN IN ER    Medications   sodium chloride 0.9 % flush 10 mL (has no administration in time range)   nitroglycerin (NITROSTAT) SL tablet 0.4 mg (has no administration in time range)   acetaminophen (TYLENOL) tablet 650 mg (has no administration in time range)   sennosides-docusate (PERICOLACE) 8.6-50 MG per tablet 2 tablet (has no administration in time range)     And   polyethylene glycol (MIRALAX) packet 17 g (has no administration in time range)     And   bisacodyl (DULCOLAX) EC tablet 5 mg (has no administration in time range)     And   bisacodyl (DULCOLAX) suppository 10 mg (has no administration in time range)   ondansetron ODT (ZOFRAN-ODT) disintegrating tablet 4 mg (has no administration in time range)     Or   ondansetron (ZOFRAN) injection 4 mg (has no administration in time range)   dextrose (GLUTOSE) oral gel 15 g (has no administration in time range)   dextrose (D50W) (25 g/50 mL) IV injection 25 g (has no administration in time range)   glucagon (GLUCAGEN) injection 1 mg (has no administration in time range)   insulin lispro (HUMALOG/ADMELOG) injection 2-7 Units ( Subcutaneous Not Given 12/20/24 2028)   furosemide (LASIX) injection 40 mg (40 mg Intravenous Given 12/20/24 1746)         ADDITIONAL ORDERS CONSIDERED BUT NOT ORDERED:  Nothing    PROGRESS, DATA ANALYSIS, CONSULTS, AND MEDICAL DECISION MAKING    All labs have been independently interpreted by myself.  All radiology studies have been independently interpreted by myself and discussed with radiologist dictating the report.   EKGs independently interpreted by  myself.  Discussion below represents my analysis of pertinent findings related to patient's condition, differential diagnosis, treatment plan and final disposition.    I have discussed case with Dr. Prakash, emergency room physician.  He has performed his own bedside examination and agrees with treatment plan.    ED Course as of 12/20/24 2206   Fri Dec 20, 2024   1732 Patient presents with several day history of worsening pedal edema, exertional dyspnea.  Patient with known history of CHF, chronic kidney disease.  Vitals stable on room air.  Differential diagnoses include but not limited to CHF exacerbation, pneumonia, worsening kidney disease. [EE]   1734 Chest x-ray independently interpreted myself shows vascular congestion with cardiomegaly. [EE]   1737 EKG independently interpreted myself.  Time 1617.  Atrial fibrillation, rate 74.  QRS shows left bundle branch block, left axis deviation.  No significant ST abnormalities.  Similar to previous EKG from 10/18/2024. [EE]   1739 Creatinine(!): 2.80  Close to baseline [EE]   1739 proBNP(!): 14,959.0 [EE]   1900 Patient with worsening symptoms of CHF despite increasing oral Lasix.  Patient also with chronic kidney disease which will certainly worsen.  Plan to admit for diuresis. [EE]   1922 I discussed case with Lilo, nurse practitioner with [facility].  She agrees to admit to Dr. Schultz [EE]      ED Course User Index  [EE] Charlie Solorzano PA       AS OF 22:06 EST VITALS:    BP - 136/67  HR - 68  TEMP - 98.1 °F (36.7 °C) (Oral)  O2 SATS - 97%        DIAGNOSIS  Final diagnoses:   Acute on chronic congestive heart failure, unspecified heart failure type   Chronic kidney disease, unspecified CKD stage         DISPOSITION  Admitted    Dictated utilizing Dragon dictation     Charlie Solorzano PA  12/20/24 2206

## 2024-12-21 LAB
ANION GAP SERPL CALCULATED.3IONS-SCNC: 9.9 MMOL/L (ref 5–15)
BUN SERPL-MCNC: 38 MG/DL (ref 8–23)
BUN/CREAT SERPL: 15.3 (ref 7–25)
CALCIUM SPEC-SCNC: 9 MG/DL (ref 8.6–10.5)
CHLORIDE SERPL-SCNC: 105 MMOL/L (ref 98–107)
CO2 SERPL-SCNC: 23.1 MMOL/L (ref 22–29)
CREAT SERPL-MCNC: 2.49 MG/DL (ref 0.57–1)
DEPRECATED RDW RBC AUTO: 49.5 FL (ref 37–54)
EGFRCR SERPLBLD CKD-EPI 2021: 18.6 ML/MIN/1.73
ERYTHROCYTE [DISTWIDTH] IN BLOOD BY AUTOMATED COUNT: 14.5 % (ref 12.3–15.4)
GLUCOSE BLDC GLUCOMTR-MCNC: 112 MG/DL (ref 70–130)
GLUCOSE BLDC GLUCOMTR-MCNC: 123 MG/DL (ref 70–130)
GLUCOSE BLDC GLUCOMTR-MCNC: 149 MG/DL (ref 70–130)
GLUCOSE BLDC GLUCOMTR-MCNC: 181 MG/DL (ref 70–130)
GLUCOSE SERPL-MCNC: 132 MG/DL (ref 65–99)
HCT VFR BLD AUTO: 29.3 % (ref 34–46.6)
HGB BLD-MCNC: 9.4 G/DL (ref 12–15.9)
MCH RBC QN AUTO: 29.9 PG (ref 26.6–33)
MCHC RBC AUTO-ENTMCNC: 32.1 G/DL (ref 31.5–35.7)
MCV RBC AUTO: 93.3 FL (ref 79–97)
PLATELET # BLD AUTO: 123 10*3/MM3 (ref 140–450)
PMV BLD AUTO: 9.7 FL (ref 6–12)
POTASSIUM SERPL-SCNC: 3.7 MMOL/L (ref 3.5–5.2)
RBC # BLD AUTO: 3.14 10*6/MM3 (ref 3.77–5.28)
SODIUM SERPL-SCNC: 138 MMOL/L (ref 136–145)
WBC NRBC COR # BLD AUTO: 3.54 10*3/MM3 (ref 3.4–10.8)

## 2024-12-21 PROCEDURE — G0378 HOSPITAL OBSERVATION PER HR: HCPCS

## 2024-12-21 PROCEDURE — 85027 COMPLETE CBC AUTOMATED: CPT

## 2024-12-21 PROCEDURE — 97161 PT EVAL LOW COMPLEX 20 MIN: CPT

## 2024-12-21 PROCEDURE — 25010000002 FUROSEMIDE PER 20 MG: Performed by: INTERNAL MEDICINE

## 2024-12-21 PROCEDURE — 80048 BASIC METABOLIC PNL TOTAL CA: CPT

## 2024-12-21 PROCEDURE — 25010000002 FUROSEMIDE PER 20 MG: Performed by: NURSE PRACTITIONER

## 2024-12-21 PROCEDURE — 97530 THERAPEUTIC ACTIVITIES: CPT

## 2024-12-21 PROCEDURE — 97110 THERAPEUTIC EXERCISES: CPT

## 2024-12-21 PROCEDURE — 63710000001 INSULIN LISPRO (HUMAN) PER 5 UNITS

## 2024-12-21 PROCEDURE — 99214 OFFICE O/P EST MOD 30 MIN: CPT

## 2024-12-21 PROCEDURE — 82948 REAGENT STRIP/BLOOD GLUCOSE: CPT

## 2024-12-21 RX ORDER — ATORVASTATIN CALCIUM 20 MG/1
40 TABLET, FILM COATED ORAL DAILY
Status: DISCONTINUED | OUTPATIENT
Start: 2024-12-21 | End: 2024-12-24 | Stop reason: HOSPADM

## 2024-12-21 RX ORDER — FUROSEMIDE 10 MG/ML
40 INJECTION INTRAMUSCULAR; INTRAVENOUS ONCE
Status: COMPLETED | OUTPATIENT
Start: 2024-12-21 | End: 2024-12-21

## 2024-12-21 RX ORDER — FUROSEMIDE 10 MG/ML
40 INJECTION INTRAMUSCULAR; INTRAVENOUS EVERY 12 HOURS
Status: DISCONTINUED | OUTPATIENT
Start: 2024-12-21 | End: 2024-12-22

## 2024-12-21 RX ORDER — ISOSORBIDE DINITRATE 20 MG/1
10 TABLET ORAL
Status: DISCONTINUED | OUTPATIENT
Start: 2024-12-21 | End: 2024-12-24 | Stop reason: HOSPADM

## 2024-12-21 RX ORDER — LIDOCAINE 4 G/G
1 PATCH TOPICAL DAILY PRN
Status: DISCONTINUED | OUTPATIENT
Start: 2024-12-21 | End: 2024-12-24 | Stop reason: HOSPADM

## 2024-12-21 RX ORDER — CARVEDILOL 12.5 MG/1
12.5 TABLET ORAL 2 TIMES DAILY WITH MEALS
Status: DISCONTINUED | OUTPATIENT
Start: 2024-12-21 | End: 2024-12-24 | Stop reason: HOSPADM

## 2024-12-21 RX ORDER — VENLAFAXINE HYDROCHLORIDE 75 MG/1
75 CAPSULE, EXTENDED RELEASE ORAL DAILY
Status: DISCONTINUED | OUTPATIENT
Start: 2024-12-21 | End: 2024-12-24 | Stop reason: HOSPADM

## 2024-12-21 RX ADMIN — VENLAFAXINE HYDROCHLORIDE 75 MG: 75 CAPSULE, EXTENDED RELEASE ORAL at 08:21

## 2024-12-21 RX ADMIN — INSULIN LISPRO 2 UNITS: 100 INJECTION, SOLUTION INTRAVENOUS; SUBCUTANEOUS at 12:02

## 2024-12-21 RX ADMIN — APIXABAN 2.5 MG: 2.5 TABLET, FILM COATED ORAL at 21:12

## 2024-12-21 RX ADMIN — APIXABAN 2.5 MG: 2.5 TABLET, FILM COATED ORAL at 08:25

## 2024-12-21 RX ADMIN — ISOSORBIDE DINITRATE 10 MG: 20 TABLET ORAL at 17:47

## 2024-12-21 RX ADMIN — FUROSEMIDE 40 MG: 10 INJECTION, SOLUTION INTRAMUSCULAR; INTRAVENOUS at 21:32

## 2024-12-21 RX ADMIN — ISOSORBIDE DINITRATE 10 MG: 20 TABLET ORAL at 14:16

## 2024-12-21 RX ADMIN — ATORVASTATIN CALCIUM 40 MG: 20 TABLET, FILM COATED ORAL at 08:21

## 2024-12-21 RX ADMIN — SENNOSIDES AND DOCUSATE SODIUM 2 TABLET: 50; 8.6 TABLET ORAL at 12:02

## 2024-12-21 RX ADMIN — CARVEDILOL 12.5 MG: 12.5 TABLET, FILM COATED ORAL at 08:21

## 2024-12-21 RX ADMIN — LIDOCAINE 1 PATCH: 4 PATCH TOPICAL at 01:11

## 2024-12-21 RX ADMIN — CARVEDILOL 12.5 MG: 12.5 TABLET, FILM COATED ORAL at 17:47

## 2024-12-21 RX ADMIN — ISOSORBIDE DINITRATE 10 MG: 20 TABLET ORAL at 08:21

## 2024-12-21 RX ADMIN — ACETAMINOPHEN 650 MG: 325 TABLET ORAL at 12:02

## 2024-12-21 RX ADMIN — FUROSEMIDE 40 MG: 10 INJECTION, SOLUTION INTRAMUSCULAR; INTRAVENOUS at 08:21

## 2024-12-21 NOTE — H&P
Patient Name:  Lowell Min  YOB: 1940  MRN:  [id number]  Admit Date:  12/20/2024  Patient Care Team:  Dannie Mathews MD as PCP - General (Internal Medicine)      Subjective   History Present Illness     Chief Complaint   Patient presents with    Leg Swelling     History of Present Illness  Ms. Min is an 84-year-old female with history of CHF, CKD, hyperlipidemia, iron deficiency anemia, paroxysmal A-fib, type 2 diabetes who presents to the emergency room with increased dyspnea and leg swelling.  Patient states she has had increased dyspnea on exertion and leg swelling over the past week.  She was seen by her primary care physician yesterday and told to increase her Lasix which she did, but states that her symptoms have not improved.  She does follow with cardiology and nephrology outpatient.  She denies having any chest pain, no fever, no abdominal pain, no difficulty with urination, no other recent illnesses.  In the emergency room her initial troponin was 48 with a repeat of 50, her BNP is elevated at 14,959, creatinine 2.8 with a BUN of 36, this is closer to baseline, glucose of 109, albumin 3.3, EKG showed A-fib with a rate of 74.  Chest x-ray shows cardiomegaly with vascular congestion.  Patient was given 1 dose of 40 mg IV Lasix in the emergency room    Review of Systems   Constitutional:  Negative for appetite change and fever.   HENT:  Negative for nosebleeds and trouble swallowing.    Eyes:  Negative for photophobia, redness and visual disturbance.   Respiratory:  Positive for shortness of breath (with exertion). Negative for cough, chest tightness and wheezing.    Cardiovascular:  Positive for leg swelling (last few days). Negative for chest pain and palpitations.   Gastrointestinal:  Negative for abdominal distention, abdominal pain, nausea and vomiting.   Endocrine: Negative.    Genitourinary: Negative.    Musculoskeletal:  Negative for gait problem and joint swelling.   Skin:  Negative.    Neurological:  Negative for dizziness, seizures, speech difficulty, light-headedness and headaches.   Hematological: Negative.    Psychiatric/Behavioral:  Negative for behavioral problems and confusion.         Personal History     Past Medical History:   Diagnosis Date    Anxiety     Atrial fibrillation     CHF (congestive heart failure)     Chronic kidney disease, stage IV (severe)     Depression     Elevated cholesterol     Hypertension     Type 2 diabetes mellitus      Past Surgical History:   Procedure Laterality Date    APPENDECTOMY      CHOLECYSTECTOMY      COLONOSCOPY      CYSTOSCOPY BOTOX INJECTION OF BLADDER N/A     HYSTERECTOMY      TUMOR REMOVAL Left     benign tumor from left breast     Family History   Problem Relation Age of Onset    Heart disease Mother      Social History     Tobacco Use    Smoking status: Never     Passive exposure: Never    Smokeless tobacco: Never   Vaping Use    Vaping status: Never Used   Substance Use Topics    Alcohol use: Never    Drug use: Never     No current facility-administered medications on file prior to encounter.     Current Outpatient Medications on File Prior to Encounter   Medication Sig Dispense Refill    Diclofenac Sodium (VOLTAREN) 1 % gel gel Apply 4 g topically to the appropriate area as directed 4 (Four) Times a Day As Needed.      lidocaine (LIDODERM) 5 % Place 1 patch on the skin as directed by provider Daily As Needed for Mild Pain. Remove & Discard patch within 12 hours or as directed by MD      acetaminophen (TYLENOL) 325 MG tablet Take 2 tablets by mouth Every 4 (Four) Hours As Needed for Mild Pain .      apixaban (ELIQUIS) 2.5 MG tablet tablet Take 1 tablet by mouth 2 (Two) Times a Day. Indications: Atrial Fibrillation 60 tablet     atorvastatin (LIPITOR) 40 MG tablet Take 1 tablet by mouth every night at bedtime.      carvedilol (COREG) 12.5 MG tablet Take 1 tablet by mouth 2 (two) times daily with meals.      furosemide (LASIX) 40 MG  tablet Take 1 tablet by mouth Daily.      hydrALAZINE (APRESOLINE) 10 MG tablet Take 1 tablet by mouth Every 8 (Eight) Hours. (Patient taking differently: Take 1 tablet by mouth Every 8 (Eight) Hours. Only if bp >160)      isosorbide dinitrate (ISORDIL) 10 MG tablet Take 1 tablet by mouth 3 (Three) Times a Day.      O2 (OXYGEN) 2 L/min Every Night. Uses when sleeping      sennosides-docusate (PERICOLACE) 8.6-50 MG per tablet Take 2 tablets by mouth 2 (Two) Times a Day. (Patient taking differently: Take 2 tablets by mouth 2 (Two) Times a Day As Needed for Constipation.)      venlafaxine XR (EFFEXOR-XR) 75 MG 24 hr capsule Take 1 capsule by mouth Daily.       Allergies   Allergen Reactions    Ibuprofen Other (See Comments)     Decrease urine output         Objective    Objective     Vital Signs  Temp:  [96.9 °F (36.1 °C)-98.1 °F (36.7 °C)] 97.5 °F (36.4 °C)  Heart Rate:  [66-79] 66  Resp:  [18] 18  BP: (110-152)/() 110/65  SpO2:  [91 %-98 %] 96 %  on  Flow (L/min) (Oxygen Therapy):  [2] 2;   Device (Oxygen Therapy): nasal cannula  Body mass index is 31.17 kg/m².    Physical Exam  Vitals and nursing note reviewed.   Constitutional:       General: She is not in acute distress.     Appearance: She is well-developed.   HENT:      Head: Normocephalic.   Neck:      Vascular: No JVD.   Cardiovascular:      Rate and Rhythm: Normal rate and regular rhythm.      Comments: A-fib on the monitor with heart rate 78-80 during my exam  Pulmonary:      Effort: Pulmonary effort is normal.      Breath sounds: Normal breath sounds.      Comments: Diminished lung sounds, but otherwise clear, patient is on 2 L of oxygen during my exam because she had been sleeping, her sats are 95%, she appears to be in no acute distress at rest  Abdominal:      General: Bowel sounds are normal. There is no distension.      Palpations: Abdomen is soft.      Tenderness: There is no abdominal tenderness.   Musculoskeletal:         General: Normal range  of motion.      Cervical back: Normal range of motion.      Right lower le+ Pitting Edema present.      Left lower le+ Pitting Edema present.   Skin:     General: Skin is warm and dry.      Capillary Refill: Capillary refill takes less than 2 seconds.   Neurological:      General: No focal deficit present.      Mental Status: She is alert and oriented to person, place, and time.   Psychiatric:         Attention and Perception: Attention normal.         Mood and Affect: Mood normal.         Behavior: Behavior normal.         Cognition and Memory: Cognition normal.         Results Review:  I reviewed the patient's new clinical results.  I reviewed the patient's new imaging results and agree with the interpretation.  I reviewed the patient's other test results and agree with the interpretation  I personally viewed and interpreted the patient's EKG/Telemetry data  Discussed with ED provider.    Lab Results (last 24 hours)       Procedure Component Value Units Date/Time    CBC & Differential [[id number]]  (Abnormal) Collected: 24    Specimen: Blood from Arm, Right Updated: 24 1646    Narrative:      The following orders were created for panel order CBC & Differential.  Procedure                               Abnormality         Status                     ---------                               -----------         ------                     CBC Auto Differential[[id number]]        Abnormal            Final result                 Please view results for these tests on the individual orders.    Comprehensive Metabolic Panel [[id number]]  (Abnormal) Collected: 24    Specimen: Blood from Arm, Right Updated: 24 1708     Glucose 109 mg/dL      BUN 36 mg/dL      Creatinine 2.80 mg/dL      Sodium 137 mmol/L      Potassium 4.1 mmol/L      Chloride 105 mmol/L      CO2 21.3 mmol/L      Calcium 9.4 mg/dL      Total Protein 6.6 g/dL      Albumin 3.3 g/dL      ALT (SGPT) 8 U/L      AST (SGOT) 18  U/L      Alkaline Phosphatase 200 U/L      Total Bilirubin 1.0 mg/dL      Globulin 3.3 gm/dL      A/G Ratio 1.0 g/dL      BUN/Creatinine Ratio 12.9     Anion Gap 10.7 mmol/L      eGFR 16.2 mL/min/1.73     Narrative:      GFR Categories in Chronic Kidney Disease (CKD)      GFR Category          GFR (mL/min/1.73)    Interpretation  G1                     90 or greater         Normal or high (1)  G2                      60-89                Mild decrease (1)  G3a                   45-59                Mild to moderate decrease  G3b                   30-44                Moderate to severe decrease  G4                    15-29                Severe decrease  G5                    14 or less           Kidney failure          (1)In the absence of evidence of kidney disease, neither GFR category G1 or G2 fulfill the criteria for CKD.    eGFR calculation 2021 CKD-EPI creatinine equation, which does not include race as a factor    BNP [[id number]]  (Abnormal) Collected: 12/20/24 1635    Specimen: Blood from Arm, Right Updated: 12/20/24 1708     proBNP 14,959.0 pg/mL     Narrative:      This assay is used as an aid in the diagnosis of individuals suspected of having heart failure. It can be used as an aid in the diagnosis of acute decompensated heart failure (ADHF) in patients presenting with signs and symptoms of ADHF to the emergency department (ED). In addition, NT-proBNP of <300 pg/mL indicates ADHF is not likely.    Age Range Result Interpretation  NT-proBNP Concentration (pg/mL:      <50             Positive            >450                   Gray                 300-450                    Negative             <300    50-75           Positive            >900                  Gray                300-900                  Negative            <300      >75             Positive            >1800                  Gray                300-1800                  Negative            <300    High Sensitivity Troponin T [[id number]]   (Abnormal) Collected: 12/20/24 1635    Specimen: Blood from Arm, Right Updated: 12/20/24 1708     HS Troponin T 48 ng/L     Narrative:      High Sensitive Troponin T Reference Range:  <14.0 ng/L- Negative Female for AMI  <22.0 ng/L- Negative Male for AMI  >=14 - Abnormal Female indicating possible myocardial injury.  >=22 - Abnormal Male indicating possible myocardial injury.   Clinicians would have to utilize clinical acumen, EKG, Troponin, and serial changes to determine if it is an Acute Myocardial Infarction or myocardial injury due to an underlying chronic condition.         CBC Auto Differential [[id number]]  (Abnormal) Collected: 12/20/24 1635    Specimen: Blood from Arm, Right Updated: 12/20/24 1646     WBC 4.97 10*3/mm3      RBC 3.25 10*6/mm3      Hemoglobin 9.9 g/dL      Hematocrit 31.2 %      MCV 96.0 fL      MCH 30.5 pg      MCHC 31.7 g/dL      RDW 14.5 %      RDW-SD 50.2 fl      MPV 9.6 fL      Platelets 147 10*3/mm3      Neutrophil % 59.6 %      Lymphocyte % 24.3 %      Monocyte % 9.9 %      Eosinophil % 5.2 %      Basophil % 0.8 %      Immature Grans % 0.2 %      Neutrophils, Absolute 2.96 10*3/mm3      Lymphocytes, Absolute 1.21 10*3/mm3      Monocytes, Absolute 0.49 10*3/mm3      Eosinophils, Absolute 0.26 10*3/mm3      Basophils, Absolute 0.04 10*3/mm3      Immature Grans, Absolute 0.01 10*3/mm3      nRBC 0.0 /100 WBC     Hemoglobin A1c [[id number]]  (Abnormal) Collected: 12/20/24 1635    Specimen: Blood from Arm, Right Updated: 12/20/24 2000     Hemoglobin A1C 5.80 %     Narrative:      Hemoglobin A1C Ranges:    Increased Risk for Diabetes  5.7% to 6.4%  Diabetes                     >= 6.5%  Diabetic Goal                < 7.0%    High Sensitivity Troponin T 1Hr [[id number]]  (Abnormal) Collected: 12/20/24 1746    Specimen: Blood from Arm, Left Updated: 12/20/24 1838     HS Troponin T 50 ng/L      Troponin T Numeric Delta 2 ng/L      Troponin T % Delta 4 %     Narrative:      High Sensitive Troponin T  Reference Range:  <14.0 ng/L- Negative Female for AMI  <22.0 ng/L- Negative Male for AMI  >=14 - Abnormal Female indicating possible myocardial injury.  >=22 - Abnormal Male indicating possible myocardial injury.   Clinicians would have to utilize clinical acumen, EKG, Troponin, and serial changes to determine if it is an Acute Myocardial Infarction or myocardial injury due to an underlying chronic condition.         POC Glucose Once [[id number]]  (Normal) Collected: 12/20/24 2025    Specimen: Blood Updated: 12/20/24 2026     Glucose 100 mg/dL     POC Glucose Once [[id number]]  (Abnormal) Collected: 12/20/24 2150    Specimen: Blood Updated: 12/20/24 2152     Glucose 154 mg/dL             Imaging Results (Last 24 Hours)       Procedure Component Value Units Date/Time    XR Chest 1 View [[id number]] Collected: 12/20/24 1652     Updated: 12/20/24 1657    Narrative:      XR CHEST 1 VW-        INDICATION: Shortness of air     COMPARISON: Chest radiograph October 18, 2024     TECHNIQUE: 1 view chest     FINDINGS:      Vascular congestion. Small amount of subsegmental atelectasis at the  bases. No effusions. Stable mediastinum. Enlarged cardiac silhouette.  Calcified left hilar lymph nodes, consistent with prior granulomatous  infection.       Impression:      Cardiomegaly with vascular congestion, without edema     This report was finalized on 12/20/2024 4:54 PM by Dr. Michael Peña M.D on Workstation: [id number]               Results for orders placed during the hospital encounter of 09/06/24    Adult Transthoracic Echo Complete W/ Cont if Necessary Per Protocol    Interpretation Summary    Left ventricular systolic function is mildly decreased. Calculated left ventricular EF = 44.1%    The following left ventricular wall segments are hypokinetic: mid anterior, apical anterior, basal anterolateral, mid anterolateral, apical lateral, basal inferolateral, mid inferolateral, apical inferior, mid inferior, apical septal,  basal inferoseptal, mid inferoseptal, apex hypokinetic, mid anteroseptal, basal anterior, basal inferior and basal inferoseptal.    Left ventricular diastolic function was indeterminate.    The left atrial cavity is severely dilated.    Mild aortic valve stenosis is present. Aortic valve area is 1.2 cm2.    The right atrial cavity is severely  dilated.    Peak velocity of the flow distal to the aortic valve is 246.1 cm/s. Aortic valve mean pressure gradient is 11 mmHg.    Severe mitral valve regurgitation is present.    Mild mitral valve stenosis is present. The mitral valve mean gradient is 4 mmHg.    Severe tricuspid valve regurgitation is present.    Estimated right ventricular systolic pressure from tricuspid regurgitation is markedly elevated (>55 mmHg). Calculated right ventricular systolic pressure from tricuspid regurgitation is 62 mmHg.      ECG 12 Lead ED Triage Standing Order; SOA   Final Result   HEART RATE=74  bpm   RR Pzmpryob=277  ms   MO Interval=  ms   P Horizontal Axis=  deg   P Front Axis=  deg   QRSD Imzfgepm=886  ms   QT Ppqbnndl=208  ms   XRdZ=899  ms   QRS Axis=-52  deg   T Wave Axis=114  deg   - ABNORMAL ECG -   Atrial fibrillation   Left bundle branch block   When compared with ECG of 18-Oct-2024 18:38:30,   No significant change   Electronically Signed By: Lobito Munoz ([facility]) 2024-12-20 19:54:54   Date and Time of Study:2024-12-20 16:17:23      Telemetry Scan   Final Result           Assessment/Plan     Active Hospital Problems    Diagnosis  POA    **CHF exacerbation [I50.9]  Yes    Type 2 diabetes mellitus [E11.9]  Yes    HLD (hyperlipidemia) [E78.5]  Yes    PAF (paroxysmal atrial fibrillation) [I48.0]  Yes    Iron deficiency anemia [D50.9]  Yes    Chronic kidney disease, stage IV (severe) [N18.4]  Yes     Ms. Min is an 84-year-old female with history of CHF, CKD, hyperlipidemia, iron deficiency anemia, paroxysmal A-fib, type 2 diabetes who presents to the emergency room with increased  dyspnea and leg swelling.     CHF exacerbation/CKD  -Daily weights  -Consult cardiology  -Consult nephrology  -40 mg of Lasix IV given in the emergency room, will give second dose in a.m. defer to cardiology/nephrology for further diuresis  -CMP in a.m.  -Telemetry unit for monitoring  -Last echo done in September 2024 shows an ejection fraction of 44%  -O2 to keep sats above 90%  -Strict I's and O's  -Continue isosorbide and Coreg  -Hold hydralazine given worsening creatinine    Type 2 diabetes  -Accu-Cheks before meals and at bedtime with correctional dose insulin  -Hold oral diabetic medications at this time  -Consistent carb diet  -Hemoglobin A1c 5.8    A-fib/hyperlipidemia  -Continue Eliquis  -Telemetry unit for monitoring  -Heart rate stable at this time    Iron deficiency anemia  -Hemoglobin stable at 9.9, continue to monitor    I discussed the patient's findings and my recommendations with patient.    VTE Prophylaxis - SCDs.  Code Status - Full code.       RAMAKRISHNA Quezada  [facility]  12/21/24  00:26 EST

## 2024-12-21 NOTE — CONSULTS
Date of Consultation: 24    Referral Provider: Alexei Prakash MD     Reason for Consultation: CHF    Encounter Provider: RAMAKRISHNA Brantley    Group of Service: [facility]     Patient Name: Lowell Min    :1940    Chief complaint: Shortness of breath, leg swelling     History of Present Illness:  Lowell Min is an 84 year old who has a past medical history that is significant for CHF, CKD, hyperlipidemia, iron deficiency anemia, paroxysmal atrial fibrillation, and type II diabetes.     She has had multiple hospitalizations this year with decompensated heart failure.     She presented to the ED on 24 with complaints of increasing dyspnea and leg swelling. She contacted her PCP and was instructed to increase her furosemide but reports her symptoms did not improve with this. She denies recent illness, cough, fever, chest pain, palpitations, nausea, vomiting, or diarrhea.     Workup in the ED included: HS troponin 48 then 50. proBNP 14,959. Creatinine 2.8. Chest xray shows vascular congestion. EKG showed atrial fibrillation, LBBB, rate 74 bpm.     She received IV furosemide in the ED which has been continued today. She was up in the chair on my exam. She reports her breathing is improving though she is still having dyspnea with exertion. She denies chest pain or palpitations.     Echocardiogram 24  Summary: The ejection fraction biplane was calculated at 41%. Mild left ventricular hypertrophy. The estimated ejection fraction is 40-45%.Septal hypokinesis noted. Mitral valve tissue Doppler E/E'' ratio consistent with elevated left atrial pressures. Severely dilated left atrium. The right ventricular chamber size is mildly enlarged and systolic function is mildly reduced. There is moderate to severe right atrial enlargement. The peak instantaneous gradient of the aortic valve is 13 mmHg. The mean gradient of the aortic valve is 8 mmHg. The aortic valve area, by VTI, is  calculated at 1.3 cm2.Mild aortic sclerosis The mean gradient across the mitral valve is 2 mmHg. The peak gradient across the mitral valve is 7 mmHg. The mitral valve area, by pressure half time, is calculated at 2.5 cm2.Mild mitral stenosis. Mild to moderate mitral regurgitation noted Right ventricular systolic pressure of 76 mmHg. The tricuspid valve leaflets are mildly thickened. Moderate to severe tricuspid regurgitation. The aortic root appears normal. There is no dilatation of the ascending aorta. No evidence of pericardial effusion.   Findings   Left Ventricle: The ejection fraction biplane was calculated at 41%. Mild left ventricular hypertrophy. The estimated ejection fraction is 40-45%.Septal hypokinesis noted. Mitral valve tissue Doppler E/E'' ratio consistent with elevated left atrial pressures.   Left Atrium: Severely dilated left atrium.   Right Ventricle: The right ventricular chamber size is mildly enlarged and systolic function is mildly reduced.   Right Atrium: There is moderate to severe right atrial enlargement.   Aortic Valve: The peak instantaneous gradient of the aortic valve is 13 mmHg. The mean gradient of the aortic valve is 8 mmHg. The aortic valve area, by VTI, is calculated at 1.3 cm2.Mild aortic sclerosis   Mitral Valve: The mean gradient across the mitral valve is 2 mmHg. The peak gradient across the mitral valve is 7 mmHg. The mitral valve area, by pressure half time, is calculated at 2.5 cm2.Mild mitral stenosis. Mild to moderate mitral regurgitation noted   Tricuspid Valve: Right ventricular systolic pressure of 76 mmHg. The tricuspid valve leaflets are mildly thickened. Moderate to severe tricuspid regurgitation.   Pulmonic Valve: The pulmonic valve appears normal in structure and function. Mild pulmonic insufficiency.   Pericardium: No evidence of pericardial effusion.   Aorta/Great Vessels: The aortic root appears normal. There is no dilatation of the ascending aorta.       Past  Medical History:   Diagnosis Date    Anxiety     Atrial fibrillation     CHF (congestive heart failure)     Chronic kidney disease, stage IV (severe)     Depression     Elevated cholesterol     Hypertension     Type 2 diabetes mellitus          Past Surgical History:   Procedure Laterality Date    APPENDECTOMY      CHOLECYSTECTOMY      COLONOSCOPY      CYSTOSCOPY BOTOX INJECTION OF BLADDER N/A     HYSTERECTOMY      TUMOR REMOVAL Left     benign tumor from left breast         Allergies   Allergen Reactions    Ibuprofen Other (See Comments)     Decrease urine output           No current facility-administered medications on file prior to encounter.     Current Outpatient Medications on File Prior to Encounter   Medication Sig Dispense Refill    acetaminophen (TYLENOL) 325 MG tablet Take 2 tablets by mouth Every 4 (Four) Hours As Needed for Mild Pain .      apixaban (ELIQUIS) 2.5 MG tablet tablet Take 1 tablet by mouth 2 (Two) Times a Day. Indications: Atrial Fibrillation 60 tablet     atorvastatin (LIPITOR) 40 MG tablet Take 1 tablet by mouth every night at bedtime.      carvedilol (COREG) 12.5 MG tablet Take 1 tablet by mouth 2 (two) times daily with meals.      Diclofenac Sodium (VOLTAREN) 1 % gel gel Apply 4 g topically to the appropriate area as directed 4 (Four) Times a Day As Needed.      furosemide (LASIX) 40 MG tablet Take 1 tablet by mouth Daily.      isosorbide dinitrate (ISORDIL) 10 MG tablet Take 1 tablet by mouth 3 (Three) Times a Day.      lidocaine (LIDODERM) 5 % Place 1 patch on the skin as directed by provider Daily As Needed for Mild Pain. Remove & Discard patch within 12 hours or as directed by MD      O2 (OXYGEN) 2 L/min Every Night. Uses when sleeping      venlafaxine XR (EFFEXOR-XR) 75 MG 24 hr capsule Take 1 capsule by mouth Daily.      hydrALAZINE (APRESOLINE) 10 MG tablet Take 1 tablet by mouth Every 8 (Eight) Hours. (Patient taking differently: Take 1 tablet by mouth Every 8 (Eight) Hours.  "Only if bp >160)      sennosides-docusate (PERICOLACE) 8.6-50 MG per tablet Take 2 tablets by mouth 2 (Two) Times a Day. (Patient taking differently: Take 2 tablets by mouth 2 (Two) Times a Day As Needed for Constipation.)           Social History     Socioeconomic History    Marital status: Single   Tobacco Use    Smoking status: Never     Passive exposure: Never    Smokeless tobacco: Never   Vaping Use    Vaping status: Never Used   Substance and Sexual Activity    Alcohol use: Never    Drug use: Never    Sexual activity: Defer         Family History   Problem Relation Age of Onset    Heart disease Mother        REVIEW OF SYSTEMS:   12 point ROS was performed and is negative except as outlined in HPI       Objective:     Vitals:    12/20/24 2330 12/21/24 0528 12/21/24 0751 12/21/24 1332   BP: 110/65  133/91 134/76   BP Location: Left arm  Right arm Right arm   Patient Position: Lying  Lying Sitting   Pulse: 66  72 67   Resp: 18  18 18   Temp: 97.5 °F (36.4 °C)  97.5 °F (36.4 °C) 97.9 °F (36.6 °C)   TempSrc: Oral  Oral Oral   SpO2: 96%  93% 96%   Weight:  90.3 kg (199 lb)     Height:         Body mass index is 31.17 kg/m².  Flowsheet Rows      Flowsheet Row First Filed Value   Admission Height 170.2 cm (67\") Documented at 12/20/2024 1720   Admission Weight 90.3 kg (199 lb) Documented at 12/20/2024 1720              Physical Exam  Vitals reviewed.   Constitutional:       General: She is not in acute distress.  HENT:      Head: Normocephalic.      Nose: Nose normal.   Eyes:      Extraocular Movements: Extraocular movements intact.      Pupils: Pupils are equal, round, and reactive to light.   Cardiovascular:      Rate and Rhythm: Normal rate. Rhythm irregularly irregular.      Pulses: Normal pulses.      Heart sounds: Heart sounds not distant. Murmur heard.      No friction rub. No gallop. No S3 or S4 sounds.   Pulmonary:      Effort: Pulmonary effort is normal.      Breath sounds: Normal breath sounds.   Abdominal: "      General: Abdomen is flat. Bowel sounds are normal.      Palpations: Abdomen is soft.      Tenderness: There is no abdominal tenderness.   Musculoskeletal:      Right lower leg: Edema present.      Left lower leg: Edema present.   Skin:     General: Skin is warm and dry.   Neurological:      General: No focal deficit present.      Mental Status: She is alert and oriented to person, place, and time. Mental status is at baseline.   Psychiatric:         Mood and Affect: Mood normal.         Behavior: Behavior normal.         Lab Review:                Results from last 7 days   Lab Units 12/21/24  0522   SODIUM mmol/L 138   POTASSIUM mmol/L 3.7   CHLORIDE mmol/L 105   CO2 mmol/L 23.1   BUN mg/dL 38*   CREATININE mg/dL 2.49*   GLUCOSE mg/dL 132*   CALCIUM mg/dL 9.0     Results from last 7 days   Lab Units 12/20/24  1746 12/20/24  1635   HSTROP T ng/L 50* 48*     Results from last 7 days   Lab Units 12/21/24  0522   WBC 10*3/mm3 3.54   HEMOGLOBIN g/dL 9.4*   HEMATOCRIT % 29.3*   PLATELETS 10*3/mm3 123*                       EKG:        Assessment/Plan:   Acute on chronic HFmrEF  She has had multiple hospitalizations this year for decompensated heart failure. She reports compliance with her diuretics. She denies dietary indiscretion. She has not had recent illness.   Nephrology is managing diuresis, appreciate their expertise.   Echocardiogram in November 2024 showed EF 41%, severe TR, moderate mitral valve regurgitation   At her most recent visit with her primary cardiologist a stress test/cardiac catheterization were discussed and she declined both as she does not want an invasive cardiac procedure.   GDMT: carvedilol, furosemide. Otherwise limited by renal function.   Chronic kidney disease  Nephrology is following   Severe tricuspid regurgitation and moderate mitral valve regurgitation   She has declined further evaluation of this such as right cardiac catheterization previously. She has known severe pulmonary  hypertension for which she follows with pulmonology.   Type II diabetes  Atrial fibrillation  Continue carvedilol and Eliquis 2.5 mg twice daily (appropriate given age and renal function)   Hyperlipidemia    Continue atorvastatin   Iron deficiency anemia     Cardiology will follow, thank you for this consult.     Brenda Orozco, APRN   12/21/24

## 2024-12-21 NOTE — PLAN OF CARE
Goal Outcome Evaluation:  Plan of Care Reviewed With: patient, family           Outcome Evaluation: Pt is an 85 yo female with PMH including A-fib, HTN, DM2, and chronic LBP admitted with c/o dyspnea and leg swelling. She lives alone and is I with mobility at BL using RW. Today she presents with impaired functional mobility with weakness. She performed STS and gait x 30 ft with RW CGA, c/o feeling shakey. Gait distance further limited by decreased activity tolerance. Acute PT indicated to address functional deficits and maximize level of independence prior to discharge.    Anticipated Discharge Disposition (PT): home with assist, home with home health

## 2024-12-21 NOTE — PLAN OF CARE
Goal Outcome Evaluation:         Patient admitted this shift for CHF exacerbation and CKD. Blood sugar 154. Patient wears 2L O2 at HS. She is on room air at this time. No issues. Lasix received in ED. Patient generalized edema. BLE tight. Uses walker at home. Pleasant and cooperative. Lidocaine patch placed on lower back for chronic pain.

## 2024-12-21 NOTE — ED NOTES
.Nursing report ED to floor  Lowell Min  84 y.o.  female    HPI :  HPI  Stated Reason for Visit: leg swelling hx of chf    Chief Complaint  Chief Complaint   Patient presents with    Leg Swelling     Lowell Min is a 84 y.o. female with a history of chronic kidney disease, CHF, A-fib, pulmonary hypertension, diabetes, who presents to the ED c/o acute exertional dyspnea, pedal edema.  Patient's symptoms have been worsening over the past several days.  Patient saw her primary care physician yesterday who increased her Lasix however the patient continues to worsen.  She denies any overt chest pain, orthopnea, fever.  She follows with [facility] as well as Dr. Foster in nephrology.     Review of prior external notes (non-ED):   Reviewed last admission from 10/18/2024 through 10/26/2024.  Patient admitted for CHF exacerbation.     Review of prior external test results outside of this encounter:  I reviewed an echocardiogram dated 9/7/2024.  EF 44%.     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    Symptomatic anemia 04/03/2022    Anxiety associated with depression 04/03/2022    Iron deficiency anemia 04/03/2022    PAF (paroxysmal atrial fibrillation) 04/03/2022    Shortness of breath 04/03/2022    Chronic diastolic congestive heart failure 04/03/2022    Diabetic polyneuropathy associated with type 2 diabetes mellitus 04/12/2022    PERLA (acute kidney injury) 10/07/2022    Foot pain, bilateral 10/13/2022    Anemia of chronic renal failure 06/27/2023    Acute on chronic diastolic CHF (congestive heart failure) 09/06/2024    Obesity (BMI 30-39.9) 09/06/2024    HLD (hyperlipidemia) 09/06/2024    Calcium pyrophosphate deposition disease (CPPD) 09/10/2024    Type 2 diabetes mellitus      Acute on chronic systolic CHF (congestive heart failure) 10/21/2024    Chronic respiratory  "failure with hypoxia 10/21/2024      Admitting doctor:   Matthew Schultz MD    Admitting diagnosis:   The primary encounter diagnosis was Acute on chronic congestive heart failure, unspecified heart failure type. A diagnosis of Chronic kidney disease, unspecified CKD stage was also pertinent to this visit.    Code status:   Current Code Status       Date Active Code Status Order ID Comments User Context       12/20/2024 1927 CPR (Attempt to Resuscitate) [id number]  Lilo Gastelum, APRN ED        Question Answer    Code Status (Patient has no pulse and is not breathing) CPR (Attempt to Resuscitate)    Medical Interventions (Patient has pulse or is breathing) Full                Allergies:   Ibuprofen    Isolation:   No active isolations    Intake and Output  No intake or output data in the 24 hours ending 12/20/24 1930    Weight:       12/20/24  1720   Weight: 90.3 kg (199 lb)     Most recent vitals:   Vitals:    12/20/24 1622 12/20/24 1720 12/20/24 1741 12/20/24 1826   BP: 129/84  140/99 (!) 144/101   BP Location: Left arm      Patient Position: Sitting      Pulse:   72 67   Resp:       Temp:       TempSrc:       SpO2:   98% 96%   Weight:  90.3 kg (199 lb)     Height:  170.2 cm (67\")       Active LDAs/IV Access:   Lines, Drains & Airways       Active LDAs       Name Placement date Placement time Site Days    Peripheral IV 12/20/24 1746 Left Antecubital 12/20/24 1746  Antecubital  less than 1                Labs (abnormal labs have a star):   Labs Reviewed   COMPREHENSIVE METABOLIC PANEL - Abnormal; Notable for the following components:       Result Value    Glucose 109 (*)     BUN 36 (*)     Creatinine 2.80 (*)     CO2 21.3 (*)     Albumin 3.3 (*)     Alkaline Phosphatase 200 (*)     eGFR 16.2 (*)     All other components within normal limits    Narrative:     GFR Categories in Chronic Kidney Disease (CKD)      GFR Category          GFR (mL/min/1.73)    Interpretation  G1                     90 or greater         " Normal or high (1)  G2                      60-89                Mild decrease (1)  G3a                   45-59                Mild to moderate decrease  G3b                   30-44                Moderate to severe decrease  G4                    15-29                Severe decrease  G5                    14 or less           Kidney failure          (1)In the absence of evidence of kidney disease, neither GFR category G1 or G2 fulfill the criteria for CKD.    eGFR calculation 2021 CKD-EPI creatinine equation, which does not include race as a factor   BNP (IN-HOUSE) - Abnormal; Notable for the following components:    proBNP 14,959.0 (*)     All other components within normal limits    Narrative:     This assay is used as an aid in the diagnosis of individuals suspected of having heart failure. It can be used as an aid in the diagnosis of acute decompensated heart failure (ADHF) in patients presenting with signs and symptoms of ADHF to the emergency department (ED). In addition, NT-proBNP of <300 pg/mL indicates ADHF is not likely.    Age Range Result Interpretation  NT-proBNP Concentration (pg/mL:      <50             Positive            >450                   Gray                 300-450                    Negative             <300    50-75           Positive            >900                  Gray                300-900                  Negative            <300      >75             Positive            >1800                  Gray                300-1800                  Negative            <300   TROPONIN - Abnormal; Notable for the following components:    HS Troponin T 48 (*)     All other components within normal limits    Narrative:     High Sensitive Troponin T Reference Range:  <14.0 ng/L- Negative Female for AMI  <22.0 ng/L- Negative Male for AMI  >=14 - Abnormal Female indicating possible myocardial injury.  >=22 - Abnormal Male indicating possible myocardial injury.   Clinicians would have to utilize  clinical acumen, EKG, Troponin, and serial changes to determine if it is an Acute Myocardial Infarction or myocardial injury due to an underlying chronic condition.        CBC WITH AUTO DIFFERENTIAL - Abnormal; Notable for the following components:    RBC 3.25 (*)     Hemoglobin 9.9 (*)     Hematocrit 31.2 (*)     All other components within normal limits   HIGH SENSITIVITIY TROPONIN T 1HR - Abnormal; Notable for the following components:    HS Troponin T 50 (*)     All other components within normal limits    Narrative:     High Sensitive Troponin T Reference Range:  <14.0 ng/L- Negative Female for AMI  <22.0 ng/L- Negative Male for AMI  >=14 - Abnormal Female indicating possible myocardial injury.  >=22 - Abnormal Male indicating possible myocardial injury.   Clinicians would have to utilize clinical acumen, EKG, Troponin, and serial changes to determine if it is an Acute Myocardial Infarction or myocardial injury due to an underlying chronic condition.        RAINBOW DRAW    Narrative:     The following orders were created for panel order [place] Draw.  Procedure                               Abnormality         Status                     ---------                               -----------         ------                     Green Top (Gel)[[id number]]                                  Final result               Lavender Top[[id number]]                                     Final result               Gold Top - SST[[id number]]                                   Final result               Light Blue Top[[id number]]                                   Final result                 Please view results for these tests on the individual orders.   HEMOGLOBIN A1C   POCT GLUCOSE FINGERSTICK   POCT GLUCOSE FINGERSTICK   POCT GLUCOSE FINGERSTICK   POCT GLUCOSE FINGERSTICK   CBC AND DIFFERENTIAL    Narrative:     The following orders were created for panel order CBC & Differential.  Procedure                               Abnormality          Status                     ---------                               -----------         ------                     CBC Auto Differential[[id number]]        Abnormal            Final result                 Please view results for these tests on the individual orders.   GREEN TOP   LAVENDER TOP   GOLD TOP - SST   LIGHT BLUE TOP     EKG:   ECG 12 Lead ED Triage Standing Order; SOA   Preliminary Result   HEART RATE=74  bpm   RR Dlzcfluw=338  ms   IL Interval=  ms   P Horizontal Axis=  deg   P Front Axis=  deg   QRSD Tixyswze=306  ms   QT Qkfjqhrb=011  ms   WJeJ=845  ms   QRS Axis=-52  deg   T Wave Axis=114  deg   - ABNORMAL ECG -   Atrial fibrillation   Left bundle branch block   Date and Time of Study:2024-12-20 16:17:23        Meds given in ED:   Medications   sodium chloride 0.9 % flush 10 mL (has no administration in time range)   nitroglycerin (NITROSTAT) SL tablet 0.4 mg (has no administration in time range)   acetaminophen (TYLENOL) tablet 650 mg (has no administration in time range)   sennosides-docusate (PERICOLACE) 8.6-50 MG per tablet 2 tablet (has no administration in time range)     And   polyethylene glycol (MIRALAX) packet 17 g (has no administration in time range)     And   bisacodyl (DULCOLAX) EC tablet 5 mg (has no administration in time range)     And   bisacodyl (DULCOLAX) suppository 10 mg (has no administration in time range)   ondansetron ODT (ZOFRAN-ODT) disintegrating tablet 4 mg (has no administration in time range)     Or   ondansetron (ZOFRAN) injection 4 mg (has no administration in time range)   dextrose (GLUTOSE) oral gel 15 g (has no administration in time range)   dextrose (D50W) (25 g/50 mL) IV injection 25 g (has no administration in time range)   glucagon (GLUCAGEN) injection 1 mg (has no administration in time range)   insulin lispro (HUMALOG/ADMELOG) injection 2-7 Units (has no administration in time range)   furosemide (LASIX) injection 40 mg (40 mg Intravenous Given 12/20/24  1746)     Imaging results:  XR Chest 1 View    Result Date: 12/20/2024  Cardiomegaly with vascular congestion, without edema  This report was finalized on 12/20/2024 4:54 PM by Dr. Michael Peña M.D on Workstation: [id number]       Ambulatory status:   - standby    Social issues:   Social History     Socioeconomic History    Marital status: Single   Tobacco Use    Smoking status: Never    Smokeless tobacco: Never   Vaping Use    Vaping status: Never Used   Substance and Sexual Activity    Alcohol use: Never    Drug use: Never    Sexual activity: Defer     Peripheral Neurovascular  Peripheral Neurovascular (Adult)  Peripheral Neurovascular WDL: .WDL except  Edema  Edema: leg, right, leg, left  Leg, Left Edema: 3+ (Moderate)  Leg, Right Edema: 3+ (Moderate)    Neuro Cognitive  Neuro Cognitive (Adult)  Cognitive/Neuro/Behavioral WDL: WDL    Learning  Learning Assessment  Learning Readiness and Ability: no barriers identified    Respiratory  Respiratory WDL  Respiratory WDL: .WDL except, all  Rhythm/Pattern, Respiratory: other (see comments), shortness of breath (with exertion)    Abdominal Pain     Pain Assessments  Pain (Adult)  (0-10) Pain Rating: Rest: 0  (0-10) Pain Rating: Activity: 0    NIH Stroke Scale     Dona Leon RN  12/20/24 19:30 EST

## 2024-12-21 NOTE — CONSULTS
Patient Care Team:  Dannie Mathews MD as PCP - General (Internal Medicine)    Chief complaint: CKD4, CHF    Inpatient Nephrology Consult  Consult performed by: Hailey Barros MD  Consult ordered by: Lilo Gastelum APRN             History of Present Illness  Patient is a 83 yo female with advance CKD followed by Dr. HARRY Foster who presented to ED with SANDOVAL and lower extremity edema.   She has discussed with PCP who advised to increase lasix dose, but this did not improve her symptoms.   She was give IV lasix overnight and plans for another dose this am.     Her Cr on arrival was 2.8 and it is down to 2.5 this am which is her baseline.     She reports she still feels her legs are tight and breathing is shallow at times.     Review of Systems   Review of Systems - History obtained from chart review and the patient  General ROS: negative  Psychological ROS: negative  Ophthalmic ROS: negative  ENT ROS: negative  Allergy and Immunology ROS: negative  Hematological and Lymphatic ROS: negative  Endocrine ROS: negative  Respiratory ROS: per HPI  Cardiovascular ROS: no chest pain. + SANDOVAL  Gastrointestinal ROS: no abdominal pain, change in bowel habits, or black or bloody stools  Genito-Urinary ROS: no dysuria, trouble voiding, or hematuria  Musculoskeletal ROS: + edema   Neurological ROS: no TIA or stroke symptoms  Dermatological ROS: negative       Past Medical History:   Diagnosis Date    Anxiety     Atrial fibrillation     CHF (congestive heart failure)     Chronic kidney disease, stage IV (severe)     Depression     Elevated cholesterol     Hypertension     Type 2 diabetes mellitus    ,   Past Surgical History:   Procedure Laterality Date    APPENDECTOMY      CHOLECYSTECTOMY      COLONOSCOPY      CYSTOSCOPY BOTOX INJECTION OF BLADDER N/A     HYSTERECTOMY      TUMOR REMOVAL Left     benign tumor from left breast   ,   Family History   Problem Relation Age of Onset    Heart disease Mother    ,   Social History      Socioeconomic History    Marital status: Single   Tobacco Use    Smoking status: Never     Passive exposure: Never    Smokeless tobacco: Never   Vaping Use    Vaping status: Never Used   Substance and Sexual Activity    Alcohol use: Never    Drug use: Never    Sexual activity: Defer     E-cigarette/Vaping    E-cigarette/Vaping Use Never User     Passive Exposure No     Counseling Given No      E-cigarette/Vaping Substances    Nicotine No     THC No     CBD No     Flavoring No      E-cigarette/Vaping Devices    Disposable No     Pre-filled or Refillable Cartridge No     Refillable Tank No     Pre-filled Pod No          ,   Medications Prior to Admission   Medication Sig Dispense Refill Last Dose/Taking    Diclofenac Sodium (VOLTAREN) 1 % gel gel Apply 4 g topically to the appropriate area as directed 4 (Four) Times a Day As Needed.   Past Month    lidocaine (LIDODERM) 5 % Place 1 patch on the skin as directed by provider Daily As Needed for Mild Pain. Remove & Discard patch within 12 hours or as directed by MD   12/19/2024    acetaminophen (TYLENOL) 325 MG tablet Take 2 tablets by mouth Every 4 (Four) Hours As Needed for Mild Pain .   12/19/2024    apixaban (ELIQUIS) 2.5 MG tablet tablet Take 1 tablet by mouth 2 (Two) Times a Day. Indications: Atrial Fibrillation 60 tablet  12/19/2024    atorvastatin (LIPITOR) 40 MG tablet Take 1 tablet by mouth every night at bedtime.   12/19/2024    carvedilol (COREG) 12.5 MG tablet Take 1 tablet by mouth 2 (two) times daily with meals.   12/19/2024    furosemide (LASIX) 40 MG tablet Take 1 tablet by mouth Daily.   12/19/2024    hydrALAZINE (APRESOLINE) 10 MG tablet Take 1 tablet by mouth Every 8 (Eight) Hours. (Patient taking differently: Take 1 tablet by mouth Every 8 (Eight) Hours. Only if bp >160)   More than a month    isosorbide dinitrate (ISORDIL) 10 MG tablet Take 1 tablet by mouth 3 (Three) Times a Day.   12/19/2024    O2 (OXYGEN) 2 L/min Every Night. Uses when  sleeping   12/19/2024    sennosides-docusate (PERICOLACE) 8.6-50 MG per tablet Take 2 tablets by mouth 2 (Two) Times a Day. (Patient taking differently: Take 2 tablets by mouth 2 (Two) Times a Day As Needed for Constipation.)   More than a month    venlafaxine XR (EFFEXOR-XR) 75 MG 24 hr capsule Take 1 capsule by mouth Daily.   12/19/2024   , Scheduled Meds:  apixaban, 2.5 mg, Oral, BID  atorvastatin, 40 mg, Oral, Daily  carvedilol, 12.5 mg, Oral, BID With Meals  furosemide, 40 mg, Intravenous, Once  insulin lispro, 2-7 Units, Subcutaneous, 4x Daily AC & at Bedtime  isosorbide dinitrate, 10 mg, Oral, TID - Nitrates  venlafaxine XR, 75 mg, Oral, Daily   , Continuous Infusions:   , PRN Meds:    acetaminophen    senna-docusate sodium **AND** polyethylene glycol **AND** bisacodyl **AND** bisacodyl    dextrose    dextrose    glucagon (human recombinant)    Lidocaine    nitroglycerin    ondansetron ODT **OR** ondansetron    sodium chloride, and Allergies:  Ibuprofen    Objective     Vital Signs  Temp:  [96.9 °F (36.1 °C)-98.1 °F (36.7 °C)] 97.5 °F (36.4 °C)  Heart Rate:  [66-79] 66  Resp:  [18] 18  BP: (110-152)/() 110/65    No intake/output data recorded.  I/O last 3 completed shifts:  In: -   Out: 600 [Urine:600]    Physical Exam  Physical Examination: General appearance - alert, well appearing, and in no distress  Mental status - alert, oriented to person, place, and time  Chest - clear to auscultation, no wheezes, rales or rhonchi, symmetric air entry  Heart - normal rate, regular rhythm, normal S1, S2, no murmurs, rubs, clicks or gallops  Abdomen - soft, nontender, nondistended, no masses or organomegaly  Neurological - alert, oriented, normal speech, no focal findings or movement disorder noted  Extremities - 1+ pitting edema       Results Review:    I reviewed the patient's new clinical results.    WBC WBC   Date Value Ref Range Status   12/21/2024 3.54 3.40 - 10.80 10*3/mm3 Final   12/20/2024 4.97 3.40 -  "10.80 10*3/mm3 Final      HGB Hemoglobin   Date Value Ref Range Status   12/21/2024 9.4 (L) 12.0 - 15.9 g/dL Final   12/20/2024 9.9 (L) 12.0 - 15.9 g/dL Final      HCT Hematocrit   Date Value Ref Range Status   12/21/2024 29.3 (L) 34.0 - 46.6 % Final   12/20/2024 31.2 (L) 34.0 - 46.6 % Final      Platlets No results found for: \"LABPLAT\"   MCV MCV   Date Value Ref Range Status   12/21/2024 93.3 79.0 - 97.0 fL Final   12/20/2024 96.0 79.0 - 97.0 fL Final          Sodium Sodium   Date Value Ref Range Status   12/21/2024 138 136 - 145 mmol/L Final   12/20/2024 137 136 - 145 mmol/L Final      Potassium Potassium   Date Value Ref Range Status   12/21/2024 3.7 3.5 - 5.2 mmol/L Final   12/20/2024 4.1 3.5 - 5.2 mmol/L Final      Chloride Chloride   Date Value Ref Range Status   12/21/2024 105 98 - 107 mmol/L Final   12/20/2024 105 98 - 107 mmol/L Final      CO2 CO2   Date Value Ref Range Status   12/21/2024 23.1 22.0 - 29.0 mmol/L Final   12/20/2024 21.3 (L) 22.0 - 29.0 mmol/L Final      BUN BUN   Date Value Ref Range Status   12/21/2024 38 (H) 8 - 23 mg/dL Final   12/20/2024 36 (H) 8 - 23 mg/dL Final      Creatinine Creatinine   Date Value Ref Range Status   12/21/2024 2.49 (H) 0.57 - 1.00 mg/dL Final   12/20/2024 2.80 (H) 0.57 - 1.00 mg/dL Final      Calcium Calcium   Date Value Ref Range Status   12/21/2024 9.0 8.6 - 10.5 mg/dL Final   12/20/2024 9.4 8.6 - 10.5 mg/dL Final      PO4 No results found for: \"CAPO4\"   Albumin Albumin   Date Value Ref Range Status   12/20/2024 3.3 (L) 3.5 - 5.2 g/dL Final      Magnesium No results found for: \"MG\"   Uric Acid No results found for: \"URICACID\"         Assessment & Plan       CHF exacerbation    Chronic kidney disease, stage IV (severe)    Iron deficiency anemia    PAF (paroxysmal atrial fibrillation)    HLD (hyperlipidemia)    Type 2 diabetes mellitus      Assessment & Plan  PERLA  CKD4  CHF  Volume overload   Anemia of CKD     Renal function back to her baseline   Will schedule " diuretics for now  Hopefully can transition to PO tomorrow.   Monitor volume and respiratory status.   Monitor renal function and electrolytes,   Will follow   Thanks for referral     I discussed the patients findings and my recommendations with patient    Hailey Barros MD  12/21/24  07:37 EST

## 2024-12-21 NOTE — PLAN OF CARE
Problem: Adult Inpatient Plan of Care  Goal: Plan of Care Review  Outcome: Progressing  Flowsheets (Taken 12/21/2024 1610)  Progress: improving  Outcome Evaluation: Vitals stable. SOA with activity. IV Lasix Q12. Worked with PT, up to chair. Family visited. Plan of care ongoing.  Plan of Care Reviewed With: patient

## 2024-12-22 LAB
ALBUMIN SERPL-MCNC: 2.9 G/DL (ref 3.5–5.2)
ANION GAP SERPL CALCULATED.3IONS-SCNC: 16.7 MMOL/L (ref 5–15)
BUN SERPL-MCNC: 40 MG/DL (ref 8–23)
BUN/CREAT SERPL: 14.4 (ref 7–25)
CALCIUM SPEC-SCNC: 8.2 MG/DL (ref 8.6–10.5)
CHLORIDE SERPL-SCNC: 102 MMOL/L (ref 98–107)
CO2 SERPL-SCNC: 22.3 MMOL/L (ref 22–29)
CREAT SERPL-MCNC: 2.77 MG/DL (ref 0.57–1)
DEPRECATED RDW RBC AUTO: 50.6 FL (ref 37–54)
EGFRCR SERPLBLD CKD-EPI 2021: 16.4 ML/MIN/1.73
ERYTHROCYTE [DISTWIDTH] IN BLOOD BY AUTOMATED COUNT: 14.8 % (ref 12.3–15.4)
GLUCOSE BLDC GLUCOMTR-MCNC: 116 MG/DL (ref 70–130)
GLUCOSE BLDC GLUCOMTR-MCNC: 127 MG/DL (ref 70–130)
GLUCOSE BLDC GLUCOMTR-MCNC: 157 MG/DL (ref 70–130)
GLUCOSE BLDC GLUCOMTR-MCNC: 158 MG/DL (ref 70–130)
GLUCOSE BLDC GLUCOMTR-MCNC: 161 MG/DL (ref 70–130)
GLUCOSE SERPL-MCNC: 149 MG/DL (ref 65–99)
HCT VFR BLD AUTO: 28.8 % (ref 34–46.6)
HGB BLD-MCNC: 9.4 G/DL (ref 12–15.9)
MCH RBC QN AUTO: 30.4 PG (ref 26.6–33)
MCHC RBC AUTO-ENTMCNC: 32.6 G/DL (ref 31.5–35.7)
MCV RBC AUTO: 93.2 FL (ref 79–97)
PHOSPHATE SERPL-MCNC: 3.3 MG/DL (ref 2.5–4.5)
PLATELET # BLD AUTO: 127 10*3/MM3 (ref 140–450)
PMV BLD AUTO: 9.9 FL (ref 6–12)
POTASSIUM SERPL-SCNC: 3.9 MMOL/L (ref 3.5–5.2)
RBC # BLD AUTO: 3.09 10*6/MM3 (ref 3.77–5.28)
SODIUM SERPL-SCNC: 141 MMOL/L (ref 136–145)
WBC NRBC COR # BLD AUTO: 4.33 10*3/MM3 (ref 3.4–10.8)

## 2024-12-22 PROCEDURE — 82948 REAGENT STRIP/BLOOD GLUCOSE: CPT

## 2024-12-22 PROCEDURE — 63710000001 INSULIN LISPRO (HUMAN) PER 5 UNITS

## 2024-12-22 PROCEDURE — 99214 OFFICE O/P EST MOD 30 MIN: CPT | Performed by: INTERNAL MEDICINE

## 2024-12-22 PROCEDURE — 25010000002 FUROSEMIDE PER 20 MG: Performed by: INTERNAL MEDICINE

## 2024-12-22 PROCEDURE — 85027 COMPLETE CBC AUTOMATED: CPT | Performed by: STUDENT IN AN ORGANIZED HEALTH CARE EDUCATION/TRAINING PROGRAM

## 2024-12-22 PROCEDURE — 80069 RENAL FUNCTION PANEL: CPT | Performed by: STUDENT IN AN ORGANIZED HEALTH CARE EDUCATION/TRAINING PROGRAM

## 2024-12-22 RX ORDER — FUROSEMIDE 40 MG/1
40 TABLET ORAL DAILY
Status: DISCONTINUED | OUTPATIENT
Start: 2024-12-23 | End: 2024-12-23

## 2024-12-22 RX ADMIN — ISOSORBIDE DINITRATE 10 MG: 20 TABLET ORAL at 08:43

## 2024-12-22 RX ADMIN — APIXABAN 2.5 MG: 2.5 TABLET, FILM COATED ORAL at 21:17

## 2024-12-22 RX ADMIN — ATORVASTATIN CALCIUM 40 MG: 20 TABLET, FILM COATED ORAL at 08:43

## 2024-12-22 RX ADMIN — ACETAMINOPHEN 650 MG: 325 TABLET ORAL at 21:17

## 2024-12-22 RX ADMIN — ACETAMINOPHEN 650 MG: 325 TABLET ORAL at 14:15

## 2024-12-22 RX ADMIN — INSULIN LISPRO 2 UNITS: 100 INJECTION, SOLUTION INTRAVENOUS; SUBCUTANEOUS at 21:17

## 2024-12-22 RX ADMIN — APIXABAN 2.5 MG: 2.5 TABLET, FILM COATED ORAL at 08:43

## 2024-12-22 RX ADMIN — ACETAMINOPHEN 650 MG: 325 TABLET ORAL at 08:49

## 2024-12-22 RX ADMIN — CARVEDILOL 12.5 MG: 12.5 TABLET, FILM COATED ORAL at 17:58

## 2024-12-22 RX ADMIN — VENLAFAXINE HYDROCHLORIDE 75 MG: 75 CAPSULE, EXTENDED RELEASE ORAL at 08:43

## 2024-12-22 RX ADMIN — INSULIN LISPRO 2 UNITS: 100 INJECTION, SOLUTION INTRAVENOUS; SUBCUTANEOUS at 12:39

## 2024-12-22 RX ADMIN — ISOSORBIDE DINITRATE 10 MG: 20 TABLET ORAL at 17:58

## 2024-12-22 RX ADMIN — CARVEDILOL 12.5 MG: 12.5 TABLET, FILM COATED ORAL at 08:43

## 2024-12-22 RX ADMIN — FUROSEMIDE 40 MG: 10 INJECTION, SOLUTION INTRAMUSCULAR; INTRAVENOUS at 10:13

## 2024-12-22 RX ADMIN — ISOSORBIDE DINITRATE 10 MG: 20 TABLET ORAL at 14:15

## 2024-12-22 NOTE — PROGRESS NOTES
LOS: 0 days   Patient Care Team:  Dannie Mathews MD as PCP - General (Internal Medicine)    Chief Complaint:  Shortness of Breath    Interval History:     She is having some shortness of breath.  She feels like she might need oxygen.     She is also having back pain, but this is a chronic issue for her.       Objective   Vital Signs  Temp:  [97.7 °F (36.5 °C)-97.9 °F (36.6 °C)] 97.9 °F (36.6 °C)  Heart Rate:  [64-80] 68  Resp:  [16-18] 18  BP: (126-134)/(70-84) 126/71    Intake/Output Summary (Last 24 hours) at 12/22/2024 1219  Last data filed at 12/22/2024 0849  Gross per 24 hour   Intake 960 ml   Output 2400 ml   Net -1440 ml       Elderly female   On room air  Mild tachypnea, decreased lung sounds in bases  Irregular rate, systolic murmur along sternal border  Abdomen is soft, non distended  Back is nontender to palpitation  Mild edema  Warm  Alert and oriented.       Results Review:      Results from last 7 days   Lab Units 12/22/24  0620 12/21/24  0522 12/20/24  1635   SODIUM mmol/L 141 138 137   POTASSIUM mmol/L 3.9 3.7 4.1   CHLORIDE mmol/L 102 105 105   CO2 mmol/L 22.3 23.1 21.3*   BUN mg/dL 40* 38* 36*   CREATININE mg/dL 2.77* 2.49* 2.80*   GLUCOSE mg/dL 149* 132* 109*   CALCIUM mg/dL 8.2* 9.0 9.4     Results from last 7 days   Lab Units 12/20/24  1746 12/20/24  1635   HSTROP T ng/L 50* 48*     Results from last 7 days   Lab Units 12/22/24  0620 12/21/24  0522 12/20/24  1635   WBC 10*3/mm3 4.33 3.54 4.97   HEMOGLOBIN g/dL 9.4* 9.4* 9.9*   HEMATOCRIT % 28.8* 29.3* 31.2*   PLATELETS 10*3/mm3 127* 123* 147                       I reviewed the patient's new clinical results.  I personally viewed and interpreted the patient's EKG/Telemetry data        Medication Review:   apixaban, 2.5 mg, Oral, BID  atorvastatin, 40 mg, Oral, Daily  carvedilol, 12.5 mg, Oral, BID With Meals  [START ON 12/23/2024] furosemide, 40 mg, Oral, Daily  insulin lispro, 2-7 Units, Subcutaneous, 4x Daily AC & at Bedtime  isosorbide  dinitrate, 10 mg, Oral, TID - Nitrates  venlafaxine XR, 75 mg, Oral, Daily             Assessment & Plan     Acute on Chronic heart failure with moderate to slightly reduced ejection fraction, but evidence of pressure overload, diastolic failure.   Persistent Atrial Fibrillation  Acute on Chronic Kidney Injury  Left Bundle Branch Block  Anemia and Thrombocytopenia  Multiple Hospitalization    Nephrology note reviewed, she is back on oral diuretics.  Perhaps they will increase to BID    She has declined invasive treatments or evaluation for her valve.      No further suggestions today    A goals of care discussion would be appropriate, but I will defer to primary cardiology team.       Niles Zuñiga MD  12/22/24  12:19 EST

## 2024-12-22 NOTE — PROGRESS NOTES
" LOS: 0 days   Patient Care Team:  Dannie Mathews MD as PCP - General (Internal Medicine)    Chief Complaint: CHF, CKD4      History of Present Illness  Patient is a 85 yo female with advance CKD followed by Dr. HARRY Foster who presented to ED with SANDOVAL and lower extremity edema.   She has discussed with PCP who advised to increase lasix dose, but this did not improve her symptoms.   She was give IV lasix overnight and plans for another dose this am.      Her Cr on arrival was 2.8 and it is down to 2.5 this am which is her baseline.       Subjective  Patient still feeling a bit sob. Edema in lower extremities is better.   Complains of pain in her back     History taken from: patient chart    Objective     Vital Sign Min/Max for last 24 hours  Temp  Min: 97.7 °F (36.5 °C)  Max: 97.9 °F (36.6 °C)   BP  Min: 126/72  Max: 134/76   Pulse  Min: 64  Max: 80   Resp  Min: 16  Max: 18   SpO2  Min: 96 %  Max: 98 %   No data recorded   Weight  Min: 91.3 kg (201 lb 4.8 oz)  Max: 91.3 kg (201 lb 4.8 oz)     Flowsheet Rows      Flowsheet Row First Filed Value   Admission Height 170.2 cm (67\") Documented at 12/20/2024 1720   Admission Weight 90.3 kg (199 lb) Documented at 12/20/2024 1720            No intake/output data recorded.  I/O last 3 completed shifts:  In: 960 [P.O.:960]  Out: 3000 [Urine:3000]    Objective:  Vital signs: (most recent): Blood pressure 126/71, pulse 68, temperature 97.9 °F (36.6 °C), temperature source Oral, resp. rate 18, height 170.2 cm (67\"), weight 91.3 kg (201 lb 4.8 oz), SpO2 97%.            Physical Examination: General appearance - alert, well appearing, and in no distress  Mental status - alert, oriented to person, place, and time  Chest - clear to auscultation, no wheezes, rales or rhonchi, symmetric air entry  Heart - normal rate, regular rhythm, normal S1, S2, no murmurs, rubs, clicks or gallops  Abdomen - soft, nontender, nondistended, no masses or organomegaly  Neurological - alert, oriented, " "normal speech, no focal findings or movement disorder noted  Extremities - peripheral pulses normal, no pedal edema, no clubbing or cyanosis     Results Review:     I reviewed the patient's new clinical results.    WBC WBC   Date Value Ref Range Status   12/22/2024 4.33 3.40 - 10.80 10*3/mm3 Final   12/21/2024 3.54 3.40 - 10.80 10*3/mm3 Final   12/20/2024 4.97 3.40 - 10.80 10*3/mm3 Final      HGB Hemoglobin   Date Value Ref Range Status   12/22/2024 9.4 (L) 12.0 - 15.9 g/dL Final   12/21/2024 9.4 (L) 12.0 - 15.9 g/dL Final   12/20/2024 9.9 (L) 12.0 - 15.9 g/dL Final      HCT Hematocrit   Date Value Ref Range Status   12/22/2024 28.8 (L) 34.0 - 46.6 % Final   12/21/2024 29.3 (L) 34.0 - 46.6 % Final   12/20/2024 31.2 (L) 34.0 - 46.6 % Final      Platlets No results found for: \"LABPLAT\"   MCV MCV   Date Value Ref Range Status   12/22/2024 93.2 79.0 - 97.0 fL Final   12/21/2024 93.3 79.0 - 97.0 fL Final   12/20/2024 96.0 79.0 - 97.0 fL Final          Sodium Sodium   Date Value Ref Range Status   12/22/2024 141 136 - 145 mmol/L Final   12/21/2024 138 136 - 145 mmol/L Final   12/20/2024 137 136 - 145 mmol/L Final      Potassium Potassium   Date Value Ref Range Status   12/22/2024 3.9 3.5 - 5.2 mmol/L Final   12/21/2024 3.7 3.5 - 5.2 mmol/L Final   12/20/2024 4.1 3.5 - 5.2 mmol/L Final      Chloride Chloride   Date Value Ref Range Status   12/22/2024 102 98 - 107 mmol/L Final   12/21/2024 105 98 - 107 mmol/L Final   12/20/2024 105 98 - 107 mmol/L Final      CO2 CO2   Date Value Ref Range Status   12/22/2024 22.3 22.0 - 29.0 mmol/L Final   12/21/2024 23.1 22.0 - 29.0 mmol/L Final   12/20/2024 21.3 (L) 22.0 - 29.0 mmol/L Final      BUN BUN   Date Value Ref Range Status   12/22/2024 40 (H) 8 - 23 mg/dL Final   12/21/2024 38 (H) 8 - 23 mg/dL Final   12/20/2024 36 (H) 8 - 23 mg/dL Final      Creatinine Creatinine   Date Value Ref Range Status   12/22/2024 2.77 (H) 0.57 - 1.00 mg/dL Final   12/21/2024 2.49 (H) 0.57 - 1.00 mg/dL " "Final   12/20/2024 2.80 (H) 0.57 - 1.00 mg/dL Final      Calcium Calcium   Date Value Ref Range Status   12/22/2024 8.2 (L) 8.6 - 10.5 mg/dL Final   12/21/2024 9.0 8.6 - 10.5 mg/dL Final   12/20/2024 9.4 8.6 - 10.5 mg/dL Final      PO4 No results found for: \"CAPO4\"   Albumin Albumin   Date Value Ref Range Status   12/22/2024 2.9 (L) 3.5 - 5.2 g/dL Final   12/20/2024 3.3 (L) 3.5 - 5.2 g/dL Final      Magnesium No results found for: \"MG\"   Uric Acid No results found for: \"URICACID\"     Medication Review:     Current Facility-Administered Medications:     acetaminophen (TYLENOL) tablet 650 mg, 650 mg, Oral, Q4H PRN, Lilo Gastelum APRN, 650 mg at 12/21/24 1202    apixaban (ELIQUIS) tablet 2.5 mg, 2.5 mg, Oral, BID, Ryann Bonilla APRN, 2.5 mg at 12/21/24 2112    atorvastatin (LIPITOR) tablet 40 mg, 40 mg, Oral, Daily, Ryann Bonilla APRN, 40 mg at 12/21/24 0821    sennosides-docusate (PERICOLACE) 8.6-50 MG per tablet 2 tablet, 2 tablet, Oral, BID PRN, 2 tablet at 12/21/24 1202 **AND** polyethylene glycol (MIRALAX) packet 17 g, 17 g, Oral, Daily PRN **AND** bisacodyl (DULCOLAX) EC tablet 5 mg, 5 mg, Oral, Daily PRN **AND** bisacodyl (DULCOLAX) suppository 10 mg, 10 mg, Rectal, Daily PRN, Lilo Gastelum, APRN    carvedilol (COREG) tablet 12.5 mg, 12.5 mg, Oral, BID With Meals, Ryann Bonilla APRYUKO, 12.5 mg at 12/21/24 1747    dextrose (D50W) (25 g/50 mL) IV injection 25 g, 25 g, Intravenous, Q15 Min PRN, Lilo Gastelum APRN    dextrose (GLUTOSE) oral gel 15 g, 15 g, Oral, Q15 Min PRN, Lilo Gastelum APRN    furosemide (LASIX) injection 40 mg, 40 mg, Intravenous, Q12H, Hailey Barros MD, 40 mg at 12/21/24 2132    glucagon (GLUCAGEN) injection 1 mg, 1 mg, Intramuscular, Q15 Min PRN, Lilo Gastelum APRN    insulin lispro (HUMALOG/ADMELOG) injection 2-7 Units, 2-7 Units, Subcutaneous, 4x Daily AC & at Bedtime, Lilo Gastelum APRN, 2 Units at 12/21/24 1202    isosorbide " dinitrate (ISORDIL) tablet 10 mg, 10 mg, Oral, TID - Nitrates, Ryann Bonilla, APRN, 10 mg at 12/21/24 1747    Lidocaine 4 % 1 patch, 1 patch, Transdermal, Daily PRN, Ryann Bonilla, APRN, 1 patch at 12/21/24 0111    nitroglycerin (NITROSTAT) SL tablet 0.4 mg, 0.4 mg, Sublingual, Q5 Min PRN, Lilo Gastelum APRN    ondansetron ODT (ZOFRAN-ODT) disintegrating tablet 4 mg, 4 mg, Oral, Q6H PRN **OR** ondansetron (ZOFRAN) injection 4 mg, 4 mg, Intravenous, Q6H PRN, Lilo Gastelum APRN    sodium chloride 0.9 % flush 10 mL, 10 mL, Intravenous, PRN, Alexei Prakash MD    venlafaxine XR (EFFEXOR-XR) 24 hr capsule 75 mg, 75 mg, Oral, Daily, Ryann Bonilla, APRN, 75 mg at 12/21/24 0821     Assessment & Plan       CHF exacerbation    Chronic kidney disease, stage IV (severe)    Iron deficiency anemia    PAF (paroxysmal atrial fibrillation)    HLD (hyperlipidemia)    Type 2 diabetes mellitus      Assessment & Plan  PERLA  CKD4  CHF  Volume overload   Anemia of CKD     Cr up a bit again  She looks euvolemic on exam.   Will DC IV lasix and restart her PO dose in am.   She might need BID dose instead of daily given her admissions with decompensated HF.   Electrolytes ok   Will follow       Hailey Barros MD  12/22/24  08:21 EST

## 2024-12-22 NOTE — PROGRESS NOTES
Name: Lowell Min ADMIT: 2024   : 1940  PCP: Dannie Mathews MD    MRN: [id number] LOS: 0 days   AGE/SEX: 84 y.o. female  ROOM: [facility]     Subjective   Subjective   Resting in her recliner, complaining of some back pain related to the bed/recliner.  She just took some Tylenol and is expecting that that will kick in soon.  Otherwise feeling better, swelling less.       Objective   Objective   Vital Signs  Temp:  [97.7 °F (36.5 °C)-97.9 °F (36.6 °C)] 97.9 °F (36.6 °C)  Heart Rate:  [64-80] 68  Resp:  [16-18] 18  BP: (126-134)/(70-84) 126/71  SpO2:  [96 %-98 %] 97 %  on   ;   Device (Oxygen Therapy): room air  Body mass index is 31.53 kg/m².  Physical Exam  Constitutional:       General: She is not in acute distress.     Appearance: She is ill-appearing.   Cardiovascular:      Rate and Rhythm: Normal rate and regular rhythm.   Pulmonary:      Effort: Pulmonary effort is normal. No respiratory distress.      Breath sounds: Normal breath sounds. No wheezing.   Abdominal:      General: Abdomen is flat.      Palpations: Abdomen is soft.      Tenderness: There is no abdominal tenderness.   Musculoskeletal:         General: No swelling or tenderness.      Right lower leg: Edema present.      Left lower leg: Edema present.   Skin:     General: Skin is warm and dry.   Neurological:      General: No focal deficit present.      Mental Status: She is alert and oriented to person, place, and time. Mental status is at baseline.         Results Review     I reviewed the patient's new clinical results.  Results from last 7 days   Lab Units 24  0620 24  0522 24  1635   WBC 10*3/mm3 4.33 3.54 4.97   HEMOGLOBIN g/dL 9.4* 9.4* 9.9*   PLATELETS 10*3/mm3 127* 123* 147     Results from last 7 days   Lab Units 24  0620 24  0522 24  1635   SODIUM mmol/L 141 138 137   POTASSIUM mmol/L 3.9 3.7 4.1   CHLORIDE mmol/L 102 105 105   CO2 mmol/L 22.3 23.1 21.3*   BUN mg/dL 40* 38* 36*    CREATININE mg/dL 2.77* 2.49* 2.80*   GLUCOSE mg/dL 149* 132* 109*   Estimated Creatinine Clearance: 17.5 mL/min (A) (by C-G formula based on SCr of 2.77 mg/dL (H)).  Results from last 7 days   Lab Units 12/22/24  0620 12/20/24  1635   ALBUMIN g/dL 2.9* 3.3*   BILIRUBIN mg/dL  --  1.0   ALK PHOS U/L  --  200*   AST (SGOT) U/L  --  18   ALT (SGPT) U/L  --  8     Results from last 7 days   Lab Units 12/22/24  0620 12/21/24  0522 12/20/24  1635   CALCIUM mg/dL 8.2* 9.0 9.4   ALBUMIN g/dL 2.9*  --  3.3*   PHOSPHORUS mg/dL 3.3  --   --        COVID19   Date Value Ref Range Status   04/03/2022 Not Detected Not Detected - Ref. Range Final     Hemoglobin A1C   Date/Time Value Ref Range Status   12/20/2024 1635 5.80 (H) 4.80 - 5.60 % Final     Glucose   Date/Time Value Ref Range Status   12/22/2024 1207 161 (H) 70 - 130 mg/dL Final   12/22/2024 0831 127 70 - 130 mg/dL Final   12/22/2024 0615 157 (H) 70 - 130 mg/dL Final   12/21/2024 2014 149 (H) 70 - 130 mg/dL Final   12/21/2024 1658 123 70 - 130 mg/dL Final   12/21/2024 1146 181 (H) 70 - 130 mg/dL Final   12/21/2024 0756 112 70 - 130 mg/dL Final       XR Chest 1 View  Narrative: XR CHEST 1 VW-        INDICATION: Shortness of air     COMPARISON: Chest radiograph October 18, 2024     TECHNIQUE: 1 view chest     FINDINGS:      Vascular congestion. Small amount of subsegmental atelectasis at the  bases. No effusions. Stable mediastinum. Enlarged cardiac silhouette.  Calcified left hilar lymph nodes, consistent with prior granulomatous  infection.     Impression: Cardiomegaly with vascular congestion, without edema     This report was finalized on 12/20/2024 4:54 PM by Dr. Michael Peña M.D on Workstation: [id number]       Scheduled Medications  apixaban, 2.5 mg, Oral, BID  atorvastatin, 40 mg, Oral, Daily  carvedilol, 12.5 mg, Oral, BID With Meals  [START ON 12/23/2024] furosemide, 40 mg, Oral, Daily  insulin lispro, 2-7 Units, Subcutaneous, 4x Daily AC & at  Bedtime  isosorbide dinitrate, 10 mg, Oral, TID - Nitrates  venlafaxine XR, 75 mg, Oral, Daily    Infusions   Diet  Diet: Cardiac; Healthy Heart (2-3 Na+); Fluid Consistency: Thin (IDDSI 0)         I have personally reviewed:  [x]  Laboratory   []  Microbiology   []  Radiology   [x]  EKG/Telemetry   []  Cardiology/Vascular   []  Pathology   []  Records    Assessment/Plan     Active Hospital Problems    Diagnosis  POA    **CHF exacerbation [I50.9]  Yes    Type 2 diabetes mellitus [E11.9]  Yes    HLD (hyperlipidemia) [E78.5]  Yes    PAF (paroxysmal atrial fibrillation) [I48.0]  Yes    Iron deficiency anemia [D50.9]  Yes    Chronic kidney disease, stage IV (severe) [N18.4]  Yes      Resolved Hospital Problems   No resolved problems to display.       84 y.o. female admitted with CHF exacerbation.    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  Full code.  Discussed with patient and nursing staff.  Anticipated discharge home with HH vs SNF, TBD        Amrita Coombs MD  [facility]  12/22/24  12:17 EST    I wore protective equipment throughout this patient encounter including gloves.  Hand hygiene was performed before donning protective equipment and after removal when leaving the room.         Copied text in this note has been reviewed and is accurate as of 12/22/24.

## 2024-12-22 NOTE — PLAN OF CARE
Problem: Adult Inpatient Plan of Care  Goal: Plan of Care Review  Outcome: Progressing  Flowsheets (Taken 12/22/2024 3439)  Progress: improving  Outcome Evaluation: Vitals stable. Room air. IV Lasix given this am - changing to PO tomorrow. Up to chair. Family visited. Plan of care ongoing.  Plan of Care Reviewed With: patient

## 2024-12-22 NOTE — PLAN OF CARE
Goal Outcome Evaluation:         Pt. Alert, oriented x4, no voiced c/o pain, v/s stable, 02 sats kept over 90% on room air, continued to checked Input and output due to treating of diuretic medicine, no s/s acute distress noted

## 2024-12-23 LAB
ALBUMIN SERPL-MCNC: 2.9 G/DL (ref 3.5–5.2)
ANION GAP SERPL CALCULATED.3IONS-SCNC: 10 MMOL/L (ref 5–15)
BUN SERPL-MCNC: 43 MG/DL (ref 8–23)
BUN/CREAT SERPL: 15.7 (ref 7–25)
CALCIUM SPEC-SCNC: 8.1 MG/DL (ref 8.6–10.5)
CHLORIDE SERPL-SCNC: 104 MMOL/L (ref 98–107)
CO2 SERPL-SCNC: 22 MMOL/L (ref 22–29)
CREAT SERPL-MCNC: 2.74 MG/DL (ref 0.57–1)
DEPRECATED RDW RBC AUTO: 51.7 FL (ref 37–54)
EGFRCR SERPLBLD CKD-EPI 2021: 16.6 ML/MIN/1.73
ERYTHROCYTE [DISTWIDTH] IN BLOOD BY AUTOMATED COUNT: 14.7 % (ref 12.3–15.4)
GLUCOSE BLDC GLUCOMTR-MCNC: 112 MG/DL (ref 70–130)
GLUCOSE BLDC GLUCOMTR-MCNC: 115 MG/DL (ref 70–130)
GLUCOSE BLDC GLUCOMTR-MCNC: 137 MG/DL (ref 70–130)
GLUCOSE BLDC GLUCOMTR-MCNC: 183 MG/DL (ref 70–130)
GLUCOSE SERPL-MCNC: 102 MG/DL (ref 65–99)
HCT VFR BLD AUTO: 30 % (ref 34–46.6)
HGB BLD-MCNC: 9.6 G/DL (ref 12–15.9)
MCH RBC QN AUTO: 31 PG (ref 26.6–33)
MCHC RBC AUTO-ENTMCNC: 32 G/DL (ref 31.5–35.7)
MCV RBC AUTO: 96.8 FL (ref 79–97)
PHOSPHATE SERPL-MCNC: 3.6 MG/DL (ref 2.5–4.5)
PLATELET # BLD AUTO: 136 10*3/MM3 (ref 140–450)
PMV BLD AUTO: 10 FL (ref 6–12)
POTASSIUM SERPL-SCNC: 4 MMOL/L (ref 3.5–5.2)
RBC # BLD AUTO: 3.1 10*6/MM3 (ref 3.77–5.28)
SODIUM SERPL-SCNC: 136 MMOL/L (ref 136–145)
WBC NRBC COR # BLD AUTO: 4.18 10*3/MM3 (ref 3.4–10.8)

## 2024-12-23 PROCEDURE — 63710000001 INSULIN LISPRO (HUMAN) PER 5 UNITS

## 2024-12-23 PROCEDURE — 99214 OFFICE O/P EST MOD 30 MIN: CPT | Performed by: NURSE PRACTITIONER

## 2024-12-23 PROCEDURE — 80069 RENAL FUNCTION PANEL: CPT | Performed by: STUDENT IN AN ORGANIZED HEALTH CARE EDUCATION/TRAINING PROGRAM

## 2024-12-23 PROCEDURE — 82948 REAGENT STRIP/BLOOD GLUCOSE: CPT

## 2024-12-23 PROCEDURE — 85027 COMPLETE CBC AUTOMATED: CPT | Performed by: STUDENT IN AN ORGANIZED HEALTH CARE EDUCATION/TRAINING PROGRAM

## 2024-12-23 PROCEDURE — 97116 GAIT TRAINING THERAPY: CPT

## 2024-12-23 PROCEDURE — 87481 CANDIDA DNA AMP PROBE: CPT | Performed by: STUDENT IN AN ORGANIZED HEALTH CARE EDUCATION/TRAINING PROGRAM

## 2024-12-23 PROCEDURE — 25010000002 ONDANSETRON PER 1 MG

## 2024-12-23 RX ORDER — FUROSEMIDE 40 MG/1
40 TABLET ORAL
Status: DISCONTINUED | OUTPATIENT
Start: 2024-12-23 | End: 2024-12-24

## 2024-12-23 RX ADMIN — APIXABAN 2.5 MG: 2.5 TABLET, FILM COATED ORAL at 20:37

## 2024-12-23 RX ADMIN — LIDOCAINE 1 PATCH: 4 PATCH TOPICAL at 08:25

## 2024-12-23 RX ADMIN — INSULIN LISPRO 2 UNITS: 100 INJECTION, SOLUTION INTRAVENOUS; SUBCUTANEOUS at 12:03

## 2024-12-23 RX ADMIN — ACETAMINOPHEN 650 MG: 325 TABLET ORAL at 04:51

## 2024-12-23 RX ADMIN — ACETAMINOPHEN 650 MG: 325 TABLET ORAL at 20:37

## 2024-12-23 RX ADMIN — ATORVASTATIN CALCIUM 40 MG: 20 TABLET, FILM COATED ORAL at 08:43

## 2024-12-23 RX ADMIN — ONDANSETRON 4 MG: 2 INJECTION, SOLUTION INTRAMUSCULAR; INTRAVENOUS at 13:33

## 2024-12-23 RX ADMIN — ACETAMINOPHEN 650 MG: 325 TABLET ORAL at 08:47

## 2024-12-23 RX ADMIN — CARVEDILOL 12.5 MG: 12.5 TABLET, FILM COATED ORAL at 18:01

## 2024-12-23 RX ADMIN — ISOSORBIDE DINITRATE 10 MG: 20 TABLET ORAL at 18:01

## 2024-12-23 RX ADMIN — VENLAFAXINE HYDROCHLORIDE 75 MG: 75 CAPSULE, EXTENDED RELEASE ORAL at 08:43

## 2024-12-23 RX ADMIN — ISOSORBIDE DINITRATE 10 MG: 20 TABLET ORAL at 13:33

## 2024-12-23 RX ADMIN — FUROSEMIDE 40 MG: 40 TABLET ORAL at 08:43

## 2024-12-23 RX ADMIN — ISOSORBIDE DINITRATE 10 MG: 20 TABLET ORAL at 08:42

## 2024-12-23 RX ADMIN — APIXABAN 2.5 MG: 2.5 TABLET, FILM COATED ORAL at 08:43

## 2024-12-23 RX ADMIN — FUROSEMIDE 40 MG: 40 TABLET ORAL at 18:01

## 2024-12-23 RX ADMIN — SENNOSIDES AND DOCUSATE SODIUM 2 TABLET: 50; 8.6 TABLET ORAL at 10:09

## 2024-12-23 RX ADMIN — CARVEDILOL 12.5 MG: 12.5 TABLET, FILM COATED ORAL at 08:43

## 2024-12-23 NOTE — PROGRESS NOTES
[facility]  Cardiology Progress Note    Patient Identification:  Name: Lowell Min  Age: 84 y.o.  Sex: female  :  1940  MRN: [id number]                 Follow Up / Chief Complaint: Follow-up for shortness of breath    Interval History: Resting in bed. Shortness of breath has improved. No cp         Objective:    Past Medical History:  Past Medical History:   Diagnosis Date    Anxiety     Atrial fibrillation     CHF (congestive heart failure)     Chronic kidney disease, stage IV (severe)     Depression     Elevated cholesterol     Hypertension     Type 2 diabetes mellitus      Past Surgical History:  Past Surgical History:   Procedure Laterality Date    APPENDECTOMY      CHOLECYSTECTOMY      COLONOSCOPY      CYSTOSCOPY BOTOX INJECTION OF BLADDER N/A     HYSTERECTOMY      TUMOR REMOVAL Left     benign tumor from left breast        Social History:   Social History     Tobacco Use    Smoking status: Never     Passive exposure: Never    Smokeless tobacco: Never   Substance Use Topics    Alcohol use: Never      Family History:  Family History   Problem Relation Age of Onset    Heart disease Mother           Allergies:  Allergies   Allergen Reactions    Ibuprofen Other (See Comments)     Decrease urine output       Scheduled Meds:  apixaban, 2.5 mg, BID  atorvastatin, 40 mg, Daily  carvedilol, 12.5 mg, BID With Meals  furosemide, 40 mg, BID Diuretics  insulin lispro, 2-7 Units, 4x Daily AC & at Bedtime  isosorbide dinitrate, 10 mg, TID - Nitrates  venlafaxine XR, 75 mg, Daily            INTAKE AND OUTPUT:    Intake/Output Summary (Last 24 hours) at 2024 1205  Last data filed at 2024 0931  Gross per 24 hour   Intake 1020 ml   Output 950 ml   Net 70 ml       Review of Systems:   GI: No nausea or vomiting  Cardiac: No chest pain  Pulmonary: Improvement with shortness of breath    Constitutional:  Temp:  [97.3 °F (36.3 °C)-97.7 °F (36.5 °C)] 97.7 °F (36.5 °C)  Heart Rate:  [56-74]  74  Resp:  [18] 18  BP: (117-146)/(65-88) 134/88      Physical Exam:  General:  Appears in no acute distress, resting in bed  Eyes: eom normal no conjunctival drainage  HEENT:  No JVD. Thyroid not visibly enlarged. No mucosal pallor or cyanosis  Respiratory: Respirations regular and unlabored at rest. BBS with good air entry in all fields. No crackles, rubs or wheezes auscultated  Cardiovascular: S1S2 irregularly irregular rhythm. No murmur, rub or gallop. No carotid bruits. DP/PT pulses     . No pretibial pitting edema  Gastrointestinal: Abdomen soft, flat, non tender. Bowel sounds present. No hepatosplenomegaly. No ascites  Skin:   Skin warm and dry to touch. No rashes    Neuro: AAO x3 CN II-XII grossly intact  Psych: Mood and affect normal, pleasant and cooperative            I reviewed the patient's new clinical results, and personally reviewed and interpreted the patient's ECG and telemetry data from the last 24 hours      Cardiographics       ECG: A-fib with LBBB    Echocardiogram:   9/7/24    Interpretation Summary         Left ventricular systolic function is mildly decreased. Calculated left ventricular EF = 44.1%    The following left ventricular wall segments are hypokinetic: mid anterior, apical anterior, basal anterolateral, mid anterolateral, apical lateral, basal inferolateral, mid inferolateral, apical inferior, mid inferior, apical septal, basal inferoseptal, mid inferoseptal, apex hypokinetic, mid anteroseptal, basal anterior, basal inferior and basal inferoseptal.    Left ventricular diastolic function was indeterminate.    The left atrial cavity is severely dilated.    Mild aortic valve stenosis is present. Aortic valve area is 1.2 cm2.    The right atrial cavity is severely  dilated.    Peak velocity of the flow distal to the aortic valve is 246.1 cm/s. Aortic valve mean pressure gradient is 11 mmHg.    Severe mitral valve regurgitation is present.    Mild mitral valve stenosis is present. The  "mitral valve mean gradient is 4 mmHg.    Severe tricuspid valve regurgitation is present.    Estimated right ventricular systolic pressure from tricuspid regurgitation is markedly elevated (>55 mmHg). Calculated right ventricular systolic pressure from tricuspid regurgitation is 62 mmHg.  Lab Review   Results from last 7 days   Lab Units 12/20/24  1746 12/20/24  1635   HSTROP T ng/L 50* 48*         Results from last 7 days   Lab Units 12/23/24  0450   SODIUM mmol/L 136   POTASSIUM mmol/L 4.0   BUN mg/dL 43*   CREATININE mg/dL 2.74*   CALCIUM mg/dL 8.1*     @[email or web address]@  Results from last 7 days   Lab Units 12/23/24  0450 12/22/24  0620 12/21/24  0522   WBC 10*3/mm3 4.18 4.33 3.54   HEMOGLOBIN g/dL 9.6* 9.4* 9.4*   HEMATOCRIT % 30.0* 28.8* 29.3*   PLATELETS 10*3/mm3 136* 127* 123*         CHADS-VASc Risk Assessment               6 Total Score    1 CHF    1 Hypertension    2 Age >/= 75    1 DM    1 Sex: Female    Criteria that do not apply:    PRIOR STROKE/TIA/THROMBO    Vascular Disease    Age 65-74                Assessment:  Acute on chronic heart failure with moderate to slightly reduced EF.  Persistent atrial fib  Acute on chronic chronic kidney injury  LBBB  Anemia  Thrombocytopenia      Plan:  -Blood pressure and HR stable.  -Nephrology following and diuresing. Euvolemic on exam. Now on po diuretic twice daily. Discussed monitoring sodium intake with patient.   -She has declined further workup.    )12/23/2024  RAMAKRISHNA Flannery/Transcription:   \"Dictated utilizing Dragon dictation\".     "

## 2024-12-23 NOTE — PLAN OF CARE
Problem: Adult Inpatient Plan of Care  Goal: Plan of Care Review  Outcome: Progressing  Flowsheets (Taken 12/23/2024 5747)  Progress: improving  Outcome Evaluation: Vitals stable. c/o of back pain - prn tylenol, lidocaine patch and heating pad applied. Started PO lasix today. Worked with PT today, up to chair. Hopeful to discharge home tomorrow with home health. Plan of care ongoing.  Plan of Care Reviewed With: patient

## 2024-12-23 NOTE — PROGRESS NOTES
Name: Lowell Min ADMIT: 2024   : 1940  PCP: Dannie Mathews MD    MRN: [id number] LOS: 1 days   AGE/SEX: 84 y.o. female  ROOM: [facility]     Subjective   Subjective   Resting in her recliner, complaining of some back pain related to the bed/recliner.  Her lower extremity edema is better. D/w RN about getting heating pad for back. Now on po diuretics.        Objective   Objective   Vital Signs  Temp:  [97.3 °F (36.3 °C)-97.7 °F (36.5 °C)] 97.7 °F (36.5 °C)  Heart Rate:  [56-74] 74  Resp:  [18] 18  BP: (117-146)/(65-88) 134/88  SpO2:  [93 %-100 %] 93 %  on  Flow (L/min) (Oxygen Therapy):  [2] 2;   Device (Oxygen Therapy): room air  Body mass index is 31.48 kg/m².  Physical Exam  Constitutional:       General: She is not in acute distress.     Appearance: She is ill-appearing.   Cardiovascular:      Rate and Rhythm: Normal rate and regular rhythm.   Pulmonary:      Effort: Pulmonary effort is normal. No respiratory distress.      Breath sounds: Normal breath sounds. No wheezing.   Abdominal:      General: Abdomen is flat.      Palpations: Abdomen is soft.      Tenderness: There is no abdominal tenderness.   Musculoskeletal:         General: No swelling or tenderness.      Right lower leg: Edema present.      Left lower leg: Edema present.   Skin:     General: Skin is warm and dry.   Neurological:      General: No focal deficit present.      Mental Status: She is alert and oriented to person, place, and time. Mental status is at baseline.         Results Review     I reviewed the patient's new clinical results.  Results from last 7 days   Lab Units 24  0450 24  0620 24  0524  1635   WBC 10*3/mm3 4.18 4.33 3.54 4.97   HEMOGLOBIN g/dL 9.6* 9.4* 9.4* 9.9*   PLATELETS 10*3/mm3 136* 127* 123* 147     Results from last 7 days   Lab Units 24  0450 24  0620 24  0522 24  1635   SODIUM mmol/L 136 141 138 137   POTASSIUM mmol/L 4.0 3.9 3.7 4.1   CHLORIDE mmol/L  104 102 105 105   CO2 mmol/L 22.0 22.3 23.1 21.3*   BUN mg/dL 43* 40* 38* 36*   CREATININE mg/dL 2.74* 2.77* 2.49* 2.80*   GLUCOSE mg/dL 102* 149* 132* 109*   Estimated Creatinine Clearance: 17.7 mL/min (A) (by C-G formula based on SCr of 2.74 mg/dL (H)).  Results from last 7 days   Lab Units 12/23/24  0450 12/22/24  0620 12/20/24  1635   ALBUMIN g/dL 2.9* 2.9* 3.3*   BILIRUBIN mg/dL  --   --  1.0   ALK PHOS U/L  --   --  200*   AST (SGOT) U/L  --   --  18   ALT (SGPT) U/L  --   --  8     Results from last 7 days   Lab Units 12/23/24  0450 12/22/24  0620 12/21/24  0522 12/20/24  1635   CALCIUM mg/dL 8.1* 8.2* 9.0 9.4   ALBUMIN g/dL 2.9* 2.9*  --  3.3*   PHOSPHORUS mg/dL 3.6 3.3  --   --        COVID19   Date Value Ref Range Status   04/03/2022 Not Detected Not Detected - Ref. Range Final     Hemoglobin A1C   Date/Time Value Ref Range Status   12/20/2024 1635 5.80 (H) 4.80 - 5.60 % Final     Glucose   Date/Time Value Ref Range Status   12/23/2024 1150 183 (H) 70 - 130 mg/dL Final   12/23/2024 0800 112 70 - 130 mg/dL Final   12/22/2024 2037 158 (H) 70 - 130 mg/dL Final   12/22/2024 1658 116 70 - 130 mg/dL Final   12/22/2024 1207 161 (H) 70 - 130 mg/dL Final   12/22/2024 0831 127 70 - 130 mg/dL Final   12/22/2024 0615 157 (H) 70 - 130 mg/dL Final       XR Chest 1 View  Narrative: XR CHEST 1 VW-        INDICATION: Shortness of air     COMPARISON: Chest radiograph October 18, 2024     TECHNIQUE: 1 view chest     FINDINGS:      Vascular congestion. Small amount of subsegmental atelectasis at the  bases. No effusions. Stable mediastinum. Enlarged cardiac silhouette.  Calcified left hilar lymph nodes, consistent with prior granulomatous  infection.     Impression: Cardiomegaly with vascular congestion, without edema     This report was finalized on 12/20/2024 4:54 PM by Dr. Michael Peña M.D on Workstation: [id number]       Scheduled Medications  apixaban, 2.5 mg, Oral, BID  atorvastatin, 40 mg, Oral,  Daily  carvedilol, 12.5 mg, Oral, BID With Meals  furosemide, 40 mg, Oral, BID Diuretics  insulin lispro, 2-7 Units, Subcutaneous, 4x Daily AC & at Bedtime  isosorbide dinitrate, 10 mg, Oral, TID - Nitrates  venlafaxine XR, 75 mg, Oral, Daily    Infusions   Diet  Diet: Cardiac; Healthy Heart (2-3 Na+); Fluid Consistency: Thin (IDDSI 0)         I have personally reviewed:  [x]  Laboratory   []  Microbiology   []  Radiology   [x]  EKG/Telemetry   []  Cardiology/Vascular   []  Pathology   []  Records    Assessment/Plan     Active Hospital Problems    Diagnosis  POA    **CHF exacerbation [I50.9]  Yes    Type 2 diabetes mellitus [E11.9]  Yes    HLD (hyperlipidemia) [E78.5]  Yes    PAF (paroxysmal atrial fibrillation) [I48.0]  Yes    Iron deficiency anemia [D50.9]  Yes    Chronic kidney disease, stage IV (severe) [N18.4]  Yes      Resolved Hospital Problems   No resolved problems to display.       84 y.o. female admitted with CHF exacerbation.    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  Full code.  Discussed with patient and nursing staff.  Anticipated discharge home with HH vs SNF, TBD    Patient walked 30' with PT a few days ago but was shaky- may benefit from SNF- PT to see today.    Amrita Coombs MD  [facility]  12/23/24  11:56 EST    I wore protective equipment throughout this patient encounter including  gloves.  Hand hygiene was performed before donning protective equipment and after removal when leaving the room.         Copied text in this note has been reviewed and is accurate as of 12/23/24.

## 2024-12-23 NOTE — PROGRESS NOTES
" LOS: 1 day   Patient Care Team:  Dannie Mathews MD as PCP - General (Internal Medicine)    Chief Complaint: CHF, CKD4      History of Present Illness  Patient is a 85 yo female with advance CKD followed by Dr. HARRY Foster who presented to ED with SANDOVAL and lower extremity edema.   She has discussed with PCP who advised to increase lasix dose, but this did not improve her symptoms.   She was give IV lasix overnight and plans for another dose this am.      Her Cr on arrival was 2.8 and it is down to 2.5 this am which is her baseline.       Subjective  Overall feeling better. Still having back pain, which seems to be what it bothering her the most.     History taken from: patient chart    Objective     Vital Sign Min/Max for last 24 hours  Temp  Min: 97.3 °F (36.3 °C)  Max: 97.9 °F (36.6 °C)   BP  Min: 117/65  Max: 146/79   Pulse  Min: 56  Max: 80   Resp  Min: 18  Max: 18   SpO2  Min: 95 %  Max: 100 %   Flow (L/min) (Oxygen Therapy)  Min: 2  Max: 2   Weight  Min: 91.2 kg (201 lb)  Max: 91.2 kg (201 lb)     Flowsheet Rows      Flowsheet Row First Filed Value   Admission Height 170.2 cm (67\") Documented at 12/20/2024 1720   Admission Weight 90.3 kg (199 lb) Documented at 12/20/2024 1720            I/O this shift:  In: 0   Out: 400 [Urine:400]  I/O last 3 completed shifts:  In: 1980 [P.O.:1980]  Out: 3700 [Urine:3700]    Objective:  Vital signs: (most recent): Blood pressure 134/88, pulse 74, temperature 97.7 °F (36.5 °C), temperature source Oral, resp. rate 18, height 170.2 cm (67\"), weight 91.2 kg (201 lb), SpO2 93%.            Physical Examination: General appearance - alert, well appearing, and in no distress  Mental status - alert, oriented to person, place, and time  Chest - clear to auscultation, no wheezes, rales or rhonchi, symmetric air entry  Heart - normal rate, regular rhythm, normal S1, S2, no murmurs, rubs, clicks or gallops  Abdomen - soft, nontender, nondistended, no masses or organomegaly  Neurological - " "alert, oriented, normal speech, no focal findings or movement disorder noted  Extremities - peripheral pulses normal, no pedal edema, no clubbing or cyanosis     Results Review:     I reviewed the patient's new clinical results.    WBC WBC   Date Value Ref Range Status   12/23/2024 4.18 3.40 - 10.80 10*3/mm3 Final   12/22/2024 4.33 3.40 - 10.80 10*3/mm3 Final   12/21/2024 3.54 3.40 - 10.80 10*3/mm3 Final   12/20/2024 4.97 3.40 - 10.80 10*3/mm3 Final      HGB Hemoglobin   Date Value Ref Range Status   12/23/2024 9.6 (L) 12.0 - 15.9 g/dL Final   12/22/2024 9.4 (L) 12.0 - 15.9 g/dL Final   12/21/2024 9.4 (L) 12.0 - 15.9 g/dL Final   12/20/2024 9.9 (L) 12.0 - 15.9 g/dL Final      HCT Hematocrit   Date Value Ref Range Status   12/23/2024 30.0 (L) 34.0 - 46.6 % Final   12/22/2024 28.8 (L) 34.0 - 46.6 % Final   12/21/2024 29.3 (L) 34.0 - 46.6 % Final   12/20/2024 31.2 (L) 34.0 - 46.6 % Final      Platlets No results found for: \"LABPLAT\"   MCV MCV   Date Value Ref Range Status   12/23/2024 96.8 79.0 - 97.0 fL Final   12/22/2024 93.2 79.0 - 97.0 fL Final   12/21/2024 93.3 79.0 - 97.0 fL Final   12/20/2024 96.0 79.0 - 97.0 fL Final          Sodium Sodium   Date Value Ref Range Status   12/23/2024 136 136 - 145 mmol/L Final   12/22/2024 141 136 - 145 mmol/L Final   12/21/2024 138 136 - 145 mmol/L Final   12/20/2024 137 136 - 145 mmol/L Final      Potassium Potassium   Date Value Ref Range Status   12/23/2024 4.0 3.5 - 5.2 mmol/L Final   12/22/2024 3.9 3.5 - 5.2 mmol/L Final   12/21/2024 3.7 3.5 - 5.2 mmol/L Final   12/20/2024 4.1 3.5 - 5.2 mmol/L Final      Chloride Chloride   Date Value Ref Range Status   12/23/2024 104 98 - 107 mmol/L Final   12/22/2024 102 98 - 107 mmol/L Final   12/21/2024 105 98 - 107 mmol/L Final   12/20/2024 105 98 - 107 mmol/L Final      CO2 CO2   Date Value Ref Range Status   12/23/2024 22.0 22.0 - 29.0 mmol/L Final   12/22/2024 22.3 22.0 - 29.0 mmol/L Final   12/21/2024 23.1 22.0 - 29.0 mmol/L Final " "  12/20/2024 21.3 (L) 22.0 - 29.0 mmol/L Final      BUN BUN   Date Value Ref Range Status   12/23/2024 43 (H) 8 - 23 mg/dL Final   12/22/2024 40 (H) 8 - 23 mg/dL Final   12/21/2024 38 (H) 8 - 23 mg/dL Final   12/20/2024 36 (H) 8 - 23 mg/dL Final      Creatinine Creatinine   Date Value Ref Range Status   12/23/2024 2.74 (H) 0.57 - 1.00 mg/dL Final   12/22/2024 2.77 (H) 0.57 - 1.00 mg/dL Final   12/21/2024 2.49 (H) 0.57 - 1.00 mg/dL Final   12/20/2024 2.80 (H) 0.57 - 1.00 mg/dL Final      Calcium Calcium   Date Value Ref Range Status   12/23/2024 8.1 (L) 8.6 - 10.5 mg/dL Final   12/22/2024 8.2 (L) 8.6 - 10.5 mg/dL Final   12/21/2024 9.0 8.6 - 10.5 mg/dL Final   12/20/2024 9.4 8.6 - 10.5 mg/dL Final      PO4 No results found for: \"CAPO4\"   Albumin Albumin   Date Value Ref Range Status   12/23/2024 2.9 (L) 3.5 - 5.2 g/dL Final   12/22/2024 2.9 (L) 3.5 - 5.2 g/dL Final   12/20/2024 3.3 (L) 3.5 - 5.2 g/dL Final      Magnesium No results found for: \"MG\"   Uric Acid No results found for: \"URICACID\"     Medication Review:     Current Facility-Administered Medications:     acetaminophen (TYLENOL) tablet 650 mg, 650 mg, Oral, Q4H PRN, Lilo Gastelum APRN, 650 mg at 12/23/24 0451    apixaban (ELIQUIS) tablet 2.5 mg, 2.5 mg, Oral, BID, Ryann Bonilla APRN, 2.5 mg at 12/22/24 0957    atorvastatin (LIPITOR) tablet 40 mg, 40 mg, Oral, Daily, Ryann Bonilla, APRN, 40 mg at 12/22/24 0843    sennosides-docusate (PERICOLACE) 8.6-50 MG per tablet 2 tablet, 2 tablet, Oral, BID PRN, 2 tablet at 12/21/24 1202 **AND** polyethylene glycol (MIRALAX) packet 17 g, 17 g, Oral, Daily PRN **AND** bisacodyl (DULCOLAX) EC tablet 5 mg, 5 mg, Oral, Daily PRN **AND** bisacodyl (DULCOLAX) suppository 10 mg, 10 mg, Rectal, Daily PRN, Lilo Gastelum, APRN    carvedilol (COREG) tablet 12.5 mg, 12.5 mg, Oral, BID With Meals, Ryann Bonilla APRN, 12.5 mg at 12/22/24 1758    dextrose (D50W) (25 g/50 mL) IV injection 25 g, 25 g, " Intravenous, Q15 Min PRN, Lilo Gastelum APRN    dextrose (GLUTOSE) oral gel 15 g, 15 g, Oral, Q15 Min PRN, Lilo Gastelum APRN    furosemide (LASIX) tablet 40 mg, 40 mg, Oral, Daily, Hailey Barros MD    glucagon (GLUCAGEN) injection 1 mg, 1 mg, Intramuscular, Q15 Min PRN, Lilo Gastelum APRN    insulin lispro (HUMALOG/ADMELOG) injection 2-7 Units, 2-7 Units, Subcutaneous, 4x Daily AC & at Bedtime, Lilo Gastelum APRN, 2 Units at 12/22/24 2117    isosorbide dinitrate (ISORDIL) tablet 10 mg, 10 mg, Oral, TID - Nitrates, Ryann Bonilla APRN, 10 mg at 12/22/24 1758    Lidocaine 4 % 1 patch, 1 patch, Transdermal, Daily PRN, Ryann Bonilla APRN, 1 patch at 12/21/24 0111    nitroglycerin (NITROSTAT) SL tablet 0.4 mg, 0.4 mg, Sublingual, Q5 Min PRN, Lilo Gastelum APRN    ondansetron ODT (ZOFRAN-ODT) disintegrating tablet 4 mg, 4 mg, Oral, Q6H PRN **OR** ondansetron (ZOFRAN) injection 4 mg, 4 mg, Intravenous, Q6H PRN, Lilo Gastelum APRN    sodium chloride 0.9 % flush 10 mL, 10 mL, Intravenous, PRN, Alexei Prakash MD    venlafaxine XR (EFFEXOR-XR) 24 hr capsule 75 mg, 75 mg, Oral, Daily, Ryann Bonilla APRN, 75 mg at 12/22/24 0843     Assessment & Plan       CHF exacerbation    Chronic kidney disease, stage IV (severe)    Iron deficiency anemia    PAF (paroxysmal atrial fibrillation)    HLD (hyperlipidemia)    Type 2 diabetes mellitus      Assessment & Plan  PERLA  CKD4  CHF  Volume overload   Anemia of CKD     Cr stable today   She looks euvolemic on exam.   Change lasix to BID dose instead of daily given her admissions with decompensated HF.   Electrolytes ok   Will follow       Hailey Barros MD  12/23/24  06:59 EST

## 2024-12-23 NOTE — CASE MANAGEMENT/SOCIAL WORK
Discharge Planning Assessment  [facility]     Patient Name: Lowell Min  MRN: [id number]  Today's Date: 12/23/2024    Admit Date: 12/20/2024    Plan: Home w/ current HH, family to transport   Discharge Needs Assessment       Row Name 12/23/24 1402       Living Environment    People in Home alone    Current Living Arrangements home    Primary Care Provided by self    Provides Primary Care For no one    Family Caregiver if Needed child(yashira), adult    Able to Return to Prior Arrangements yes       Resource/Environmental Concerns    Resource/Environmental Concerns home accessibility    Home Accessibility Concerns stairs to enter home       Transition Planning    Patient/Family Anticipates Transition to home with help/services    Patient/Family Anticipated Services at Transition home health care    Transportation Anticipated family or friend will provide       Discharge Needs Assessment    Equipment Currently Used at Home walker, rolling;wheelchair;shower chair    Discharge Facility/Level of Care Needs home with home health                   Discharge Plan       Row Name 12/23/24 1403       Plan    Plan Home w/ current HH, family to transport    Plan Comments CCP spoke with patient at bedside; explained role, verified facesheet, and discussed dc plan. Patient uses a walker, wheelchair, scale, and shower chair at home. She lives alone in a 2 level home with 11steps to enter her front door. She reports she is current with a HH but cannot recall which agency. She has been to [facility] in the past. Patient is denying need for rehab x2. She states the plan is home with HH. She states her daughter will be able to help her get up the steps and into her home. CCP asked if daughter could stay with patient for a few days at dc but patient states that is not necessary. Plan at this time is home with current HH, family to transport. MONRO[place], MYKE                  Continued Care and Services - Admitted Since 12/20/2024    No  active coordination exists for this encounter.       Selected Continued Care - Prior Encounters Includes continued care and service providers with selected services from prior encounters from 9/21/2024 to 12/23/2024      Discharged on 10/26/2024 Admission date: 10/18/2024 - Discharge disposition: Skilled Nursing Facility (DC - External)      Destination       Service Provider Services Address Phone Fax Patient Preferred    [facility] [address] [id number] [phone] --                             Demographic Summary       Row Name 12/23/24 1402       General Information    Admission Type inpatient    Arrived From home    Referral Source admission list    Reason for Consult discharge planning    Preferred Language English                   Functional Status       Row Name 12/23/24 1402       Functional Status    Usual Activity Tolerance moderate    Current Activity Tolerance fair       Functional Status, IADL    Medications assistive equipment    Meal Preparation assistive equipment    Housekeeping assistive equipment    Laundry assistive equipment    Shopping assistive equipment       Mental Status    General Appearance WDL WDL                   Psychosocial    No documentation.                  Abuse/Neglect    No documentation.                  Legal    No documentation.                  Substance Abuse    No documentation.                  Patient Forms    No documentation.                     MYKE Dawn

## 2024-12-23 NOTE — CONSULTS
Chap visited pt.  Pt indicated she is not feeling well today and expressed hopefulness for getting home in time for [place].  Pt has support through family. Chap provided prayer support and supportive presence.  Pt expressed thanks for the visit.  Chap remains available.

## 2024-12-23 NOTE — PLAN OF CARE
Goal Outcome Evaluation:  Plan of Care Reviewed With: patient        Progress: improving  Outcome Evaluation: Pt tolerated treatment well this date. Required SBA to stand from chair, then CGA to ambulate. Pt ambulated 60ft w/ Rw and CGA for safety. Limited d/t fatigue. Encouraged pt to ambulate w/ nsg during the day.    Anticipated Discharge Disposition (PT): home with home health

## 2024-12-23 NOTE — THERAPY TREATMENT NOTE
Patient Name: Lowell Min  : 1940    MRN: [id number]                              Today's Date: 2024       Admit Date: 2024    Visit Dx:     ICD-10-CM ICD-9-CM   1. Acute on chronic congestive heart failure, unspecified heart failure type  I50.9 428.0   2. Chronic kidney disease, unspecified CKD stage  N18.9 585.9     Patient Active Problem List   Diagnosis    Chronic kidney disease, stage IV (severe)    Erythropoietin deficiency anemia    Symptomatic anemia    Anxiety associated with depression    Iron deficiency anemia    PAF (paroxysmal atrial fibrillation)    Shortness of breath    Chronic diastolic congestive heart failure    Diabetic polyneuropathy associated with type 2 diabetes mellitus    PERLA (acute kidney injury)    Foot pain, bilateral    Anemia of chronic renal failure    Acute on chronic diastolic CHF (congestive heart failure)    Obesity (BMI 30-39.9)    HLD (hyperlipidemia)    Calcium pyrophosphate deposition disease (CPPD)    Type 2 diabetes mellitus    Acute on chronic systolic CHF (congestive heart failure)    Chronic respiratory failure with hypoxia    CHF exacerbation     Past Medical History:   Diagnosis Date    Anxiety     Atrial fibrillation     CHF (congestive heart failure)     Chronic kidney disease, stage IV (severe)     Depression     Elevated cholesterol     Hypertension     Type 2 diabetes mellitus      Past Surgical History:   Procedure Laterality Date    APPENDECTOMY      CHOLECYSTECTOMY      COLONOSCOPY      CYSTOSCOPY BOTOX INJECTION OF BLADDER N/A     HYSTERECTOMY      TUMOR REMOVAL Left     benign tumor from left breast      General Information       Row Name 24 1530          Physical Therapy Time and Intention    Document Type therapy note (daily note)  -     Mode of Treatment physical therapy  -       Row Name 24 1537          General Information    Existing Precautions/Restrictions fall  -       Row Name 24 1536          Cognition     Orientation Status (Cognition) oriented x 3  -SM               User Key  (r) = Recorded By, (t) = Taken By, (c) = Cosigned By      Initials Name Provider Type    Yady Shaikh PTA Physical Therapist Assistant                   Mobility       Row Name 12/23/24 1538          Bed Mobility    Comment, (Bed Mobility) up in chair  -[facility] Name 12/23/24 1538          Sit-Stand Transfer    Sit-Stand [place] (Transfers) standby assist  -     Assistive Device (Sit-Stand Transfers) walker, front-wheeled  -[facility] Name 12/23/24 1538          Gait/Stairs (Locomotion)    [place] Level (Gait) contact guard  -     Assistive Device (Gait) walker, front-wheeled  -     Patient was able to Ambulate yes  -     Distance in Feet (Gait) 60  -SM     Deviations/Abnormal Patterns (Gait) guillermo decreased;stride length decreased  -     Bilateral Gait Deviations forward flexed posture  -               User Key  (r) = Recorded By, (t) = Taken By, (c) = Cosigned By      Initials Name Provider Type    Yady Shaikh PTA Physical Therapist Assistant                   Obj/Interventions       [facility] Name 12/23/24 1544          Motor Skills    Therapeutic Exercise --  seated AP and LAQ x10 reps  -               User Key  (r) = Recorded By, (t) = Taken By, (c) = Cosigned By      Initials Name Provider Type    Yady Shaikh PTA Physical Therapist Assistant                   Goals/Plan    No documentation.                  Clinical Impression       [facility] Name 12/23/24 1540          Pain    Pretreatment Pain Rating 2/10  -     Posttreatment Pain Rating 2/10  -     Pain Location back  -     Pain Side/Orientation lower  -     Pain Management Interventions exercise or physical activity utilized;positioning techniques utilized  -[facility] Name 12/23/24 154          Positioning and Restraints    Pre-Treatment Position sitting in chair/recliner  -     Post Treatment Position chair  -      In Chair reclined;call light within reach;encouraged to call for assist;exit alarm on  -               User Key  (r) = Recorded By, (t) = Taken By, (c) = Cosigned By      Initials Name Provider Type    Yady Shaikh PTA Physical Therapist Assistant                   Outcome Measures       Row Name 12/23/24 1546 12/23/24 0847       How much help from another person do you currently need...    Turning from your back to your side while in flat bed without using bedrails? 3  -SM 4  -MOY    Moving from lying on back to sitting on the side of a flat bed without bedrails? 3  -SM 4  -MOY    Moving to and from a bed to a chair (including a wheelchair)? 3  -SM 3  -MOY    Standing up from a chair using your arms (e.g., wheelchair, bedside chair)? 3  - 3  -MOY    Climbing 3-5 steps with a railing? 3  -SM 3  -MOY    To walk in hospital room? 3  -SM 3  -MOY    AM-PAC 6 Clicks Score (PT) 18  - 20  -MOY    Highest Level of Mobility Goal 6 --> Walk 10 steps or more  - 6 --> Walk 10 steps or more  -MOY      Row Name 12/23/24 1546          Functional Assessment    Outcome Measure Options AM-PAC 6 Clicks Basic Mobility (PT)  -               User Key  (r) = Recorded By, (t) = Taken By, (c) = Cosigned By      Initials Name Provider Type    Yady Shaikh PTA Physical Therapist Assistant    Marcia Sesay, RN Registered Nurse                                 Physical Therapy Education       Title: PT OT SLP Therapies (Done)       Topic: Physical Therapy (Done)       Point: Mobility training (Done)       Learning Progress Summary            Patient Acceptance, E,TB,D, VU,NR by  at 12/23/2024 1546    Acceptance, E, NR by CONSTANZA at 12/22/2024 0318    Acceptance, E, VU,NR by ALONSO at 12/21/2024 1851   Family Acceptance, E, VU,NR by ALONSO at 12/21/2024 1851                      Point: Home exercise program (Done)       Learning Progress Summary            Patient Acceptance, E,TB,D, VU,NR by  at 12/23/2024 1546     Acceptance, E, NR by  at 12/22/2024 0318                      Point: Body mechanics (Done)       Learning Progress Summary            Patient Acceptance, E,TB,D, VU,NR by  at 12/23/2024 1546    Acceptance, E, NR by  at 12/22/2024 0318                      Point: Precautions (Done)       Learning Progress Summary            Patient Acceptance, E,TB,D, VU,NR by  at 12/23/2024 1546    Acceptance, E, NR by  at 12/22/2024 0318    Acceptance, E, VU,NR by  at 12/21/2024 1851   Family Acceptance, E, VU,NR by  at 12/21/2024 1851                                      User Key       Initials Effective Dates Name Provider Type Discipline     03/07/18 -  Yady Trisatn PTA Physical Therapist Assistant PT     06/16/21 -  Milagro Man, PT Physical Therapist PT     10/20/20 -  Jorge Clayton, RN Registered Nurse Nurse                  PT Recommendation and Plan     Progress: improving  Outcome Evaluation: Pt tolerated treatment well this date. Required SBA to stand from chair, then CGA to ambulate. Pt ambulated 60ft w/ Rw and CGA for safety. Limited d/t fatigue. Encouraged pt to ambulate w/ nsg during the day.     Time Calculation:         PT Charges       Row Name 12/23/24 1551             Time Calculation    Start Time 1417  -      Stop Time 1431  -      Time Calculation (min) 14 min  -      PT Received On 12/23/24  -      PT - Next Appointment 12/24/24  -                User Key  (r) = Recorded By, (t) = Taken By, (c) = Cosigned By      Initials Name Provider Type     Yady Tristan PTA Physical Therapist Assistant                  Therapy Charges for Today       Code Description Service Date Service Provider Modifiers Qty    [id number] HC GAIT TRAINING EA 15 MIN 12/23/2024 Yady Tristan PTA GP 1            PT G-Codes  Outcome Measure Options: AM-PAC 6 Clicks Basic Mobility (PT)  AM-PAC 6 Clicks Score (PT): 18  PT Discharge Summary  Anticipated Discharge Disposition (PT): home  with home health    Yady Tristan, PTA  12/23/2024

## 2024-12-23 NOTE — PLAN OF CARE
Goal Outcome Evaluation:  Plan of Care Reviewed With: patient        Progress: improving  Outcome Evaluation: Patient Afib on the monitor, requesting 2 Liters of Oxygen to sleep. Patient sitting in chair early in shift, then to bed. Purewick in. PAtient having lower back pain with 2x tylenol given. No s/s of acute distress noted. Will continue to monitor.

## 2024-12-24 ENCOUNTER — READMISSION MANAGEMENT (OUTPATIENT)
Dept: CALL CENTER | Facility: HOSPITAL | Age: 84
End: 2024-12-24
Payer: MEDICARE

## 2024-12-24 VITALS
SYSTOLIC BLOOD PRESSURE: 123 MMHG | HEART RATE: 53 BPM | WEIGHT: 199.8 LBS | OXYGEN SATURATION: 100 % | HEIGHT: 67 IN | TEMPERATURE: 97.5 F | BODY MASS INDEX: 31.36 KG/M2 | DIASTOLIC BLOOD PRESSURE: 71 MMHG | RESPIRATION RATE: 18 BRPM

## 2024-12-24 LAB
ALBUMIN SERPL-MCNC: 2.8 G/DL (ref 3.5–5.2)
ALBUMIN SERPL-MCNC: 3 G/DL (ref 3.5–5.2)
ANION GAP SERPL CALCULATED.3IONS-SCNC: 8 MMOL/L (ref 5–15)
ANION GAP SERPL CALCULATED.3IONS-SCNC: 9.1 MMOL/L (ref 5–15)
BUN SERPL-MCNC: 47 MG/DL (ref 8–23)
BUN SERPL-MCNC: 47 MG/DL (ref 8–23)
BUN/CREAT SERPL: 15 (ref 7–25)
BUN/CREAT SERPL: 15.9 (ref 7–25)
CALCIUM SPEC-SCNC: 8.2 MG/DL (ref 8.6–10.5)
CALCIUM SPEC-SCNC: 8.3 MG/DL (ref 8.6–10.5)
CHLORIDE SERPL-SCNC: 102 MMOL/L (ref 98–107)
CHLORIDE SERPL-SCNC: 103 MMOL/L (ref 98–107)
CO2 SERPL-SCNC: 23.9 MMOL/L (ref 22–29)
CO2 SERPL-SCNC: 24 MMOL/L (ref 22–29)
CREAT SERPL-MCNC: 2.95 MG/DL (ref 0.57–1)
CREAT SERPL-MCNC: 3.13 MG/DL (ref 0.57–1)
DEPRECATED RDW RBC AUTO: 51.5 FL (ref 37–54)
EGFRCR SERPLBLD CKD-EPI 2021: 14.2 ML/MIN/1.73
EGFRCR SERPLBLD CKD-EPI 2021: 15.2 ML/MIN/1.73
ERYTHROCYTE [DISTWIDTH] IN BLOOD BY AUTOMATED COUNT: 14.6 % (ref 12.3–15.4)
GLUCOSE BLDC GLUCOMTR-MCNC: 111 MG/DL (ref 70–130)
GLUCOSE BLDC GLUCOMTR-MCNC: 176 MG/DL (ref 70–130)
GLUCOSE SERPL-MCNC: 129 MG/DL (ref 65–99)
GLUCOSE SERPL-MCNC: 168 MG/DL (ref 65–99)
HCT VFR BLD AUTO: 29 % (ref 34–46.6)
HGB BLD-MCNC: 9.4 G/DL (ref 12–15.9)
MCH RBC QN AUTO: 31.4 PG (ref 26.6–33)
MCHC RBC AUTO-ENTMCNC: 32.4 G/DL (ref 31.5–35.7)
MCV RBC AUTO: 97 FL (ref 79–97)
PHOSPHATE SERPL-MCNC: 3.5 MG/DL (ref 2.5–4.5)
PHOSPHATE SERPL-MCNC: 3.5 MG/DL (ref 2.5–4.5)
PLATELET # BLD AUTO: 120 10*3/MM3 (ref 140–450)
PMV BLD AUTO: 9.9 FL (ref 6–12)
POTASSIUM SERPL-SCNC: 4 MMOL/L (ref 3.5–5.2)
POTASSIUM SERPL-SCNC: 5 MMOL/L (ref 3.5–5.2)
RBC # BLD AUTO: 2.99 10*6/MM3 (ref 3.77–5.28)
SODIUM SERPL-SCNC: 134 MMOL/L (ref 136–145)
SODIUM SERPL-SCNC: 136 MMOL/L (ref 136–145)
WBC NRBC COR # BLD AUTO: 4.48 10*3/MM3 (ref 3.4–10.8)

## 2024-12-24 PROCEDURE — 80069 RENAL FUNCTION PANEL: CPT | Performed by: STUDENT IN AN ORGANIZED HEALTH CARE EDUCATION/TRAINING PROGRAM

## 2024-12-24 PROCEDURE — 82948 REAGENT STRIP/BLOOD GLUCOSE: CPT

## 2024-12-24 PROCEDURE — 63710000001 INSULIN LISPRO (HUMAN) PER 5 UNITS

## 2024-12-24 PROCEDURE — 97110 THERAPEUTIC EXERCISES: CPT

## 2024-12-24 PROCEDURE — 99232 SBSQ HOSP IP/OBS MODERATE 35: CPT | Performed by: NURSE PRACTITIONER

## 2024-12-24 PROCEDURE — 85027 COMPLETE CBC AUTOMATED: CPT | Performed by: STUDENT IN AN ORGANIZED HEALTH CARE EDUCATION/TRAINING PROGRAM

## 2024-12-24 PROCEDURE — 97116 GAIT TRAINING THERAPY: CPT

## 2024-12-24 PROCEDURE — 80069 RENAL FUNCTION PANEL: CPT | Performed by: INTERNAL MEDICINE

## 2024-12-24 RX ORDER — AMOXICILLIN 250 MG
2 CAPSULE ORAL 2 TIMES DAILY PRN
Start: 2024-12-24

## 2024-12-24 RX ORDER — HYDRALAZINE HYDROCHLORIDE 10 MG/1
10 TABLET, FILM COATED ORAL EVERY 8 HOURS SCHEDULED
Start: 2024-12-24

## 2024-12-24 RX ADMIN — VENLAFAXINE HYDROCHLORIDE 75 MG: 75 CAPSULE, EXTENDED RELEASE ORAL at 08:14

## 2024-12-24 RX ADMIN — INSULIN LISPRO 2 UNITS: 100 INJECTION, SOLUTION INTRAVENOUS; SUBCUTANEOUS at 12:02

## 2024-12-24 RX ADMIN — Medication 2.5 MG: at 00:45

## 2024-12-24 RX ADMIN — FUROSEMIDE 40 MG: 40 TABLET ORAL at 08:14

## 2024-12-24 RX ADMIN — ATORVASTATIN CALCIUM 40 MG: 20 TABLET, FILM COATED ORAL at 08:14

## 2024-12-24 RX ADMIN — CARVEDILOL 12.5 MG: 12.5 TABLET, FILM COATED ORAL at 08:14

## 2024-12-24 RX ADMIN — APIXABAN 2.5 MG: 2.5 TABLET, FILM COATED ORAL at 08:14

## 2024-12-24 RX ADMIN — ISOSORBIDE DINITRATE 10 MG: 20 TABLET ORAL at 12:02

## 2024-12-24 RX ADMIN — ACETAMINOPHEN 650 MG: 325 TABLET ORAL at 00:45

## 2024-12-24 RX ADMIN — ACETAMINOPHEN 650 MG: 325 TABLET ORAL at 08:18

## 2024-12-24 RX ADMIN — ISOSORBIDE DINITRATE 10 MG: 20 TABLET ORAL at 08:14

## 2024-12-24 NOTE — PROGRESS NOTES
" LOS: 2 days   Patient Care Team:  Dannie Mathews MD as PCP - General (Internal Medicine)    Chief Complaint: CHF, CKD4      History of Present Illness  Patient is a 83 yo female with advance CKD followed by Dr. HARRY Foster who presented to ED with SANDOVAL and lower extremity edema.   She has discussed with PCP who advised to increase lasix dose, but this did not improve her symptoms.   She was give IV lasix overnight and plans for another dose this am.      Her Cr on arrival was 2.8 and it is down to 2.5 this am which is her baseline.       Subjective  Feeling ok     History taken from: patient chart    Objective     Vital Sign Min/Max for last 24 hours  Temp  Min: 97.5 °F (36.4 °C)  Max: 97.7 °F (36.5 °C)   BP  Min: 119/73  Max: 139/87   Pulse  Min: 57  Max: 88   Resp  Min: 16  Max: 18   SpO2  Min: 88 %  Max: 99 %   Flow (L/min) (Oxygen Therapy)  Min: 2  Max: 2   Weight  Min: 90.6 kg (199 lb 12.8 oz)  Max: 90.6 kg (199 lb 12.8 oz)     Flowsheet Rows      Flowsheet Row First Filed Value   Admission Height 170.2 cm (67\") Documented at 12/20/2024 1720   Admission Weight 90.3 kg (199 lb) Documented at 12/20/2024 1720            No intake/output data recorded.  I/O last 3 completed shifts:  In: 1000 [P.O.:1000]  Out: 750 [Urine:750]    Objective:  Vital signs: (most recent): Blood pressure 139/87, pulse 70, temperature 97.7 °F (36.5 °C), temperature source Oral, resp. rate 16, height 170.2 cm (67\"), weight 90.6 kg (199 lb 12.8 oz), SpO2 99%.            Physical Examination: General appearance - alert, well appearing, and in no distress  Mental status - alert, oriented to person, place, and time  Chest - clear to auscultation, no wheezes, rales or rhonchi, symmetric air entry  Heart - normal rate, regular rhythm, normal S1, S2, no murmurs, rubs, clicks or gallops  Abdomen - soft, nontender, nondistended, no masses or organomegaly  Neurological - alert, oriented, normal speech, no focal findings or movement disorder " "noted  Extremities - peripheral pulses normal, no pedal edema, no clubbing or cyanosis     Results Review:     I reviewed the patient's new clinical results.    WBC WBC   Date Value Ref Range Status   12/24/2024 4.48 3.40 - 10.80 10*3/mm3 Final   12/23/2024 4.18 3.40 - 10.80 10*3/mm3 Final   12/22/2024 4.33 3.40 - 10.80 10*3/mm3 Final      HGB Hemoglobin   Date Value Ref Range Status   12/24/2024 9.4 (L) 12.0 - 15.9 g/dL Final   12/23/2024 9.6 (L) 12.0 - 15.9 g/dL Final   12/22/2024 9.4 (L) 12.0 - 15.9 g/dL Final      HCT Hematocrit   Date Value Ref Range Status   12/24/2024 29.0 (L) 34.0 - 46.6 % Final   12/23/2024 30.0 (L) 34.0 - 46.6 % Final   12/22/2024 28.8 (L) 34.0 - 46.6 % Final      Platlets No results found for: \"LABPLAT\"   MCV MCV   Date Value Ref Range Status   12/24/2024 97.0 79.0 - 97.0 fL Final   12/23/2024 96.8 79.0 - 97.0 fL Final   12/22/2024 93.2 79.0 - 97.0 fL Final          Sodium Sodium   Date Value Ref Range Status   12/24/2024 134 (L) 136 - 145 mmol/L Final   12/23/2024 136 136 - 145 mmol/L Final   12/22/2024 141 136 - 145 mmol/L Final      Potassium Potassium   Date Value Ref Range Status   12/24/2024 5.0 3.5 - 5.2 mmol/L Final   12/23/2024 4.0 3.5 - 5.2 mmol/L Final   12/22/2024 3.9 3.5 - 5.2 mmol/L Final      Chloride Chloride   Date Value Ref Range Status   12/24/2024 102 98 - 107 mmol/L Final   12/23/2024 104 98 - 107 mmol/L Final   12/22/2024 102 98 - 107 mmol/L Final      CO2 CO2   Date Value Ref Range Status   12/24/2024 24.0 22.0 - 29.0 mmol/L Final   12/23/2024 22.0 22.0 - 29.0 mmol/L Final   12/22/2024 22.3 22.0 - 29.0 mmol/L Final      BUN BUN   Date Value Ref Range Status   12/24/2024 47 (H) 8 - 23 mg/dL Final   12/23/2024 43 (H) 8 - 23 mg/dL Final   12/22/2024 40 (H) 8 - 23 mg/dL Final      Creatinine Creatinine   Date Value Ref Range Status   12/24/2024 3.13 (H) 0.57 - 1.00 mg/dL Final   12/23/2024 2.74 (H) 0.57 - 1.00 mg/dL Final   12/22/2024 2.77 (H) 0.57 - 1.00 mg/dL Final    " "  Calcium Calcium   Date Value Ref Range Status   12/24/2024 8.3 (L) 8.6 - 10.5 mg/dL Final   12/23/2024 8.1 (L) 8.6 - 10.5 mg/dL Final   12/22/2024 8.2 (L) 8.6 - 10.5 mg/dL Final      PO4 No results found for: \"CAPO4\"   Albumin Albumin   Date Value Ref Range Status   12/24/2024 2.8 (L) 3.5 - 5.2 g/dL Final   12/23/2024 2.9 (L) 3.5 - 5.2 g/dL Final   12/22/2024 2.9 (L) 3.5 - 5.2 g/dL Final      Magnesium No results found for: \"MG\"   Uric Acid No results found for: \"URICACID\"     Medication Review:     Current Facility-Administered Medications:     acetaminophen (TYLENOL) tablet 650 mg, 650 mg, Oral, Q4H PRN, Lilo Gastelum APRN, 650 mg at 12/24/24 0818    apixaban (ELIQUIS) tablet 2.5 mg, 2.5 mg, Oral, BID, Ryann Bonilla APRN, 2.5 mg at 12/24/24 0814    atorvastatin (LIPITOR) tablet 40 mg, 40 mg, Oral, Daily, Ryann Bonilla APRN, 40 mg at 12/24/24 0814    sennosides-docusate (PERICOLACE) 8.6-50 MG per tablet 2 tablet, 2 tablet, Oral, BID PRN, 2 tablet at 12/23/24 1009 **AND** polyethylene glycol (MIRALAX) packet 17 g, 17 g, Oral, Daily PRN **AND** bisacodyl (DULCOLAX) EC tablet 5 mg, 5 mg, Oral, Daily PRN **AND** bisacodyl (DULCOLAX) suppository 10 mg, 10 mg, Rectal, Daily PRN, Lilo Gastelum, APRN    carvedilol (COREG) tablet 12.5 mg, 12.5 mg, Oral, BID With Meals, Ryann Bonilla APRYUKO, 12.5 mg at 12/24/24 0814    dextrose (D50W) (25 g/50 mL) IV injection 25 g, 25 g, Intravenous, Q15 Min PRN, Lilo Gastelum APRN    dextrose (GLUTOSE) oral gel 15 g, 15 g, Oral, Q15 Min PRN, Lilo Gastelum APRN    glucagon (GLUCAGEN) injection 1 mg, 1 mg, Intramuscular, Q15 Min PRN, Lilo Gastelum APRN    insulin lispro (HUMALOG/ADMELOG) injection 2-7 Units, 2-7 Units, Subcutaneous, 4x Daily AC & at Bedtime, Lilo Gastelum APRN, 2 Units at 12/23/24 1203    isosorbide dinitrate (ISORDIL) tablet 10 mg, 10 mg, Oral, TID - Nitrates, Ryann Bonilla APRN, 10 mg at 12/24/24 0814    " Lidocaine 4 % 1 patch, 1 patch, Transdermal, Daily PRN, Ryann Bonilla, APRN, 1 patch at 12/23/24 0825    melatonin tablet 2.5 mg, 2.5 mg, Oral, Nightly PRN, Rossana Aldana, APRN, 2.5 mg at 12/24/24 0045    nitroglycerin (NITROSTAT) SL tablet 0.4 mg, 0.4 mg, Sublingual, Q5 Min PRN, Lilo Gastelum, APRN    ondansetron ODT (ZOFRAN-ODT) disintegrating tablet 4 mg, 4 mg, Oral, Q6H PRN **OR** ondansetron (ZOFRAN) injection 4 mg, 4 mg, Intravenous, Q6H PRN, Lilo Gastelum APRN, 4 mg at 12/23/24 1333    sodium chloride 0.9 % flush 10 mL, 10 mL, Intravenous, PRN, Alexei Prakash MD    venlafaxine XR (EFFEXOR-XR) 24 hr capsule 75 mg, 75 mg, Oral, Daily, Ryann Bonilla, APRN, 75 mg at 12/24/24 0814     Assessment & Plan       CHF exacerbation    Chronic kidney disease, stage IV (severe)    Iron deficiency anemia    PAF (paroxysmal atrial fibrillation)    HLD (hyperlipidemia)    Type 2 diabetes mellitus      Assessment & Plan  PERLA  CKD4  CHF  Volume overload   Anemia of CKD     Cr up today to 3.1   Lasix BID was added yesterday, but only 350 cc UOP was recorded.   Her weight went down only about 2 lbs.   Essentially not sure how much she diuresed with current regimen and if PERLA is due to over diuresis or this is her true baseline when she is euvolemic.   Will hold diuretics today.   Bladder scan with no evidence of retention   Could check Cr around noon, if still around 3, would recommend she stays one more day    Will need to evaluate diuretic regimen, and might need to compromised on renal function to keep her on the dry side and decrease recurrent admission with decompensated CHF.   Check labs in am   Will follow       Hailey Barros MD  12/24/24  08:26 EST

## 2024-12-24 NOTE — PROGRESS NOTES
[facility]  Cardiology Progress Note    Patient Identification:  Name: Lowell Min  Age: 84 y.o.  Sex: female  :  1940  MRN: [id number]                 Follow Up / Chief Complaint: Follow-up for shortness of breath    Interval History: Resting in bed.  Denies chest pain and shortness of breath.        Objective:    Past Medical History:  Past Medical History:   Diagnosis Date    Anxiety     Atrial fibrillation     CHF (congestive heart failure)     Chronic kidney disease, stage IV (severe)     Depression     Elevated cholesterol     Hypertension     Type 2 diabetes mellitus      Past Surgical History:  Past Surgical History:   Procedure Laterality Date    APPENDECTOMY      CHOLECYSTECTOMY      COLONOSCOPY      CYSTOSCOPY BOTOX INJECTION OF BLADDER N/A     HYSTERECTOMY      TUMOR REMOVAL Left     benign tumor from left breast        Social History:   Social History     Tobacco Use    Smoking status: Never     Passive exposure: Never    Smokeless tobacco: Never   Substance Use Topics    Alcohol use: Never      Family History:  Family History   Problem Relation Age of Onset    Heart disease Mother           Allergies:  Allergies   Allergen Reactions    Ibuprofen Other (See Comments)     Decrease urine output       Scheduled Meds:  apixaban, 2.5 mg, BID  atorvastatin, 40 mg, Daily  carvedilol, 12.5 mg, BID With Meals  insulin lispro, 2-7 Units, 4x Daily AC & at Bedtime  isosorbide dinitrate, 10 mg, TID - Nitrates  venlafaxine XR, 75 mg, Daily            INTAKE AND OUTPUT:    Intake/Output Summary (Last 24 hours) at 2024 1102  Last data filed at 2024 0526  Gross per 24 hour   Intake 640 ml   Output 350 ml   Net 290 ml       Review of Systems:   GI: No vomiting, nausea or abdominal pain.    Cardiac: No chest pain or palpitations   Pulmonary: No shortness of breath.    Constitutional:  Temp:  [97.5 °F (36.4 °C)-97.7 °F (36.5 °C)] 97.7 °F (36.5 °C)  Heart Rate:  [57-88] 70  Resp:   [16-18] 16  BP: (119-139)/(73-87) 139/87      Physical Exam:  General:  Appears in no acute distress, resting in bed  Eyes: eom normal no conjunctival drainage  HEENT:  No JVD. Thyroid not visibly enlarged. No mucosal pallor or cyanosis  Respiratory: Respirations regular and unlabored at rest. BBS with good air entry in all fields. No crackles, rubs or wheezes auscultated  Cardiovascular: S1S2 irregularly irregular rhythm. No murmur, rub or gallop. No carotid bruits. DP/PT pulses     . No pretibial pitting edema  Gastrointestinal: Abdomen soft, flat, non tender. Bowel sounds present. No hepatosplenomegaly. No ascites  Skin:   Skin warm and dry to touch. No rashes    Neuro: AAO x3 CN II-XII grossly intact  Psych: Mood and affect normal, pleasant and cooperative      Cardiographics       ECG: A-fib with LBBB    Echocardiogram:   9/7/24    Interpretation Summary         Left ventricular systolic function is mildly decreased. Calculated left ventricular EF = 44.1%    The following left ventricular wall segments are hypokinetic: mid anterior, apical anterior, basal anterolateral, mid anterolateral, apical lateral, basal inferolateral, mid inferolateral, apical inferior, mid inferior, apical septal, basal inferoseptal, mid inferoseptal, apex hypokinetic, mid anteroseptal, basal anterior, basal inferior and basal inferoseptal.    Left ventricular diastolic function was indeterminate.    The left atrial cavity is severely dilated.    Mild aortic valve stenosis is present. Aortic valve area is 1.2 cm2.    The right atrial cavity is severely  dilated.    Peak velocity of the flow distal to the aortic valve is 246.1 cm/s. Aortic valve mean pressure gradient is 11 mmHg.    Severe mitral valve regurgitation is present.    Mild mitral valve stenosis is present. The mitral valve mean gradient is 4 mmHg.    Severe tricuspid valve regurgitation is present.    Estimated right ventricular systolic pressure from tricuspid regurgitation  "is markedly elevated (>55 mmHg). Calculated right ventricular systolic pressure from tricuspid regurgitation is 62 mmHg.  Lab Review   Results from last 7 days   Lab Units 12/20/24  1746 12/20/24  1635   HSTROP T ng/L 50* 48*         Results from last 7 days   Lab Units 12/24/24  0334   SODIUM mmol/L 134*   POTASSIUM mmol/L 5.0   BUN mg/dL 47*   CREATININE mg/dL 3.13*   CALCIUM mg/dL 8.3*     @[id number]@  Results from last 7 days   Lab Units 12/24/24  0334 12/23/24  0450 12/22/24  0620   WBC 10*3/mm3 4.48 4.18 4.33   HEMOGLOBIN g/dL 9.4* 9.6* 9.4*   HEMATOCRIT % 29.0* 30.0* 28.8*   PLATELETS 10*3/mm3 120* 136* 127*         CHADS-VASc Risk Assessment               6 Total Score    1 CHF    1 Hypertension    2 Age >/= 75    1 DM    1 Sex: Female    Criteria that do not apply:    PRIOR STROKE/TIA/THROMBO    Vascular Disease    Age 65-74                Assessment:  -Acute on chronic heart failure with moderate to slightly reduced EF.  She has declined further workup.  -Persistent atrial fib  -Acute on chronic chronic kidney injury  -LBBB  -Anemia  -Thrombocytopenia      Plan:  -Nephrology following and diuresing.  -Blood pressure and heart rate stable.  Continue carvedilol and Imdur.    -A-fib on the monitor, rate controlled.  Continue beta-blockade and anticoagulation.    We will sign off at this time as she is okay to be discharged from our standpoint.  She will follow-up with her primary cardiologist.  Discussed with patient and Dr. Coombs.        )12/24/2024  RAMAKRISHNA Flannery/Transcription:   \"Dictated utilizing Dragon dictation\".     "

## 2024-12-24 NOTE — DISCHARGE PLACEMENT REQUEST
"Becca Min (84 y.o. Female)       Date of Birth   1940    Social Security Number       Address   [address] [facility] [address]    Home Phone   [phone]    MRN   [id number]       [facility]    Marital Status   Single                            Admission Date   12/20/24    Admission Type   Emergency    Admitting Provider   Amrita Coombs MD    Attending Provider   Amrita Coombs MD    Department, Room/Bed   [address], [address]       Discharge Date       Discharge Disposition       Discharge Destination                                 Attending Provider: Amrita Coombs MD    Allergies: Ibuprofen    Isolation: Contact   Infection: MRSA/History Only (04/03/22), Lala Auris (rule out) (12/23/24)   Code Status: CPR    Ht: 170.2 cm (67\")   Wt: 90.6 kg (199 lb 12.8 oz)    Admission Cmt: None   Principal Problem: CHF exacerbation [I50.9]                   Active Insurance as of 12/20/2024       Primary Coverage       Payor Plan Insurance Group Employer/Plan Group    MEDICARE MEDICARE A & B        Payor Plan Address Payor Plan Phone Number Payor Plan Fax Number Effective Dates    [address] [phone]  1/1/2005 - None Entered    [facility] [id number]         Subscriber Name Subscriber Birth Date Member ID       BECCA MIN 1940 [id number]               Secondary Coverage       Payor Plan Insurance Group Employer/Plan Group    [facility] SUPP [id number]       Payor Plan Address Payor Plan Phone Number Payor Plan Fax Number Effective Dates    [address]   12/1/2016 - None Entered    [facility] [id number]         Subscriber Name Subscriber Birth Date Member ID       BECCA MIN 1940 [id number]                     Emergency Contacts        (Rel.) Home Phone Work Phone Mobile Phone    ANABELA COPPOLA (Daughter) [phone] -- [phone]    MAXX MIN (Son) -- -- [phone]                "

## 2024-12-24 NOTE — THERAPY TREATMENT NOTE
Patient Name: Lowell Min  : 1940    MRN: [id number]                              Today's Date: 2024       Admit Date: 2024    Visit Dx:     ICD-10-CM ICD-9-CM   1. Acute on chronic congestive heart failure, unspecified heart failure type  I50.9 428.0   2. Chronic kidney disease, unspecified CKD stage  N18.9 585.9     Patient Active Problem List   Diagnosis    Chronic kidney disease, stage IV (severe)    Erythropoietin deficiency anemia    Symptomatic anemia    Anxiety associated with depression    Iron deficiency anemia    PAF (paroxysmal atrial fibrillation)    Shortness of breath    Chronic diastolic congestive heart failure    Diabetic polyneuropathy associated with type 2 diabetes mellitus    PERLA (acute kidney injury)    Foot pain, bilateral    Anemia of chronic renal failure    Acute on chronic diastolic CHF (congestive heart failure)    Obesity (BMI 30-39.9)    HLD (hyperlipidemia)    Calcium pyrophosphate deposition disease (CPPD)    Type 2 diabetes mellitus    Acute on chronic systolic CHF (congestive heart failure)    Chronic respiratory failure with hypoxia    CHF exacerbation     Past Medical History:   Diagnosis Date    Anxiety     Atrial fibrillation     CHF (congestive heart failure)     Chronic kidney disease, stage IV (severe)     Depression     Elevated cholesterol     Hypertension     Type 2 diabetes mellitus      Past Surgical History:   Procedure Laterality Date    APPENDECTOMY      CHOLECYSTECTOMY      COLONOSCOPY      CYSTOSCOPY BOTOX INJECTION OF BLADDER N/A     HYSTERECTOMY      TUMOR REMOVAL Left     benign tumor from left breast      General Information       Row Name 24 1419          Physical Therapy Time and Intention    Document Type therapy note (daily note)  -     Mode of Treatment physical therapy  -       Row Name 24 1419          General Information    Existing Precautions/Restrictions fall;oxygen therapy device and L/min  -       Row Name  12/24/24 1419          Cognition    Orientation Status (Cognition) oriented x 4  -SM               User Key  (r) = Recorded By, (t) = Taken By, (c) = Cosigned By      Initials Name Provider Type    Yady Shaikh PTA Physical Therapist Assistant                   Mobility       Row Name 12/24/24 1420          Bed Mobility    Bed Mobility supine-sit  -     Supine-Sit [place] (Bed Mobility) standby assist  -     Assistive Device (Bed Mobility) bed rails;head of bed elevated  -       Row Name 12/24/24 1420          Sit-Stand Transfer    Sit-Stand [place] (Transfers) standby assist  -SM       Row Name 12/24/24 1420          Gait/Stairs (Locomotion)    [place] Level (Gait) standby assist;contact guard  -     Patient was able to Ambulate yes  -     Distance in Feet (Gait) 90  seated rest break every 30ft  -     Deviations/Abnormal Patterns (Gait) guillermo decreased;stride length decreased  -     Bilateral Gait Deviations forward flexed posture  -SM               User Key  (r) = Recorded By, (t) = Taken By, (c) = Cosigned By      Initials Name Provider Type    Yady Shaikh PTA Physical Therapist Assistant                   Obj/Interventions       Row Name 12/24/24 1430          Motor Skills    Therapeutic Exercise --  seated AP and LAQ x10 reps  -               User Key  (r) = Recorded By, (t) = Taken By, (c) = Cosigned By      Initials Name Provider Type    Yady Shaikh PTA Physical Therapist Assistant                   Goals/Plan    No documentation.                  Clinical Impression       Row Name 12/24/24 1431          Pain    Pretreatment Pain Rating 0/10 - no pain  -     Posttreatment Pain Rating 0/10 - no pain  -SM       [facility] Name 12/24/24 1431          Positioning and Restraints    Pre-Treatment Position in bed  -     Post Treatment Position chair  -     In Chair reclined;call light within reach;encouraged to call for assist;exit alarm on  -                User Key  (r) = Recorded By, (t) = Taken By, (c) = Cosigned By      Initials Name Provider Type     Yady Tristan PTA Physical Therapist Assistant                   Outcome Measures       Row Name 12/24/24 1432          How much help from another person do you currently need...    Turning from your back to your side while in flat bed without using bedrails? 4  -SM     Moving from lying on back to sitting on the side of a flat bed without bedrails? 4  -SM     Moving to and from a bed to a chair (including a wheelchair)? 3  -SM     Standing up from a chair using your arms (e.g., wheelchair, bedside chair)? 3  -SM     Climbing 3-5 steps with a railing? 3  -SM     To walk in hospital room? 3  -SM     AM-PAC 6 Clicks Score (PT) 20  -     Highest Level of Mobility Goal 6 --> Walk 10 steps or more  -       Row Name 12/24/24 1432          Functional Assessment    Outcome Measure Options AM-PAC 6 Clicks Basic Mobility (PT)  -               User Key  (r) = Recorded By, (t) = Taken By, (c) = Cosigned By      Initials Name Provider Type    Yady Shaikh PTA Physical Therapist Assistant                                 Physical Therapy Education       Title: PT OT SLP Therapies (Done)       Topic: Physical Therapy (Done)       Point: Mobility training (Done)       Learning Progress Summary            Patient Acceptance, E,TB,D, VU,NR by  at 12/24/2024 1433    Acceptance, E,TB,D, VU,NR by  at 12/23/2024 1546    Acceptance, E, NR by CONSTANZA at 12/22/2024 0318    Acceptance, E, VU,NR by ALONSO at 12/21/2024 1851   Family Acceptance, E, VU,NR by ALONSO at 12/21/2024 1851                      Point: Home exercise program (Done)       Learning Progress Summary            Patient Acceptance, E,TB,D, VU,NR by  at 12/24/2024 1433    Acceptance, E,TB,D, VU,NR by  at 12/23/2024 1546    Acceptance, E, NR by CONSTANZA at 12/22/2024 0318                      Point: Body mechanics (Done)       Learning Progress Summary             Patient Acceptance, E,TB,D, VU,NR by  at 12/24/2024 1433    Acceptance, E,TB,D, VU,NR by  at 12/23/2024 1546    Acceptance, E, NR by  at 12/22/2024 0318                      Point: Precautions (Done)       Learning Progress Summary            Patient Acceptance, E,TB,D, VU,NR by  at 12/24/2024 1433    Acceptance, E,TB,D, VU,NR by  at 12/23/2024 1546    Acceptance, E, NR by  at 12/22/2024 0318    Acceptance, E, VU,NR by  at 12/21/2024 1851   Family Acceptance, E, VU,NR by  at 12/21/2024 1851                                      User Key       Initials Effective Dates Name Provider Type Discipline     03/07/18 -  Yady Tristan PTA Physical Therapist Assistant PT     06/16/21 -  Milagro Man, MIRNA Physical Therapist PT     10/20/20 -  Jorge Clayton, RN Registered Nurse Nurse                  PT Recommendation and Plan     Progress: improving  Outcome Evaluation: Pt tolerated treatment well this date. Required SBA for bed mobility and to stand. Pt ambulated 30ft x3 w/ Rw and SBA-CGA. Seated rest break required between each trial d/t fatigue, though overall doing well.     Time Calculation:         PT Charges       Row Name 12/24/24 1437             Time Calculation    Start Time 1340  -      Stop Time 1404  -      Time Calculation (min) 24 min  -      PT Received On 12/24/24  -      PT - Next Appointment 12/26/24  -                User Key  (r) = Recorded By, (t) = Taken By, (c) = Cosigned By      Initials Name Provider Type     Yady Tristan PTA Physical Therapist Assistant                  Therapy Charges for Today       Code Description Service Date Service Provider Modifiers Qty    80628663015 HC GAIT TRAINING EA 15 MIN 12/23/2024 Yady Tristan PTA GP 1    94335742603 HC GAIT TRAINING EA 15 MIN 12/24/2024 Yady Tristan, RHEA GP 1    38152507602 HC PT THER PROC EA 15 MIN 12/24/2024 Yady Tristan, RHEA GP 1            PT G-Codes  Outcome Measure  Options: AM-PAC 6 Clicks Basic Mobility (PT)  AM-PAC 6 Clicks Score (PT): 20  PT Discharge Summary  Anticipated Discharge Disposition (PT): home with home health    Yady Tristan, PTA  12/24/2024

## 2024-12-24 NOTE — DISCHARGE SUMMARY
Patient Name: Lowell Min  : 1940  MRN: [id number]    Date of Admission: 2024  Date of Discharge:  2024  Primary Care Physician: Dannie Mathews MD      Chief Complaint:   Leg Swelling      Discharge Diagnoses     Active Hospital Problems    Diagnosis  POA    **CHF exacerbation [I50.9]  Yes    Type 2 diabetes mellitus [E11.9]  Yes    HLD (hyperlipidemia) [E78.5]  Yes    PAF (paroxysmal atrial fibrillation) [I48.0]  Yes    Iron deficiency anemia [D50.9]  Yes    Chronic kidney disease, stage IV (severe) [N18.4]  Yes      Resolved Hospital Problems   No resolved problems to display.        Hospital Course     Ms. Min is a 84 y.o. female with a history of chronic systolic/diastolic CHF, PAF, valvular heart disease, HLD, CKD 4, anemia of chronic disease, type 2 diabetes who presented to [facility] initially complaining of shortness of breath and lower extremity edema.  Please see the admitting history and physical for further details.  She was found to have acute on chronic diastolic/systolic heart failure exacerbation and was admitted to the hospital for further evaluation and treatment.  Cardiology and nephrology consulted for patient's heart failure exacerbation in the setting of advanced CKD.  The patient was treated with IV diuretics until she was euvolemic.  The patient did have a slight bump in creatinine related to diuresis which improved with holding of diuretic.  Repeat creatinine today improved and nephrology has cleared her for discharge with close outpatient follow-up to discuss further diuretic dosage changes.  In the meantime she is to continue Lasix 40 mg once a day.  She is stable for discharge and should also follow-up with her PCP in a week for posthospital follow-up visit.      Day of Discharge     Subjective:  Resting in bed, eager for discharge home.    Physical Exam:  Temp:  [97.5 °F (36.4 °C)-97.7 °F (36.5 °C)] 97.5 °F (36.4 °C)  Heart Rate:  [53-70]  53  Resp:  [16-18] 18  BP: (123-139)/(68-87) 123/71  Body mass index is 31.29 kg/m².  Physical Exam  Constitutional:       General: She is not in acute distress.     Appearance: She is ill-appearing.   Cardiovascular:      Rate and Rhythm: Normal rate and regular rhythm.   Pulmonary:      Effort: Pulmonary effort is normal. No respiratory distress.      Breath sounds: Normal breath sounds. No wheezing.   Abdominal:      General: Abdomen is flat.      Palpations: Abdomen is soft.      Tenderness: There is no abdominal tenderness.   Musculoskeletal:         General: No swelling or tenderness.      Right lower leg: Edema present.      Left lower leg: Edema present.      Comments: Edema improved   Skin:     General: Skin is warm and dry.   Neurological:      General: No focal deficit present.      Mental Status: She is alert and oriented to person, place, and time. Mental status is at baseline.         Consultants     Consult Orders (all) (From admission, onward)       Start     Ordered    12/20/24 2030  Inpatient Cardiology Consult  Once        Specialty:  Cardiology  Provider:  Pj Cason MD    12/20/24 2029 12/20/24 1927  Inpatient Cardiology Consult  Once,   Status:  Canceled        Specialty:  Cardiology  Provider:  Charlie Daily MD    12/20/24 1927 12/20/24 1926  Inpatient Nephrology Consult  Once        Specialty:  Nephrology  Provider:  Isaiah Foster MD    12/20/24 1927 12/20/24 1905  [facility] (on-call MD unless specified) Details  Once        Specialty:  Hospitalist  Provider:  (Not yet assigned)    12/20/24 1904                  Procedures     Imaging Results (All)       Procedure Component Value Units Date/Time    XR Chest 1 View [[id number]] Collected: 12/20/24 1652     Updated: 12/20/24 1657    Narrative:      XR CHEST 1 VW-        INDICATION: Shortness of air     COMPARISON: Chest radiograph October 18, 2024     TECHNIQUE: 1 view chest     FINDINGS:      Vascular congestion. Small amount of  "subsegmental atelectasis at the  bases. No effusions. Stable mediastinum. Enlarged cardiac silhouette.  Calcified left hilar lymph nodes, consistent with prior granulomatous  infection.       Impression:      Cardiomegaly with vascular congestion, without edema     This report was finalized on 12/20/2024 4:54 PM by Dr. Michael Peña M.D on Workstation: [id number]               Pertinent Labs     Results from last 7 days   Lab Units 12/24/24 0334 12/23/24 0450 12/22/24  0620 12/21/24  0522   WBC 10*3/mm3 4.48 4.18 4.33 3.54   HEMOGLOBIN g/dL 9.4* 9.6* 9.4* 9.4*   PLATELETS 10*3/mm3 120* 136* 127* 123*     Results from last 7 days   Lab Units 12/24/24 1222 12/24/24 0334 12/23/24 0450 12/22/24  0620   SODIUM mmol/L 136 134* 136 141   POTASSIUM mmol/L 4.0 5.0 4.0 3.9   CHLORIDE mmol/L 103 102 104 102   CO2 mmol/L 23.9 24.0 22.0 22.3   BUN mg/dL 47* 47* 43* 40*   CREATININE mg/dL 2.95* 3.13* 2.74* 2.77*   GLUCOSE mg/dL 168* 129* 102* 149*   Estimated Creatinine Clearance: 16.4 mL/min (A) (by C-G formula based on SCr of 2.95 mg/dL (H)).  Results from last 7 days   Lab Units 12/24/24  1222 12/24/24 0334 12/23/24 0450 12/22/24  0620 12/20/24  1635   ALBUMIN g/dL 3.0* 2.8* 2.9* 2.9* 3.3*   BILIRUBIN mg/dL  --   --   --   --  1.0   ALK PHOS U/L  --   --   --   --  200*   AST (SGOT) U/L  --   --   --   --  18   ALT (SGPT) U/L  --   --   --   --  8     Results from last 7 days   Lab Units 12/24/24  1222 12/24/24 0334 12/23/24 0450 12/22/24  0620   CALCIUM mg/dL 8.2* 8.3* 8.1* 8.2*   ALBUMIN g/dL 3.0* 2.8* 2.9* 2.9*   PHOSPHORUS mg/dL 3.5 3.5 3.6 3.3       Results from last 7 days   Lab Units 12/20/24  1746 12/20/24  1635   HSTROP T ng/L 50* 48*   PROBNP pg/mL  --  14,959.0*           Invalid input(s): \"LDLCALC\"        Test Results Pending at Discharge     Pending Labs       Order Current Status    CANDIDA AURIS PCR - Swab, Axilla Right, Axilla Left and Groin In process            Discharge Details      "   Discharge Medications        Continue These Medications        Instructions Start Date   acetaminophen 325 MG tablet  Commonly known as: TYLENOL   650 mg, Oral, Every 4 Hours PRN      apixaban 2.5 MG tablet tablet  Commonly known as: ELIQUIS   2.5 mg, Oral, 2 Times Daily      atorvastatin 40 MG tablet  Commonly known as: LIPITOR   1 tablet, Every Night at Bedtime      carvedilol 12.5 MG tablet  Commonly known as: COREG   Take 1 tablet by mouth 2 (two) times daily with meals.      Diclofenac Sodium 1 % gel gel  Commonly known as: VOLTAREN   4 g, 4 Times Daily PRN      furosemide 40 MG tablet  Commonly known as: LASIX   40 mg, Oral, Daily      hydrALAZINE 10 MG tablet  Commonly known as: APRESOLINE   10 mg, Oral, Every 8 Hours Scheduled, Only if bp >160      isosorbide dinitrate 10 MG tablet  Commonly known as: ISORDIL   10 mg, Oral, 3 Times Daily (Nitrates)      lidocaine 5 %  Commonly known as: LIDODERM   1 patch, Daily PRN      O2  Commonly known as: OXYGEN   2 L/min, Nightly      sennosides-docusate 8.6-50 MG per tablet  Commonly known as: PERICOLACE   2 tablets, Oral, 2 Times Daily PRN      venlafaxine XR 75 MG 24 hr capsule  Commonly known as: EFFEXOR-XR   1 capsule, Daily               Allergies   Allergen Reactions    Ibuprofen Other (See Comments)     Decrease urine output           Discharge Disposition:  Home or Self Care    Discharge Diet:  Diet Order   Procedures    Diet: Cardiac; Healthy Heart (2-3 Na+); Fluid Consistency: Thin (IDDSI 0)       Discharge Activity:   Activity Instructions       Activity as Tolerated              CODE STATUS:    Code Status and Medical Interventions: CPR (Attempt to Resuscitate); Full   Ordered at: 12/20/24 1927     Code Status (Patient has no pulse and is not breathing):    CPR (Attempt to Resuscitate)     Medical Interventions (Patient has pulse or is breathing):    Full       No future appointments.  Additional Instructions for the Follow-ups that You Need to Schedule        Discharge Follow-up with PCP   As directed       Currently Documented PCP:    Dannie Mathews MD    PCP Phone Number:    [phone]     Follow Up Details: post-hospital follow up               Contact information for follow-up providers       Dannie Mathews MD .    Specialty: Internal Medicine  Why: post-hospital follow up  Contact information:  [address]  [address]  [place] [address]  [phone]               Hailey Barros MD. Schedule an appointment as soon as possible for a visit in 2 day(s).    Specialty: Nephrology  Contact information:  [address] [facility] [address]  [phone]                       Contact information for after-discharge care       Home Medical Care       [facility] .    Services: Home Health Services, Home Nursing, Home Rehabilitation  Contact information:  [address] [address] [address]  [phone]                                   Additional Instructions for the Follow-ups that You Need to Schedule       Discharge Follow-up with PCP   As directed       Currently Documented PCP:    Dannie Mathews MD    PCP Phone Number:    [phone]     Follow Up Details: post-hospital follow up            Time Spent on Discharge:  I spent greater than 30 minutes on this discharge activity which included: face-to-face encounter with the patient, reviewing the data in the system, coordination of the care with the nursing staff as well as consultants, documentation, and entering orders.       Amrita Coombs MD  [facility]  12/24/24  15:54 EST

## 2024-12-24 NOTE — PLAN OF CARE
Goal Outcome Evaluation:  Plan of Care Reviewed With: patient        Progress: improving  Outcome Evaluation: Pt tolerated treatment well this date. Required SBA for bed mobility and to stand. Pt ambulated 30ft x3 w/ Rw and SBA-CGA. Seated rest break required between each trial d/t fatigue, though overall doing well.    Anticipated Discharge Disposition (PT): home with home health

## 2024-12-24 NOTE — PLAN OF CARE
Goal Outcome Evaluation:  Plan of Care Reviewed With: patient        Progress: improving  Outcome Evaluation: Pt stable. Pt to dc home w family.

## 2024-12-25 LAB — C AURIS DNA SPEC QL NAA+NON-PROBE: NOT DETECTED

## 2024-12-25 NOTE — OUTREACH NOTE
Prep Survey      Flowsheet Row Responses   [facility] facility patient discharged from? [place]   Is LACE score < 7 ? No   Eligibility Readm Mgmt   Discharge diagnosis CHF exacerbation   Does the patient have one of the following disease processes/diagnoses(primary or secondary)? CHF   Does the patient have Home health ordered? Yes   What is the Home health agency?  [facility]   Is there a DME ordered? No   Prep survey completed? Yes            JOSÉ MIGUEL MCCRAY - Registered Nurse

## 2024-12-25 NOTE — CASE MANAGEMENT/SOCIAL WORK
Case Management Discharge Note      Final Note: Home with [facility] home health, family to transport         Selected Continued Care - Discharged on 12/24/2024 Admission date: 12/20/2024 - Discharge disposition: Home or Self Care      Destination    No services have been selected for the patient.                Durable Medical Equipment    No services have been selected for the patient.                Dialysis/Infusion    No services have been selected for the patient.                Home Medical Care Coordination complete.      Service Provider Services Address Phone Fax Patient Preferred    [facility], Home Nursing, Home Rehabilitation [address] [place], [place] [id number] [phone] --              Therapy    No services have been selected for the patient.                Community Resources    No services have been selected for the patient.                [facility]    No services have been selected for the patient.                    Selected Continued Care - Prior Encounters Includes continued care and service providers with selected services from prior encounters from 9/21/2024 to 12/24/2024      Discharged on 10/26/2024 Admission date: 10/18/2024 - Discharge disposition: Skilled Nursing Facility (DC - External)      Destination       Service Provider Services Address Phone Fax Patient Preferred    [facility] [address] [facility] [id number] [phone] --                          Transportation Services  Private: Car    Final Discharge Disposition Code: 06 - home with home health care

## 2024-12-26 ENCOUNTER — READMISSION MANAGEMENT (OUTPATIENT)
Dept: CALL CENTER | Facility: HOSPITAL | Age: 84
End: 2024-12-26
Payer: MEDICARE

## 2024-12-26 NOTE — OUTREACH NOTE
CHF Week 1 Survey      Flowsheet Row Responses   [facility] patient discharged from? [place]   Does the patient have one of the following disease processes/diagnoses(primary or secondary)? CHF   CHF Week 1 attempt successful? Yes   Call start time 1405   Call end time 1413   Discharge diagnosis CHF exacerbation   Is patient permission given to speak with other caregiver? Yes   Person spoke with today (if not patient) and relationship patient and daughter Karen Child reviewed with patient/caregiver? Yes   Does the patient have all medications ordered at discharge? Yes   Is the patient taking all medications as directed (includes completed medication regime)? Yes   Comments regarding appointments Advised for patient to follow up with the HF clinic. Daughter states that patients cardiologist is at [facility] and she would want to check with them before scheduling. [facility] HF clinic information and contact # given to daughter.   Does the patient have a primary care provider?  Yes   Does the patient have an appointment with their PCP within 7 days of discharge? No   Comments regarding PCP PCP Dr Mathews   Nursing Interventions Educated patient on importance of making appointment, Advised patient to make appointment   Has the patient kept scheduled appointments due by today? N/A   What is the Home health agency?  [facility]   Has home health visited the patient within 72 hours of discharge? Call prior to 72 hours   Pulse Ox monitoring None   Psychosocial issues? No   Did the patient receive a copy of their discharge instructions? Yes   Nursing interventions Reviewed instructions with patient   What is the patient's perception of their health status since discharge? Same   Nursing interventions Nurse provided patient education   Is the patient able to teach back signs and symptoms of worsening condition? (i.e. weight gain, shortness of air, etc.) Yes   If the patient is a  current smoker, are they able to teach back resources for cessation? Not a smoker   Is the patient/caregiver able to teach back the hierarchy of who to call/visit for symptoms/problems? PCP, Specialist, Home health nurse, Urgent Care, ED, 911 Yes   CHF Nursing Interventions --  [Advised to monitor daily weight for fluid retention, follow low salt diet.]    CHF Week 1 call completed? Yes   Is the patient interested in additional calls from an ambulatory ? No   Would this patient benefit from a Referral to [facility] Social Work? No   Call end time 1413            NUBIA MANN - Registered Nurse

## 2025-01-15 ENCOUNTER — READMISSION MANAGEMENT (OUTPATIENT)
Dept: CALL CENTER | Facility: HOSPITAL | Age: 85
End: 2025-01-15
Payer: MEDICARE

## 2025-01-15 NOTE — OUTREACH NOTE
CHF Week 3 Survey      Flowsheet Row Responses   [facility] facility patient discharged from? [place]   Does the patient have one of the following disease processes/diagnoses(primary or secondary)? CHF   Week 3 attempt successful? No   Unsuccessful attempts Attempt 1            Leila REINA - Registered Nurse

## 2025-01-18 ENCOUNTER — APPOINTMENT (OUTPATIENT)
Dept: GENERAL RADIOLOGY | Facility: HOSPITAL | Age: 85
End: 2025-01-18
Payer: MEDICARE

## 2025-01-18 ENCOUNTER — HOSPITAL ENCOUNTER (OUTPATIENT)
Facility: HOSPITAL | Age: 85
Setting detail: OBSERVATION
Discharge: HOME | End: 2025-01-19
Attending: EMERGENCY MEDICINE | Admitting: EMERGENCY MEDICINE
Payer: MEDICARE

## 2025-01-18 ENCOUNTER — READMISSION MANAGEMENT (OUTPATIENT)
Dept: CALL CENTER | Facility: HOSPITAL | Age: 85
End: 2025-01-18
Payer: MEDICARE

## 2025-01-18 DIAGNOSIS — N18.9 CHRONIC KIDNEY DISEASE, UNSPECIFIED CKD STAGE: ICD-10-CM

## 2025-01-18 DIAGNOSIS — I50.9 ACUTE ON CHRONIC CONGESTIVE HEART FAILURE, UNSPECIFIED HEART FAILURE TYPE: Primary | ICD-10-CM

## 2025-01-18 DIAGNOSIS — R79.89 ELEVATED TROPONIN: ICD-10-CM

## 2025-01-18 LAB
ALBUMIN SERPL-MCNC: 3.1 G/DL (ref 3.5–5.2)
ALBUMIN/GLOB SERPL: 0.9 G/DL
ALP SERPL-CCNC: 214 U/L (ref 39–117)
ALT SERPL W P-5'-P-CCNC: 11 U/L (ref 1–33)
ANION GAP SERPL CALCULATED.3IONS-SCNC: 11 MMOL/L (ref 5–15)
AST SERPL-CCNC: 24 U/L (ref 1–32)
BACTERIA UR QL AUTO: ABNORMAL /HPF
BASOPHILS # BLD AUTO: 0.01 10*3/MM3 (ref 0–0.2)
BASOPHILS NFR BLD AUTO: 0.2 % (ref 0–1.5)
BILIRUB SERPL-MCNC: 1.1 MG/DL (ref 0–1.2)
BILIRUB UR QL STRIP: NEGATIVE
BUN SERPL-MCNC: 37 MG/DL (ref 8–23)
BUN/CREAT SERPL: 14.7 (ref 7–25)
CALCIUM SPEC-SCNC: 8.7 MG/DL (ref 8.6–10.5)
CHLORIDE SERPL-SCNC: 105 MMOL/L (ref 98–107)
CLARITY UR: CLEAR
CO2 SERPL-SCNC: 22 MMOL/L (ref 22–29)
COLOR UR: YELLOW
CREAT SERPL-MCNC: 2.52 MG/DL (ref 0.57–1)
DEPRECATED RDW RBC AUTO: 51.5 FL (ref 37–54)
EGFRCR SERPLBLD CKD-EPI 2021: 18.2 ML/MIN/1.73
EOSINOPHIL # BLD AUTO: 0.12 10*3/MM3 (ref 0–0.4)
EOSINOPHIL NFR BLD AUTO: 2.8 % (ref 0.3–6.2)
ERYTHROCYTE [DISTWIDTH] IN BLOOD BY AUTOMATED COUNT: 14.8 % (ref 12.3–15.4)
GEN 5 1HR TROPONIN T REFLEX: 49 NG/L
GLOBULIN UR ELPH-MCNC: 3.3 GM/DL
GLUCOSE BLDC GLUCOMTR-MCNC: 181 MG/DL (ref 70–130)
GLUCOSE SERPL-MCNC: 100 MG/DL (ref 65–99)
GLUCOSE UR STRIP-MCNC: NEGATIVE MG/DL
HCT VFR BLD AUTO: 30.7 % (ref 34–46.6)
HGB BLD-MCNC: 9.6 G/DL (ref 12–15.9)
HGB UR QL STRIP.AUTO: NEGATIVE
HOLD SPECIMEN: NORMAL
HOLD SPECIMEN: NORMAL
HYALINE CASTS UR QL AUTO: ABNORMAL /LPF
IMM GRANULOCYTES # BLD AUTO: 0.01 10*3/MM3 (ref 0–0.05)
IMM GRANULOCYTES NFR BLD AUTO: 0.2 % (ref 0–0.5)
KETONES UR QL STRIP: NEGATIVE
LEUKOCYTE ESTERASE UR QL STRIP.AUTO: NEGATIVE
LYMPHOCYTES # BLD AUTO: 1.03 10*3/MM3 (ref 0.7–3.1)
LYMPHOCYTES NFR BLD AUTO: 24.1 % (ref 19.6–45.3)
MCH RBC QN AUTO: 29.8 PG (ref 26.6–33)
MCHC RBC AUTO-ENTMCNC: 31.3 G/DL (ref 31.5–35.7)
MCV RBC AUTO: 95.3 FL (ref 79–97)
MONOCYTES # BLD AUTO: 0.5 10*3/MM3 (ref 0.1–0.9)
MONOCYTES NFR BLD AUTO: 11.7 % (ref 5–12)
NEUTROPHILS NFR BLD AUTO: 2.6 10*3/MM3 (ref 1.7–7)
NEUTROPHILS NFR BLD AUTO: 61 % (ref 42.7–76)
NITRITE UR QL STRIP: POSITIVE
NRBC BLD AUTO-RTO: 0 /100 WBC (ref 0–0.2)
NT-PROBNP SERPL-MCNC: 8739 PG/ML (ref 0–1800)
PH UR STRIP.AUTO: 7 [PH] (ref 5–8)
PLATELET # BLD AUTO: 132 10*3/MM3 (ref 140–450)
PMV BLD AUTO: 10.4 FL (ref 6–12)
POTASSIUM SERPL-SCNC: 4.6 MMOL/L (ref 3.5–5.2)
PROT SERPL-MCNC: 6.4 G/DL (ref 6–8.5)
PROT UR QL STRIP: NEGATIVE
RBC # BLD AUTO: 3.22 10*6/MM3 (ref 3.77–5.28)
RBC # UR STRIP: ABNORMAL /HPF
REF LAB TEST METHOD: ABNORMAL
SODIUM SERPL-SCNC: 138 MMOL/L (ref 136–145)
SP GR UR STRIP: 1.01 (ref 1–1.03)
SQUAMOUS #/AREA URNS HPF: ABNORMAL /HPF
TROPONIN T % DELTA: 4
TROPONIN T NUMERIC DELTA: 2 NG/L
TROPONIN T SERPL HS-MCNC: 47 NG/L
UROBILINOGEN UR QL STRIP: ABNORMAL
WBC # UR STRIP: ABNORMAL /HPF
WBC NRBC COR # BLD AUTO: 4.27 10*3/MM3 (ref 3.4–10.8)
WHOLE BLOOD HOLD COAG: NORMAL
WHOLE BLOOD HOLD SPECIMEN: NORMAL

## 2025-01-18 PROCEDURE — 80053 COMPREHEN METABOLIC PANEL: CPT | Performed by: EMERGENCY MEDICINE

## 2025-01-18 PROCEDURE — 96376 TX/PRO/DX INJ SAME DRUG ADON: CPT

## 2025-01-18 PROCEDURE — 84484 ASSAY OF TROPONIN QUANT: CPT | Performed by: EMERGENCY MEDICINE

## 2025-01-18 PROCEDURE — 81001 URINALYSIS AUTO W/SCOPE: CPT | Performed by: NURSE PRACTITIONER

## 2025-01-18 PROCEDURE — 93005 ELECTROCARDIOGRAM TRACING: CPT | Performed by: EMERGENCY MEDICINE

## 2025-01-18 PROCEDURE — 71045 X-RAY EXAM CHEST 1 VIEW: CPT

## 2025-01-18 PROCEDURE — 25010000002 FUROSEMIDE PER 20 MG: Performed by: EMERGENCY MEDICINE

## 2025-01-18 PROCEDURE — 25010000002 FUROSEMIDE PER 20 MG: Performed by: NURSE PRACTITIONER

## 2025-01-18 PROCEDURE — G0378 HOSPITAL OBSERVATION PER HR: HCPCS

## 2025-01-18 PROCEDURE — 93010 ELECTROCARDIOGRAM REPORT: CPT | Performed by: STUDENT IN AN ORGANIZED HEALTH CARE EDUCATION/TRAINING PROGRAM

## 2025-01-18 PROCEDURE — 99285 EMERGENCY DEPT VISIT HI MDM: CPT

## 2025-01-18 PROCEDURE — 96374 THER/PROPH/DIAG INJ IV PUSH: CPT

## 2025-01-18 PROCEDURE — 36415 COLL VENOUS BLD VENIPUNCTURE: CPT

## 2025-01-18 PROCEDURE — 83880 ASSAY OF NATRIURETIC PEPTIDE: CPT | Performed by: EMERGENCY MEDICINE

## 2025-01-18 PROCEDURE — 85025 COMPLETE CBC W/AUTO DIFF WBC: CPT | Performed by: EMERGENCY MEDICINE

## 2025-01-18 PROCEDURE — 82948 REAGENT STRIP/BLOOD GLUCOSE: CPT

## 2025-01-18 RX ORDER — IBUPROFEN 600 MG/1
1 TABLET ORAL
Status: DISCONTINUED | OUTPATIENT
Start: 2025-01-18 | End: 2025-01-19 | Stop reason: HOSPADM

## 2025-01-18 RX ORDER — SODIUM CHLORIDE 0.9 % (FLUSH) 0.9 %
10 SYRINGE (ML) INJECTION EVERY 12 HOURS SCHEDULED
Status: DISCONTINUED | OUTPATIENT
Start: 2025-01-18 | End: 2025-01-19 | Stop reason: HOSPADM

## 2025-01-18 RX ORDER — BISACODYL 10 MG
10 SUPPOSITORY, RECTAL RECTAL DAILY PRN
Status: DISCONTINUED | OUTPATIENT
Start: 2025-01-18 | End: 2025-01-19 | Stop reason: HOSPADM

## 2025-01-18 RX ORDER — ATORVASTATIN CALCIUM 20 MG/1
40 TABLET, FILM COATED ORAL NIGHTLY
Status: DISCONTINUED | OUTPATIENT
Start: 2025-01-18 | End: 2025-01-19 | Stop reason: HOSPADM

## 2025-01-18 RX ORDER — INSULIN LISPRO 100 [IU]/ML
3-14 INJECTION, SOLUTION INTRAVENOUS; SUBCUTANEOUS
Status: DISCONTINUED | OUTPATIENT
Start: 2025-01-18 | End: 2025-01-18

## 2025-01-18 RX ORDER — SODIUM CHLORIDE 0.9 % (FLUSH) 0.9 %
10 SYRINGE (ML) INJECTION AS NEEDED
Status: DISCONTINUED | OUTPATIENT
Start: 2025-01-18 | End: 2025-01-19 | Stop reason: HOSPADM

## 2025-01-18 RX ORDER — FUROSEMIDE 10 MG/ML
40 INJECTION INTRAMUSCULAR; INTRAVENOUS ONCE
Status: COMPLETED | OUTPATIENT
Start: 2025-01-18 | End: 2025-01-18

## 2025-01-18 RX ORDER — VENLAFAXINE HYDROCHLORIDE 75 MG/1
75 CAPSULE, EXTENDED RELEASE ORAL DAILY
Status: DISCONTINUED | OUTPATIENT
Start: 2025-01-19 | End: 2025-01-19 | Stop reason: HOSPADM

## 2025-01-18 RX ORDER — BENZONATATE 100 MG/1
100 CAPSULE ORAL 3 TIMES DAILY
Status: DISCONTINUED | OUTPATIENT
Start: 2025-01-18 | End: 2025-01-18

## 2025-01-18 RX ORDER — NICOTINE POLACRILEX 4 MG
15 LOZENGE BUCCAL
Status: DISCONTINUED | OUTPATIENT
Start: 2025-01-18 | End: 2025-01-19 | Stop reason: HOSPADM

## 2025-01-18 RX ORDER — INSULIN LISPRO 100 [IU]/ML
2-7 INJECTION, SOLUTION INTRAVENOUS; SUBCUTANEOUS
Status: DISCONTINUED | OUTPATIENT
Start: 2025-01-19 | End: 2025-01-19 | Stop reason: HOSPADM

## 2025-01-18 RX ORDER — INSULIN LISPRO 100 [IU]/ML
2-7 INJECTION, SOLUTION INTRAVENOUS; SUBCUTANEOUS
Status: DISCONTINUED | OUTPATIENT
Start: 2025-01-18 | End: 2025-01-18

## 2025-01-18 RX ORDER — NITROGLYCERIN 0.4 MG/1
0.4 TABLET SUBLINGUAL
Status: DISCONTINUED | OUTPATIENT
Start: 2025-01-18 | End: 2025-01-19 | Stop reason: HOSPADM

## 2025-01-18 RX ORDER — GUAIFENESIN 600 MG/1
600 TABLET, EXTENDED RELEASE ORAL EVERY 12 HOURS SCHEDULED
Status: DISCONTINUED | OUTPATIENT
Start: 2025-01-18 | End: 2025-01-18

## 2025-01-18 RX ORDER — SODIUM CHLORIDE 9 MG/ML
40 INJECTION, SOLUTION INTRAVENOUS AS NEEDED
Status: DISCONTINUED | OUTPATIENT
Start: 2025-01-18 | End: 2025-01-19 | Stop reason: HOSPADM

## 2025-01-18 RX ORDER — BISACODYL 5 MG/1
5 TABLET, DELAYED RELEASE ORAL DAILY PRN
Status: DISCONTINUED | OUTPATIENT
Start: 2025-01-18 | End: 2025-01-19 | Stop reason: HOSPADM

## 2025-01-18 RX ORDER — AMOXICILLIN 250 MG
2 CAPSULE ORAL 2 TIMES DAILY PRN
Status: DISCONTINUED | OUTPATIENT
Start: 2025-01-18 | End: 2025-01-19 | Stop reason: HOSPADM

## 2025-01-18 RX ORDER — DEXTROSE MONOHYDRATE 25 G/50ML
25 INJECTION, SOLUTION INTRAVENOUS
Status: DISCONTINUED | OUTPATIENT
Start: 2025-01-18 | End: 2025-01-19 | Stop reason: HOSPADM

## 2025-01-18 RX ORDER — ISOSORBIDE DINITRATE 10 MG/1
10 TABLET ORAL
Status: DISCONTINUED | OUTPATIENT
Start: 2025-01-19 | End: 2025-01-19 | Stop reason: HOSPADM

## 2025-01-18 RX ORDER — POLYETHYLENE GLYCOL 3350 17 G/17G
17 POWDER, FOR SOLUTION ORAL DAILY PRN
Status: DISCONTINUED | OUTPATIENT
Start: 2025-01-18 | End: 2025-01-19 | Stop reason: HOSPADM

## 2025-01-18 RX ORDER — CARVEDILOL 12.5 MG/1
12.5 TABLET ORAL EVERY 12 HOURS SCHEDULED
Status: DISCONTINUED | OUTPATIENT
Start: 2025-01-18 | End: 2025-01-19 | Stop reason: HOSPADM

## 2025-01-18 RX ORDER — ACETAMINOPHEN 325 MG/1
650 TABLET ORAL EVERY 4 HOURS PRN
Status: DISCONTINUED | OUTPATIENT
Start: 2025-01-18 | End: 2025-01-19 | Stop reason: HOSPADM

## 2025-01-18 RX ADMIN — FUROSEMIDE 40 MG: 10 INJECTION, SOLUTION INTRAMUSCULAR; INTRAVENOUS at 19:05

## 2025-01-18 RX ADMIN — FUROSEMIDE 40 MG: 10 INJECTION, SOLUTION INTRAMUSCULAR; INTRAVENOUS at 17:55

## 2025-01-18 RX ADMIN — Medication 10 ML: at 22:21

## 2025-01-18 RX ADMIN — ATORVASTATIN CALCIUM 40 MG: 20 TABLET, FILM COATED ORAL at 22:22

## 2025-01-18 RX ADMIN — APIXABAN 2.5 MG: 2.5 TABLET, FILM COATED ORAL at 22:17

## 2025-01-18 RX ADMIN — CARVEDILOL 12.5 MG: 12.5 TABLET, FILM COATED ORAL at 22:17

## 2025-01-18 NOTE — ED NOTES
Nursing report ED to floor  Lowell Min  85 y.o.  female    HPI :  HPI  Stated Reason for Visit: leg swelling, exertional soa  History Obtained From: patient, family    Chief Complaint  Chief Complaint   Patient presents with    Shortness of Breath    Leg Swelling       Admitting doctor:   Ousmane Reed MD    Admitting diagnosis:   The primary encounter diagnosis was Acute on chronic congestive heart failure, unspecified heart failure type. Diagnoses of Chronic kidney disease, unspecified CKD stage and Elevated troponin were also pertinent to this visit.    Code status:   Current Code Status       Date Active Code Status Order ID Comments User Context       Prior            Allergies:   Ibuprofen    Isolation:   No active isolations    Intake and Output  No intake or output data in the 24 hours ending 01/18/25 1808    Weight:   There were no vitals filed for this visit.    Most recent vitals:   Vitals:    01/18/25 1601 01/18/25 1631 01/18/25 1701 01/18/25 1731   BP: 129/82 131/79 138/75 132/79   Pulse: 67 72 73 71   Resp:       Temp:       SpO2: 95% 95% 97% 96%       Active LDAs/IV Access:   Lines, Drains & Airways       Active LDAs       Name Placement date Placement time Site Days    Peripheral IV 01/18/25 1546 Anterior;Left Forearm 01/18/25  1546  Forearm  less than 1                    Labs (abnormal labs have a star):   Labs Reviewed   COMPREHENSIVE METABOLIC PANEL - Abnormal; Notable for the following components:       Result Value    Glucose 100 (*)     BUN 37 (*)     Creatinine 2.52 (*)     Albumin 3.1 (*)     Alkaline Phosphatase 214 (*)     eGFR 18.2 (*)     All other components within normal limits    Narrative:     GFR Categories in Chronic Kidney Disease (CKD)      GFR Category          GFR (mL/min/1.73)    Interpretation  G1                     90 or greater         Normal or high (1)  G2                      60-89                Mild decrease (1)  G3a                   45-59                Mild to  moderate decrease  G3b                   30-44                Moderate to severe decrease  G4                    15-29                Severe decrease  G5                    14 or less           Kidney failure          (1)In the absence of evidence of kidney disease, neither GFR category G1 or G2 fulfill the criteria for CKD.    eGFR calculation 2021 CKD-EPI creatinine equation, which does not include race as a factor   BNP (IN-HOUSE) - Abnormal; Notable for the following components:    proBNP 8,739.0 (*)     All other components within normal limits    Narrative:     This assay is used as an aid in the diagnosis of individuals suspected of having heart failure. It can be used as an aid in the diagnosis of acute decompensated heart failure (ADHF) in patients presenting with signs and symptoms of ADHF to the emergency department (ED). In addition, NT-proBNP of <300 pg/mL indicates ADHF is not likely.    Age Range Result Interpretation  NT-proBNP Concentration (pg/mL:      <50             Positive            >450                   Gray                 300-450                    Negative             <300    50-75           Positive            >900                  Gray                300-900                  Negative            <300      >75             Positive            >1800                  Gray                300-1800                  Negative            <300   TROPONIN - Abnormal; Notable for the following components:    HS Troponin T 47 (*)     All other components within normal limits    Narrative:     High Sensitive Troponin T Reference Range:  <14.0 ng/L- Negative Female for AMI  <22.0 ng/L- Negative Male for AMI  >=14 - Abnormal Female indicating possible myocardial injury.  >=22 - Abnormal Male indicating possible myocardial injury.   Clinicians would have to utilize clinical acumen, EKG, Troponin, and serial changes to determine if it is an Acute Myocardial Infarction or myocardial injury due to an  underlying chronic condition.        CBC WITH AUTO DIFFERENTIAL - Abnormal; Notable for the following components:    RBC 3.22 (*)     Hemoglobin 9.6 (*)     Hematocrit 30.7 (*)     MCHC 31.3 (*)     Platelets 132 (*)     All other components within normal limits   HIGH SENSITIVITIY TROPONIN T 1HR - Abnormal; Notable for the following components:    HS Troponin T 49 (*)     All other components within normal limits    Narrative:     High Sensitive Troponin T Reference Range:  <14.0 ng/L- Negative Female for AMI  <22.0 ng/L- Negative Male for AMI  >=14 - Abnormal Female indicating possible myocardial injury.  >=22 - Abnormal Male indicating possible myocardial injury.   Clinicians would have to utilize clinical acumen, EKG, Troponin, and serial changes to determine if it is an Acute Myocardial Infarction or myocardial injury due to an underlying chronic condition.        RAINBOW DRAW    Narrative:     The following orders were created for panel order [facility] Draw.  Procedure                               Abnormality         Status                     ---------                               -----------         ------                     Green Top (Gel)[[id number]]                                  Final result               Lavender Top[[id number]]                                     Final result               Gold Top - SST[[id number]]                                   Final result               Light Blue Top[[id number]]                                   Final result                 Please view results for these tests on the individual orders.   CBC AND DIFFERENTIAL    Narrative:     The following orders were created for panel order CBC & Differential.  Procedure                               Abnormality         Status                     ---------                               -----------         ------                     CBC Auto Differential[[id number]]        Abnormal            Final result                 Please  view results for these tests on the individual orders.   GREEN TOP   LAVENDER TOP   GOLD TOP - SST   LIGHT BLUE TOP       EKG:   ECG 12 Lead ED Triage Standing Order; SOA   Preliminary Result   HEART RATE=73  bpm   RR Gqccgsfw=118  ms   CA Interval=  ms   P Horizontal Axis=  deg   P Front Axis=  deg   QRSD Pihcnysq=716  ms   QT Keciewhj=268  ms   PGnM=354  ms   QRS Axis=-43  deg   T Wave Axis=118  deg   - ABNORMAL ECG -   Atrial fibrillation   Left bundle branch block   Date and Time of Study:2025-01-18 15:47:32          Meds given in ED:   Medications   sodium chloride 0.9 % flush 10 mL (has no administration in time range)   furosemide (LASIX) injection 40 mg (40 mg Intravenous Given 1/18/25 1755)       Imaging results:  XR Chest 1 View    Result Date: 1/18/2025  Stable enlarged cardiac silhouette. Central pulmonary vasculature remains relatively distinct. No focal consolidation. No measurable pleural effusion or pneumothorax. No focal osseous abnormality.  This report was finalized on 1/18/2025 4:21 PM by Dr. Rodo Armas M.D on Workstation: [id number]       Ambulatory status:   - standby    Social issues:   Social History     Socioeconomic History    Marital status: Single   Tobacco Use    Smoking status: Never     Passive exposure: Never    Smokeless tobacco: Never   Vaping Use    Vaping status: Never Used   Substance and Sexual Activity    Alcohol use: Never    Drug use: Never    Sexual activity: Defer       Peripheral Neurovascular  Peripheral Neurovascular (Adult)  Peripheral Neurovascular WDL: WDL  Edema  Edema: leg, left, leg, right  Leg, Left Edema: 1+ (Trace)  Leg, Right Edema: 2+ (Mild)    Neuro Cognitive  Neuro Cognitive (Adult)  Cognitive/Neuro/Behavioral WDL: WDL    Learning  Learning Assessment  Learning Readiness and Ability: no barriers identified    Respiratory  Respiratory WDL  Respiratory WDL: .WDL except, all  Rhythm/Pattern, Respiratory: shortness of breath    Abdominal Pain       Pain  Assessments  Pain (Adult)  (0-10) Pain Rating: Rest: 0  (0-10) Pain Rating: Activity: 0    NIH Stroke Scale       Silvia Ibarra RN  01/18/25 18:08 EST

## 2025-01-18 NOTE — ED TRIAGE NOTES
Patient to ED via pv from home. Patient c/o bilateral leg swelling and exertional shortness of breath. Patient had labs done at PCP (Dr. Mathews) yesterday.

## 2025-01-18 NOTE — PROGRESS NOTES
Pt admitted to the observation unit from the emergency department with complaints of increased peripheral edema and some weight gain after hospitalization near [place] for similar.  Minimal if any exertional shortness of breath.  Has been compliant with Lasix.  Follows with cardiology at [place] and follows with Dr. Foster with nephrology.     On exam,   General: No acute distress, nontoxic  HEENT: EOMI  Pulm: Symmetric chest rise, nonlabored breathing, no overt wheezing or rales  CV: Regular rate and rhythm, minimal nonpitting edema bilaterally  GI: Nondistended  MSK: No deformity  Skin: Warm, dry  Neuro: Awake, alert, oriented x 4, moving all extremities, no focal deficits  Psych: Calm, cooperative    Vital signs and nursing notes reviewed.           Plan: Chest x-ray without significant signs of edema or effusion, oxygen saturations in the mid upper 90s on room air.  She reports weight gain of maybe 10 to 12 pounds since being discharged on [place] [facility].  Will plan for diuresis, nephrology consult, and further monitoring.  Creatinine actually improved today as compared to prior at 2.5, BNP down from 4 weeks ago where it was [phone] it is now 8739.         MD Attestation Note    SHARED VISIT: This visit was performed by BOTH a physician and an APC. The substantive portion of the medical decision making was performed by this attesting physician who made or approved the management plan and takes responsibility for patient management. All studies in the APC note (if performed) were independently interpreted by me.

## 2025-01-18 NOTE — ED PROVIDER NOTES
EMERGENCY DEPARTMENT ENCOUNTER    Room Number:  31/31  PCP: Dannie Mathews MD  Historian: Patient      HPI:  Chief Complaint: Shortness of breath/edema  A complete HPI/ROS/PMH/PSH/SH/FH are unobtainable due to: None  Context: Lowell Min is a 85 y.o. female who presents to the ED c/o fairly sudden onset shortness of breath with associated lower extremity edema that is now been present and ongoing for the last several days.  She does report some weight gain associated with this.  She does have a history of congestive heart failure and reports that she is on 40 mg of Lasix once daily those symptoms have been steadily progressing despite that treatment.  She does state that the shortness of breath is made worse by exertion.  She denies chest pain, back pain, abdominal pain, fever/chills, or nausea/vomiting.            PAST MEDICAL HISTORY  Active Ambulatory Problems     Diagnosis Date Noted    Chronic kidney disease, stage IV (severe) 03/31/2022    Erythropoietin deficiency anemia 03/31/2022    Symptomatic anemia 04/03/2022    Anxiety associated with depression 04/03/2022    Iron deficiency anemia 04/03/2022    PAF (paroxysmal atrial fibrillation) 04/03/2022    Shortness of breath 04/03/2022    Chronic diastolic congestive heart failure 04/03/2022    Diabetic polyneuropathy associated with type 2 diabetes mellitus 04/12/2022    PERLA (acute kidney injury) 10/07/2022    Foot pain, bilateral 10/13/2022    Anemia of chronic renal failure 06/27/2023    Acute on chronic diastolic CHF (congestive heart failure) 09/06/2024    Obesity (BMI 30-39.9) 09/06/2024    HLD (hyperlipidemia) 09/06/2024    Calcium pyrophosphate deposition disease (CPPD) 09/10/2024    Type 2 diabetes mellitus     Acute on chronic systolic CHF (congestive heart failure) 10/21/2024    Chronic respiratory failure with hypoxia 10/21/2024    CHF exacerbation 12/20/2024     Resolved Ambulatory Problems     Diagnosis Date Noted    Hypomagnesemia 10/12/2022      Past Medical History:   Diagnosis Date    Anxiety     Atrial fibrillation     CHF (congestive heart failure)     Depression     Elevated cholesterol     Hypertension          PAST SURGICAL HISTORY  Past Surgical History:   Procedure Laterality Date    APPENDECTOMY      CHOLECYSTECTOMY      COLONOSCOPY      CYSTOSCOPY BOTOX INJECTION OF BLADDER N/A     HYSTERECTOMY      TUMOR REMOVAL Left     benign tumor from left breast         FAMILY HISTORY  Family History   Problem Relation Age of Onset    Heart disease Mother          SOCIAL HISTORY  Social History     Socioeconomic History    Marital status: Single   Tobacco Use    Smoking status: Never     Passive exposure: Never    Smokeless tobacco: Never   Vaping Use    Vaping status: Never Used   Substance and Sexual Activity    Alcohol use: Never    Drug use: Never    Sexual activity: Defer         ALLERGIES  Ibuprofen        REVIEW OF SYSTEMS  Review of Systems   Constitutional:  Negative for fever.   HENT:  Negative for sore throat.    Eyes: Negative.    Respiratory:  Positive for shortness of breath. Negative for cough.    Cardiovascular:  Positive for leg swelling. Negative for chest pain.   Gastrointestinal:  Negative for abdominal pain, diarrhea and vomiting.   Genitourinary:  Negative for dysuria.   Musculoskeletal:  Negative for neck pain.   Skin:  Negative for rash.   Allergic/Immunologic: Negative.    Neurological:  Negative for weakness, numbness and headaches.   Hematological: Negative.    Psychiatric/Behavioral: Negative.     All other systems reviewed and are negative.         PHYSICAL EXAM  ED Triage Vitals   Temp Heart Rate Resp BP SpO2   01/18/25 1524 01/18/25 1524 01/18/25 1524 01/18/25 1540 01/18/25 1524   96.7 °F (35.9 °C) 74 18 122/65 97 %      Temp src Heart Rate Source Patient Position BP Location FiO2 (%)   -- -- -- -- --              Physical Exam  Constitutional:       General: She is not in acute distress.     Appearance: Normal appearance.  She is not ill-appearing or toxic-appearing.   HENT:      Head: Normocephalic and atraumatic.   Eyes:      Extraocular Movements: Extraocular movements intact.      Pupils: Pupils are equal, round, and reactive to light.   Cardiovascular:      Rate and Rhythm: Normal rate and regular rhythm.      Heart sounds: No murmur heard.     No friction rub. No gallop.   Pulmonary:      Effort: Pulmonary effort is normal.      Breath sounds: Normal breath sounds. Examination of the right-lower field reveals rhonchi. Examination of the left-lower field reveals rhonchi.   Abdominal:      General: Abdomen is flat. There is no distension.      Palpations: Abdomen is soft.      Tenderness: There is no abdominal tenderness.   Musculoskeletal:         General: No swelling or tenderness. Normal range of motion.      Cervical back: Normal range of motion and neck supple.      Right lower leg: Edema present.      Left lower leg: Edema present.   Skin:     General: Skin is warm and dry.   Neurological:      General: No focal deficit present.      Mental Status: She is alert and oriented to person, place, and time.      Sensory: No sensory deficit.      Motor: No weakness.   Psychiatric:         Mood and Affect: Mood normal.         Behavior: Behavior normal.         Vital signs and nursing notes reviewed.          LAB RESULTS  Recent Results (from the past 24 hours)   Comprehensive Metabolic Panel    Collection Time: 01/18/25  3:47 PM    Specimen: Blood   Result Value Ref Range    Glucose 100 (H) 65 - 99 mg/dL    BUN 37 (H) 8 - 23 mg/dL    Creatinine 2.52 (H) 0.57 - 1.00 mg/dL    Sodium 138 136 - 145 mmol/L    Potassium 4.6 3.5 - 5.2 mmol/L    Chloride 105 98 - 107 mmol/L    CO2 22.0 22.0 - 29.0 mmol/L    Calcium 8.7 8.6 - 10.5 mg/dL    Total Protein 6.4 6.0 - 8.5 g/dL    Albumin 3.1 (L) 3.5 - 5.2 g/dL    ALT (SGPT) 11 1 - 33 U/L    AST (SGOT) 24 1 - 32 U/L    Alkaline Phosphatase 214 (H) 39 - 117 U/L    Total Bilirubin 1.1 0.0 - 1.2  mg/dL    Globulin 3.3 gm/dL    A/G Ratio 0.9 g/dL    BUN/Creatinine Ratio 14.7 7.0 - 25.0    Anion Gap 11.0 5.0 - 15.0 mmol/L    eGFR 18.2 (L) >60.0 mL/min/1.73   BNP    Collection Time: 01/18/25  3:47 PM    Specimen: Blood   Result Value Ref Range    proBNP 8,739.0 (H) 0.0 - 1,800.0 pg/mL   High Sensitivity Troponin T    Collection Time: 01/18/25  3:47 PM    Specimen: Blood   Result Value Ref Range    HS Troponin T 47 (H) <14 ng/L   Green Top (Gel)    Collection Time: 01/18/25  3:47 PM   Result Value Ref Range    Extra Tube Hold for add-ons.    Lavender Top    Collection Time: 01/18/25  3:47 PM   Result Value Ref Range    Extra Tube hold for add-on    Gold Top - SST    Collection Time: 01/18/25  3:47 PM   Result Value Ref Range    Extra Tube Hold for add-ons.    Light Blue Top    Collection Time: 01/18/25  3:47 PM   Result Value Ref Range    Extra Tube Hold for add-ons.    CBC Auto Differential    Collection Time: 01/18/25  3:47 PM    Specimen: Blood   Result Value Ref Range    WBC 4.27 3.40 - 10.80 10*3/mm3    RBC 3.22 (L) 3.77 - 5.28 10*6/mm3    Hemoglobin 9.6 (L) 12.0 - 15.9 g/dL    Hematocrit 30.7 (L) 34.0 - 46.6 %    MCV 95.3 79.0 - 97.0 fL    MCH 29.8 26.6 - 33.0 pg    MCHC 31.3 (L) 31.5 - 35.7 g/dL    RDW 14.8 12.3 - 15.4 %    RDW-SD 51.5 37.0 - 54.0 fl    MPV 10.4 6.0 - 12.0 fL    Platelets 132 (L) 140 - 450 10*3/mm3    Neutrophil % 61.0 42.7 - 76.0 %    Lymphocyte % 24.1 19.6 - 45.3 %    Monocyte % 11.7 5.0 - 12.0 %    Eosinophil % 2.8 0.3 - 6.2 %    Basophil % 0.2 0.0 - 1.5 %    Immature Grans % 0.2 0.0 - 0.5 %    Neutrophils, Absolute 2.60 1.70 - 7.00 10*3/mm3    Lymphocytes, Absolute 1.03 0.70 - 3.10 10*3/mm3    Monocytes, Absolute 0.50 0.10 - 0.90 10*3/mm3    Eosinophils, Absolute 0.12 0.00 - 0.40 10*3/mm3    Basophils, Absolute 0.01 0.00 - 0.20 10*3/mm3    Immature Grans, Absolute 0.01 0.00 - 0.05 10*3/mm3    nRBC 0.0 0.0 - 0.2 /100 WBC   ECG 12 Lead ED Triage Standing Order; SOA    Collection Time:  01/18/25  3:47 PM   Result Value Ref Range    QT Interval 487 ms    QTC Interval 537 ms   High Sensitivity Troponin T 1Hr    Collection Time: 01/18/25  4:57 PM    Specimen: Blood   Result Value Ref Range    HS Troponin T 49 (H) <14 ng/L    Troponin T Numeric Delta 2 ng/L    Troponin T % Delta 4 Abnormal if >/= 20%       Ordered the above labs and reviewed the results.        RADIOLOGY  XR Chest 1 View    Result Date: 1/18/2025  XR CHEST 1 VW-  INDICATION: Shortness of breath  COMPARISON: Chest radiographs dating back to 4/3/2022      Stable enlarged cardiac silhouette. Central pulmonary vasculature remains relatively distinct. No focal consolidation. No measurable pleural effusion or pneumothorax. No focal osseous abnormality.  This report was finalized on 1/18/2025 4:21 PM by Dr. Rodo Armas M.D on Workstation: [employer]       Ordered the above noted radiological studies. Reviewed by me in PACS.            PROCEDURES  Procedures    EKG independently interpreted by myself as follows:    EKG          EKG time: 1547  Rhythm/Rate: atrial fibrillation, 73  P waves and HI: absent  QRS, axis: widened, LAD, LBBB   ST and T waves: nml     Interpreted Contemporaneously by me, independently viewed  unchanged compared to prior 12/20/24            MEDICATIONS GIVEN IN ER  Medications   sodium chloride 0.9 % flush 10 mL (has no administration in time range)   furosemide (LASIX) injection 40 mg (has no administration in time range)   furosemide (LASIX) injection 40 mg (40 mg Intravenous Given 1/18/25 1755)                   MEDICAL DECISION MAKING, PROGRESS, and CONSULTS    All labs have been independently reviewed by me.  All radiology studies have been reviewed by me and I have also reviewed the radiology report.   EKG's independently viewed and interpreted by me.  Discussion below represents my analysis of pertinent findings related to patient's condition, differential diagnosis, treatment plan and final  disposition.      Additional sources:  - Discussed/ obtained information from independent historians: History obtained from the patient as well as the patient's family at bedside.    - External (non-ED) record review: Upon medical records review, the patient was most recently admitted to the hospital from 12/20/2024 through 12/24/2024 due to lower extremity swelling where she was diagnosed and treated for a CHF exacerbation.    - Chronic or social conditions impacting care: Anticoagulation dependent history of atrial fibrillation, congestive heart failure, CKD    - Shared decision making: Patient decision based on shared conversations have between myself, the patient and family at bedside, as well as RAMAKRISHNA Fields.      Orders placed during this visit:  Orders Placed This Encounter   Procedures    XR Chest 1 View    [place] Draw    Comprehensive Metabolic Panel    BNP    High Sensitivity Troponin T    CBC Auto Differential    High Sensitivity Troponin T 1Hr    NPO Diet NPO Type: Strict NPO    Undress & Gown    Continuous Pulse Oximetry    Vital Signs    Oxygen Therapy- Nasal Cannula; Titrate 1-6 LPM Per SpO2; 90 - 95%    ECG 12 Lead ED Triage Standing Order; SOA    Insert Peripheral IV    Initiate ED Observation Status    CBC & Differential    Green Top (Gel)    Lavender Top    Gold Top - SST    Light Blue Top             Differential diagnosis includes but is not limited to:    CHF with acute exacerbation, pulmonary edema, acute coronary syndrome, peripheral edema, acute on chronic renal insufficiency/renal failure, acute anemia, or electrolyte disturbance      Independent interpretation of labs, radiology studies, and discussions with consultants:    Chest x-ray independently interpreted by myself with my interpretation showing cardiomegaly without edematous changes or consolidation.      ED Course as of 01/18/25 1828   Sat Jan 18, 2025   1806 The patient's presentation, workup, as well as diagnosis and  treatment plan was discussed at length with RAMAKRISHNA Fields.  She agrees to admit the patient to the observation unit today with Dr. Reed. [BM]      ED Course User Index  [BM] Evangelist Tristan MD             DIAGNOSIS  Final diagnoses:   Acute on chronic congestive heart failure, unspecified heart failure type   Chronic kidney disease, unspecified CKD stage   Elevated troponin         DISPOSITION  ADMISSION    Discussed treatment plan and reason for admission with pt/family and admitting physician.  Pt/family voiced understanding of the plan for admission for further testing/treatment as needed.               Latest Documented Vital Signs:  As of 18:28 EST  BP- 129/64 HR- 71 Temp- 96.7 °F (35.9 °C) O2 sat- 98%              --    Please note that portions of this were completed with a voice recognition program.       Note Disclaimer: At [facility], we believe that sharing information builds trust and better relationships. You are receiving this note because you are receiving care at [facility] or recently visited. It is possible you will see health information before a provider has talked with you about it. This kind of information can be easy to misunderstand. To help you fully understand what it means for your health, we urge you to discuss this note with your provider.             Evangelist Tristan MD  01/18/25 [id number]

## 2025-01-19 ENCOUNTER — READMISSION MANAGEMENT (OUTPATIENT)
Dept: CALL CENTER | Facility: HOSPITAL | Age: 85
End: 2025-01-19
Payer: MEDICARE

## 2025-01-19 VITALS
HEIGHT: 67 IN | OXYGEN SATURATION: 93 % | TEMPERATURE: 97.5 F | WEIGHT: 200 LBS | RESPIRATION RATE: 17 BRPM | BODY MASS INDEX: 31.39 KG/M2 | SYSTOLIC BLOOD PRESSURE: 126 MMHG | HEART RATE: 56 BPM | DIASTOLIC BLOOD PRESSURE: 84 MMHG

## 2025-01-19 LAB
ANION GAP SERPL CALCULATED.3IONS-SCNC: 10 MMOL/L (ref 5–15)
BASOPHILS # BLD AUTO: 0.02 10*3/MM3 (ref 0–0.2)
BASOPHILS NFR BLD AUTO: 0.6 % (ref 0–1.5)
BUN SERPL-MCNC: 39 MG/DL (ref 8–23)
BUN/CREAT SERPL: 15.5 (ref 7–25)
CALCIUM SPEC-SCNC: 8.6 MG/DL (ref 8.6–10.5)
CHLORIDE SERPL-SCNC: 107 MMOL/L (ref 98–107)
CO2 SERPL-SCNC: 19 MMOL/L (ref 22–29)
CREAT SERPL-MCNC: 2.51 MG/DL (ref 0.57–1)
DEPRECATED RDW RBC AUTO: 51.2 FL (ref 37–54)
EGFRCR SERPLBLD CKD-EPI 2021: 18.3 ML/MIN/1.73
EOSINOPHIL # BLD AUTO: 0.17 10*3/MM3 (ref 0–0.4)
EOSINOPHIL NFR BLD AUTO: 4.9 % (ref 0.3–6.2)
ERYTHROCYTE [DISTWIDTH] IN BLOOD BY AUTOMATED COUNT: 15 % (ref 12.3–15.4)
GLUCOSE BLDC GLUCOMTR-MCNC: 114 MG/DL (ref 70–130)
GLUCOSE BLDC GLUCOMTR-MCNC: 81 MG/DL (ref 70–130)
GLUCOSE SERPL-MCNC: 64 MG/DL (ref 65–99)
HBA1C MFR BLD: 6.3 % (ref 4.8–5.6)
HCT VFR BLD AUTO: 28.7 % (ref 34–46.6)
HGB BLD-MCNC: 9.2 G/DL (ref 12–15.9)
IMM GRANULOCYTES # BLD AUTO: 0.01 10*3/MM3 (ref 0–0.05)
IMM GRANULOCYTES NFR BLD AUTO: 0.3 % (ref 0–0.5)
LYMPHOCYTES # BLD AUTO: 1.02 10*3/MM3 (ref 0.7–3.1)
LYMPHOCYTES NFR BLD AUTO: 29.5 % (ref 19.6–45.3)
MCH RBC QN AUTO: 30.1 PG (ref 26.6–33)
MCHC RBC AUTO-ENTMCNC: 32.1 G/DL (ref 31.5–35.7)
MCV RBC AUTO: 93.8 FL (ref 79–97)
MONOCYTES # BLD AUTO: 0.4 10*3/MM3 (ref 0.1–0.9)
MONOCYTES NFR BLD AUTO: 11.6 % (ref 5–12)
NEUTROPHILS NFR BLD AUTO: 1.84 10*3/MM3 (ref 1.7–7)
NEUTROPHILS NFR BLD AUTO: 53.1 % (ref 42.7–76)
NRBC BLD AUTO-RTO: 0 /100 WBC (ref 0–0.2)
PLATELET # BLD AUTO: 127 10*3/MM3 (ref 140–450)
PMV BLD AUTO: 10.2 FL (ref 6–12)
POTASSIUM SERPL-SCNC: 4.8 MMOL/L (ref 3.5–5.2)
QT INTERVAL: 487 MS
QTC INTERVAL: 537 MS
RBC # BLD AUTO: 3.06 10*6/MM3 (ref 3.77–5.28)
SODIUM SERPL-SCNC: 136 MMOL/L (ref 136–145)
TROPONIN T SERPL HS-MCNC: 44 NG/L
WBC NRBC COR # BLD AUTO: 3.46 10*3/MM3 (ref 3.4–10.8)

## 2025-01-19 PROCEDURE — G0378 HOSPITAL OBSERVATION PER HR: HCPCS

## 2025-01-19 PROCEDURE — 80048 BASIC METABOLIC PNL TOTAL CA: CPT

## 2025-01-19 PROCEDURE — 96376 TX/PRO/DX INJ SAME DRUG ADON: CPT

## 2025-01-19 PROCEDURE — 63710000001 INSULIN LISPRO (HUMAN) PER 5 UNITS

## 2025-01-19 PROCEDURE — 83036 HEMOGLOBIN GLYCOSYLATED A1C: CPT

## 2025-01-19 PROCEDURE — 93005 ELECTROCARDIOGRAM TRACING: CPT

## 2025-01-19 PROCEDURE — 84484 ASSAY OF TROPONIN QUANT: CPT

## 2025-01-19 PROCEDURE — 85025 COMPLETE CBC W/AUTO DIFF WBC: CPT

## 2025-01-19 PROCEDURE — 93010 ELECTROCARDIOGRAM REPORT: CPT | Performed by: INTERNAL MEDICINE

## 2025-01-19 PROCEDURE — 82948 REAGENT STRIP/BLOOD GLUCOSE: CPT

## 2025-01-19 PROCEDURE — 25010000002 FUROSEMIDE PER 20 MG

## 2025-01-19 RX ORDER — INSULIN LISPRO 100 [IU]/ML
2 INJECTION, SOLUTION INTRAVENOUS; SUBCUTANEOUS ONCE
Status: COMPLETED | OUTPATIENT
Start: 2025-01-19 | End: 2025-01-19

## 2025-01-19 RX ORDER — FUROSEMIDE 10 MG/ML
80 INJECTION INTRAMUSCULAR; INTRAVENOUS DAILY
Status: DISCONTINUED | OUTPATIENT
Start: 2025-01-19 | End: 2025-01-19 | Stop reason: HOSPADM

## 2025-01-19 RX ADMIN — Medication 10 ML: at 09:09

## 2025-01-19 RX ADMIN — APIXABAN 2.5 MG: 2.5 TABLET, FILM COATED ORAL at 09:09

## 2025-01-19 RX ADMIN — VENLAFAXINE HYDROCHLORIDE 75 MG: 75 CAPSULE, EXTENDED RELEASE ORAL at 09:09

## 2025-01-19 RX ADMIN — ISOSORBIDE DINITRATE 10 MG: 10 TABLET ORAL at 09:00

## 2025-01-19 RX ADMIN — ACETAMINOPHEN 650 MG: 325 TABLET ORAL at 04:21

## 2025-01-19 RX ADMIN — INSULIN LISPRO 2 UNITS: 100 INJECTION, SOLUTION INTRAVENOUS; SUBCUTANEOUS at 00:53

## 2025-01-19 RX ADMIN — FUROSEMIDE 80 MG: 10 INJECTION, SOLUTION INTRAMUSCULAR; INTRAVENOUS at 09:08

## 2025-01-19 RX ADMIN — CARVEDILOL 12.5 MG: 12.5 TABLET, FILM COATED ORAL at 09:09

## 2025-01-19 NOTE — PROGRESS NOTES
MD ATTESTATION NOTE     SHARED VISIT: This visit was performed by BOTH a physician and an APC. The substantive portion of the medical decision making was performed by this attesting physician who made or approved the management plan and takes responsibility for patient management. All studies in the APC note (if performed) were independently interpreted by me.  The ELISSA and I have discussed this patient's history, physical exam, and treatment plan. I have reviewed the documentation and personally had a face to face interaction with the patient. I affirm the documentation and agree with the treatment and plan. I provided a substantive portion of the care of the patient.  I personally performed the physical exam in its entirety, and below are my findings.      Brief HPI: Patient is feeling better this morning.  Denies chest pain or shortness of breath.    PHYSICAL EXAM    GENERAL: Awake and alert.  Nontoxic-appearing elderly female.  Resting comfortably in no acute distress  HENT: nares patent  EYES: no scleral icterus  CV: regular rhythm, normal rate  RESPIRATORY: normal effort, CTAB  ABDOMEN: soft, nontender  MUSCULOSKELETAL: no deformity  NEURO: alert, moves all extremities, follows commands  PSYCH:  calm, cooperative  SKIN: warm, dry    Vital signs and nursing notes reviewed.        Plan: Renal function is stable this morning.  Troponin remains flat.  She has had a urine output of 1.2 L over the past 24 hours.  Nephrology consult is pending.

## 2025-01-19 NOTE — CONSULTS
[facility]                                                                                             Nephrology Initial Consult Note    Patient Identification:  Name: Lowell Min MRN: [id number]  Age: 85 y.o. : 1940  Sex: female  Date:2025    Requesting Physician: As per consult order.  Reason for Consultation: CHF exacerbation  Information from:patient/ family/ chart      History of Present Illness: This is a 85 y.o. year old female with stage IV chronic kidney disease, CHF.  Known to us from previous admissions in the office.  Baseline creatinine usually runs around 2.5 and she is near that level today.  Came in with shortness of breath and a 15 pound weight gain with increasing edema.  She has received several doses of IV Lasix with improvement in her symptoms today.  I am not sure if her weight yesterday was accurate but her weight today is back down to 200 which is about her baseline weight she has no edema is not using any oxygen denies complaints.  No fevers chills cough or congestion, no chest pain.    The following medical history and medications personally reviewed by me:    Problem List:     CHF exacerbation      Past Medical History:  Past Medical History:   Diagnosis Date    Anxiety     Atrial fibrillation     CHF (congestive heart failure)     Chronic kidney disease, stage IV (severe)     Depression     Elevated cholesterol     Hypertension     Type 2 diabetes mellitus        Past Surgical History:  Past Surgical History:   Procedure Laterality Date    APPENDECTOMY      CHOLECYSTECTOMY      COLONOSCOPY      CYSTOSCOPY BOTOX INJECTION OF BLADDER N/A     HYSTERECTOMY      TUMOR REMOVAL Left     benign tumor from left breast        Home Meds:   Medications Prior to Admission   Medication Sig Dispense Refill Last Dose/Taking    acetaminophen (TYLENOL) 325 MG tablet Take 2 tablets by  mouth Every 4 (Four) Hours As Needed for Mild Pain .   Past Week Morning    apixaban (ELIQUIS) 2.5 MG tablet tablet Take 1 tablet by mouth 2 (Two) Times a Day. Indications: Atrial Fibrillation 60 tablet  1/18/2025 Morning    atorvastatin (LIPITOR) 40 MG tablet Take 1 tablet by mouth every night at bedtime.   1/17/2025 Evening    carvedilol (COREG) 12.5 MG tablet Take 1 tablet by mouth 2 (two) times daily with meals.   1/18/2025 Morning    furosemide (LASIX) 40 MG tablet Take 1 tablet by mouth Daily.   1/18/2025 Morning    isosorbide dinitrate (ISORDIL) 10 MG tablet Take 1 tablet by mouth 3 (Three) Times a Day.   1/18/2025 Morning    lidocaine (LIDODERM) 5 % Place 1 patch on the skin as directed by provider Daily As Needed for Mild Pain. Remove & Discard patch within 12 hours or as directed by MD   Past Month Morning    O2 (OXYGEN) 2 L/min Every Night. Uses when sleeping   1/17/2025 Evening    sennosides-docusate (PERICOLACE) 8.6-50 MG per tablet Take 2 tablets by mouth 2 (Two) Times a Day As Needed for Constipation.   Past Week Morning    venlafaxine XR (EFFEXOR-XR) 75 MG 24 hr capsule Take 1 capsule by mouth Daily.   1/18/2025 Morning    Diclofenac Sodium (VOLTAREN) 1 % gel gel Apply 4 g topically to the appropriate area as directed 4 (Four) Times a Day As Needed.   More than a month Morning    hydrALAZINE (APRESOLINE) 10 MG tablet Take 1 tablet by mouth Every 8 (Eight) Hours. Only if bp >160   More than a month Morning       Current Meds:   Current Facility-Administered Medications   Medication Dose Route Frequency Provider Last Rate Last Admin    acetaminophen (TYLENOL) tablet 650 mg  650 mg Oral Q4H PRN Milton Lewis APRN   650 mg at 01/19/25 0421    apixaban (ELIQUIS) tablet 2.5 mg  2.5 mg Oral BID Milton Lewis APRN   2.5 mg at 01/19/25 0909    atorvastatin (LIPITOR) tablet 40 mg  40 mg Oral Nightly Milton Lewis APRN   40 mg at 01/18/25 2222    sennosides-docusate (PERICOLACE) 8.6-50 MG per tablet 2 tablet  2  tablet Oral BID PRN Milton Lewis APRN        And    polyethylene glycol (MIRALAX) packet 17 g  17 g Oral Daily PRN Milton Lewis APRN        And    bisacodyl (DULCOLAX) EC tablet 5 mg  5 mg Oral Daily PRN Milton Lewis APRN        And    bisacodyl (DULCOLAX) suppository 10 mg  10 mg Rectal Daily PRN Milton Lewis APRN        carvedilol (COREG) tablet 12.5 mg  12.5 mg Oral Q12H Milton Lewis APRN   12.5 mg at 01/19/25 0909    dextrose (D50W) (25 g/50 mL) IV injection 25 g  25 g Intravenous Q15 Min PRN Milton Lewis APRN        dextrose (GLUTOSE) oral gel 15 g  15 g Oral Q15 Min PRN Milton Lewis APRN        furosemide (LASIX) injection 80 mg  80 mg Intravenous Daily Milton Lewis APRN   80 mg at 01/19/25 0908    glucagon (GLUCAGEN) injection 1 mg  1 mg Intramuscular Q15 Min PRN Milton Lewis APRN        insulin lispro (HUMALOG/ADMELOG) injection 2-7 Units  2-7 Units Subcutaneous 4x Daily AC & at Bedtime Milton Lewis APRN        isosorbide dinitrate (ISORDIL) tablet 10 mg  10 mg Oral TID - Nitrates Milton Lewis APRN   10 mg at 01/19/25 0900    nitroglycerin (NITROSTAT) SL tablet 0.4 mg  0.4 mg Sublingual Q5 Min PRN Milton Lewis APRN        O2 (OXYGEN)  2 L/min Inhalation Nightly Milton Lewis APRN   2 L/min at 01/18/25 2222    sodium chloride 0.9 % flush 10 mL  10 mL Intravenous PRN Milton Lewis APRN        sodium chloride 0.9 % flush 10 mL  10 mL Intravenous Q12H Milton Lewis APRN   10 mL at 01/19/25 0909    sodium chloride 0.9 % flush 10 mL  10 mL Intravenous PRN Milton Lewis APRN        sodium chloride 0.9 % infusion 40 mL  40 mL Intravenous PRN Milton Lewis APRN        venlafaxine XR (EFFEXOR-XR) 24 hr capsule 75 mg  75 mg Oral Daily Lewis, Kaidi, APRN   75 mg at 01/19/25 0909       Allergies:  Allergies   Allergen Reactions    Ibuprofen Other (See Comments)     Decrease urine output         Social History:   Social History     Socioeconomic History    Marital status: Single   Tobacco Use    Smoking status: Never      "Passive exposure: Never    Smokeless tobacco: Never   Vaping Use    Vaping status: Never Used   Substance and Sexual Activity    Alcohol use: Never    Drug use: Never    Sexual activity: Defer        Family History:  Family History   Problem Relation Age of Onset    Heart disease Mother         Review of Systems: as per HPI, in addition:    General:      + Weakness / fatigue,                       No fevers / chills                       no weight loss  HEENT;       no dysphagia   Neck:           No swelling  Respiratory: no cough / congestion/wheezing                      Complains of shortness of air   CV:              No chest pain                       No palpitations  Abdomen/GI: no nausea / vomiting                      No diarrhea, no constipation                      No abdominal pain  :             no dysuria  Endocrine:   No heat or cold intolerance  Skin:           Denies rashes or skin lesions   Vascular:   Improving edema  Musculoskeletal: Denies joint pain or deformities      Physical Exam:  Vitals:   Temp (24hrs), Av.6 °F (36.4 °C), Min:96.7 °F (35.9 °C), Max:98.2 °F (36.8 °C)    /69 (BP Location: Right arm, Patient Position: Lying)   Pulse 67   Temp 98.2 °F (36.8 °C) (Oral)   Resp 18   Ht 170.2 cm (67\")   Wt 90.7 kg (200 lb)   SpO2 94%   BMI 31.32 kg/m²   Intake/Output:     Intake/Output Summary (Last 24 hours) at 2025 1114  Last data filed at 2025 1000  Gross per 24 hour   Intake 600 ml   Output 1200 ml   Net -600 ml        Wt Readings from Last 1 Encounters:   25 0506 90.7 kg (200 lb)   25 1900 97.5 kg (215 lb)       Exam:    General Appearance:  Awake, alert, no acute distress  Obese, chronically ill -appearing   Head and Face:  Normocephalic, atraumatic   Eyes:  No icterus   ENMT: Moist mucosa   Neck: Supple  no jugular venous distention   Pulmonary:  Respiratory effort: Normal  Auscultation of lungs: Clear bilaterally  No wheezes or rhonchi  Good air " movement, good expansion   Chest wall:  No tenderness or deformity   Cardiovascular:  Auscultation of the heart: Normal rhythm, no murmurs  1+ edema of bilateral lower extremities   Abdomen:  Abdomen: soft, nontender, normal bowel sounds    Musculoskeletal: No joint swelling or gross deformities    Skin: Skin inspection and palpation: No rash   Lymphatic: No focal lymphadenopathy   Psychiatric: Judgement and insight: normal  Orientation to person place and time: normal  Mood and affect: normal       DATA:  Radiology and Labs:  The following labs independently reviewed by me, additional AM labs ordered  Old records independently reviewed showing CHF/stage IV CKD  The following radiologic studies independently viewed by me, findings chest x-ray with stable cardiac silhouette mild vascular prominence no effusions  Interval notes, chart personally reviewed by me.  I have reviewed and summarized old records as detailed above  Plan of care discussed with patient herself at bedside   New problems include CHF exacerbation      Risk/ complexity of medical care/ medical decision making: High complexity, CHF stage IV CKD, diuretic management  Chronic illness with severe exacerbation or progression: CHF/CKD      Labs:   Recent Results (from the past 24 hours)   Comprehensive Metabolic Panel    Collection Time: 01/18/25  3:47 PM    Specimen: Blood   Result Value Ref Range    Glucose 100 (H) 65 - 99 mg/dL    BUN 37 (H) 8 - 23 mg/dL    Creatinine 2.52 (H) 0.57 - 1.00 mg/dL    Sodium 138 136 - 145 mmol/L    Potassium 4.6 3.5 - 5.2 mmol/L    Chloride 105 98 - 107 mmol/L    CO2 22.0 22.0 - 29.0 mmol/L    Calcium 8.7 8.6 - 10.5 mg/dL    Total Protein 6.4 6.0 - 8.5 g/dL    Albumin 3.1 (L) 3.5 - 5.2 g/dL    ALT (SGPT) 11 1 - 33 U/L    AST (SGOT) 24 1 - 32 U/L    Alkaline Phosphatase 214 (H) 39 - 117 U/L    Total Bilirubin 1.1 0.0 - 1.2 mg/dL    Globulin 3.3 gm/dL    A/G Ratio 0.9 g/dL    BUN/Creatinine Ratio 14.7 7.0 - 25.0    Anion Gap  11.0 5.0 - 15.0 mmol/L    eGFR 18.2 (L) >60.0 mL/min/1.73   BNP    Collection Time: 01/18/25  3:47 PM    Specimen: Blood   Result Value Ref Range    proBNP 8,739.0 (H) 0.0 - 1,800.0 pg/mL   High Sensitivity Troponin T    Collection Time: 01/18/25  3:47 PM    Specimen: Blood   Result Value Ref Range    HS Troponin T 47 (H) <14 ng/L   Green Top (Gel)    Collection Time: 01/18/25  3:47 PM   Result Value Ref Range    Extra Tube Hold for add-ons.    Lavender Top    Collection Time: 01/18/25  3:47 PM   Result Value Ref Range    Extra Tube hold for add-on    Gold Top - SST    Collection Time: 01/18/25  3:47 PM   Result Value Ref Range    Extra Tube Hold for add-ons.    Light Blue Top    Collection Time: 01/18/25  3:47 PM   Result Value Ref Range    Extra Tube Hold for add-ons.    CBC Auto Differential    Collection Time: 01/18/25  3:47 PM    Specimen: Blood   Result Value Ref Range    WBC 4.27 3.40 - 10.80 10*3/mm3    RBC 3.22 (L) 3.77 - 5.28 10*6/mm3    Hemoglobin 9.6 (L) 12.0 - 15.9 g/dL    Hematocrit 30.7 (L) 34.0 - 46.6 %    MCV 95.3 79.0 - 97.0 fL    MCH 29.8 26.6 - 33.0 pg    MCHC 31.3 (L) 31.5 - 35.7 g/dL    RDW 14.8 12.3 - 15.4 %    RDW-SD 51.5 37.0 - 54.0 fl    MPV 10.4 6.0 - 12.0 fL    Platelets 132 (L) 140 - 450 10*3/mm3    Neutrophil % 61.0 42.7 - 76.0 %    Lymphocyte % 24.1 19.6 - 45.3 %    Monocyte % 11.7 5.0 - 12.0 %    Eosinophil % 2.8 0.3 - 6.2 %    Basophil % 0.2 0.0 - 1.5 %    Immature Grans % 0.2 0.0 - 0.5 %    Neutrophils, Absolute 2.60 1.70 - 7.00 10*3/mm3    Lymphocytes, Absolute 1.03 0.70 - 3.10 10*3/mm3    Monocytes, Absolute 0.50 0.10 - 0.90 10*3/mm3    Eosinophils, Absolute 0.12 0.00 - 0.40 10*3/mm3    Basophils, Absolute 0.01 0.00 - 0.20 10*3/mm3    Immature Grans, Absolute 0.01 0.00 - 0.05 10*3/mm3    nRBC 0.0 0.0 - 0.2 /100 WBC   ECG 12 Lead ED Triage Standing Order; SOA    Collection Time: 01/18/25  3:47 PM   Result Value Ref Range    QT Interval 487 ms    QTC Interval 537 ms   High  Sensitivity Troponin T 1Hr    Collection Time: 01/18/25  4:57 PM    Specimen: Blood   Result Value Ref Range    HS Troponin T 49 (H) <14 ng/L    Troponin T Numeric Delta 2 ng/L    Troponin T % Delta 4 Abnormal if >/= 20%   Urinalysis With Microscopic If Indicated (No Culture) - Urine, Clean Catch    Collection Time: 01/18/25  8:30 PM    Specimen: Urine, Clean Catch   Result Value Ref Range    Color, UA Yellow Yellow, Straw    Appearance, UA Clear Clear    pH, UA 7.0 5.0 - 8.0    Specific Gravity, UA 1.006 1.005 - 1.030    Glucose, UA Negative Negative    Ketones, UA Negative Negative    Bilirubin, UA Negative Negative    Blood, UA Negative Negative    Protein, UA Negative Negative    Leuk Esterase, UA Negative Negative    Nitrite, UA Positive (A) Negative    Urobilinogen, UA 0.2 E.U./dL 0.2 - 1.0 E.U./dL   Urinalysis, Microscopic Only - Urine, Clean Catch    Collection Time: 01/18/25  8:30 PM    Specimen: Urine, Clean Catch   Result Value Ref Range    RBC, UA 0-2 None Seen, 0-2 /HPF    WBC, UA 0-2 None Seen, 0-2 /HPF    Bacteria, UA 4+ (A) None Seen /HPF    Squamous Epithelial Cells, UA 0-2 None Seen, 0-2 /HPF    Hyaline Casts, UA None Seen None Seen /LPF    Methodology Automated Microscopy    POC Glucose Once    Collection Time: 01/18/25 10:00 PM    Specimen: Blood   Result Value Ref Range    Glucose 181 (H) 70 - 130 mg/dL   ECG 12 Lead Dyspnea    Collection Time: 01/19/25  4:41 AM   Result Value Ref Range    QT Interval 538 ms    QTC Interval 506 ms   Basic Metabolic Panel    Collection Time: 01/19/25  5:23 AM    Specimen: Blood   Result Value Ref Range    Glucose 64 (L) 65 - 99 mg/dL    BUN 39 (H) 8 - 23 mg/dL    Creatinine 2.51 (H) 0.57 - 1.00 mg/dL    Sodium 136 136 - 145 mmol/L    Potassium 4.8 3.5 - 5.2 mmol/L    Chloride 107 98 - 107 mmol/L    CO2 19.0 (L) 22.0 - 29.0 mmol/L    Calcium 8.6 8.6 - 10.5 mg/dL    BUN/Creatinine Ratio 15.5 7.0 - 25.0    Anion Gap 10.0 5.0 - 15.0 mmol/L    eGFR 18.3 (L) >60.0  mL/min/1.73   CBC Auto Differential    Collection Time: 01/19/25  5:23 AM    Specimen: Blood   Result Value Ref Range    WBC 3.46 3.40 - 10.80 10*3/mm3    RBC 3.06 (L) 3.77 - 5.28 10*6/mm3    Hemoglobin 9.2 (L) 12.0 - 15.9 g/dL    Hematocrit 28.7 (L) 34.0 - 46.6 %    MCV 93.8 79.0 - 97.0 fL    MCH 30.1 26.6 - 33.0 pg    MCHC 32.1 31.5 - 35.7 g/dL    RDW 15.0 12.3 - 15.4 %    RDW-SD 51.2 37.0 - 54.0 fl    MPV 10.2 6.0 - 12.0 fL    Platelets 127 (L) 140 - 450 10*3/mm3    Neutrophil % 53.1 42.7 - 76.0 %    Lymphocyte % 29.5 19.6 - 45.3 %    Monocyte % 11.6 5.0 - 12.0 %    Eosinophil % 4.9 0.3 - 6.2 %    Basophil % 0.6 0.0 - 1.5 %    Immature Grans % 0.3 0.0 - 0.5 %    Neutrophils, Absolute 1.84 1.70 - 7.00 10*3/mm3    Lymphocytes, Absolute 1.02 0.70 - 3.10 10*3/mm3    Monocytes, Absolute 0.40 0.10 - 0.90 10*3/mm3    Eosinophils, Absolute 0.17 0.00 - 0.40 10*3/mm3    Basophils, Absolute 0.02 0.00 - 0.20 10*3/mm3    Immature Grans, Absolute 0.01 0.00 - 0.05 10*3/mm3    nRBC 0.0 0.0 - 0.2 /100 WBC   Hemoglobin A1c    Collection Time: 01/19/25  5:23 AM    Specimen: Blood   Result Value Ref Range    Hemoglobin A1C 6.30 (H) 4.80 - 5.60 %   High Sensitivity Troponin T    Collection Time: 01/19/25  5:23 AM    Specimen: Blood   Result Value Ref Range    HS Troponin T 44 (H) <14 ng/L   POC Glucose Once    Collection Time: 01/19/25  7:53 AM    Specimen: Blood   Result Value Ref Range    Glucose 81 70 - 130 mg/dL       Radiology:  Imaging Results (Last 24 Hours)       Procedure Component Value Units Date/Time    XR Chest 1 View [[id number]] Collected: 01/18/25 1618     Updated: 01/18/25 1624    Narrative:      XR CHEST 1 VW-     INDICATION: Shortness of breath     COMPARISON: Chest radiographs dating back to 4/3/2022       Impression:      Stable enlarged cardiac silhouette. Central pulmonary  vasculature remains relatively distinct. No focal consolidation. No  measurable pleural effusion or pneumothorax. No focal  "osseous  abnormality.     This report was finalized on 1/18/2025 4:21 PM by Dr. Rodo Armas M.D on Workstation: [id number]                    ASSESSMENT:   Stage IV CKD, near baseline    CHF exacerbation, chronic systolic.  EF 44%  Obesity  Anemia of CKD  Type 2 diabetes mellitus      DISCUSSION/PLAN:   Her renal function remains stable, near baseline  Volume status and symptoms are significantly improved with IV diuretics  She did receive an additional dose of IV Lasix this morning  can likely transition her back to her oral diuretics, clinically euvolemic today on exam, stable oxygenation and her weight is back to her usual \"dry weight\" okay to discharge home today from a renal standpoint    She can follow-up with her usual nephrologist, Dr. Ryann Foster in 2 to 4 weeks.  She should contact our office at [phone] tomorrow to schedule an appointment    I appreciate the consult request.  Please send me a secure chat message with any nonurgent questions regarding patient care.  For any urgent patient care issues please call my office number below.      Isaiah Foster MD  [facility]  Office phone number: [phone]  Answering service phone number: [phone]      1/19/2025        Dictation via Dragon dictation software    "

## 2025-01-19 NOTE — OUTREACH NOTE
Prep Survey      Flowsheet Row Responses   [facility] facility patient discharged from? [facility]   Is LACE score < 7 ? No   Eligibility Readm Mgmt   Discharge diagnosis CHF exacerbation   Does the patient have one of the following disease processes/diagnoses(primary or secondary)? CHF   Does the patient have Home health ordered? No   Is there a DME ordered? No   Prep survey completed? Yes            Becky MCDONALD - Registered Nurse

## 2025-01-19 NOTE — PLAN OF CARE
Goal Outcome Evaluation:         Pt stable for discharge home. AVS given, instructed follow up with her Nephrologist. Transport by family.

## 2025-01-19 NOTE — DISCHARGE SUMMARY
ED OBSERVATION PROGRESS/DISCHARGE SUMMARY    Date of Admission: 1/18/2025   LOS: 0 days   PCP: Dannie Mathews MD    Final Diagnosis CHF exacerbation      Subjective     Hospital Outcome: Lowell Min is a 85 y.o. female with medical history significant for CKD 4, anemia, A-fib, T2DM, and heart failure presents with dependent edema, weight gain, and mild dyspnea.  She reports she first noticed swelling in bilateral lower legs around 3 days ago, and she reports her baseline weight is around 200 pounds.  Today she is 215 pounds on the bed scale.  She has 3+ pitting edema from bilateral ankles to knees.  She reports mild exertional dyspnea.  Denies any appetite changes, denies shortness of air at rest.  Denies cough, chest pain, recent illness, or changes in her voiding pattern.  She denies any recent increasing taking fluid or salt.  She follows with Dr. Garzon daily for nephrology and Dr. Orozco for cardiology.     1/19/2025: Patient feels much better this morning and has had good urinary output since IV diuretics were given. Patient was on Oxygen during the night but was weaned this morning without any respiratory issues. Nephrology saw and evaluated the patient and no further inpatient testing or treatment indicated at this time. Patient ok for discharge from nephrology's standpoint. All labs and imaging findings as well as specialists recommendations discussed with patient who is agreeable for discharge at this time.     ROS:  General: no fevers, chills  Respiratory: no cough, dyspnea  Cardiovascular: no chest pain, palpitations  Abdomen: No abdominal pain, nausea, vomiting, or diarrhea  Neurologic: No focal weakness    Objective   Physical Exam:  I have reviewed the vital signs.  Temp:  [96.7 °F (35.9 °C)-98 °F (36.7 °C)] 98 °F (36.7 °C)  Heart Rate:  [55-74] 55  Resp:  [18] 18  BP: (113-138)/(64-82) 113/69  General Appearance:    Alert, cooperative, no distress  Head:    Normocephalic, atraumatic, Normal  hearing   Eyes:    Sclerae anicteric, EOMI  Neck:   Supple, nontender  Lungs: Clear to auscultation bilaterally, respirations unlabored on RA  Heart: Regular rate and rhythm, S1 and S2 normal, no murmur  Abdomen:  Soft, nontender, bowel sounds active, nondistended  Extremities: No clubbing, cyanosis, or edema to lower extremities  Pulses:  2+ and symmetric in distal lower extremities  Skin: No rashes   Neurologic: Oriented x3, Normal strength to extremities    Results Review:    I have reviewed the labs, radiology results and diagnostic studies.    Results from last 7 days   Lab Units 01/19/25  0523   WBC 10*3/mm3 3.46   HEMOGLOBIN g/dL 9.2*   HEMATOCRIT % 28.7*   PLATELETS 10*3/mm3 127*     Results from last 7 days   Lab Units 01/19/25  0523 01/18/25  1547   SODIUM mmol/L 136 138   POTASSIUM mmol/L 4.8 4.6   CHLORIDE mmol/L 107 105   CO2 mmol/L 19.0* 22.0   BUN mg/dL 39* 37*   CREATININE mg/dL 2.51* 2.52*   CALCIUM mg/dL 8.6 8.7   BILIRUBIN mg/dL  --  1.1   ALK PHOS U/L  --  214*   ALT (SGPT) U/L  --  11   AST (SGOT) U/L  --  24   GLUCOSE mg/dL 64* 100*     Imaging Results (Last 24 Hours)       Procedure Component Value Units Date/Time    XR Chest 1 View [[id number]] Collected: 01/18/25 1618     Updated: 01/18/25 1624    Narrative:      XR CHEST 1 VW-     INDICATION: Shortness of breath     COMPARISON: Chest radiographs dating back to 4/3/2022       Impression:      Stable enlarged cardiac silhouette. Central pulmonary  vasculature remains relatively distinct. No focal consolidation. No  measurable pleural effusion or pneumothorax. No focal osseous  abnormality.     This report was finalized on 1/18/2025 4:21 PM by Dr. Rodo Armas M.D on Workstation: [id number]               I have reviewed the medications.     Discharge Medications        ASK your doctor about these medications        Instructions Start Date   acetaminophen 325 MG tablet  Commonly known as: TYLENOL   650 mg, Oral, Every 4 Hours PRN       apixaban 2.5 MG tablet tablet  Commonly known as: ELIQUIS   2.5 mg, Oral, 2 Times Daily      atorvastatin 40 MG tablet  Commonly known as: LIPITOR   1 tablet, Every Night at Bedtime      carvedilol 12.5 MG tablet  Commonly known as: COREG   Take 1 tablet by mouth 2 (two) times daily with meals.      Diclofenac Sodium 1 % gel gel  Commonly known as: VOLTAREN   4 g, 4 Times Daily PRN      furosemide 40 MG tablet  Commonly known as: LASIX   40 mg, Oral, Daily      hydrALAZINE 10 MG tablet  Commonly known as: APRESOLINE   10 mg, Oral, Every 8 Hours Scheduled, Only if bp >160      isosorbide dinitrate 10 MG tablet  Commonly known as: ISORDIL   10 mg, Oral, 3 Times Daily (Nitrates)      lidocaine 5 %  Commonly known as: LIDODERM   1 patch, Daily PRN      O2  Commonly known as: OXYGEN   2 L/min, Nightly      sennosides-docusate 8.6-50 MG per tablet  Commonly known as: PERICOLACE   2 tablets, Oral, 2 Times Daily PRN      venlafaxine XR 75 MG 24 hr capsule  Commonly known as: EFFEXOR-XR   1 capsule, Daily              ---------------------------------------------------------------------------------------------  Assessment & Plan   Assessment/Problem List    CHF exacerbation      Plan:    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  -Continuous cardiac monitoring  -TTE in September 2024 show EF = 44.1%, there has multiple segments of ventricular wall hypokinesis, severe bilateral atrial dilation and severe tricuspid regurgitation  -Strict I&O tracking  -Daily weight  -Improved with IV diuretics, ok to resume home PO regimen   - Nephrology saw and evaluated the patient and no further testing or treatment needed at this time. Patient ok for discharge from Nephrology's standpoint.      T2DM  -Resume home regimen      Anemia  -Hemoglobin is 9.6, near her baseline  -Likely renal etiology     A-fib  -Continue home Eliquis  -Continuous  heart monitoring    Disposition: Discharge to home     Follow-up after Discharge: PCP in 1-2 weeks; Nephrology in 2-4 weeks     This note will serve as a discharge summary    Sol Tristan PA-C 01/19/25 07:24 EST    I have worn appropriate PPE during this patient encounter, sanitized my hands both with entering and exiting patient's room.      35 minutes has been spent by [facility] providers in the care of this patient while under observation status

## 2025-01-19 NOTE — OUTREACH NOTE
CHF Week 3 Survey      Flowsheet Row Responses   [facility] patient discharged from? [place]   Does the patient have one of the following disease processes/diagnoses(primary or secondary)? CHF   Week 3 attempt successful? No   Unsuccessful attempts Attempt 1            No Castellon Registered Nurse

## 2025-01-19 NOTE — PLAN OF CARE
Goal Outcome Evaluation:     Patient admitted for CHF exacerbation/SOB  Patient placed on 2L O2 (per provider)  Patient also on diuretics, using a purewick.  Nephrology consulting.

## 2025-01-20 ENCOUNTER — READMISSION MANAGEMENT (OUTPATIENT)
Dept: CALL CENTER | Facility: HOSPITAL | Age: 85
End: 2025-01-20
Payer: MEDICARE

## 2025-01-20 LAB
QT INTERVAL: 538 MS
QTC INTERVAL: 506 MS

## 2025-01-20 NOTE — OUTREACH NOTE
CHF Week 1 Survey      Flowsheet Row Responses   [facility] patient discharged from? [place]   Does the patient have one of the following disease processes/diagnoses(primary or secondary)? CHF   CHF Week 1 attempt successful? Yes   Call start time 1315   Call end time 1326   Discharge diagnosis CHF exacerbation   Meds reviewed with patient/caregiver? Yes   Is the patient having any side effects they believe may be caused by any medication additions or changes? No   Does the patient have all medications ordered at discharge? N/A   Is the patient taking all medications as directed (includes completed medication regime)? Yes   Comments regarding appointments Advised for patient to follow up with the HF clinic. Patient states that her daughter works and she will want to check with her before scheduling. [facility] HF clinic information and contact # given to the patient.   Does the patient have a primary care provider?  Yes   Does the patient have an appointment with their PCP within 7 days of discharge? No   What is preventing the patient from scheduling follow up appointments within 7 days of discharge? Haven't had time   Nursing Interventions Advised patient to make appointment, Educated patient on importance of making appointment   What is the Home health agency?  [facility]   Has home health visited the patient within 72 hours of discharge? Call prior to 72 hours   DME comments Pt wears home O2 at night.   Pulse Ox monitoring None   Comments Patient reports that her SOA and edema are at baseline. Patient monitors daily wts at home, she reports. Patient c/o nausea without emesis today and decreased appetite. Pt has tolerated toast this morning she reports.   Did the patient receive a copy of their discharge instructions? Yes   Nursing interventions Reviewed instructions with patient   What is the patient's perception of their health status since discharge? New  symptoms unrelated to diagnosis   Nursing interventions Nurse provided patient education   Is the patient able to teach back signs and symptoms of worsening condition? (i.e. weight gain, shortness of air, etc.) Yes   If the patient is a current smoker, are they able to teach back resources for cessation? Not a smoker   Is the patient/caregiver able to teach back the hierarchy of who to call/visit for symptoms/problems? PCP, Specialist, Home health nurse, Urgent Care, ED, 911 Yes   Is the patient able to teach back Heart Failure Zones? Yes   CHF Zone this Call Green Zone   Green Zone Patient reports doing well, No new or worsening shortness of breath, Physical activity level is normal for you, No chest pain   Green Zone Interventions Daily weight check, Meds as directed, Follow up visits planned, Low sodium diet    CHF Week 1 call completed? Yes   Revoked No further contact(revokes)-requires comment   Call end time [id number]            Samantha HILL - Registered Nurse

## 2025-07-25 ENCOUNTER — INPATIENT HOSPITAL (OUTPATIENT)
Dept: URBAN - METROPOLITAN AREA HOSPITAL 107 | Facility: HOSPITAL | Age: 85
End: 2025-07-25
Payer: MEDICARE

## 2025-07-25 DIAGNOSIS — K63.5 POLYP OF COLON: ICD-10-CM

## 2025-07-25 DIAGNOSIS — K62.1 RECTAL POLYP: ICD-10-CM

## 2025-07-25 DIAGNOSIS — K62.89 OTHER SPECIFIED DISEASES OF ANUS AND RECTUM: ICD-10-CM

## 2025-07-25 DIAGNOSIS — Z79.01 LONG TERM (CURRENT) USE OF ANTICOAGULANTS: ICD-10-CM

## 2025-07-25 DIAGNOSIS — Z53.8 PROCEDURE AND TREATMENT NOT CARRIED OUT FOR OTHER REASONS: ICD-10-CM

## 2025-07-25 DIAGNOSIS — K62.5 HEMORRHAGE OF ANUS AND RECTUM: ICD-10-CM

## 2025-07-25 PROCEDURE — 45330 DIAGNOSTIC SIGMOIDOSCOPY: CPT | Performed by: INTERNAL MEDICINE

## 2025-07-26 ENCOUNTER — INPATIENT HOSPITAL (OUTPATIENT)
Dept: URBAN - METROPOLITAN AREA HOSPITAL 107 | Facility: HOSPITAL | Age: 85
End: 2025-07-26
Payer: MEDICARE

## 2025-07-26 DIAGNOSIS — K62.5 HEMORRHAGE OF ANUS AND RECTUM: ICD-10-CM

## 2025-07-26 PROCEDURE — 99232 SBSQ HOSP IP/OBS MODERATE 35: CPT | Performed by: INTERNAL MEDICINE

## 2025-07-27 PROCEDURE — 99232 SBSQ HOSP IP/OBS MODERATE 35: CPT | Performed by: INTERNAL MEDICINE

## 2025-07-28 ENCOUNTER — INPATIENT HOSPITAL (OUTPATIENT)
Dept: URBAN - METROPOLITAN AREA HOSPITAL 107 | Facility: HOSPITAL | Age: 85
End: 2025-07-28
Payer: MEDICARE

## 2025-07-28 DIAGNOSIS — K92.1 MELENA: ICD-10-CM

## 2025-07-28 DIAGNOSIS — D64.9 ANEMIA, UNSPECIFIED: ICD-10-CM

## 2025-07-28 PROCEDURE — 99232 SBSQ HOSP IP/OBS MODERATE 35: CPT | Mod: FS | Performed by: PHYSICIAN ASSISTANT

## 2025-07-29 ENCOUNTER — INPATIENT HOSPITAL (OUTPATIENT)
Dept: URBAN - METROPOLITAN AREA HOSPITAL 107 | Facility: HOSPITAL | Age: 85
End: 2025-07-29
Payer: MEDICARE

## 2025-07-29 DIAGNOSIS — D12.0 BENIGN NEOPLASM OF CECUM: ICD-10-CM

## 2025-07-29 DIAGNOSIS — D12.5 BENIGN NEOPLASM OF SIGMOID COLON: ICD-10-CM

## 2025-07-29 DIAGNOSIS — D50.9 IRON DEFICIENCY ANEMIA, UNSPECIFIED: ICD-10-CM

## 2025-07-29 DIAGNOSIS — K92.1 MELENA: ICD-10-CM

## 2025-07-29 DIAGNOSIS — D12.2 BENIGN NEOPLASM OF ASCENDING COLON: ICD-10-CM

## 2025-07-29 DIAGNOSIS — D12.3 BENIGN NEOPLASM OF TRANSVERSE COLON: ICD-10-CM

## 2025-07-29 PROCEDURE — 45380 COLONOSCOPY AND BIOPSY: CPT | Mod: 59 | Performed by: INTERNAL MEDICINE

## 2025-07-29 PROCEDURE — 45385 COLONOSCOPY W/LESION REMOVAL: CPT | Performed by: INTERNAL MEDICINE

## 2025-07-30 ENCOUNTER — INPATIENT HOSPITAL (OUTPATIENT)
Dept: URBAN - METROPOLITAN AREA HOSPITAL 107 | Facility: HOSPITAL | Age: 85
End: 2025-07-30
Payer: MEDICARE

## 2025-07-30 DIAGNOSIS — K92.1 MELENA: ICD-10-CM

## 2025-07-30 PROCEDURE — 99232 SBSQ HOSP IP/OBS MODERATE 35: CPT | Performed by: PHYSICIAN ASSISTANT

## 2025-07-31 PROCEDURE — 99232 SBSQ HOSP IP/OBS MODERATE 35: CPT | Performed by: PHYSICIAN ASSISTANT
